# Patient Record
Sex: MALE | Race: WHITE | Employment: UNEMPLOYED | URBAN - METROPOLITAN AREA
[De-identification: names, ages, dates, MRNs, and addresses within clinical notes are randomized per-mention and may not be internally consistent; named-entity substitution may affect disease eponyms.]

---

## 2018-05-07 ENCOUNTER — HOSPITAL ENCOUNTER (INPATIENT)
Age: 43
LOS: 6 days | Discharge: HOME | DRG: 432 | End: 2018-05-13
Attending: INTERNAL MEDICINE | Admitting: INTERNAL MEDICINE

## 2018-05-07 ENCOUNTER — APPOINTMENT (OUTPATIENT)
Dept: CT IMAGING | Age: 43
DRG: 432 | End: 2018-05-07

## 2018-05-07 ENCOUNTER — APPOINTMENT (OUTPATIENT)
Dept: ULTRASOUND IMAGING | Age: 43
DRG: 432 | End: 2018-05-07

## 2018-05-07 ENCOUNTER — APPOINTMENT (OUTPATIENT)
Dept: GENERAL RADIOLOGY | Age: 43
DRG: 432 | End: 2018-05-07

## 2018-05-07 DIAGNOSIS — E87.6 HYPOKALEMIA: ICD-10-CM

## 2018-05-07 DIAGNOSIS — R10.13 ABDOMINAL PAIN, EPIGASTRIC: ICD-10-CM

## 2018-05-07 DIAGNOSIS — K92.0 HEMATEMESIS WITH NAUSEA: ICD-10-CM

## 2018-05-07 DIAGNOSIS — K85.20 ALCOHOL-INDUCED ACUTE PANCREATITIS, UNSPECIFIED COMPLICATION STATUS: Primary | ICD-10-CM

## 2018-05-07 DIAGNOSIS — F10.29 ALCOHOL DEPENDENCE WITH UNSPECIFIED ALCOHOL-INDUCED DISORDER (HCC): ICD-10-CM

## 2018-05-07 LAB
ALBUMIN SERPL-MCNC: 4.1 G/DL (ref 3.5–5.2)
ALP BLD-CCNC: 206 U/L (ref 40–129)
ALT SERPL-CCNC: 116 U/L (ref 0–40)
AMMONIA: 60 UMOL/L (ref 16–60)
ANION GAP SERPL CALCULATED.3IONS-SCNC: 19 MMOL/L (ref 7–16)
AST SERPL-CCNC: 347 U/L (ref 0–39)
BASOPHILS ABSOLUTE: 0.05 E9/L (ref 0–0.2)
BASOPHILS RELATIVE PERCENT: 0.7 % (ref 0–2)
BILIRUB SERPL-MCNC: 9.3 MG/DL (ref 0–1.2)
BUN BLDV-MCNC: 4 MG/DL (ref 6–20)
CALCIUM SERPL-MCNC: 9.1 MG/DL (ref 8.6–10.2)
CHLORIDE BLD-SCNC: 88 MMOL/L (ref 98–107)
CO2: 25 MMOL/L (ref 22–29)
CREAT SERPL-MCNC: 0.6 MG/DL (ref 0.7–1.2)
EOSINOPHILS ABSOLUTE: 0 E9/L (ref 0.05–0.5)
EOSINOPHILS RELATIVE PERCENT: 0 % (ref 0–6)
ETHANOL: 248 MG/DL (ref 0–0.08)
GFR AFRICAN AMERICAN: >60
GFR NON-AFRICAN AMERICAN: >60 ML/MIN/1.73
GLUCOSE BLD-MCNC: 139 MG/DL (ref 74–109)
HCT VFR BLD CALC: 42 % (ref 37–54)
HEMOGLOBIN: 15.1 G/DL (ref 12.5–16.5)
IMMATURE GRANULOCYTES #: 0.04 E9/L
IMMATURE GRANULOCYTES %: 0.5 % (ref 0–5)
LACTIC ACID: 3.7 MMOL/L (ref 0.5–2.2)
LACTIC ACID: 4.9 MMOL/L (ref 0.5–2.2)
LIPASE: 448 U/L (ref 13–60)
LYMPHOCYTES ABSOLUTE: 1.24 E9/L (ref 1.5–4)
LYMPHOCYTES RELATIVE PERCENT: 16.8 % (ref 20–42)
MCH RBC QN AUTO: 34.3 PG (ref 26–35)
MCHC RBC AUTO-ENTMCNC: 36 % (ref 32–34.5)
MCV RBC AUTO: 95.5 FL (ref 80–99.9)
MONOCYTES ABSOLUTE: 0.81 E9/L (ref 0.1–0.95)
MONOCYTES RELATIVE PERCENT: 11 % (ref 2–12)
NEUTROPHILS ABSOLUTE: 5.25 E9/L (ref 1.8–7.3)
NEUTROPHILS RELATIVE PERCENT: 71 % (ref 43–80)
PDW BLD-RTO: 13.7 FL (ref 11.5–15)
PLATELET # BLD: 140 E9/L (ref 130–450)
PMV BLD AUTO: 10.3 FL (ref 7–12)
POTASSIUM SERPL-SCNC: 2.9 MMOL/L (ref 3.5–5)
RBC # BLD: 4.4 E12/L (ref 3.8–5.8)
SODIUM BLD-SCNC: 132 MMOL/L (ref 132–146)
TOTAL PROTEIN: 8.6 G/DL (ref 6.4–8.3)
WBC # BLD: 7.4 E9/L (ref 4.5–11.5)

## 2018-05-07 PROCEDURE — 85025 COMPLETE CBC W/AUTO DIFF WBC: CPT

## 2018-05-07 PROCEDURE — 76705 ECHO EXAM OF ABDOMEN: CPT

## 2018-05-07 PROCEDURE — 83690 ASSAY OF LIPASE: CPT

## 2018-05-07 PROCEDURE — 6360000002 HC RX W HCPCS: Performed by: STUDENT IN AN ORGANIZED HEALTH CARE EDUCATION/TRAINING PROGRAM

## 2018-05-07 PROCEDURE — 2580000003 HC RX 258: Performed by: STUDENT IN AN ORGANIZED HEALTH CARE EDUCATION/TRAINING PROGRAM

## 2018-05-07 PROCEDURE — 71046 X-RAY EXAM CHEST 2 VIEWS: CPT

## 2018-05-07 PROCEDURE — 2000000000 HC ICU R&B

## 2018-05-07 PROCEDURE — 6370000000 HC RX 637 (ALT 250 FOR IP): Performed by: INTERNAL MEDICINE

## 2018-05-07 PROCEDURE — 6360000004 HC RX CONTRAST MEDICATION: Performed by: RADIOLOGY

## 2018-05-07 PROCEDURE — 82140 ASSAY OF AMMONIA: CPT

## 2018-05-07 PROCEDURE — 83605 ASSAY OF LACTIC ACID: CPT

## 2018-05-07 PROCEDURE — 2580000003 HC RX 258: Performed by: INTERNAL MEDICINE

## 2018-05-07 PROCEDURE — 80048 BASIC METABOLIC PNL TOTAL CA: CPT

## 2018-05-07 PROCEDURE — 94760 N-INVAS EAR/PLS OXIMETRY 1: CPT

## 2018-05-07 PROCEDURE — 96365 THER/PROPH/DIAG IV INF INIT: CPT

## 2018-05-07 PROCEDURE — 96375 TX/PRO/DX INJ NEW DRUG ADDON: CPT

## 2018-05-07 PROCEDURE — 36415 COLL VENOUS BLD VENIPUNCTURE: CPT

## 2018-05-07 PROCEDURE — 74177 CT ABD & PELVIS W/CONTRAST: CPT

## 2018-05-07 PROCEDURE — 80307 DRUG TEST PRSMV CHEM ANLYZR: CPT

## 2018-05-07 PROCEDURE — 6360000002 HC RX W HCPCS: Performed by: INTERNAL MEDICINE

## 2018-05-07 PROCEDURE — 80053 COMPREHEN METABOLIC PANEL: CPT

## 2018-05-07 PROCEDURE — 99291 CRITICAL CARE FIRST HOUR: CPT

## 2018-05-07 RX ORDER — ONDANSETRON 2 MG/ML
4 INJECTION INTRAMUSCULAR; INTRAVENOUS EVERY 6 HOURS PRN
Status: DISCONTINUED | OUTPATIENT
Start: 2018-05-07 | End: 2018-05-13 | Stop reason: HOSPADM

## 2018-05-07 RX ORDER — POTASSIUM CHLORIDE 7.45 MG/ML
10 INJECTION INTRAVENOUS ONCE
Status: COMPLETED | OUTPATIENT
Start: 2018-05-07 | End: 2018-05-07

## 2018-05-07 RX ORDER — OMEPRAZOLE 20 MG/1
20 CAPSULE, DELAYED RELEASE ORAL DAILY PRN
Status: ON HOLD | COMMUNITY
End: 2019-01-23 | Stop reason: HOSPADM

## 2018-05-07 RX ORDER — LORAZEPAM 1 MG/1
4 TABLET ORAL
Status: DISCONTINUED | OUTPATIENT
Start: 2018-05-07 | End: 2018-05-13 | Stop reason: HOSPADM

## 2018-05-07 RX ORDER — LORAZEPAM 1 MG/1
3 TABLET ORAL
Status: DISCONTINUED | OUTPATIENT
Start: 2018-05-07 | End: 2018-05-13 | Stop reason: HOSPADM

## 2018-05-07 RX ORDER — LORAZEPAM 1 MG/1
1 TABLET ORAL
Status: DISCONTINUED | OUTPATIENT
Start: 2018-05-07 | End: 2018-05-13 | Stop reason: HOSPADM

## 2018-05-07 RX ORDER — SODIUM CHLORIDE 0.9 % (FLUSH) 0.9 %
10 SYRINGE (ML) INJECTION EVERY 12 HOURS SCHEDULED
Status: DISCONTINUED | OUTPATIENT
Start: 2018-05-07 | End: 2018-05-13 | Stop reason: HOSPADM

## 2018-05-07 RX ORDER — LORAZEPAM 2 MG/ML
3 INJECTION INTRAMUSCULAR
Status: DISCONTINUED | OUTPATIENT
Start: 2018-05-07 | End: 2018-05-13 | Stop reason: HOSPADM

## 2018-05-07 RX ORDER — ONDANSETRON 2 MG/ML
4 INJECTION INTRAMUSCULAR; INTRAVENOUS ONCE
Status: COMPLETED | OUTPATIENT
Start: 2018-05-07 | End: 2018-05-07

## 2018-05-07 RX ORDER — LORAZEPAM 2 MG/ML
1 INJECTION INTRAMUSCULAR
Status: DISCONTINUED | OUTPATIENT
Start: 2018-05-07 | End: 2018-05-13 | Stop reason: HOSPADM

## 2018-05-07 RX ORDER — LORAZEPAM 2 MG/ML
4 INJECTION INTRAMUSCULAR
Status: DISCONTINUED | OUTPATIENT
Start: 2018-05-07 | End: 2018-05-13 | Stop reason: HOSPADM

## 2018-05-07 RX ORDER — POTASSIUM CHLORIDE 20 MEQ/1
20 TABLET, EXTENDED RELEASE ORAL ONCE
Status: COMPLETED | OUTPATIENT
Start: 2018-05-07 | End: 2018-05-07

## 2018-05-07 RX ORDER — SODIUM CHLORIDE 0.9 % (FLUSH) 0.9 %
10 SYRINGE (ML) INJECTION PRN
Status: DISCONTINUED | OUTPATIENT
Start: 2018-05-07 | End: 2018-05-13 | Stop reason: HOSPADM

## 2018-05-07 RX ORDER — POTASSIUM CHLORIDE AND SODIUM CHLORIDE 450; 150 MG/100ML; MG/100ML
INJECTION, SOLUTION INTRAVENOUS CONTINUOUS
Status: DISCONTINUED | OUTPATIENT
Start: 2018-05-07 | End: 2018-05-08

## 2018-05-07 RX ORDER — LORAZEPAM 1 MG/1
2 TABLET ORAL
Status: DISCONTINUED | OUTPATIENT
Start: 2018-05-07 | End: 2018-05-13 | Stop reason: HOSPADM

## 2018-05-07 RX ORDER — 0.9 % SODIUM CHLORIDE 0.9 %
1000 INTRAVENOUS SOLUTION INTRAVENOUS ONCE
Status: COMPLETED | OUTPATIENT
Start: 2018-05-07 | End: 2018-05-07

## 2018-05-07 RX ORDER — LORAZEPAM 2 MG/ML
2 INJECTION INTRAMUSCULAR
Status: DISCONTINUED | OUTPATIENT
Start: 2018-05-07 | End: 2018-05-13 | Stop reason: HOSPADM

## 2018-05-07 RX ADMIN — POTASSIUM CHLORIDE 20 MEQ: 20 TABLET, EXTENDED RELEASE ORAL at 22:27

## 2018-05-07 RX ADMIN — POTASSIUM CHLORIDE AND SODIUM CHLORIDE: 450; 150 INJECTION, SOLUTION INTRAVENOUS at 22:27

## 2018-05-07 RX ADMIN — ONDANSETRON 4 MG: 2 INJECTION INTRAMUSCULAR; INTRAVENOUS at 18:32

## 2018-05-07 RX ADMIN — Medication 10 ML: at 22:33

## 2018-05-07 RX ADMIN — IOPAMIDOL 80 ML: 755 INJECTION, SOLUTION INTRAVENOUS at 19:57

## 2018-05-07 RX ADMIN — SODIUM CHLORIDE 1000 ML: 9 INJECTION, SOLUTION INTRAVENOUS at 20:00

## 2018-05-07 RX ADMIN — POTASSIUM CHLORIDE 10 MEQ: 10 INJECTION, SOLUTION INTRAVENOUS at 20:00

## 2018-05-07 ASSESSMENT — ENCOUNTER SYMPTOMS
CHEST TIGHTNESS: 0
CONSTIPATION: 0
SHORTNESS OF BREATH: 1
SORE THROAT: 0
NAUSEA: 1
ABDOMINAL PAIN: 1
VOMITING: 1
BLOOD IN STOOL: 0
WHEEZING: 0
DIARRHEA: 0
BACK PAIN: 0
COUGH: 1
RHINORRHEA: 0

## 2018-05-07 ASSESSMENT — PAIN SCALES - GENERAL
PAINLEVEL_OUTOF10: 5
PAINLEVEL_OUTOF10: 0

## 2018-05-07 ASSESSMENT — PAIN DESCRIPTION - PAIN TYPE: TYPE: ACUTE PAIN

## 2018-05-07 ASSESSMENT — PAIN DESCRIPTION - LOCATION: LOCATION: ABDOMEN

## 2018-05-07 ASSESSMENT — PAIN DESCRIPTION - DESCRIPTORS: DESCRIPTORS: ACHING

## 2018-05-08 ENCOUNTER — ANESTHESIA EVENT (OUTPATIENT)
Dept: ENDOSCOPY | Age: 43
DRG: 432 | End: 2018-05-08

## 2018-05-08 ENCOUNTER — ANESTHESIA (OUTPATIENT)
Dept: ENDOSCOPY | Age: 43
DRG: 432 | End: 2018-05-08

## 2018-05-08 VITALS
DIASTOLIC BLOOD PRESSURE: 71 MMHG | OXYGEN SATURATION: 96 % | RESPIRATION RATE: 20 BRPM | SYSTOLIC BLOOD PRESSURE: 123 MMHG

## 2018-05-08 LAB
ALBUMIN SERPL-MCNC: 3.3 G/DL (ref 3.5–5.2)
ALP BLD-CCNC: 164 U/L (ref 40–129)
ALT SERPL-CCNC: 97 U/L (ref 0–40)
AMPHETAMINE SCREEN, URINE: NOT DETECTED
ANION GAP SERPL CALCULATED.3IONS-SCNC: 16 MMOL/L (ref 7–16)
ANION GAP SERPL CALCULATED.3IONS-SCNC: 20 MMOL/L (ref 7–16)
AST SERPL-CCNC: 305 U/L (ref 0–39)
BACTERIA: ABNORMAL /HPF
BARBITURATE SCREEN URINE: NOT DETECTED
BASOPHILS ABSOLUTE: 0.06 E9/L (ref 0–0.2)
BASOPHILS RELATIVE PERCENT: 1 % (ref 0–2)
BENZODIAZEPINE SCREEN, URINE: NOT DETECTED
BILIRUB SERPL-MCNC: 8.8 MG/DL (ref 0–1.2)
BILIRUBIN URINE: ABNORMAL
BLOOD, URINE: ABNORMAL
BUN BLDV-MCNC: 3 MG/DL (ref 6–20)
BUN BLDV-MCNC: 3 MG/DL (ref 6–20)
CALCIUM SERPL-MCNC: 7.7 MG/DL (ref 8.6–10.2)
CALCIUM SERPL-MCNC: 8 MG/DL (ref 8.6–10.2)
CANNABINOID SCREEN URINE: NOT DETECTED
CHLORIDE BLD-SCNC: 89 MMOL/L (ref 98–107)
CHLORIDE BLD-SCNC: 93 MMOL/L (ref 98–107)
CHOLESTEROL, TOTAL: 154 MG/DL (ref 0–199)
CLARITY: CLEAR
CO2: 21 MMOL/L (ref 22–29)
CO2: 22 MMOL/L (ref 22–29)
COCAINE METABOLITE SCREEN URINE: NOT DETECTED
COLOR: ABNORMAL
CREAT SERPL-MCNC: 0.6 MG/DL (ref 0.7–1.2)
CREAT SERPL-MCNC: 0.6 MG/DL (ref 0.7–1.2)
EOSINOPHILS ABSOLUTE: 0 E9/L (ref 0.05–0.5)
EOSINOPHILS RELATIVE PERCENT: 0 % (ref 0–6)
EPITHELIAL CELLS, UA: ABNORMAL /HPF
FERRITIN: 1815 NG/ML
GFR AFRICAN AMERICAN: >60
GFR AFRICAN AMERICAN: >60
GFR NON-AFRICAN AMERICAN: >60 ML/MIN/1.73
GFR NON-AFRICAN AMERICAN: >60 ML/MIN/1.73
GLUCOSE BLD-MCNC: 104 MG/DL (ref 74–109)
GLUCOSE BLD-MCNC: 115 MG/DL (ref 74–109)
GLUCOSE URINE: NEGATIVE MG/DL
HBA1C MFR BLD: 5.1 % (ref 4.8–5.9)
HCT VFR BLD CALC: 35.7 % (ref 37–54)
HDLC SERPL-MCNC: 5 MG/DL
HEMOGLOBIN: 12.7 G/DL (ref 12.5–16.5)
IMMATURE GRANULOCYTES #: 0.03 E9/L
IMMATURE GRANULOCYTES %: 0.5 % (ref 0–5)
INR BLD: 1.5
IRON SATURATION: 89 % (ref 20–55)
IRON: 132 MCG/DL (ref 59–158)
KETONES, URINE: 40 MG/DL
LACTIC ACID: 2.7 MMOL/L (ref 0.5–2.2)
LDL CHOLESTEROL CALCULATED: 98 MG/DL (ref 0–99)
LEUKOCYTE ESTERASE, URINE: NEGATIVE
LIPASE: 460 U/L (ref 13–60)
LYMPHOCYTES ABSOLUTE: 0.99 E9/L (ref 1.5–4)
LYMPHOCYTES RELATIVE PERCENT: 16.1 % (ref 20–42)
MAGNESIUM: 1.7 MG/DL (ref 1.6–2.6)
MCH RBC QN AUTO: 34.3 PG (ref 26–35)
MCHC RBC AUTO-ENTMCNC: 35.6 % (ref 32–34.5)
MCV RBC AUTO: 96.5 FL (ref 80–99.9)
METHADONE SCREEN, URINE: NOT DETECTED
MONOCYTES ABSOLUTE: 0.59 E9/L (ref 0.1–0.95)
MONOCYTES RELATIVE PERCENT: 9.6 % (ref 2–12)
NEUTROPHILS ABSOLUTE: 4.48 E9/L (ref 1.8–7.3)
NEUTROPHILS RELATIVE PERCENT: 72.8 % (ref 43–80)
NITRITE, URINE: NEGATIVE
OPIATE SCREEN URINE: NOT DETECTED
PDW BLD-RTO: 14.3 FL (ref 11.5–15)
PH UA: 7 (ref 5–9)
PHENCYCLIDINE SCREEN URINE: NOT DETECTED
PHOSPHORUS: 1.2 MG/DL (ref 2.5–4.5)
PLATELET # BLD: 129 E9/L (ref 130–450)
PMV BLD AUTO: 10.3 FL (ref 7–12)
POTASSIUM SERPL-SCNC: 2.8 MMOL/L (ref 3.5–5)
POTASSIUM SERPL-SCNC: 3.3 MMOL/L (ref 3.5–5)
PROPOXYPHENE SCREEN: NOT DETECTED
PROTEIN UA: 30 MG/DL
PROTHROMBIN TIME: 17.2 SEC (ref 9.3–12.4)
RBC # BLD: 3.7 E12/L (ref 3.8–5.8)
RBC UA: ABNORMAL /HPF (ref 0–2)
SODIUM BLD-SCNC: 130 MMOL/L (ref 132–146)
SODIUM BLD-SCNC: 131 MMOL/L (ref 132–146)
SPECIFIC GRAVITY UA: <=1.005 (ref 1–1.03)
TOTAL IRON BINDING CAPACITY: 149 MCG/DL (ref 250–450)
TOTAL PROTEIN: 6.9 G/DL (ref 6.4–8.3)
TRIGL SERPL-MCNC: 253 MG/DL (ref 0–149)
TSH SERPL DL<=0.05 MIU/L-ACNC: 1.34 UIU/ML (ref 0.27–4.2)
UROBILINOGEN, URINE: >=8 E.U./DL
VLDLC SERPL CALC-MCNC: 51 MG/DL
WBC # BLD: 6.2 E9/L (ref 4.5–11.5)
WBC UA: ABNORMAL /HPF (ref 0–5)

## 2018-05-08 PROCEDURE — 81001 URINALYSIS AUTO W/SCOPE: CPT

## 2018-05-08 PROCEDURE — 6360000002 HC RX W HCPCS: Performed by: INTERNAL MEDICINE

## 2018-05-08 PROCEDURE — 80074 ACUTE HEPATITIS PANEL: CPT

## 2018-05-08 PROCEDURE — 83735 ASSAY OF MAGNESIUM: CPT

## 2018-05-08 PROCEDURE — C9113 INJ PANTOPRAZOLE SODIUM, VIA: HCPCS | Performed by: INTERNAL MEDICINE

## 2018-05-08 PROCEDURE — 88342 IMHCHEM/IMCYTCHM 1ST ANTB: CPT

## 2018-05-08 PROCEDURE — 87081 CULTURE SCREEN ONLY: CPT

## 2018-05-08 PROCEDURE — 2709999900 HC NON-CHARGEABLE SUPPLY: Performed by: INTERNAL MEDICINE

## 2018-05-08 PROCEDURE — 36415 COLL VENOUS BLD VENIPUNCTURE: CPT

## 2018-05-08 PROCEDURE — 88305 TISSUE EXAM BY PATHOLOGIST: CPT

## 2018-05-08 PROCEDURE — 82728 ASSAY OF FERRITIN: CPT

## 2018-05-08 PROCEDURE — 2000000000 HC ICU R&B

## 2018-05-08 PROCEDURE — 0DB48ZX EXCISION OF ESOPHAGOGASTRIC JUNCTION, VIA NATURAL OR ARTIFICIAL OPENING ENDOSCOPIC, DIAGNOSTIC: ICD-10-PCS | Performed by: INTERNAL MEDICINE

## 2018-05-08 PROCEDURE — 80061 LIPID PANEL: CPT

## 2018-05-08 PROCEDURE — 2580000003 HC RX 258: Performed by: NURSE ANESTHETIST, CERTIFIED REGISTERED

## 2018-05-08 PROCEDURE — 3700000000 HC ANESTHESIA ATTENDED CARE: Performed by: INTERNAL MEDICINE

## 2018-05-08 PROCEDURE — 6360000002 HC RX W HCPCS: Performed by: NURSE ANESTHETIST, CERTIFIED REGISTERED

## 2018-05-08 PROCEDURE — 2500000003 HC RX 250 WO HCPCS: Performed by: INTERNAL MEDICINE

## 2018-05-08 PROCEDURE — 3609012400 HC EGD TRANSORAL BIOPSY SINGLE/MULTIPLE: Performed by: INTERNAL MEDICINE

## 2018-05-08 PROCEDURE — 80307 DRUG TEST PRSMV CHEM ANLYZR: CPT

## 2018-05-08 PROCEDURE — 82103 ALPHA-1-ANTITRYPSIN TOTAL: CPT

## 2018-05-08 PROCEDURE — 83605 ASSAY OF LACTIC ACID: CPT

## 2018-05-08 PROCEDURE — 2580000003 HC RX 258: Performed by: INTERNAL MEDICINE

## 2018-05-08 PROCEDURE — 85610 PROTHROMBIN TIME: CPT

## 2018-05-08 PROCEDURE — 84100 ASSAY OF PHOSPHORUS: CPT

## 2018-05-08 PROCEDURE — 85025 COMPLETE CBC W/AUTO DIFF WBC: CPT

## 2018-05-08 PROCEDURE — 83540 ASSAY OF IRON: CPT

## 2018-05-08 PROCEDURE — 6370000000 HC RX 637 (ALT 250 FOR IP): Performed by: INTERNAL MEDICINE

## 2018-05-08 PROCEDURE — C1773 RET DEV, INSERTABLE: HCPCS | Performed by: INTERNAL MEDICINE

## 2018-05-08 PROCEDURE — 84443 ASSAY THYROID STIM HORMONE: CPT

## 2018-05-08 PROCEDURE — 86255 FLUORESCENT ANTIBODY SCREEN: CPT

## 2018-05-08 PROCEDURE — 0DB98ZX EXCISION OF DUODENUM, VIA NATURAL OR ARTIFICIAL OPENING ENDOSCOPIC, DIAGNOSTIC: ICD-10-PCS | Performed by: INTERNAL MEDICINE

## 2018-05-08 PROCEDURE — 83036 HEMOGLOBIN GLYCOSYLATED A1C: CPT

## 2018-05-08 PROCEDURE — 0DB68ZX EXCISION OF STOMACH, VIA NATURAL OR ARTIFICIAL OPENING ENDOSCOPIC, DIAGNOSTIC: ICD-10-PCS | Performed by: INTERNAL MEDICINE

## 2018-05-08 PROCEDURE — 3700000001 HC ADD 15 MINUTES (ANESTHESIA): Performed by: INTERNAL MEDICINE

## 2018-05-08 PROCEDURE — 82390 ASSAY OF CERULOPLASMIN: CPT

## 2018-05-08 PROCEDURE — 83690 ASSAY OF LIPASE: CPT

## 2018-05-08 PROCEDURE — 80053 COMPREHEN METABOLIC PANEL: CPT

## 2018-05-08 PROCEDURE — 83550 IRON BINDING TEST: CPT

## 2018-05-08 PROCEDURE — 86038 ANTINUCLEAR ANTIBODIES: CPT

## 2018-05-08 RX ORDER — PANTOPRAZOLE SODIUM 40 MG/10ML
40 INJECTION, POWDER, LYOPHILIZED, FOR SOLUTION INTRAVENOUS DAILY
Status: DISCONTINUED | OUTPATIENT
Start: 2018-05-08 | End: 2018-05-13 | Stop reason: HOSPADM

## 2018-05-08 RX ORDER — PROPOFOL 10 MG/ML
INJECTION, EMULSION INTRAVENOUS PRN
Status: DISCONTINUED | OUTPATIENT
Start: 2018-05-08 | End: 2018-05-08 | Stop reason: SDUPTHER

## 2018-05-08 RX ORDER — 0.9 % SODIUM CHLORIDE 0.9 %
10 VIAL (ML) INJECTION EVERY 12 HOURS SCHEDULED
Status: DISCONTINUED | OUTPATIENT
Start: 2018-05-08 | End: 2018-05-13 | Stop reason: HOSPADM

## 2018-05-08 RX ORDER — POTASSIUM CHLORIDE 20 MEQ/1
40 TABLET, EXTENDED RELEASE ORAL ONCE
Status: COMPLETED | OUTPATIENT
Start: 2018-05-08 | End: 2018-05-08

## 2018-05-08 RX ORDER — NADOLOL 20 MG/1
20 TABLET ORAL DAILY
Status: DISCONTINUED | OUTPATIENT
Start: 2018-05-08 | End: 2018-05-13 | Stop reason: HOSPADM

## 2018-05-08 RX ORDER — SODIUM CHLORIDE 9 MG/ML
INJECTION, SOLUTION INTRAVENOUS CONTINUOUS PRN
Status: DISCONTINUED | OUTPATIENT
Start: 2018-05-08 | End: 2018-05-08 | Stop reason: SDUPTHER

## 2018-05-08 RX ORDER — SODIUM CHLORIDE 0.9 % (FLUSH) 0.9 %
10 SYRINGE (ML) INJECTION PRN
Status: DISCONTINUED | OUTPATIENT
Start: 2018-05-08 | End: 2018-05-13 | Stop reason: HOSPADM

## 2018-05-08 RX ORDER — SODIUM CHLORIDE AND POTASSIUM CHLORIDE .9; .15 G/100ML; G/100ML
SOLUTION INTRAVENOUS CONTINUOUS
Status: DISCONTINUED | OUTPATIENT
Start: 2018-05-08 | End: 2018-05-13

## 2018-05-08 RX ADMIN — ONDANSETRON 4 MG: 2 INJECTION INTRAMUSCULAR; INTRAVENOUS at 06:01

## 2018-05-08 RX ADMIN — LORAZEPAM 1 MG: 2 INJECTION INTRAMUSCULAR at 21:06

## 2018-05-08 RX ADMIN — PROPOFOL 200 MG: 10 INJECTION, EMULSION INTRAVENOUS at 17:52

## 2018-05-08 RX ADMIN — POTASSIUM CHLORIDE AND SODIUM CHLORIDE: 900; 150 INJECTION, SOLUTION INTRAVENOUS at 22:41

## 2018-05-08 RX ADMIN — Medication 10 ML: at 09:05

## 2018-05-08 RX ADMIN — SODIUM CHLORIDE: 9 INJECTION, SOLUTION INTRAVENOUS at 17:48

## 2018-05-08 RX ADMIN — PANTOPRAZOLE SODIUM 40 MG: 40 INJECTION, POWDER, FOR SOLUTION INTRAVENOUS at 22:41

## 2018-05-08 RX ADMIN — LORAZEPAM 1 MG: 1 TABLET ORAL at 06:01

## 2018-05-08 RX ADMIN — POTASSIUM PHOSPHATE, MONOBASIC AND POTASSIUM PHOSPHATE, DIBASIC 30 MMOL: 224; 236 INJECTION, SOLUTION INTRAVENOUS at 12:13

## 2018-05-08 RX ADMIN — PROPOFOL 50 MG: 10 INJECTION, EMULSION INTRAVENOUS at 17:55

## 2018-05-08 RX ADMIN — Medication 10 ML: at 22:43

## 2018-05-08 RX ADMIN — ENOXAPARIN SODIUM 40 MG: 40 INJECTION SUBCUTANEOUS at 09:05

## 2018-05-08 RX ADMIN — POTASSIUM CHLORIDE 40 MEQ: 20 TABLET, EXTENDED RELEASE ORAL at 00:41

## 2018-05-08 RX ADMIN — NADOLOL 20 MG: 20 TABLET ORAL at 09:10

## 2018-05-08 RX ADMIN — POTASSIUM CHLORIDE AND SODIUM CHLORIDE: 900; 150 INJECTION, SOLUTION INTRAVENOUS at 00:41

## 2018-05-08 RX ADMIN — LORAZEPAM 2 MG: 2 INJECTION INTRAMUSCULAR; INTRAVENOUS at 09:02

## 2018-05-08 RX ADMIN — FOLIC ACID: 5 INJECTION, SOLUTION INTRAMUSCULAR; INTRAVENOUS; SUBCUTANEOUS at 08:59

## 2018-05-08 ASSESSMENT — PAIN SCALES - GENERAL
PAINLEVEL_OUTOF10: 0

## 2018-05-09 LAB
ALBUMIN SERPL-MCNC: 3.1 G/DL (ref 3.5–5.2)
ALP BLD-CCNC: 159 U/L (ref 40–129)
ALPHA-1 ANTITRYPSIN: 171 MG/DL (ref 90–200)
ALT SERPL-CCNC: 103 U/L (ref 0–40)
AMYLASE: 114 U/L (ref 20–100)
ANION GAP SERPL CALCULATED.3IONS-SCNC: 14 MMOL/L (ref 7–16)
ANTI-NUCLEAR ANTIBODY (ANA): NEGATIVE
AST SERPL-CCNC: 298 U/L (ref 0–39)
BASOPHILS ABSOLUTE: 0.05 E9/L (ref 0–0.2)
BASOPHILS RELATIVE PERCENT: 1 % (ref 0–2)
BILIRUB SERPL-MCNC: 10.7 MG/DL (ref 0–1.2)
BUN BLDV-MCNC: 2 MG/DL (ref 6–20)
CALCIUM SERPL-MCNC: 7.6 MG/DL (ref 8.6–10.2)
CHLORIDE BLD-SCNC: 96 MMOL/L (ref 98–107)
CO2: 25 MMOL/L (ref 22–29)
CREAT SERPL-MCNC: 0.6 MG/DL (ref 0.7–1.2)
EOSINOPHILS ABSOLUTE: 0.01 E9/L (ref 0.05–0.5)
EOSINOPHILS RELATIVE PERCENT: 0.2 % (ref 0–6)
GFR AFRICAN AMERICAN: >60
GFR NON-AFRICAN AMERICAN: >60 ML/MIN/1.73
GLUCOSE BLD-MCNC: 98 MG/DL (ref 74–109)
HAV IGM SER IA-ACNC: NORMAL
HCT VFR BLD CALC: 36.2 % (ref 37–54)
HEMOGLOBIN: 12.5 G/DL (ref 12.5–16.5)
HEPATITIS B CORE IGM ANTIBODY: NORMAL
HEPATITIS B SURFACE ANTIGEN INTERPRETATION: NORMAL
HEPATITIS C ANTIBODY INTERPRETATION: NORMAL
IMMATURE GRANULOCYTES #: 0.04 E9/L
IMMATURE GRANULOCYTES %: 0.8 % (ref 0–5)
LIPASE: 695 U/L (ref 13–60)
LYMPHOCYTES ABSOLUTE: 0.93 E9/L (ref 1.5–4)
LYMPHOCYTES RELATIVE PERCENT: 19.2 % (ref 20–42)
MCH RBC QN AUTO: 34.5 PG (ref 26–35)
MCHC RBC AUTO-ENTMCNC: 34.5 % (ref 32–34.5)
MCV RBC AUTO: 100 FL (ref 80–99.9)
MONOCYTES ABSOLUTE: 0.39 E9/L (ref 0.1–0.95)
MONOCYTES RELATIVE PERCENT: 8 % (ref 2–12)
MRSA CULTURE ONLY: NORMAL
NEUTROPHILS ABSOLUTE: 3.43 E9/L (ref 1.8–7.3)
NEUTROPHILS RELATIVE PERCENT: 70.8 % (ref 43–80)
PDW BLD-RTO: 14.6 FL (ref 11.5–15)
PLATELET # BLD: 130 E9/L (ref 130–450)
PMV BLD AUTO: 10.4 FL (ref 7–12)
POTASSIUM SERPL-SCNC: 3.1 MMOL/L (ref 3.5–5)
RBC # BLD: 3.62 E12/L (ref 3.8–5.8)
SODIUM BLD-SCNC: 135 MMOL/L (ref 132–146)
TOTAL PROTEIN: 6.4 G/DL (ref 6.4–8.3)
WBC # BLD: 4.9 E9/L (ref 4.5–11.5)

## 2018-05-09 PROCEDURE — C9113 INJ PANTOPRAZOLE SODIUM, VIA: HCPCS | Performed by: INTERNAL MEDICINE

## 2018-05-09 PROCEDURE — 80053 COMPREHEN METABOLIC PANEL: CPT

## 2018-05-09 PROCEDURE — 83690 ASSAY OF LIPASE: CPT

## 2018-05-09 PROCEDURE — 2580000003 HC RX 258: Performed by: INTERNAL MEDICINE

## 2018-05-09 PROCEDURE — 6360000002 HC RX W HCPCS: Performed by: INTERNAL MEDICINE

## 2018-05-09 PROCEDURE — 81256 HFE GENE: CPT

## 2018-05-09 PROCEDURE — 36415 COLL VENOUS BLD VENIPUNCTURE: CPT

## 2018-05-09 PROCEDURE — 82150 ASSAY OF AMYLASE: CPT

## 2018-05-09 PROCEDURE — 1200000000 HC SEMI PRIVATE

## 2018-05-09 PROCEDURE — 6370000000 HC RX 637 (ALT 250 FOR IP): Performed by: INTERNAL MEDICINE

## 2018-05-09 PROCEDURE — 85025 COMPLETE CBC W/AUTO DIFF WBC: CPT

## 2018-05-09 PROCEDURE — 2500000003 HC RX 250 WO HCPCS: Performed by: INTERNAL MEDICINE

## 2018-05-09 RX ORDER — DIPHENHYDRAMINE HCL 25 MG
25 TABLET ORAL NIGHTLY PRN
Status: DISCONTINUED | OUTPATIENT
Start: 2018-05-09 | End: 2018-05-13 | Stop reason: HOSPADM

## 2018-05-09 RX ORDER — DIPHENHYDRAMINE HCL 25 MG
25 TABLET ORAL NIGHTLY PRN
Status: DISCONTINUED | OUTPATIENT
Start: 2018-05-10 | End: 2018-05-09

## 2018-05-09 RX ADMIN — ENOXAPARIN SODIUM 40 MG: 40 INJECTION SUBCUTANEOUS at 08:08

## 2018-05-09 RX ADMIN — POTASSIUM CHLORIDE AND SODIUM CHLORIDE: 900; 150 INJECTION, SOLUTION INTRAVENOUS at 10:10

## 2018-05-09 RX ADMIN — Medication 10 ML: at 08:08

## 2018-05-09 RX ADMIN — NADOLOL 20 MG: 20 TABLET ORAL at 10:56

## 2018-05-09 RX ADMIN — PANTOPRAZOLE SODIUM 40 MG: 40 INJECTION, POWDER, FOR SOLUTION INTRAVENOUS at 08:08

## 2018-05-09 RX ADMIN — POTASSIUM CHLORIDE AND SODIUM CHLORIDE: 900; 150 INJECTION, SOLUTION INTRAVENOUS at 14:42

## 2018-05-09 RX ADMIN — POTASSIUM CHLORIDE AND SODIUM CHLORIDE: 900; 150 INJECTION, SOLUTION INTRAVENOUS at 22:12

## 2018-05-09 RX ADMIN — DIPHENHYDRAMINE HCL 25 MG: 25 TABLET ORAL at 22:20

## 2018-05-09 RX ADMIN — Medication 10 ML: at 22:13

## 2018-05-09 RX ADMIN — FOLIC ACID: 5 INJECTION, SOLUTION INTRAMUSCULAR; INTRAVENOUS; SUBCUTANEOUS at 10:56

## 2018-05-09 ASSESSMENT — PAIN SCALES - GENERAL
PAINLEVEL_OUTOF10: 0

## 2018-05-10 LAB
ALBUMIN SERPL-MCNC: 2.7 G/DL (ref 3.5–5.2)
ALP BLD-CCNC: 150 U/L (ref 40–129)
ALT SERPL-CCNC: 88 U/L (ref 0–40)
ANION GAP SERPL CALCULATED.3IONS-SCNC: 13 MMOL/L (ref 7–16)
AST SERPL-CCNC: 247 U/L (ref 0–39)
BILIRUB SERPL-MCNC: 10.6 MG/DL (ref 0–1.2)
BUN BLDV-MCNC: 2 MG/DL (ref 6–20)
CALCIUM SERPL-MCNC: 7.6 MG/DL (ref 8.6–10.2)
CERULOPLASMIN: 23 MG/DL (ref 15–30)
CHLORIDE BLD-SCNC: 100 MMOL/L (ref 98–107)
CO2: 22 MMOL/L (ref 22–29)
CREAT SERPL-MCNC: 0.5 MG/DL (ref 0.7–1.2)
GFR AFRICAN AMERICAN: >60
GFR NON-AFRICAN AMERICAN: >60 ML/MIN/1.73
GLUCOSE BLD-MCNC: 87 MG/DL (ref 74–109)
HCT VFR BLD CALC: 34.5 % (ref 37–54)
HEMOGLOBIN: 12 G/DL (ref 12.5–16.5)
LIPASE: 529 U/L (ref 13–60)
POTASSIUM SERPL-SCNC: 3.1 MMOL/L (ref 3.5–5)
SODIUM BLD-SCNC: 135 MMOL/L (ref 132–146)
TOTAL PROTEIN: 6 G/DL (ref 6.4–8.3)

## 2018-05-10 PROCEDURE — 6370000000 HC RX 637 (ALT 250 FOR IP): Performed by: INTERNAL MEDICINE

## 2018-05-10 PROCEDURE — 2500000003 HC RX 250 WO HCPCS: Performed by: INTERNAL MEDICINE

## 2018-05-10 PROCEDURE — 83690 ASSAY OF LIPASE: CPT

## 2018-05-10 PROCEDURE — 85014 HEMATOCRIT: CPT

## 2018-05-10 PROCEDURE — 6360000002 HC RX W HCPCS: Performed by: INTERNAL MEDICINE

## 2018-05-10 PROCEDURE — 2580000003 HC RX 258: Performed by: INTERNAL MEDICINE

## 2018-05-10 PROCEDURE — C9113 INJ PANTOPRAZOLE SODIUM, VIA: HCPCS | Performed by: INTERNAL MEDICINE

## 2018-05-10 PROCEDURE — 1200000000 HC SEMI PRIVATE

## 2018-05-10 PROCEDURE — 85018 HEMOGLOBIN: CPT

## 2018-05-10 PROCEDURE — 80053 COMPREHEN METABOLIC PANEL: CPT

## 2018-05-10 PROCEDURE — 36415 COLL VENOUS BLD VENIPUNCTURE: CPT

## 2018-05-10 RX ORDER — KETOROLAC TROMETHAMINE 15 MG/ML
15 INJECTION, SOLUTION INTRAMUSCULAR; INTRAVENOUS EVERY 6 HOURS PRN
Status: DISCONTINUED | OUTPATIENT
Start: 2018-05-10 | End: 2018-05-13 | Stop reason: HOSPADM

## 2018-05-10 RX ORDER — TRAMADOL HYDROCHLORIDE 50 MG/1
50 TABLET ORAL EVERY 6 HOURS PRN
Status: DISCONTINUED | OUTPATIENT
Start: 2018-05-10 | End: 2018-05-13 | Stop reason: HOSPADM

## 2018-05-10 RX ORDER — POTASSIUM CHLORIDE 20 MEQ/1
20 TABLET, EXTENDED RELEASE ORAL 2 TIMES DAILY WITH MEALS
Status: COMPLETED | OUTPATIENT
Start: 2018-05-10 | End: 2018-05-10

## 2018-05-10 RX ADMIN — ENOXAPARIN SODIUM 40 MG: 40 INJECTION SUBCUTANEOUS at 09:37

## 2018-05-10 RX ADMIN — PANTOPRAZOLE SODIUM 40 MG: 40 INJECTION, POWDER, FOR SOLUTION INTRAVENOUS at 10:03

## 2018-05-10 RX ADMIN — LORAZEPAM 1 MG: 1 TABLET ORAL at 04:32

## 2018-05-10 RX ADMIN — POTASSIUM CHLORIDE 20 MEQ: 20 TABLET, EXTENDED RELEASE ORAL at 12:00

## 2018-05-10 RX ADMIN — Medication 10 ML: at 10:03

## 2018-05-10 RX ADMIN — POTASSIUM CHLORIDE 20 MEQ: 20 TABLET, EXTENDED RELEASE ORAL at 16:33

## 2018-05-10 RX ADMIN — NADOLOL 20 MG: 20 TABLET ORAL at 11:18

## 2018-05-10 RX ADMIN — TRAMADOL HYDROCHLORIDE 50 MG: 50 TABLET, FILM COATED ORAL at 20:25

## 2018-05-10 RX ADMIN — FOLIC ACID: 5 INJECTION, SOLUTION INTRAMUSCULAR; INTRAVENOUS; SUBCUTANEOUS at 10:03

## 2018-05-10 ASSESSMENT — PAIN DESCRIPTION - FREQUENCY
FREQUENCY: INTERMITTENT
FREQUENCY: INTERMITTENT

## 2018-05-10 ASSESSMENT — PAIN DESCRIPTION - PAIN TYPE
TYPE: ACUTE PAIN

## 2018-05-10 ASSESSMENT — PAIN DESCRIPTION - DESCRIPTORS
DESCRIPTORS: ACHING;DISCOMFORT
DESCRIPTORS: ACHING;DISCOMFORT;DULL
DESCRIPTORS: ACHING;DISCOMFORT;DULL

## 2018-05-10 ASSESSMENT — PAIN DESCRIPTION - LOCATION
LOCATION: ABDOMEN
LOCATION: BACK
LOCATION: BACK

## 2018-05-10 ASSESSMENT — PAIN SCALES - GENERAL
PAINLEVEL_OUTOF10: 0
PAINLEVEL_OUTOF10: 2
PAINLEVEL_OUTOF10: 7
PAINLEVEL_OUTOF10: 7
PAINLEVEL_OUTOF10: 2
PAINLEVEL_OUTOF10: 0
PAINLEVEL_OUTOF10: 2

## 2018-05-10 ASSESSMENT — PAIN DESCRIPTION - ONSET
ONSET: ON-GOING
ONSET: ON-GOING

## 2018-05-10 ASSESSMENT — PAIN DESCRIPTION - PROGRESSION
CLINICAL_PROGRESSION: GRADUALLY WORSENING
CLINICAL_PROGRESSION: GRADUALLY IMPROVING

## 2018-05-10 ASSESSMENT — PAIN DESCRIPTION - ORIENTATION
ORIENTATION: LOWER
ORIENTATION: UPPER;MID
ORIENTATION: LOWER

## 2018-05-11 LAB
ALBUMIN SERPL-MCNC: 2.7 G/DL (ref 3.5–5.2)
ALP BLD-CCNC: 144 U/L (ref 40–129)
ALT SERPL-CCNC: 76 U/L (ref 0–40)
ANION GAP SERPL CALCULATED.3IONS-SCNC: 11 MMOL/L (ref 7–16)
ANTI-MITOCHONDRIAL AB, IFA: NEGATIVE
AST SERPL-CCNC: 195 U/L (ref 0–39)
BILIRUB SERPL-MCNC: 11.9 MG/DL (ref 0–1.2)
BUN BLDV-MCNC: 3 MG/DL (ref 6–20)
CALCIUM SERPL-MCNC: 7.6 MG/DL (ref 8.6–10.2)
CHLORIDE BLD-SCNC: 102 MMOL/L (ref 98–107)
CO2: 24 MMOL/L (ref 22–29)
CREAT SERPL-MCNC: 0.5 MG/DL (ref 0.7–1.2)
GFR AFRICAN AMERICAN: >60
GFR NON-AFRICAN AMERICAN: >60 ML/MIN/1.73
GLUCOSE BLD-MCNC: 72 MG/DL (ref 74–109)
LIPASE: 644 U/L (ref 13–60)
POTASSIUM SERPL-SCNC: 3.2 MMOL/L (ref 3.5–5)
SMOOTH MUSCLE ANTIBODY: NEGATIVE
SODIUM BLD-SCNC: 137 MMOL/L (ref 132–146)
TOTAL PROTEIN: 6 G/DL (ref 6.4–8.3)

## 2018-05-11 PROCEDURE — 6370000000 HC RX 637 (ALT 250 FOR IP): Performed by: INTERNAL MEDICINE

## 2018-05-11 PROCEDURE — 6360000002 HC RX W HCPCS: Performed by: INTERNAL MEDICINE

## 2018-05-11 PROCEDURE — 2580000003 HC RX 258: Performed by: INTERNAL MEDICINE

## 2018-05-11 PROCEDURE — C9113 INJ PANTOPRAZOLE SODIUM, VIA: HCPCS | Performed by: INTERNAL MEDICINE

## 2018-05-11 PROCEDURE — 1200000000 HC SEMI PRIVATE

## 2018-05-11 PROCEDURE — 83690 ASSAY OF LIPASE: CPT

## 2018-05-11 PROCEDURE — 36415 COLL VENOUS BLD VENIPUNCTURE: CPT

## 2018-05-11 PROCEDURE — 80053 COMPREHEN METABOLIC PANEL: CPT

## 2018-05-11 RX ADMIN — LORAZEPAM 2 MG: 1 TABLET ORAL at 21:11

## 2018-05-11 RX ADMIN — Medication 10 ML: at 09:09

## 2018-05-11 RX ADMIN — NADOLOL 20 MG: 20 TABLET ORAL at 08:30

## 2018-05-11 RX ADMIN — TRAMADOL HYDROCHLORIDE 50 MG: 50 TABLET, FILM COATED ORAL at 14:47

## 2018-05-11 RX ADMIN — POTASSIUM CHLORIDE AND SODIUM CHLORIDE: 900; 150 INJECTION, SOLUTION INTRAVENOUS at 01:32

## 2018-05-11 RX ADMIN — ENOXAPARIN SODIUM 40 MG: 40 INJECTION SUBCUTANEOUS at 08:30

## 2018-05-11 RX ADMIN — KETOROLAC TROMETHAMINE 15 MG: 15 INJECTION, SOLUTION INTRAMUSCULAR; INTRAVENOUS at 12:18

## 2018-05-11 RX ADMIN — ONDANSETRON 4 MG: 2 INJECTION INTRAMUSCULAR; INTRAVENOUS at 07:47

## 2018-05-11 RX ADMIN — PANTOPRAZOLE SODIUM 40 MG: 40 INJECTION, POWDER, FOR SOLUTION INTRAVENOUS at 09:09

## 2018-05-11 RX ADMIN — POTASSIUM CHLORIDE AND SODIUM CHLORIDE: 900; 150 INJECTION, SOLUTION INTRAVENOUS at 13:06

## 2018-05-11 RX ADMIN — KETOROLAC TROMETHAMINE 15 MG: 15 INJECTION, SOLUTION INTRAMUSCULAR; INTRAVENOUS at 21:11

## 2018-05-11 RX ADMIN — TRAMADOL HYDROCHLORIDE 50 MG: 50 TABLET, FILM COATED ORAL at 09:08

## 2018-05-11 ASSESSMENT — PAIN DESCRIPTION - PROGRESSION
CLINICAL_PROGRESSION: GRADUALLY WORSENING
CLINICAL_PROGRESSION: GRADUALLY WORSENING

## 2018-05-11 ASSESSMENT — PAIN DESCRIPTION - PAIN TYPE
TYPE: ACUTE PAIN
TYPE: ACUTE PAIN

## 2018-05-11 ASSESSMENT — PAIN DESCRIPTION - ORIENTATION
ORIENTATION: LOWER
ORIENTATION: RIGHT;LEFT;LOWER

## 2018-05-11 ASSESSMENT — PAIN SCALES - GENERAL
PAINLEVEL_OUTOF10: 7
PAINLEVEL_OUTOF10: 6
PAINLEVEL_OUTOF10: 7
PAINLEVEL_OUTOF10: 2
PAINLEVEL_OUTOF10: 0
PAINLEVEL_OUTOF10: 3
PAINLEVEL_OUTOF10: 7
PAINLEVEL_OUTOF10: 6
PAINLEVEL_OUTOF10: 6

## 2018-05-11 ASSESSMENT — PAIN DESCRIPTION - FREQUENCY
FREQUENCY: INTERMITTENT
FREQUENCY: INTERMITTENT

## 2018-05-11 ASSESSMENT — PAIN DESCRIPTION - LOCATION
LOCATION: BACK
LOCATION: BACK

## 2018-05-11 ASSESSMENT — PAIN DESCRIPTION - ONSET
ONSET: ON-GOING
ONSET: ON-GOING

## 2018-05-11 ASSESSMENT — PAIN DESCRIPTION - DESCRIPTORS
DESCRIPTORS: ACHING;DISCOMFORT
DESCRIPTORS: ACHING;DULL

## 2018-05-12 LAB
ALBUMIN SERPL-MCNC: 2.7 G/DL (ref 3.5–5.2)
ALP BLD-CCNC: 136 U/L (ref 40–129)
ALT SERPL-CCNC: 68 U/L (ref 0–40)
ANION GAP SERPL CALCULATED.3IONS-SCNC: 14 MMOL/L (ref 7–16)
AST SERPL-CCNC: 174 U/L (ref 0–39)
BASOPHILS ABSOLUTE: 0.07 E9/L (ref 0–0.2)
BASOPHILS RELATIVE PERCENT: 1.1 % (ref 0–2)
BILIRUB SERPL-MCNC: 11.5 MG/DL (ref 0–1.2)
BUN BLDV-MCNC: 6 MG/DL (ref 6–20)
CALCIUM SERPL-MCNC: 7.4 MG/DL (ref 8.6–10.2)
CHLORIDE BLD-SCNC: 102 MMOL/L (ref 98–107)
CO2: 22 MMOL/L (ref 22–29)
CREAT SERPL-MCNC: 0.5 MG/DL (ref 0.7–1.2)
EOSINOPHILS ABSOLUTE: 0 E9/L (ref 0.05–0.5)
EOSINOPHILS RELATIVE PERCENT: 0 % (ref 0–6)
FOLATE: 5.1 NG/ML (ref 4.8–24.2)
GFR AFRICAN AMERICAN: >60
GFR NON-AFRICAN AMERICAN: >60 ML/MIN/1.73
GLUCOSE BLD-MCNC: 70 MG/DL (ref 74–109)
HCT VFR BLD CALC: 36.5 % (ref 37–54)
HEMOGLOBIN: 12.5 G/DL (ref 12.5–16.5)
IMMATURE GRANULOCYTES #: 0.11 E9/L
IMMATURE GRANULOCYTES %: 1.7 % (ref 0–5)
LYMPHOCYTES ABSOLUTE: 1.28 E9/L (ref 1.5–4)
LYMPHOCYTES RELATIVE PERCENT: 19.6 % (ref 20–42)
MCH RBC QN AUTO: 35.1 PG (ref 26–35)
MCHC RBC AUTO-ENTMCNC: 34.2 % (ref 32–34.5)
MCV RBC AUTO: 102.5 FL (ref 80–99.9)
MONOCYTES ABSOLUTE: 0.76 E9/L (ref 0.1–0.95)
MONOCYTES RELATIVE PERCENT: 11.7 % (ref 2–12)
NEUTROPHILS ABSOLUTE: 4.3 E9/L (ref 1.8–7.3)
NEUTROPHILS RELATIVE PERCENT: 65.9 % (ref 43–80)
PDW BLD-RTO: 16.2 FL (ref 11.5–15)
PLATELET # BLD: 129 E9/L (ref 130–450)
PMV BLD AUTO: 10.2 FL (ref 7–12)
POTASSIUM SERPL-SCNC: 3.4 MMOL/L (ref 3.5–5)
RBC # BLD: 3.56 E12/L (ref 3.8–5.8)
SODIUM BLD-SCNC: 138 MMOL/L (ref 132–146)
TOTAL PROTEIN: 5.8 G/DL (ref 6.4–8.3)
VITAMIN B-12: >2000 PG/ML (ref 211–946)
WBC # BLD: 6.5 E9/L (ref 4.5–11.5)

## 2018-05-12 PROCEDURE — 36415 COLL VENOUS BLD VENIPUNCTURE: CPT

## 2018-05-12 PROCEDURE — 6360000002 HC RX W HCPCS: Performed by: INTERNAL MEDICINE

## 2018-05-12 PROCEDURE — 2580000003 HC RX 258: Performed by: INTERNAL MEDICINE

## 2018-05-12 PROCEDURE — 1200000000 HC SEMI PRIVATE

## 2018-05-12 PROCEDURE — 82607 VITAMIN B-12: CPT

## 2018-05-12 PROCEDURE — 82746 ASSAY OF FOLIC ACID SERUM: CPT

## 2018-05-12 PROCEDURE — 85025 COMPLETE CBC W/AUTO DIFF WBC: CPT

## 2018-05-12 PROCEDURE — C9113 INJ PANTOPRAZOLE SODIUM, VIA: HCPCS | Performed by: INTERNAL MEDICINE

## 2018-05-12 PROCEDURE — 6370000000 HC RX 637 (ALT 250 FOR IP): Performed by: INTERNAL MEDICINE

## 2018-05-12 PROCEDURE — 80053 COMPREHEN METABOLIC PANEL: CPT

## 2018-05-12 RX ORDER — PENTOXIFYLLINE 400 MG/1
400 TABLET, EXTENDED RELEASE ORAL
Status: DISCONTINUED | OUTPATIENT
Start: 2018-05-12 | End: 2018-05-13 | Stop reason: HOSPADM

## 2018-05-12 RX ADMIN — PANTOPRAZOLE SODIUM 40 MG: 40 INJECTION, POWDER, FOR SOLUTION INTRAVENOUS at 09:14

## 2018-05-12 RX ADMIN — LORAZEPAM 2 MG: 1 TABLET ORAL at 08:18

## 2018-05-12 RX ADMIN — KETOROLAC TROMETHAMINE 15 MG: 15 INJECTION, SOLUTION INTRAMUSCULAR; INTRAVENOUS at 19:09

## 2018-05-12 RX ADMIN — PENTOXIFYLLINE 400 MG: 400 TABLET, EXTENDED RELEASE ORAL at 19:09

## 2018-05-12 RX ADMIN — ENOXAPARIN SODIUM 40 MG: 40 INJECTION SUBCUTANEOUS at 08:16

## 2018-05-12 RX ADMIN — Medication 10 ML: at 09:14

## 2018-05-12 RX ADMIN — PENTOXIFYLLINE 400 MG: 400 TABLET, EXTENDED RELEASE ORAL at 12:18

## 2018-05-12 RX ADMIN — NADOLOL 20 MG: 20 TABLET ORAL at 08:16

## 2018-05-12 ASSESSMENT — PAIN SCALES - GENERAL
PAINLEVEL_OUTOF10: 0
PAINLEVEL_OUTOF10: 7
PAINLEVEL_OUTOF10: 0

## 2018-05-13 VITALS
RESPIRATION RATE: 14 BRPM | WEIGHT: 225.3 LBS | TEMPERATURE: 98.4 F | SYSTOLIC BLOOD PRESSURE: 116 MMHG | DIASTOLIC BLOOD PRESSURE: 69 MMHG | HEART RATE: 99 BPM | OXYGEN SATURATION: 96 % | BODY MASS INDEX: 28.01 KG/M2 | HEIGHT: 75 IN

## 2018-05-13 LAB
ALBUMIN SERPL-MCNC: 2.6 G/DL (ref 3.5–5.2)
ALP BLD-CCNC: 132 U/L (ref 40–129)
ALT SERPL-CCNC: 61 U/L (ref 0–40)
ANION GAP SERPL CALCULATED.3IONS-SCNC: 14 MMOL/L (ref 7–16)
AST SERPL-CCNC: 150 U/L (ref 0–39)
BILIRUB SERPL-MCNC: 11.9 MG/DL (ref 0–1.2)
BUN BLDV-MCNC: 5 MG/DL (ref 6–20)
CALCIUM SERPL-MCNC: 7.5 MG/DL (ref 8.6–10.2)
CHLORIDE BLD-SCNC: 101 MMOL/L (ref 98–107)
CO2: 22 MMOL/L (ref 22–29)
CREAT SERPL-MCNC: 0.5 MG/DL (ref 0.7–1.2)
GFR AFRICAN AMERICAN: >60
GFR NON-AFRICAN AMERICAN: >60 ML/MIN/1.73
GLUCOSE BLD-MCNC: 88 MG/DL (ref 74–109)
LIPASE: 563 U/L (ref 13–60)
POTASSIUM SERPL-SCNC: 3.2 MMOL/L (ref 3.5–5)
SODIUM BLD-SCNC: 137 MMOL/L (ref 132–146)
TOTAL PROTEIN: 6 G/DL (ref 6.4–8.3)

## 2018-05-13 PROCEDURE — 36415 COLL VENOUS BLD VENIPUNCTURE: CPT

## 2018-05-13 PROCEDURE — 6370000000 HC RX 637 (ALT 250 FOR IP): Performed by: INTERNAL MEDICINE

## 2018-05-13 PROCEDURE — C9113 INJ PANTOPRAZOLE SODIUM, VIA: HCPCS | Performed by: INTERNAL MEDICINE

## 2018-05-13 PROCEDURE — 2580000003 HC RX 258: Performed by: INTERNAL MEDICINE

## 2018-05-13 PROCEDURE — 83690 ASSAY OF LIPASE: CPT

## 2018-05-13 PROCEDURE — 80053 COMPREHEN METABOLIC PANEL: CPT

## 2018-05-13 PROCEDURE — 6360000002 HC RX W HCPCS: Performed by: INTERNAL MEDICINE

## 2018-05-13 RX ORDER — POTASSIUM CHLORIDE 20 MEQ/1
40 TABLET, EXTENDED RELEASE ORAL ONCE
Status: COMPLETED | OUTPATIENT
Start: 2018-05-13 | End: 2018-05-13

## 2018-05-13 RX ORDER — NADOLOL 20 MG/1
20 TABLET ORAL DAILY
Qty: 30 TABLET | Refills: 3 | Status: SHIPPED | OUTPATIENT
Start: 2018-05-14 | End: 2019-01-16

## 2018-05-13 RX ORDER — FOLIC ACID 1 MG/1
1 TABLET ORAL DAILY
Qty: 30 TABLET | Refills: 1 | Status: SHIPPED | OUTPATIENT
Start: 2018-05-13 | End: 2019-01-16

## 2018-05-13 RX ORDER — PENTOXIFYLLINE 400 MG/1
400 TABLET, EXTENDED RELEASE ORAL
Qty: 90 TABLET | Refills: 3 | Status: SHIPPED | OUTPATIENT
Start: 2018-05-13 | End: 2019-01-16

## 2018-05-13 RX ORDER — LORAZEPAM 1 MG/1
1 TABLET ORAL EVERY 8 HOURS PRN
Qty: 30 TABLET | Refills: 0 | Status: SHIPPED | OUTPATIENT
Start: 2018-05-13 | End: 2018-05-27

## 2018-05-13 RX ADMIN — ENOXAPARIN SODIUM 40 MG: 40 INJECTION SUBCUTANEOUS at 08:03

## 2018-05-13 RX ADMIN — PENTOXIFYLLINE 400 MG: 400 TABLET, EXTENDED RELEASE ORAL at 08:03

## 2018-05-13 RX ADMIN — PENTOXIFYLLINE 400 MG: 400 TABLET, EXTENDED RELEASE ORAL at 11:31

## 2018-05-13 RX ADMIN — LORAZEPAM 1 MG: 2 INJECTION INTRAMUSCULAR at 03:37

## 2018-05-13 RX ADMIN — Medication 10 ML: at 09:27

## 2018-05-13 RX ADMIN — POTASSIUM CHLORIDE 40 MEQ: 20 TABLET, EXTENDED RELEASE ORAL at 11:31

## 2018-05-13 RX ADMIN — PANTOPRAZOLE SODIUM 40 MG: 40 INJECTION, POWDER, FOR SOLUTION INTRAVENOUS at 09:27

## 2018-05-13 RX ADMIN — NADOLOL 20 MG: 20 TABLET ORAL at 08:03

## 2018-05-13 ASSESSMENT — PAIN SCALES - GENERAL
PAINLEVEL_OUTOF10: 0
PAINLEVEL_OUTOF10: 0

## 2018-05-15 LAB
Lab: NORMAL
REPORT: NORMAL
THIS TEST SENT TO: NORMAL

## 2018-05-28 ENCOUNTER — HOSPITAL ENCOUNTER (OUTPATIENT)
Age: 43
Discharge: HOME | End: 2018-05-28
Payer: COMMERCIAL

## 2018-05-28 LAB
HCT VFR BLD CALC: 40.8 % (ref 37–54)
HEMOGLOBIN: 14.1 G/DL (ref 12.5–16.5)
INR BLD: 2.2
MCH RBC QN AUTO: 34.4 PG (ref 26–35)
MCHC RBC AUTO-ENTMCNC: 34.6 % (ref 32–34.5)
MCV RBC AUTO: 99.5 FL (ref 80–99.9)
PDW BLD-RTO: 16.1 FL (ref 11.5–15)
PLATELET # BLD: 272 E9/L (ref 130–450)
PMV BLD AUTO: 9.4 FL (ref 7–12)
PROTHROMBIN TIME: 25 SEC (ref 9.3–12.4)
RBC # BLD: 4.1 E12/L (ref 3.8–5.8)
WBC # BLD: 10.8 E9/L (ref 4.5–11.5)

## 2018-05-28 PROCEDURE — 85027 COMPLETE CBC AUTOMATED: CPT

## 2018-05-28 PROCEDURE — 85610 PROTHROMBIN TIME: CPT

## 2018-05-28 PROCEDURE — 36415 COLL VENOUS BLD VENIPUNCTURE: CPT

## 2018-05-28 PROCEDURE — 80053 COMPREHEN METABOLIC PANEL: CPT

## 2018-05-29 LAB
ALBUMIN SERPL-MCNC: 2.6 G/DL (ref 3.5–5.2)
ALP BLD-CCNC: 176 U/L (ref 40–129)
ALT SERPL-CCNC: 83 U/L (ref 0–40)
ANION GAP SERPL CALCULATED.3IONS-SCNC: 14 MMOL/L (ref 7–16)
AST SERPL-CCNC: 237 U/L (ref 0–39)
BILIRUB SERPL-MCNC: 28.9 MG/DL (ref 0–1.2)
BUN BLDV-MCNC: 6 MG/DL (ref 6–20)
CALCIUM SERPL-MCNC: 8 MG/DL (ref 8.6–10.2)
CHLORIDE BLD-SCNC: 100 MMOL/L (ref 98–107)
CO2: 21 MMOL/L (ref 22–29)
CREAT SERPL-MCNC: 0.7 MG/DL (ref 0.7–1.2)
GFR AFRICAN AMERICAN: >60
GFR NON-AFRICAN AMERICAN: >60 ML/MIN/1.73
GLUCOSE BLD-MCNC: 87 MG/DL (ref 74–109)
POTASSIUM SERPL-SCNC: 3.3 MMOL/L (ref 3.5–5)
SODIUM BLD-SCNC: 135 MMOL/L (ref 132–146)
TOTAL PROTEIN: 6.7 G/DL (ref 6.4–8.3)

## 2018-06-05 ENCOUNTER — HOSPITAL ENCOUNTER (OUTPATIENT)
Age: 43
Discharge: HOME | End: 2018-06-05
Payer: COMMERCIAL

## 2018-06-05 LAB
ALBUMIN SERPL-MCNC: 2.6 G/DL (ref 3.5–5.2)
ALP BLD-CCNC: 164 U/L (ref 40–129)
ALT SERPL-CCNC: 159 U/L (ref 0–40)
ANION GAP SERPL CALCULATED.3IONS-SCNC: 13 MMOL/L (ref 7–16)
APTT: 36.8 SEC (ref 24.5–35.1)
AST SERPL-CCNC: 300 U/L (ref 0–39)
BASOPHILS ABSOLUTE: 0.03 E9/L (ref 0–0.2)
BASOPHILS RELATIVE PERCENT: 0.2 % (ref 0–2)
BILIRUB SERPL-MCNC: 24 MG/DL (ref 0–1.2)
BUN BLDV-MCNC: 11 MG/DL (ref 6–20)
CALCIUM SERPL-MCNC: 7.9 MG/DL (ref 8.6–10.2)
CHLORIDE BLD-SCNC: 104 MMOL/L (ref 98–107)
CO2: 22 MMOL/L (ref 22–29)
CREAT SERPL-MCNC: 0.7 MG/DL (ref 0.7–1.2)
EOSINOPHILS ABSOLUTE: 0 E9/L (ref 0.05–0.5)
EOSINOPHILS RELATIVE PERCENT: 0 % (ref 0–6)
GFR AFRICAN AMERICAN: >60
GFR NON-AFRICAN AMERICAN: >60 ML/MIN/1.73
GLUCOSE FASTING: 127 MG/DL (ref 74–109)
HCT VFR BLD CALC: 38.8 % (ref 37–54)
HEMOGLOBIN: 13.7 G/DL (ref 12.5–16.5)
IMMATURE GRANULOCYTES #: 0.13 E9/L
IMMATURE GRANULOCYTES %: 1 % (ref 0–5)
INR BLD: 1.7
LYMPHOCYTES ABSOLUTE: 1.27 E9/L (ref 1.5–4)
LYMPHOCYTES RELATIVE PERCENT: 9.7 % (ref 20–42)
MCH RBC QN AUTO: 34.7 PG (ref 26–35)
MCHC RBC AUTO-ENTMCNC: 35.3 % (ref 32–34.5)
MCV RBC AUTO: 98.2 FL (ref 80–99.9)
MONOCYTES ABSOLUTE: 0.73 E9/L (ref 0.1–0.95)
MONOCYTES RELATIVE PERCENT: 5.6 % (ref 2–12)
NEUTROPHILS ABSOLUTE: 10.96 E9/L (ref 1.8–7.3)
NEUTROPHILS RELATIVE PERCENT: 83.5 % (ref 43–80)
PDW BLD-RTO: 15.9 FL (ref 11.5–15)
PLATELET # BLD: 227 E9/L (ref 130–450)
PMV BLD AUTO: 10 FL (ref 7–12)
POTASSIUM SERPL-SCNC: 3 MMOL/L (ref 3.5–5)
PROTHROMBIN TIME: 19 SEC (ref 9.3–12.4)
RBC # BLD: 3.95 E12/L (ref 3.8–5.8)
SODIUM BLD-SCNC: 139 MMOL/L (ref 132–146)
TOTAL PROTEIN: 6.8 G/DL (ref 6.4–8.3)
WBC # BLD: 13.1 E9/L (ref 4.5–11.5)

## 2018-06-05 PROCEDURE — 85025 COMPLETE CBC W/AUTO DIFF WBC: CPT

## 2018-06-05 PROCEDURE — 36415 COLL VENOUS BLD VENIPUNCTURE: CPT

## 2018-06-05 PROCEDURE — 85730 THROMBOPLASTIN TIME PARTIAL: CPT

## 2018-06-05 PROCEDURE — 80053 COMPREHEN METABOLIC PANEL: CPT

## 2018-06-05 PROCEDURE — 85610 PROTHROMBIN TIME: CPT

## 2018-06-14 ENCOUNTER — HOSPITAL ENCOUNTER (OUTPATIENT)
Age: 43
Discharge: HOME | End: 2018-06-14
Payer: COMMERCIAL

## 2018-06-14 LAB
ALBUMIN SERPL-MCNC: 2.7 G/DL (ref 3.5–5.2)
ALP BLD-CCNC: 130 U/L (ref 40–129)
ALT SERPL-CCNC: 244 U/L (ref 0–40)
ANION GAP SERPL CALCULATED.3IONS-SCNC: 10 MMOL/L (ref 7–16)
APTT: 32.7 SEC (ref 24.5–35.1)
AST SERPL-CCNC: 375 U/L (ref 0–39)
BASOPHILS ABSOLUTE: 0.03 E9/L (ref 0–0.2)
BASOPHILS RELATIVE PERCENT: 0.3 % (ref 0–2)
BILIRUB SERPL-MCNC: 12.5 MG/DL (ref 0–1.2)
BUN BLDV-MCNC: 8 MG/DL (ref 6–20)
CALCIUM SERPL-MCNC: 8.5 MG/DL (ref 8.6–10.2)
CHLORIDE BLD-SCNC: 105 MMOL/L (ref 98–107)
CO2: 24 MMOL/L (ref 22–29)
CREAT SERPL-MCNC: 0.7 MG/DL (ref 0.7–1.2)
EOSINOPHILS ABSOLUTE: 0 E9/L (ref 0.05–0.5)
EOSINOPHILS RELATIVE PERCENT: 0 % (ref 0–6)
GFR AFRICAN AMERICAN: >60
GFR NON-AFRICAN AMERICAN: >60 ML/MIN/1.73
GLUCOSE BLD-MCNC: 120 MG/DL (ref 74–109)
HCT VFR BLD CALC: 37 % (ref 37–54)
HEMOGLOBIN: 12.9 G/DL (ref 12.5–16.5)
IMMATURE GRANULOCYTES #: 0.07 E9/L
IMMATURE GRANULOCYTES %: 0.7 % (ref 0–5)
INR BLD: 1.5
LYMPHOCYTES ABSOLUTE: 1.02 E9/L (ref 1.5–4)
LYMPHOCYTES RELATIVE PERCENT: 10.5 % (ref 20–42)
MCH RBC QN AUTO: 35.2 PG (ref 26–35)
MCHC RBC AUTO-ENTMCNC: 34.9 % (ref 32–34.5)
MCV RBC AUTO: 101.1 FL (ref 80–99.9)
MONOCYTES ABSOLUTE: 0.44 E9/L (ref 0.1–0.95)
MONOCYTES RELATIVE PERCENT: 4.5 % (ref 2–12)
NEUTROPHILS ABSOLUTE: 8.18 E9/L (ref 1.8–7.3)
NEUTROPHILS RELATIVE PERCENT: 84 % (ref 43–80)
PDW BLD-RTO: 16.2 FL (ref 11.5–15)
PLATELET # BLD: 206 E9/L (ref 130–450)
PMV BLD AUTO: 10.1 FL (ref 7–12)
POTASSIUM SERPL-SCNC: 4.1 MMOL/L (ref 3.5–5)
PROTHROMBIN TIME: 16.6 SEC (ref 9.3–12.4)
RBC # BLD: 3.66 E12/L (ref 3.8–5.8)
SODIUM BLD-SCNC: 139 MMOL/L (ref 132–146)
TOTAL PROTEIN: 7.1 G/DL (ref 6.4–8.3)
WBC # BLD: 9.7 E9/L (ref 4.5–11.5)

## 2018-06-14 PROCEDURE — 36415 COLL VENOUS BLD VENIPUNCTURE: CPT

## 2018-06-14 PROCEDURE — 85025 COMPLETE CBC W/AUTO DIFF WBC: CPT

## 2018-06-14 PROCEDURE — 85610 PROTHROMBIN TIME: CPT

## 2018-06-14 PROCEDURE — 80053 COMPREHEN METABOLIC PANEL: CPT

## 2018-06-14 PROCEDURE — 85730 THROMBOPLASTIN TIME PARTIAL: CPT

## 2018-07-17 ENCOUNTER — HOSPITAL ENCOUNTER (OUTPATIENT)
Age: 43
Discharge: HOME | End: 2018-07-17
Payer: COMMERCIAL

## 2018-07-17 LAB
ALBUMIN SERPL-MCNC: 3.4 G/DL (ref 3.5–5.2)
ALP BLD-CCNC: 92 U/L (ref 40–129)
ALT SERPL-CCNC: 30 U/L (ref 0–40)
ANION GAP SERPL CALCULATED.3IONS-SCNC: 11 MMOL/L (ref 7–16)
APTT: 37.3 SEC (ref 24.5–35.1)
AST SERPL-CCNC: 39 U/L (ref 0–39)
BASOPHILS ABSOLUTE: 0.05 E9/L (ref 0–0.2)
BASOPHILS RELATIVE PERCENT: 0.6 % (ref 0–2)
BILIRUB SERPL-MCNC: 1.5 MG/DL (ref 0–1.2)
BUN BLDV-MCNC: 5 MG/DL (ref 6–20)
CALCIUM SERPL-MCNC: 8.8 MG/DL (ref 8.6–10.2)
CHLORIDE BLD-SCNC: 107 MMOL/L (ref 98–107)
CO2: 21 MMOL/L (ref 22–29)
CREAT SERPL-MCNC: 0.7 MG/DL (ref 0.7–1.2)
EOSINOPHILS ABSOLUTE: 0.01 E9/L (ref 0.05–0.5)
EOSINOPHILS RELATIVE PERCENT: 0.1 % (ref 0–6)
GFR AFRICAN AMERICAN: >60
GFR NON-AFRICAN AMERICAN: >60 ML/MIN/1.73
GLUCOSE BLD-MCNC: 166 MG/DL (ref 74–109)
HCT VFR BLD CALC: 36.6 % (ref 37–54)
HEMOGLOBIN: 13 G/DL (ref 12.5–16.5)
IMMATURE GRANULOCYTES #: 0.03 E9/L
IMMATURE GRANULOCYTES %: 0.4 % (ref 0–5)
INR BLD: 1.3
LYMPHOCYTES ABSOLUTE: 1.67 E9/L (ref 1.5–4)
LYMPHOCYTES RELATIVE PERCENT: 21 % (ref 20–42)
MCH RBC QN AUTO: 33.9 PG (ref 26–35)
MCHC RBC AUTO-ENTMCNC: 35.5 % (ref 32–34.5)
MCV RBC AUTO: 95.6 FL (ref 80–99.9)
MONOCYTES ABSOLUTE: 0.47 E9/L (ref 0.1–0.95)
MONOCYTES RELATIVE PERCENT: 5.9 % (ref 2–12)
NEUTROPHILS ABSOLUTE: 5.73 E9/L (ref 1.8–7.3)
NEUTROPHILS RELATIVE PERCENT: 72 % (ref 43–80)
PDW BLD-RTO: 13.6 FL (ref 11.5–15)
PLATELET # BLD: 133 E9/L (ref 130–450)
PMV BLD AUTO: 8.6 FL (ref 7–12)
POTASSIUM SERPL-SCNC: 2.9 MMOL/L (ref 3.5–5)
PROTHROMBIN TIME: 14.5 SEC (ref 9.3–12.4)
RBC # BLD: 3.83 E12/L (ref 3.8–5.8)
SODIUM BLD-SCNC: 139 MMOL/L (ref 132–146)
TOTAL PROTEIN: 6.7 G/DL (ref 6.4–8.3)
WBC # BLD: 8 E9/L (ref 4.5–11.5)

## 2018-07-17 PROCEDURE — 85610 PROTHROMBIN TIME: CPT

## 2018-07-17 PROCEDURE — 80053 COMPREHEN METABOLIC PANEL: CPT

## 2018-07-17 PROCEDURE — 85730 THROMBOPLASTIN TIME PARTIAL: CPT

## 2018-07-17 PROCEDURE — 85025 COMPLETE CBC W/AUTO DIFF WBC: CPT

## 2018-07-17 PROCEDURE — 36415 COLL VENOUS BLD VENIPUNCTURE: CPT

## 2019-01-16 ENCOUNTER — APPOINTMENT (OUTPATIENT)
Dept: GENERAL RADIOLOGY | Age: 44
End: 2019-01-16
Payer: MEDICAID

## 2019-01-16 ENCOUNTER — HOSPITAL ENCOUNTER (INPATIENT)
Age: 44
LOS: 7 days | Discharge: HOME | End: 2019-01-23
Attending: EMERGENCY MEDICINE | Admitting: INTERNAL MEDICINE
Payer: MEDICAID

## 2019-01-16 DIAGNOSIS — E80.6 HYPERBILIRUBINEMIA: ICD-10-CM

## 2019-01-16 DIAGNOSIS — F10.930 ALCOHOL WITHDRAWAL SYNDROME WITHOUT COMPLICATION (HCC): ICD-10-CM

## 2019-01-16 DIAGNOSIS — E87.20 LACTIC ACIDOSIS: ICD-10-CM

## 2019-01-16 DIAGNOSIS — D69.6 THROMBOCYTOPENIA (HCC): ICD-10-CM

## 2019-01-16 DIAGNOSIS — I48.91 ATRIAL FIBRILLATION WITH RAPID VENTRICULAR RESPONSE (HCC): Primary | ICD-10-CM

## 2019-01-16 DIAGNOSIS — I85.11 SECONDARY ESOPHAGEAL VARICES WITH BLEEDING (HCC): ICD-10-CM

## 2019-01-16 DIAGNOSIS — E83.42 HYPOMAGNESEMIA: ICD-10-CM

## 2019-01-16 DIAGNOSIS — R74.01 TRANSAMINITIS: ICD-10-CM

## 2019-01-16 DIAGNOSIS — E87.6 HYPOKALEMIA: ICD-10-CM

## 2019-01-16 PROBLEM — K92.0 HEMATEMESIS: Status: ACTIVE | Noted: 2019-01-16

## 2019-01-16 LAB
ALBUMIN SERPL-MCNC: 4.1 G/DL (ref 3.5–5.2)
ALP BLD-CCNC: 165 U/L (ref 40–129)
ALT SERPL-CCNC: 179 U/L (ref 0–40)
AMPHETAMINE SCREEN, URINE: NOT DETECTED
AMYLASE: 91 U/L (ref 20–100)
ANION GAP SERPL CALCULATED.3IONS-SCNC: 19 MMOL/L (ref 7–16)
AST SERPL-CCNC: 416 U/L (ref 0–39)
BARBITURATE SCREEN URINE: NOT DETECTED
BASOPHILS ABSOLUTE: 0 E9/L (ref 0–0.2)
BASOPHILS RELATIVE PERCENT: 0.3 % (ref 0–2)
BENZODIAZEPINE SCREEN, URINE: NOT DETECTED
BILIRUB SERPL-MCNC: 5.5 MG/DL (ref 0–1.2)
BUN BLDV-MCNC: 6 MG/DL (ref 6–20)
CALCIUM SERPL-MCNC: 8.3 MG/DL (ref 8.6–10.2)
CANNABINOID SCREEN URINE: NOT DETECTED
CHLORIDE BLD-SCNC: 91 MMOL/L (ref 98–107)
CO2: 20 MMOL/L (ref 22–29)
CO2: 22 MMOL/L (ref 22–29)
COCAINE METABOLITE SCREEN URINE: NOT DETECTED
CREAT SERPL-MCNC: 0.6 MG/DL (ref 0.7–1.2)
EOSINOPHILS ABSOLUTE: 0 E9/L (ref 0.05–0.5)
EOSINOPHILS RELATIVE PERCENT: 0.1 % (ref 0–6)
ETHANOL: <10 MG/DL (ref 0–0.08)
GFR AFRICAN AMERICAN: >60
GFR AFRICAN AMERICAN: >60
GFR NON-AFRICAN AMERICAN: >60 ML/MIN/1.73
GFR NON-AFRICAN AMERICAN: >60 ML/MIN/1.73
GLUCOSE BLD-MCNC: 110 MG/DL (ref 74–99)
GLUCOSE BLD-MCNC: 126 MG/DL (ref 74–99)
HCT VFR BLD CALC: 33.9 % (ref 37–54)
HCT VFR BLD CALC: 34.9 % (ref 37–54)
HCT VFR BLD CALC: 40.4 % (ref 37–54)
HEMOGLOBIN: 11.8 G/DL (ref 12.5–16.5)
HEMOGLOBIN: 12.2 G/DL (ref 12.5–16.5)
HEMOGLOBIN: 14.4 G/DL (ref 12.5–16.5)
INR BLD: 1.6
LACTIC ACID: 2.1 MMOL/L (ref 0.5–2.2)
LACTIC ACID: 4.3 MMOL/L (ref 0.5–2.2)
LIPASE: 217 U/L (ref 13–60)
LYMPHOCYTES ABSOLUTE: 0.18 E9/L (ref 1.5–4)
LYMPHOCYTES RELATIVE PERCENT: 1.8 % (ref 20–42)
MAGNESIUM: 1.3 MG/DL (ref 1.6–2.6)
MCH RBC QN AUTO: 32.4 PG (ref 26–35)
MCHC RBC AUTO-ENTMCNC: 35.6 % (ref 32–34.5)
MCV RBC AUTO: 90.8 FL (ref 80–99.9)
METHADONE SCREEN, URINE: NOT DETECTED
MONOCYTES ABSOLUTE: 0.18 E9/L (ref 0.1–0.95)
MONOCYTES RELATIVE PERCENT: 1.8 % (ref 2–12)
NEUTROPHILS ABSOLUTE: 8.63 E9/L (ref 1.8–7.3)
NEUTROPHILS RELATIVE PERCENT: 96.5 % (ref 43–80)
OPIATE SCREEN URINE: NOT DETECTED
PDW BLD-RTO: 14.1 FL (ref 11.5–15)
PHENCYCLIDINE SCREEN URINE: NOT DETECTED
PLATELET # BLD: 65 E9/L (ref 130–450)
PLATELET CONFIRMATION: NORMAL
PMV BLD AUTO: 9.8 FL (ref 7–12)
POC ANION GAP: 13 MMOL/L (ref 7–16)
POC BUN: 4 MG/DL (ref 8–23)
POC CHLORIDE: 100 MMOL/L (ref 100–108)
POC CREATININE: 0.7 MG/DL (ref 0.7–1.2)
POC POTASSIUM: 2.9 MMOL/L (ref 3.5–5)
POC SODIUM: 133 MMOL/L (ref 132–146)
POTASSIUM SERPL-SCNC: 3.6 MMOL/L (ref 3.5–5)
PROPOXYPHENE SCREEN: NOT DETECTED
PROTHROMBIN TIME: 17.4 SEC (ref 9.3–12.4)
RBC # BLD: 4.45 E12/L (ref 3.8–5.8)
RBC # BLD: NORMAL 10*6/UL
SODIUM BLD-SCNC: 132 MMOL/L (ref 132–146)
T4 TOTAL: 10.8 MCG/DL (ref 4.5–11.7)
TOTAL CK: 486 U/L (ref 20–200)
TOTAL PROTEIN: 8.5 G/DL (ref 6.4–8.3)
TROPONIN: <0.01 NG/ML (ref 0–0.03)
TSH SERPL DL<=0.05 MIU/L-ACNC: 1.25 UIU/ML (ref 0.27–4.2)
WBC # BLD: 8.9 E9/L (ref 4.5–11.5)

## 2019-01-16 PROCEDURE — 6370000000 HC RX 637 (ALT 250 FOR IP): Performed by: EMERGENCY MEDICINE

## 2019-01-16 PROCEDURE — 99285 EMERGENCY DEPT VISIT HI MDM: CPT

## 2019-01-16 PROCEDURE — 93005 ELECTROCARDIOGRAM TRACING: CPT | Performed by: INTERNAL MEDICINE

## 2019-01-16 PROCEDURE — 2500000003 HC RX 250 WO HCPCS: Performed by: INTERNAL MEDICINE

## 2019-01-16 PROCEDURE — 83605 ASSAY OF LACTIC ACID: CPT

## 2019-01-16 PROCEDURE — 96375 TX/PRO/DX INJ NEW DRUG ADDON: CPT

## 2019-01-16 PROCEDURE — 80053 COMPREHEN METABOLIC PANEL: CPT

## 2019-01-16 PROCEDURE — 87081 CULTURE SCREEN ONLY: CPT

## 2019-01-16 PROCEDURE — 85610 PROTHROMBIN TIME: CPT

## 2019-01-16 PROCEDURE — 83735 ASSAY OF MAGNESIUM: CPT

## 2019-01-16 PROCEDURE — 99223 1ST HOSP IP/OBS HIGH 75: CPT | Performed by: INTERNAL MEDICINE

## 2019-01-16 PROCEDURE — 6360000002 HC RX W HCPCS: Performed by: EMERGENCY MEDICINE

## 2019-01-16 PROCEDURE — 84436 ASSAY OF TOTAL THYROXINE: CPT

## 2019-01-16 PROCEDURE — 96365 THER/PROPH/DIAG IV INF INIT: CPT

## 2019-01-16 PROCEDURE — C9113 INJ PANTOPRAZOLE SODIUM, VIA: HCPCS | Performed by: EMERGENCY MEDICINE

## 2019-01-16 PROCEDURE — 80307 DRUG TEST PRSMV CHEM ANLYZR: CPT

## 2019-01-16 PROCEDURE — 2000000000 HC ICU R&B

## 2019-01-16 PROCEDURE — 2580000003 HC RX 258: Performed by: INTERNAL MEDICINE

## 2019-01-16 PROCEDURE — 83690 ASSAY OF LIPASE: CPT

## 2019-01-16 PROCEDURE — 36415 COLL VENOUS BLD VENIPUNCTURE: CPT

## 2019-01-16 PROCEDURE — 84443 ASSAY THYROID STIM HORMONE: CPT

## 2019-01-16 PROCEDURE — G0480 DRUG TEST DEF 1-7 CLASSES: HCPCS

## 2019-01-16 PROCEDURE — 6360000002 HC RX W HCPCS: Performed by: INTERNAL MEDICINE

## 2019-01-16 PROCEDURE — 82150 ASSAY OF AMYLASE: CPT

## 2019-01-16 PROCEDURE — 84484 ASSAY OF TROPONIN QUANT: CPT

## 2019-01-16 PROCEDURE — 2580000003 HC RX 258: Performed by: EMERGENCY MEDICINE

## 2019-01-16 PROCEDURE — 85025 COMPLETE CBC W/AUTO DIFF WBC: CPT

## 2019-01-16 PROCEDURE — 82550 ASSAY OF CK (CPK): CPT

## 2019-01-16 PROCEDURE — 82565 ASSAY OF CREATININE: CPT

## 2019-01-16 PROCEDURE — 71045 X-RAY EXAM CHEST 1 VIEW: CPT

## 2019-01-16 PROCEDURE — 85014 HEMATOCRIT: CPT

## 2019-01-16 PROCEDURE — 82947 ASSAY GLUCOSE BLOOD QUANT: CPT

## 2019-01-16 PROCEDURE — 85018 HEMOGLOBIN: CPT

## 2019-01-16 PROCEDURE — C9113 INJ PANTOPRAZOLE SODIUM, VIA: HCPCS | Performed by: INTERNAL MEDICINE

## 2019-01-16 PROCEDURE — 93005 ELECTROCARDIOGRAM TRACING: CPT | Performed by: EMERGENCY MEDICINE

## 2019-01-16 PROCEDURE — 84520 ASSAY OF UREA NITROGEN: CPT

## 2019-01-16 PROCEDURE — 96376 TX/PRO/DX INJ SAME DRUG ADON: CPT

## 2019-01-16 PROCEDURE — 2500000003 HC RX 250 WO HCPCS: Performed by: EMERGENCY MEDICINE

## 2019-01-16 PROCEDURE — 80051 ELECTROLYTE PANEL: CPT

## 2019-01-16 RX ORDER — 0.9 % SODIUM CHLORIDE 0.9 %
1000 INTRAVENOUS SOLUTION INTRAVENOUS ONCE
Status: COMPLETED | OUTPATIENT
Start: 2019-01-16 | End: 2019-01-16

## 2019-01-16 RX ORDER — SODIUM CHLORIDE 0.9 % (FLUSH) 0.9 %
10 SYRINGE (ML) INJECTION 2 TIMES DAILY
Status: DISCONTINUED | OUTPATIENT
Start: 2019-01-16 | End: 2019-01-16 | Stop reason: SDUPTHER

## 2019-01-16 RX ORDER — PANTOPRAZOLE SODIUM 40 MG/10ML
80 INJECTION, POWDER, LYOPHILIZED, FOR SOLUTION INTRAVENOUS ONCE
Status: COMPLETED | OUTPATIENT
Start: 2019-01-16 | End: 2019-01-16

## 2019-01-16 RX ORDER — LORAZEPAM 2 MG/ML
3 INJECTION INTRAMUSCULAR
Status: DISCONTINUED | OUTPATIENT
Start: 2019-01-16 | End: 2019-01-17

## 2019-01-16 RX ORDER — FOLIC ACID 1 MG/1
1 TABLET ORAL ONCE
Status: COMPLETED | OUTPATIENT
Start: 2019-01-16 | End: 2019-01-16

## 2019-01-16 RX ORDER — SODIUM CHLORIDE 0.9 % (FLUSH) 0.9 %
10 SYRINGE (ML) INJECTION PRN
Status: DISCONTINUED | OUTPATIENT
Start: 2019-01-16 | End: 2019-01-18 | Stop reason: SDUPTHER

## 2019-01-16 RX ORDER — SODIUM CHLORIDE 0.9 % (FLUSH) 0.9 %
10 SYRINGE (ML) INJECTION EVERY 12 HOURS SCHEDULED
Status: DISCONTINUED | OUTPATIENT
Start: 2019-01-16 | End: 2019-01-18 | Stop reason: SDUPTHER

## 2019-01-16 RX ORDER — MULTIVITAMIN WITH FOLIC ACID 400 MCG
1 TABLET ORAL DAILY
Status: DISCONTINUED | OUTPATIENT
Start: 2019-01-16 | End: 2019-01-16 | Stop reason: CLARIF

## 2019-01-16 RX ORDER — SODIUM CHLORIDE 9 MG/ML
INJECTION, SOLUTION INTRAVENOUS
Status: DISPENSED
Start: 2019-01-16 | End: 2019-01-17

## 2019-01-16 RX ORDER — CHLORDIAZEPOXIDE HYDROCHLORIDE 25 MG/1
100 CAPSULE, GELATIN COATED ORAL ONCE
Status: COMPLETED | OUTPATIENT
Start: 2019-01-16 | End: 2019-01-16

## 2019-01-16 RX ORDER — LORAZEPAM 1 MG/1
1 TABLET ORAL
Status: DISCONTINUED | OUTPATIENT
Start: 2019-01-16 | End: 2019-01-17

## 2019-01-16 RX ORDER — ONDANSETRON 2 MG/ML
4 INJECTION INTRAMUSCULAR; INTRAVENOUS EVERY 6 HOURS PRN
Status: DISCONTINUED | OUTPATIENT
Start: 2019-01-16 | End: 2019-01-23 | Stop reason: HOSPADM

## 2019-01-16 RX ORDER — LORAZEPAM 1 MG/1
3 TABLET ORAL
Status: DISCONTINUED | OUTPATIENT
Start: 2019-01-16 | End: 2019-01-17

## 2019-01-16 RX ORDER — DIGOXIN 0.25 MG/ML
250 INJECTION INTRAMUSCULAR; INTRAVENOUS ONCE
Status: COMPLETED | OUTPATIENT
Start: 2019-01-16 | End: 2019-01-16

## 2019-01-16 RX ORDER — 0.9 % SODIUM CHLORIDE 0.9 %
10 VIAL (ML) INJECTION DAILY
Status: DISCONTINUED | OUTPATIENT
Start: 2019-01-16 | End: 2019-01-17

## 2019-01-16 RX ORDER — LORAZEPAM 2 MG/ML
4 INJECTION INTRAMUSCULAR
Status: DISCONTINUED | OUTPATIENT
Start: 2019-01-16 | End: 2019-01-17

## 2019-01-16 RX ORDER — MAGNESIUM SULFATE IN WATER 40 MG/ML
2 INJECTION, SOLUTION INTRAVENOUS ONCE
Status: COMPLETED | OUTPATIENT
Start: 2019-01-16 | End: 2019-01-16

## 2019-01-16 RX ORDER — ONDANSETRON 2 MG/ML
4 INJECTION INTRAMUSCULAR; INTRAVENOUS ONCE
Status: COMPLETED | OUTPATIENT
Start: 2019-01-16 | End: 2019-01-16

## 2019-01-16 RX ORDER — MULTIPLE VITAMINS W/ MINERALS TAB 9MG-400MCG
1 TAB ORAL DAILY
Status: DISCONTINUED | OUTPATIENT
Start: 2019-01-16 | End: 2019-01-19 | Stop reason: CLARIF

## 2019-01-16 RX ORDER — DILTIAZEM HYDROCHLORIDE 5 MG/ML
20 INJECTION INTRAVENOUS ONCE
Status: COMPLETED | OUTPATIENT
Start: 2019-01-16 | End: 2019-01-16

## 2019-01-16 RX ORDER — SODIUM CHLORIDE 0.9 % (FLUSH) 0.9 %
10 SYRINGE (ML) INJECTION PRN
Status: DISCONTINUED | OUTPATIENT
Start: 2019-01-16 | End: 2019-01-16 | Stop reason: SDUPTHER

## 2019-01-16 RX ORDER — OCTREOTIDE ACETATE 50 UG/ML
50 INJECTION, SOLUTION INTRAVENOUS; SUBCUTANEOUS ONCE
Status: COMPLETED | OUTPATIENT
Start: 2019-01-16 | End: 2019-01-16

## 2019-01-16 RX ORDER — THIAMINE MONONITRATE (VIT B1) 100 MG
100 TABLET ORAL ONCE
Status: COMPLETED | OUTPATIENT
Start: 2019-01-16 | End: 2019-01-16

## 2019-01-16 RX ORDER — LORAZEPAM 2 MG/ML
2 INJECTION INTRAMUSCULAR
Status: DISCONTINUED | OUTPATIENT
Start: 2019-01-16 | End: 2019-01-17

## 2019-01-16 RX ORDER — LORAZEPAM 1 MG/1
4 TABLET ORAL
Status: DISCONTINUED | OUTPATIENT
Start: 2019-01-16 | End: 2019-01-17

## 2019-01-16 RX ORDER — LORAZEPAM 1 MG/1
2 TABLET ORAL
Status: DISCONTINUED | OUTPATIENT
Start: 2019-01-16 | End: 2019-01-17

## 2019-01-16 RX ORDER — LORAZEPAM 2 MG/ML
1 INJECTION INTRAMUSCULAR
Status: DISCONTINUED | OUTPATIENT
Start: 2019-01-16 | End: 2019-01-17

## 2019-01-16 RX ORDER — POTASSIUM CHLORIDE 7.45 MG/ML
10 INJECTION INTRAVENOUS
Status: COMPLETED | OUTPATIENT
Start: 2019-01-16 | End: 2019-01-16

## 2019-01-16 RX ORDER — PANTOPRAZOLE SODIUM 40 MG/10ML
40 INJECTION, POWDER, LYOPHILIZED, FOR SOLUTION INTRAVENOUS 2 TIMES DAILY
Status: DISCONTINUED | OUTPATIENT
Start: 2019-01-16 | End: 2019-01-17

## 2019-01-16 RX ADMIN — Medication 10 ML: at 20:23

## 2019-01-16 RX ADMIN — PHYTONADIONE 10 MG: 10 INJECTION, EMULSION INTRAMUSCULAR; INTRAVENOUS; SUBCUTANEOUS at 17:58

## 2019-01-16 RX ADMIN — Medication 10 ML: at 17:46

## 2019-01-16 RX ADMIN — DILTIAZEM HYDROCHLORIDE 20 MG: 5 INJECTION INTRAVENOUS at 13:11

## 2019-01-16 RX ADMIN — FOLIC ACID 1 MG: 1 TABLET ORAL at 12:53

## 2019-01-16 RX ADMIN — MAGNESIUM SULFATE HEPTAHYDRATE 2 G: 40 INJECTION, SOLUTION INTRAVENOUS at 14:54

## 2019-01-16 RX ADMIN — Medication 10 ML: at 18:16

## 2019-01-16 RX ADMIN — Medication 100 MG: at 12:53

## 2019-01-16 RX ADMIN — Medication 10 ML: at 22:32

## 2019-01-16 RX ADMIN — POTASSIUM CHLORIDE 10 MEQ: 7.46 INJECTION, SOLUTION INTRAVENOUS at 18:52

## 2019-01-16 RX ADMIN — DILTIAZEM HYDROCHLORIDE 5 MG/HR: 5 INJECTION INTRAVENOUS at 13:17

## 2019-01-16 RX ADMIN — CHLORDIAZEPOXIDE HYDROCHLORIDE 100 MG: 25 CAPSULE ORAL at 12:53

## 2019-01-16 RX ADMIN — Medication 10 ML: at 18:15

## 2019-01-16 RX ADMIN — ONDANSETRON 4 MG: 2 INJECTION INTRAMUSCULAR; INTRAVENOUS at 18:15

## 2019-01-16 RX ADMIN — MULTIPLE VITAMINS W/ MINERALS TAB 1 TABLET: TAB at 13:44

## 2019-01-16 RX ADMIN — POTASSIUM CHLORIDE 10 MEQ: 7.46 INJECTION, SOLUTION INTRAVENOUS at 15:58

## 2019-01-16 RX ADMIN — OCTREOTIDE ACETATE 25 MCG/HR: 500 INJECTION, SOLUTION INTRAVENOUS; SUBCUTANEOUS at 15:48

## 2019-01-16 RX ADMIN — OCTREOTIDE ACETATE 50 MCG: 50 INJECTION, SOLUTION INTRAVENOUS; SUBCUTANEOUS at 15:34

## 2019-01-16 RX ADMIN — ONDANSETRON 4 MG: 2 INJECTION INTRAMUSCULAR; INTRAVENOUS at 12:53

## 2019-01-16 RX ADMIN — PANTOPRAZOLE SODIUM 80 MG: 40 INJECTION, POWDER, FOR SOLUTION INTRAVENOUS at 14:54

## 2019-01-16 RX ADMIN — SODIUM CHLORIDE 1000 ML: 9 INJECTION, SOLUTION INTRAVENOUS at 14:07

## 2019-01-16 RX ADMIN — POTASSIUM CHLORIDE 10 MEQ: 7.46 INJECTION, SOLUTION INTRAVENOUS at 17:45

## 2019-01-16 RX ADMIN — Medication 10 ML: at 22:33

## 2019-01-16 RX ADMIN — POTASSIUM CHLORIDE 10 MEQ: 7.46 INJECTION, SOLUTION INTRAVENOUS at 14:54

## 2019-01-16 RX ADMIN — THIAMINE HYDROCHLORIDE: 100 INJECTION, SOLUTION INTRAMUSCULAR; INTRAVENOUS at 19:38

## 2019-01-16 RX ADMIN — LORAZEPAM 3 MG: 2 INJECTION INTRAMUSCULAR; INTRAVENOUS at 22:32

## 2019-01-16 RX ADMIN — PANTOPRAZOLE SODIUM 40 MG: 40 INJECTION, POWDER, FOR SOLUTION INTRAVENOUS at 20:23

## 2019-01-16 RX ADMIN — SODIUM CHLORIDE 1000 ML: 9 INJECTION, SOLUTION INTRAVENOUS at 13:00

## 2019-01-16 RX ADMIN — DIGOXIN 250 MCG: 250 INJECTION, SOLUTION INTRAMUSCULAR; INTRAVENOUS; PARENTERAL at 14:06

## 2019-01-16 RX ADMIN — Medication 10 ML: at 17:45

## 2019-01-16 ASSESSMENT — ENCOUNTER SYMPTOMS
BLOOD IN STOOL: 0
ABDOMINAL PAIN: 1
RESPIRATORY NEGATIVE: 1
EYES NEGATIVE: 1
ALLERGIC/IMMUNOLOGIC NEGATIVE: 1
SHORTNESS OF BREATH: 1
DIARRHEA: 0
NAUSEA: 1
BACK PAIN: 0
VOMITING: 1
ABDOMINAL DISTENTION: 1

## 2019-01-16 ASSESSMENT — PAIN SCALES - GENERAL: PAINLEVEL_OUTOF10: 0

## 2019-01-17 ENCOUNTER — APPOINTMENT (OUTPATIENT)
Dept: CT IMAGING | Age: 44
End: 2019-01-17
Payer: MEDICAID

## 2019-01-17 LAB
ALBUMIN SERPL-MCNC: 3.2 G/DL (ref 3.5–5.2)
ALP BLD-CCNC: 137 U/L (ref 40–129)
ALT SERPL-CCNC: 120 U/L (ref 0–40)
ANION GAP SERPL CALCULATED.3IONS-SCNC: 9 MMOL/L (ref 7–16)
AST SERPL-CCNC: 185 U/L (ref 0–39)
BASOPHILS ABSOLUTE: 0 E9/L (ref 0–0.2)
BASOPHILS RELATIVE PERCENT: 0.5 % (ref 0–2)
BILIRUB SERPL-MCNC: 5.6 MG/DL (ref 0–1.2)
BILIRUBIN DIRECT: 3.6 MG/DL (ref 0–0.3)
BUN BLDV-MCNC: 4 MG/DL (ref 6–20)
CALCIUM SERPL-MCNC: 7.2 MG/DL (ref 8.6–10.2)
CHLORIDE BLD-SCNC: 101 MMOL/L (ref 98–107)
CO2: 23 MMOL/L (ref 22–29)
CREAT SERPL-MCNC: 0.7 MG/DL (ref 0.7–1.2)
EKG ATRIAL RATE: 91 BPM
EKG Q-T INTERVAL: 378 MS
EKG QRS DURATION: 102 MS
EKG QTC CALCULATION (BAZETT): 482 MS
EKG R AXIS: 3 DEGREES
EKG T AXIS: 12 DEGREES
EKG VENTRICULAR RATE: 98 BPM
EOSINOPHILS ABSOLUTE: 0.07 E9/L (ref 0.05–0.5)
EOSINOPHILS RELATIVE PERCENT: 1.7 % (ref 0–6)
GFR AFRICAN AMERICAN: >60
GFR NON-AFRICAN AMERICAN: >60 ML/MIN/1.73
GLUCOSE BLD-MCNC: 140 MG/DL (ref 74–99)
HCT VFR BLD CALC: 33.5 % (ref 37–54)
HCT VFR BLD CALC: 35.2 % (ref 37–54)
HCT VFR BLD CALC: 36 % (ref 37–54)
HEMOGLOBIN: 11.8 G/DL (ref 12.5–16.5)
HEMOGLOBIN: 12.3 G/DL (ref 12.5–16.5)
HEMOGLOBIN: 12.3 G/DL (ref 12.5–16.5)
INR BLD: 1.5
LV EF: 55 %
LVEF MODALITY: NORMAL
LYMPHOCYTES ABSOLUTE: 0.12 E9/L (ref 1.5–4)
LYMPHOCYTES RELATIVE PERCENT: 2.6 % (ref 20–42)
MAGNESIUM: 2 MG/DL (ref 1.6–2.6)
MCH RBC QN AUTO: 33 PG (ref 26–35)
MCHC RBC AUTO-ENTMCNC: 35.2 % (ref 32–34.5)
MCV RBC AUTO: 93.6 FL (ref 80–99.9)
METAMYELOCYTES RELATIVE PERCENT: 0.9 % (ref 0–1)
MONOCYTES ABSOLUTE: 0.08 E9/L (ref 0.1–0.95)
MONOCYTES RELATIVE PERCENT: 1.7 % (ref 2–12)
NEUTROPHILS ABSOLUTE: 3.85 E9/L (ref 1.8–7.3)
NEUTROPHILS RELATIVE PERCENT: 93 % (ref 43–80)
PDW BLD-RTO: 14.7 FL (ref 11.5–15)
PHOSPHORUS: 1.4 MG/DL (ref 2.5–4.5)
PLATELET # BLD: 37 E9/L (ref 130–450)
PLATELET CONFIRMATION: NORMAL
PMV BLD AUTO: 10.8 FL (ref 7–12)
POTASSIUM SERPL-SCNC: 3.4 MMOL/L (ref 3.5–5)
PROTHROMBIN TIME: 16.2 SEC (ref 9.3–12.4)
RBC # BLD: 3.58 E12/L (ref 3.8–5.8)
RBC # BLD: NORMAL 10*6/UL
SODIUM BLD-SCNC: 133 MMOL/L (ref 132–146)
TOTAL PROTEIN: 6.6 G/DL (ref 6.4–8.3)
WBC # BLD: 4.1 E9/L (ref 4.5–11.5)

## 2019-01-17 PROCEDURE — 6360000002 HC RX W HCPCS: Performed by: INTERNAL MEDICINE

## 2019-01-17 PROCEDURE — 99254 IP/OBS CNSLTJ NEW/EST MOD 60: CPT | Performed by: INTERNAL MEDICINE

## 2019-01-17 PROCEDURE — C9113 INJ PANTOPRAZOLE SODIUM, VIA: HCPCS | Performed by: INTERNAL MEDICINE

## 2019-01-17 PROCEDURE — 84100 ASSAY OF PHOSPHORUS: CPT

## 2019-01-17 PROCEDURE — 2580000003 HC RX 258: Performed by: INTERNAL MEDICINE

## 2019-01-17 PROCEDURE — 2580000003 HC RX 258: Performed by: EMERGENCY MEDICINE

## 2019-01-17 PROCEDURE — 85025 COMPLETE CBC W/AUTO DIFF WBC: CPT

## 2019-01-17 PROCEDURE — 85610 PROTHROMBIN TIME: CPT

## 2019-01-17 PROCEDURE — 74177 CT ABD & PELVIS W/CONTRAST: CPT

## 2019-01-17 PROCEDURE — 93306 TTE W/DOPPLER COMPLETE: CPT

## 2019-01-17 PROCEDURE — 82248 BILIRUBIN DIRECT: CPT

## 2019-01-17 PROCEDURE — 83735 ASSAY OF MAGNESIUM: CPT

## 2019-01-17 PROCEDURE — 2500000003 HC RX 250 WO HCPCS: Performed by: EMERGENCY MEDICINE

## 2019-01-17 PROCEDURE — 99233 SBSQ HOSP IP/OBS HIGH 50: CPT | Performed by: INTERNAL MEDICINE

## 2019-01-17 PROCEDURE — 80053 COMPREHEN METABOLIC PANEL: CPT

## 2019-01-17 PROCEDURE — 6360000002 HC RX W HCPCS

## 2019-01-17 PROCEDURE — 6360000002 HC RX W HCPCS: Performed by: EMERGENCY MEDICINE

## 2019-01-17 PROCEDURE — 2500000003 HC RX 250 WO HCPCS: Performed by: INTERNAL MEDICINE

## 2019-01-17 PROCEDURE — 36415 COLL VENOUS BLD VENIPUNCTURE: CPT | Performed by: NURSE PRACTITIONER

## 2019-01-17 PROCEDURE — 85018 HEMOGLOBIN: CPT

## 2019-01-17 PROCEDURE — 93010 ELECTROCARDIOGRAM REPORT: CPT | Performed by: INTERNAL MEDICINE

## 2019-01-17 PROCEDURE — 6360000004 HC RX CONTRAST MEDICATION: Performed by: RADIOLOGY

## 2019-01-17 PROCEDURE — 36415 COLL VENOUS BLD VENIPUNCTURE: CPT

## 2019-01-17 PROCEDURE — 85014 HEMATOCRIT: CPT

## 2019-01-17 PROCEDURE — 2000000000 HC ICU R&B

## 2019-01-17 RX ORDER — POTASSIUM CHLORIDE 1.5 G/1.77G
20 POWDER, FOR SOLUTION ORAL 2 TIMES DAILY
Status: DISCONTINUED | OUTPATIENT
Start: 2019-01-17 | End: 2019-01-17

## 2019-01-17 RX ORDER — POTASSIUM CHLORIDE 7.45 MG/ML
10 INJECTION INTRAVENOUS
Status: COMPLETED | OUTPATIENT
Start: 2019-01-17 | End: 2019-01-17

## 2019-01-17 RX ORDER — LORAZEPAM 2 MG/ML
2 INJECTION INTRAMUSCULAR ONCE
Status: COMPLETED | OUTPATIENT
Start: 2019-01-17 | End: 2019-01-17

## 2019-01-17 RX ORDER — 0.9 % SODIUM CHLORIDE 0.9 %
10 VIAL (ML) INJECTION 2 TIMES DAILY
Status: DISCONTINUED | OUTPATIENT
Start: 2019-01-18 | End: 2019-01-21

## 2019-01-17 RX ORDER — LORAZEPAM 2 MG/ML
INJECTION INTRAMUSCULAR
Status: COMPLETED
Start: 2019-01-17 | End: 2019-01-17

## 2019-01-17 RX ORDER — PANTOPRAZOLE SODIUM 40 MG/10ML
40 INJECTION, POWDER, LYOPHILIZED, FOR SOLUTION INTRAVENOUS 2 TIMES DAILY
Status: DISCONTINUED | OUTPATIENT
Start: 2019-01-18 | End: 2019-01-21

## 2019-01-17 RX ADMIN — POTASSIUM CHLORIDE 10 MEQ: 7.46 INJECTION, SOLUTION INTRAVENOUS at 18:07

## 2019-01-17 RX ADMIN — Medication 10 ML: at 19:55

## 2019-01-17 RX ADMIN — LORAZEPAM 2 MG: 2 INJECTION INTRAMUSCULAR; INTRAVENOUS at 21:48

## 2019-01-17 RX ADMIN — ONDANSETRON 4 MG: 2 INJECTION INTRAMUSCULAR; INTRAVENOUS at 16:12

## 2019-01-17 RX ADMIN — LORAZEPAM 2 MG: 2 INJECTION INTRAMUSCULAR; INTRAVENOUS at 18:14

## 2019-01-17 RX ADMIN — SODIUM CHLORIDE 8 MG/HR: 9 INJECTION, SOLUTION INTRAVENOUS at 20:58

## 2019-01-17 RX ADMIN — Medication 10 ML: at 16:13

## 2019-01-17 RX ADMIN — LORAZEPAM 4 MG: 2 INJECTION INTRAMUSCULAR; INTRAVENOUS at 20:43

## 2019-01-17 RX ADMIN — Medication 10 ML: at 18:13

## 2019-01-17 RX ADMIN — Medication 10 ML: at 19:35

## 2019-01-17 RX ADMIN — DEXMEDETOMIDINE 0.2 MCG/KG/HR: 100 INJECTION, SOLUTION, CONCENTRATE INTRAVENOUS at 21:51

## 2019-01-17 RX ADMIN — OCTREOTIDE ACETATE 25 MCG/HR: 500 INJECTION, SOLUTION INTRAVENOUS; SUBCUTANEOUS at 09:59

## 2019-01-17 RX ADMIN — LORAZEPAM 3 MG: 2 INJECTION INTRAMUSCULAR; INTRAVENOUS at 11:52

## 2019-01-17 RX ADMIN — LORAZEPAM 3 MG: 2 INJECTION INTRAMUSCULAR; INTRAVENOUS at 16:05

## 2019-01-17 RX ADMIN — DILTIAZEM HYDROCHLORIDE 5 MG/HR: 5 INJECTION INTRAVENOUS at 04:13

## 2019-01-17 RX ADMIN — Medication 10 ML: at 09:52

## 2019-01-17 RX ADMIN — Medication 10 ML: at 20:42

## 2019-01-17 RX ADMIN — PANTOPRAZOLE SODIUM 40 MG: 40 INJECTION, POWDER, FOR SOLUTION INTRAVENOUS at 09:51

## 2019-01-17 RX ADMIN — Medication 10 ML: at 17:02

## 2019-01-17 RX ADMIN — POTASSIUM CHLORIDE 10 MEQ: 7.46 INJECTION, SOLUTION INTRAVENOUS at 17:00

## 2019-01-17 RX ADMIN — Medication 10 ML: at 18:14

## 2019-01-17 RX ADMIN — Medication 10 ML: at 19:42

## 2019-01-17 RX ADMIN — LORAZEPAM 2 MG: 2 INJECTION INTRAMUSCULAR; INTRAVENOUS at 04:10

## 2019-01-17 RX ADMIN — SODIUM CHLORIDE 8 MG/HR: 9 INJECTION, SOLUTION INTRAVENOUS at 12:34

## 2019-01-17 RX ADMIN — LORAZEPAM 2 MG: 2 INJECTION INTRAMUSCULAR at 21:48

## 2019-01-17 RX ADMIN — Medication 10 ML: at 19:15

## 2019-01-17 RX ADMIN — Medication 10 ML: at 20:43

## 2019-01-17 RX ADMIN — IOPAMIDOL 80 ML: 755 INJECTION, SOLUTION INTRAVENOUS at 15:35

## 2019-01-17 RX ADMIN — LORAZEPAM 4 MG: 2 INJECTION INTRAMUSCULAR; INTRAVENOUS at 14:33

## 2019-01-17 RX ADMIN — Medication 10 ML: at 16:12

## 2019-01-17 RX ADMIN — THIAMINE HYDROCHLORIDE: 100 INJECTION, SOLUTION INTRAMUSCULAR; INTRAVENOUS at 09:47

## 2019-01-17 RX ADMIN — Medication 10 ML: at 09:51

## 2019-01-17 RX ADMIN — LORAZEPAM 2 MG: 2 INJECTION INTRAMUSCULAR; INTRAVENOUS at 00:40

## 2019-01-17 RX ADMIN — LORAZEPAM 2 MG: 2 INJECTION INTRAMUSCULAR; INTRAVENOUS at 19:40

## 2019-01-17 RX ADMIN — POTASSIUM CHLORIDE 10 MEQ: 7.46 INJECTION, SOLUTION INTRAVENOUS at 19:02

## 2019-01-17 RX ADMIN — Medication 10 ML: at 16:04

## 2019-01-17 RX ADMIN — Medication 10 ML: at 19:10

## 2019-01-17 RX ADMIN — Medication 10 ML: at 16:05

## 2019-01-17 RX ADMIN — Medication 10 ML: at 19:40

## 2019-01-17 RX ADMIN — Medication 10 ML: at 17:01

## 2019-01-17 ASSESSMENT — PAIN SCALES - GENERAL
PAINLEVEL_OUTOF10: 4
PAINLEVEL_OUTOF10: 0
PAINLEVEL_OUTOF10: 4
PAINLEVEL_OUTOF10: 0
PAINLEVEL_OUTOF10: 0

## 2019-01-17 ASSESSMENT — PAIN DESCRIPTION - LOCATION: LOCATION: ABDOMEN

## 2019-01-17 ASSESSMENT — PAIN DESCRIPTION - FREQUENCY: FREQUENCY: CONTINUOUS

## 2019-01-17 ASSESSMENT — ENCOUNTER SYMPTOMS
ALLERGIC/IMMUNOLOGIC NEGATIVE: 1
RESPIRATORY NEGATIVE: 1
EYES NEGATIVE: 1

## 2019-01-17 ASSESSMENT — PAIN DESCRIPTION - PAIN TYPE: TYPE: ACUTE PAIN

## 2019-01-17 ASSESSMENT — PAIN DESCRIPTION - ONSET: ONSET: ON-GOING

## 2019-01-17 ASSESSMENT — PAIN DESCRIPTION - PROGRESSION: CLINICAL_PROGRESSION: NOT CHANGED

## 2019-01-17 ASSESSMENT — PAIN DESCRIPTION - ORIENTATION: ORIENTATION: UPPER;RIGHT;LEFT

## 2019-01-17 ASSESSMENT — PAIN DESCRIPTION - DESCRIPTORS: DESCRIPTORS: BURNING;SHARP

## 2019-01-18 ENCOUNTER — APPOINTMENT (OUTPATIENT)
Dept: GENERAL RADIOLOGY | Age: 44
End: 2019-01-18
Payer: MEDICAID

## 2019-01-18 ENCOUNTER — APPOINTMENT (OUTPATIENT)
Dept: ULTRASOUND IMAGING | Age: 44
End: 2019-01-18
Payer: MEDICAID

## 2019-01-18 PROBLEM — E43 SEVERE PROTEIN-CALORIE MALNUTRITION (HCC): Status: ACTIVE | Noted: 2019-01-18

## 2019-01-18 LAB
ALBUMIN SERPL-MCNC: 3.4 G/DL (ref 3.5–5.2)
ALP BLD-CCNC: 153 U/L (ref 40–129)
ALT SERPL-CCNC: 114 U/L (ref 0–40)
ANION GAP SERPL CALCULATED.3IONS-SCNC: 12 MMOL/L (ref 7–16)
AST SERPL-CCNC: 174 U/L (ref 0–39)
BASOPHILS ABSOLUTE: 0.04 E9/L (ref 0–0.2)
BASOPHILS RELATIVE PERCENT: 0.9 % (ref 0–2)
BILIRUB SERPL-MCNC: 6.4 MG/DL (ref 0–1.2)
BUN BLDV-MCNC: 4 MG/DL (ref 6–20)
CALCIUM SERPL-MCNC: 7.8 MG/DL (ref 8.6–10.2)
CHLORIDE BLD-SCNC: 103 MMOL/L (ref 98–107)
CO2: 20 MMOL/L (ref 22–29)
CREAT SERPL-MCNC: 0.6 MG/DL (ref 0.7–1.2)
EKG ATRIAL RATE: 197 BPM
EKG Q-T INTERVAL: 226 MS
EKG QRS DURATION: 88 MS
EKG QTC CALCULATION (BAZETT): 386 MS
EKG R AXIS: 13 DEGREES
EKG T AXIS: -34 DEGREES
EKG VENTRICULAR RATE: 176 BPM
EOSINOPHILS ABSOLUTE: 0.08 E9/L (ref 0.05–0.5)
EOSINOPHILS RELATIVE PERCENT: 1.8 % (ref 0–6)
GFR AFRICAN AMERICAN: >60
GFR NON-AFRICAN AMERICAN: >60 ML/MIN/1.73
GLUCOSE BLD-MCNC: 122 MG/DL (ref 74–99)
HCT VFR BLD CALC: 36.3 % (ref 37–54)
HEMOGLOBIN: 12.6 G/DL (ref 12.5–16.5)
LYMPHOCYTES ABSOLUTE: 0.39 E9/L (ref 1.5–4)
LYMPHOCYTES RELATIVE PERCENT: 8.8 % (ref 20–42)
MCH RBC QN AUTO: 32.4 PG (ref 26–35)
MCHC RBC AUTO-ENTMCNC: 34.7 % (ref 32–34.5)
MCV RBC AUTO: 93.3 FL (ref 80–99.9)
MONOCYTES ABSOLUTE: 0.3 E9/L (ref 0.1–0.95)
MONOCYTES RELATIVE PERCENT: 7 % (ref 2–12)
MRSA CULTURE ONLY: NORMAL
NEUTROPHILS ABSOLUTE: 3.53 E9/L (ref 1.8–7.3)
NEUTROPHILS RELATIVE PERCENT: 81.6 % (ref 43–80)
PDW BLD-RTO: 14.4 FL (ref 11.5–15)
PLATELET # BLD: 45 E9/L (ref 130–450)
PLATELET CONFIRMATION: NORMAL
PMV BLD AUTO: 10.6 FL (ref 7–12)
POIKILOCYTES: ABNORMAL
POLYCHROMASIA: ABNORMAL
POTASSIUM SERPL-SCNC: 3.4 MMOL/L (ref 3.5–5)
RBC # BLD: 3.89 E12/L (ref 3.8–5.8)
SODIUM BLD-SCNC: 135 MMOL/L (ref 132–146)
TARGET CELLS: ABNORMAL
TOTAL PROTEIN: 7 G/DL (ref 6.4–8.3)
WBC # BLD: 4.3 E9/L (ref 4.5–11.5)

## 2019-01-18 PROCEDURE — C9113 INJ PANTOPRAZOLE SODIUM, VIA: HCPCS | Performed by: INTERNAL MEDICINE

## 2019-01-18 PROCEDURE — 76705 ECHO EXAM OF ABDOMEN: CPT

## 2019-01-18 PROCEDURE — 2580000003 HC RX 258: Performed by: INTERNAL MEDICINE

## 2019-01-18 PROCEDURE — 71045 X-RAY EXAM CHEST 1 VIEW: CPT

## 2019-01-18 PROCEDURE — 2500000003 HC RX 250 WO HCPCS: Performed by: INTERNAL MEDICINE

## 2019-01-18 PROCEDURE — 36415 COLL VENOUS BLD VENIPUNCTURE: CPT

## 2019-01-18 PROCEDURE — 2000000000 HC ICU R&B

## 2019-01-18 PROCEDURE — 6360000002 HC RX W HCPCS: Performed by: INTERNAL MEDICINE

## 2019-01-18 PROCEDURE — 80053 COMPREHEN METABOLIC PANEL: CPT

## 2019-01-18 PROCEDURE — 99233 SBSQ HOSP IP/OBS HIGH 50: CPT | Performed by: INTERNAL MEDICINE

## 2019-01-18 PROCEDURE — 2500000003 HC RX 250 WO HCPCS: Performed by: EMERGENCY MEDICINE

## 2019-01-18 PROCEDURE — 2580000003 HC RX 258: Performed by: EMERGENCY MEDICINE

## 2019-01-18 PROCEDURE — 85025 COMPLETE CBC W/AUTO DIFF WBC: CPT

## 2019-01-18 RX ORDER — LORAZEPAM 2 MG/ML
4 INJECTION INTRAMUSCULAR
Status: DISCONTINUED | OUTPATIENT
Start: 2019-01-18 | End: 2019-01-23 | Stop reason: HOSPADM

## 2019-01-18 RX ORDER — LORAZEPAM 2 MG/ML
2 INJECTION INTRAMUSCULAR
Status: DISCONTINUED | OUTPATIENT
Start: 2019-01-18 | End: 2019-01-23 | Stop reason: HOSPADM

## 2019-01-18 RX ORDER — LORAZEPAM 2 MG/ML
3 INJECTION INTRAMUSCULAR
Status: DISCONTINUED | OUTPATIENT
Start: 2019-01-18 | End: 2019-01-23 | Stop reason: HOSPADM

## 2019-01-18 RX ORDER — SODIUM CHLORIDE 0.9 % (FLUSH) 0.9 %
10 SYRINGE (ML) INJECTION EVERY 12 HOURS SCHEDULED
Status: DISCONTINUED | OUTPATIENT
Start: 2019-01-18 | End: 2019-01-23 | Stop reason: HOSPADM

## 2019-01-18 RX ORDER — LORAZEPAM 2 MG/ML
1 INJECTION INTRAMUSCULAR
Status: DISCONTINUED | OUTPATIENT
Start: 2019-01-18 | End: 2019-01-23 | Stop reason: HOSPADM

## 2019-01-18 RX ORDER — LORAZEPAM 1 MG/1
3 TABLET ORAL
Status: DISCONTINUED | OUTPATIENT
Start: 2019-01-18 | End: 2019-01-23 | Stop reason: HOSPADM

## 2019-01-18 RX ORDER — LORAZEPAM 1 MG/1
1 TABLET ORAL
Status: DISCONTINUED | OUTPATIENT
Start: 2019-01-18 | End: 2019-01-23 | Stop reason: HOSPADM

## 2019-01-18 RX ORDER — SODIUM CHLORIDE 0.9 % (FLUSH) 0.9 %
10 SYRINGE (ML) INJECTION PRN
Status: DISCONTINUED | OUTPATIENT
Start: 2019-01-18 | End: 2019-01-23 | Stop reason: HOSPADM

## 2019-01-18 RX ORDER — LORAZEPAM 1 MG/1
4 TABLET ORAL
Status: DISCONTINUED | OUTPATIENT
Start: 2019-01-18 | End: 2019-01-23 | Stop reason: HOSPADM

## 2019-01-18 RX ORDER — LORAZEPAM 1 MG/1
2 TABLET ORAL
Status: DISCONTINUED | OUTPATIENT
Start: 2019-01-18 | End: 2019-01-23 | Stop reason: HOSPADM

## 2019-01-18 RX ORDER — DILTIAZEM HYDROCHLORIDE 120 MG/1
120 CAPSULE, COATED, EXTENDED RELEASE ORAL DAILY
Status: DISCONTINUED | OUTPATIENT
Start: 2019-01-19 | End: 2019-01-19

## 2019-01-18 RX ADMIN — LORAZEPAM 3 MG: 2 INJECTION INTRAMUSCULAR; INTRAVENOUS at 16:14

## 2019-01-18 RX ADMIN — DILTIAZEM HYDROCHLORIDE 5 MG/HR: 5 INJECTION INTRAVENOUS at 06:01

## 2019-01-18 RX ADMIN — THIAMINE HYDROCHLORIDE: 100 INJECTION, SOLUTION INTRAMUSCULAR; INTRAVENOUS at 10:09

## 2019-01-18 RX ADMIN — LORAZEPAM 2 MG: 2 INJECTION INTRAMUSCULAR; INTRAVENOUS at 22:22

## 2019-01-18 RX ADMIN — Medication 10 ML: at 20:50

## 2019-01-18 RX ADMIN — LORAZEPAM 4 MG: 2 INJECTION INTRAMUSCULAR; INTRAVENOUS at 18:34

## 2019-01-18 RX ADMIN — Medication 10 ML: at 10:06

## 2019-01-18 RX ADMIN — POTASSIUM PHOSPHATE, MONOBASIC AND POTASSIUM PHOSPHATE, DIBASIC 10 MMOL: 224; 236 INJECTION, SOLUTION INTRAVENOUS at 20:49

## 2019-01-18 RX ADMIN — POTASSIUM PHOSPHATE, MONOBASIC AND POTASSIUM PHOSPHATE, DIBASIC 10 MMOL: 224; 236 INJECTION, SOLUTION INTRAVENOUS at 16:04

## 2019-01-18 RX ADMIN — DEXMEDETOMIDINE 0.4 MCG/KG/HR: 100 INJECTION, SOLUTION, CONCENTRATE INTRAVENOUS at 06:02

## 2019-01-18 RX ADMIN — POTASSIUM PHOSPHATE, MONOBASIC AND POTASSIUM PHOSPHATE, DIBASIC 10 MMOL: 224; 236 INJECTION, SOLUTION INTRAVENOUS at 09:58

## 2019-01-18 RX ADMIN — DEXMEDETOMIDINE 0.2 MCG/KG/HR: 100 INJECTION, SOLUTION, CONCENTRATE INTRAVENOUS at 21:16

## 2019-01-18 RX ADMIN — PANTOPRAZOLE SODIUM 40 MG: 40 INJECTION, POWDER, FOR SOLUTION INTRAVENOUS at 20:51

## 2019-01-18 RX ADMIN — PANTOPRAZOLE SODIUM 40 MG: 40 INJECTION, POWDER, FOR SOLUTION INTRAVENOUS at 10:05

## 2019-01-18 ASSESSMENT — PAIN SCALES - GENERAL
PAINLEVEL_OUTOF10: 0

## 2019-01-19 LAB
ALBUMIN SERPL-MCNC: 3.1 G/DL (ref 3.5–5.2)
ALP BLD-CCNC: 148 U/L (ref 40–129)
ALT SERPL-CCNC: 101 U/L (ref 0–40)
AMMONIA: 95 UMOL/L (ref 16–60)
ANION GAP SERPL CALCULATED.3IONS-SCNC: 11 MMOL/L (ref 7–16)
AST SERPL-CCNC: 135 U/L (ref 0–39)
BASOPHILS ABSOLUTE: 0 E9/L (ref 0–0.2)
BASOPHILS RELATIVE PERCENT: 0.9 % (ref 0–2)
BILIRUB SERPL-MCNC: 4.8 MG/DL (ref 0–1.2)
BUN BLDV-MCNC: 4 MG/DL (ref 6–20)
CALCIUM SERPL-MCNC: 7.7 MG/DL (ref 8.6–10.2)
CHLORIDE BLD-SCNC: 107 MMOL/L (ref 98–107)
CO2: 20 MMOL/L (ref 22–29)
CREAT SERPL-MCNC: 0.6 MG/DL (ref 0.7–1.2)
EOSINOPHILS ABSOLUTE: 0.15 E9/L (ref 0.05–0.5)
EOSINOPHILS RELATIVE PERCENT: 3.5 % (ref 0–6)
GFR AFRICAN AMERICAN: >60
GFR NON-AFRICAN AMERICAN: >60 ML/MIN/1.73
GLUCOSE BLD-MCNC: 97 MG/DL (ref 74–99)
HCT VFR BLD CALC: 35.2 % (ref 37–54)
HEMOGLOBIN: 12.2 G/DL (ref 12.5–16.5)
LYMPHOCYTES ABSOLUTE: 0.65 E9/L (ref 1.5–4)
LYMPHOCYTES RELATIVE PERCENT: 14.9 % (ref 20–42)
MAGNESIUM: 1.7 MG/DL (ref 1.6–2.6)
MCH RBC QN AUTO: 32.6 PG (ref 26–35)
MCHC RBC AUTO-ENTMCNC: 34.7 % (ref 32–34.5)
MCV RBC AUTO: 94.1 FL (ref 80–99.9)
MONOCYTES ABSOLUTE: 0.17 E9/L (ref 0.1–0.95)
MONOCYTES RELATIVE PERCENT: 4.4 % (ref 2–12)
NEUTROPHILS ABSOLUTE: 3.31 E9/L (ref 1.8–7.3)
NEUTROPHILS RELATIVE PERCENT: 77.2 % (ref 43–80)
PDW BLD-RTO: 14.7 FL (ref 11.5–15)
PHOSPHORUS: 2.7 MG/DL (ref 2.5–4.5)
PLATELET # BLD: 57 E9/L (ref 130–450)
PLATELET CONFIRMATION: NORMAL
PMV BLD AUTO: 11.1 FL (ref 7–12)
POTASSIUM SERPL-SCNC: 4.2 MMOL/L (ref 3.5–5)
RBC # BLD: 3.74 E12/L (ref 3.8–5.8)
SODIUM BLD-SCNC: 138 MMOL/L (ref 132–146)
TOTAL PROTEIN: 6.7 G/DL (ref 6.4–8.3)
WBC # BLD: 4.3 E9/L (ref 4.5–11.5)

## 2019-01-19 PROCEDURE — 99233 SBSQ HOSP IP/OBS HIGH 50: CPT | Performed by: INTERNAL MEDICINE

## 2019-01-19 PROCEDURE — 6370000000 HC RX 637 (ALT 250 FOR IP): Performed by: INTERNAL MEDICINE

## 2019-01-19 PROCEDURE — 2500000003 HC RX 250 WO HCPCS: Performed by: INTERNAL MEDICINE

## 2019-01-19 PROCEDURE — 82140 ASSAY OF AMMONIA: CPT

## 2019-01-19 PROCEDURE — C9113 INJ PANTOPRAZOLE SODIUM, VIA: HCPCS | Performed by: INTERNAL MEDICINE

## 2019-01-19 PROCEDURE — 36415 COLL VENOUS BLD VENIPUNCTURE: CPT

## 2019-01-19 PROCEDURE — 83735 ASSAY OF MAGNESIUM: CPT

## 2019-01-19 PROCEDURE — 2000000000 HC ICU R&B

## 2019-01-19 PROCEDURE — 99232 SBSQ HOSP IP/OBS MODERATE 35: CPT | Performed by: INTERNAL MEDICINE

## 2019-01-19 PROCEDURE — 6370000000 HC RX 637 (ALT 250 FOR IP): Performed by: EMERGENCY MEDICINE

## 2019-01-19 PROCEDURE — 6360000002 HC RX W HCPCS: Performed by: INTERNAL MEDICINE

## 2019-01-19 PROCEDURE — 85025 COMPLETE CBC W/AUTO DIFF WBC: CPT

## 2019-01-19 PROCEDURE — 80053 COMPREHEN METABOLIC PANEL: CPT

## 2019-01-19 PROCEDURE — 2580000003 HC RX 258: Performed by: INTERNAL MEDICINE

## 2019-01-19 PROCEDURE — 84100 ASSAY OF PHOSPHORUS: CPT

## 2019-01-19 RX ORDER — M-VIT,TX,IRON,MINS/CALC/FOLIC 27MG-0.4MG
1 TABLET ORAL DAILY
Status: DISCONTINUED | OUTPATIENT
Start: 2019-01-19 | End: 2019-01-23 | Stop reason: HOSPADM

## 2019-01-19 RX ORDER — SODIUM CHLORIDE 9 MG/ML
INJECTION, SOLUTION INTRAVENOUS CONTINUOUS
Status: DISCONTINUED | OUTPATIENT
Start: 2019-01-19 | End: 2019-01-22

## 2019-01-19 RX ORDER — DILTIAZEM HYDROCHLORIDE 120 MG/1
120 CAPSULE, COATED, EXTENDED RELEASE ORAL EVERY 12 HOURS
Status: COMPLETED | OUTPATIENT
Start: 2019-01-19 | End: 2019-01-21

## 2019-01-19 RX ADMIN — Medication 10 ML: at 20:27

## 2019-01-19 RX ADMIN — SODIUM CHLORIDE: 9 INJECTION, SOLUTION INTRAVENOUS at 10:03

## 2019-01-19 RX ADMIN — THIAMINE HYDROCHLORIDE 100 MG: 100 INJECTION, SOLUTION INTRAMUSCULAR; INTRAVENOUS at 09:09

## 2019-01-19 RX ADMIN — PANTOPRAZOLE SODIUM 40 MG: 40 INJECTION, POWDER, FOR SOLUTION INTRAVENOUS at 08:37

## 2019-01-19 RX ADMIN — MULTIPLE VITAMINS W/ MINERALS TAB 1 TABLET: TAB at 09:09

## 2019-01-19 RX ADMIN — Medication 10 ML: at 08:37

## 2019-01-19 RX ADMIN — FOLIC ACID 1 MG: 5 INJECTION, SOLUTION INTRAMUSCULAR; INTRAVENOUS; SUBCUTANEOUS at 09:42

## 2019-01-19 RX ADMIN — Medication 10 ML: at 20:28

## 2019-01-19 RX ADMIN — DILTIAZEM HYDROCHLORIDE 120 MG: 120 CAPSULE, COATED, EXTENDED RELEASE ORAL at 19:07

## 2019-01-19 RX ADMIN — LORAZEPAM 2 MG: 2 INJECTION INTRAMUSCULAR; INTRAVENOUS at 18:57

## 2019-01-19 RX ADMIN — LORAZEPAM 3 MG: 2 INJECTION INTRAMUSCULAR; INTRAVENOUS at 20:42

## 2019-01-19 RX ADMIN — DILTIAZEM HYDROCHLORIDE 120 MG: 120 CAPSULE, COATED, EXTENDED RELEASE ORAL at 08:37

## 2019-01-19 RX ADMIN — PANTOPRAZOLE SODIUM 40 MG: 40 INJECTION, POWDER, FOR SOLUTION INTRAVENOUS at 20:27

## 2019-01-19 ASSESSMENT — PAIN SCALES - GENERAL
PAINLEVEL_OUTOF10: 0

## 2019-01-20 LAB
ALBUMIN SERPL-MCNC: 3.1 G/DL (ref 3.5–5.2)
ALP BLD-CCNC: 145 U/L (ref 40–129)
ALT SERPL-CCNC: 94 U/L (ref 0–40)
ANION GAP SERPL CALCULATED.3IONS-SCNC: 11 MMOL/L (ref 7–16)
AST SERPL-CCNC: 112 U/L (ref 0–39)
BILIRUB SERPL-MCNC: 4 MG/DL (ref 0–1.2)
BUN BLDV-MCNC: 3 MG/DL (ref 6–20)
CALCIUM SERPL-MCNC: 8 MG/DL (ref 8.6–10.2)
CHLORIDE BLD-SCNC: 106 MMOL/L (ref 98–107)
CO2: 18 MMOL/L (ref 22–29)
CREAT SERPL-MCNC: 0.6 MG/DL (ref 0.7–1.2)
FOLATE: 11.1 NG/ML (ref 4.8–24.2)
GFR AFRICAN AMERICAN: >60
GFR NON-AFRICAN AMERICAN: >60 ML/MIN/1.73
GLUCOSE BLD-MCNC: 104 MG/DL (ref 74–99)
HCT VFR BLD CALC: 36.7 % (ref 37–54)
HEMOGLOBIN: 12.6 G/DL (ref 12.5–16.5)
INR BLD: 1.4
MCH RBC QN AUTO: 33 PG (ref 26–35)
MCHC RBC AUTO-ENTMCNC: 34.3 % (ref 32–34.5)
MCV RBC AUTO: 96.1 FL (ref 80–99.9)
PDW BLD-RTO: 15.7 FL (ref 11.5–15)
PLATELET # BLD: 63 E9/L (ref 130–450)
PLATELET CONFIRMATION: NORMAL
PMV BLD AUTO: 10.3 FL (ref 7–12)
POTASSIUM SERPL-SCNC: 3.1 MMOL/L (ref 3.5–5)
PROTHROMBIN TIME: 15.8 SEC (ref 9.3–12.4)
RBC # BLD: 3.82 E12/L (ref 3.8–5.8)
SODIUM BLD-SCNC: 135 MMOL/L (ref 132–146)
TOTAL PROTEIN: 6.9 G/DL (ref 6.4–8.3)
VITAMIN B-12: 1404 PG/ML (ref 211–946)
WBC # BLD: 4.2 E9/L (ref 4.5–11.5)

## 2019-01-20 PROCEDURE — 85027 COMPLETE CBC AUTOMATED: CPT

## 2019-01-20 PROCEDURE — 36415 COLL VENOUS BLD VENIPUNCTURE: CPT

## 2019-01-20 PROCEDURE — C9113 INJ PANTOPRAZOLE SODIUM, VIA: HCPCS | Performed by: INTERNAL MEDICINE

## 2019-01-20 PROCEDURE — 99233 SBSQ HOSP IP/OBS HIGH 50: CPT | Performed by: INTERNAL MEDICINE

## 2019-01-20 PROCEDURE — 6360000002 HC RX W HCPCS: Performed by: INTERNAL MEDICINE

## 2019-01-20 PROCEDURE — 6370000000 HC RX 637 (ALT 250 FOR IP): Performed by: INTERNAL MEDICINE

## 2019-01-20 PROCEDURE — 2580000003 HC RX 258: Performed by: INTERNAL MEDICINE

## 2019-01-20 PROCEDURE — 80053 COMPREHEN METABOLIC PANEL: CPT

## 2019-01-20 PROCEDURE — 85610 PROTHROMBIN TIME: CPT

## 2019-01-20 PROCEDURE — 82607 VITAMIN B-12: CPT

## 2019-01-20 PROCEDURE — 2500000003 HC RX 250 WO HCPCS: Performed by: INTERNAL MEDICINE

## 2019-01-20 PROCEDURE — 2000000000 HC ICU R&B

## 2019-01-20 PROCEDURE — 99232 SBSQ HOSP IP/OBS MODERATE 35: CPT | Performed by: INTERNAL MEDICINE

## 2019-01-20 PROCEDURE — 6370000000 HC RX 637 (ALT 250 FOR IP): Performed by: EMERGENCY MEDICINE

## 2019-01-20 PROCEDURE — 82746 ASSAY OF FOLIC ACID SERUM: CPT

## 2019-01-20 RX ORDER — LACTULOSE 10 G/15ML
20 SOLUTION ORAL 3 TIMES DAILY
Status: DISCONTINUED | OUTPATIENT
Start: 2019-01-20 | End: 2019-01-23 | Stop reason: HOSPADM

## 2019-01-20 RX ORDER — METOPROLOL SUCCINATE 25 MG/1
25 TABLET, EXTENDED RELEASE ORAL 2 TIMES DAILY
Status: COMPLETED | OUTPATIENT
Start: 2019-01-20 | End: 2019-01-21

## 2019-01-20 RX ADMIN — Medication 10 ML: at 08:58

## 2019-01-20 RX ADMIN — Medication 10 ML: at 09:32

## 2019-01-20 RX ADMIN — FOLIC ACID 1 MG: 5 INJECTION, SOLUTION INTRAMUSCULAR; INTRAVENOUS; SUBCUTANEOUS at 09:29

## 2019-01-20 RX ADMIN — METOPROLOL SUCCINATE 25 MG: 25 TABLET, EXTENDED RELEASE ORAL at 09:15

## 2019-01-20 RX ADMIN — LORAZEPAM 2 MG: 2 INJECTION INTRAMUSCULAR; INTRAVENOUS at 20:19

## 2019-01-20 RX ADMIN — SODIUM CHLORIDE: 9 INJECTION, SOLUTION INTRAVENOUS at 13:29

## 2019-01-20 RX ADMIN — DILTIAZEM HYDROCHLORIDE 120 MG: 120 CAPSULE, COATED, EXTENDED RELEASE ORAL at 08:57

## 2019-01-20 RX ADMIN — THIAMINE HYDROCHLORIDE 100 MG: 100 INJECTION, SOLUTION INTRAMUSCULAR; INTRAVENOUS at 09:54

## 2019-01-20 RX ADMIN — PANTOPRAZOLE SODIUM 40 MG: 40 INJECTION, POWDER, FOR SOLUTION INTRAVENOUS at 20:06

## 2019-01-20 RX ADMIN — METOPROLOL SUCCINATE 25 MG: 25 TABLET, EXTENDED RELEASE ORAL at 20:06

## 2019-01-20 RX ADMIN — Medication 10 ML: at 20:07

## 2019-01-20 RX ADMIN — Medication 10 ML: at 20:06

## 2019-01-20 RX ADMIN — LORAZEPAM 2 MG: 2 INJECTION INTRAMUSCULAR; INTRAVENOUS at 08:56

## 2019-01-20 RX ADMIN — DILTIAZEM HYDROCHLORIDE 120 MG: 120 CAPSULE, COATED, EXTENDED RELEASE ORAL at 20:06

## 2019-01-20 RX ADMIN — LORAZEPAM 4 MG: 2 INJECTION INTRAMUSCULAR; INTRAVENOUS at 00:20

## 2019-01-20 RX ADMIN — SODIUM CHLORIDE: 9 INJECTION, SOLUTION INTRAVENOUS at 01:00

## 2019-01-20 RX ADMIN — LACTULOSE 20 G: 20 SOLUTION ORAL at 20:06

## 2019-01-20 RX ADMIN — MULTIPLE VITAMINS W/ MINERALS TAB 1 TABLET: TAB at 08:57

## 2019-01-20 RX ADMIN — PANTOPRAZOLE SODIUM 40 MG: 40 INJECTION, POWDER, FOR SOLUTION INTRAVENOUS at 08:57

## 2019-01-20 RX ADMIN — LACTULOSE 20 G: 20 SOLUTION ORAL at 13:53

## 2019-01-20 RX ADMIN — SODIUM CHLORIDE: 9 INJECTION, SOLUTION INTRAVENOUS at 22:08

## 2019-01-20 RX ADMIN — LACTULOSE 20 G: 20 SOLUTION ORAL at 09:34

## 2019-01-20 ASSESSMENT — PAIN SCALES - GENERAL
PAINLEVEL_OUTOF10: 0

## 2019-01-21 ENCOUNTER — ANESTHESIA (OUTPATIENT)
Dept: ENDOSCOPY | Age: 44
End: 2019-01-21
Payer: MEDICAID

## 2019-01-21 ENCOUNTER — ANESTHESIA EVENT (OUTPATIENT)
Dept: ENDOSCOPY | Age: 44
End: 2019-01-21
Payer: MEDICAID

## 2019-01-21 VITALS
SYSTOLIC BLOOD PRESSURE: 99 MMHG | DIASTOLIC BLOOD PRESSURE: 74 MMHG | OXYGEN SATURATION: 97 % | RESPIRATION RATE: 15 BRPM

## 2019-01-21 LAB
ALBUMIN SERPL-MCNC: 3.4 G/DL (ref 3.5–5.2)
ALP BLD-CCNC: 155 U/L (ref 40–129)
ALT SERPL-CCNC: 98 U/L (ref 0–40)
AMMONIA: 83 UMOL/L (ref 16–60)
ANION GAP SERPL CALCULATED.3IONS-SCNC: 11 MMOL/L (ref 7–16)
ANISOCYTOSIS: ABNORMAL
AST SERPL-CCNC: 127 U/L (ref 0–39)
BASOPHILS ABSOLUTE: 0 E9/L (ref 0–0.2)
BASOPHILS RELATIVE PERCENT: 0.8 % (ref 0–2)
BILIRUB SERPL-MCNC: 3.1 MG/DL (ref 0–1.2)
BUN BLDV-MCNC: <2 MG/DL (ref 6–20)
CALCIUM SERPL-MCNC: 8.2 MG/DL (ref 8.6–10.2)
CHLORIDE BLD-SCNC: 106 MMOL/L (ref 98–107)
CO2: 18 MMOL/L (ref 22–29)
CREAT SERPL-MCNC: 0.6 MG/DL (ref 0.7–1.2)
EKG ATRIAL RATE: 73 BPM
EKG Q-T INTERVAL: 410 MS
EKG QRS DURATION: 108 MS
EKG QTC CALCULATION (BAZETT): 484 MS
EKG R AXIS: -6 DEGREES
EKG T AXIS: 7 DEGREES
EKG VENTRICULAR RATE: 84 BPM
EOSINOPHILS ABSOLUTE: 0 E9/L (ref 0.05–0.5)
EOSINOPHILS RELATIVE PERCENT: 0 % (ref 0–6)
GFR AFRICAN AMERICAN: >60
GFR NON-AFRICAN AMERICAN: >60 ML/MIN/1.73
GLUCOSE BLD-MCNC: 101 MG/DL (ref 74–99)
HCT VFR BLD CALC: 39 % (ref 37–54)
HEMOGLOBIN: 13.3 G/DL (ref 12.5–16.5)
LYMPHOCYTES ABSOLUTE: 1.09 E9/L (ref 1.5–4)
LYMPHOCYTES RELATIVE PERCENT: 20.9 % (ref 20–42)
MCH RBC QN AUTO: 32.4 PG (ref 26–35)
MCHC RBC AUTO-ENTMCNC: 34.1 % (ref 32–34.5)
MCV RBC AUTO: 95.1 FL (ref 80–99.9)
MONOCYTES ABSOLUTE: 0.31 E9/L (ref 0.1–0.95)
MONOCYTES RELATIVE PERCENT: 6.1 % (ref 2–12)
NEUTROPHILS ABSOLUTE: 3.8 E9/L (ref 1.8–7.3)
NEUTROPHILS RELATIVE PERCENT: 73 % (ref 43–80)
OVALOCYTES: ABNORMAL
PDW BLD-RTO: 15.9 FL (ref 11.5–15)
PLATELET # BLD: 86 E9/L (ref 130–450)
PLATELET CONFIRMATION: NORMAL
PMV BLD AUTO: 10.3 FL (ref 7–12)
POIKILOCYTES: ABNORMAL
POLYCHROMASIA: ABNORMAL
POTASSIUM SERPL-SCNC: 4.6 MMOL/L (ref 3.5–5)
RBC # BLD: 4.1 E12/L (ref 3.8–5.8)
SMUDGE CELLS: ABNORMAL
SODIUM BLD-SCNC: 135 MMOL/L (ref 132–146)
TARGET CELLS: ABNORMAL
TOTAL PROTEIN: 7.8 G/DL (ref 6.4–8.3)
VITAMIN D 25-HYDROXY: 18 NG/ML (ref 30–100)
WBC # BLD: 5.2 E9/L (ref 4.5–11.5)

## 2019-01-21 PROCEDURE — 6370000000 HC RX 637 (ALT 250 FOR IP): Performed by: INTERNAL MEDICINE

## 2019-01-21 PROCEDURE — 6360000002 HC RX W HCPCS: Performed by: INTERNAL MEDICINE

## 2019-01-21 PROCEDURE — 2580000003 HC RX 258: Performed by: INTERNAL MEDICINE

## 2019-01-21 PROCEDURE — 82140 ASSAY OF AMMONIA: CPT

## 2019-01-21 PROCEDURE — 99233 SBSQ HOSP IP/OBS HIGH 50: CPT | Performed by: INTERNAL MEDICINE

## 2019-01-21 PROCEDURE — 6360000002 HC RX W HCPCS: Performed by: NURSE ANESTHETIST, CERTIFIED REGISTERED

## 2019-01-21 PROCEDURE — 2700000000 HC OXYGEN THERAPY PER DAY

## 2019-01-21 PROCEDURE — 2720000010 HC SURG SUPPLY STERILE: Performed by: INTERNAL MEDICINE

## 2019-01-21 PROCEDURE — 93005 ELECTROCARDIOGRAM TRACING: CPT | Performed by: INTERNAL MEDICINE

## 2019-01-21 PROCEDURE — 80053 COMPREHEN METABOLIC PANEL: CPT

## 2019-01-21 PROCEDURE — 3609013000 HC EGD TRANSORAL CONTROL BLEEDING ANY METHOD: Performed by: INTERNAL MEDICINE

## 2019-01-21 PROCEDURE — C9113 INJ PANTOPRAZOLE SODIUM, VIA: HCPCS | Performed by: INTERNAL MEDICINE

## 2019-01-21 PROCEDURE — 0DB68ZX EXCISION OF STOMACH, VIA NATURAL OR ARTIFICIAL OPENING ENDOSCOPIC, DIAGNOSTIC: ICD-10-PCS | Performed by: INTERNAL MEDICINE

## 2019-01-21 PROCEDURE — 93010 ELECTROCARDIOGRAM REPORT: CPT | Performed by: INTERNAL MEDICINE

## 2019-01-21 PROCEDURE — 3700000000 HC ANESTHESIA ATTENDED CARE: Performed by: INTERNAL MEDICINE

## 2019-01-21 PROCEDURE — 2500000003 HC RX 250 WO HCPCS: Performed by: INTERNAL MEDICINE

## 2019-01-21 PROCEDURE — 36415 COLL VENOUS BLD VENIPUNCTURE: CPT

## 2019-01-21 PROCEDURE — 6370000000 HC RX 637 (ALT 250 FOR IP): Performed by: EMERGENCY MEDICINE

## 2019-01-21 PROCEDURE — 3609017100 HC EGD: Performed by: INTERNAL MEDICINE

## 2019-01-21 PROCEDURE — 2709999900 HC NON-CHARGEABLE SUPPLY: Performed by: INTERNAL MEDICINE

## 2019-01-21 PROCEDURE — 3700000001 HC ADD 15 MINUTES (ANESTHESIA): Performed by: INTERNAL MEDICINE

## 2019-01-21 PROCEDURE — 85025 COMPLETE CBC W/AUTO DIFF WBC: CPT

## 2019-01-21 PROCEDURE — 88305 TISSUE EXAM BY PATHOLOGIST: CPT

## 2019-01-21 PROCEDURE — 82306 VITAMIN D 25 HYDROXY: CPT

## 2019-01-21 PROCEDURE — 2000000000 HC ICU R&B

## 2019-01-21 RX ORDER — FOLIC ACID 1 MG/1
1 TABLET ORAL DAILY
Status: DISCONTINUED | OUTPATIENT
Start: 2019-01-22 | End: 2019-01-23 | Stop reason: HOSPADM

## 2019-01-21 RX ORDER — TRAMADOL HYDROCHLORIDE 50 MG/1
50 TABLET ORAL EVERY 6 HOURS PRN
Status: DISCONTINUED | OUTPATIENT
Start: 2019-01-21 | End: 2019-01-23 | Stop reason: HOSPADM

## 2019-01-21 RX ORDER — THIAMINE MONONITRATE (VIT B1) 100 MG
100 TABLET ORAL DAILY
Status: DISCONTINUED | OUTPATIENT
Start: 2019-01-22 | End: 2019-01-23 | Stop reason: HOSPADM

## 2019-01-21 RX ORDER — PANTOPRAZOLE SODIUM 40 MG/1
40 TABLET, DELAYED RELEASE ORAL
Status: DISCONTINUED | OUTPATIENT
Start: 2019-01-22 | End: 2019-01-23 | Stop reason: HOSPADM

## 2019-01-21 RX ORDER — PROPOFOL 10 MG/ML
INJECTION, EMULSION INTRAVENOUS PRN
Status: DISCONTINUED | OUTPATIENT
Start: 2019-01-21 | End: 2019-01-21 | Stop reason: SDUPTHER

## 2019-01-21 RX ORDER — ERGOCALCIFEROL 1.25 MG/1
50000 CAPSULE ORAL WEEKLY
Status: DISCONTINUED | OUTPATIENT
Start: 2019-01-22 | End: 2019-01-23 | Stop reason: HOSPADM

## 2019-01-21 RX ORDER — TRAMADOL HYDROCHLORIDE 50 MG/1
50 TABLET ORAL ONCE
Status: COMPLETED | OUTPATIENT
Start: 2019-01-21 | End: 2019-01-21

## 2019-01-21 RX ADMIN — DILTIAZEM HYDROCHLORIDE 120 MG: 120 CAPSULE, COATED, EXTENDED RELEASE ORAL at 08:40

## 2019-01-21 RX ADMIN — Medication 10 ML: at 21:06

## 2019-01-21 RX ADMIN — PROPOFOL 100 MG: 10 INJECTION, EMULSION INTRAVENOUS at 11:31

## 2019-01-21 RX ADMIN — Medication 10 ML: at 08:41

## 2019-01-21 RX ADMIN — ONDANSETRON 4 MG: 2 INJECTION INTRAMUSCULAR; INTRAVENOUS at 15:01

## 2019-01-21 RX ADMIN — MULTIPLE VITAMINS W/ MINERALS TAB 1 TABLET: TAB at 08:41

## 2019-01-21 RX ADMIN — LACTULOSE 20 G: 20 SOLUTION ORAL at 21:01

## 2019-01-21 RX ADMIN — THIAMINE HYDROCHLORIDE 100 MG: 100 INJECTION, SOLUTION INTRAMUSCULAR; INTRAVENOUS at 08:41

## 2019-01-21 RX ADMIN — LACTULOSE 20 G: 20 SOLUTION ORAL at 08:42

## 2019-01-21 RX ADMIN — SODIUM CHLORIDE: 9 INJECTION, SOLUTION INTRAVENOUS at 14:00

## 2019-01-21 RX ADMIN — TRAMADOL HYDROCHLORIDE 50 MG: 50 TABLET, FILM COATED ORAL at 21:10

## 2019-01-21 RX ADMIN — DILTIAZEM HYDROCHLORIDE 120 MG: 120 CAPSULE, COATED, EXTENDED RELEASE ORAL at 21:01

## 2019-01-21 RX ADMIN — LIDOCAINE HYDROCHLORIDE 20 MG: 20 INJECTION, SOLUTION INTRAVENOUS at 11:30

## 2019-01-21 RX ADMIN — PROPOFOL 100 MG: 10 INJECTION, EMULSION INTRAVENOUS at 11:30

## 2019-01-21 RX ADMIN — METOPROLOL SUCCINATE 25 MG: 25 TABLET, EXTENDED RELEASE ORAL at 08:40

## 2019-01-21 RX ADMIN — SODIUM CHLORIDE: 9 INJECTION, SOLUTION INTRAVENOUS at 11:26

## 2019-01-21 RX ADMIN — SODIUM CHLORIDE: 9 INJECTION, SOLUTION INTRAVENOUS at 22:37

## 2019-01-21 RX ADMIN — PROPOFOL 30 MG: 10 INJECTION, EMULSION INTRAVENOUS at 11:35

## 2019-01-21 RX ADMIN — PANTOPRAZOLE SODIUM 40 MG: 40 INJECTION, POWDER, FOR SOLUTION INTRAVENOUS at 08:41

## 2019-01-21 RX ADMIN — PROPOFOL 20 MG: 10 INJECTION, EMULSION INTRAVENOUS at 11:38

## 2019-01-21 RX ADMIN — PHENYLEPHRINE HYDROCHLORIDE 100 MCG: 10 INJECTION INTRAVENOUS at 11:37

## 2019-01-21 RX ADMIN — LACTULOSE 20 G: 20 SOLUTION ORAL at 14:37

## 2019-01-21 RX ADMIN — LORAZEPAM 2 MG: 1 TABLET ORAL at 14:43

## 2019-01-21 RX ADMIN — SODIUM CHLORIDE: 9 INJECTION, SOLUTION INTRAVENOUS at 06:10

## 2019-01-21 RX ADMIN — TRAMADOL HYDROCHLORIDE 50 MG: 50 TABLET, FILM COATED ORAL at 17:12

## 2019-01-21 RX ADMIN — FOLIC ACID 1 MG: 5 INJECTION, SOLUTION INTRAMUSCULAR; INTRAVENOUS; SUBCUTANEOUS at 08:41

## 2019-01-21 RX ADMIN — METOPROLOL SUCCINATE 25 MG: 25 TABLET, EXTENDED RELEASE ORAL at 21:01

## 2019-01-21 ASSESSMENT — PAIN DESCRIPTION - DESCRIPTORS: DESCRIPTORS: ACHING;CONSTANT;DISCOMFORT

## 2019-01-21 ASSESSMENT — PAIN SCALES - GENERAL
PAINLEVEL_OUTOF10: 10
PAINLEVEL_OUTOF10: 0
PAINLEVEL_OUTOF10: 8
PAINLEVEL_OUTOF10: 5

## 2019-01-21 ASSESSMENT — PAIN DESCRIPTION - PAIN TYPE: TYPE: ACUTE PAIN

## 2019-01-21 ASSESSMENT — PAIN DESCRIPTION - LOCATION: LOCATION: HEAD

## 2019-01-22 PROCEDURE — 99233 SBSQ HOSP IP/OBS HIGH 50: CPT | Performed by: INTERNAL MEDICINE

## 2019-01-22 PROCEDURE — 6370000000 HC RX 637 (ALT 250 FOR IP): Performed by: INTERNAL MEDICINE

## 2019-01-22 PROCEDURE — 6370000000 HC RX 637 (ALT 250 FOR IP): Performed by: EMERGENCY MEDICINE

## 2019-01-22 PROCEDURE — 1200000000 HC SEMI PRIVATE

## 2019-01-22 PROCEDURE — 6370000000 HC RX 637 (ALT 250 FOR IP): Performed by: PHYSICIAN ASSISTANT

## 2019-01-22 PROCEDURE — 99232 SBSQ HOSP IP/OBS MODERATE 35: CPT | Performed by: INTERNAL MEDICINE

## 2019-01-22 PROCEDURE — 2580000003 HC RX 258: Performed by: INTERNAL MEDICINE

## 2019-01-22 RX ORDER — LANOLIN ALCOHOL/MO/W.PET/CERES
4.5 CREAM (GRAM) TOPICAL NIGHTLY PRN
Status: DISCONTINUED | OUTPATIENT
Start: 2019-01-22 | End: 2019-01-23 | Stop reason: HOSPADM

## 2019-01-22 RX ADMIN — MULTIPLE VITAMINS W/ MINERALS TAB 1 TABLET: TAB at 08:28

## 2019-01-22 RX ADMIN — FOLIC ACID 1 MG: 1 TABLET ORAL at 08:28

## 2019-01-22 RX ADMIN — LACTULOSE 20 G: 20 SOLUTION ORAL at 21:33

## 2019-01-22 RX ADMIN — MELATONIN TAB 3 MG 4.5 MG: 3 TAB at 22:05

## 2019-01-22 RX ADMIN — LACTULOSE 20 G: 20 SOLUTION ORAL at 13:39

## 2019-01-22 RX ADMIN — LACTULOSE 20 G: 20 SOLUTION ORAL at 08:28

## 2019-01-22 RX ADMIN — PANTOPRAZOLE SODIUM 40 MG: 40 TABLET, DELAYED RELEASE ORAL at 08:28

## 2019-01-22 RX ADMIN — Medication 100 MG: at 08:28

## 2019-01-22 RX ADMIN — DILTIAZEM HYDROCHLORIDE 300 MG: 180 CAPSULE, COATED, EXTENDED RELEASE ORAL at 08:27

## 2019-01-22 RX ADMIN — Medication 10 ML: at 21:33

## 2019-01-22 RX ADMIN — ERGOCALCIFEROL 50000 UNITS: 1.25 CAPSULE ORAL at 08:30

## 2019-01-22 ASSESSMENT — PAIN SCALES - GENERAL
PAINLEVEL_OUTOF10: 0

## 2019-01-23 VITALS
OXYGEN SATURATION: 97 % | DIASTOLIC BLOOD PRESSURE: 71 MMHG | TEMPERATURE: 97.9 F | WEIGHT: 225.7 LBS | BODY MASS INDEX: 28.06 KG/M2 | SYSTOLIC BLOOD PRESSURE: 128 MMHG | HEART RATE: 74 BPM | RESPIRATION RATE: 16 BRPM | HEIGHT: 75 IN

## 2019-01-23 DIAGNOSIS — I48.0 PAROXYSMAL ATRIAL FIBRILLATION (HCC): Primary | ICD-10-CM

## 2019-01-23 PROCEDURE — 2580000003 HC RX 258: Performed by: INTERNAL MEDICINE

## 2019-01-23 PROCEDURE — 6370000000 HC RX 637 (ALT 250 FOR IP): Performed by: INTERNAL MEDICINE

## 2019-01-23 PROCEDURE — 99239 HOSP IP/OBS DSCHRG MGMT >30: CPT | Performed by: INTERNAL MEDICINE

## 2019-01-23 PROCEDURE — 6370000000 HC RX 637 (ALT 250 FOR IP): Performed by: EMERGENCY MEDICINE

## 2019-01-23 PROCEDURE — 99232 SBSQ HOSP IP/OBS MODERATE 35: CPT | Performed by: INTERNAL MEDICINE

## 2019-01-23 RX ORDER — LACTULOSE 10 G/15ML
20 SOLUTION ORAL 2 TIMES DAILY
Qty: 1892 ML | Refills: 0 | Status: ON HOLD | OUTPATIENT
Start: 2019-01-23 | End: 2021-01-01 | Stop reason: SDUPTHER

## 2019-01-23 RX ORDER — LANOLIN ALCOHOL/MO/W.PET/CERES
100 CREAM (GRAM) TOPICAL DAILY
Qty: 30 TABLET | Refills: 0 | Status: SHIPPED | OUTPATIENT
Start: 2019-01-24 | End: 2020-05-11 | Stop reason: SDUPTHER

## 2019-01-23 RX ORDER — ERGOCALCIFEROL (VITAMIN D2) 1250 MCG
50000 CAPSULE ORAL WEEKLY
Qty: 4 CAPSULE | Refills: 0 | Status: SHIPPED | OUTPATIENT
Start: 2019-01-29 | End: 2021-01-01 | Stop reason: ALTCHOICE

## 2019-01-23 RX ORDER — M-VIT,TX,IRON,MINS/CALC/FOLIC 27MG-0.4MG
1 TABLET ORAL DAILY
Qty: 30 TABLET | Refills: 0 | Status: SHIPPED | OUTPATIENT
Start: 2019-01-24 | End: 2021-01-01 | Stop reason: ALTCHOICE

## 2019-01-23 RX ORDER — FOLIC ACID 1 MG/1
1 TABLET ORAL DAILY
Qty: 30 TABLET | Refills: 0 | Status: SHIPPED | OUTPATIENT
Start: 2019-01-24 | End: 2020-05-11 | Stop reason: SDUPTHER

## 2019-01-23 RX ORDER — PANTOPRAZOLE SODIUM 40 MG/1
40 TABLET, DELAYED RELEASE ORAL
Qty: 30 TABLET | Refills: 0 | Status: SHIPPED | OUTPATIENT
Start: 2019-01-24 | End: 2021-01-01 | Stop reason: ALTCHOICE

## 2019-01-23 RX ORDER — DILTIAZEM HYDROCHLORIDE 300 MG/1
300 CAPSULE, COATED, EXTENDED RELEASE ORAL DAILY
Qty: 30 CAPSULE | Refills: 3 | Status: ON HOLD | OUTPATIENT
Start: 2019-01-24 | End: 2020-07-04 | Stop reason: HOSPADM

## 2019-01-23 RX ADMIN — LACTULOSE 20 G: 20 SOLUTION ORAL at 13:34

## 2019-01-23 RX ADMIN — PANTOPRAZOLE SODIUM 40 MG: 40 TABLET, DELAYED RELEASE ORAL at 06:56

## 2019-01-23 RX ADMIN — LACTULOSE 20 G: 20 SOLUTION ORAL at 09:29

## 2019-01-23 RX ADMIN — DILTIAZEM HYDROCHLORIDE 300 MG: 180 CAPSULE, COATED, EXTENDED RELEASE ORAL at 09:28

## 2019-01-23 RX ADMIN — MULTIPLE VITAMINS W/ MINERALS TAB 1 TABLET: TAB at 09:28

## 2019-01-23 RX ADMIN — Medication 10 ML: at 09:00

## 2019-01-23 RX ADMIN — FOLIC ACID 1 MG: 1 TABLET ORAL at 09:28

## 2019-01-23 RX ADMIN — Medication 100 MG: at 09:29

## 2019-01-23 ASSESSMENT — PAIN SCALES - GENERAL
PAINLEVEL_OUTOF10: 0
PAINLEVEL_OUTOF10: 0

## 2019-01-25 ENCOUNTER — NURSE ONLY (OUTPATIENT)
Dept: CARDIOLOGY CLINIC | Age: 44
End: 2019-01-25

## 2019-01-30 ENCOUNTER — TELEPHONE (OUTPATIENT)
Dept: CARDIOLOGY CLINIC | Age: 44
End: 2019-01-30

## 2019-03-04 ENCOUNTER — OFFICE VISIT (OUTPATIENT)
Dept: CARDIOLOGY CLINIC | Age: 44
End: 2019-03-04
Payer: MEDICAID

## 2019-03-04 VITALS
HEART RATE: 72 BPM | BODY MASS INDEX: 26.86 KG/M2 | WEIGHT: 216 LBS | DIASTOLIC BLOOD PRESSURE: 82 MMHG | HEIGHT: 75 IN | SYSTOLIC BLOOD PRESSURE: 120 MMHG

## 2019-03-04 DIAGNOSIS — I48.0 PAROXYSMAL ATRIAL FIBRILLATION (HCC): Primary | ICD-10-CM

## 2019-03-04 DIAGNOSIS — I10 ESSENTIAL HYPERTENSION: ICD-10-CM

## 2019-03-04 DIAGNOSIS — K21.9 GASTROESOPHAGEAL REFLUX DISEASE WITHOUT ESOPHAGITIS: ICD-10-CM

## 2019-03-04 DIAGNOSIS — E66.3 OVER WEIGHT: ICD-10-CM

## 2019-03-04 DIAGNOSIS — Z87.898 HISTORY OF ALCOHOL USE: ICD-10-CM

## 2019-03-04 DIAGNOSIS — I51.7 LVH (LEFT VENTRICULAR HYPERTROPHY): ICD-10-CM

## 2019-03-04 PROCEDURE — G8484 FLU IMMUNIZE NO ADMIN: HCPCS | Performed by: INTERNAL MEDICINE

## 2019-03-04 PROCEDURE — G8419 CALC BMI OUT NRM PARAM NOF/U: HCPCS | Performed by: INTERNAL MEDICINE

## 2019-03-04 PROCEDURE — 99213 OFFICE O/P EST LOW 20 MIN: CPT | Performed by: INTERNAL MEDICINE

## 2019-03-04 PROCEDURE — G8427 DOCREV CUR MEDS BY ELIG CLIN: HCPCS | Performed by: INTERNAL MEDICINE

## 2019-03-04 PROCEDURE — 93000 ELECTROCARDIOGRAM COMPLETE: CPT | Performed by: INTERNAL MEDICINE

## 2019-03-04 PROCEDURE — 1036F TOBACCO NON-USER: CPT | Performed by: INTERNAL MEDICINE

## 2019-03-04 RX ORDER — CLONIDINE HYDROCHLORIDE 0.1 MG/1
0.1 TABLET ORAL 2 TIMES DAILY
COMMUNITY
Start: 2019-02-09 | End: 2020-05-11 | Stop reason: SDUPTHER

## 2019-03-07 DIAGNOSIS — I48.0 PAROXYSMAL ATRIAL FIBRILLATION (HCC): ICD-10-CM

## 2020-05-11 ENCOUNTER — HOSPITAL ENCOUNTER (EMERGENCY)
Age: 45
Discharge: HOME | End: 2020-05-11
Attending: EMERGENCY MEDICINE
Payer: MEDICAID

## 2020-05-11 ENCOUNTER — APPOINTMENT (OUTPATIENT)
Dept: GENERAL RADIOLOGY | Age: 45
End: 2020-05-11
Payer: MEDICAID

## 2020-05-11 VITALS
WEIGHT: 225 LBS | OXYGEN SATURATION: 98 % | TEMPERATURE: 98.5 F | RESPIRATION RATE: 18 BRPM | BODY MASS INDEX: 28.12 KG/M2 | HEART RATE: 99 BPM | SYSTOLIC BLOOD PRESSURE: 149 MMHG | DIASTOLIC BLOOD PRESSURE: 68 MMHG

## 2020-05-11 LAB
ACETAMINOPHEN LEVEL: <5 MCG/ML (ref 10–30)
ALBUMIN SERPL-MCNC: 4.4 G/DL (ref 3.5–5.2)
ALP BLD-CCNC: 110 U/L (ref 40–129)
ALT SERPL-CCNC: 68 U/L (ref 0–40)
AMPHETAMINE SCREEN, URINE: NOT DETECTED
ANION GAP SERPL CALCULATED.3IONS-SCNC: 16 MMOL/L (ref 7–16)
AST SERPL-CCNC: 116 U/L (ref 0–39)
BARBITURATE SCREEN URINE: POSITIVE
BASOPHILS ABSOLUTE: 0.07 E9/L (ref 0–0.2)
BASOPHILS RELATIVE PERCENT: 1 % (ref 0–2)
BENZODIAZEPINE SCREEN, URINE: NOT DETECTED
BETA-HYDROXYBUTYRATE: 0.39 MMOL/L (ref 0.02–0.27)
BILIRUB SERPL-MCNC: 1.8 MG/DL (ref 0–1.2)
BUN BLDV-MCNC: 5 MG/DL (ref 6–20)
CALCIUM SERPL-MCNC: 8.6 MG/DL (ref 8.6–10.2)
CANNABINOID SCREEN URINE: POSITIVE
CHLORIDE BLD-SCNC: 100 MMOL/L (ref 98–107)
CO2: 22 MMOL/L (ref 22–29)
COCAINE METABOLITE SCREEN URINE: NOT DETECTED
CREAT SERPL-MCNC: 0.8 MG/DL (ref 0.7–1.2)
EKG ATRIAL RATE: 109 BPM
EKG P AXIS: 60 DEGREES
EKG P-R INTERVAL: 140 MS
EKG Q-T INTERVAL: 342 MS
EKG QRS DURATION: 96 MS
EKG QTC CALCULATION (BAZETT): 460 MS
EKG R AXIS: 5 DEGREES
EKG T AXIS: 40 DEGREES
EKG VENTRICULAR RATE: 109 BPM
EOSINOPHILS ABSOLUTE: 0 E9/L (ref 0.05–0.5)
EOSINOPHILS RELATIVE PERCENT: 0 % (ref 0–6)
ETHANOL: 55 MG/DL (ref 0–0.08)
FENTANYL SCREEN, URINE: NOT DETECTED
GFR AFRICAN AMERICAN: >60
GFR NON-AFRICAN AMERICAN: >60 ML/MIN/1.73
GLUCOSE BLD-MCNC: 117 MG/DL (ref 74–99)
HCT VFR BLD CALC: 39.5 % (ref 37–54)
HEMOGLOBIN: 14 G/DL (ref 12.5–16.5)
IMMATURE GRANULOCYTES #: 0.02 E9/L
IMMATURE GRANULOCYTES %: 0.3 % (ref 0–5)
INR BLD: 1.4
LACTIC ACID: 2 MMOL/L (ref 0.5–2.2)
LACTIC ACID: 3.3 MMOL/L (ref 0.5–2.2)
LIPASE: 26 U/L (ref 13–60)
LYMPHOCYTES ABSOLUTE: 1.38 E9/L (ref 1.5–4)
LYMPHOCYTES RELATIVE PERCENT: 20.3 % (ref 20–42)
Lab: ABNORMAL
MAGNESIUM: 1.5 MG/DL (ref 1.6–2.6)
MCH RBC QN AUTO: 34.1 PG (ref 26–35)
MCHC RBC AUTO-ENTMCNC: 35.4 % (ref 32–34.5)
MCV RBC AUTO: 96.1 FL (ref 80–99.9)
METHADONE SCREEN, URINE: NOT DETECTED
MONOCYTES ABSOLUTE: 0.5 E9/L (ref 0.1–0.95)
MONOCYTES RELATIVE PERCENT: 7.4 % (ref 2–12)
NEUTROPHILS ABSOLUTE: 4.82 E9/L (ref 1.8–7.3)
NEUTROPHILS RELATIVE PERCENT: 71 % (ref 43–80)
OPIATE SCREEN URINE: NOT DETECTED
OXYCODONE URINE: NOT DETECTED
PDW BLD-RTO: 12.8 FL (ref 11.5–15)
PH VENOUS: 7.45 (ref 7.35–7.45)
PHENCYCLIDINE SCREEN URINE: NOT DETECTED
PLATELET # BLD: 104 E9/L (ref 130–450)
PMV BLD AUTO: 9.7 FL (ref 7–12)
POTASSIUM SERPL-SCNC: 3.5 MMOL/L (ref 3.5–5)
PRO-BNP: 8 PG/ML (ref 0–125)
PROTHROMBIN TIME: 16 SEC (ref 9.3–12.4)
RBC # BLD: 4.11 E12/L (ref 3.8–5.8)
SALICYLATE, SERUM: <0.3 MG/DL (ref 0–30)
SODIUM BLD-SCNC: 138 MMOL/L (ref 132–146)
TOTAL CK: 171 U/L (ref 20–200)
TOTAL PROTEIN: 8.8 G/DL (ref 6.4–8.3)
TRICYCLIC ANTIDEPRESSANTS SCREEN SERUM: NEGATIVE NG/ML
TROPONIN: <0.01 NG/ML (ref 0–0.03)
TSH SERPL DL<=0.05 MIU/L-ACNC: 0.88 UIU/ML (ref 0.27–4.2)
WBC # BLD: 6.8 E9/L (ref 4.5–11.5)

## 2020-05-11 PROCEDURE — 84443 ASSAY THYROID STIM HORMONE: CPT

## 2020-05-11 PROCEDURE — 82010 KETONE BODYS QUAN: CPT

## 2020-05-11 PROCEDURE — 2580000003 HC RX 258: Performed by: EMERGENCY MEDICINE

## 2020-05-11 PROCEDURE — 82550 ASSAY OF CK (CPK): CPT

## 2020-05-11 PROCEDURE — 96365 THER/PROPH/DIAG IV INF INIT: CPT

## 2020-05-11 PROCEDURE — 83880 ASSAY OF NATRIURETIC PEPTIDE: CPT

## 2020-05-11 PROCEDURE — 93005 ELECTROCARDIOGRAM TRACING: CPT | Performed by: EMERGENCY MEDICINE

## 2020-05-11 PROCEDURE — G0480 DRUG TEST DEF 1-7 CLASSES: HCPCS

## 2020-05-11 PROCEDURE — 80307 DRUG TEST PRSMV CHEM ANLYZR: CPT

## 2020-05-11 PROCEDURE — 96375 TX/PRO/DX INJ NEW DRUG ADDON: CPT

## 2020-05-11 PROCEDURE — 36415 COLL VENOUS BLD VENIPUNCTURE: CPT

## 2020-05-11 PROCEDURE — 6360000002 HC RX W HCPCS: Performed by: EMERGENCY MEDICINE

## 2020-05-11 PROCEDURE — 99285 EMERGENCY DEPT VISIT HI MDM: CPT

## 2020-05-11 PROCEDURE — 83690 ASSAY OF LIPASE: CPT

## 2020-05-11 PROCEDURE — 80053 COMPREHEN METABOLIC PANEL: CPT

## 2020-05-11 PROCEDURE — 93010 ELECTROCARDIOGRAM REPORT: CPT | Performed by: INTERNAL MEDICINE

## 2020-05-11 PROCEDURE — 85025 COMPLETE CBC W/AUTO DIFF WBC: CPT

## 2020-05-11 PROCEDURE — 6370000000 HC RX 637 (ALT 250 FOR IP): Performed by: EMERGENCY MEDICINE

## 2020-05-11 PROCEDURE — 83735 ASSAY OF MAGNESIUM: CPT

## 2020-05-11 PROCEDURE — 82800 BLOOD PH: CPT

## 2020-05-11 PROCEDURE — 85610 PROTHROMBIN TIME: CPT

## 2020-05-11 PROCEDURE — 84484 ASSAY OF TROPONIN QUANT: CPT

## 2020-05-11 PROCEDURE — 71045 X-RAY EXAM CHEST 1 VIEW: CPT

## 2020-05-11 PROCEDURE — 2500000003 HC RX 250 WO HCPCS: Performed by: EMERGENCY MEDICINE

## 2020-05-11 PROCEDURE — 96367 TX/PROPH/DG ADDL SEQ IV INF: CPT

## 2020-05-11 PROCEDURE — 83605 ASSAY OF LACTIC ACID: CPT

## 2020-05-11 RX ORDER — 0.9 % SODIUM CHLORIDE 0.9 %
1000 INTRAVENOUS SOLUTION INTRAVENOUS ONCE
Status: COMPLETED | OUTPATIENT
Start: 2020-05-11 | End: 2020-05-11

## 2020-05-11 RX ORDER — THIAMINE HYDROCHLORIDE 100 MG/ML
100 INJECTION, SOLUTION INTRAMUSCULAR; INTRAVENOUS ONCE
Status: COMPLETED | OUTPATIENT
Start: 2020-05-11 | End: 2020-05-11

## 2020-05-11 RX ORDER — CLONIDINE HYDROCHLORIDE 0.1 MG/1
0.1 TABLET ORAL 2 TIMES DAILY
Qty: 14 TABLET | Refills: 0 | Status: ON HOLD | OUTPATIENT
Start: 2020-05-11 | End: 2020-07-04 | Stop reason: HOSPADM

## 2020-05-11 RX ORDER — LANOLIN ALCOHOL/MO/W.PET/CERES
100 CREAM (GRAM) TOPICAL DAILY
Qty: 30 TABLET | Refills: 0 | Status: SHIPPED | OUTPATIENT
Start: 2020-05-11 | End: 2021-01-01 | Stop reason: ALTCHOICE

## 2020-05-11 RX ORDER — MAGNESIUM SULFATE IN WATER 40 MG/ML
2 INJECTION, SOLUTION INTRAVENOUS ONCE
Status: COMPLETED | OUTPATIENT
Start: 2020-05-11 | End: 2020-05-11

## 2020-05-11 RX ORDER — CHLORDIAZEPOXIDE HYDROCHLORIDE 25 MG/1
CAPSULE, GELATIN COATED ORAL
Qty: 20 CAPSULE | Refills: 0 | Status: SHIPPED | OUTPATIENT
Start: 2020-05-11 | End: 2020-05-15

## 2020-05-11 RX ORDER — PHENOBARBITAL SODIUM 65 MG/ML
260 INJECTION INTRAMUSCULAR ONCE
Status: COMPLETED | OUTPATIENT
Start: 2020-05-11 | End: 2020-05-11

## 2020-05-11 RX ORDER — ONDANSETRON 2 MG/ML
4 INJECTION INTRAMUSCULAR; INTRAVENOUS ONCE
Status: COMPLETED | OUTPATIENT
Start: 2020-05-11 | End: 2020-05-11

## 2020-05-11 RX ORDER — FOLIC ACID 5 MG/ML
1 INJECTION, SOLUTION INTRAMUSCULAR; INTRAVENOUS; SUBCUTANEOUS ONCE
Status: DISCONTINUED | OUTPATIENT
Start: 2020-05-11 | End: 2020-05-11 | Stop reason: CLARIF

## 2020-05-11 RX ORDER — CLONIDINE HYDROCHLORIDE 0.1 MG/1
0.1 TABLET ORAL ONCE
Status: COMPLETED | OUTPATIENT
Start: 2020-05-11 | End: 2020-05-11

## 2020-05-11 RX ORDER — FOLIC ACID 1 MG/1
1 TABLET ORAL DAILY
Qty: 30 TABLET | Refills: 0 | Status: SHIPPED | OUTPATIENT
Start: 2020-05-11 | End: 2021-01-01 | Stop reason: ALTCHOICE

## 2020-05-11 RX ADMIN — MAGNESIUM SULFATE 2 G: 2 INJECTION INTRAVENOUS at 17:26

## 2020-05-11 RX ADMIN — CLONIDINE HYDROCHLORIDE 0.1 MG: 0.1 TABLET ORAL at 14:40

## 2020-05-11 RX ADMIN — FOLIC ACID 1 MG: 5 INJECTION, SOLUTION INTRAMUSCULAR; INTRAVENOUS; SUBCUTANEOUS at 14:58

## 2020-05-11 RX ADMIN — SODIUM CHLORIDE 1000 ML: 9 INJECTION, SOLUTION INTRAVENOUS at 14:34

## 2020-05-11 RX ADMIN — THIAMINE HYDROCHLORIDE 100 MG: 100 INJECTION, SOLUTION INTRAMUSCULAR; INTRAVENOUS at 14:41

## 2020-05-11 RX ADMIN — PHENOBARBITAL SODIUM 260 MG: 65 INJECTION INTRAMUSCULAR; INTRAVENOUS at 14:58

## 2020-05-11 RX ADMIN — ONDANSETRON 4 MG: 2 INJECTION INTRAMUSCULAR; INTRAVENOUS at 14:40

## 2020-05-11 RX ADMIN — SODIUM CHLORIDE 1000 ML: 9 INJECTION, SOLUTION INTRAVENOUS at 16:38

## 2020-05-11 ASSESSMENT — ENCOUNTER SYMPTOMS
SHORTNESS OF BREATH: 0
ABDOMINAL PAIN: 0
NAUSEA: 0
VOMITING: 0
COLOR CHANGE: 0
SORE THROAT: 0

## 2020-05-11 NOTE — ED PROVIDER NOTES
homicidal or suicidal plan. Procedures     MDM  Number of Diagnoses or Management Options  Alcohol withdrawal syndrome without complication [facility]):   Elevated blood pressure reading:   Hypomagnesemia:   Diagnosis management comments: Patient presents to the ED for above signs and symptoms. Patient was given IV fluids, thiamine, folate and phenobarbital for their symptoms with moderate improvement. EKG showed sinus tachycardia without ischemic changes. Patient was given 1 dose of clonidine as this is his home medication. Patient did appear dehydrated with mildly elevated lactate that improved with fluids. Results of blood work did not show any evidence of alcohol ketoacidosis. No evidence of thyroid storm. No liver failure. Blood work did show hypomagnesemia as he was given 2 g in the ED. Patient continues to be non-toxic on re-evaluation. Patient is hemodynamically stable. Findings were discussed with the patient and reasons to immediately return to the ED were articulated to them. Patient reports he has good follow-up as his mother is involved with detox and will manage his symptoms at home. Patient denies any suicidal homicidal ideations. They will follow-up with their PMD. Patient agrees with the plan, repeated the plan and all questions were answered. ED Course as of May 11 2202   Mon May 11, 2020   1553 Patient reassessed. Patient reports mild improvement. Patient aware of results thus far. We will give magnesium and if patient continues to improve, he will be discharged home    [MA]      ED Course User Index  [MA] Mona Ortega DO          ----------------------------------------------- PAST HISTORY --------------------------------------------  Past Medical History:  has a past medical history of Anxiety, Depression, Esophagitis, ETOH abuse, Gastritis, GERD (gastroesophageal reflux disease), Hematuria, and Pancreatitis.     Past Surgical History:  has a past surgical history that includes knee surgery; Upper gastrointestinal endoscopy (05/09/2018); Upper gastrointestinal endoscopy (N/A, 5/8/2018); Upper gastrointestinal endoscopy (N/A, 1/21/2019); and Upper gastrointestinal endoscopy (1/21/2019). Social History:  reports that he has never smoked. He has never used smokeless tobacco. He reports current alcohol use. He reports that he does not use drugs. Family History: family history is not on file. The patients home medications have been reviewed. Allergies: Patient has no known allergies.     ------------------------------------------------ RESULTS ---------------------------------------------------    LABS:  Results for orders placed or performed during the hospital encounter of 05/11/20   CBC Auto Differential   Result Value Ref Range    WBC 6.8 4.5 - 11.5 E9/L    RBC 4.11 3.80 - 5.80 E12/L    Hemoglobin 14.0 12.5 - 16.5 g/dL    Hematocrit 39.5 37.0 - 54.0 %    MCV 96.1 80.0 - 99.9 fL    MCH 34.1 26.0 - 35.0 pg    MCHC 35.4 (H) 32.0 - 34.5 %    RDW 12.8 11.5 - 15.0 fL    Platelets 598 (L) 373 - 450 E9/L    MPV 9.7 7.0 - 12.0 fL    Neutrophils % 71.0 43.0 - 80.0 %    Immature Granulocytes % 0.3 0.0 - 5.0 %    Lymphocytes % 20.3 20.0 - 42.0 %    Monocytes % 7.4 2.0 - 12.0 %    Eosinophils % 0.0 0.0 - 6.0 %    Basophils % 1.0 0.0 - 2.0 %    Neutrophils Absolute 4.82 1.80 - 7.30 E9/L    Immature Granulocytes # 0.02 E9/L    Lymphocytes Absolute 1.38 (L) 1.50 - 4.00 E9/L    Monocytes Absolute 0.50 0.10 - 0.95 E9/L    Eosinophils Absolute 0.00 (L) 0.05 - 0.50 E9/L    Basophils Absolute 0.07 0.00 - 0.20 E9/L   Lipase   Result Value Ref Range    Lipase 26 13 - 60 U/L   pH, venous   Result Value Ref Range    pH, Tobias 7.45 7.35 - 7.45   Serum Drug Screen   Result Value Ref Range    Ethanol Lvl 55 mg/dL    Acetaminophen Level <5.0 (L) 10.0 - 70.3 mcg/mL    Salicylate, Serum <8.7 0.0 - 30.0 mg/dL    TCA Scrn NEGATIVE Cutoff:300 ng/mL   Urine Drug Screen   Result Value Ref Range Amphetamine Screen, Urine NOT DETECTED Negative <1000 ng/mL    Barbiturate Screen, Ur POSITIVE (A) Negative < 200 ng/mL    Benzodiazepine Screen, Urine NOT DETECTED Negative < 200 ng/mL    Cannabinoid Scrn, Ur POSITIVE (A) Negative < 50ng/mL    Cocaine Metabolite Screen, Urine NOT DETECTED Negative < 300 ng/mL    Opiate Scrn, Ur NOT DETECTED Negative < 300ng/mL    PCP Screen, Urine NOT DETECTED Negative < 25 ng/mL    Methadone Screen, Urine NOT DETECTED Negative <300 ng/mL    Oxycodone Urine NOT DETECTED Negative <100 ng/mL    FENTANYL SCREEN, URINE NOT DETECTED Negative <1 ng/mL    Drug Screen Comment: see below    Lactic Acid, Plasma   Result Value Ref Range    Lactic Acid 3.3 (H) 0.5 - 2.2 mmol/L   Protime-INR   Result Value Ref Range    Protime 16.0 (H) 9.3 - 12.4 sec    INR 1.4    Troponin   Result Value Ref Range    Troponin <0.01 0.00 - 0.03 ng/mL   Brain Natriuretic Peptide   Result Value Ref Range    Pro-BNP 8 0 - 125 pg/mL   TSH without Reflex   Result Value Ref Range    TSH 0.881 0.270 - 4.200 uIU/mL   Beta-Hydroxybutyrate   Result Value Ref Range    Beta-Hydroxybutyrate 0.39 (H) 0.02 - 0.27 mmol/L   Comprehensive Metabolic Panel   Result Value Ref Range    Sodium 138 132 - 146 mmol/L    Potassium 3.5 3.5 - 5.0 mmol/L    Chloride 100 98 - 107 mmol/L    CO2 22 22 - 29 mmol/L    Anion Gap 16 7 - 16 mmol/L    Glucose 117 (H) 74 - 99 mg/dL    BUN 5 (L) 6 - 20 mg/dL    CREATININE 0.8 0.7 - 1.2 mg/dL    GFR Non-African American >60 >=60 mL/min/1.73    GFR African American >60     Calcium 8.6 8.6 - 10.2 mg/dL    Total Protein 8.8 (H) 6.4 - 8.3 g/dL    Alb 4.4 3.5 - 5.2 g/dL    Total Bilirubin 1.8 (H) 0.0 - 1.2 mg/dL    Alkaline Phosphatase 110 40 - 129 U/L    ALT 68 (H) 0 - 40 U/L     (H) 0 - 39 U/L   Magnesium   Result Value Ref Range    Magnesium 1.5 (L) 1.6 - 2.6 mg/dL   CK   Result Value Ref Range    Total  20 - 200 U/L   Lactic Acid, Plasma   Result Value Ref Range    Lactic Acid 2.0 0.5 - 2.2 mmol/L   EKG 12 Lead   Result Value Ref Range    Ventricular Rate 109 BPM    Atrial Rate 109 BPM    P-R Interval 140 ms    QRS Duration 96 ms    Q-T Interval 342 ms    QTc Calculation (Bazett) 460 ms    P Axis 60 degrees    R Axis 5 degrees    T Axis 40 degrees       RADIOLOGY:    All Radiology results interpreted by Radiologist unless otherwise noted. XR CHEST PORTABLE   Final Result   1. No active pulmonary disease. EKG:  This EKG is signed and interpreted by ED Physician. Time: 1404  Rate: 109  Rhythm: Sinus. Interpretation: Sinus tachycardia, normal axis, no ST elevation, QT appears to be prolonged although qtc 460  Comparison: stable as compared to patient's most recent EKG.    ---------------------------- NURSING NOTES AND VITALS REVIEWED -------------------------   The nursing notes within the ED encounter and vital signs as below have been reviewed. BP (!) 149/68   Pulse 99   Temp 98.5 °F (36.9 °C)   Resp 18   Wt 225 lb (102.1 kg)   SpO2 98%   BMI 28.12 kg/m²   Oxygen Saturation Interpretation: Normal      ------------------------------------------PROGRESS NOTES -------------------------------------------    ED COURSE MEDICATIONS:                Medications   0.9 % sodium chloride bolus (0 mLs Intravenous Stopped 5/11/20 1533)   thiamine (B-1) injection 100 mg (100 mg Intravenous Given 5/11/20 1441)   ondansetron (ZOFRAN) injection 4 mg (4 mg Intravenous Given 5/11/20 1440)   PHENobarbital (LUMINAL) injection 260 mg (260 mg Intravenous Given 7/70/89 1121)   folic acid 1 mg in dextrose 5 % 50 mL IVPB (0 mg Intravenous Stopped 5/11/20 1533)   cloNIDine (CATAPRES) tablet 0.1 mg (0.1 mg Oral Given 5/11/20 1440)   magnesium sulfate 2 g in 50 mL IVPB premix (0 g Intravenous Stopped 5/11/20 1849)   0.9 % sodium chloride bolus (0 mLs Intravenous Stopped 5/11/20 1729)       CONSULTATIONS:            none. PROCEDURES:            none.       COUNSELING:   I have spoken with the patient and

## 2020-05-11 NOTE — ED NOTES
1 2lead ECG completed by SR, pt was placed onto telemetry monitoring with pulse oximetry. A tracing was placed into the pt's lite chart.       Kina Grant RN  05/11/20 5034

## 2020-07-01 ENCOUNTER — APPOINTMENT (OUTPATIENT)
Dept: GENERAL RADIOLOGY | Age: 45
DRG: 201 | End: 2020-07-01
Payer: MEDICAID

## 2020-07-01 ENCOUNTER — HOSPITAL ENCOUNTER (INPATIENT)
Age: 45
LOS: 3 days | Discharge: HOME | DRG: 201 | End: 2020-07-04
Attending: EMERGENCY MEDICINE | Admitting: FAMILY MEDICINE
Payer: MEDICAID

## 2020-07-01 LAB
ALBUMIN SERPL-MCNC: 4.6 G/DL (ref 3.5–5.2)
ALP BLD-CCNC: 97 U/L (ref 40–129)
ALT SERPL-CCNC: 63 U/L (ref 0–40)
AMPHETAMINE SCREEN, URINE: NOT DETECTED
ANION GAP SERPL CALCULATED.3IONS-SCNC: 21 MMOL/L (ref 7–16)
AST SERPL-CCNC: 95 U/L (ref 0–39)
BARBITURATE SCREEN URINE: NOT DETECTED
BASOPHILS ABSOLUTE: 0.13 E9/L (ref 0–0.2)
BASOPHILS RELATIVE PERCENT: 1.1 % (ref 0–2)
BENZODIAZEPINE SCREEN, URINE: NOT DETECTED
BILIRUB SERPL-MCNC: 1.7 MG/DL (ref 0–1.2)
BUN BLDV-MCNC: 5 MG/DL (ref 6–20)
CALCIUM SERPL-MCNC: 10 MG/DL (ref 8.6–10.2)
CANNABINOID SCREEN URINE: NOT DETECTED
CHLORIDE BLD-SCNC: 93 MMOL/L (ref 98–107)
CO2: 19 MMOL/L (ref 22–29)
COCAINE METABOLITE SCREEN URINE: NOT DETECTED
CREAT SERPL-MCNC: 0.8 MG/DL (ref 0.7–1.2)
EOSINOPHILS ABSOLUTE: 0 E9/L (ref 0.05–0.5)
EOSINOPHILS RELATIVE PERCENT: 0 % (ref 0–6)
FENTANYL SCREEN, URINE: NOT DETECTED
GFR AFRICAN AMERICAN: >60
GFR NON-AFRICAN AMERICAN: >60 ML/MIN/1.73
GLUCOSE BLD-MCNC: 127 MG/DL (ref 74–99)
HCT VFR BLD CALC: 47.1 % (ref 37–54)
HEMOGLOBIN: 16.6 G/DL (ref 12.5–16.5)
IMMATURE GRANULOCYTES #: 0.02 E9/L
IMMATURE GRANULOCYTES %: 0.2 % (ref 0–5)
LIPASE: 26 U/L (ref 13–60)
LYMPHOCYTES ABSOLUTE: 1.75 E9/L (ref 1.5–4)
LYMPHOCYTES RELATIVE PERCENT: 15.4 % (ref 20–42)
Lab: NORMAL
MCH RBC QN AUTO: 32.9 PG (ref 26–35)
MCHC RBC AUTO-ENTMCNC: 35.2 % (ref 32–34.5)
MCV RBC AUTO: 93.3 FL (ref 80–99.9)
METHADONE SCREEN, URINE: NOT DETECTED
MONOCYTES ABSOLUTE: 0.62 E9/L (ref 0.1–0.95)
MONOCYTES RELATIVE PERCENT: 5.5 % (ref 2–12)
NEUTROPHILS ABSOLUTE: 8.83 E9/L (ref 1.8–7.3)
NEUTROPHILS RELATIVE PERCENT: 77.8 % (ref 43–80)
OPIATE SCREEN URINE: NOT DETECTED
OXYCODONE URINE: NOT DETECTED
PDW BLD-RTO: 12.7 FL (ref 11.5–15)
PHENCYCLIDINE SCREEN URINE: NOT DETECTED
PLATELET # BLD: 189 E9/L (ref 130–450)
PMV BLD AUTO: 9 FL (ref 7–12)
POTASSIUM SERPL-SCNC: 3.7 MMOL/L (ref 3.5–5)
RBC # BLD: 5.05 E12/L (ref 3.8–5.8)
SODIUM BLD-SCNC: 133 MMOL/L (ref 132–146)
TOTAL PROTEIN: 9.1 G/DL (ref 6.4–8.3)
TROPONIN: <0.01 NG/ML (ref 0–0.03)
WBC # BLD: 11.4 E9/L (ref 4.5–11.5)

## 2020-07-01 PROCEDURE — 96376 TX/PRO/DX INJ SAME DRUG ADON: CPT

## 2020-07-01 PROCEDURE — 99285 EMERGENCY DEPT VISIT HI MDM: CPT

## 2020-07-01 PROCEDURE — 83690 ASSAY OF LIPASE: CPT

## 2020-07-01 PROCEDURE — 2060000000 HC ICU INTERMEDIATE R&B

## 2020-07-01 PROCEDURE — 93005 ELECTROCARDIOGRAM TRACING: CPT | Performed by: EMERGENCY MEDICINE

## 2020-07-01 PROCEDURE — 85025 COMPLETE CBC W/AUTO DIFF WBC: CPT

## 2020-07-01 PROCEDURE — 36415 COLL VENOUS BLD VENIPUNCTURE: CPT

## 2020-07-01 PROCEDURE — 2580000003 HC RX 258: Performed by: STUDENT IN AN ORGANIZED HEALTH CARE EDUCATION/TRAINING PROGRAM

## 2020-07-01 PROCEDURE — 84484 ASSAY OF TROPONIN QUANT: CPT

## 2020-07-01 PROCEDURE — G0480 DRUG TEST DEF 1-7 CLASSES: HCPCS

## 2020-07-01 PROCEDURE — 71045 X-RAY EXAM CHEST 1 VIEW: CPT

## 2020-07-01 PROCEDURE — 6370000000 HC RX 637 (ALT 250 FOR IP): Performed by: EMERGENCY MEDICINE

## 2020-07-01 PROCEDURE — 96375 TX/PRO/DX INJ NEW DRUG ADDON: CPT

## 2020-07-01 PROCEDURE — 6360000002 HC RX W HCPCS: Performed by: STUDENT IN AN ORGANIZED HEALTH CARE EDUCATION/TRAINING PROGRAM

## 2020-07-01 PROCEDURE — 80307 DRUG TEST PRSMV CHEM ANLYZR: CPT

## 2020-07-01 PROCEDURE — 96365 THER/PROPH/DIAG IV INF INIT: CPT

## 2020-07-01 PROCEDURE — 2580000003 HC RX 258: Performed by: EMERGENCY MEDICINE

## 2020-07-01 PROCEDURE — 2500000003 HC RX 250 WO HCPCS: Performed by: EMERGENCY MEDICINE

## 2020-07-01 PROCEDURE — 80053 COMPREHEN METABOLIC PANEL: CPT

## 2020-07-01 RX ORDER — 0.9 % SODIUM CHLORIDE 0.9 %
1000 INTRAVENOUS SOLUTION INTRAVENOUS ONCE
Status: COMPLETED | OUTPATIENT
Start: 2020-07-01 | End: 2020-07-01

## 2020-07-01 RX ORDER — THIAMINE HYDROCHLORIDE 100 MG/ML
100 INJECTION, SOLUTION INTRAMUSCULAR; INTRAVENOUS DAILY
Status: DISCONTINUED | OUTPATIENT
Start: 2020-07-01 | End: 2020-07-01

## 2020-07-01 RX ORDER — LORAZEPAM 1 MG/1
4 TABLET ORAL
Status: DISCONTINUED | OUTPATIENT
Start: 2020-07-01 | End: 2020-07-02

## 2020-07-01 RX ORDER — LORAZEPAM 2 MG/ML
2 INJECTION INTRAMUSCULAR
Status: DISCONTINUED | OUTPATIENT
Start: 2020-07-01 | End: 2020-07-02

## 2020-07-01 RX ORDER — SODIUM CHLORIDE 0.9 % (FLUSH) 0.9 %
10 SYRINGE (ML) INJECTION EVERY 12 HOURS SCHEDULED
Status: DISCONTINUED | OUTPATIENT
Start: 2020-07-01 | End: 2020-07-04 | Stop reason: HOSPADM

## 2020-07-01 RX ORDER — FOLIC ACID 5 MG/ML
1 INJECTION, SOLUTION INTRAMUSCULAR; INTRAVENOUS; SUBCUTANEOUS ONCE
Status: DISCONTINUED | OUTPATIENT
Start: 2020-07-01 | End: 2020-07-01

## 2020-07-01 RX ORDER — FOLIC ACID 1 MG/1
1 TABLET ORAL DAILY
Status: DISCONTINUED | OUTPATIENT
Start: 2020-07-01 | End: 2020-07-01

## 2020-07-01 RX ORDER — LORAZEPAM 1 MG/1
2 TABLET ORAL
Status: DISCONTINUED | OUTPATIENT
Start: 2020-07-01 | End: 2020-07-02

## 2020-07-01 RX ORDER — ONDANSETRON 2 MG/ML
4 INJECTION INTRAMUSCULAR; INTRAVENOUS ONCE
Status: COMPLETED | OUTPATIENT
Start: 2020-07-01 | End: 2020-07-01

## 2020-07-01 RX ORDER — LORAZEPAM 2 MG/ML
1 INJECTION INTRAMUSCULAR
Status: DISCONTINUED | OUTPATIENT
Start: 2020-07-01 | End: 2020-07-02

## 2020-07-01 RX ORDER — LORAZEPAM 2 MG/ML
3 INJECTION INTRAMUSCULAR
Status: DISCONTINUED | OUTPATIENT
Start: 2020-07-01 | End: 2020-07-02

## 2020-07-01 RX ORDER — LORAZEPAM 1 MG/1
1 TABLET ORAL
Status: DISCONTINUED | OUTPATIENT
Start: 2020-07-01 | End: 2020-07-02

## 2020-07-01 RX ORDER — THIAMINE MONONITRATE (VIT B1) 100 MG
100 TABLET ORAL ONCE
Status: COMPLETED | OUTPATIENT
Start: 2020-07-01 | End: 2020-07-01

## 2020-07-01 RX ORDER — LORAZEPAM 1 MG/1
3 TABLET ORAL
Status: DISCONTINUED | OUTPATIENT
Start: 2020-07-01 | End: 2020-07-02

## 2020-07-01 RX ORDER — LORAZEPAM 2 MG/ML
4 INJECTION INTRAMUSCULAR
Status: DISCONTINUED | OUTPATIENT
Start: 2020-07-01 | End: 2020-07-02

## 2020-07-01 RX ORDER — SODIUM CHLORIDE 0.9 % (FLUSH) 0.9 %
10 SYRINGE (ML) INJECTION PRN
Status: DISCONTINUED | OUTPATIENT
Start: 2020-07-01 | End: 2020-07-04 | Stop reason: HOSPADM

## 2020-07-01 RX ADMIN — LORAZEPAM 2 MG: 1 TABLET ORAL at 19:58

## 2020-07-01 RX ADMIN — ONDANSETRON 4 MG: 2 INJECTION INTRAMUSCULAR; INTRAVENOUS at 19:56

## 2020-07-01 RX ADMIN — DILTIAZEM HYDROCHLORIDE 5 MG/HR: 5 INJECTION, SOLUTION INTRAVENOUS at 20:52

## 2020-07-01 RX ADMIN — SODIUM CHLORIDE 1000 ML: 9 INJECTION, SOLUTION INTRAVENOUS at 20:47

## 2020-07-01 RX ADMIN — FOLIC ACID 1 MG: 5 INJECTION, SOLUTION INTRAMUSCULAR; INTRAVENOUS; SUBCUTANEOUS at 22:32

## 2020-07-01 RX ADMIN — SODIUM CHLORIDE 1000 ML: 9 INJECTION, SOLUTION INTRAVENOUS at 19:54

## 2020-07-01 RX ADMIN — Medication 100 MG: at 23:04

## 2020-07-01 RX ADMIN — DILTIAZEM HYDROCHLORIDE 25 MG: 5 INJECTION INTRAVENOUS at 20:57

## 2020-07-01 RX ADMIN — SODIUM CHLORIDE 1000 ML: 9 INJECTION, SOLUTION INTRAVENOUS at 19:53

## 2020-07-01 ASSESSMENT — PAIN DESCRIPTION - LOCATION: LOCATION: ABDOMEN;HEAD

## 2020-07-01 ASSESSMENT — PAIN SCALES - GENERAL: PAINLEVEL_OUTOF10: 5

## 2020-07-01 ASSESSMENT — PAIN DESCRIPTION - PAIN TYPE: TYPE: ACUTE PAIN

## 2020-07-01 ASSESSMENT — PAIN DESCRIPTION - DESCRIPTORS: DESCRIPTORS: HEADACHE

## 2020-07-01 NOTE — ED PROVIDER NOTES
52-year-old male past medical history of alcohol abuse, CAPRI ramsey, presents with delirium tremens. Patient drinks a \"12 pack a night for years. \"  States that he stopped drinking last night at 8 PM patient was to stop drinking. States that he woke up this morning with \"shakes\" and he feels that his heart is racing. No alleviating or exacerbating factors. He does not have any medications to himself off alcohol. His only plan was to stop drinking. Patient states that he previously attempted this before and went into A. fib. Admits to nausea and vomiting 10 times today. Denies headache, blurred vision, chest pain, shortness breath, abdominal pain, point pain, dysuria, hematuria, diarrhea, constipation, new rashes sores, suicidal ideation, and visual or auditory hallucinations. Review of Systems   Constitutional: Negative for activity change, appetite change, chills and fever. HENT: Negative for ear pain, sinus pressure and sore throat. Eyes: Negative for pain, discharge, redness and itching. Respiratory: Negative for cough, shortness of breath and wheezing. Cardiovascular: Negative for chest pain. Gastrointestinal: Negative for abdominal pain, diarrhea, nausea and vomiting. Endocrine: Negative for polyphagia and polyuria. Genitourinary: Negative for dysuria and frequency. Musculoskeletal: Negative for arthralgias and back pain. Skin: Negative for rash and wound. Neurological: Positive for tremors and weakness. Negative for dizziness, seizures, syncope, facial asymmetry, speech difficulty, light-headedness, numbness and headaches. Hematological: Negative for adenopathy. Psychiatric/Behavioral: Negative for agitation. All other systems reviewed and are negative. Physical Exam  Vitals signs and nursing note reviewed. Constitutional:       Appearance: Normal appearance. He is well-developed. Comments: Chronically ill looking, cachectic, with resting tremors.    HENT: MDM  Number of Diagnoses or Management Options  Atrial fibrillation, unspecified type [facility]):   Diagnosis management comments: 61-year-old male with a past medical history of alcohol abuse presenting with delirium tremens and emesis. Patient abruptly stopped drinking alcohol at 8 PM last night. He usually drinks a 12 pack of beer daily. He had no medications to wean himself off alcohol. In the department he is hypertensive, tachycardic in A. fib RVR. CIWA protocol was initiated. 2 L of fluid were given to patient initially. He responded well. Patient still in A. fib RVR. Cardizem bolus plus infusion was ordered. On reevaluation patient states that he feels much better. Patient's blood pressure has decreased slightly. Patient's heart rate is now in the 110's to 120s. He is in no acute distress. Patient was informed of all the results of his evaluation. Dr. Scooter Reagan will admit patient. Patient was given thiamine, folate, fluids, and Ativan. ED Course as of Jul 04 1738 Wed Jul 01, 2020   1093 Spoke with Dr. Scooter Reagan. She will accept the patient for admission. [MT]      ED Course User Index  [MT] Washington Boast, DO       --------------------------------------------- PAST HISTORY ---------------------------------------------  Past Medical History:  has a past medical history of Anxiety, Depression, Esophagitis, ETOH abuse, Gastritis, GERD (gastroesophageal reflux disease), Hematuria, and Pancreatitis. Past Surgical History:  has a past surgical history that includes knee surgery; Upper gastrointestinal endoscopy (05/09/2018); Upper gastrointestinal endoscopy (N/A, 5/8/2018); Upper gastrointestinal endoscopy (N/A, 1/21/2019); and Upper gastrointestinal endoscopy (1/21/2019). Social History:  reports that he has never smoked. He has never used smokeless tobacco. He reports current alcohol use. He reports that he does not use drugs. Family History: family history is not on file.      The patients home medications have been reviewed. Allergies: Patient has no known allergies.     -------------------------------------------------- RESULTS -------------------------------------------------    Lab  Results for orders placed or performed during the hospital encounter of 07/01/20   CBC Auto Differential   Result Value Ref Range    WBC 11.4 4.5 - 11.5 E9/L    RBC 5.05 3.80 - 5.80 E12/L    Hemoglobin 16.6 (H) 12.5 - 16.5 g/dL    Hematocrit 47.1 37.0 - 54.0 %    MCV 93.3 80.0 - 99.9 fL    MCH 32.9 26.0 - 35.0 pg    MCHC 35.2 (H) 32.0 - 34.5 %    RDW 12.7 11.5 - 15.0 fL    Platelets 156 426 - 933 E9/L    MPV 9.0 7.0 - 12.0 fL    Neutrophils % 77.8 43.0 - 80.0 %    Immature Granulocytes % 0.2 0.0 - 5.0 %    Lymphocytes % 15.4 (L) 20.0 - 42.0 %    Monocytes % 5.5 2.0 - 12.0 %    Eosinophils % 0.0 0.0 - 6.0 %    Basophils % 1.1 0.0 - 2.0 %    Neutrophils Absolute 8.83 (H) 1.80 - 7.30 E9/L    Immature Granulocytes # 0.02 E9/L    Lymphocytes Absolute 1.75 1.50 - 4.00 E9/L    Monocytes Absolute 0.62 0.10 - 0.95 E9/L    Eosinophils Absolute 0.00 (L) 0.05 - 0.50 E9/L    Basophils Absolute 0.13 0.00 - 0.20 E9/L   Comprehensive Metabolic Panel   Result Value Ref Range    Sodium 133 132 - 146 mmol/L    Potassium 3.7 3.5 - 5.0 mmol/L    Chloride 93 (L) 98 - 107 mmol/L    CO2 19 (L) 22 - 29 mmol/L    Anion Gap 21 (H) 7 - 16 mmol/L    Glucose 127 (H) 74 - 99 mg/dL    BUN 5 (L) 6 - 20 mg/dL    CREATININE 0.8 0.7 - 1.2 mg/dL    GFR Non-African American >60 >=60 mL/min/1.73    GFR African American >60     Calcium 10.0 8.6 - 10.2 mg/dL    Total Protein 9.1 (H) 6.4 - 8.3 g/dL    Alb 4.6 3.5 - 5.2 g/dL    Total Bilirubin 1.7 (H) 0.0 - 1.2 mg/dL    Alkaline Phosphatase 97 40 - 129 U/L    ALT 63 (H) 0 - 40 U/L    AST 95 (H) 0 - 39 U/L   Serum Drug Screen   Result Value Ref Range    Ethanol Lvl <10 mg/dL    Acetaminophen Level <5.0 (L) 10.0 - 75.9 mcg/mL    Salicylate, Serum <2.3 0.0 - 30.0 mg/dL    TCA Scrn NEGATIVE Cutoff:300 3.5 3.5 - 5.0 mmol/L    Chloride 103 98 - 107 mmol/L    CO2 19 (L) 22 - 29 mmol/L    Anion Gap 15 7 - 16 mmol/L    Glucose 107 (H) 74 - 99 mg/dL    BUN 9 6 - 20 mg/dL    CREATININE 0.8 0.7 - 1.2 mg/dL    GFR Non-African American >60 >=60 mL/min/1.73    GFR African American >60     Calcium 9.0 8.6 - 10.2 mg/dL    Total Protein 7.6 6.4 - 8.3 g/dL    Alb 3.8 3.5 - 5.2 g/dL    Total Bilirubin 1.2 0.0 - 1.2 mg/dL    Alkaline Phosphatase 75 40 - 129 U/L    ALT 57 (H) 0 - 40 U/L    AST 86 (H) 0 - 39 U/L   POCT Glucose   Result Value Ref Range    Meter Glucose 106 (H) 74 - 99 mg/dL   EKG 12 Lead   Result Value Ref Range    Ventricular Rate 181 BPM    Atrial Rate 153 BPM    P-R Interval 128 ms    QRS Duration 88 ms    Q-T Interval 270 ms    QTc Calculation (Bazett) 468 ms    R Axis 9 degrees       Radiology  XR CHEST PORTABLE   Final Result   No acute process. EKG:  This EKG is signed and interpreted by me. Rate: 181  Rhythm: A. fib with RVR  Interpretation: A. fib with RVR. Comparison: changes compared to previous EKG      ------------------------- NURSING NOTES AND VITALS REVIEWED ---------------------------  Date / Time Roomed:  7/1/2020  7:37 PM  ED Bed Assignment:  [id number]    The nursing notes within the ED encounter and vital signs as below have been reviewed. Patient Vitals for the past 24 hrs:   BP Temp Temp src Pulse Resp SpO2   07/04/20 0830 128/80 99.1 °F (37.3 °C) Oral 89 18 --   07/03/20 2000 123/79 99.2 °F (37.3 °C) Oral 71 18 95 %       Oxygen Saturation Interpretation: Normal      ------------------------------------------ PROGRESS NOTES ------------------------------------------  Re-evaluation(s):  Time: 2230. Patients symptoms are improving  Repeat physical examination is improved    Time: 2300.   Patients symptoms are improving  Repeat physical examination is improved    I have spoken with the patient and discussed todays results, in addition to providing specific details for the plan of care and counseling regarding the diagnosis and prognosis. Their questions are answered at this time and they are agreeable with the plan.      --------------------------------- ADDITIONAL PROVIDER NOTES ---------------------------------  Consultations:  Spoke with Dr. Delfina De Leon,  They will admit this patient. This patient's ED course included: a personal history and physicial examination, re-evaluation prior to disposition, multiple bedside re-evaluations, IV medications, cardiac monitoring, continuous pulse oximetry and complex medical decision making and emergency management    This patient has improved during their ED course. Please note that the withdrawal or failure to initiate urgent interventions for this patient would likely result in a life threatening deterioration or permanent disability. Accordingly this patient received 40 minutes of critical care time, excluding separately billable procedures. Clinical Impression  1. Atrial fibrillation, unspecified type ([facility] [address]          Disposition  Patient's disposition: Admit to telemetry  Patient's condition is fair. Derl Duverney, MD  Resident  07/02/20 6284    ATTENDING PROVIDER ATTESTATION:     Irina Herrera presented to the emergency department for evaluation of Delirium Tremens (DTS) (Patient states that he has been going through DT's all day today. ) and Emesis    I have reviewed and discussed the case, including pertinent history (medical, surgical, family and social) and exam findings with the Resident and the Nurse assigned to Irina Herrera. I have personally performed and/or participated in the history, exam, medical decision making, and procedures and agree with all pertinent clinical information. I have reviewed my findings and recommendations with Irina Herrera and members of family present at the time of disposition. I, Dr. Paxton Geiger am the primary physician of record for this patient.     MDM: The patient is

## 2020-07-01 NOTE — ED NOTES
EKG done and given to Dr Rich Gu. Patient placed on telemetry monitor and Dr Rich Gu in seeing the patient at this time.       Giles Santana RN  07/01/20 1945

## 2020-07-02 LAB
ACETAMINOPHEN LEVEL: <5 MCG/ML (ref 10–30)
AMMONIA: 54 UMOL/L (ref 16–60)
EKG ATRIAL RATE: 153 BPM
EKG P-R INTERVAL: 128 MS
EKG Q-T INTERVAL: 270 MS
EKG QRS DURATION: 88 MS
EKG QTC CALCULATION (BAZETT): 468 MS
EKG R AXIS: 9 DEGREES
EKG VENTRICULAR RATE: 181 BPM
ETHANOL: <10 MG/DL (ref 0–0.08)
LV EF: 63 %
LVEF MODALITY: NORMAL
SALICYLATE, SERUM: <0.3 MG/DL (ref 0–30)
TRICYCLIC ANTIDEPRESSANTS SCREEN SERUM: NEGATIVE NG/ML
TSH SERPL DL<=0.05 MIU/L-ACNC: 1.69 UIU/ML (ref 0.27–4.2)

## 2020-07-02 PROCEDURE — 93010 ELECTROCARDIOGRAM REPORT: CPT | Performed by: INTERNAL MEDICINE

## 2020-07-02 PROCEDURE — 82140 ASSAY OF AMMONIA: CPT

## 2020-07-02 PROCEDURE — 93306 TTE W/DOPPLER COMPLETE: CPT

## 2020-07-02 PROCEDURE — 2500000003 HC RX 250 WO HCPCS: Performed by: EMERGENCY MEDICINE

## 2020-07-02 PROCEDURE — 2060000000 HC ICU INTERMEDIATE R&B

## 2020-07-02 PROCEDURE — 6370000000 HC RX 637 (ALT 250 FOR IP): Performed by: INTERNAL MEDICINE

## 2020-07-02 PROCEDURE — 36415 COLL VENOUS BLD VENIPUNCTURE: CPT

## 2020-07-02 PROCEDURE — 84443 ASSAY THYROID STIM HORMONE: CPT

## 2020-07-02 PROCEDURE — 6370000000 HC RX 637 (ALT 250 FOR IP): Performed by: NURSE PRACTITIONER

## 2020-07-02 PROCEDURE — 2580000003 HC RX 258: Performed by: EMERGENCY MEDICINE

## 2020-07-02 RX ORDER — PANTOPRAZOLE SODIUM 40 MG/1
40 TABLET, DELAYED RELEASE ORAL
Status: DISCONTINUED | OUTPATIENT
Start: 2020-07-03 | End: 2020-07-04 | Stop reason: HOSPADM

## 2020-07-02 RX ORDER — CLONIDINE HYDROCHLORIDE 0.1 MG/1
0.1 TABLET ORAL 2 TIMES DAILY
Status: ON HOLD | COMMUNITY
End: 2020-07-04 | Stop reason: HOSPADM

## 2020-07-02 RX ORDER — LORAZEPAM 1 MG/1
1 TABLET ORAL EVERY 4 HOURS PRN
Status: DISCONTINUED | OUTPATIENT
Start: 2020-07-02 | End: 2020-07-04 | Stop reason: HOSPADM

## 2020-07-02 RX ORDER — OMEPRAZOLE 20 MG/1
20 CAPSULE, DELAYED RELEASE ORAL DAILY
Status: ON HOLD | COMMUNITY
End: 2020-07-04 | Stop reason: HOSPADM

## 2020-07-02 RX ORDER — GABAPENTIN 100 MG/1
100 CAPSULE ORAL 3 TIMES DAILY
Status: DISCONTINUED | OUTPATIENT
Start: 2020-07-02 | End: 2020-07-04 | Stop reason: HOSPADM

## 2020-07-02 RX ORDER — METOPROLOL SUCCINATE 50 MG/1
50 TABLET, EXTENDED RELEASE ORAL 2 TIMES DAILY
Status: DISCONTINUED | OUTPATIENT
Start: 2020-07-02 | End: 2020-07-03

## 2020-07-02 RX ORDER — PANTOPRAZOLE SODIUM 40 MG/1
40 TABLET, DELAYED RELEASE ORAL ONCE
Status: COMPLETED | OUTPATIENT
Start: 2020-07-02 | End: 2020-07-02

## 2020-07-02 RX ORDER — TRAZODONE HYDROCHLORIDE 50 MG/1
50 TABLET ORAL NIGHTLY
Status: DISCONTINUED | OUTPATIENT
Start: 2020-07-02 | End: 2020-07-04 | Stop reason: HOSPADM

## 2020-07-02 RX ORDER — ASPIRIN 81 MG/1
81 TABLET, CHEWABLE ORAL DAILY
Status: DISCONTINUED | OUTPATIENT
Start: 2020-07-02 | End: 2020-07-04 | Stop reason: HOSPADM

## 2020-07-02 RX ORDER — CALCIUM CARBONATE 200(500)MG
500 TABLET,CHEWABLE ORAL 3 TIMES DAILY PRN
Status: DISCONTINUED | OUTPATIENT
Start: 2020-07-02 | End: 2020-07-04 | Stop reason: HOSPADM

## 2020-07-02 RX ADMIN — TRAZODONE HYDROCHLORIDE 50 MG: 50 TABLET ORAL at 20:57

## 2020-07-02 RX ADMIN — CALCIUM CARBONATE (ANTACID) CHEW TAB 500 MG 500 MG: 500 CHEW TAB at 16:29

## 2020-07-02 RX ADMIN — DILTIAZEM HYDROCHLORIDE 5 MG/HR: 5 INJECTION, SOLUTION INTRAVENOUS at 16:06

## 2020-07-02 RX ADMIN — GABAPENTIN 100 MG: 100 CAPSULE ORAL at 20:57

## 2020-07-02 RX ADMIN — GABAPENTIN 100 MG: 100 CAPSULE ORAL at 10:27

## 2020-07-02 RX ADMIN — SODIUM CHLORIDE, PRESERVATIVE FREE 10 ML: 5 INJECTION INTRAVENOUS at 20:58

## 2020-07-02 RX ADMIN — GABAPENTIN 100 MG: 100 CAPSULE ORAL at 16:07

## 2020-07-02 RX ADMIN — DILTIAZEM HYDROCHLORIDE 15 MG/HR: 5 INJECTION, SOLUTION INTRAVENOUS at 05:52

## 2020-07-02 RX ADMIN — METOPROLOL SUCCINATE 50 MG: 50 TABLET, EXTENDED RELEASE ORAL at 20:57

## 2020-07-02 RX ADMIN — PANTOPRAZOLE SODIUM 40 MG: 40 TABLET, DELAYED RELEASE ORAL at 13:03

## 2020-07-02 RX ADMIN — ASPIRIN 81 MG 81 MG: 81 TABLET ORAL at 16:29

## 2020-07-02 RX ADMIN — METOPROLOL SUCCINATE 50 MG: 50 TABLET, EXTENDED RELEASE ORAL at 10:27

## 2020-07-02 ASSESSMENT — ENCOUNTER SYMPTOMS
EYE REDNESS: 0
WHEEZING: 0
ABDOMINAL PAIN: 0
COUGH: 0
EYE ITCHING: 0
VOMITING: 0
EYE PAIN: 0
NAUSEA: 0
EYE DISCHARGE: 0
BACK PAIN: 0
DIARRHEA: 0
SORE THROAT: 0
SINUS PRESSURE: 0
SHORTNESS OF BREATH: 0

## 2020-07-02 ASSESSMENT — PAIN SCALES - GENERAL
PAINLEVEL_OUTOF10: 0
PAINLEVEL_OUTOF10: 2

## 2020-07-02 ASSESSMENT — PAIN DESCRIPTION - PAIN TYPE: TYPE: CHRONIC PAIN

## 2020-07-02 NOTE — CONSULTS
Consult    PCP: Dr. Justo Cross    Admitting Physician: Dr. Lauren Adame and Dr. Whitney Mccoy. CHIEF COMPLAINT:  Felt heart racing    HISTORY OF PRESENT ILLNESS:      This is a 38year old  male who presented to the ED with a complaint of feeling his heart racing and th e\"shakes\". States that he was trying to detox himself from alcohol. . Drinks a 12 pack daily for approximately 8-10 days and will then stop for 7 days or so. Has been drinking for a long time. Has been in rehab and was instructed to join AA and to \"get a higher power\". States that his grandmother  3 weeks ago which was a trigger to drink. States life stressors like not seeing his son for a prolonged areas of time is also a trigger to drink. States he drinks due to depresion, boredom and/or anxiety, States he tried to stop drinking at approximately 8PM the day before yesterday, went to bed, and woke up with \"shakes\". States that he felt his heart racing and felt poorly in general. Has a history of atrial fibrillation and in the ED was found to be in atrial fibrillation with RVR. No chest pain. ED Course:  Vital signs:   20 1925 (!) 142/89 98 °F (36.7 °C) Oral 169 22 96 % 6' 3\" (1.905 m) 215 lb (97.5   Labs: Potassium was 3.7, ALT/AST 63/95, BUN/Cr 5/0.8. WBC 11.4, HH 16.6/47. 1. EKG revealed atrial fibrillation with RVR-Rate was 181 bpm. The patient was in need of further evaluation and treatment and admitted under the service of Dr. Chaz Sawant with consultation to Dr. Augustus Nieves and myself for medical management.        PAST MEDICAL Hx:  Past Medical History:   Diagnosis Date    Anxiety     Depression     Esophagitis     ETOH abuse     Gastritis     GERD (gastroesophageal reflux disease)     Hematuria     Pancreatitis        PAST SURGICAL Hx:   Past Surgical History:   Procedure Laterality Date    KNEE SURGERY      UPPER GASTROINTESTINAL ENDOSCOPY  2018    UPPER GASTROINTESTINAL ENDOSCOPY N/A 2018    EGD BIOPSY Financial resource strain: Not on file    Food insecurity     Worry: Not on file     Inability: Not on file   [employer] needs     Medical: Not on file     Non-medical: Not on file   Tobacco Use    Smoking status: Never Smoker    Smokeless tobacco: Never Used   Substance and Sexual Activity    Alcohol use: Yes     Comment: 5th of liquour daily. Previous 1/15/2019    Drug use: No    Sexual activity: Not Currently     Partners: Female   Lifestyle    Physical activity     Days per week: Not on file     Minutes per session: Not on file    Stress: Not on file   Relationships    Social connections     Talks on phone: Not on file     Gets together: Not on file     Attends Religion service: Not on file     Active member of club or organization: Not on file     Attends meetings of clubs or organizations: Not on file     Relationship status: Not on file    Intimate partner violence     Fear of current or ex partner: Not on file     Emotionally abused: Not on file     Physically abused: Not on file     Forced sexual activity: Not on file   Other Topics Concern    Not on file   Social History Narrative    Has a 15 y.o. son who lives in [place]. ROS:  General:   Denies chills, fatigue, fever, malaise, night sweats or weight loss    Psychological:   Denies anxiety, disorientation or hallucinations    HEENT:    Denies epistaxis, headaches, vertigo or visual changes    Cardiovascular:   Denies any chest pain, admits to irregular heartbeats and  palpitations. No paroxysmal nocturnal dyspnea. Respiratory:   Denies shortness of breath, coughing, sputum production, hemoptysis, or wheezing. No orthopnea. Gastrointestinal:   Positive for nausea,Denies vomiting, diarrhea, or constipation. Denies any abdominal pain. Denies change in bowel habits or stools. Genito-Urinary:    Denies any urgency, frequency, hematuria. Voiding without difficulty.     Musculoskeletal:   Denies joint pain, joint stiffness, joint swelling or muscle pain    Neurology:    Denies any headache or focal neurological deficits. No weakness or paresthesia. Derm:    Denies any rashes, ulcers, or excoriations. Denies bruising. Extremities:   Denies any lower extremity swelling or edema. PHYSICAL EXAM:  VITALS:  Blood pressure 135/88, pulse 98, temperature 98.9 °F (37.2 °C), temperature source Oral, resp. rate 16, height 6' 3\" (1.905 m), weight 215 lb (97.5 kg), SpO2 96 %. CONSTITUTIONAL:    Awake, alert, cooperative, no apparent distress, and appears stated age    EYES:    PERRL, EOMI, sclera clear, conjunctiva normal    HENT:    Normocephalic, atraumatic, sinuses nontender on palpation. External ears without lesions. Oral pharynx with moist mucus membranes. NECK:    Supple, symmetrical, trachea midline, no adenopathy, thyroid symmetric, not enlarged and no tenderness, skin normal, no bruits, no JVD    HEMATOLOGIC/LYMPHATICS:    No cervical lymphadenopathy and no supraclavicular lymphadenopathy    LUNGS:    Symmetric. No increased work of breathing, good air exchange, clear to auscultation bilaterally, no wheezes, rhonchi, or rales,     CARDIOVASCULAR:    Normal apical impulse,Irregular rate and rhythm-atrial fibrillation with controlled rate, normal S1 and S2, no S3 or S4, and no murmur noted    ABDOMEN:    No scars, normal bowel sounds, soft, non-distended, non-tender, no masses palpated, no hepatosplenomegaly, no rebound or guarding elicited on palpation     MUSCULOSKELETAL:    There is no redness, warmth, or swelling of the joints. Full range of motion noted. Motor strength is 5 out of 5 all extremities bilaterally. Tone is normal.    NEUROLOGIC:    Awake, alert, oriented to name, place and time. Cranial nerves II-XII are grossly intact. Motor is 5 out of 5 bilaterally. SKIN:    No bruising or bleeding. No redness, warmth, or swelling    EXTREMITIES:    Peripheral pulses present.   No edema, cyanosis, or swelling. OSTEOPATHIC:    Examined in seated and supine positions. Normal thoracic kyphosis and lumbar lordosis. No acute somatic dysfunction. LABORATORY DATA:  CBC with Differential:    Lab Results   Component Value Date    WBC 11.4 07/01/2020    RBC 5.05 07/01/2020    HGB 16.6 07/01/2020    HCT 47.1 07/01/2020     07/01/2020    MCV 93.3 07/01/2020    MCH 32.9 07/01/2020    MCHC 35.2 07/01/2020    RDW 12.7 07/01/2020    METASPCT 0.9 01/17/2019    LYMPHOPCT 15.4 07/01/2020    MONOPCT 5.5 07/01/2020    BASOPCT 1.1 07/01/2020    MONOSABS 0.62 07/01/2020    LYMPHSABS 1.75 07/01/2020    EOSABS 0.00 07/01/2020    BASOSABS 0.13 07/01/2020     CMP:    Lab Results   Component Value Date     07/01/2020    K 3.7 07/01/2020    CL 93 07/01/2020    CO2 19 07/01/2020    BUN 5 07/01/2020    CREATININE 0.8 07/01/2020    GFRAA >60 07/01/2020    LABGLOM >60 07/01/2020    GLUCOSE 127 07/01/2020    PROT 9.1 07/01/2020    LABALBU 4.6 07/01/2020    CALCIUM 10.0 07/01/2020    BILITOT 1.7 07/01/2020    ALKPHOS 97 07/01/2020    AST 95 07/01/2020    ALT 63 07/01/2020       ASSESSMENT:  Patient Active Problem List    Diagnosis Date Noted    Hyperbilirubinemia     Hypomagnesemia     Paroxysmal atrial fibrillation (HCC)     Atrial fibrillation with RVR (HCC)     Severe protein-calorie malnutrition ([address] 01/18/2019    Hypokalemia     Hematemesis 01/16/2019    Atrial fibrillation with rapid ventricular response ([address] 01/16/2019    Alcohol withdrawal syndrome without complication ([facility] 32/82/7831    Lactic acidosis 01/16/2019    Anxiety     Depression     ETOH abuse     Alcohol-induced acute pancreatitis 05/07/2018       Assessment:  · Paroxysmal atrial fibrillation with rapid ventricular response  · Alcohol abuse  · Anxiety/depression  · Gastroesophageal reflux disease  · Vitamin D deficiency    PLAN:  Admitted to the 6th floor/IM.  Will obtain Echocardiogram for evaluation of LV function due to alcoholism he may have underlying ETOH cardiomyopathy. Rate is better controlled but still in atrial fibrillation. Will wean off Cardizem and add beta blocker. Patient states that he does wish to quit drinking. States his mother is a former alcoholic and a good resource/support for him. We discussed inpatient rehab. Patient states he does not feel that he needs this at this time as he has undergone it in the past and failed it. States he believes that his friends will be a good resource to help him stop. Add Neurontin and Desyrel. Will obtain a TSH. Monitor vital signs closely. In regard to the patient's alcoholism he does have mild transaminitis as well as hyperbilirubinemia. Again we did discuss the need for complete cessation from alcohol use. 35 minutes of critical care time was spent with the patient. This includes chart review, , reviewing rhythm strips, and discussion with those consultants involved in the patient's care. More than 50% of my  time was spent at the bedside counseling and/or coordination of care with the patient and/or family with face to face contact. This time was spent reviewing notes and laboratory data, instructing and counseling the patient. Time I spent with the family or surrogate(s) is included only if the patient was incapable of providing the necessary information or participating in medical decisionsI also discussed the differential diagnosis and all of the proposed management plans with the patient and individuals accompanying the patient. I reviewed the patient's past medical, surgical history and medication. Please see orders for further plan of care. Reviewed consultant recommendations/notes and/or discussion. Patient's medications were reviewed/continued/adjusted. Labs as ordered. Please see orders for further plan of care. I reviewed the  course of events since last visit.       Maki Goodrich D.O., F.A.C.O.I.  0:32 AM  7/2/2020

## 2020-07-02 NOTE — PLAN OF CARE
Problem: Nutrition Deficit:  Goal: Ability to achieve adequate nutritional intake will improve  Description: Ability to achieve adequate nutritional intake will improve  Outcome: Met This Shift

## 2020-07-02 NOTE — CARE COORDINATION
SS Note: SW Consult received for \"For consideration of rehab\". SW met with pt, stated he has been to [facility] in past where his stepfather is employed. Pt stated he averages 12 pack beer daily, has been having some periods of sobriety such as 20 days sober then relapses then 20 days sober. Stated he resides alone, has episodes of lonliness, anxiety, and depression adding he does now have a PCP Dr. Coby Smith who has been treating pt for anxiety. Stated his 15year old son and ex-wife reside in [place], has good relationship with ex-wife and frequent contact with his son. Stated alcoholism on both sides of his family, his mother and several aunts now lead sober lives and are supportive. Stated he now has a sponsor and attends [facility] meetings however sponsor was having health issues so was temporarily unable to meet with him to work on 12 step program as they had been previously. Pt requested referral to [facility] for Inpatient Detox Program. Pt stated if accepted his mother could transport him to [facility]. SW called [facility], phone [phone], fax [phone], and faxed referral for her review. Stated at this time no bed available until Sunday but she will review and advise. Electronically signed by Charmayne Bring, LSW on 7/2/2020 at 3:01 PM       Addendum:  BEATRIZ called [facility] Admissions again however still waiting for response.   Electronically signed by Charmayne Bring, LSW on 7/2/2020 at 4:34 PM

## 2020-07-03 LAB
MAGNESIUM: 1.7 MG/DL (ref 1.6–2.6)
METER GLUCOSE: 106 MG/DL (ref 74–99)

## 2020-07-03 PROCEDURE — 82962 GLUCOSE BLOOD TEST: CPT

## 2020-07-03 PROCEDURE — 6370000000 HC RX 637 (ALT 250 FOR IP): Performed by: INTERNAL MEDICINE

## 2020-07-03 PROCEDURE — 6360000002 HC RX W HCPCS: Performed by: PHYSICIAN ASSISTANT

## 2020-07-03 PROCEDURE — 6370000000 HC RX 637 (ALT 250 FOR IP): Performed by: NURSE PRACTITIONER

## 2020-07-03 PROCEDURE — 84165 PROTEIN E-PHORESIS SERUM: CPT

## 2020-07-03 PROCEDURE — 99222 1ST HOSP IP/OBS MODERATE 55: CPT | Performed by: INTERNAL MEDICINE

## 2020-07-03 PROCEDURE — 36415 COLL VENOUS BLD VENIPUNCTURE: CPT

## 2020-07-03 PROCEDURE — 6370000000 HC RX 637 (ALT 250 FOR IP): Performed by: PHYSICIAN ASSISTANT

## 2020-07-03 PROCEDURE — 83735 ASSAY OF MAGNESIUM: CPT

## 2020-07-03 PROCEDURE — APPSS60 APP SPLIT SHARED TIME 46-60 MINUTES: Performed by: PHYSICIAN ASSISTANT

## 2020-07-03 PROCEDURE — 2060000000 HC ICU INTERMEDIATE R&B

## 2020-07-03 PROCEDURE — 2580000003 HC RX 258: Performed by: EMERGENCY MEDICINE

## 2020-07-03 RX ORDER — METOPROLOL SUCCINATE 25 MG/1
25 TABLET, EXTENDED RELEASE ORAL ONCE
Status: DISCONTINUED | OUTPATIENT
Start: 2020-07-03 | End: 2020-07-03

## 2020-07-03 RX ORDER — AMIODARONE HYDROCHLORIDE 200 MG/1
200 TABLET ORAL 2 TIMES DAILY
Status: DISCONTINUED | OUTPATIENT
Start: 2020-07-03 | End: 2020-07-04 | Stop reason: HOSPADM

## 2020-07-03 RX ORDER — ACETAMINOPHEN 325 MG/1
650 TABLET ORAL EVERY 6 HOURS PRN
Status: DISCONTINUED | OUTPATIENT
Start: 2020-07-03 | End: 2020-07-04 | Stop reason: HOSPADM

## 2020-07-03 RX ORDER — METOPROLOL SUCCINATE 25 MG/1
25 TABLET, EXTENDED RELEASE ORAL ONCE
Status: COMPLETED | OUTPATIENT
Start: 2020-07-03 | End: 2020-07-03

## 2020-07-03 RX ORDER — DIGOXIN 0.25 MG/ML
125 INJECTION INTRAMUSCULAR; INTRAVENOUS ONCE
Status: COMPLETED | OUTPATIENT
Start: 2020-07-03 | End: 2020-07-03

## 2020-07-03 RX ADMIN — GABAPENTIN 100 MG: 100 CAPSULE ORAL at 09:33

## 2020-07-03 RX ADMIN — SODIUM CHLORIDE, PRESERVATIVE FREE 10 ML: 5 INJECTION INTRAVENOUS at 20:23

## 2020-07-03 RX ADMIN — RIVAROXABAN 20 MG: 20 TABLET, FILM COATED ORAL at 18:07

## 2020-07-03 RX ADMIN — METOPROLOL SUCCINATE 75 MG: 50 TABLET, EXTENDED RELEASE ORAL at 20:21

## 2020-07-03 RX ADMIN — PANTOPRAZOLE SODIUM 40 MG: 40 TABLET, DELAYED RELEASE ORAL at 05:42

## 2020-07-03 RX ADMIN — TRAZODONE HYDROCHLORIDE 50 MG: 50 TABLET ORAL at 20:21

## 2020-07-03 RX ADMIN — METOPROLOL SUCCINATE 25 MG: 25 TABLET, EXTENDED RELEASE ORAL at 12:02

## 2020-07-03 RX ADMIN — GABAPENTIN 100 MG: 100 CAPSULE ORAL at 15:02

## 2020-07-03 RX ADMIN — AMIODARONE HYDROCHLORIDE 200 MG: 200 TABLET ORAL at 15:03

## 2020-07-03 RX ADMIN — DIGOXIN 125 MCG: 250 INJECTION, SOLUTION INTRAMUSCULAR; INTRAVENOUS; PARENTERAL at 12:05

## 2020-07-03 RX ADMIN — METOPROLOL SUCCINATE 50 MG: 50 TABLET, EXTENDED RELEASE ORAL at 09:33

## 2020-07-03 RX ADMIN — ACETAMINOPHEN 650 MG: 325 TABLET, FILM COATED ORAL at 16:38

## 2020-07-03 RX ADMIN — ENOXAPARIN SODIUM 100 MG: 100 INJECTION SUBCUTANEOUS at 12:05

## 2020-07-03 RX ADMIN — ASPIRIN 81 MG 81 MG: 81 TABLET ORAL at 09:34

## 2020-07-03 RX ADMIN — GABAPENTIN 100 MG: 100 CAPSULE ORAL at 20:21

## 2020-07-03 ASSESSMENT — PAIN SCALES - GENERAL
PAINLEVEL_OUTOF10: 0
PAINLEVEL_OUTOF10: 4

## 2020-07-03 ASSESSMENT — PAIN DESCRIPTION - DESCRIPTORS: DESCRIPTORS: ACHING;DISCOMFORT;HEADACHE

## 2020-07-03 ASSESSMENT — PAIN DESCRIPTION - LOCATION: LOCATION: HEAD

## 2020-07-03 NOTE — PROGRESS NOTES
Dr. Mica Dunn notified that pt's HR is still low 100s. Okay to turn off Cardizem drip. Notify cardiology if HR goes to 130 and sustaining.

## 2020-07-03 NOTE — PROGRESS NOTES
Internal Medicine Progress Note    [facility]    Bob Vieyra. Franny Hernandez., EBONY Chun D.O., DARÍO Jones, MSN, APRN, NP-C  Emeli Swift. Lu Nguyen, MSN, APRN-CNP     Primary Care Physician: Fariba Mahan DO   Admitting Physician:  Theron Foy DO  Admission date and time: 7/1/2020  7:37 PM    Room:  [address] [place] [address]  Admitting diagnosis: Atrial fibrillation with RVR [facility]) [I48.91]    Patient Name: Dee Simeon  MRN: [id number]    Date of Service: 7/3/2020     Subjective: Rica Magallanes is a 38 y. o.  male who was seen and examined today,7/3/2020, at the bedside. The patient has been seen by cardiology at the present time. He still remain in atrial fibrillation with his heart rate under better control but still occasionally tachycardic. Cardizem drip has been weaned off. Beta-blockers are being modified and anticoagulant therapy has been started. The patient will follow-up with cardiology on outpatient basis. He has no other complaints of chest pain or shortness of breath. He shows no acute signs of alcohol withdrawals    No family present during my examination. Review of System:   Constitutional:   Denies fever or chills, weight loss or gain, fatigue or malaise. HEENT:   Denies ear pain, sore throat, sinus or eye problems. Cardiovascular:   Complains of irregular heartbeat  Respiratory:   Denies shortness of breath, coughing, sputum production, hemoptysis, or wheezing. Gastrointestinal:   Denies nausea, vomiting, diarrhea, or constipation. Denies any abdominal pain. Genitourinary:    Denies any urgency, frequency, hematuria. Voiding  without difficulty. Extremities:   Denies lower extremity swelling, edema or cyanosis. Neurology:    Denies any headache or focal neurological deficits, Denies generalized weakness or memory difficulty. Psch:   Denies being anxious or depressed.   Musculoskeletal: texture. Genitalia/Breast:  Deferred      Allergy:  No Known Allergies     Medication:  Scheduled Meds:   metoprolol succinate  75 mg Oral BID    rivaroxaban  20 mg Oral Daily    amiodarone  200 mg Oral BID    traZODone  50 mg Oral Nightly    gabapentin  100 mg Oral TID    pantoprazole  40 mg Oral QAM AC    aspirin  81 mg Oral Daily    sodium chloride flush  10 mL Intravenous 2 times per day     Continuous Infusions:    Objective Data:  CBC:   Recent Labs     07/01/20  0803   WBC 11.4   HGB 16.6*        BMP:    Recent Labs     07/01/20 2003      K 3.7   CL 93*   CO2 19*   BUN 5*   CREATININE 0.8   GLUCOSE 127*     CMP:    Lab Results   Component Value Date     07/01/2020    K 3.7 07/01/2020    CL 93 07/01/2020    CO2 19 07/01/2020    BUN 5 07/01/2020    CREATININE 0.8 07/01/2020    GFRAA >60 07/01/2020    LABGLOM >60 07/01/2020    GLUCOSE 127 07/01/2020    PROT 9.1 07/01/2020    LABALBU 4.6 07/01/2020    CALCIUM 10.0 07/01/2020    BILITOT 1.7 07/01/2020    ALKPHOS 97 07/01/2020    AST 95 07/01/2020    ALT 63 07/01/2020     Hepatic:   Recent Labs     07/01/20 2003   AST 95*   ALT 63*   BILITOT 1.7*   ALKPHOS 97     Troponin:   Recent Labs     07/01/20 2003   Kimberly Rice <0.01     BNP: No results for input(s): BNP in the last 72 hours. Lipids: No results for input(s): CHOL, HDL in the last 72 hours. Invalid input(s): LDLCALCU  ABGs: No results found for: PHART, PO2ART, QBN1DSO  INR: No results for input(s): INR, PROTIME in the last 72 hours. RAD: Xr Chest Portable    Result Date: 7/1/2020  EXAMINATION: ONE XRAY VIEW OF THE CHEST 7/1/2020 7:47 pm COMPARISON: 05/11/2020 HISTORY: ORDERING SYSTEM PROVIDED HISTORY: vomiting TECHNOLOGIST PROVIDED HISTORY: Reason for exam:->vomiting FINDINGS: The lungs are without acute focal process. There is no effusion or pneumothorax. The cardiomediastinal silhouette is stable. The osseous structures are stable. No acute process. Chronic Hospital Medical Problems:  Past Medical History:   Diagnosis Date    Anxiety     Depression     Esophagitis     ETOH abuse     Gastritis     GERD (gastroesophageal reflux disease)     Hematuria     Pancreatitis      Active Problems:    Anxiety    Depression    ETOH abuse    Alcohol withdrawal syndrome without complication (HCC)    Atrial fibrillation with RVR (HCC)  Resolved Problems:    * No resolved hospital problems. *      Assessment:    · Paroxysmal atrial fibrillation with rapid ventricular response  · Alcohol abuse  · Anxiety/depression  · Gastroesophageal reflux disease  · Vitamin D deficiency    Plan:     · The patient has been seen by cardiology and appears to be relatively stable for possible discharge. The patient is currently on a Cardizem drip at a low rate. This will be discontinue. If the patient heart rate can be modify and control on oral beta-blockers possible discharge later today  · Patient has been started on anticoagulant therapy  · We will continue vitamin D supplementation  · Follow with alcohol treatment program on outpatient basis  · Further cardiac work-up as indicated        I reviewed the patient's past medical, surgical history and medication. Patient's medications were reviewed/continued/adjusted. Labs as ordered. Please see orders for further plan of care. Rhythm strips reviewed as well as consultant recommendations/notes and/or discussion. I reviewed the  course of events since last visit. More than 50% of my  time was spent at the bedside counseling and/or coordination of care with the patient and/or family with face to face contact. This time was spent reviewing notes and laboratory data, instructing and counseling the patient.  Time I spent with the family or surrogate(s) is included only if the patient was incapable of providing the necessary information or participating in medical decisionsI also discussed the differential diagnosis and all of the proposed management plans with the patient and individuals accompanying the patient. Jose Cochran requires this high level of physician care and nursing in the [facility] due the complexity of decision management and chance of rapid decline or death. Continued cardiac monitoring and higher level of nursing are required. I am ready available for decision making and intervention. I reviewed the relevant imaging studies and available reports. I also discussed the differential diagnosis and all of the proposed management plans with the patient and individuals accompanying the patient to this visit. I reviewed the relevant imaging studies and available reports. Anthony Mercer DO, JOHN.C.O.I.   On 7/3/2020  3:03 PM

## 2020-07-03 NOTE — H&P
Department of Family Practice  Attending History and Physical        CHIEF COMPLAINT:  RAPID HEART RATE    Reason for Admission:  ATRIAL FIBRILLATION WITH RVR    History Obtained From:  patient, electronic medical record, Quality of history:  good historian    HISTORY OF PRESENT ILLNESS:      The patient is a 38 y.o. male with significant past medical history of ALCOHOL ABUSE who presents with RAPI, [address] [facility] ED. HE REPORTS HAVING HAD AN EPISODE OF A. FIB IN THE PAST WHICH RESOLVED WITH IV CARDIZEM. DR. Marguerita Landau DID A 30 DAY HOLTER AND NO ARRHYTHMIAS WERE RECORDED SO HE WAS NOT CONTINUED ON LEE MEDICATION. Past Medical History:        Diagnosis Date    Anxiety     Depression     Esophagitis     ETOH abuse     Gastritis     GERD (gastroesophageal reflux disease)     Hematuria     Pancreatitis      Past Surgical History:        Procedure Laterality Date    KNEE SURGERY      ACL    UPPER GASTROINTESTINAL ENDOSCOPY  05/09/2018    UPPER GASTROINTESTINAL ENDOSCOPY N/A 5/8/2018    EGD BIOPSY performed by Romayne Downer, MD at [address] 1/21/2019    EGD ESOPHAGOGASTRODUODENOSCOPY performed by Dimas Busby DO at [address]  1/21/2019    EGD CONTROL HEMORRHAGE performed by Dimas Busby DO at [address]:              Influenza:   Indicated for current flu vaccination season Oct. to Feb.            Pneumococcal Polysaccharide:  Indicated for current flu vaccination season Oct. to Feb.    Medications Prior to Admission:   Medications Prior to Admission: cloNIDine (CATAPRES) 0.1 MG tablet, Take 0.1 mg by mouth 2 times daily  omeprazole (PRILOSEC) 20 MG delayed release capsule, Take 20 mg by mouth Daily  cloNIDine (CATAPRES) 0.1 MG tablet, Take 1 tablet by mouth 2 times daily for 7 days  folic acid (FOLVITE) 1 MG tablet, Take 1 tablet by mouth daily  Multiple Vitamins-Minerals (THERAPEUTIC MULTIVITAMIN-MINERALS) tablet, Take 1 tablet by mouth daily  thiamine 100 MG tablet, Take 1 tablet by mouth daily  diltiazem (CARDIZEM CD) 300 MG extended release capsule, Take 1 capsule by mouth daily  lactulose (CHRONULAC) 10 GM/15ML solution, Take 30 mLs by mouth 2 times daily  pantoprazole (PROTONIX) 40 MG tablet, Take 1 tablet by mouth every morning (before breakfast)  ergocalciferol (ERGOCALCIFEROL) 69525 units capsule, Take 1 capsule by mouth once a week    Allergies:  Patient has no known allergies. Social History:   TOBACCO:   reports that he has never smoked. He has never used smokeless tobacco.  ETOH:   reports current alcohol use. DRUGS:   reports no history of drug use. Family History:   History reviewed. No pertinent family history. REVIEW OF SYSTEMS:  CONSTITUTIONAL:  negative  HEENT:  negative  RESPIRATORY:  negative  CARDIOVASCULAR:  positive for  palpitations  GASTROINTESTINAL:  negative  GENITOURINARY:  negative  MUSCULOSKELETAL:  negative  PHYSICAL EXAM:    Vitals:  /76   Pulse 85   Temp 98.5 °F (36.9 °C) (Oral)   Resp 16   Ht 6' 3\" (1.905 m)   Wt 215 lb (97.5 kg)   SpO2 97%   BMI 26.87 kg/m²     CONSTITUTIONAL:  awake, alert, cooperative, no apparent distress, and appears stated age  ENT:  Normocephalic, without obvious abnormality, atraumatic, sinuses nontender on palpation, external ears without lesions, oral pharynx with moist mucus membranes, tonsils without erythema or exudates, gums normal and good dentition.   BACK:  Symmetric, no curvature, spinous processes are non-tender on palpation, paraspinous muscles are non-tender on palpation, no costal vertebral tenderness  LUNGS:  No increased work of breathing, good air exchange, clear to auscultation bilaterally, no crackles or wheezing  CARDIOVASCULAR:  Normal apical impulse, IRregular, RAPID rate and IRREGULAR  rhythm, normal S1 and S2, no S3 or S4, and no murmur noted  ABDOMEN:  No scars,

## 2020-07-03 NOTE — CONSULTS
PJ MCDONOUGH, Chula Gerardo, APRN - CNP, 40 mg at 07/03/20 0542    aspirin chewable tablet 81 mg, 81 mg, Oral, Daily, Donna Decker Bisel, DO, 81 mg at 07/02/20 1629    calcium carbonate (TUMS) chewable tablet 500 mg, 500 mg, Oral, TID PRN, Donna [place] Bisel, DO, 500 mg at 07/02/20 1629    LORazepam (ATIVAN) tablet 1 mg, 1 mg, Oral, Q4H PRN, Donna [place] Bisel, DO    sodium chloride flush 0.9 % injection 10 mL, 10 mL, Intravenous, 2 times per day, Donna Adams, DO, 10 mL at 07/02/20 2058    sodium chloride flush 0.9 % injection 10 mL, 10 mL, Intravenous, PRN, Donna Adams, DO    dilTIAZem 125 mg in dextrose 5 % 125 mL infusion, 5 mg/hr, Intravenous, Continuous, Mariam Rodriguez DO, Last Rate: 5 mL/hr at 07/02/20 2045, 5 mg/hr at 07/02/20 2045      ALLERGIES:  Patient has no known allergies. SOCIAL HISTORY:    Patient states he drinks one 12 pack of beer per day for approximately 2 weeks, and then takes one week without any alcohol use. Denies former current tobacco or illicit drug use. FAMILY HISTORY:   Denies any pertinent cardiac family medical history at this time. REVIEW OF SYSTEMS:     · Constitutional: Denies fevers, chills, night sweats, and generalized fatigue. Denies significant weight loss or weight gain. · HEENT: Denies headaches, nose bleeds, rhinorrhea, sore throat. Denies blurred vision. Denies dysphagia, odynophagia. · Musculoskeletal: Denies falls, pain to BLE with ambulation. Denies muscle weakness. · Neurological: Denies numbness and tingling. Denies focal neurological deficits. · Cardiovascular: +chest tightness, +palpitations, +dizziness, +near syncope. Denies diaphoresis, syncope. Denies PND, orthopnea, peripheral edema. · Respiratory: +shortness of breath. Denies cough, hemoptysis. · Gastrointestinal: Denies abdominal pain, nausea/vomiting, diarrhea and constipation, black/bloody, and tarry stools. · Genitourinary: Denies dysuria and hematuria.   · Hematologic: Denies excessive borderline dilated. Interatrial septum appears intact. No evidence of thrombus within left atrium. Mildly dilated right ventricle. No evidence of a thrombus in the right ventricle. Mildly enlarged right atrium size. Physiologic and/or trace mitral regurgitation is present. No evidence of mitral valve stenosis. Physiologic and/or trace tricuspid regurgitation. RVSP is 24.11 mmHg. Irregular rhythm--atrial fibrillation. Intake/Output Summary (Last 24 hours) at 7/3/2020 0915  Last data filed at 7/2/2020 2045  Gross per 24 hour   Intake 1542.92 ml   Output 250 ml   Net 1292.92 ml       Labs:   CBC:   Recent Labs     07/01/20  0803   WBC 11.4   HGB 16.6*   HCT 47.1        BMP:   Recent Labs     07/01/20 2003      K 3.7   CO2 19*   BUN 5*   CREATININE 0.8   LABGLOM >60   CALCIUM 10.0     TSH:   Recent Labs     07/02/20  1154   TSH 1.690     HgA1c:   Lab Results   Component Value Date    LABA1C 5.1 05/08/2018     CARDIAC ENZYMES:  Recent Labs     07/01/20 2003   TROPONINI <0.01     FASTING LIPID PANEL:  Lab Results   Component Value Date    CHOL 154 05/08/2018    HDL 5 05/08/2018    LDLCALC 98 05/08/2018    TRIG 253 05/08/2018     LIVER PROFILE:  Recent Labs     07/01/20 2003   AST 95*   ALT 63*   LABALBU 4.6         ASSESSMENT:  1. Paroxysmal atrial fibrillation: With RVR on admission. In setting of alcohol abuse. Heart rate better controlled since admission, will adjust medications accordingly. CHADsVASc score of 1 given HTN. TSH within normal limits. 2.  Alcohol abuse. History of esophageal varices with hematemesis per chart review. 3.  Elevated LFTs. 4.  Hypertension: Well-controlled. 5.  Depression. Anxiety. 6.  GERD/gastritis. RECOMMENDATIONS:  1. Increase Toprol-XL to 75 mg twice daily. 2.  Give one-time dose of Toprol-XL 25 mg this AM.  3.  Give one-time dose of IV digoxin 125 mcg this AM.   4.  Wean Cardizem drip to off as tolerated.   5.  Give one-time dose of full dose Lovenox today, with initiating Xarelto therapy 20 mg daily this evening for a minimum of one month with re-evaluation outpatient as per Dr. Romina Multani (given CHADsVASc score of 1). 6.  Echocardiogram from this hospitalization reviewed. 7.  Check magnesium level. 8.  Consider stress testing evaluation as an outpatient if deemed necessary. 9.  Further recommendations as per Dr. Romina Multani. 10. Advised patient of need for alcohol cessation. The above case and recommendations have been discussed with Dr. Romina Multani. OSMAR Herr  [facility]    Electronically signed by Sebastian Serra PA-C on 7/3/2020 at 9:15 AM       Addendum:  Brock Orta MD     Reason for consult:  A fib/RVR     Patient previously known to me.      History of Present illness: 38 yr pt admitted for heart racing -and A fib with RVR. He has history of paroxysmal atrial fibrillation, anxiety, depression, alcohol abuse, gastroesophageal reflux disease/gastritis, hypertension, history of esophageal varices and hematemesis with no prior intervention, He presented to [address] [place] [address] on July 1, 2020 with complaints of palpitations. He states that Wednesday was his first day trying to detox from alcohol use. He typically drinks a 12 pack of beer per day for approximately two weeks, and then goes approximately one week without any alcohol use. Tuesday was his last day drinking (patient did admit to drinking 12 pack of beer that day), with Wednesday being his first day at detox attempt. He states that he was doing well in the AM, however around 2PM he began to notice symptoms of palpitations, shortness of breath and chest tightness. He also admits to dizziness accompanying the palpitations and near syncope, but denies any actual syncopal episodes. The shortness of breath and chest tightness only occurred with the palpitations.  The chest tightness was located across his entire anterior chest with no radiation. The symptoms did not resolve quickly, so he decided come to the ED for further evaluation and management. n the ED, he was found to be in atrial fibrillation with RVR, and we are being consulted as result for valuation and recommendations. He denies paroxysmal nocturnal dyspnea, orthopnea or peripheral edema. He is anxious for discharge. No PC or palpitations; No PND or orthopnea.      Review of systems:  Review of 10 systems negative except as mentioned in the HPI     Medical History: Reviewed     Surgical history: Reviewed     FamilyHistory: Reviewed     Allergies:  Reviewed     Social Hx: Reviewed.     Medications: reviewed.     Labs/imaging studies: Reviewed.     Above ANDREW exam, assessment reviewed and reflect my work. I personally saw, examined, and evaluated the patient today.     I personally reviewed the medications, rhythm strips, pertinent labs and test reports. I directly participated in the medical-decision making, ordering pertinent tests and medication adjustment.     Physical exam:          Vitals:     07/03/20 0930   BP: 114/72   Pulse: 122   Resp: 22   Temp: 98.8 °F (37.1 °C)   SpO2: 97%         In general, this is a well developed, well nourished who appears stated age. awake, alert, cooperative, no apparent distress     HEENT: eyes -conjunctivae pink,   Neck-  no stridor, no carotid bruit. no jugular venous distention   RESPIRATORY: Chest symmetrical and non-tender to palpation. No accessory muscles use. Lung auscultation - clear to auscultation except few rhonchi  CARDIOVASCULAR:     Heart Inspection shows no noted pulsations  Heart Palpation - no palpable thrills  Heart Ausculation - Irregular rate and rhythm, 1/6 systolic murmur, No s3 or rub. No lower extremity edema, no varicosities. Distal pulses palpable, no clubbing or cyanosis   ABDOMEN: Soft, nontender,  Bowel sounds present. MS: n/a.   : Deferred  Rectal Exam: Deferred  SKIN: warm and dry  NEURO / PSYCH: oriented to person, place           Impression/Recommendations:     A Fib withy RVR - Rate control with increase Metoprolol; Wean iv Cardizem; [id number] VASc score 1 - Recommend OAC, at-least for short term; Risk benefits discussed - agreeable to take Xarelto 20mg; Lovenox one dose today morning;  If remain in A fib, DCCV later     Essential HTN -  Monitor BP     Alcohol use - Counseled to quit drinking    Hx of GERd, esophageal varices- Currently stable                 Above recommendations discussed with him and admitting team.        If heart rates controlled, home later today  F/u by [place] cardiology 2-3 weeks    Thank you for the consult.           Jalil Nichols MD  7/3/2020  10:25 AM  [facility]) Cardiology

## 2020-07-03 NOTE — PLAN OF CARE
Problem: Nutrition Deficit:  Goal: Ability to achieve adequate nutritional intake will improve  Description: Ability to achieve adequate nutritional intake will improve  7/3/2020 0134 by Bekah Juarez RN  Outcome: Met This Shift  7/2/2020 1756 by Steph Oscar RN  Outcome: Met This Shift     Problem: Sleep Pattern Disturbance:  Goal: Appears well-rested  Description: Appears well-rested  7/3/2020 0134 by Bekah Juarez RN  Outcome: Met This Shift  7/2/2020 1756 by Steph Oscar RN  Outcome: Met This Shift     Problem: SAFETY  Goal: LTG - Patient will demonstrate safety requirements appropriate to situation/environment  7/3/2020 0134 by Bekah Juarez RN  Outcome: Met This Shift  7/2/2020 1756 by Steph Oscar RN  Outcome: Met This Shift

## 2020-07-04 VITALS
HEART RATE: 89 BPM | RESPIRATION RATE: 18 BRPM | HEIGHT: 75 IN | BODY MASS INDEX: 26.14 KG/M2 | TEMPERATURE: 99.1 F | WEIGHT: 210.2 LBS | DIASTOLIC BLOOD PRESSURE: 80 MMHG | OXYGEN SATURATION: 95 % | SYSTOLIC BLOOD PRESSURE: 128 MMHG

## 2020-07-04 LAB
ALBUMIN SERPL-MCNC: 3.8 G/DL (ref 3.5–5.2)
ALP BLD-CCNC: 75 U/L (ref 40–129)
ALT SERPL-CCNC: 57 U/L (ref 0–40)
ANION GAP SERPL CALCULATED.3IONS-SCNC: 15 MMOL/L (ref 7–16)
AST SERPL-CCNC: 86 U/L (ref 0–39)
BASOPHILS ABSOLUTE: 0.07 E9/L (ref 0–0.2)
BASOPHILS RELATIVE PERCENT: 1.3 % (ref 0–2)
BILIRUB SERPL-MCNC: 1.2 MG/DL (ref 0–1.2)
BUN BLDV-MCNC: 9 MG/DL (ref 6–20)
CALCIUM SERPL-MCNC: 9 MG/DL (ref 8.6–10.2)
CHLORIDE BLD-SCNC: 103 MMOL/L (ref 98–107)
CO2: 19 MMOL/L (ref 22–29)
CREAT SERPL-MCNC: 0.8 MG/DL (ref 0.7–1.2)
EOSINOPHILS ABSOLUTE: 0.12 E9/L (ref 0.05–0.5)
EOSINOPHILS RELATIVE PERCENT: 2.2 % (ref 0–6)
GFR AFRICAN AMERICAN: >60
GFR NON-AFRICAN AMERICAN: >60 ML/MIN/1.73
GLUCOSE BLD-MCNC: 107 MG/DL (ref 74–99)
HCT VFR BLD CALC: 43.6 % (ref 37–54)
HEMOGLOBIN: 15 G/DL (ref 12.5–16.5)
IMMATURE GRANULOCYTES #: 0.02 E9/L
IMMATURE GRANULOCYTES %: 0.4 % (ref 0–5)
LYMPHOCYTES ABSOLUTE: 1.91 E9/L (ref 1.5–4)
LYMPHOCYTES RELATIVE PERCENT: 34.9 % (ref 20–42)
MCH RBC QN AUTO: 32.9 PG (ref 26–35)
MCHC RBC AUTO-ENTMCNC: 34.4 % (ref 32–34.5)
MCV RBC AUTO: 95.6 FL (ref 80–99.9)
MONOCYTES ABSOLUTE: 0.4 E9/L (ref 0.1–0.95)
MONOCYTES RELATIVE PERCENT: 7.3 % (ref 2–12)
NEUTROPHILS ABSOLUTE: 2.95 E9/L (ref 1.8–7.3)
NEUTROPHILS RELATIVE PERCENT: 53.9 % (ref 43–80)
PDW BLD-RTO: 12.7 FL (ref 11.5–15)
PLATELET # BLD: 121 E9/L (ref 130–450)
PMV BLD AUTO: 10.1 FL (ref 7–12)
POTASSIUM SERPL-SCNC: 3.5 MMOL/L (ref 3.5–5)
RBC # BLD: 4.56 E12/L (ref 3.8–5.8)
SODIUM BLD-SCNC: 137 MMOL/L (ref 132–146)
TOTAL PROTEIN: 7.6 G/DL (ref 6.4–8.3)
WBC # BLD: 5.5 E9/L (ref 4.5–11.5)

## 2020-07-04 PROCEDURE — 6370000000 HC RX 637 (ALT 250 FOR IP): Performed by: INTERNAL MEDICINE

## 2020-07-04 PROCEDURE — 36415 COLL VENOUS BLD VENIPUNCTURE: CPT

## 2020-07-04 PROCEDURE — APPSS45 APP SPLIT SHARED TIME 31-45 MINUTES: Performed by: PHYSICIAN ASSISTANT

## 2020-07-04 PROCEDURE — 6370000000 HC RX 637 (ALT 250 FOR IP): Performed by: PHYSICIAN ASSISTANT

## 2020-07-04 PROCEDURE — 6370000000 HC RX 637 (ALT 250 FOR IP): Performed by: NURSE PRACTITIONER

## 2020-07-04 PROCEDURE — 80053 COMPREHEN METABOLIC PANEL: CPT

## 2020-07-04 PROCEDURE — 85025 COMPLETE CBC W/AUTO DIFF WBC: CPT

## 2020-07-04 PROCEDURE — 2580000003 HC RX 258: Performed by: EMERGENCY MEDICINE

## 2020-07-04 PROCEDURE — 99233 SBSQ HOSP IP/OBS HIGH 50: CPT | Performed by: INTERNAL MEDICINE

## 2020-07-04 RX ORDER — GABAPENTIN 100 MG/1
100 CAPSULE ORAL 3 TIMES DAILY
Qty: 90 CAPSULE | Refills: 0 | Status: SHIPPED | OUTPATIENT
Start: 2020-07-04 | End: 2021-01-01 | Stop reason: ALTCHOICE

## 2020-07-04 RX ORDER — METOPROLOL SUCCINATE 25 MG/1
75 TABLET, EXTENDED RELEASE ORAL 2 TIMES DAILY
Qty: 60 TABLET | Refills: 0 | Status: SHIPPED | OUTPATIENT
Start: 2020-07-04 | End: 2020-07-04 | Stop reason: HOSPADM

## 2020-07-04 RX ORDER — POTASSIUM CHLORIDE 20 MEQ/1
20 TABLET, EXTENDED RELEASE ORAL ONCE
Status: COMPLETED | OUTPATIENT
Start: 2020-07-04 | End: 2020-07-04

## 2020-07-04 RX ORDER — METOPROLOL SUCCINATE 100 MG/1
100 TABLET, EXTENDED RELEASE ORAL 2 TIMES DAILY
Qty: 30 TABLET | Refills: 3 | Status: ON HOLD | OUTPATIENT
Start: 2020-07-04 | End: 2021-01-01 | Stop reason: HOSPADM

## 2020-07-04 RX ORDER — ASPIRIN 81 MG/1
81 TABLET, CHEWABLE ORAL DAILY
Qty: 30 TABLET | Refills: 3 | COMMUNITY
Start: 2020-07-05 | End: 2021-01-01 | Stop reason: ALTCHOICE

## 2020-07-04 RX ORDER — METOPROLOL SUCCINATE 25 MG/1
25 TABLET, EXTENDED RELEASE ORAL ONCE
Status: COMPLETED | OUTPATIENT
Start: 2020-07-04 | End: 2020-07-04

## 2020-07-04 RX ORDER — AMIODARONE HYDROCHLORIDE 200 MG/1
200 TABLET ORAL 2 TIMES DAILY
Qty: 60 TABLET | Refills: 0 | Status: SHIPPED | OUTPATIENT
Start: 2020-07-04 | End: 2021-01-01 | Stop reason: ALTCHOICE

## 2020-07-04 RX ORDER — METOPROLOL SUCCINATE 100 MG/1
100 TABLET, EXTENDED RELEASE ORAL 2 TIMES DAILY
Status: DISCONTINUED | OUTPATIENT
Start: 2020-07-04 | End: 2020-07-04 | Stop reason: HOSPADM

## 2020-07-04 RX ADMIN — GABAPENTIN 100 MG: 100 CAPSULE ORAL at 13:51

## 2020-07-04 RX ADMIN — METOPROLOL SUCCINATE 25 MG: 25 TABLET, EXTENDED RELEASE ORAL at 13:51

## 2020-07-04 RX ADMIN — ASPIRIN 81 MG 81 MG: 81 TABLET ORAL at 08:41

## 2020-07-04 RX ADMIN — AMIODARONE HYDROCHLORIDE 200 MG: 200 TABLET ORAL at 08:42

## 2020-07-04 RX ADMIN — SODIUM CHLORIDE, PRESERVATIVE FREE 10 ML: 5 INJECTION INTRAVENOUS at 08:42

## 2020-07-04 RX ADMIN — GABAPENTIN 100 MG: 100 CAPSULE ORAL at 08:41

## 2020-07-04 RX ADMIN — METOPROLOL SUCCINATE 75 MG: 50 TABLET, EXTENDED RELEASE ORAL at 08:41

## 2020-07-04 RX ADMIN — PANTOPRAZOLE SODIUM 40 MG: 40 TABLET, DELAYED RELEASE ORAL at 05:53

## 2020-07-04 RX ADMIN — POTASSIUM CHLORIDE 20 MEQ: 1500 TABLET, EXTENDED RELEASE ORAL at 13:51

## 2020-07-04 ASSESSMENT — PAIN SCALES - GENERAL: PAINLEVEL_OUTOF10: 0

## 2020-07-04 NOTE — PROGRESS NOTES
Department of Family Practice  Adult Daily Progress Note      SUBJECTIVE  MARTINE IS SEEN IN FOLLOW UP TODAY ON [facility]. HE IS OFF THE CARDIZEM DRIP. HE HAS BEEN STARTED ON TOPROL-XL AND WILL BE ON XARELTO FOR AT LEAST A MONTH.     OBJECTIVE    Physical  VITALS:  /79   Pulse 71   Temp 99.2 °F (37.3 °C) (Oral)   Resp 18   Ht 6' 3\" (1.905 m)   Wt 210 lb 3.2 oz (95.3 kg)   SpO2 95%   BMI 26.27 kg/m²   CONSTITUTIONAL:  awake, alert, cooperative, no apparent distress, and appears stated age  LUNGS:  No increased work of breathing, good air exchange, clear to auscultation bilaterally, no crackles or wheezing  CARDIOVASCULAR:  Normal apical impulse, regular rate and rhythm, normal S1 and S2, no S3 or S4, and no murmur noted  ABDOMEN:  No scars, normal bowel sounds, soft, non-distended, non-tender, no masses palpated, no hepatosplenomegally  Data  CBC with Differential:    Lab Results   Component Value Date    WBC 11.4 07/01/2020    RBC 5.05 07/01/2020    HGB 16.6 07/01/2020    HCT 47.1 07/01/2020     07/01/2020    MCV 93.3 07/01/2020    MCH 32.9 07/01/2020    MCHC 35.2 07/01/2020    RDW 12.7 07/01/2020    METASPCT 0.9 01/17/2019    LYMPHOPCT 15.4 07/01/2020    MONOPCT 5.5 07/01/2020    BASOPCT 1.1 07/01/2020    MONOSABS 0.62 07/01/2020    LYMPHSABS 1.75 07/01/2020    EOSABS 0.00 07/01/2020    BASOSABS 0.13 07/01/2020     BMP:    Lab Results   Component Value Date     07/01/2020    K 3.7 07/01/2020    CL 93 07/01/2020    CO2 19 07/01/2020    BUN 5 07/01/2020    LABALBU 4.6 07/01/2020    CREATININE 0.8 07/01/2020    CALCIUM 10.0 07/01/2020    GFRAA >60 07/01/2020    LABGLOM >60 07/01/2020    GLUCOSE 127 07/01/2020     Current Medications  Scheduled Meds:   metoprolol succinate  75 mg Oral BID    rivaroxaban  20 mg Oral Daily    amiodarone  200 mg Oral BID    traZODone  50 mg Oral Nightly    gabapentin  100 mg Oral TID    pantoprazole  40 mg Oral QAM AC    aspirin  81 mg Oral Daily    sodium chloride flush  10 mL Intravenous 2 times per day     Continuous Infusions:  PRN Meds:.acetaminophen, perflutren lipid microspheres, calcium carbonate, LORazepam, sodium chloride flush    ASSESSMENT AND PLAN      Principal Problem:    Atrial fibrillation with RVR (HCC)  Active Problems:    Anxiety    Depression    ETOH abuse    Alcohol withdrawal syndrome without complication (HCC)  Resolved Problems:    * No resolved hospital problems. *      POSSIBLE DISCHARGE BY TOMORROW IF MEDICATIONS EFFECTIVE AND TOLERATED.

## 2020-07-04 NOTE — DISCHARGE SUMMARY
Internal Medicine Progress Note     [place]     Julissa Winchester., F.A.C.OJerardoIJerardo David D.O., JEREMYODARÍO Solomon, MSN, APRN, NP-C  Russell Humphrey, MSN, APRN-CNP       Internal Medicine  Discharge Summary    NAME: Delona Dandy  :  1975  MRN:  [id number]  [facility] [address], DO  ADMITTED: 2020      DISCHARGED: 20    ADMITTING PHYSICIAN: Allie Parry DO    CONSULTANT(S):   IP CONSULT TO SOCIAL WORK  IP CONSULT TO INTERNAL MEDICINE  IP CONSULT TO INTERNAL MEDICINE  IP CONSULT TO CARDIOLOGY     ADMITTING DIAGNOSIS:   Atrial fibrillation with RVR ([facility] [address] [I48.91]     DISCHARGE DIAGNOSES:   · Paroxysmal atrial fibrillation with rapid ventricular response  · Alcohol abuse  · Anxiety/depression  · Gastroesophageal reflux disease  · Vitamin D deficiency    BRIEF HISTORY OF PRESENT ILLNESS:   This is a 38year old  male who presented to the ED with a complaint of feeling his heart racing and the\"shakes\". States that he was trying to detox himself from alcohol. . Drinks a 12 pack daily for approximately 8-10 days and will then stop for 7 days or so. Has been drinking for a long time. Has been in rehab and was instructed to join AA and to \"get a higher power\". States that his grandmother  3 weeks ago which was a trigger to drink. States life stressors like not seeing his son for a prolonged areas of time is also a trigger to drink. States he drinks due to depresion, boredom and/or anxiety, States he tried to stop drinking at approximately 8PM the day before yesterday, went to bed, and woke up with \"shakes\". States that he felt his heart racing and felt poorly in general. Has a history of atrial fibrillation and in the ED was found to be in atrial fibrillation with RVR. No chest pain.      LABS[de-identified]  Lab Results   Component Value Date    WBC 5.5 2020    HGB 15.0 2020    HCT 43.6 07/04/2020     (L) 07/04/2020     07/04/2020    K 3.5 07/04/2020     07/04/2020    CREATININE 0.8 07/04/2020    BUN 9 07/04/2020    CO2 19 (L) 07/04/2020    GLUCOSE 107 (H) 07/04/2020    ALT 57 (H) 07/04/2020    AST 86 (H) 07/04/2020    INR 1.4 05/11/2020     Lab Results   Component Value Date    INR 1.4 05/11/2020    INR 1.4 01/20/2019    INR 1.5 01/17/2019    PROTIME 16.0 (H) 05/11/2020    PROTIME 15.8 (H) 01/20/2019    PROTIME 16.2 (H) 01/17/2019      Lab Results   Component Value Date    TSH 1.690 07/02/2020     Lab Results   Component Value Date    TRIG 253 (H) 05/08/2018     Lab Results   Component Value Date    HDL 5 05/08/2018     Lab Results   Component Value Date    LDLCALC 98 05/08/2018     Lab Results   Component Value Date    LABA1C 5.1 05/08/2018       IMAGING:  Xr Chest Portable    Result Date: 7/1/2020  EXAMINATION: ONE XRAY VIEW OF THE CHEST 7/1/2020 7:47 pm COMPARISON: 05/11/2020 HISTORY: ORDERING SYSTEM PROVIDED HISTORY: vomiting TECHNOLOGIST PROVIDED HISTORY: Reason for exam:->vomiting FINDINGS: The lungs are without acute focal process. There is no effusion or pneumothorax. The cardiomediastinal silhouette is stable. The osseous structures are stable. No acute process. HOSPITAL COURSE:   Abel Fox did well throughout the course of his hospitalization. He was initially admitted with symptoms of heart racing and palpitations related to alcohol detoxification. He was found to have atrial fibrillation with rapid ventricular response related to this. Medication therapy was instituted for treatment of the underlying alcohol withdrawal and is improved. The poorly controlled atrial fibrillation rate continued however. Cardiology was consulted and multiple medication adjustments were required. He was loaded with digoxin IV. Clonidine was discontinued as well as Cardizem in favor of amiodarone and Toprol XL. Rate control significantly improved.   Echocardiogram performed showed preserved systolic functioning with no significant valvular abnormalities, diastolic function could not be assessed in the setting of atrial fibrillation however. Therapeutic anticoagulation and aspirin were instituted as, if the patient remains in atrial fibrillation, he may benefit from cardioversion as an outpatient which will be determined to follow-up with cardiology. Overall, his condition improved to the point where he can be discharged home with close outpatient follow-up. BRIEF PHYSICAL EXAMINATION AND LABORATORIES ON DAY OF DISCHARGE:  VITALS:  /80   Pulse 89   Temp 99.1 °F (37.3 °C) (Oral)   Resp 18   Ht 6' 3\" (1.905 m)   Wt 210 lb 3.2 oz (95.3 kg)   SpO2 95%   BMI 26.27 kg/m²     HEENT:  PERRLA. EOMI. Sclera clear. Buccal mucosa moist.    Neck:  Supple. Trachea midline. No thyromegaly. No JVD. No bruits. Heart: Irregular rhythm with controlled ventricular response,  bpm at rest.  Subtle systolic murmur appreciated. Lungs:  Symmetrical. Clear to auscultation bilaterally. No wheezes. No rhonchi. No rales. Abdomen: Soft. Non-tender. Non-distended. Bowel sounds positive. No organomegaly or masses. No pain on palpation    Extremities:  Peripheral pulses present. No peripheral edema. No ulcers. Neurologic:  Alert x 3. No focal deficit. Cranial nerves grossly intact. Skin:  No petechia. No hemorrhage. No wounds. DISPOSITION:  The patient's condition is good. At this time the patient is without objective evidence of an acute process requiring continuing hospitalization or inpatient management. They are stable for discharge with outpatient follow-up. I have spoken with the patient and discussed the results of the current hospitalization, in addition to providing specific details for the plan of care and counseling regarding the diagnosis and prognosis.   The plan has been discussed in detail and they are aware of the specific conditions for emergent return, as well as the importance of follow-up. Their questions are answered at this time and they are agreeable with the plan for discharge to home    DISCHARGE MEDICATIONS:    Natividad Carpenter   Home Medication Instructions XVD:[id number]    Printed on:07/04/20 7668   Medication Information                      amiodarone (CORDARONE) 200 MG tablet  Take 1 tablet by mouth 2 times daily             aspirin 81 MG chewable tablet  Take 1 tablet by mouth daily             ergocalciferol (ERGOCALCIFEROL) 86436 units capsule  Take 1 capsule by mouth once a week             folic acid (FOLVITE) 1 MG tablet  Take 1 tablet by mouth daily             gabapentin (NEURONTIN) 100 MG capsule  Take 1 capsule by mouth 3 times daily for 30 days. lactulose (CHRONULAC) 10 GM/15ML solution  Take 30 mLs by mouth 2 times daily             metoprolol succinate (TOPROL XL) 100 MG extended release tablet  Take 1 tablet by mouth 2 times daily             Multiple Vitamins-Minerals (THERAPEUTIC MULTIVITAMIN-MINERALS) tablet  Take 1 tablet by mouth daily             pantoprazole (PROTONIX) 40 MG tablet  Take 1 tablet by mouth every morning (before breakfast)             rivaroxaban (XARELTO) 20 MG TABS tablet  Take 1 tablet by mouth daily             thiamine 100 MG tablet  Take 1 tablet by mouth daily                 FOLLOW UP/INSTRUCTIONS:  · This patient is instructed to follow-up with his primary care physician. · Patient is instructed to follow-up with the consults listed above as directed by them. · he is instructed to resume home medications and take new medications as indicated in the list above. · If the patient has a recurrence of symptoms, he is instructed to go to the ED. Preparing for this patient's discharge, including paperwork, orders, instructions, and meeting with patient did require > 40 minutes.     EILEEN Mayen CNP     7/4/2020  10:29 AM     I have discussed the case with the cardiovascular team.  Further adjustments in his cardiac medications have been made. We stressed the absolute need for alcohol cessation as an outpatient and he voiced understanding and agreement. 2D echocardiographic results have been reviewed. Anticoagulation therapy has been employed. He understands the importance of close outpatient follow-up with the cardiovascular team. I saw, examined, and discussed the patient with the resident/nurse practioner and agree with their assessment and plan.     Electronically signed by Rizwana Winchester DO on 7/4/2020 at 10:59 AM

## 2020-07-04 NOTE — PROGRESS NOTES
Inpatient Cardiology Progress Note    Date of Service: 7/4/2020    Reason for Follow-up: Atrial fibrillation with RVR    SUBJECTIVE:     Patient seen and examined at bedside this morning. He is anxious for discharge. He states he has been ambulating without difficulty. He denies any complaints of chest discomfort, palpitations, shortness of breath, dizziness, near-syncope or syncope. Review of telemetry reveals heart rate remaining in the low 100s to 110s in atrial fibrillation. HOME MEDICATIONS:  Prior to Admission medications    Medication Sig Start Date End Date Taking? Authorizing Provider   aspirin 81 MG chewable tablet Take 1 tablet by mouth daily 7/5/20  Yes EILEEN Chandler - CNP   amiodarone (CORDARONE) 200 MG tablet Take 1 tablet by mouth 2 times daily 7/4/20 8/3/20 Yes Evert Oneill APRN - CNP   rivaroxaban (XARELTO) 20 MG TABS tablet Take 1 tablet by mouth daily 7/4/20  Yes Evert Oneill APRN - CNP   gabapentin (NEURONTIN) 100 MG capsule Take 1 capsule by mouth 3 times daily for 30 days.  7/4/20 8/3/20 Yes Evert Oneill APRN - CNP   metoprolol succinate (TOPROL XL) 25 MG extended release tablet Take 3 tablets by mouth 2 times daily 7/4/20  Yes Evert Oneill APRN - CNP   folic acid (FOLVITE) 1 MG tablet Take 1 tablet by mouth daily 5/11/20  Yes Brittni Garcia DO   Multiple Vitamins-Minerals (THERAPEUTIC MULTIVITAMIN-MINERALS) tablet Take 1 tablet by mouth daily 1/24/19  Yes Amy Shore MD   thiamine 100 MG tablet Take 1 tablet by mouth daily 5/11/20   Brittni Garcia DO   lactulose [facility]) 10 GM/15ML solution Take 30 mLs by mouth 2 times daily 1/23/19   Amy Shore MD   pantoprazole (PROTONIX) 40 MG tablet Take 1 tablet by mouth every morning (before breakfast) 1/24/19   Amy Shore MD   ergocalciferol (ERGOCALCIFEROL) 88206 units capsule Take 1 capsule by mouth once a week 1/29/19   Amy Shore MD       CURRENT MEDICATIONS:      Current Facility-Administered Medications:     metoprolol succinate (TOPROL XL) extended release tablet 75 mg, 75 mg, Oral, BID, Abbi Harris PA-C, 75 mg at 07/04/20 0841    rivaroxaban (XARELTO) tablet 20 mg, 20 mg, Oral, Daily, [facility] BRIA Harris, 20 mg at 07/03/20 1807    amiodarone (CORDARONE) tablet 200 mg, 200 mg, Oral, BID, Judy Ruby MD, 200 mg at 07/04/20 0842    acetaminophen (TYLENOL) tablet 650 mg, 650 mg, Oral, Q6H PRN, Tristan Díaz, APRN - CNP, 650 mg at 07/03/20 1638    traZODone (DESYREL) tablet 50 mg, 50 mg, Oral, Nightly, Anthony Mercer DO, 50 mg at 07/03/20 2021    perflutren lipid microspheres (DEFINITY) injection 1.65 mg, 1.5 mL, Intravenous, ONCE PRN, Danay Mercer DO    gabapentin (NEURONTIN) capsule 100 mg, 100 mg, Oral, TID, Anthony Mercer DO, 100 mg at 07/04/20 0841    pantoprazole (PROTONIX) tablet 40 mg, 40 mg, Oral, QAM AC, Tristan Díaz, APRN - CNP, 40 mg at 07/04/20 0553    aspirin chewable tablet 81 mg, 81 mg, Oral, Daily, Danay Mercer, DO, 81 mg at 07/04/20 0841    calcium carbonate (TUMS) chewable tablet 500 mg, 500 mg, Oral, TID PRN, Danay Mercer, DO, 500 mg at 07/02/20 1629    LORazepam (ATIVAN) tablet 1 mg, 1 mg, Oral, Q4H PRN, Danay Mercer, DO    sodium chloride flush 0.9 % injection 10 mL, 10 mL, Intravenous, 2 times per day, Mariam Rodriguez DO, 10 mL at 07/04/20 0110    sodium chloride flush 0.9 % injection 10 mL, 10 mL, Intravenous, PRN, Rose Jeans, DO      ALLERGIES:  Patient has no known allergies. REVIEW OF SYSTEMS:     · Constitutional: Denies fevers, chills, night sweats, and generalized fatigue. · HEENT: Denies headaches, nose bleeds, rhinorrhea, sore throat. Denies blurred vision. Denies dysphagia, odynophagia. · Musculoskeletal: Denies falls, pain to BLE with ambulation. Denies muscle weakness. · Neurological: Denies dizziness and lightheadedness, numbness and tingling. Denies focal neurological deficits.   · Cardiovascular: Denies chest pain, palpitations, diaphoresis. Denies syncope. Denies PND, orthopnea, peripheral edema. · Respiratory: Denies shortness of breath at rest or with exertion. Denies cough, hemoptysis. · Gastrointestinal: Denies abdominal pain, nausea/vomiting, diarrhea and constipation, black/bloody, and tarry stools. PHYSICAL EXAM:   /80   Pulse 89   Temp 99.1 °F (37.3 °C) (Oral)   Resp 18   Ht 6' 3\" (1.905 m)   Wt 210 lb 3.2 oz (95.3 kg)   SpO2 95%   BMI 26.27 kg/m²   CONST:  Well developed, well nourished WM who appears stated age. Awake, alert, cooperative, no apparent distress. HEENT:   Head- Normocephalic, atraumatic. Eyes- Conjunctivae pink, anicteric. Throat- Oral mucosa pink and moist.  Neck-  No stridor, trachea midline, no jugular venous distention. CHEST: Chest symmetrical and non-tender to palpation. No accessory muscle use or intercostal retractions. RESPIRATORY: Lung sounds - clear throughout fields. No wheezing, rales or rhonchi. CARDIOVASCULAR:     No carotid bruit. Heart Inspection- shows no noted pulsations. Heart Palpation- no heaves or thrills. Heart Ausculation- Irregularly irregular rhythm with fast rate at times, no apparent murmur. No s3, s4 or rub. PV: Trace lower extremity edema. No varicosities. Pedal pulses palpable, no clubbing or cyanosis. ABDOMEN: Soft, non-tender to light palpation. Bowel sounds present. MS: Good muscle strength and tone. No atrophy or abnormal movements. SKIN: Warm and dry. NEURO / PSYCH: Oriented to person, place and time. Speech clear and appropriate. Follows all commands. Pleasant affect. DATA:    Telemetry: Atrial fibrillation with HR in the low 100s-110s    Diagnostic:  All diagnostic testing and lab work thus far this admission reviewed in detail.     CXR 07/01/2020:   Impression: No acute process.     2D TTE 07/2020 (Dr. Que Kern):  Summary: Left ventricular size is grossly normal.   Mild left ventricular concentric hypertrophy noted.   Ejection fraction is measured at 63%.   No evidence of left ventricular mass or thrombus noted.   No regional wall motion abnormalities seen.   The left atrium is borderline dilated.   Interatrial septum appears intact.   No evidence of thrombus within left atrium.   Mildly dilated right ventricle.    No evidence of a thrombus in the right ventricle.   Mildly enlarged right atrium size.   Physiologic and/or trace mitral regurgitation is present.   No evidence of mitral valve stenosis.   Physiologic and/or trace tricuspid regurgitation.  RVSP is 24.11 mmHg.   Irregular rhythm--atrial fibrillation. Intake/Output Summary (Last 24 hours) at 7/4/2020 0950  Last data filed at 7/4/2020 0830  Gross per 24 hour   Intake 840 ml   Output 0 ml   Net 840 ml       Labs:   CBC:   Recent Labs     07/04/20  0601   WBC 5.5   HGB 15.0   HCT 43.6   *     BMP:   Recent Labs     07/01/20 2003 07/04/20  0601    137   K 3.7 3.5   CO2 19* 19*   BUN 5* 9   CREATININE 0.8 0.8   LABGLOM >60 >60   CALCIUM 10.0 9.0     Mag:   Recent Labs     07/03/20  1352   MG 1.7     TSH:   Recent Labs     07/02/20  1154   TSH 1.690     HgA1c:   Lab Results   Component Value Date    LABA1C 5.1 05/08/2018     CARDIAC ENZYMES:  Recent Labs     07/01/20 2003   TROPONINI <0.01     FASTING LIPID PANEL:  Lab Results   Component Value Date    CHOL 154 05/08/2018    HDL 5 05/08/2018    LDLCALC 98 05/08/2018    TRIG 253 05/08/2018     LIVER PROFILE:  Recent Labs     07/01/20 2003 07/04/20  0601   AST 95* 86*   ALT 63* 57*   LABALBU 4.6 3.8         ASSESSMENT:  1. Paroxysmal atrial fibrillation: With RVR on admission. In setting of alcohol abuse. Heart rate better controlled since admission but still remains tachycardic at times, will adjust medications accordingly. CHADsVASc score of 1 given HTN, Xarelto initiated with re-evaluation outpatient -- patient was agreeable. TSH within normal limits. 2.  Alcohol abuse.   History of esophageal varices with hematemesis per chart review. 3.  Elevated LFTs. 4.  Hypertension: Well-controlled. 5.  Depression. Anxiety. 6.  GERD/gastritis. RECOMMENDATIONS:  1. Continue Xarelto for stroke prophylaxis. 2. Titrate Toprol-Xl to 100 mg twice daily. Give an extra dose of 25 mg Toprol-XL x 1 dose this AM.  3. Counseled again on importance of alcohol cessation. 4. Continue Amiodarone 200 mg BID for a total of 14 days, then decrease to 200 mg once daily per Dr. Vidal Murphy. He should follow up outpatient in the next 2-3 weeks for re-evaluation of amiodarone therapy and possible need for DCCV.   5. Okay for discharge from a cardiology standpoint if HR remains < 110 per Dr. Vidal Murphy. 6. Supplement potassium. 7. Call with any questions or concerns. The above case and recommendations have been discussed with Dr. Vidal Murphy. OSMAR Gonzalez [facility]    Electronically signed by Elle Zelaya PA-C on 7/4/2020 at 9:50 AM     I independently performed an evaluation on the patient. I have reviewed the above documentation completed by the advance practitioner. Please see my additional contributions to the HPI, physical exam, assessment and medical decision making. Moises Cabrera D.O.   Cardiologist  Cardiology, [facility]

## 2020-07-06 LAB
ALBUMIN SERPL-MCNC: 3.5 G/DL (ref 3.5–4.7)
ALPHA-1-GLOBULIN: 0.2 G/DL (ref 0.2–0.4)
ALPHA-2-GLOBULIN: 0.6 G/FL (ref 0.5–1)
BETA GLOBULIN: 1.3 G/DL (ref 0.8–1.3)
ELECTROPHORESIS: ABNORMAL
GAMMA GLOBULIN: 2.1 G/DL (ref 0.7–1.6)
TOTAL PROTEIN: 7.7 G/DL (ref 6.4–8.3)

## 2020-11-03 PROBLEM — I48.91 ATRIAL FIBRILLATION (HCC): Status: RESOLVED | Noted: 2020-11-03 | Resolved: 2020-11-03

## 2021-01-01 ENCOUNTER — ANESTHESIA (OUTPATIENT)
Dept: ENDOSCOPY | Age: 46
DRG: 280 | End: 2021-01-01
Payer: MEDICAID

## 2021-01-01 ENCOUNTER — APPOINTMENT (OUTPATIENT)
Dept: ULTRASOUND IMAGING | Age: 46
DRG: 279 | End: 2021-01-01
Payer: MEDICAID

## 2021-01-01 ENCOUNTER — APPOINTMENT (OUTPATIENT)
Dept: ULTRASOUND IMAGING | Age: 46
DRG: 280 | End: 2021-01-01
Payer: MEDICAID

## 2021-01-01 ENCOUNTER — ANESTHESIA (OUTPATIENT)
Dept: ENDOSCOPY | Age: 46
End: 2021-01-01

## 2021-01-01 ENCOUNTER — APPOINTMENT (OUTPATIENT)
Dept: GENERAL RADIOLOGY | Age: 46
DRG: 279 | End: 2021-01-01
Payer: MEDICAID

## 2021-01-01 ENCOUNTER — APPOINTMENT (OUTPATIENT)
Dept: GENERAL RADIOLOGY | Age: 46
DRG: 280 | End: 2021-01-01
Payer: MEDICAID

## 2021-01-01 ENCOUNTER — APPOINTMENT (OUTPATIENT)
Dept: INTERVENTIONAL RADIOLOGY/VASCULAR | Age: 46
DRG: 279 | End: 2021-01-01
Payer: MEDICAID

## 2021-01-01 ENCOUNTER — ANESTHESIA EVENT (OUTPATIENT)
Dept: ENDOSCOPY | Age: 46
End: 2021-01-01

## 2021-01-01 ENCOUNTER — HOSPITAL ENCOUNTER (INPATIENT)
Age: 46
LOS: 21 days | DRG: 279 | End: 2021-12-17
Attending: EMERGENCY MEDICINE | Admitting: INTERNAL MEDICINE
Payer: MEDICAID

## 2021-01-01 ENCOUNTER — APPOINTMENT (OUTPATIENT)
Dept: CT IMAGING | Age: 46
DRG: 279 | End: 2021-01-01
Payer: MEDICAID

## 2021-01-01 ENCOUNTER — APPOINTMENT (OUTPATIENT)
Dept: INTERVENTIONAL RADIOLOGY/VASCULAR | Age: 46
DRG: 280 | End: 2021-01-01
Payer: MEDICAID

## 2021-01-01 ENCOUNTER — HOSPITAL ENCOUNTER (INPATIENT)
Age: 46
LOS: 6 days | Discharge: HOME | DRG: 280 | End: 2021-11-22
Attending: EMERGENCY MEDICINE | Admitting: INTERNAL MEDICINE
Payer: MEDICAID

## 2021-01-01 ENCOUNTER — ANESTHESIA EVENT (OUTPATIENT)
Dept: ENDOSCOPY | Age: 46
DRG: 280 | End: 2021-01-01
Payer: MEDICAID

## 2021-01-01 VITALS
BODY MASS INDEX: 24.12 KG/M2 | HEIGHT: 75 IN | OXYGEN SATURATION: 90 % | HEART RATE: 123 BPM | SYSTOLIC BLOOD PRESSURE: 130 MMHG | WEIGHT: 194 LBS | TEMPERATURE: 98.1 F | RESPIRATION RATE: 20 BRPM | DIASTOLIC BLOOD PRESSURE: 55 MMHG

## 2021-01-01 VITALS
OXYGEN SATURATION: 96 % | BODY MASS INDEX: 26.73 KG/M2 | RESPIRATION RATE: 16 BRPM | HEART RATE: 86 BPM | DIASTOLIC BLOOD PRESSURE: 70 MMHG | TEMPERATURE: 97.7 F | WEIGHT: 215 LBS | SYSTOLIC BLOOD PRESSURE: 116 MMHG | HEIGHT: 75 IN

## 2021-01-01 DIAGNOSIS — N17.9 ACUTE KIDNEY INJURY (HCC): ICD-10-CM

## 2021-01-01 DIAGNOSIS — K76.7 HEPATORENAL SYNDROME (HCC): Primary | ICD-10-CM

## 2021-01-01 DIAGNOSIS — K72.10 CHRONIC LIVER FAILURE WITHOUT HEPATIC COMA (HCC): ICD-10-CM

## 2021-01-01 DIAGNOSIS — K70.31 ALCOHOLIC CIRRHOSIS OF LIVER WITH ASCITES (HCC): ICD-10-CM

## 2021-01-01 DIAGNOSIS — E80.6 BILIRUBINEMIA: Primary | ICD-10-CM

## 2021-01-01 LAB
ABO/RH: NORMAL
ACANTHOCYTES: ABNORMAL
ACETAMINOPHEN LEVEL: <5 MCG/ML (ref 10–30)
ALBUMIN FLUID: 1.1 G/DL
ALBUMIN FLUID: <0.2 G/DL
ALBUMIN SERPL-MCNC: 1.7 G/DL (ref 3.5–5.2)
ALBUMIN SERPL-MCNC: 1.8 G/DL (ref 3.5–5.2)
ALBUMIN SERPL-MCNC: 1.9 G/DL (ref 3.5–5.2)
ALBUMIN SERPL-MCNC: 1.9 G/DL (ref 3.5–5.2)
ALBUMIN SERPL-MCNC: 2 G/DL (ref 3.5–5.2)
ALBUMIN SERPL-MCNC: 2.1 G/DL (ref 3.5–5.2)
ALBUMIN SERPL-MCNC: 2.2 G/DL (ref 3.5–5.2)
ALBUMIN SERPL-MCNC: 2.2 G/DL (ref 3.5–5.2)
ALBUMIN SERPL-MCNC: 2.3 G/DL (ref 3.5–5.2)
ALBUMIN SERPL-MCNC: 2.3 G/DL (ref 3.5–5.2)
ALBUMIN SERPL-MCNC: 2.8 G/DL (ref 3.5–5.2)
ALBUMIN SERPL-MCNC: 3.1 G/DL (ref 3.5–5.2)
ALBUMIN SERPL-MCNC: 3.2 G/DL (ref 3.5–5.2)
ALBUMIN SERPL-MCNC: 3.3 G/DL (ref 3.5–5.2)
ALBUMIN SERPL-MCNC: 3.4 G/DL (ref 3.5–5.2)
ALBUMIN SERPL-MCNC: 3.4 G/DL (ref 3.5–5.2)
ALBUMIN SERPL-MCNC: 3.5 G/DL (ref 3.5–5.2)
ALBUMIN SERPL-MCNC: 3.6 G/DL (ref 3.5–5.2)
ALBUMIN SERPL-MCNC: 3.6 G/DL (ref 3.5–5.2)
ALBUMIN SERPL-MCNC: 3.7 G/DL (ref 3.5–5.2)
ALBUMIN SERPL-MCNC: 3.8 G/DL (ref 3.5–5.2)
ALBUMIN SERPL-MCNC: 3.9 G/DL (ref 3.5–5.2)
ALP BLD-CCNC: 127 U/L (ref 40–129)
ALP BLD-CCNC: 133 U/L (ref 40–129)
ALP BLD-CCNC: 138 U/L (ref 40–129)
ALP BLD-CCNC: 143 U/L (ref 40–129)
ALP BLD-CCNC: 152 U/L (ref 40–129)
ALP BLD-CCNC: 154 U/L (ref 40–129)
ALP BLD-CCNC: 172 U/L (ref 40–129)
ALP BLD-CCNC: 181 U/L (ref 40–129)
ALP BLD-CCNC: 208 U/L (ref 40–129)
ALP BLD-CCNC: 37 U/L (ref 40–129)
ALP BLD-CCNC: 45 U/L (ref 40–129)
ALP BLD-CCNC: 46 U/L (ref 40–129)
ALP BLD-CCNC: 46 U/L (ref 40–129)
ALP BLD-CCNC: 47 U/L (ref 40–129)
ALP BLD-CCNC: 49 U/L (ref 40–129)
ALP BLD-CCNC: 50 U/L (ref 40–129)
ALP BLD-CCNC: 50 U/L (ref 40–129)
ALP BLD-CCNC: 51 U/L (ref 40–129)
ALP BLD-CCNC: 52 U/L (ref 40–129)
ALP BLD-CCNC: 52 U/L (ref 40–129)
ALP BLD-CCNC: 54 U/L (ref 40–129)
ALP BLD-CCNC: 54 U/L (ref 40–129)
ALP BLD-CCNC: 57 U/L (ref 40–129)
ALP BLD-CCNC: 57 U/L (ref 40–129)
ALP BLD-CCNC: 62 U/L (ref 40–129)
ALP BLD-CCNC: 66 U/L (ref 40–129)
ALP BLD-CCNC: 66 U/L (ref 40–129)
ALP BLD-CCNC: 68 U/L (ref 40–129)
ALP BLD-CCNC: 88 U/L (ref 40–129)
ALP BLD-CCNC: 89 U/L (ref 40–129)
ALPHA-1 ANTITRYPSIN: 66 MG/DL (ref 90–200)
ALT SERPL-CCNC: 100 U/L (ref 0–40)
ALT SERPL-CCNC: 104 U/L (ref 0–40)
ALT SERPL-CCNC: 118 U/L (ref 0–40)
ALT SERPL-CCNC: 126 U/L (ref 0–40)
ALT SERPL-CCNC: 141 U/L (ref 0–40)
ALT SERPL-CCNC: 142 U/L (ref 0–40)
ALT SERPL-CCNC: 144 U/L (ref 0–40)
ALT SERPL-CCNC: 18 U/L (ref 0–40)
ALT SERPL-CCNC: 18 U/L (ref 0–40)
ALT SERPL-CCNC: 19 U/L (ref 0–40)
ALT SERPL-CCNC: 20 U/L (ref 0–40)
ALT SERPL-CCNC: 21 U/L (ref 0–40)
ALT SERPL-CCNC: 24 U/L (ref 0–40)
ALT SERPL-CCNC: 24 U/L (ref 0–40)
ALT SERPL-CCNC: 25 U/L (ref 0–40)
ALT SERPL-CCNC: 27 U/L (ref 0–40)
ALT SERPL-CCNC: 28 U/L (ref 0–40)
ALT SERPL-CCNC: 34 U/L (ref 0–40)
ALT SERPL-CCNC: 43 U/L (ref 0–40)
ALT SERPL-CCNC: 47 U/L (ref 0–40)
ALT SERPL-CCNC: 77 U/L (ref 0–40)
ALT SERPL-CCNC: 85 U/L (ref 0–40)
ALT SERPL-CCNC: 87 U/L (ref 0–40)
ALT SERPL-CCNC: 91 U/L (ref 0–40)
ALT SERPL-CCNC: 98 U/L (ref 0–40)
AMMONIA: 102 UMOL/L (ref 16–60)
AMMONIA: 103 UMOL/L (ref 16–60)
AMMONIA: 110 UMOL/L (ref 16–60)
AMMONIA: 127 UMOL/L (ref 16–60)
AMMONIA: 43 UMOL/L (ref 16–60)
AMMONIA: 91 UMOL/L (ref 16–60)
AMMONIA: 98 UMOL/L (ref 16–60)
AMPHETAMINE SCREEN, URINE: NOT DETECTED
AMYLASE FLUID: 16 U/L
ANION GAP SERPL CALCULATED.3IONS-SCNC: 10 MMOL/L (ref 7–16)
ANION GAP SERPL CALCULATED.3IONS-SCNC: 11 MMOL/L (ref 7–16)
ANION GAP SERPL CALCULATED.3IONS-SCNC: 12 MMOL/L (ref 7–16)
ANION GAP SERPL CALCULATED.3IONS-SCNC: 13 MMOL/L (ref 7–16)
ANION GAP SERPL CALCULATED.3IONS-SCNC: 14 MMOL/L (ref 7–16)
ANION GAP SERPL CALCULATED.3IONS-SCNC: 15 MMOL/L (ref 7–16)
ANION GAP SERPL CALCULATED.3IONS-SCNC: 16 MMOL/L (ref 7–16)
ANION GAP SERPL CALCULATED.3IONS-SCNC: 18 MMOL/L (ref 7–16)
ANION GAP SERPL CALCULATED.3IONS-SCNC: 23 MMOL/L (ref 7–16)
ANION GAP SERPL CALCULATED.3IONS-SCNC: 9 MMOL/L (ref 7–16)
ANION GAP SERPL CALCULATED.3IONS-SCNC: 9 MMOL/L (ref 7–16)
ANISOCYTOSIS: ABNORMAL
ANTI-NUCLEAR ANTIBODY (ANA): NEGATIVE
ANTIBODY SCREEN: NORMAL
APPEARANCE FLUID: CLEAR
APPEARANCE FLUID: CLEAR
AST SERPL-CCNC: 123 U/L (ref 0–39)
AST SERPL-CCNC: 128 U/L (ref 0–39)
AST SERPL-CCNC: 139 U/L (ref 0–39)
AST SERPL-CCNC: 148 U/L (ref 0–39)
AST SERPL-CCNC: 167 U/L (ref 0–39)
AST SERPL-CCNC: 171 U/L (ref 0–39)
AST SERPL-CCNC: 210 U/L (ref 0–39)
AST SERPL-CCNC: 233 U/L (ref 0–39)
AST SERPL-CCNC: 233 U/L (ref 0–39)
AST SERPL-CCNC: 253 U/L (ref 0–39)
AST SERPL-CCNC: 281 U/L (ref 0–39)
AST SERPL-CCNC: 284 U/L (ref 0–39)
AST SERPL-CCNC: 38 U/L (ref 0–39)
AST SERPL-CCNC: 41 U/L (ref 0–39)
AST SERPL-CCNC: 42 U/L (ref 0–39)
AST SERPL-CCNC: 43 U/L (ref 0–39)
AST SERPL-CCNC: 45 U/L (ref 0–39)
AST SERPL-CCNC: 47 U/L (ref 0–39)
AST SERPL-CCNC: 47 U/L (ref 0–39)
AST SERPL-CCNC: 50 U/L (ref 0–39)
AST SERPL-CCNC: 55 U/L (ref 0–39)
AST SERPL-CCNC: 55 U/L (ref 0–39)
AST SERPL-CCNC: 62 U/L (ref 0–39)
AST SERPL-CCNC: 62 U/L (ref 0–39)
AST SERPL-CCNC: 65 U/L (ref 0–39)
AST SERPL-CCNC: 66 U/L (ref 0–39)
AST SERPL-CCNC: 67 U/L (ref 0–39)
AST SERPL-CCNC: 81 U/L (ref 0–39)
B.E.: -11.3 MMOL/L (ref -3–3)
BACTERIA: ABNORMAL /HPF
BACTERIA: NORMAL /HPF
BARBITURATE SCREEN URINE: NOT DETECTED
BASOPHILS ABSOLUTE: 0 E9/L (ref 0–0.2)
BASOPHILS ABSOLUTE: 0.01 E9/L (ref 0–0.2)
BASOPHILS ABSOLUTE: 0.04 E9/L (ref 0–0.2)
BASOPHILS ABSOLUTE: 0.05 E9/L (ref 0–0.2)
BASOPHILS ABSOLUTE: 0.06 E9/L (ref 0–0.2)
BASOPHILS ABSOLUTE: 0.06 E9/L (ref 0–0.2)
BASOPHILS ABSOLUTE: 0.07 E9/L (ref 0–0.2)
BASOPHILS ABSOLUTE: 0.08 E9/L (ref 0–0.2)
BASOPHILS ABSOLUTE: 0.09 E9/L (ref 0–0.2)
BASOPHILS ABSOLUTE: 0.1 E9/L (ref 0–0.2)
BASOPHILS ABSOLUTE: 0.1 E9/L (ref 0–0.2)
BASOPHILS ABSOLUTE: 0.14 E9/L (ref 0–0.2)
BASOPHILS ABSOLUTE: 0.15 E9/L (ref 0–0.2)
BASOPHILS ABSOLUTE: 0.16 E9/L (ref 0–0.2)
BASOPHILS RELATIVE PERCENT: 0 % (ref 0–2)
BASOPHILS RELATIVE PERCENT: 0 % (ref 0–2)
BASOPHILS RELATIVE PERCENT: 0.1 % (ref 0–2)
BASOPHILS RELATIVE PERCENT: 0.5 % (ref 0–2)
BASOPHILS RELATIVE PERCENT: 0.6 % (ref 0–2)
BASOPHILS RELATIVE PERCENT: 0.7 % (ref 0–2)
BASOPHILS RELATIVE PERCENT: 0.7 % (ref 0–2)
BASOPHILS RELATIVE PERCENT: 0.8 % (ref 0–2)
BASOPHILS RELATIVE PERCENT: 0.9 % (ref 0–2)
BASOPHILS RELATIVE PERCENT: 1 % (ref 0–2)
BASOPHILS RELATIVE PERCENT: 1.1 % (ref 0–2)
BASOPHILS RELATIVE PERCENT: 1.1 % (ref 0–2)
BASOPHILS RELATIVE PERCENT: 1.3 % (ref 0–2)
BASOPHILS RELATIVE PERCENT: 1.7 % (ref 0–2)
BASOPHILS RELATIVE PERCENT: 1.8 % (ref 0–2)
BASOPHILS RELATIVE PERCENT: 1.8 % (ref 0–2)
BASOPHILS RELATIVE PERCENT: 2.6 % (ref 0–2)
BASOPHILS RELATIVE PERCENT: 2.6 % (ref 0–2)
BENZODIAZEPINE SCREEN, URINE: NOT DETECTED
BILIRUB SERPL-MCNC: 19.7 MG/DL (ref 0–1.2)
BILIRUB SERPL-MCNC: 20.1 MG/DL (ref 0–1.2)
BILIRUB SERPL-MCNC: 20.2 MG/DL (ref 0–1.2)
BILIRUB SERPL-MCNC: 20.5 MG/DL (ref 0–1.2)
BILIRUB SERPL-MCNC: 21.3 MG/DL (ref 0–1.2)
BILIRUB SERPL-MCNC: 21.5 MG/DL (ref 0–1.2)
BILIRUB SERPL-MCNC: 22.2 MG/DL (ref 0–1.2)
BILIRUB SERPL-MCNC: 22.5 MG/DL (ref 0–1.2)
BILIRUB SERPL-MCNC: 22.8 MG/DL (ref 0–1.2)
BILIRUB SERPL-MCNC: 22.9 MG/DL (ref 0–1.2)
BILIRUB SERPL-MCNC: 23.4 MG/DL (ref 0–1.2)
BILIRUB SERPL-MCNC: 23.9 MG/DL (ref 0–1.2)
BILIRUB SERPL-MCNC: 24 MG/DL (ref 0–1.2)
BILIRUB SERPL-MCNC: 24.1 MG/DL (ref 0–1.2)
BILIRUB SERPL-MCNC: 24.2 MG/DL (ref 0–1.2)
BILIRUB SERPL-MCNC: 24.2 MG/DL (ref 0–1.2)
BILIRUB SERPL-MCNC: 24.8 MG/DL (ref 0–1.2)
BILIRUB SERPL-MCNC: 26.4 MG/DL (ref 0–1.2)
BILIRUB SERPL-MCNC: 27.5 MG/DL (ref 0–1.2)
BILIRUB SERPL-MCNC: 27.7 MG/DL (ref 0–1.2)
BILIRUB SERPL-MCNC: 29 MG/DL (ref 0–1.2)
BILIRUB SERPL-MCNC: 29.4 MG/DL (ref 0–1.2)
BILIRUB SERPL-MCNC: 30.2 MG/DL (ref 0–1.2)
BILIRUB SERPL-MCNC: 32.9 MG/DL (ref 0–1.2)
BILIRUB SERPL-MCNC: 38.5 MG/DL (ref 0–1.2)
BILIRUB SERPL-MCNC: 41 MG/DL (ref 0–1.2)
BILIRUB SERPL-MCNC: 44.5 MG/DL (ref 0–1.2)
BILIRUB SERPL-MCNC: 47.5 MG/DL (ref 0–1.2)
BILIRUB SERPL-MCNC: 50.1 MG/DL (ref 0–1.2)
BILIRUB SERPL-MCNC: 55.7 MG/DL (ref 0–1.2)
BILIRUBIN DIRECT: 11.3 MG/DL (ref 0–0.3)
BILIRUBIN DIRECT: 15.3 MG/DL (ref 0–0.3)
BILIRUBIN DIRECT: 18.3 MG/DL (ref 0–0.3)
BILIRUBIN URINE: ABNORMAL
BILIRUBIN, INDIRECT: 15.1 MG/DL (ref 0–1)
BILIRUBIN, INDIRECT: 5.2 MG/DL (ref 0–1)
BLOOD BANK DISPENSE STATUS: NORMAL
BLOOD BANK PRODUCT CODE: NORMAL
BLOOD CULTURE, ROUTINE: NORMAL
BLOOD CULTURE, ROUTINE: NORMAL
BLOOD, URINE: ABNORMAL
BODY FLUID CULTURE, STERILE: NORMAL
BODY FLUID CULTURE, STERILE: NORMAL
BPU ID: NORMAL
BUN BLDV-MCNC: 10 MG/DL (ref 6–20)
BUN BLDV-MCNC: 11 MG/DL (ref 6–20)
BUN BLDV-MCNC: 12 MG/DL (ref 6–20)
BUN BLDV-MCNC: 13 MG/DL (ref 6–20)
BUN BLDV-MCNC: 14 MG/DL (ref 6–20)
BUN BLDV-MCNC: 16 MG/DL (ref 6–20)
BUN BLDV-MCNC: 18 MG/DL (ref 6–20)
BUN BLDV-MCNC: 20 MG/DL (ref 6–20)
BUN BLDV-MCNC: 24 MG/DL (ref 6–20)
BUN BLDV-MCNC: 25 MG/DL (ref 6–20)
BUN BLDV-MCNC: 27 MG/DL (ref 6–20)
BUN BLDV-MCNC: 29 MG/DL (ref 6–20)
BUN BLDV-MCNC: 30 MG/DL (ref 6–20)
BUN BLDV-MCNC: 33 MG/DL (ref 6–20)
BUN BLDV-MCNC: 33 MG/DL (ref 6–20)
BUN BLDV-MCNC: 36 MG/DL (ref 6–20)
BUN BLDV-MCNC: 36 MG/DL (ref 6–20)
BUN BLDV-MCNC: 38 MG/DL (ref 6–20)
BUN BLDV-MCNC: 41 MG/DL (ref 6–20)
BUN BLDV-MCNC: 44 MG/DL (ref 6–20)
BUN BLDV-MCNC: 45 MG/DL (ref 6–20)
BURR CELLS: ABNORMAL
CALCIUM IONIZED: 1.12 MMOL/L (ref 1.15–1.33)
CALCIUM SERPL-MCNC: 10.1 MG/DL (ref 8.6–10.2)
CALCIUM SERPL-MCNC: 7.1 MG/DL (ref 8.6–10.2)
CALCIUM SERPL-MCNC: 7.2 MG/DL (ref 8.6–10.2)
CALCIUM SERPL-MCNC: 7.5 MG/DL (ref 8.6–10.2)
CALCIUM SERPL-MCNC: 7.6 MG/DL (ref 8.6–10.2)
CALCIUM SERPL-MCNC: 7.6 MG/DL (ref 8.6–10.2)
CALCIUM SERPL-MCNC: 7.7 MG/DL (ref 8.6–10.2)
CALCIUM SERPL-MCNC: 7.7 MG/DL (ref 8.6–10.2)
CALCIUM SERPL-MCNC: 7.8 MG/DL (ref 8.6–10.2)
CALCIUM SERPL-MCNC: 7.9 MG/DL (ref 8.6–10.2)
CALCIUM SERPL-MCNC: 8 MG/DL (ref 8.6–10.2)
CALCIUM SERPL-MCNC: 8.1 MG/DL (ref 8.6–10.2)
CALCIUM SERPL-MCNC: 8.2 MG/DL (ref 8.6–10.2)
CALCIUM SERPL-MCNC: 8.3 MG/DL (ref 8.6–10.2)
CALCIUM SERPL-MCNC: 8.4 MG/DL (ref 8.6–10.2)
CALCIUM SERPL-MCNC: 8.5 MG/DL (ref 8.6–10.2)
CALCIUM SERPL-MCNC: 8.6 MG/DL (ref 8.6–10.2)
CALCIUM SERPL-MCNC: 8.7 MG/DL (ref 8.6–10.2)
CALCIUM SERPL-MCNC: 8.8 MG/DL (ref 8.6–10.2)
CALCIUM SERPL-MCNC: 9 MG/DL (ref 8.6–10.2)
CALCIUM SERPL-MCNC: 9.3 MG/DL (ref 8.6–10.2)
CALCIUM SERPL-MCNC: 9.4 MG/DL (ref 8.6–10.2)
CALCIUM SERPL-MCNC: 9.5 MG/DL (ref 8.6–10.2)
CALCIUM SERPL-MCNC: 9.6 MG/DL (ref 8.6–10.2)
CALCIUM SERPL-MCNC: 9.7 MG/DL (ref 8.6–10.2)
CANNABINOID SCREEN URINE: NOT DETECTED
CELL COUNT FLUID TYPE: NORMAL
CELL COUNT FLUID TYPE: NORMAL
CHLORIDE BLD-SCNC: 100 MMOL/L (ref 98–107)
CHLORIDE BLD-SCNC: 100 MMOL/L (ref 98–107)
CHLORIDE BLD-SCNC: 101 MMOL/L (ref 98–107)
CHLORIDE BLD-SCNC: 101 MMOL/L (ref 98–107)
CHLORIDE BLD-SCNC: 103 MMOL/L (ref 98–107)
CHLORIDE BLD-SCNC: 104 MMOL/L (ref 98–107)
CHLORIDE BLD-SCNC: 105 MMOL/L (ref 98–107)
CHLORIDE BLD-SCNC: 106 MMOL/L (ref 98–107)
CHLORIDE BLD-SCNC: 107 MMOL/L (ref 98–107)
CHLORIDE BLD-SCNC: 107 MMOL/L (ref 98–107)
CHLORIDE BLD-SCNC: 108 MMOL/L (ref 98–107)
CHLORIDE BLD-SCNC: 108 MMOL/L (ref 98–107)
CHLORIDE BLD-SCNC: 110 MMOL/L (ref 98–107)
CHLORIDE BLD-SCNC: 111 MMOL/L (ref 98–107)
CHLORIDE BLD-SCNC: 90 MMOL/L (ref 98–107)
CHLORIDE BLD-SCNC: 91 MMOL/L (ref 98–107)
CHLORIDE BLD-SCNC: 92 MMOL/L (ref 98–107)
CHLORIDE BLD-SCNC: 93 MMOL/L (ref 98–107)
CHLORIDE BLD-SCNC: 94 MMOL/L (ref 98–107)
CHLORIDE BLD-SCNC: 94 MMOL/L (ref 98–107)
CHLORIDE BLD-SCNC: 95 MMOL/L (ref 98–107)
CHLORIDE BLD-SCNC: 95 MMOL/L (ref 98–107)
CHLORIDE BLD-SCNC: 96 MMOL/L (ref 98–107)
CHLORIDE BLD-SCNC: 97 MMOL/L (ref 98–107)
CHLORIDE BLD-SCNC: 98 MMOL/L (ref 98–107)
CHLORIDE BLD-SCNC: 99 MMOL/L (ref 98–107)
CHLORIDE URINE RANDOM: 56 MMOL/L
CHOLESTEROL, TOTAL: 31 MG/DL (ref 0–199)
CLARITY: ABNORMAL
CLARITY: CLEAR
CO2: 10 MMOL/L (ref 22–29)
CO2: 13 MMOL/L (ref 22–29)
CO2: 14 MMOL/L (ref 22–29)
CO2: 16 MMOL/L (ref 22–29)
CO2: 17 MMOL/L (ref 22–29)
CO2: 18 MMOL/L (ref 22–29)
CO2: 19 MMOL/L (ref 22–29)
CO2: 20 MMOL/L (ref 22–29)
CO2: 21 MMOL/L (ref 22–29)
CO2: 23 MMOL/L (ref 22–29)
CO2: 23 MMOL/L (ref 22–29)
CO2: 26 MMOL/L (ref 22–29)
CO2: 26 MMOL/L (ref 22–29)
CO2: 27 MMOL/L (ref 22–29)
CO2: 27 MMOL/L (ref 22–29)
CO2: 28 MMOL/L (ref 22–29)
CO2: 30 MMOL/L (ref 22–29)
CO2: 9 MMOL/L (ref 22–29)
COARSE CASTS, UA: ABNORMAL /LPF (ref 0–2)
COCAINE METABOLITE SCREEN URINE: NOT DETECTED
COHB: 0.4 % (ref 0–1.5)
COLOR FLUID: YELLOW
COLOR FLUID: YELLOW
COLOR: ABNORMAL
COMMENT: ABNORMAL
CREAT SERPL-MCNC: 0.7 MG/DL (ref 0.7–1.2)
CREAT SERPL-MCNC: 0.8 MG/DL (ref 0.7–1.2)
CREAT SERPL-MCNC: 0.9 MG/DL (ref 0.7–1.2)
CREAT SERPL-MCNC: 1 MG/DL (ref 0.7–1.2)
CREAT SERPL-MCNC: 1.1 MG/DL (ref 0.7–1.2)
CREAT SERPL-MCNC: 1.2 MG/DL (ref 0.7–1.2)
CREAT SERPL-MCNC: 1.4 MG/DL (ref 0.7–1.2)
CREAT SERPL-MCNC: 1.4 MG/DL (ref 0.7–1.2)
CREAT SERPL-MCNC: 1.6 MG/DL (ref 0.7–1.2)
CREAT SERPL-MCNC: 1.7 MG/DL (ref 0.7–1.2)
CREAT SERPL-MCNC: 1.9 MG/DL (ref 0.7–1.2)
CREAT SERPL-MCNC: 2.2 MG/DL (ref 0.7–1.2)
CREAT SERPL-MCNC: 2.3 MG/DL (ref 0.7–1.2)
CREAT SERPL-MCNC: 2.4 MG/DL (ref 0.7–1.2)
CREAT SERPL-MCNC: 2.5 MG/DL (ref 0.7–1.2)
CREAT SERPL-MCNC: 2.9 MG/DL (ref 0.7–1.2)
CREAT SERPL-MCNC: 3.2 MG/DL (ref 0.7–1.2)
CREAT SERPL-MCNC: 3.2 MG/DL (ref 0.7–1.2)
CREAT SERPL-MCNC: 3.4 MG/DL (ref 0.7–1.2)
CREAT SERPL-MCNC: 3.8 MG/DL (ref 0.7–1.2)
CREAT SERPL-MCNC: 3.9 MG/DL (ref 0.7–1.2)
CREATININE URINE: 85 MG/DL (ref 40–278)
CRITICAL: ABNORMAL
CULTURE, BLOOD 2: NORMAL
DATE ANALYZED: ABNORMAL
DATE OF COLLECTION: ABNORMAL
DESCRIPTION BLOOD BANK: NORMAL
EKG ATRIAL RATE: 100 BPM
EKG P AXIS: 45 DEGREES
EKG P-R INTERVAL: 148 MS
EKG Q-T INTERVAL: 388 MS
EKG QRS DURATION: 116 MS
EKG QTC CALCULATION (BAZETT): 500 MS
EKG R AXIS: 30 DEGREES
EKG T AXIS: 44 DEGREES
EKG VENTRICULAR RATE: 100 BPM
EOSINOPHIL, URINE: 0 % (ref 0–1)
EOSINOPHILS ABSOLUTE: 0 E9/L (ref 0.05–0.5)
EOSINOPHILS ABSOLUTE: 0 E9/L (ref 0.05–0.5)
EOSINOPHILS ABSOLUTE: 0.01 E9/L (ref 0.05–0.5)
EOSINOPHILS ABSOLUTE: 0.01 E9/L (ref 0.05–0.5)
EOSINOPHILS ABSOLUTE: 0.09 E9/L (ref 0.05–0.5)
EOSINOPHILS ABSOLUTE: 0.09 E9/L (ref 0.05–0.5)
EOSINOPHILS ABSOLUTE: 0.1 E9/L (ref 0.05–0.5)
EOSINOPHILS ABSOLUTE: 0.11 E9/L (ref 0.05–0.5)
EOSINOPHILS ABSOLUTE: 0.15 E9/L (ref 0.05–0.5)
EOSINOPHILS ABSOLUTE: 0.16 E9/L (ref 0.05–0.5)
EOSINOPHILS ABSOLUTE: 0.18 E9/L (ref 0.05–0.5)
EOSINOPHILS ABSOLUTE: 0.19 E9/L (ref 0.05–0.5)
EOSINOPHILS ABSOLUTE: 0.2 E9/L (ref 0.05–0.5)
EOSINOPHILS ABSOLUTE: 0.23 E9/L (ref 0.05–0.5)
EOSINOPHILS ABSOLUTE: 0.24 E9/L (ref 0.05–0.5)
EOSINOPHILS ABSOLUTE: 0.24 E9/L (ref 0.05–0.5)
EOSINOPHILS ABSOLUTE: 0.26 E9/L (ref 0.05–0.5)
EOSINOPHILS ABSOLUTE: 0.27 E9/L (ref 0.05–0.5)
EOSINOPHILS ABSOLUTE: 0.32 E9/L (ref 0.05–0.5)
EOSINOPHILS ABSOLUTE: 0.33 E9/L (ref 0.05–0.5)
EOSINOPHILS ABSOLUTE: 0.42 E9/L (ref 0.05–0.5)
EOSINOPHILS ABSOLUTE: 0.42 E9/L (ref 0.05–0.5)
EOSINOPHILS ABSOLUTE: 0.44 E9/L (ref 0.05–0.5)
EOSINOPHILS ABSOLUTE: 0.47 E9/L (ref 0.05–0.5)
EOSINOPHILS ABSOLUTE: 0.62 E9/L (ref 0.05–0.5)
EOSINOPHILS ABSOLUTE: 0.67 E9/L (ref 0.05–0.5)
EOSINOPHILS RELATIVE PERCENT: 0 % (ref 0–6)
EOSINOPHILS RELATIVE PERCENT: 0 % (ref 0–6)
EOSINOPHILS RELATIVE PERCENT: 0.1 % (ref 0–6)
EOSINOPHILS RELATIVE PERCENT: 0.1 % (ref 0–6)
EOSINOPHILS RELATIVE PERCENT: 1.7 % (ref 0–6)
EOSINOPHILS RELATIVE PERCENT: 2 % (ref 0–6)
EOSINOPHILS RELATIVE PERCENT: 2.1 % (ref 0–6)
EOSINOPHILS RELATIVE PERCENT: 2.2 % (ref 0–6)
EOSINOPHILS RELATIVE PERCENT: 2.3 % (ref 0–6)
EOSINOPHILS RELATIVE PERCENT: 2.6 % (ref 0–6)
EOSINOPHILS RELATIVE PERCENT: 2.7 % (ref 0–6)
EOSINOPHILS RELATIVE PERCENT: 2.7 % (ref 0–6)
EOSINOPHILS RELATIVE PERCENT: 3 % (ref 0–6)
EOSINOPHILS RELATIVE PERCENT: 3.3 % (ref 0–6)
EOSINOPHILS RELATIVE PERCENT: 3.5 % (ref 0–6)
EOSINOPHILS RELATIVE PERCENT: 5.2 % (ref 0–6)
EOSINOPHILS RELATIVE PERCENT: 6.1 % (ref 0–6)
EOSINOPHILS RELATIVE PERCENT: 6.3 % (ref 0–6)
EOSINOPHILS RELATIVE PERCENT: 7 % (ref 0–6)
EOSINOPHILS RELATIVE PERCENT: 7 % (ref 0–6)
EOSINOPHILS RELATIVE PERCENT: 8 % (ref 0–6)
EOSINOPHILS RELATIVE PERCENT: 8.7 % (ref 0–6)
EPITHELIAL CELLS, UA: ABNORMAL /HPF
ETHANOL: <10 MG/DL (ref 0–0.08)
FENTANYL SCREEN, URINE: NOT DETECTED
FIBRINOGEN: 197 MG/DL (ref 225–540)
FIBRINOGEN: <80 MG/DL (ref 225–540)
FLUID TYPE: NORMAL
FLUID TYPE: NORMAL
FOLATE: 10.1 NG/ML (ref 4.8–24.2)
FOLATE: 14.8 NG/ML (ref 4.8–24.2)
GFR AFRICAN AMERICAN: 20
GFR AFRICAN AMERICAN: 21
GFR AFRICAN AMERICAN: 24
GFR AFRICAN AMERICAN: 26
GFR AFRICAN AMERICAN: 26
GFR AFRICAN AMERICAN: 29
GFR AFRICAN AMERICAN: 34
GFR AFRICAN AMERICAN: 36
GFR AFRICAN AMERICAN: 37
GFR AFRICAN AMERICAN: 39
GFR AFRICAN AMERICAN: 47
GFR AFRICAN AMERICAN: 53
GFR AFRICAN AMERICAN: 57
GFR AFRICAN AMERICAN: >60
GFR NON-AFRICAN AMERICAN: 17 ML/MIN/1.73
GFR NON-AFRICAN AMERICAN: 17 ML/MIN/1.73
GFR NON-AFRICAN AMERICAN: 20 ML/MIN/1.73
GFR NON-AFRICAN AMERICAN: 21 ML/MIN/1.73
GFR NON-AFRICAN AMERICAN: 21 ML/MIN/1.73
GFR NON-AFRICAN AMERICAN: 24 ML/MIN/1.73
GFR NON-AFRICAN AMERICAN: 28 ML/MIN/1.73
GFR NON-AFRICAN AMERICAN: 29 ML/MIN/1.73
GFR NON-AFRICAN AMERICAN: 31 ML/MIN/1.73
GFR NON-AFRICAN AMERICAN: 33 ML/MIN/1.73
GFR NON-AFRICAN AMERICAN: 39 ML/MIN/1.73
GFR NON-AFRICAN AMERICAN: 44 ML/MIN/1.73
GFR NON-AFRICAN AMERICAN: 47 ML/MIN/1.73
GFR NON-AFRICAN AMERICAN: 55 ML/MIN/1.73
GFR NON-AFRICAN AMERICAN: 55 ML/MIN/1.73
GFR NON-AFRICAN AMERICAN: >60 ML/MIN/1.73
GLUCOSE BLD-MCNC: 100 MG/DL (ref 74–99)
GLUCOSE BLD-MCNC: 104 MG/DL (ref 74–99)
GLUCOSE BLD-MCNC: 104 MG/DL (ref 74–99)
GLUCOSE BLD-MCNC: 111 MG/DL (ref 74–99)
GLUCOSE BLD-MCNC: 114 MG/DL (ref 74–99)
GLUCOSE BLD-MCNC: 114 MG/DL (ref 74–99)
GLUCOSE BLD-MCNC: 115 MG/DL (ref 74–99)
GLUCOSE BLD-MCNC: 117 MG/DL (ref 74–99)
GLUCOSE BLD-MCNC: 117 MG/DL (ref 74–99)
GLUCOSE BLD-MCNC: 118 MG/DL (ref 74–99)
GLUCOSE BLD-MCNC: 120 MG/DL (ref 74–99)
GLUCOSE BLD-MCNC: 122 MG/DL (ref 74–99)
GLUCOSE BLD-MCNC: 122 MG/DL (ref 74–99)
GLUCOSE BLD-MCNC: 127 MG/DL (ref 74–99)
GLUCOSE BLD-MCNC: 129 MG/DL (ref 74–99)
GLUCOSE BLD-MCNC: 131 MG/DL (ref 74–99)
GLUCOSE BLD-MCNC: 142 MG/DL (ref 74–99)
GLUCOSE BLD-MCNC: 147 MG/DL (ref 74–99)
GLUCOSE BLD-MCNC: 163 MG/DL (ref 74–99)
GLUCOSE BLD-MCNC: 198 MG/DL (ref 74–99)
GLUCOSE BLD-MCNC: 285 MG/DL (ref 74–99)
GLUCOSE BLD-MCNC: 64 MG/DL (ref 74–99)
GLUCOSE BLD-MCNC: 70 MG/DL (ref 74–99)
GLUCOSE BLD-MCNC: 73 MG/DL (ref 74–99)
GLUCOSE BLD-MCNC: 76 MG/DL (ref 74–99)
GLUCOSE BLD-MCNC: 80 MG/DL (ref 74–99)
GLUCOSE BLD-MCNC: 83 MG/DL (ref 74–99)
GLUCOSE BLD-MCNC: 88 MG/DL (ref 74–99)
GLUCOSE BLD-MCNC: 89 MG/DL (ref 74–99)
GLUCOSE BLD-MCNC: 89 MG/DL (ref 74–99)
GLUCOSE BLD-MCNC: 91 MG/DL (ref 74–99)
GLUCOSE BLD-MCNC: 93 MG/DL (ref 74–99)
GLUCOSE BLD-MCNC: 97 MG/DL (ref 74–99)
GLUCOSE BLD-MCNC: 98 MG/DL (ref 74–99)
GLUCOSE URINE: 100 MG/DL
GLUCOSE URINE: NEGATIVE MG/DL
GLUCOSE, FLUID: 113 MG/DL
GRAM STAIN RESULT: NORMAL
GRAM STAIN RESULT: NORMAL
HAPTOGLOBIN: <10 MG/DL (ref 30–200)
HAV IGM SER IA-ACNC: NORMAL
HAV IGM SER IA-ACNC: NORMAL
HCO3: 10.3 MMOL/L (ref 22–26)
HCT VFR BLD CALC: 17.5 % (ref 37–54)
HCT VFR BLD CALC: 18 % (ref 37–54)
HCT VFR BLD CALC: 18 % (ref 37–54)
HCT VFR BLD CALC: 18.9 % (ref 37–54)
HCT VFR BLD CALC: 18.9 % (ref 37–54)
HCT VFR BLD CALC: 19.1 % (ref 37–54)
HCT VFR BLD CALC: 19.3 % (ref 37–54)
HCT VFR BLD CALC: 19.5 % (ref 37–54)
HCT VFR BLD CALC: 19.5 % (ref 37–54)
HCT VFR BLD CALC: 20.2 % (ref 37–54)
HCT VFR BLD CALC: 20.3 % (ref 37–54)
HCT VFR BLD CALC: 21.1 % (ref 37–54)
HCT VFR BLD CALC: 21.2 % (ref 37–54)
HCT VFR BLD CALC: 21.3 % (ref 37–54)
HCT VFR BLD CALC: 22 % (ref 37–54)
HCT VFR BLD CALC: 22.3 % (ref 37–54)
HCT VFR BLD CALC: 22.4 % (ref 37–54)
HCT VFR BLD CALC: 22.6 % (ref 37–54)
HCT VFR BLD CALC: 23.2 % (ref 37–54)
HCT VFR BLD CALC: 23.3 % (ref 37–54)
HCT VFR BLD CALC: 23.5 % (ref 37–54)
HCT VFR BLD CALC: 25.4 % (ref 37–54)
HCT VFR BLD CALC: 27.5 % (ref 37–54)
HCT VFR BLD CALC: 29.7 % (ref 37–54)
HCT VFR BLD CALC: 30.7 % (ref 37–54)
HCT VFR BLD CALC: 31.5 % (ref 37–54)
HCT VFR BLD CALC: 32.1 % (ref 37–54)
HCT VFR BLD CALC: 32.3 % (ref 37–54)
HCT VFR BLD CALC: 32.7 % (ref 37–54)
HCT VFR BLD CALC: 32.7 % (ref 37–54)
HCT VFR BLD CALC: 33.9 % (ref 37–54)
HCT VFR BLD CALC: 33.9 % (ref 37–54)
HCT VFR BLD CALC: 34.3 % (ref 37–54)
HCT VFR BLD CALC: 34.6 % (ref 37–54)
HDLC SERPL-MCNC: 11 MG/DL
HEMOGLOBIN: 10.1 G/DL (ref 12.5–16.5)
HEMOGLOBIN: 10.8 G/DL (ref 12.5–16.5)
HEMOGLOBIN: 11.1 G/DL (ref 12.5–16.5)
HEMOGLOBIN: 11.1 G/DL (ref 12.5–16.5)
HEMOGLOBIN: 11.6 G/DL (ref 12.5–16.5)
HEMOGLOBIN: 11.6 G/DL (ref 12.5–16.5)
HEMOGLOBIN: 11.7 G/DL (ref 12.5–16.5)
HEMOGLOBIN: 11.9 G/DL (ref 12.5–16.5)
HEMOGLOBIN: 12.1 G/DL (ref 12.5–16.5)
HEMOGLOBIN: 12.2 G/DL (ref 12.5–16.5)
HEMOGLOBIN: 12.4 G/DL (ref 12.5–16.5)
HEMOGLOBIN: 12.4 G/DL (ref 12.5–16.5)
HEMOGLOBIN: 6 G/DL (ref 12.5–16.5)
HEMOGLOBIN: 6.2 G/DL (ref 12.5–16.5)
HEMOGLOBIN: 6.4 G/DL (ref 12.5–16.5)
HEMOGLOBIN: 6.6 G/DL (ref 12.5–16.5)
HEMOGLOBIN: 6.6 G/DL (ref 12.5–16.5)
HEMOGLOBIN: 6.7 G/DL (ref 12.5–16.5)
HEMOGLOBIN: 6.8 G/DL (ref 12.5–16.5)
HEMOGLOBIN: 6.9 G/DL (ref 12.5–16.5)
HEMOGLOBIN: 7.1 G/DL (ref 12.5–16.5)
HEMOGLOBIN: 7.1 G/DL (ref 12.5–16.5)
HEMOGLOBIN: 7.2 G/DL (ref 12.5–16.5)
HEMOGLOBIN: 7.5 G/DL (ref 12.5–16.5)
HEMOGLOBIN: 7.5 G/DL (ref 12.5–16.5)
HEMOGLOBIN: 7.8 G/DL (ref 12.5–16.5)
HEMOGLOBIN: 7.9 G/DL (ref 12.5–16.5)
HEMOGLOBIN: 8 G/DL (ref 12.5–16.5)
HEMOGLOBIN: 8.1 G/DL (ref 12.5–16.5)
HEMOGLOBIN: 8.2 G/DL (ref 12.5–16.5)
HEMOGLOBIN: 9.2 G/DL (ref 12.5–16.5)
HEPATITIS B CORE IGM ANTIBODY: NORMAL
HEPATITIS B CORE IGM ANTIBODY: NORMAL
HEPATITIS B CORE TOTAL ANTIBODY: NONREACTIVE
HEPATITIS B SURFACE ANTIGEN INTERPRETATION: NORMAL
HEPATITIS B SURFACE ANTIGEN INTERPRETATION: NORMAL
HEPATITIS C ANTIBODY INTERPRETATION: NORMAL
HEPATITIS C ANTIBODY INTERPRETATION: NORMAL
HHB: 2.2 % (ref 0–5)
HYPOCHROMIA: ABNORMAL
IGG: 2217 MG/DL (ref 700–1600)
IMMATURE GRANULOCYTES #: 0.02 E9/L
IMMATURE GRANULOCYTES #: 0.03 E9/L
IMMATURE GRANULOCYTES #: 0.1 E9/L
IMMATURE GRANULOCYTES #: 0.13 E9/L
IMMATURE GRANULOCYTES #: 0.31 E9/L
IMMATURE GRANULOCYTES %: 0.3 % (ref 0–5)
IMMATURE GRANULOCYTES %: 0.7 % (ref 0–5)
IMMATURE GRANULOCYTES %: 0.9 % (ref 0–5)
IMMATURE GRANULOCYTES %: 1.2 % (ref 0–5)
IMMATURE GRANULOCYTES %: 2.8 % (ref 0–5)
IMMATURE RETIC FRACT: 21.4 % (ref 2.3–13.4)
IMMATURE RETIC FRACT: 26.1 % (ref 2.3–13.4)
INR BLD: 2.9
INR BLD: 3.4
INR BLD: 3.5
INR BLD: 3.9
INR BLD: 4.1
INR BLD: 4.2
INR BLD: 4.2
INR BLD: 4.3
INR BLD: 4.7
INR BLD: 4.8
INR BLD: 4.9
INR BLD: 5
INR BLD: 5
INR BLD: 5.2
INR BLD: 5.3
INR BLD: 5.7
INR BLD: 5.9
INR BLD: 6.2
INR BLD: 6.3
INR BLD: 6.6
INR BLD: 6.8
INR BLD: 7.1
INR BLD: 7.3
INR BLD: 7.8
INR BLD: 8.3
INR BLD: 8.7
INR BLD: 9.2
KETONES, URINE: ABNORMAL MG/DL
KETONES, URINE: ABNORMAL MG/DL
KETONES, URINE: NEGATIVE MG/DL
KETONES, URINE: NEGATIVE MG/DL
L. PNEUMOPHILA SEROGP 1 UR AG: NORMAL
LAB: ABNORMAL
LACTATE DEHYDROGENASE: 228 U/L (ref 135–225)
LACTIC ACID, SEPSIS: 3.1 MMOL/L (ref 0.5–1.9)
LACTIC ACID, SEPSIS: 3.4 MMOL/L (ref 0.5–1.9)
LACTIC ACID: 2 MMOL/L (ref 0.5–2.2)
LACTIC ACID: 2 MMOL/L (ref 0.5–2.2)
LACTIC ACID: 2.2 MMOL/L (ref 0.5–2.2)
LD, FLUID: 28 U/L
LDL CHOLESTEROL CALCULATED: 8 MG/DL (ref 0–99)
LEUKOCYTE ESTERASE, URINE: NEGATIVE
LIPASE: 68 U/L (ref 13–60)
LYMPHOCYTES ABSOLUTE: 0.27 E9/L (ref 1.5–4)
LYMPHOCYTES ABSOLUTE: 0.42 E9/L (ref 1.5–4)
LYMPHOCYTES ABSOLUTE: 0.42 E9/L (ref 1.5–4)
LYMPHOCYTES ABSOLUTE: 0.43 E9/L (ref 1.5–4)
LYMPHOCYTES ABSOLUTE: 0.44 E9/L (ref 1.5–4)
LYMPHOCYTES ABSOLUTE: 0.47 E9/L (ref 1.5–4)
LYMPHOCYTES ABSOLUTE: 0.62 E9/L (ref 1.5–4)
LYMPHOCYTES ABSOLUTE: 0.63 E9/L (ref 1.5–4)
LYMPHOCYTES ABSOLUTE: 0.7 E9/L (ref 1.5–4)
LYMPHOCYTES ABSOLUTE: 0.73 E9/L (ref 1.5–4)
LYMPHOCYTES ABSOLUTE: 0.82 E9/L (ref 1.5–4)
LYMPHOCYTES ABSOLUTE: 0.83 E9/L (ref 1.5–4)
LYMPHOCYTES ABSOLUTE: 0.85 E9/L (ref 1.5–4)
LYMPHOCYTES ABSOLUTE: 0.85 E9/L (ref 1.5–4)
LYMPHOCYTES ABSOLUTE: 0.88 E9/L (ref 1.5–4)
LYMPHOCYTES ABSOLUTE: 0.9 E9/L (ref 1.5–4)
LYMPHOCYTES ABSOLUTE: 0.92 E9/L (ref 1.5–4)
LYMPHOCYTES ABSOLUTE: 0.96 E9/L (ref 1.5–4)
LYMPHOCYTES ABSOLUTE: 0.99 E9/L (ref 1.5–4)
LYMPHOCYTES ABSOLUTE: 1 E9/L (ref 1.5–4)
LYMPHOCYTES ABSOLUTE: 1.1 E9/L (ref 1.5–4)
LYMPHOCYTES ABSOLUTE: 1.2 E9/L (ref 1.5–4)
LYMPHOCYTES ABSOLUTE: 1.28 E9/L (ref 1.5–4)
LYMPHOCYTES ABSOLUTE: 1.81 E9/L (ref 1.5–4)
LYMPHOCYTES ABSOLUTE: 2.06 E9/L (ref 1.5–4)
LYMPHOCYTES ABSOLUTE: 2.1 E9/L (ref 1.5–4)
LYMPHOCYTES RELATIVE PERCENT: 10.4 % (ref 20–42)
LYMPHOCYTES RELATIVE PERCENT: 11 % (ref 20–42)
LYMPHOCYTES RELATIVE PERCENT: 11.3 % (ref 20–42)
LYMPHOCYTES RELATIVE PERCENT: 11.4 % (ref 20–42)
LYMPHOCYTES RELATIVE PERCENT: 13.9 % (ref 20–42)
LYMPHOCYTES RELATIVE PERCENT: 14.8 % (ref 20–42)
LYMPHOCYTES RELATIVE PERCENT: 15.7 % (ref 20–42)
LYMPHOCYTES RELATIVE PERCENT: 16 % (ref 20–42)
LYMPHOCYTES RELATIVE PERCENT: 16.2 % (ref 20–42)
LYMPHOCYTES RELATIVE PERCENT: 17.4 % (ref 20–42)
LYMPHOCYTES RELATIVE PERCENT: 17.7 % (ref 20–42)
LYMPHOCYTES RELATIVE PERCENT: 18.9 % (ref 20–42)
LYMPHOCYTES RELATIVE PERCENT: 19.5 % (ref 20–42)
LYMPHOCYTES RELATIVE PERCENT: 21.2 % (ref 20–42)
LYMPHOCYTES RELATIVE PERCENT: 21.5 % (ref 20–42)
LYMPHOCYTES RELATIVE PERCENT: 21.7 % (ref 20–42)
LYMPHOCYTES RELATIVE PERCENT: 22.3 % (ref 20–42)
LYMPHOCYTES RELATIVE PERCENT: 28.1 % (ref 20–42)
LYMPHOCYTES RELATIVE PERCENT: 28.7 % (ref 20–42)
LYMPHOCYTES RELATIVE PERCENT: 3.4 % (ref 20–42)
LYMPHOCYTES RELATIVE PERCENT: 3.6 % (ref 20–42)
LYMPHOCYTES RELATIVE PERCENT: 4 % (ref 20–42)
LYMPHOCYTES RELATIVE PERCENT: 4.3 % (ref 20–42)
LYMPHOCYTES RELATIVE PERCENT: 5.2 % (ref 20–42)
LYMPHOCYTES RELATIVE PERCENT: 5.2 % (ref 20–42)
LYMPHOCYTES RELATIVE PERCENT: 6 % (ref 20–42)
LYMPHOCYTES RELATIVE PERCENT: 7.1 % (ref 20–42)
LYMPHOCYTES RELATIVE PERCENT: 7.8 % (ref 20–42)
Lab: ABNORMAL
Lab: NORMAL
MAGNESIUM: 1.3 MG/DL (ref 1.6–2.6)
MAGNESIUM: 1.4 MG/DL (ref 1.6–2.6)
MAGNESIUM: 1.5 MG/DL (ref 1.6–2.6)
MAGNESIUM: 1.5 MG/DL (ref 1.6–2.6)
MAGNESIUM: 1.6 MG/DL (ref 1.6–2.6)
MAGNESIUM: 1.7 MG/DL (ref 1.6–2.6)
MAGNESIUM: 1.8 MG/DL (ref 1.6–2.6)
MAGNESIUM: 2 MG/DL (ref 1.6–2.6)
MCH RBC QN AUTO: 32.5 PG (ref 26–35)
MCH RBC QN AUTO: 32.8 PG (ref 26–35)
MCH RBC QN AUTO: 32.8 PG (ref 26–35)
MCH RBC QN AUTO: 32.9 PG (ref 26–35)
MCH RBC QN AUTO: 32.9 PG (ref 26–35)
MCH RBC QN AUTO: 33.1 PG (ref 26–35)
MCH RBC QN AUTO: 33.1 PG (ref 26–35)
MCH RBC QN AUTO: 33.3 PG (ref 26–35)
MCH RBC QN AUTO: 33.3 PG (ref 26–35)
MCH RBC QN AUTO: 33.4 PG (ref 26–35)
MCH RBC QN AUTO: 33.4 PG (ref 26–35)
MCH RBC QN AUTO: 33.5 PG (ref 26–35)
MCH RBC QN AUTO: 33.7 PG (ref 26–35)
MCH RBC QN AUTO: 33.8 PG (ref 26–35)
MCH RBC QN AUTO: 33.8 PG (ref 26–35)
MCH RBC QN AUTO: 34 PG (ref 26–35)
MCH RBC QN AUTO: 34.2 PG (ref 26–35)
MCH RBC QN AUTO: 34.2 PG (ref 26–35)
MCH RBC QN AUTO: 34.3 PG (ref 26–35)
MCH RBC QN AUTO: 34.5 PG (ref 26–35)
MCH RBC QN AUTO: 34.7 PG (ref 26–35)
MCH RBC QN AUTO: 34.7 PG (ref 26–35)
MCH RBC QN AUTO: 34.8 PG (ref 26–35)
MCH RBC QN AUTO: 35 PG (ref 26–35)
MCH RBC QN AUTO: 35 PG (ref 26–35)
MCH RBC QN AUTO: 35.6 PG (ref 26–35)
MCHC RBC AUTO-ENTMCNC: 33.8 % (ref 32–34.5)
MCHC RBC AUTO-ENTMCNC: 34 % (ref 32–34.5)
MCHC RBC AUTO-ENTMCNC: 34.1 % (ref 32–34.5)
MCHC RBC AUTO-ENTMCNC: 34.1 % (ref 32–34.5)
MCHC RBC AUTO-ENTMCNC: 34.2 % (ref 32–34.5)
MCHC RBC AUTO-ENTMCNC: 34.3 % (ref 32–34.5)
MCHC RBC AUTO-ENTMCNC: 34.4 % (ref 32–34.5)
MCHC RBC AUTO-ENTMCNC: 34.4 % (ref 32–34.5)
MCHC RBC AUTO-ENTMCNC: 34.6 % (ref 32–34.5)
MCHC RBC AUTO-ENTMCNC: 34.8 % (ref 32–34.5)
MCHC RBC AUTO-ENTMCNC: 35 % (ref 32–34.5)
MCHC RBC AUTO-ENTMCNC: 35 % (ref 32–34.5)
MCHC RBC AUTO-ENTMCNC: 35.1 % (ref 32–34.5)
MCHC RBC AUTO-ENTMCNC: 35.3 % (ref 32–34.5)
MCHC RBC AUTO-ENTMCNC: 35.3 % (ref 32–34.5)
MCHC RBC AUTO-ENTMCNC: 35.4 % (ref 32–34.5)
MCHC RBC AUTO-ENTMCNC: 35.4 % (ref 32–34.5)
MCHC RBC AUTO-ENTMCNC: 35.8 % (ref 32–34.5)
MCHC RBC AUTO-ENTMCNC: 35.8 % (ref 32–34.5)
MCHC RBC AUTO-ENTMCNC: 35.9 % (ref 32–34.5)
MCHC RBC AUTO-ENTMCNC: 36 % (ref 32–34.5)
MCHC RBC AUTO-ENTMCNC: 36 % (ref 32–34.5)
MCHC RBC AUTO-ENTMCNC: 36.2 % (ref 32–34.5)
MCHC RBC AUTO-ENTMCNC: 36.2 % (ref 32–34.5)
MCHC RBC AUTO-ENTMCNC: 36.4 % (ref 32–34.5)
MCHC RBC AUTO-ENTMCNC: 36.4 % (ref 32–34.5)
MCHC RBC AUTO-ENTMCNC: 36.6 % (ref 32–34.5)
MCHC RBC AUTO-ENTMCNC: 36.7 % (ref 32–34.5)
MCHC RBC AUTO-ENTMCNC: 36.8 % (ref 32–34.5)
MCV RBC AUTO: 100.5 FL (ref 80–99.9)
MCV RBC AUTO: 101.6 FL (ref 80–99.9)
MCV RBC AUTO: 103.7 FL (ref 80–99.9)
MCV RBC AUTO: 90 FL (ref 80–99.9)
MCV RBC AUTO: 91.1 FL (ref 80–99.9)
MCV RBC AUTO: 91.6 FL (ref 80–99.9)
MCV RBC AUTO: 91.7 FL (ref 80–99.9)
MCV RBC AUTO: 91.8 FL (ref 80–99.9)
MCV RBC AUTO: 92.9 FL (ref 80–99.9)
MCV RBC AUTO: 93.9 FL (ref 80–99.9)
MCV RBC AUTO: 94.2 FL (ref 80–99.9)
MCV RBC AUTO: 94.3 FL (ref 80–99.9)
MCV RBC AUTO: 94.8 FL (ref 80–99.9)
MCV RBC AUTO: 95 FL (ref 80–99.9)
MCV RBC AUTO: 95.2 FL (ref 80–99.9)
MCV RBC AUTO: 95.3 FL (ref 80–99.9)
MCV RBC AUTO: 95.5 FL (ref 80–99.9)
MCV RBC AUTO: 96.2 FL (ref 80–99.9)
MCV RBC AUTO: 96.5 FL (ref 80–99.9)
MCV RBC AUTO: 96.7 FL (ref 80–99.9)
MCV RBC AUTO: 97 FL (ref 80–99.9)
MCV RBC AUTO: 97.4 FL (ref 80–99.9)
MCV RBC AUTO: 97.8 FL (ref 80–99.9)
MCV RBC AUTO: 97.9 FL (ref 80–99.9)
MCV RBC AUTO: 97.9 FL (ref 80–99.9)
MCV RBC AUTO: 98.3 FL (ref 80–99.9)
MCV RBC AUTO: 98.3 FL (ref 80–99.9)
MCV RBC AUTO: 99.1 FL (ref 80–99.9)
MCV RBC AUTO: 99.1 FL (ref 80–99.9)
METAMYELOCYTES RELATIVE PERCENT: 0.9 % (ref 0–1)
METAMYELOCYTES RELATIVE PERCENT: 0.9 % (ref 0–1)
METER GLUCOSE: 100 MG/DL (ref 74–99)
METER GLUCOSE: 102 MG/DL (ref 74–99)
METER GLUCOSE: 102 MG/DL (ref 74–99)
METER GLUCOSE: 103 MG/DL (ref 74–99)
METER GLUCOSE: 104 MG/DL (ref 74–99)
METER GLUCOSE: 104 MG/DL (ref 74–99)
METER GLUCOSE: 105 MG/DL (ref 74–99)
METER GLUCOSE: 106 MG/DL (ref 74–99)
METER GLUCOSE: 106 MG/DL (ref 74–99)
METER GLUCOSE: 107 MG/DL (ref 74–99)
METER GLUCOSE: 109 MG/DL (ref 74–99)
METER GLUCOSE: 109 MG/DL (ref 74–99)
METER GLUCOSE: 110 MG/DL (ref 74–99)
METER GLUCOSE: 110 MG/DL (ref 74–99)
METER GLUCOSE: 112 MG/DL (ref 74–99)
METER GLUCOSE: 116 MG/DL (ref 74–99)
METER GLUCOSE: 117 MG/DL (ref 74–99)
METER GLUCOSE: 117 MG/DL (ref 74–99)
METER GLUCOSE: 118 MG/DL (ref 74–99)
METER GLUCOSE: 119 MG/DL (ref 74–99)
METER GLUCOSE: 121 MG/DL (ref 74–99)
METER GLUCOSE: 122 MG/DL (ref 74–99)
METER GLUCOSE: 125 MG/DL (ref 74–99)
METER GLUCOSE: 126 MG/DL (ref 74–99)
METER GLUCOSE: 131 MG/DL (ref 74–99)
METER GLUCOSE: 132 MG/DL (ref 74–99)
METER GLUCOSE: 134 MG/DL (ref 74–99)
METER GLUCOSE: 150 MG/DL (ref 74–99)
METER GLUCOSE: 152 MG/DL (ref 74–99)
METER GLUCOSE: 332 MG/DL (ref 74–99)
METER GLUCOSE: 79 MG/DL (ref 74–99)
METER GLUCOSE: 80 MG/DL (ref 74–99)
METER GLUCOSE: 80 MG/DL (ref 74–99)
METER GLUCOSE: 85 MG/DL (ref 74–99)
METER GLUCOSE: 90 MG/DL (ref 74–99)
METER GLUCOSE: 90 MG/DL (ref 74–99)
METER GLUCOSE: 95 MG/DL (ref 74–99)
METER GLUCOSE: 97 MG/DL (ref 74–99)
METER GLUCOSE: 98 MG/DL (ref 74–99)
METER GLUCOSE: 99 MG/DL (ref 74–99)
METHADONE SCREEN, URINE: NOT DETECTED
METHB: 0.4 % (ref 0–1.5)
MODE: ABNORMAL
MONOCYTE, FLUID: 93 %
MONOCYTE, FLUID: 95 %
MONOCYTES ABSOLUTE: 0.18 E9/L (ref 0.1–0.95)
MONOCYTES ABSOLUTE: 0.2 E9/L (ref 0.1–0.95)
MONOCYTES ABSOLUTE: 0.21 E9/L (ref 0.1–0.95)
MONOCYTES ABSOLUTE: 0.23 E9/L (ref 0.1–0.95)
MONOCYTES ABSOLUTE: 0.24 E9/L (ref 0.1–0.95)
MONOCYTES ABSOLUTE: 0.26 E9/L (ref 0.1–0.95)
MONOCYTES ABSOLUTE: 0.26 E9/L (ref 0.1–0.95)
MONOCYTES ABSOLUTE: 0.27 E9/L (ref 0.1–0.95)
MONOCYTES ABSOLUTE: 0.29 E9/L (ref 0.1–0.95)
MONOCYTES ABSOLUTE: 0.33 E9/L (ref 0.1–0.95)
MONOCYTES ABSOLUTE: 0.38 E9/L (ref 0.1–0.95)
MONOCYTES ABSOLUTE: 0.43 E9/L (ref 0.1–0.95)
MONOCYTES ABSOLUTE: 0.44 E9/L (ref 0.1–0.95)
MONOCYTES ABSOLUTE: 0.48 E9/L (ref 0.1–0.95)
MONOCYTES ABSOLUTE: 0.52 E9/L (ref 0.1–0.95)
MONOCYTES ABSOLUTE: 0.57 E9/L (ref 0.1–0.95)
MONOCYTES ABSOLUTE: 0.58 E9/L (ref 0.1–0.95)
MONOCYTES ABSOLUTE: 0.59 E9/L (ref 0.1–0.95)
MONOCYTES ABSOLUTE: 0.59 E9/L (ref 0.1–0.95)
MONOCYTES ABSOLUTE: 0.64 E9/L (ref 0.1–0.95)
MONOCYTES ABSOLUTE: 0.7 E9/L (ref 0.1–0.95)
MONOCYTES ABSOLUTE: 0.95 E9/L (ref 0.1–0.95)
MONOCYTES ABSOLUTE: 0.97 E9/L (ref 0.1–0.95)
MONOCYTES ABSOLUTE: 1.04 E9/L (ref 0.1–0.95)
MONOCYTES ABSOLUTE: 1.13 E9/L (ref 0.1–0.95)
MONOCYTES ABSOLUTE: 2.09 E9/L (ref 0.1–0.95)
MONOCYTES RELATIVE PERCENT: 1.7 % (ref 2–12)
MONOCYTES RELATIVE PERCENT: 11 % (ref 2–12)
MONOCYTES RELATIVE PERCENT: 12.8 % (ref 2–12)
MONOCYTES RELATIVE PERCENT: 12.8 % (ref 2–12)
MONOCYTES RELATIVE PERCENT: 13.5 % (ref 2–12)
MONOCYTES RELATIVE PERCENT: 15.7 % (ref 2–12)
MONOCYTES RELATIVE PERCENT: 18.7 % (ref 2–12)
MONOCYTES RELATIVE PERCENT: 2.7 % (ref 2–12)
MONOCYTES RELATIVE PERCENT: 3.4 % (ref 2–12)
MONOCYTES RELATIVE PERCENT: 3.4 % (ref 2–12)
MONOCYTES RELATIVE PERCENT: 3.5 % (ref 2–12)
MONOCYTES RELATIVE PERCENT: 4.3 % (ref 2–12)
MONOCYTES RELATIVE PERCENT: 4.5 % (ref 2–12)
MONOCYTES RELATIVE PERCENT: 5.1 % (ref 2–12)
MONOCYTES RELATIVE PERCENT: 5.3 % (ref 2–12)
MONOCYTES RELATIVE PERCENT: 6.2 % (ref 2–12)
MONOCYTES RELATIVE PERCENT: 6.3 % (ref 2–12)
MONOCYTES RELATIVE PERCENT: 7.1 % (ref 2–12)
MONOCYTES RELATIVE PERCENT: 7.8 % (ref 2–12)
MONOCYTES RELATIVE PERCENT: 8.4 % (ref 2–12)
MONOCYTES RELATIVE PERCENT: 8.7 % (ref 2–12)
MONOCYTES RELATIVE PERCENT: 8.7 % (ref 2–12)
MONOCYTES RELATIVE PERCENT: 9 % (ref 2–12)
MONOCYTES RELATIVE PERCENT: 9.5 % (ref 2–12)
MONOCYTES RELATIVE PERCENT: 9.6 % (ref 2–12)
MYELOCYTE PERCENT: 2.7 % (ref 0–0)
NEUTROPHIL, FLUID: 5 %
NEUTROPHIL, FLUID: 7 %
NEUTROPHILS ABSOLUTE: 2.76 E9/L (ref 1.8–7.3)
NEUTROPHILS ABSOLUTE: 2.77 E9/L (ref 1.8–7.3)
NEUTROPHILS ABSOLUTE: 2.79 E9/L (ref 1.8–7.3)
NEUTROPHILS ABSOLUTE: 2.82 E9/L (ref 1.8–7.3)
NEUTROPHILS ABSOLUTE: 3.07 E9/L (ref 1.8–7.3)
NEUTROPHILS ABSOLUTE: 3.12 E9/L (ref 1.8–7.3)
NEUTROPHILS ABSOLUTE: 3.17 E9/L (ref 1.8–7.3)
NEUTROPHILS ABSOLUTE: 3.66 E9/L (ref 1.8–7.3)
NEUTROPHILS ABSOLUTE: 3.68 E9/L (ref 1.8–7.3)
NEUTROPHILS ABSOLUTE: 3.71 E9/L (ref 1.8–7.3)
NEUTROPHILS ABSOLUTE: 3.74 E9/L (ref 1.8–7.3)
NEUTROPHILS ABSOLUTE: 3.8 E9/L (ref 1.8–7.3)
NEUTROPHILS ABSOLUTE: 4.32 E9/L (ref 1.8–7.3)
NEUTROPHILS ABSOLUTE: 4.56 E9/L (ref 1.8–7.3)
NEUTROPHILS ABSOLUTE: 4.65 E9/L (ref 1.8–7.3)
NEUTROPHILS ABSOLUTE: 6.13 E9/L (ref 1.8–7.3)
NEUTROPHILS ABSOLUTE: 6.95 E9/L (ref 1.8–7.3)
NEUTROPHILS ABSOLUTE: 6.95 E9/L (ref 1.8–7.3)
NEUTROPHILS ABSOLUTE: 7.48 E9/L (ref 1.8–7.3)
NEUTROPHILS ABSOLUTE: 7.66 E9/L (ref 1.8–7.3)
NEUTROPHILS ABSOLUTE: 8.01 E9/L (ref 1.8–7.3)
NEUTROPHILS ABSOLUTE: 8.27 E9/L (ref 1.8–7.3)
NEUTROPHILS ABSOLUTE: 8.47 E9/L (ref 1.8–7.3)
NEUTROPHILS ABSOLUTE: 8.64 E9/L (ref 1.8–7.3)
NEUTROPHILS ABSOLUTE: 8.8 E9/L (ref 1.8–7.3)
NEUTROPHILS ABSOLUTE: 8.86 E9/L (ref 1.8–7.3)
NEUTROPHILS ABSOLUTE: 9.4 E9/L (ref 1.8–7.3)
NEUTROPHILS ABSOLUTE: 9.65 E9/L (ref 1.8–7.3)
NEUTROPHILS RELATIVE PERCENT: 51.3 % (ref 43–80)
NEUTROPHILS RELATIVE PERCENT: 61.3 % (ref 43–80)
NEUTROPHILS RELATIVE PERCENT: 62.1 % (ref 43–80)
NEUTROPHILS RELATIVE PERCENT: 62.1 % (ref 43–80)
NEUTROPHILS RELATIVE PERCENT: 62.3 % (ref 43–80)
NEUTROPHILS RELATIVE PERCENT: 63.5 % (ref 43–80)
NEUTROPHILS RELATIVE PERCENT: 63.7 % (ref 43–80)
NEUTROPHILS RELATIVE PERCENT: 66.4 % (ref 43–80)
NEUTROPHILS RELATIVE PERCENT: 68.1 % (ref 43–80)
NEUTROPHILS RELATIVE PERCENT: 68.7 % (ref 43–80)
NEUTROPHILS RELATIVE PERCENT: 68.8 % (ref 43–80)
NEUTROPHILS RELATIVE PERCENT: 70 % (ref 43–80)
NEUTROPHILS RELATIVE PERCENT: 70.8 % (ref 43–80)
NEUTROPHILS RELATIVE PERCENT: 71.3 % (ref 43–80)
NEUTROPHILS RELATIVE PERCENT: 72.2 % (ref 43–80)
NEUTROPHILS RELATIVE PERCENT: 76.5 % (ref 43–80)
NEUTROPHILS RELATIVE PERCENT: 77.7 % (ref 43–80)
NEUTROPHILS RELATIVE PERCENT: 79.1 % (ref 43–80)
NEUTROPHILS RELATIVE PERCENT: 79.8 % (ref 43–80)
NEUTROPHILS RELATIVE PERCENT: 83 % (ref 43–80)
NEUTROPHILS RELATIVE PERCENT: 84.3 % (ref 43–80)
NEUTROPHILS RELATIVE PERCENT: 85.5 % (ref 43–80)
NEUTROPHILS RELATIVE PERCENT: 86.2 % (ref 43–80)
NEUTROPHILS RELATIVE PERCENT: 86.7 % (ref 43–80)
NEUTROPHILS RELATIVE PERCENT: 87 % (ref 43–80)
NEUTROPHILS RELATIVE PERCENT: 87.1 % (ref 43–80)
NEUTROPHILS RELATIVE PERCENT: 87.8 % (ref 43–80)
NEUTROPHILS RELATIVE PERCENT: 91.3 % (ref 43–80)
NITRITE, URINE: NEGATIVE
NUCLEATED CELLS FLUID: 103 /UL
NUCLEATED CELLS FLUID: 31 /UL
O2 CONTENT: 17.1 ML/DL
O2 SATURATION: 97.8 % (ref 92–98.5)
O2HB: 97 % (ref 94–97)
OPERATOR ID: 514
OPIATE SCREEN URINE: NOT DETECTED
ORGANISM: ABNORMAL
ORGANISM: ABNORMAL
OSMOLALITY URINE: 289 MOSM/KG (ref 300–900)
OSMOLALITY: 268 MOSM/KG (ref 285–310)
OVALOCYTES: ABNORMAL
OXYCODONE URINE: NOT DETECTED
PARATHYROID HORMONE INTACT: 60 PG/ML (ref 15–65)
PATIENT TEMP: 37 C
PCO2: 15.8 MMHG (ref 35–45)
PDW BLD-RTO: 16.5 FL (ref 11.5–15)
PDW BLD-RTO: 16.6 FL (ref 11.5–15)
PDW BLD-RTO: 16.8 FL (ref 11.5–15)
PDW BLD-RTO: 17 FL (ref 11.5–15)
PDW BLD-RTO: 17 FL (ref 11.5–15)
PDW BLD-RTO: 17.1 FL (ref 11.5–15)
PDW BLD-RTO: 17.2 FL (ref 11.5–15)
PDW BLD-RTO: 17.5 FL (ref 11.5–15)
PDW BLD-RTO: 17.7 FL (ref 11.5–15)
PDW BLD-RTO: 17.8 FL (ref 11.5–15)
PDW BLD-RTO: 17.9 FL (ref 11.5–15)
PDW BLD-RTO: 18.1 FL (ref 11.5–15)
PDW BLD-RTO: 18.2 FL (ref 11.5–15)
PDW BLD-RTO: 18.6 FL (ref 11.5–15)
PDW BLD-RTO: 19.2 FL (ref 11.5–15)
PDW BLD-RTO: 19.4 FL (ref 11.5–15)
PDW BLD-RTO: 21 FL (ref 11.5–15)
PDW BLD-RTO: 21.7 FL (ref 11.5–15)
PDW BLD-RTO: 21.9 FL (ref 11.5–15)
PDW BLD-RTO: 22.2 FL (ref 11.5–15)
PDW BLD-RTO: 22.2 FL (ref 11.5–15)
PDW BLD-RTO: 22.5 FL (ref 11.5–15)
PDW BLD-RTO: 22.6 FL (ref 11.5–15)
PDW BLD-RTO: 23.7 FL (ref 11.5–15)
PDW BLD-RTO: 23.9 FL (ref 11.5–15)
PDW BLD-RTO: 25.5 FL (ref 11.5–15)
PH BLOOD GAS: 7.43 (ref 7.35–7.45)
PH UA: 6 (ref 5–9)
PH UA: 6.5 (ref 5–9)
PH UA: 7 (ref 5–9)
PH UA: 7 (ref 5–9)
PHENCYCLIDINE SCREEN URINE: NOT DETECTED
PHOSPHORUS: 1.4 MG/DL (ref 2.5–4.5)
PHOSPHORUS: 1.7 MG/DL (ref 2.5–4.5)
PHOSPHORUS: 1.7 MG/DL (ref 2.5–4.5)
PHOSPHORUS: 1.8 MG/DL (ref 2.5–4.5)
PHOSPHORUS: 1.8 MG/DL (ref 2.5–4.5)
PHOSPHORUS: 2 MG/DL (ref 2.5–4.5)
PHOSPHORUS: 2.1 MG/DL (ref 2.5–4.5)
PHOSPHORUS: 2.1 MG/DL (ref 2.5–4.5)
PHOSPHORUS: 2.2 MG/DL (ref 2.5–4.5)
PHOSPHORUS: 2.4 MG/DL (ref 2.5–4.5)
PHOSPHORUS: 2.6 MG/DL (ref 2.5–4.5)
PHOSPHORUS: 2.7 MG/DL (ref 2.5–4.5)
PHOSPHORUS: 3.1 MG/DL (ref 2.5–4.5)
PHOSPHORUS: 3.5 MG/DL (ref 2.5–4.5)
PLATELET # BLD: 100 E9/L (ref 130–450)
PLATELET # BLD: 103 E9/L (ref 130–450)
PLATELET # BLD: 29 E9/L (ref 130–450)
PLATELET # BLD: 32 E9/L (ref 130–450)
PLATELET # BLD: 34 E9/L (ref 130–450)
PLATELET # BLD: 38 E9/L (ref 130–450)
PLATELET # BLD: 39 E9/L (ref 130–450)
PLATELET # BLD: 39 E9/L (ref 130–450)
PLATELET # BLD: 46 E9/L (ref 130–450)
PLATELET # BLD: 47 E9/L (ref 130–450)
PLATELET # BLD: 50 E9/L (ref 130–450)
PLATELET # BLD: 52 E9/L (ref 130–450)
PLATELET # BLD: 52 E9/L (ref 130–450)
PLATELET # BLD: 58 E9/L (ref 130–450)
PLATELET # BLD: 59 E9/L (ref 130–450)
PLATELET # BLD: 62 E9/L (ref 130–450)
PLATELET # BLD: 63 E9/L (ref 130–450)
PLATELET # BLD: 68 E9/L (ref 130–450)
PLATELET # BLD: 69 E9/L (ref 130–450)
PLATELET # BLD: 71 E9/L (ref 130–450)
PLATELET # BLD: 72 E9/L (ref 130–450)
PLATELET # BLD: 73 E9/L (ref 130–450)
PLATELET # BLD: 78 E9/L (ref 130–450)
PLATELET # BLD: 81 E9/L (ref 130–450)
PLATELET # BLD: 81 E9/L (ref 130–450)
PLATELET # BLD: 86 E9/L (ref 130–450)
PLATELET # BLD: 88 E9/L (ref 130–450)
PLATELET CONFIRMATION: NORMAL
PMV BLD AUTO: 10 FL (ref 7–12)
PMV BLD AUTO: 10.1 FL (ref 7–12)
PMV BLD AUTO: 10.1 FL (ref 7–12)
PMV BLD AUTO: 10.2 FL (ref 7–12)
PMV BLD AUTO: 10.2 FL (ref 7–12)
PMV BLD AUTO: 10.4 FL (ref 7–12)
PMV BLD AUTO: 10.4 FL (ref 7–12)
PMV BLD AUTO: 10.5 FL (ref 7–12)
PMV BLD AUTO: 10.5 FL (ref 7–12)
PMV BLD AUTO: 10.6 FL (ref 7–12)
PMV BLD AUTO: 10.7 FL (ref 7–12)
PMV BLD AUTO: 10.7 FL (ref 7–12)
PMV BLD AUTO: 10.8 FL (ref 7–12)
PMV BLD AUTO: 10.9 FL (ref 7–12)
PMV BLD AUTO: 10.9 FL (ref 7–12)
PMV BLD AUTO: 11 FL (ref 7–12)
PMV BLD AUTO: 11.4 FL (ref 7–12)
PMV BLD AUTO: 9.5 FL (ref 7–12)
PMV BLD AUTO: 9.6 FL (ref 7–12)
PMV BLD AUTO: 9.7 FL (ref 7–12)
PMV BLD AUTO: 9.8 FL (ref 7–12)
PMV BLD AUTO: 9.9 FL (ref 7–12)
PMV BLD AUTO: 9.9 FL (ref 7–12)
PO2: 105.7 MMHG (ref 75–100)
POIKILOCYTES: ABNORMAL
POLYCHROMASIA: ABNORMAL
POTASSIUM REFLEX MAGNESIUM: 3.1 MMOL/L (ref 3.5–5)
POTASSIUM REFLEX MAGNESIUM: 5.8 MMOL/L (ref 3.5–5)
POTASSIUM SERPL-SCNC: 2.6 MMOL/L (ref 3.5–5)
POTASSIUM SERPL-SCNC: 2.7 MMOL/L (ref 3.5–5)
POTASSIUM SERPL-SCNC: 3 MMOL/L (ref 3.5–5)
POTASSIUM SERPL-SCNC: 3.2 MMOL/L (ref 3.5–5)
POTASSIUM SERPL-SCNC: 3.3 MMOL/L (ref 3.5–5)
POTASSIUM SERPL-SCNC: 3.4 MMOL/L (ref 3.5–5)
POTASSIUM SERPL-SCNC: 3.5 MMOL/L (ref 3.5–5)
POTASSIUM SERPL-SCNC: 3.6 MMOL/L (ref 3.5–5)
POTASSIUM SERPL-SCNC: 3.7 MMOL/L (ref 3.5–5)
POTASSIUM SERPL-SCNC: 3.8 MMOL/L (ref 3.5–5)
POTASSIUM SERPL-SCNC: 3.9 MMOL/L (ref 3.5–5)
POTASSIUM SERPL-SCNC: 4 MMOL/L (ref 3.5–5)
POTASSIUM SERPL-SCNC: 4.1 MMOL/L (ref 3.5–5)
POTASSIUM SERPL-SCNC: 4.1 MMOL/L (ref 3.5–5)
POTASSIUM SERPL-SCNC: 4.2 MMOL/L (ref 3.5–5)
POTASSIUM SERPL-SCNC: 4.8 MMOL/L (ref 3.5–5)
POTASSIUM SERPL-SCNC: 5 MMOL/L (ref 3.5–5)
PRO-BNP: 166 PG/ML (ref 0–125)
PROCALCITONIN: 0.46 NG/ML (ref 0–0.08)
PROTEIN FLUID: 0.2 G/DL
PROTEIN FLUID: 1.7 G/DL
PROTEIN UA: ABNORMAL MG/DL
PROTEIN UA: NEGATIVE MG/DL
PROTHROMBIN TIME: 103.8 SEC (ref 9.3–12.4)
PROTHROMBIN TIME: 110.6 SEC (ref 9.3–12.4)
PROTHROMBIN TIME: 34.5 SEC (ref 9.3–12.4)
PROTHROMBIN TIME: 39.9 SEC (ref 9.3–12.4)
PROTHROMBIN TIME: 41.9 SEC (ref 9.3–12.4)
PROTHROMBIN TIME: 45.4 SEC (ref 9.3–12.4)
PROTHROMBIN TIME: 46.1 SEC (ref 9.3–12.4)
PROTHROMBIN TIME: 46.7 SEC (ref 9.3–12.4)
PROTHROMBIN TIME: 49.1 SEC (ref 9.3–12.4)
PROTHROMBIN TIME: 49.4 SEC (ref 9.3–12.4)
PROTHROMBIN TIME: 49.7 SEC (ref 9.3–12.4)
PROTHROMBIN TIME: 50.6 SEC (ref 9.3–12.4)
PROTHROMBIN TIME: 50.9 SEC (ref 9.3–12.4)
PROTHROMBIN TIME: 51.5 SEC (ref 9.3–12.4)
PROTHROMBIN TIME: 54.9 SEC (ref 9.3–12.4)
PROTHROMBIN TIME: 56.3 SEC (ref 9.3–12.4)
PROTHROMBIN TIME: 57.8 SEC (ref 9.3–12.4)
PROTHROMBIN TIME: 58.3 SEC (ref 9.3–12.4)
PROTHROMBIN TIME: 58.9 SEC (ref 9.3–12.4)
PROTHROMBIN TIME: 60.5 SEC (ref 9.3–12.4)
PROTHROMBIN TIME: 61.5 SEC (ref 9.3–12.4)
PROTHROMBIN TIME: 67.3 SEC (ref 9.3–12.4)
PROTHROMBIN TIME: 69.7 SEC (ref 9.3–12.4)
PROTHROMBIN TIME: 73.5 SEC (ref 9.3–12.4)
PROTHROMBIN TIME: 74.5 SEC (ref 9.3–12.4)
PROTHROMBIN TIME: 76.8 SEC (ref 9.3–12.4)
PROTHROMBIN TIME: 78.6 SEC (ref 9.3–12.4)
PROTHROMBIN TIME: 81.3 SEC (ref 9.3–12.4)
PROTHROMBIN TIME: 84 SEC (ref 9.3–12.4)
PROTHROMBIN TIME: 87.2 SEC (ref 9.3–12.4)
PROTHROMBIN TIME: 92.9 SEC (ref 9.3–12.4)
RBC # BLD: 1.81 E12/L (ref 3.8–5.8)
RBC # BLD: 1.88 E12/L (ref 3.8–5.8)
RBC # BLD: 1.89 E12/L (ref 3.8–5.8)
RBC # BLD: 1.9 E12/L (ref 3.8–5.8)
RBC # BLD: 1.96 E12/L (ref 3.8–5.8)
RBC # BLD: 1.98 E12/L (ref 3.8–5.8)
RBC # BLD: 2.01 E12/L (ref 3.8–5.8)
RBC # BLD: 2.06 E12/L (ref 3.8–5.8)
RBC # BLD: 2.1 E12/L (ref 3.8–5.8)
RBC # BLD: 2.14 E12/L (ref 3.8–5.8)
RBC # BLD: 2.25 E12/L (ref 3.8–5.8)
RBC # BLD: 2.3 E12/L (ref 3.8–5.8)
RBC # BLD: 2.31 E12/L (ref 3.8–5.8)
RBC # BLD: 2.34 E12/L (ref 3.8–5.8)
RBC # BLD: 2.38 E12/L (ref 3.8–5.8)
RBC # BLD: 2.39 E12/L (ref 3.8–5.8)
RBC # BLD: 2.64 E12/L (ref 3.8–5.8)
RBC # BLD: 2.93 E12/L (ref 3.8–5.8)
RBC # BLD: 3.15 E12/L (ref 3.8–5.8)
RBC # BLD: 3.28 E12/L (ref 3.8–5.8)
RBC # BLD: 3.37 E12/L (ref 3.8–5.8)
RBC # BLD: 3.39 E12/L (ref 3.8–5.8)
RBC # BLD: 3.47 E12/L (ref 3.8–5.8)
RBC # BLD: 3.5 E12/L (ref 3.8–5.8)
RBC # BLD: 3.57 E12/L (ref 3.8–5.8)
RBC # BLD: 3.57 E12/L (ref 3.8–5.8)
RBC # BLD: 3.62 E12/L (ref 3.8–5.8)
RBC # BLD: 3.65 E12/L (ref 3.8–5.8)
RBC # BLD: 3.77 E12/L (ref 3.8–5.8)
RBC FLUID: <2000 /UL
RBC FLUID: <2000 /UL
RBC UA: >20 /HPF (ref 0–2)
RBC UA: ABNORMAL /HPF (ref 0–2)
RBC UA: ABNORMAL /HPF (ref 0–2)
RBC UA: NORMAL /HPF (ref 0–2)
REASON FOR REJECTION: NORMAL
REJECTED TEST: NORMAL
RETIC HGB EQUIVALENT: 40.9 PG (ref 28.2–36.6)
RETIC HGB EQUIVALENT: 41.3 PG (ref 28.2–36.6)
RETICULOCYTE ABSOLUTE COUNT: 0.06 E12/L
RETICULOCYTE ABSOLUTE COUNT: 0.13 E12/L
RETICULOCYTE COUNT PCT: 3 % (ref 0.4–1.9)
RETICULOCYTE COUNT PCT: 5.7 % (ref 0.4–1.9)
SARS-COV-2, NAAT: NOT DETECTED
SARS-COV-2, NAAT: NOT DETECTED
SCHISTOCYTES: ABNORMAL
SCHISTOCYTES: ABNORMAL
SMOOTH MUSCLE ANTIBODY: NEGATIVE
SODIUM BLD-SCNC: 119 MMOL/L (ref 132–146)
SODIUM BLD-SCNC: 121 MMOL/L (ref 132–146)
SODIUM BLD-SCNC: 122 MMOL/L (ref 132–146)
SODIUM BLD-SCNC: 124 MMOL/L (ref 132–146)
SODIUM BLD-SCNC: 124 MMOL/L (ref 132–146)
SODIUM BLD-SCNC: 125 MMOL/L (ref 132–146)
SODIUM BLD-SCNC: 127 MMOL/L (ref 132–146)
SODIUM BLD-SCNC: 128 MMOL/L (ref 132–146)
SODIUM BLD-SCNC: 129 MMOL/L (ref 132–146)
SODIUM BLD-SCNC: 130 MMOL/L (ref 132–146)
SODIUM BLD-SCNC: 131 MMOL/L (ref 132–146)
SODIUM BLD-SCNC: 132 MMOL/L (ref 132–146)
SODIUM BLD-SCNC: 133 MMOL/L (ref 132–146)
SODIUM BLD-SCNC: 136 MMOL/L (ref 132–146)
SODIUM BLD-SCNC: 137 MMOL/L (ref 132–146)
SODIUM BLD-SCNC: 137 MMOL/L (ref 132–146)
SODIUM BLD-SCNC: 138 MMOL/L (ref 132–146)
SODIUM BLD-SCNC: 139 MMOL/L (ref 132–146)
SODIUM BLD-SCNC: 139 MMOL/L (ref 132–146)
SODIUM BLD-SCNC: 140 MMOL/L (ref 132–146)
SODIUM BLD-SCNC: 141 MMOL/L (ref 132–146)
SODIUM BLD-SCNC: 142 MMOL/L (ref 132–146)
SODIUM URINE: 35 MMOL/L
SOURCE, BLOOD GAS: ABNORMAL
SPECIFIC GRAVITY UA: 1.01 (ref 1–1.03)
SPHEROCYTES: ABNORMAL
SPHEROCYTES: ABNORMAL
STREP PNEUMONIAE ANTIGEN, URINE: NORMAL
T4 FREE: 1.04 NG/DL (ref 0.93–1.7)
TARGET CELLS: ABNORMAL
TEAR DROP CELLS: ABNORMAL
THB: 12.4 G/DL (ref 11.5–16.5)
TIME ANALYZED: 1035
TOTAL PROTEIN: 4.5 G/DL (ref 6.4–8.3)
TOTAL PROTEIN: 5 G/DL (ref 6.4–8.3)
TOTAL PROTEIN: 5.2 G/DL (ref 6.4–8.3)
TOTAL PROTEIN: 5.3 G/DL (ref 6.4–8.3)
TOTAL PROTEIN: 5.3 G/DL (ref 6.4–8.3)
TOTAL PROTEIN: 5.6 G/DL (ref 6.4–8.3)
TOTAL PROTEIN: 5.6 G/DL (ref 6.4–8.3)
TOTAL PROTEIN: 5.8 G/DL (ref 6.4–8.3)
TOTAL PROTEIN: 5.8 G/DL (ref 6.4–8.3)
TOTAL PROTEIN: 5.9 G/DL (ref 6.4–8.3)
TOTAL PROTEIN: 5.9 G/DL (ref 6.4–8.3)
TOTAL PROTEIN: 6 G/DL (ref 6.4–8.3)
TOTAL PROTEIN: 6.3 G/DL (ref 6.4–8.3)
TOTAL PROTEIN: 6.4 G/DL (ref 6.4–8.3)
TOTAL PROTEIN: 6.5 G/DL (ref 6.4–8.3)
TOTAL PROTEIN: 6.5 G/DL (ref 6.4–8.3)
TOTAL PROTEIN: 6.6 G/DL (ref 6.4–8.3)
TOTAL PROTEIN: 6.7 G/DL (ref 6.4–8.3)
TRIGL SERPL-MCNC: 59 MG/DL (ref 0–149)
TROPONIN, HIGH SENSITIVITY: 20 NG/L (ref 0–11)
TSH SERPL DL<=0.05 MIU/L-ACNC: 0.28 UIU/ML (ref 0.27–4.2)
UREA NITROGEN, UR: 315 MG/DL (ref 800–1666)
URIC ACID, SERUM: 5.7 MG/DL (ref 3.4–7)
URINE CULTURE, ROUTINE: ABNORMAL
URINE CULTURE, ROUTINE: ABNORMAL
UROBILINOGEN, URINE: 0.2 E.U./DL
UROBILINOGEN, URINE: 1 E.U./DL
VACUOLATED NEUTROPHILS: ABNORMAL
VITAMIN B-12: >2000 PG/ML (ref 211–946)
VITAMIN B-12: >2000 PG/ML (ref 211–946)
VITAMIN D 25-HYDROXY: 14 NG/ML (ref 30–100)
VLDLC SERPL CALC-MCNC: 12 MG/DL
WBC # BLD: 10.3 E9/L (ref 4.5–11.5)
WBC # BLD: 10.6 E9/L (ref 4.5–11.5)
WBC # BLD: 10.6 E9/L (ref 4.5–11.5)
WBC # BLD: 10.8 E9/L (ref 4.5–11.5)
WBC # BLD: 10.9 E9/L (ref 4.5–11.5)
WBC # BLD: 11.2 E9/L (ref 4.5–11.5)
WBC # BLD: 11.3 E9/L (ref 4.5–11.5)
WBC # BLD: 12.7 E9/L (ref 4.5–11.5)
WBC # BLD: 4 E9/L (ref 4.5–11.5)
WBC # BLD: 4.4 E9/L (ref 4.5–11.5)
WBC # BLD: 4.5 E9/L (ref 4.5–11.5)
WBC # BLD: 4.6 E9/L (ref 4.5–11.5)
WBC # BLD: 4.8 E9/L (ref 4.5–11.5)
WBC # BLD: 5.3 E9/L (ref 4.5–11.5)
WBC # BLD: 5.3 E9/L (ref 4.5–11.5)
WBC # BLD: 5.5 E9/L (ref 4.5–11.5)
WBC # BLD: 5.5 E9/L (ref 4.5–11.5)
WBC # BLD: 5.7 E9/L (ref 4.5–11.5)
WBC # BLD: 6 E9/L (ref 4.5–11.5)
WBC # BLD: 7.2 E9/L (ref 4.5–11.5)
WBC # BLD: 7.3 E9/L (ref 4.5–11.5)
WBC # BLD: 7.5 E9/L (ref 4.5–11.5)
WBC # BLD: 8.7 E9/L (ref 4.5–11.5)
WBC # BLD: 8.8 E9/L (ref 4.5–11.5)
WBC # BLD: 8.8 E9/L (ref 4.5–11.5)
WBC # BLD: 9.1 E9/L (ref 4.5–11.5)
WBC # BLD: 9.4 E9/L (ref 4.5–11.5)
WBC UA: ABNORMAL /HPF (ref 0–5)
WBC UA: NORMAL /HPF (ref 0–5)

## 2021-01-01 PROCEDURE — 6360000002 HC RX W HCPCS: Performed by: INTERNAL MEDICINE

## 2021-01-01 PROCEDURE — 2580000003 HC RX 258: Performed by: INTERNAL MEDICINE

## 2021-01-01 PROCEDURE — 6370000000 HC RX 637 (ALT 250 FOR IP): Performed by: HOSPITALIST

## 2021-01-01 PROCEDURE — 84100 ASSAY OF PHOSPHORUS: CPT

## 2021-01-01 PROCEDURE — 82962 GLUCOSE BLOOD TEST: CPT

## 2021-01-01 PROCEDURE — 84540 ASSAY OF URINE/UREA-N: CPT

## 2021-01-01 PROCEDURE — 86900 BLOOD TYPING SEROLOGIC ABO: CPT

## 2021-01-01 PROCEDURE — 6360000002 HC RX W HCPCS: Performed by: NURSE PRACTITIONER

## 2021-01-01 PROCEDURE — 6370000000 HC RX 637 (ALT 250 FOR IP): Performed by: INTERNAL MEDICINE

## 2021-01-01 PROCEDURE — C1729 CATH, DRAINAGE: HCPCS

## 2021-01-01 PROCEDURE — 6370000000 HC RX 637 (ALT 250 FOR IP)

## 2021-01-01 PROCEDURE — 6370000000 HC RX 637 (ALT 250 FOR IP): Performed by: NURSE PRACTITIONER

## 2021-01-01 PROCEDURE — 2580000003 HC RX 258: Performed by: HOSPITALIST

## 2021-01-01 PROCEDURE — 80143 DRUG ASSAY ACETAMINOPHEN: CPT

## 2021-01-01 PROCEDURE — C9113 INJ PANTOPRAZOLE SODIUM, VIA: HCPCS | Performed by: INTERNAL MEDICINE

## 2021-01-01 PROCEDURE — 85025 COMPLETE CBC W/AUTO DIFF WBC: CPT

## 2021-01-01 PROCEDURE — 93975 VASCULAR STUDY: CPT

## 2021-01-01 PROCEDURE — 82140 ASSAY OF AMMONIA: CPT

## 2021-01-01 PROCEDURE — P9047 ALBUMIN (HUMAN), 25%, 50ML: HCPCS | Performed by: INTERNAL MEDICINE

## 2021-01-01 PROCEDURE — 89051 BODY FLUID CELL COUNT: CPT

## 2021-01-01 PROCEDURE — 85610 PROTHROMBIN TIME: CPT

## 2021-01-01 PROCEDURE — 80074 ACUTE HEPATITIS PANEL: CPT

## 2021-01-01 PROCEDURE — 85014 HEMATOCRIT: CPT

## 2021-01-01 PROCEDURE — 97110 THERAPEUTIC EXERCISES: CPT

## 2021-01-01 PROCEDURE — 36430 TRANSFUSION BLD/BLD COMPNT: CPT

## 2021-01-01 PROCEDURE — 86901 BLOOD TYPING SEROLOGIC RH(D): CPT

## 2021-01-01 PROCEDURE — 6360000002 HC RX W HCPCS: Performed by: HOSPITALIST

## 2021-01-01 PROCEDURE — 86850 RBC ANTIBODY SCREEN: CPT

## 2021-01-01 PROCEDURE — 2060000000 HC ICU INTERMEDIATE R&B

## 2021-01-01 PROCEDURE — 82525 ASSAY OF COPPER: CPT

## 2021-01-01 PROCEDURE — 85027 COMPLETE CBC AUTOMATED: CPT

## 2021-01-01 PROCEDURE — P9059 PLASMA, FRZ BETWEEN 8-24HOUR: HCPCS

## 2021-01-01 PROCEDURE — 2580000003 HC RX 258: Performed by: NURSE PRACTITIONER

## 2021-01-01 PROCEDURE — 80053 COMPREHEN METABOLIC PANEL: CPT

## 2021-01-01 PROCEDURE — 87040 BLOOD CULTURE FOR BACTERIA: CPT

## 2021-01-01 PROCEDURE — 36415 COLL VENOUS BLD VENIPUNCTURE: CPT

## 2021-01-01 PROCEDURE — 81001 URINALYSIS AUTO W/SCOPE: CPT

## 2021-01-01 PROCEDURE — 88112 CYTOPATH CELL ENHANCE TECH: CPT

## 2021-01-01 PROCEDURE — 2580000003 HC RX 258

## 2021-01-01 PROCEDURE — 86923 COMPATIBILITY TEST ELECTRIC: CPT

## 2021-01-01 PROCEDURE — 83735 ASSAY OF MAGNESIUM: CPT

## 2021-01-01 PROCEDURE — G0378 HOSPITAL OBSERVATION PER HR: HCPCS

## 2021-01-01 PROCEDURE — 2000000000 HC ICU R&B

## 2021-01-01 PROCEDURE — 83690 ASSAY OF LIPASE: CPT

## 2021-01-01 PROCEDURE — 97530 THERAPEUTIC ACTIVITIES: CPT

## 2021-01-01 PROCEDURE — C9113 INJ PANTOPRAZOLE SODIUM, VIA: HCPCS | Performed by: HOSPITALIST

## 2021-01-01 PROCEDURE — 82306 VITAMIN D 25 HYDROXY: CPT

## 2021-01-01 PROCEDURE — 2500000003 HC RX 250 WO HCPCS

## 2021-01-01 PROCEDURE — 97165 OT EVAL LOW COMPLEX 30 MIN: CPT | Performed by: OCCUPATIONAL THERAPIST

## 2021-01-01 PROCEDURE — 96361 HYDRATE IV INFUSION ADD-ON: CPT

## 2021-01-01 PROCEDURE — 6370000000 HC RX 637 (ALT 250 FOR IP): Performed by: CLINICAL NURSE SPECIALIST

## 2021-01-01 PROCEDURE — 87635 SARS-COV-2 COVID-19 AMP PRB: CPT

## 2021-01-01 PROCEDURE — 96368 THER/DIAG CONCURRENT INF: CPT

## 2021-01-01 PROCEDURE — 80048 BASIC METABOLIC PNL TOTAL CA: CPT

## 2021-01-01 PROCEDURE — P9012 CRYOPRECIPITATE EACH UNIT: HCPCS

## 2021-01-01 PROCEDURE — 49083 ABD PARACENTESIS W/IMAGING: CPT | Performed by: RADIOLOGY

## 2021-01-01 PROCEDURE — 2580000003 HC RX 258: Performed by: GENERAL PRACTICE

## 2021-01-01 PROCEDURE — 88305 TISSUE EXAM BY PATHOLOGIST: CPT

## 2021-01-01 PROCEDURE — 82746 ASSAY OF FOLIC ACID SERUM: CPT

## 2021-01-01 PROCEDURE — 85384 FIBRINOGEN ACTIVITY: CPT

## 2021-01-01 PROCEDURE — 97530 THERAPEUTIC ACTIVITIES: CPT | Performed by: OCCUPATIONAL THERAPIST

## 2021-01-01 PROCEDURE — 84484 ASSAY OF TROPONIN QUANT: CPT

## 2021-01-01 PROCEDURE — 84550 ASSAY OF BLOOD/URIC ACID: CPT

## 2021-01-01 PROCEDURE — 86695 HERPES SIMPLEX TYPE 1 TEST: CPT

## 2021-01-01 PROCEDURE — 87205 SMEAR GRAM STAIN: CPT

## 2021-01-01 PROCEDURE — 2500000003 HC RX 250 WO HCPCS: Performed by: INTERNAL MEDICINE

## 2021-01-01 PROCEDURE — 49083 ABD PARACENTESIS W/IMAGING: CPT

## 2021-01-01 PROCEDURE — 2500000003 HC RX 250 WO HCPCS: Performed by: EMERGENCY MEDICINE

## 2021-01-01 PROCEDURE — 83970 ASSAY OF PARATHORMONE: CPT

## 2021-01-01 PROCEDURE — 97161 PT EVAL LOW COMPLEX 20 MIN: CPT | Performed by: PHYSICAL THERAPIST

## 2021-01-01 PROCEDURE — 71046 X-RAY EXAM CHEST 2 VIEWS: CPT

## 2021-01-01 PROCEDURE — 82077 ASSAY SPEC XCP UR&BREATH IA: CPT

## 2021-01-01 PROCEDURE — 85018 HEMOGLOBIN: CPT

## 2021-01-01 PROCEDURE — P9016 RBC LEUKOCYTES REDUCED: HCPCS

## 2021-01-01 PROCEDURE — 82947 ASSAY GLUCOSE BLOOD QUANT: CPT

## 2021-01-01 PROCEDURE — 82248 BILIRUBIN DIRECT: CPT

## 2021-01-01 PROCEDURE — 87070 CULTURE OTHR SPECIMN AEROBIC: CPT

## 2021-01-01 PROCEDURE — 82330 ASSAY OF CALCIUM: CPT

## 2021-01-01 PROCEDURE — 86694 HERPES SIMPLEX NES ANTBDY: CPT

## 2021-01-01 PROCEDURE — 87077 CULTURE AEROBIC IDENTIFY: CPT

## 2021-01-01 PROCEDURE — 82042 OTHER SOURCE ALBUMIN QUAN EA: CPT

## 2021-01-01 PROCEDURE — 86696 HERPES SIMPLEX TYPE 2 TEST: CPT

## 2021-01-01 PROCEDURE — 6360000002 HC RX W HCPCS: Performed by: EMERGENCY MEDICINE

## 2021-01-01 PROCEDURE — 71045 X-RAY EXAM CHEST 1 VIEW: CPT

## 2021-01-01 PROCEDURE — 84443 ASSAY THYROID STIM HORMONE: CPT

## 2021-01-01 PROCEDURE — 83615 LACTATE (LD) (LDH) ENZYME: CPT

## 2021-01-01 PROCEDURE — 82805 BLOOD GASES W/O2 SATURATION: CPT

## 2021-01-01 PROCEDURE — 2500000003 HC RX 250 WO HCPCS: Performed by: NURSE PRACTITIONER

## 2021-01-01 PROCEDURE — 76705 ECHO EXAM OF ABDOMEN: CPT

## 2021-01-01 PROCEDURE — 82436 ASSAY OF URINE CHLORIDE: CPT

## 2021-01-01 PROCEDURE — 85045 AUTOMATED RETICULOCYTE COUNT: CPT

## 2021-01-01 PROCEDURE — 84157 ASSAY OF PROTEIN OTHER: CPT

## 2021-01-01 PROCEDURE — 82607 VITAMIN B-12: CPT

## 2021-01-01 PROCEDURE — P9040 RBC LEUKOREDUCED IRRADIATED: HCPCS

## 2021-01-01 PROCEDURE — 80061 LIPID PANEL: CPT

## 2021-01-01 PROCEDURE — 87186 SC STD MICRODIL/AGAR DIL: CPT

## 2021-01-01 PROCEDURE — 2580000003 HC RX 258: Performed by: EMERGENCY MEDICINE

## 2021-01-01 PROCEDURE — 87449 NOS EACH ORGANISM AG IA: CPT

## 2021-01-01 PROCEDURE — 83605 ASSAY OF LACTIC ACID: CPT

## 2021-01-01 PROCEDURE — 83935 ASSAY OF URINE OSMOLALITY: CPT

## 2021-01-01 PROCEDURE — 96375 TX/PRO/DX INJ NEW DRUG ADDON: CPT

## 2021-01-01 PROCEDURE — 84439 ASSAY OF FREE THYROXINE: CPT

## 2021-01-01 PROCEDURE — 0W9G3ZX DRAINAGE OF PERITONEAL CAVITY, PERCUTANEOUS APPROACH, DIAGNOSTIC: ICD-10-PCS | Performed by: INTERNAL MEDICINE

## 2021-01-01 PROCEDURE — 97535 SELF CARE MNGMENT TRAINING: CPT

## 2021-01-01 PROCEDURE — 82784 ASSAY IGA/IGD/IGG/IGM EACH: CPT

## 2021-01-01 PROCEDURE — 80076 HEPATIC FUNCTION PANEL: CPT

## 2021-01-01 PROCEDURE — 86665 EPSTEIN-BARR CAPSID VCA: CPT

## 2021-01-01 PROCEDURE — P9035 PLATELET PHERES LEUKOREDUCED: HCPCS

## 2021-01-01 PROCEDURE — 96365 THER/PROPH/DIAG IV INF INIT: CPT

## 2021-01-01 PROCEDURE — 83880 ASSAY OF NATRIURETIC PEPTIDE: CPT

## 2021-01-01 PROCEDURE — 6360000002 HC RX W HCPCS: Performed by: GENERAL PRACTICE

## 2021-01-01 PROCEDURE — 82570 ASSAY OF URINE CREATININE: CPT

## 2021-01-01 PROCEDURE — 97116 GAIT TRAINING THERAPY: CPT | Performed by: PHYSICAL THERAPIST

## 2021-01-01 PROCEDURE — 83010 ASSAY OF HAPTOGLOBIN QUANT: CPT

## 2021-01-01 PROCEDURE — 97112 NEUROMUSCULAR REEDUCATION: CPT | Performed by: PHYSICAL THERAPIST

## 2021-01-01 PROCEDURE — 83930 ASSAY OF BLOOD OSMOLALITY: CPT

## 2021-01-01 PROCEDURE — 84145 PROCALCITONIN (PCT): CPT

## 2021-01-01 PROCEDURE — 82103 ALPHA-1-ANTITRYPSIN TOTAL: CPT

## 2021-01-01 PROCEDURE — 86255 FLUORESCENT ANTIBODY SCREEN: CPT

## 2021-01-01 PROCEDURE — 82150 ASSAY OF AMYLASE: CPT

## 2021-01-01 PROCEDURE — 86038 ANTINUCLEAR ANTIBODIES: CPT

## 2021-01-01 PROCEDURE — 99285 EMERGENCY DEPT VISIT HI MDM: CPT

## 2021-01-01 PROCEDURE — 96366 THER/PROPH/DIAG IV INF ADDON: CPT

## 2021-01-01 PROCEDURE — 99284 EMERGENCY DEPT VISIT MOD MDM: CPT

## 2021-01-01 PROCEDURE — 84300 ASSAY OF URINE SODIUM: CPT

## 2021-01-01 PROCEDURE — 51701 INSERT BLADDER CATHETER: CPT

## 2021-01-01 PROCEDURE — 97116 GAIT TRAINING THERAPY: CPT

## 2021-01-01 PROCEDURE — 74176 CT ABD & PELVIS W/O CONTRAST: CPT

## 2021-01-01 PROCEDURE — 80307 DRUG TEST PRSMV CHEM ANLYZR: CPT

## 2021-01-01 PROCEDURE — 6370000000 HC RX 637 (ALT 250 FOR IP): Performed by: GENERAL PRACTICE

## 2021-01-01 PROCEDURE — 96376 TX/PRO/DX INJ SAME DRUG ADON: CPT

## 2021-01-01 PROCEDURE — 86704 HEP B CORE ANTIBODY TOTAL: CPT

## 2021-01-01 PROCEDURE — 93005 ELECTROCARDIOGRAM TRACING: CPT | Performed by: GENERAL PRACTICE

## 2021-01-01 PROCEDURE — 87088 URINE BACTERIA CULTURE: CPT

## 2021-01-01 PROCEDURE — 2500000003 HC RX 250 WO HCPCS: Performed by: GENERAL PRACTICE

## 2021-01-01 RX ORDER — MAGNESIUM SULFATE IN WATER 40 MG/ML
2000 INJECTION, SOLUTION INTRAVENOUS ONCE
Status: COMPLETED | OUTPATIENT
Start: 2021-01-01 | End: 2021-01-01

## 2021-01-01 RX ORDER — SODIUM CHLORIDE 9 MG/ML
INJECTION, SOLUTION INTRAVENOUS EVERY 8 HOURS
Status: DISCONTINUED | OUTPATIENT
Start: 2021-01-01 | End: 2021-01-01

## 2021-01-01 RX ORDER — SODIUM BICARBONATE 650 MG/1
1300 TABLET ORAL 3 TIMES DAILY
Status: DISCONTINUED | OUTPATIENT
Start: 2021-01-01 | End: 2021-01-01

## 2021-01-01 RX ORDER — FUROSEMIDE 10 MG/ML
40 INJECTION INTRAMUSCULAR; INTRAVENOUS ONCE
Status: COMPLETED | OUTPATIENT
Start: 2021-01-01 | End: 2021-01-01

## 2021-01-01 RX ORDER — POTASSIUM CHLORIDE 20 MEQ/1
20 TABLET, EXTENDED RELEASE ORAL ONCE
Status: COMPLETED | OUTPATIENT
Start: 2021-01-01 | End: 2021-01-01

## 2021-01-01 RX ORDER — LORAZEPAM 2 MG/ML
3 INJECTION INTRAMUSCULAR
Status: DISCONTINUED | OUTPATIENT
Start: 2021-01-01 | End: 2021-01-01

## 2021-01-01 RX ORDER — MIDODRINE HYDROCHLORIDE 5 MG/1
10 TABLET ORAL
Status: DISCONTINUED | OUTPATIENT
Start: 2021-01-01 | End: 2021-01-01

## 2021-01-01 RX ORDER — POTASSIUM CHLORIDE 7.45 MG/ML
10 INJECTION INTRAVENOUS
Status: COMPLETED | OUTPATIENT
Start: 2021-01-01 | End: 2021-01-01

## 2021-01-01 RX ORDER — PANTOPRAZOLE SODIUM 40 MG/10ML
40 INJECTION, POWDER, LYOPHILIZED, FOR SOLUTION INTRAVENOUS 2 TIMES DAILY
Status: DISCONTINUED | OUTPATIENT
Start: 2021-01-01 | End: 2021-01-01 | Stop reason: HOSPADM

## 2021-01-01 RX ORDER — ACETAMINOPHEN 325 MG/1
650 TABLET ORAL EVERY 6 HOURS PRN
Status: DISCONTINUED | OUTPATIENT
Start: 2021-01-01 | End: 2021-01-01

## 2021-01-01 RX ORDER — ALBUMIN (HUMAN) 12.5 G/50ML
50 SOLUTION INTRAVENOUS ONCE
Status: DISCONTINUED | OUTPATIENT
Start: 2021-01-01 | End: 2021-01-01

## 2021-01-01 RX ORDER — SODIUM CHLORIDE 9 MG/ML
250 INJECTION, SOLUTION INTRAVENOUS PRN
Status: COMPLETED | OUTPATIENT
Start: 2021-01-01 | End: 2021-01-01

## 2021-01-01 RX ORDER — LORAZEPAM 2 MG/ML
4 INJECTION INTRAMUSCULAR
Status: DISCONTINUED | OUTPATIENT
Start: 2021-01-01 | End: 2021-01-01

## 2021-01-01 RX ORDER — SODIUM CHLORIDE 9 MG/ML
INJECTION, SOLUTION INTRAVENOUS PRN
Status: DISCONTINUED | OUTPATIENT
Start: 2021-01-01 | End: 2021-01-01 | Stop reason: ALTCHOICE

## 2021-01-01 RX ORDER — NICOTINE POLACRILEX 4 MG
15 LOZENGE BUCCAL PRN
Status: DISCONTINUED | OUTPATIENT
Start: 2021-01-01 | End: 2021-01-01

## 2021-01-01 RX ORDER — PREDNISOLONE 15 MG/5 ML
40 SOLUTION, ORAL ORAL DAILY
Status: DISCONTINUED | OUTPATIENT
Start: 2021-01-01 | End: 2021-01-01

## 2021-01-01 RX ORDER — AMIODARONE HYDROCHLORIDE 200 MG/1
200 TABLET ORAL 2 TIMES DAILY
Status: ON HOLD | COMMUNITY
End: 2021-01-01 | Stop reason: HOSPADM

## 2021-01-01 RX ORDER — PHYTONADIONE 10 MG/ML
10 INJECTION, EMULSION INTRAMUSCULAR; INTRAVENOUS; SUBCUTANEOUS ONCE
Status: DISCONTINUED | OUTPATIENT
Start: 2021-01-01 | End: 2021-01-01 | Stop reason: CLARIF

## 2021-01-01 RX ORDER — POTASSIUM CHLORIDE 20 MEQ/1
40 TABLET, EXTENDED RELEASE ORAL ONCE
Status: COMPLETED | OUTPATIENT
Start: 2021-01-01 | End: 2021-01-01

## 2021-01-01 RX ORDER — 0.9 % SODIUM CHLORIDE 0.9 %
500 INTRAVENOUS SOLUTION INTRAVENOUS ONCE
Status: COMPLETED | OUTPATIENT
Start: 2021-01-01 | End: 2021-01-01

## 2021-01-01 RX ORDER — LANOLIN ALCOHOL/MO/W.PET/CERES
6 CREAM (GRAM) TOPICAL NIGHTLY PRN
Status: DISCONTINUED | OUTPATIENT
Start: 2021-01-01 | End: 2021-01-01

## 2021-01-01 RX ORDER — SODIUM CHLORIDE 9 MG/ML
25 INJECTION, SOLUTION INTRAVENOUS PRN
Status: DISCONTINUED | OUTPATIENT
Start: 2021-01-01 | End: 2021-01-01 | Stop reason: HOSPADM

## 2021-01-01 RX ORDER — DEXTROSE MONOHYDRATE 50 MG/ML
100 INJECTION, SOLUTION INTRAVENOUS PRN
Status: DISCONTINUED | OUTPATIENT
Start: 2021-01-01 | End: 2021-01-01 | Stop reason: HOSPADM

## 2021-01-01 RX ORDER — LORAZEPAM 2 MG/ML
2 INJECTION INTRAMUSCULAR
Status: DISCONTINUED | OUTPATIENT
Start: 2021-01-01 | End: 2021-01-01

## 2021-01-01 RX ORDER — SODIUM CHLORIDE 0.9 % (FLUSH) 0.9 %
5-40 SYRINGE (ML) INJECTION EVERY 12 HOURS SCHEDULED
Status: DISCONTINUED | OUTPATIENT
Start: 2021-01-01 | End: 2021-01-01 | Stop reason: SDUPTHER

## 2021-01-01 RX ORDER — PHYTONADIONE 5 MG/1
10 TABLET ORAL DAILY
Status: DISCONTINUED | OUTPATIENT
Start: 2021-01-01 | End: 2021-01-01

## 2021-01-01 RX ORDER — SODIUM CHLORIDE 9 MG/ML
INJECTION, SOLUTION INTRAVENOUS PRN
Status: COMPLETED | OUTPATIENT
Start: 2021-01-01 | End: 2021-01-01

## 2021-01-01 RX ORDER — SODIUM CHLORIDE 9 MG/ML
25 INJECTION, SOLUTION INTRAVENOUS PRN
Status: DISCONTINUED | OUTPATIENT
Start: 2021-01-01 | End: 2021-01-01 | Stop reason: SDUPTHER

## 2021-01-01 RX ORDER — PHYTONADIONE 5 MG/1
10 TABLET ORAL ONCE
Status: COMPLETED | OUTPATIENT
Start: 2021-01-01 | End: 2021-01-01

## 2021-01-01 RX ORDER — PHYTONADIONE 5 MG/1
5 TABLET ORAL ONCE
Status: COMPLETED | OUTPATIENT
Start: 2021-01-01 | End: 2021-01-01

## 2021-01-01 RX ORDER — FENTANYL CITRATE 50 UG/ML
25 INJECTION, SOLUTION INTRAMUSCULAR; INTRAVENOUS EVERY 6 HOURS PRN
Status: DISCONTINUED | OUTPATIENT
Start: 2021-01-01 | End: 2021-01-01 | Stop reason: HOSPADM

## 2021-01-01 RX ORDER — POLYETHYLENE GLYCOL 3350 17 G/17G
17 POWDER, FOR SOLUTION ORAL DAILY PRN
Status: DISCONTINUED | OUTPATIENT
Start: 2021-01-01 | End: 2021-01-01

## 2021-01-01 RX ORDER — SODIUM CHLORIDE 9 MG/ML
INJECTION, SOLUTION INTRAVENOUS PRN
Status: DISCONTINUED | OUTPATIENT
Start: 2021-01-01 | End: 2021-01-01 | Stop reason: HOSPADM

## 2021-01-01 RX ORDER — 0.9 % SODIUM CHLORIDE 0.9 %
1000 INTRAVENOUS SOLUTION INTRAVENOUS ONCE
Status: COMPLETED | OUTPATIENT
Start: 2021-01-01 | End: 2021-01-01

## 2021-01-01 RX ORDER — DEXTROSE MONOHYDRATE 25 G/50ML
25 INJECTION, SOLUTION INTRAVENOUS ONCE
Status: COMPLETED | OUTPATIENT
Start: 2021-01-01 | End: 2021-01-01

## 2021-01-01 RX ORDER — MAGNESIUM SULFATE IN WATER 40 MG/ML
4000 INJECTION, SOLUTION INTRAVENOUS ONCE
Status: COMPLETED | OUTPATIENT
Start: 2021-01-01 | End: 2021-01-01

## 2021-01-01 RX ORDER — SODIUM CHLORIDE 0.9 % (FLUSH) 0.9 %
5-40 SYRINGE (ML) INJECTION PRN
Status: DISCONTINUED | OUTPATIENT
Start: 2021-01-01 | End: 2021-01-01 | Stop reason: SDUPTHER

## 2021-01-01 RX ORDER — LACTULOSE 10 G/15ML
20 SOLUTION ORAL
Status: DISCONTINUED | OUTPATIENT
Start: 2021-01-01 | End: 2021-01-01

## 2021-01-01 RX ORDER — HEPARIN SODIUM 5000 [USP'U]/ML
5000 INJECTION, SOLUTION INTRAVENOUS; SUBCUTANEOUS EVERY 8 HOURS SCHEDULED
Status: DISCONTINUED | OUTPATIENT
Start: 2021-01-01 | End: 2021-01-01

## 2021-01-01 RX ORDER — SODIUM CHLORIDE 0.9 % (FLUSH) 0.9 %
5-40 SYRINGE (ML) INJECTION EVERY 12 HOURS SCHEDULED
Status: DISCONTINUED | OUTPATIENT
Start: 2021-01-01 | End: 2021-01-01 | Stop reason: HOSPADM

## 2021-01-01 RX ORDER — MIDODRINE HYDROCHLORIDE 5 MG/1
5 TABLET ORAL
Status: DISCONTINUED | OUTPATIENT
Start: 2021-01-01 | End: 2021-01-01

## 2021-01-01 RX ORDER — POLYETHYLENE GLYCOL 3350 17 G/17G
17 POWDER, FOR SOLUTION ORAL DAILY PRN
Status: DISCONTINUED | OUTPATIENT
Start: 2021-01-01 | End: 2021-01-01 | Stop reason: HOSPADM

## 2021-01-01 RX ORDER — PHYTONADIONE 10 MG/ML
10 INJECTION, EMULSION INTRAMUSCULAR; INTRAVENOUS; SUBCUTANEOUS ONCE
Status: COMPLETED | OUTPATIENT
Start: 2021-01-01 | End: 2021-01-01

## 2021-01-01 RX ORDER — METOPROLOL TARTRATE 5 MG/5ML
2.5 INJECTION INTRAVENOUS EVERY 6 HOURS
Status: DISCONTINUED | OUTPATIENT
Start: 2021-01-01 | End: 2021-01-01

## 2021-01-01 RX ORDER — LORAZEPAM 1 MG/1
1 TABLET ORAL
Status: DISCONTINUED | OUTPATIENT
Start: 2021-01-01 | End: 2021-01-01 | Stop reason: HOSPADM

## 2021-01-01 RX ORDER — ACETAMINOPHEN 325 MG/1
325 TABLET ORAL EVERY 6 HOURS PRN
Status: DISCONTINUED | OUTPATIENT
Start: 2021-01-01 | End: 2021-01-01 | Stop reason: HOSPADM

## 2021-01-01 RX ORDER — FUROSEMIDE 40 MG/1
40 TABLET ORAL DAILY
Qty: 60 TABLET | Refills: 3 | Status: SHIPPED | OUTPATIENT
Start: 2021-01-01

## 2021-01-01 RX ORDER — DEXTROSE MONOHYDRATE 25 G/50ML
12.5 INJECTION, SOLUTION INTRAVENOUS PRN
Status: DISCONTINUED | OUTPATIENT
Start: 2021-01-01 | End: 2021-01-01 | Stop reason: HOSPADM

## 2021-01-01 RX ORDER — LACTULOSE 10 G/15ML
20 SOLUTION ORAL 3 TIMES DAILY
Status: DISCONTINUED | OUTPATIENT
Start: 2021-01-01 | End: 2021-01-01 | Stop reason: HOSPADM

## 2021-01-01 RX ORDER — SODIUM CHLORIDE 0.9 % (FLUSH) 0.9 %
5-40 SYRINGE (ML) INJECTION PRN
Status: DISCONTINUED | OUTPATIENT
Start: 2021-01-01 | End: 2021-01-01 | Stop reason: HOSPADM

## 2021-01-01 RX ORDER — ACETAMINOPHEN 500 MG
500 TABLET ORAL EVERY 6 HOURS PRN
Status: DISCONTINUED | OUTPATIENT
Start: 2021-01-01 | End: 2021-01-01

## 2021-01-01 RX ORDER — POTASSIUM CHLORIDE 20 MEQ/1
20 TABLET, EXTENDED RELEASE ORAL
Status: DISCONTINUED | OUTPATIENT
Start: 2021-01-01 | End: 2021-01-01

## 2021-01-01 RX ORDER — SODIUM CHLORIDE 9 MG/ML
10 INJECTION INTRAVENOUS 2 TIMES DAILY
Status: DISCONTINUED | OUTPATIENT
Start: 2021-01-01 | End: 2021-01-01 | Stop reason: HOSPADM

## 2021-01-01 RX ORDER — SODIUM CHLORIDE 9 MG/ML
INJECTION, SOLUTION INTRAVENOUS PRN
Status: DISCONTINUED | OUTPATIENT
Start: 2021-01-01 | End: 2021-01-01 | Stop reason: SDUPTHER

## 2021-01-01 RX ORDER — MORPHINE SULFATE 4 MG/ML
3 INJECTION, SOLUTION INTRAMUSCULAR; INTRAVENOUS
Status: DISCONTINUED | OUTPATIENT
Start: 2021-01-01 | End: 2021-01-01 | Stop reason: HOSPADM

## 2021-01-01 RX ORDER — FUROSEMIDE 10 MG/ML
20 INJECTION INTRAMUSCULAR; INTRAVENOUS 3 TIMES DAILY
Status: DISCONTINUED | OUTPATIENT
Start: 2021-01-01 | End: 2021-01-01

## 2021-01-01 RX ORDER — LORAZEPAM 2 MG/ML
1 INJECTION INTRAMUSCULAR
Status: DISCONTINUED | OUTPATIENT
Start: 2021-01-01 | End: 2021-01-01 | Stop reason: HOSPADM

## 2021-01-01 RX ORDER — LACTULOSE 10 G/15ML
20 SOLUTION ORAL 3 TIMES DAILY
Qty: 1892 ML | Refills: 0 | Status: SHIPPED | OUTPATIENT
Start: 2021-01-01

## 2021-01-01 RX ORDER — TRAMADOL HYDROCHLORIDE 50 MG/1
25 TABLET ORAL EVERY 6 HOURS PRN
Status: DISCONTINUED | OUTPATIENT
Start: 2021-01-01 | End: 2021-01-01

## 2021-01-01 RX ORDER — SODIUM CHLORIDE 0.9 % (FLUSH) 0.9 %
5-40 SYRINGE (ML) INJECTION PRN
Status: DISCONTINUED | OUTPATIENT
Start: 2021-01-01 | End: 2021-01-01

## 2021-01-01 RX ORDER — POTASSIUM CHLORIDE 20 MEQ/1
20 TABLET, EXTENDED RELEASE ORAL
Status: COMPLETED | OUTPATIENT
Start: 2021-01-01 | End: 2021-01-01

## 2021-01-01 RX ORDER — SPIRONOLACTONE 25 MG/1
100 TABLET ORAL DAILY
Status: DISCONTINUED | OUTPATIENT
Start: 2021-01-01 | End: 2021-01-01 | Stop reason: HOSPADM

## 2021-01-01 RX ORDER — SODIUM BICARBONATE 650 MG/1
1300 TABLET ORAL 2 TIMES DAILY
Status: DISCONTINUED | OUTPATIENT
Start: 2021-01-01 | End: 2021-01-01

## 2021-01-01 RX ORDER — MORPHINE SULFATE 2 MG/ML
2 INJECTION, SOLUTION INTRAMUSCULAR; INTRAVENOUS
Status: DISCONTINUED | OUTPATIENT
Start: 2021-01-01 | End: 2021-01-01

## 2021-01-01 RX ORDER — ACETAMINOPHEN 650 MG/1
650 SUPPOSITORY RECTAL EVERY 6 HOURS PRN
Status: DISCONTINUED | OUTPATIENT
Start: 2021-01-01 | End: 2021-01-01

## 2021-01-01 RX ORDER — POTASSIUM BICARBONATE 25 MEQ/1
50 TABLET, EFFERVESCENT ORAL ONCE
Status: DISCONTINUED | OUTPATIENT
Start: 2021-01-01 | End: 2021-01-01 | Stop reason: CLARIF

## 2021-01-01 RX ORDER — MEPERIDINE HYDROCHLORIDE 25 MG/ML
25 INJECTION INTRAMUSCULAR; INTRAVENOUS; SUBCUTANEOUS EVERY 5 MIN PRN
Status: DISCONTINUED | OUTPATIENT
Start: 2021-01-01 | End: 2021-01-01 | Stop reason: HOSPADM

## 2021-01-01 RX ORDER — FUROSEMIDE 10 MG/ML
40 INJECTION INTRAMUSCULAR; INTRAVENOUS ONCE
Status: DISCONTINUED | OUTPATIENT
Start: 2021-01-01 | End: 2021-01-01

## 2021-01-01 RX ORDER — MIDODRINE HYDROCHLORIDE 5 MG/1
15 TABLET ORAL
Status: DISCONTINUED | OUTPATIENT
Start: 2021-01-01 | End: 2021-01-01

## 2021-01-01 RX ORDER — LORAZEPAM 1 MG/1
3 TABLET ORAL
Status: DISCONTINUED | OUTPATIENT
Start: 2021-01-01 | End: 2021-01-01

## 2021-01-01 RX ORDER — FENTANYL CITRATE 50 UG/ML
25 INJECTION, SOLUTION INTRAMUSCULAR; INTRAVENOUS EVERY 5 MIN PRN
Status: DISCONTINUED | OUTPATIENT
Start: 2021-01-01 | End: 2021-01-01

## 2021-01-01 RX ORDER — LORAZEPAM 1 MG/1
4 TABLET ORAL
Status: DISCONTINUED | OUTPATIENT
Start: 2021-01-01 | End: 2021-01-01

## 2021-01-01 RX ORDER — PANTOPRAZOLE SODIUM 40 MG/10ML
40 INJECTION, POWDER, LYOPHILIZED, FOR SOLUTION INTRAVENOUS DAILY
Status: DISCONTINUED | OUTPATIENT
Start: 2021-01-01 | End: 2021-01-01

## 2021-01-01 RX ORDER — FUROSEMIDE 40 MG/1
40 TABLET ORAL DAILY
Status: DISCONTINUED | OUTPATIENT
Start: 2021-01-01 | End: 2021-01-01 | Stop reason: HOSPADM

## 2021-01-01 RX ORDER — LACTULOSE 10 G/15ML
20 SOLUTION ORAL 3 TIMES DAILY
Status: DISCONTINUED | OUTPATIENT
Start: 2021-01-01 | End: 2021-01-01

## 2021-01-01 RX ORDER — PHYTONADIONE 5 MG/1
5 TABLET ORAL DAILY
Status: DISCONTINUED | OUTPATIENT
Start: 2021-01-01 | End: 2021-01-01

## 2021-01-01 RX ORDER — THIAMINE HYDROCHLORIDE 100 MG/ML
100 INJECTION, SOLUTION INTRAMUSCULAR; INTRAVENOUS DAILY
Status: DISCONTINUED | OUTPATIENT
Start: 2021-01-01 | End: 2021-01-01 | Stop reason: CLARIF

## 2021-01-01 RX ORDER — METOPROLOL TARTRATE 5 MG/5ML
INJECTION INTRAVENOUS
Status: COMPLETED
Start: 2021-01-01 | End: 2021-01-01

## 2021-01-01 RX ORDER — ONDANSETRON 2 MG/ML
4 INJECTION INTRAMUSCULAR; INTRAVENOUS
Status: ACTIVE | OUTPATIENT
Start: 2021-01-01 | End: 2021-01-01

## 2021-01-01 RX ORDER — FUROSEMIDE 10 MG/ML
40 INJECTION INTRAMUSCULAR; INTRAVENOUS 2 TIMES DAILY
Status: COMPLETED | OUTPATIENT
Start: 2021-01-01 | End: 2021-01-01

## 2021-01-01 RX ORDER — PROCHLORPERAZINE EDISYLATE 5 MG/ML
10 INJECTION INTRAMUSCULAR; INTRAVENOUS EVERY 6 HOURS PRN
Status: DISCONTINUED | OUTPATIENT
Start: 2021-01-01 | End: 2021-01-01

## 2021-01-01 RX ORDER — ALBUMIN (HUMAN) 12.5 G/50ML
25 SOLUTION INTRAVENOUS EVERY 8 HOURS
Status: DISCONTINUED | OUTPATIENT
Start: 2021-01-01 | End: 2021-01-01

## 2021-01-01 RX ORDER — NICOTINE POLACRILEX 4 MG
15 LOZENGE BUCCAL PRN
Status: DISCONTINUED | OUTPATIENT
Start: 2021-01-01 | End: 2021-01-01 | Stop reason: HOSPADM

## 2021-01-01 RX ORDER — MIDODRINE HYDROCHLORIDE 5 MG/1
TABLET ORAL
Status: DISPENSED
Start: 2021-01-01 | End: 2021-01-01

## 2021-01-01 RX ORDER — SODIUM CHLORIDE 9 MG/ML
250 INJECTION, SOLUTION INTRAVENOUS PRN
Status: DISCONTINUED | OUTPATIENT
Start: 2021-01-01 | End: 2021-01-01 | Stop reason: HOSPADM

## 2021-01-01 RX ORDER — FENTANYL CITRATE 50 UG/ML
12.5 INJECTION, SOLUTION INTRAMUSCULAR; INTRAVENOUS ONCE
Status: COMPLETED | OUTPATIENT
Start: 2021-01-01 | End: 2021-01-01

## 2021-01-01 RX ORDER — SODIUM CHLORIDE, SODIUM LACTATE, POTASSIUM CHLORIDE, AND CALCIUM CHLORIDE .6; .31; .03; .02 G/100ML; G/100ML; G/100ML; G/100ML
250 INJECTION, SOLUTION INTRAVENOUS ONCE
Status: COMPLETED | OUTPATIENT
Start: 2021-01-01 | End: 2021-01-01

## 2021-01-01 RX ORDER — FENTANYL CITRATE 50 UG/ML
50 INJECTION, SOLUTION INTRAMUSCULAR; INTRAVENOUS EVERY 5 MIN PRN
Status: DISCONTINUED | OUTPATIENT
Start: 2021-01-01 | End: 2021-01-01 | Stop reason: HOSPADM

## 2021-01-01 RX ORDER — ALBUMIN (HUMAN) 12.5 G/50ML
25 SOLUTION INTRAVENOUS EVERY 12 HOURS
Status: DISCONTINUED | OUTPATIENT
Start: 2021-01-01 | End: 2021-01-01

## 2021-01-01 RX ORDER — POTASSIUM CHLORIDE 7.45 MG/ML
10 INJECTION INTRAVENOUS PRN
Status: DISCONTINUED | OUTPATIENT
Start: 2021-01-01 | End: 2021-01-01

## 2021-01-01 RX ORDER — POTASSIUM CHLORIDE 20 MEQ/1
20 TABLET, EXTENDED RELEASE ORAL 2 TIMES DAILY WITH MEALS
Status: DISCONTINUED | OUTPATIENT
Start: 2021-01-01 | End: 2021-01-01

## 2021-01-01 RX ORDER — SODIUM CHLORIDE 9 MG/ML
1000 INJECTION, SOLUTION INTRAVENOUS CONTINUOUS
Status: DISCONTINUED | OUTPATIENT
Start: 2021-01-01 | End: 2021-01-01

## 2021-01-01 RX ORDER — SODIUM CHLORIDE 9 MG/ML
10 INJECTION INTRAVENOUS DAILY
Status: DISCONTINUED | OUTPATIENT
Start: 2021-01-01 | End: 2021-01-01

## 2021-01-01 RX ORDER — LORAZEPAM 1 MG/1
2 TABLET ORAL
Status: DISCONTINUED | OUTPATIENT
Start: 2021-01-01 | End: 2021-01-01

## 2021-01-01 RX ORDER — PENTOXIFYLLINE 400 MG/1
400 TABLET, EXTENDED RELEASE ORAL
Status: DISCONTINUED | OUTPATIENT
Start: 2021-01-01 | End: 2021-01-01

## 2021-01-01 RX ORDER — POTASSIUM CHLORIDE 20 MEQ/1
40 TABLET, EXTENDED RELEASE ORAL
Status: DISCONTINUED | OUTPATIENT
Start: 2021-01-01 | End: 2021-01-01 | Stop reason: HOSPADM

## 2021-01-01 RX ORDER — GAUZE BANDAGE 2" X 2"
100 BANDAGE TOPICAL DAILY
Status: DISCONTINUED | OUTPATIENT
Start: 2021-01-01 | End: 2021-01-01

## 2021-01-01 RX ORDER — LIDOCAINE 4 G/G
1 PATCH TOPICAL DAILY PRN
Status: DISCONTINUED | OUTPATIENT
Start: 2021-01-01 | End: 2021-01-01 | Stop reason: HOSPADM

## 2021-01-01 RX ORDER — POTASSIUM CHLORIDE 20 MEQ/1
20 TABLET, EXTENDED RELEASE ORAL
Qty: 60 TABLET | Refills: 3 | Status: SHIPPED | OUTPATIENT
Start: 2021-01-01

## 2021-01-01 RX ORDER — PANTOPRAZOLE SODIUM 40 MG/1
40 TABLET, DELAYED RELEASE ORAL
Status: DISCONTINUED | OUTPATIENT
Start: 2021-01-01 | End: 2021-01-01

## 2021-01-01 RX ORDER — LACTULOSE 10 G/15ML
30 SOLUTION ORAL 3 TIMES DAILY
Status: DISCONTINUED | OUTPATIENT
Start: 2021-01-01 | End: 2021-01-01

## 2021-01-01 RX ORDER — SODIUM BICARBONATE 650 MG/1
650 TABLET ORAL 2 TIMES DAILY
Status: DISCONTINUED | OUTPATIENT
Start: 2021-01-01 | End: 2021-01-01

## 2021-01-01 RX ORDER — SPIRONOLACTONE 100 MG/1
100 TABLET, FILM COATED ORAL DAILY
Qty: 30 TABLET | Refills: 3 | Status: SHIPPED | OUTPATIENT
Start: 2021-01-01

## 2021-01-01 RX ORDER — POTASSIUM CHLORIDE 20 MEQ/1
20 TABLET, EXTENDED RELEASE ORAL 2 TIMES DAILY WITH MEALS
Status: COMPLETED | OUTPATIENT
Start: 2021-01-01 | End: 2021-01-01

## 2021-01-01 RX ORDER — KETOROLAC TROMETHAMINE 15 MG/ML
15 INJECTION, SOLUTION INTRAMUSCULAR; INTRAVENOUS ONCE
Status: COMPLETED | OUTPATIENT
Start: 2021-01-01 | End: 2021-01-01

## 2021-01-01 RX ORDER — OMEPRAZOLE 20 MG/1
20-40 CAPSULE, DELAYED RELEASE ORAL DAILY
COMMUNITY

## 2021-01-01 RX ORDER — ACETAMINOPHEN 650 MG/1
325 SUPPOSITORY RECTAL EVERY 6 HOURS PRN
Status: DISCONTINUED | OUTPATIENT
Start: 2021-01-01 | End: 2021-01-01 | Stop reason: HOSPADM

## 2021-01-01 RX ORDER — ALBUMIN (HUMAN) 12.5 G/50ML
50 SOLUTION INTRAVENOUS ONCE
Status: COMPLETED | OUTPATIENT
Start: 2021-01-01 | End: 2021-01-01

## 2021-01-01 RX ADMIN — POTASSIUM CHLORIDE 10 MEQ: 10 INJECTION, SOLUTION INTRAVENOUS at 12:52

## 2021-01-01 RX ADMIN — ALBUMIN (HUMAN) 25 G: 0.25 INJECTION, SOLUTION INTRAVENOUS at 02:00

## 2021-01-01 RX ADMIN — THIAMINE HYDROCHLORIDE 100 MG: 100 INJECTION, SOLUTION INTRAMUSCULAR; INTRAVENOUS at 13:07

## 2021-01-01 RX ADMIN — RIFAXIMIN 550 MG: 550 TABLET ORAL at 13:12

## 2021-01-01 RX ADMIN — OCTREOTIDE ACETATE 50 MCG/HR: 500 INJECTION, SOLUTION INTRAVENOUS; SUBCUTANEOUS at 15:26

## 2021-01-01 RX ADMIN — OCTREOTIDE ACETATE 50 MCG/HR: 500 INJECTION, SOLUTION INTRAVENOUS; SUBCUTANEOUS at 18:07

## 2021-01-01 RX ADMIN — PIPERACILLIN SODIUM AND TAZOBACTAM SODIUM 3375 MG: 3; .375 INJECTION, POWDER, LYOPHILIZED, FOR SOLUTION INTRAVENOUS at 11:19

## 2021-01-01 RX ADMIN — Medication 10 ML: at 08:57

## 2021-01-01 RX ADMIN — POTASSIUM CHLORIDE 20 MEQ: 1500 TABLET, EXTENDED RELEASE ORAL at 09:28

## 2021-01-01 RX ADMIN — PIPERACILLIN SODIUM AND TAZOBACTAM SODIUM 3375 MG: 3; .375 INJECTION, POWDER, LYOPHILIZED, FOR SOLUTION INTRAVENOUS at 17:59

## 2021-01-01 RX ADMIN — SODIUM CHLORIDE, PRESERVATIVE FREE 10 ML: 5 INJECTION INTRAVENOUS at 21:18

## 2021-01-01 RX ADMIN — OCTREOTIDE ACETATE 50 MCG/HR: 500 INJECTION, SOLUTION INTRAVENOUS; SUBCUTANEOUS at 02:31

## 2021-01-01 RX ADMIN — POTASSIUM BICARBONATE 20 MEQ: 782 TABLET, EFFERVESCENT ORAL at 21:33

## 2021-01-01 RX ADMIN — RIFAXIMIN 550 MG: 550 TABLET ORAL at 13:51

## 2021-01-01 RX ADMIN — OCTREOTIDE ACETATE 50 MCG/HR: 500 INJECTION, SOLUTION INTRAVENOUS; SUBCUTANEOUS at 02:33

## 2021-01-01 RX ADMIN — CHOLECALCIFEROL TAB 125 MCG (5000 UNIT) 5000 UNITS: 125 TAB at 07:53

## 2021-01-01 RX ADMIN — PHYTONADIONE 10 MG: 10 INJECTION, EMULSION INTRAMUSCULAR; INTRAVENOUS; SUBCUTANEOUS at 11:48

## 2021-01-01 RX ADMIN — FUROSEMIDE 20 MG: 10 INJECTION, SOLUTION INTRAMUSCULAR; INTRAVENOUS at 14:00

## 2021-01-01 RX ADMIN — SODIUM CHLORIDE: 9 INJECTION, SOLUTION INTRAVENOUS at 22:25

## 2021-01-01 RX ADMIN — Medication 10 ML: at 10:45

## 2021-01-01 RX ADMIN — FUROSEMIDE 20 MG: 10 INJECTION, SOLUTION INTRAMUSCULAR; INTRAVENOUS at 09:01

## 2021-01-01 RX ADMIN — FUROSEMIDE 20 MG: 10 INJECTION, SOLUTION INTRAMUSCULAR; INTRAVENOUS at 22:01

## 2021-01-01 RX ADMIN — SODIUM BICARBONATE 1300 MG: 650 TABLET ORAL at 16:54

## 2021-01-01 RX ADMIN — MIDODRINE HYDROCHLORIDE 15 MG: 5 TABLET ORAL at 21:46

## 2021-01-01 RX ADMIN — DEXTROSE MONOHYDRATE 25 G: 500 INJECTION PARENTERAL at 12:12

## 2021-01-01 RX ADMIN — CHOLECALCIFEROL TAB 125 MCG (5000 UNIT) 5000 UNITS: 125 TAB at 17:41

## 2021-01-01 RX ADMIN — SODIUM CHLORIDE: 9 INJECTION, SOLUTION INTRAVENOUS at 06:20

## 2021-01-01 RX ADMIN — CHOLECALCIFEROL TAB 125 MCG (5000 UNIT) 5000 UNITS: 125 TAB at 09:06

## 2021-01-01 RX ADMIN — MIDODRINE HYDROCHLORIDE 15 MG: 5 TABLET ORAL at 09:36

## 2021-01-01 RX ADMIN — RIFAXIMIN 550 MG: 550 TABLET ORAL at 21:53

## 2021-01-01 RX ADMIN — PANTOPRAZOLE SODIUM 40 MG: 40 INJECTION, POWDER, FOR SOLUTION INTRAVENOUS at 08:34

## 2021-01-01 RX ADMIN — POTASSIUM CHLORIDE 20 MEQ: 1500 TABLET, EXTENDED RELEASE ORAL at 13:01

## 2021-01-01 RX ADMIN — RIFAXIMIN 550 MG: 550 TABLET ORAL at 07:59

## 2021-01-01 RX ADMIN — MIDODRINE HYDROCHLORIDE 15 MG: 5 TABLET ORAL at 18:30

## 2021-01-01 RX ADMIN — CHOLECALCIFEROL TAB 125 MCG (5000 UNIT) 5000 UNITS: 125 TAB at 08:42

## 2021-01-01 RX ADMIN — SODIUM CHLORIDE: 9 INJECTION, SOLUTION INTRAVENOUS at 06:39

## 2021-01-01 RX ADMIN — PANTOPRAZOLE SODIUM 40 MG: 40 TABLET, DELAYED RELEASE ORAL at 06:27

## 2021-01-01 RX ADMIN — SODIUM CHLORIDE, PRESERVATIVE FREE 10 ML: 5 INJECTION INTRAVENOUS at 08:44

## 2021-01-01 RX ADMIN — PANTOPRAZOLE SODIUM 40 MG: 40 INJECTION, POWDER, FOR SOLUTION INTRAVENOUS at 10:12

## 2021-01-01 RX ADMIN — Medication 10 ML: at 08:07

## 2021-01-01 RX ADMIN — Medication 10 ML: at 22:04

## 2021-01-01 RX ADMIN — THIAMINE HYDROCHLORIDE 100 MG: 100 INJECTION, SOLUTION INTRAMUSCULAR; INTRAVENOUS at 08:31

## 2021-01-01 RX ADMIN — PANTOPRAZOLE SODIUM 40 MG: 40 INJECTION, POWDER, FOR SOLUTION INTRAVENOUS at 09:14

## 2021-01-01 RX ADMIN — PIPERACILLIN SODIUM AND TAZOBACTAM SODIUM 3375 MG: 3; .375 INJECTION, POWDER, LYOPHILIZED, FOR SOLUTION INTRAVENOUS at 11:46

## 2021-01-01 RX ADMIN — PANTOPRAZOLE SODIUM 40 MG: 40 INJECTION, POWDER, FOR SOLUTION INTRAVENOUS at 19:56

## 2021-01-01 RX ADMIN — INSULIN LISPRO 1 UNITS: 100 INJECTION, SOLUTION INTRAVENOUS; SUBCUTANEOUS at 16:34

## 2021-01-01 RX ADMIN — SODIUM BICARBONATE 1300 MG: 650 TABLET ORAL at 08:26

## 2021-01-01 RX ADMIN — Medication 10 ML: at 09:48

## 2021-01-01 RX ADMIN — FUROSEMIDE 20 MG: 10 INJECTION, SOLUTION INTRAMUSCULAR; INTRAVENOUS at 16:24

## 2021-01-01 RX ADMIN — CEFTRIAXONE SODIUM 1000 MG: 1 INJECTION, POWDER, FOR SOLUTION INTRAMUSCULAR; INTRAVENOUS at 21:17

## 2021-01-01 RX ADMIN — LACTULOSE 30 G: 20 SOLUTION ORAL at 09:45

## 2021-01-01 RX ADMIN — MIDODRINE HYDROCHLORIDE 15 MG: 5 TABLET ORAL at 13:32

## 2021-01-01 RX ADMIN — PHENYLEPHRINE HYDROCHLORIDE 130 MCG/MIN: 10 INJECTION INTRAVENOUS at 11:09

## 2021-01-01 RX ADMIN — OCTREOTIDE ACETATE 50 MCG/HR: 500 INJECTION, SOLUTION INTRAVENOUS; SUBCUTANEOUS at 16:35

## 2021-01-01 RX ADMIN — MIDODRINE HYDROCHLORIDE 10 MG: 5 TABLET ORAL at 08:00

## 2021-01-01 RX ADMIN — MIDODRINE HYDROCHLORIDE 15 MG: 5 TABLET ORAL at 10:43

## 2021-01-01 RX ADMIN — LACTULOSE 20 G: 20 SOLUTION ORAL at 13:32

## 2021-01-01 RX ADMIN — Medication 250 MG: at 21:13

## 2021-01-01 RX ADMIN — RIFAXIMIN 550 MG: 550 TABLET ORAL at 09:45

## 2021-01-01 RX ADMIN — PHENYLEPHRINE HYDROCHLORIDE 115 MCG/MIN: 10 INJECTION INTRAVENOUS at 12:52

## 2021-01-01 RX ADMIN — PENTOXIFYLLINE 400 MG: 400 TABLET, EXTENDED RELEASE ORAL at 09:01

## 2021-01-01 RX ADMIN — CHOLECALCIFEROL TAB 125 MCG (5000 UNIT) 5000 UNITS: 125 TAB at 08:00

## 2021-01-01 RX ADMIN — POTASSIUM CHLORIDE 40 MEQ: 20 TABLET, EXTENDED RELEASE ORAL at 09:17

## 2021-01-01 RX ADMIN — CHOLECALCIFEROL TAB 125 MCG (5000 UNIT) 5000 UNITS: 125 TAB at 09:02

## 2021-01-01 RX ADMIN — POTASSIUM CHLORIDE 40 MEQ: 20 TABLET, EXTENDED RELEASE ORAL at 17:08

## 2021-01-01 RX ADMIN — PREDNISOLONE 40 MG: 15 SOLUTION ORAL at 08:04

## 2021-01-01 RX ADMIN — RIFAXIMIN 550 MG: 550 TABLET ORAL at 08:51

## 2021-01-01 RX ADMIN — SODIUM CHLORIDE: 9 INJECTION, SOLUTION INTRAVENOUS at 21:55

## 2021-01-01 RX ADMIN — RIFAXIMIN 550 MG: 550 TABLET ORAL at 21:01

## 2021-01-01 RX ADMIN — PIPERACILLIN SODIUM AND TAZOBACTAM SODIUM 3375 MG: 3; .375 INJECTION, POWDER, LYOPHILIZED, FOR SOLUTION INTRAVENOUS at 02:41

## 2021-01-01 RX ADMIN — PHYTONADIONE 10 MG: 10 INJECTION, EMULSION INTRAMUSCULAR; INTRAVENOUS; SUBCUTANEOUS at 07:40

## 2021-01-01 RX ADMIN — Medication 5 ML: at 22:21

## 2021-01-01 RX ADMIN — PANTOPRAZOLE SODIUM 40 MG: 40 INJECTION, POWDER, FOR SOLUTION INTRAVENOUS at 09:03

## 2021-01-01 RX ADMIN — TRAMADOL HYDROCHLORIDE 25 MG: 50 TABLET, FILM COATED ORAL at 13:49

## 2021-01-01 RX ADMIN — OCTREOTIDE ACETATE 50 MCG/HR: 500 INJECTION, SOLUTION INTRAVENOUS; SUBCUTANEOUS at 00:07

## 2021-01-01 RX ADMIN — OCTREOTIDE ACETATE 50 MCG/HR: 500 INJECTION, SOLUTION INTRAVENOUS; SUBCUTANEOUS at 18:31

## 2021-01-01 RX ADMIN — PIPERACILLIN SODIUM AND TAZOBACTAM SODIUM 3375 MG: 3; .375 INJECTION, POWDER, LYOPHILIZED, FOR SOLUTION INTRAVENOUS at 01:26

## 2021-01-01 RX ADMIN — FUROSEMIDE 40 MG: 10 INJECTION, SOLUTION INTRAMUSCULAR; INTRAVENOUS at 13:01

## 2021-01-01 RX ADMIN — PHYTONADIONE 10 MG: 10 INJECTION, EMULSION INTRAMUSCULAR; INTRAVENOUS; SUBCUTANEOUS at 01:01

## 2021-01-01 RX ADMIN — SODIUM CHLORIDE: 9 INJECTION, SOLUTION INTRAVENOUS at 14:47

## 2021-01-01 RX ADMIN — LACTULOSE 20 G: 20 SOLUTION ORAL at 22:04

## 2021-01-01 RX ADMIN — Medication 20 ML: at 10:10

## 2021-01-01 RX ADMIN — THIAMINE HYDROCHLORIDE 100 MG: 100 INJECTION, SOLUTION INTRAMUSCULAR; INTRAVENOUS at 09:07

## 2021-01-01 RX ADMIN — LACTULOSE 20 G: 20 SOLUTION ORAL at 14:00

## 2021-01-01 RX ADMIN — PIPERACILLIN SODIUM AND TAZOBACTAM SODIUM 3375 MG: 3; .375 INJECTION, POWDER, LYOPHILIZED, FOR SOLUTION INTRAVENOUS at 11:37

## 2021-01-01 RX ADMIN — SODIUM BICARBONATE 1300 MG: 650 TABLET ORAL at 09:14

## 2021-01-01 RX ADMIN — SODIUM CHLORIDE 1000 ML: 9 INJECTION, SOLUTION INTRAVENOUS at 19:48

## 2021-01-01 RX ADMIN — FUROSEMIDE 20 MG: 10 INJECTION, SOLUTION INTRAMUSCULAR; INTRAVENOUS at 15:39

## 2021-01-01 RX ADMIN — PREDNISOLONE 40 MG: 15 SOLUTION ORAL at 16:40

## 2021-01-01 RX ADMIN — PHYTONADIONE 5 MG: 5 TABLET ORAL at 07:32

## 2021-01-01 RX ADMIN — MORPHINE SULFATE 2 MG: 2 INJECTION, SOLUTION INTRAMUSCULAR; INTRAVENOUS at 12:31

## 2021-01-01 RX ADMIN — PHYTONADIONE 10 MG: 5 TABLET ORAL at 07:51

## 2021-01-01 RX ADMIN — SODIUM CHLORIDE: 9 INJECTION, SOLUTION INTRAVENOUS at 22:08

## 2021-01-01 RX ADMIN — MIDODRINE HYDROCHLORIDE 15 MG: 5 TABLET ORAL at 17:12

## 2021-01-01 RX ADMIN — SODIUM CHLORIDE, PRESERVATIVE FREE 10 ML: 5 INJECTION INTRAVENOUS at 10:45

## 2021-01-01 RX ADMIN — PANTOPRAZOLE SODIUM 40 MG: 40 INJECTION, POWDER, FOR SOLUTION INTRAVENOUS at 08:05

## 2021-01-01 RX ADMIN — LACTULOSE 30 G: 20 SOLUTION ORAL at 21:17

## 2021-01-01 RX ADMIN — MORPHINE SULFATE 2 MG: 2 INJECTION, SOLUTION INTRAMUSCULAR; INTRAVENOUS at 10:55

## 2021-01-01 RX ADMIN — FUROSEMIDE 20 MG: 10 INJECTION, SOLUTION INTRAMUSCULAR; INTRAVENOUS at 20:38

## 2021-01-01 RX ADMIN — MIDODRINE HYDROCHLORIDE 15 MG: 5 TABLET ORAL at 08:24

## 2021-01-01 RX ADMIN — LACTULOSE 20 G: 20 SOLUTION ORAL at 22:03

## 2021-01-01 RX ADMIN — POTASSIUM CHLORIDE 20 MEQ: 20 TABLET, EXTENDED RELEASE ORAL at 17:13

## 2021-01-01 RX ADMIN — PIPERACILLIN SODIUM AND TAZOBACTAM SODIUM 3375 MG: 3; .375 INJECTION, POWDER, LYOPHILIZED, FOR SOLUTION INTRAVENOUS at 03:10

## 2021-01-01 RX ADMIN — RIFAXIMIN 550 MG: 550 TABLET ORAL at 13:46

## 2021-01-01 RX ADMIN — POTASSIUM CHLORIDE 40 MEQ: 20 TABLET, EXTENDED RELEASE ORAL at 20:23

## 2021-01-01 RX ADMIN — FUROSEMIDE 20 MG: 10 INJECTION, SOLUTION INTRAMUSCULAR; INTRAVENOUS at 22:52

## 2021-01-01 RX ADMIN — RIFAXIMIN 550 MG: 550 TABLET ORAL at 20:16

## 2021-01-01 RX ADMIN — LACTULOSE 20 G: 20 SOLUTION ORAL at 20:17

## 2021-01-01 RX ADMIN — ALBUMIN (HUMAN) 25 G: 0.25 INJECTION, SOLUTION INTRAVENOUS at 02:42

## 2021-01-01 RX ADMIN — RIFAXIMIN 550 MG: 550 TABLET ORAL at 08:27

## 2021-01-01 RX ADMIN — PIPERACILLIN SODIUM AND TAZOBACTAM SODIUM 3375 MG: 3; .375 INJECTION, POWDER, LYOPHILIZED, FOR SOLUTION INTRAVENOUS at 12:07

## 2021-01-01 RX ADMIN — LACTULOSE 20 G: 20 SOLUTION ORAL at 13:51

## 2021-01-01 RX ADMIN — SODIUM BICARBONATE 650 MG: 650 TABLET ORAL at 10:43

## 2021-01-01 RX ADMIN — LACTULOSE 30 G: 20 SOLUTION ORAL at 20:23

## 2021-01-01 RX ADMIN — SODIUM CHLORIDE: 9 INJECTION, SOLUTION INTRAVENOUS at 09:40

## 2021-01-01 RX ADMIN — PANTOPRAZOLE SODIUM 40 MG: 40 INJECTION, POWDER, FOR SOLUTION INTRAVENOUS at 21:17

## 2021-01-01 RX ADMIN — ALBUMIN (HUMAN) 25 G: 0.25 INJECTION, SOLUTION INTRAVENOUS at 18:46

## 2021-01-01 RX ADMIN — LACTULOSE 20 G: 20 SOLUTION ORAL at 09:16

## 2021-01-01 RX ADMIN — POTASSIUM CHLORIDE 40 MEQ: 20 TABLET, EXTENDED RELEASE ORAL at 07:04

## 2021-01-01 RX ADMIN — LACTULOSE 20 G: 20 SOLUTION ORAL at 08:51

## 2021-01-01 RX ADMIN — SODIUM BICARBONATE 1300 MG: 650 TABLET ORAL at 09:39

## 2021-01-01 RX ADMIN — OCTREOTIDE ACETATE 50 MCG/HR: 500 INJECTION, SOLUTION INTRAVENOUS; SUBCUTANEOUS at 01:28

## 2021-01-01 RX ADMIN — PIPERACILLIN SODIUM AND TAZOBACTAM SODIUM 3375 MG: 3; .375 INJECTION, POWDER, LYOPHILIZED, FOR SOLUTION INTRAVENOUS at 12:53

## 2021-01-01 RX ADMIN — POTASSIUM CHLORIDE 40 MEQ: 20 TABLET, EXTENDED RELEASE ORAL at 09:42

## 2021-01-01 RX ADMIN — MIDODRINE HYDROCHLORIDE 15 MG: 5 TABLET ORAL at 08:07

## 2021-01-01 RX ADMIN — SODIUM CHLORIDE, PRESERVATIVE FREE 10 ML: 5 INJECTION INTRAVENOUS at 09:17

## 2021-01-01 RX ADMIN — CHOLECALCIFEROL TAB 125 MCG (5000 UNIT) 5000 UNITS: 125 TAB at 08:31

## 2021-01-01 RX ADMIN — SODIUM BICARBONATE 650 MG: 650 TABLET ORAL at 07:52

## 2021-01-01 RX ADMIN — SODIUM CHLORIDE: 9 INJECTION, SOLUTION INTRAVENOUS at 06:06

## 2021-01-01 RX ADMIN — MORPHINE SULFATE 2 MG: 2 INJECTION, SOLUTION INTRAMUSCULAR; INTRAVENOUS at 08:26

## 2021-01-01 RX ADMIN — SODIUM BICARBONATE 650 MG: 650 TABLET ORAL at 21:13

## 2021-01-01 RX ADMIN — METOPROLOL TARTRATE 2.5 MG: 1 INJECTION, SOLUTION INTRAVENOUS at 13:33

## 2021-01-01 RX ADMIN — POTASSIUM CHLORIDE 20 MEQ: 20 TABLET, EXTENDED RELEASE ORAL at 10:01

## 2021-01-01 RX ADMIN — PENTOXIFYLLINE 400 MG: 400 TABLET, EXTENDED RELEASE ORAL at 17:12

## 2021-01-01 RX ADMIN — TRAMADOL HYDROCHLORIDE 25 MG: 50 TABLET, FILM COATED ORAL at 11:47

## 2021-01-01 RX ADMIN — RIFAXIMIN 550 MG: 550 TABLET ORAL at 21:35

## 2021-01-01 RX ADMIN — FUROSEMIDE 40 MG: 40 TABLET ORAL at 08:33

## 2021-01-01 RX ADMIN — RIFAXIMIN 550 MG: 550 TABLET ORAL at 20:40

## 2021-01-01 RX ADMIN — Medication 6 MG: at 21:13

## 2021-01-01 RX ADMIN — POTASSIUM CHLORIDE 10 MEQ: 10 INJECTION, SOLUTION INTRAVENOUS at 14:50

## 2021-01-01 RX ADMIN — RIFAXIMIN 550 MG: 550 TABLET ORAL at 22:04

## 2021-01-01 RX ADMIN — SODIUM BICARBONATE 1300 MG: 650 TABLET ORAL at 21:53

## 2021-01-01 RX ADMIN — PANTOPRAZOLE SODIUM 40 MG: 40 INJECTION, POWDER, FOR SOLUTION INTRAVENOUS at 08:41

## 2021-01-01 RX ADMIN — SODIUM CHLORIDE 1000 ML: 9 INJECTION, SOLUTION INTRAVENOUS at 11:00

## 2021-01-01 RX ADMIN — SODIUM CHLORIDE, PRESERVATIVE FREE 10 ML: 5 INJECTION INTRAVENOUS at 14:29

## 2021-01-01 RX ADMIN — PHENYLEPHRINE HYDROCHLORIDE 125 MCG/MIN: 10 INJECTION INTRAVENOUS at 21:30

## 2021-01-01 RX ADMIN — LACTULOSE 20 G: 20 SOLUTION ORAL at 08:00

## 2021-01-01 RX ADMIN — ALBUMIN (HUMAN) 25 G: 0.25 INJECTION, SOLUTION INTRAVENOUS at 02:41

## 2021-01-01 RX ADMIN — RIFAXIMIN 550 MG: 550 TABLET ORAL at 07:47

## 2021-01-01 RX ADMIN — MAGNESIUM SULFATE 2000 MG: 2 INJECTION INTRAVENOUS at 13:02

## 2021-01-01 RX ADMIN — POTASSIUM CHLORIDE 40 MEQ: 20 TABLET, EXTENDED RELEASE ORAL at 13:04

## 2021-01-01 RX ADMIN — SODIUM CHLORIDE: 9 INJECTION, SOLUTION INTRAVENOUS at 17:17

## 2021-01-01 RX ADMIN — ALBUMIN (HUMAN) 25 G: 0.25 INJECTION, SOLUTION INTRAVENOUS at 02:32

## 2021-01-01 RX ADMIN — SPIRONOLACTONE 100 MG: 25 TABLET ORAL at 08:33

## 2021-01-01 RX ADMIN — THIAMINE HYDROCHLORIDE 100 MG: 100 INJECTION, SOLUTION INTRAMUSCULAR; INTRAVENOUS at 08:42

## 2021-01-01 RX ADMIN — ALBUMIN (HUMAN) 25 G: 0.25 INJECTION, SOLUTION INTRAVENOUS at 10:07

## 2021-01-01 RX ADMIN — RIFAXIMIN 550 MG: 550 TABLET ORAL at 20:38

## 2021-01-01 RX ADMIN — PIPERACILLIN SODIUM AND TAZOBACTAM SODIUM 3375 MG: 3; .375 INJECTION, POWDER, LYOPHILIZED, FOR SOLUTION INTRAVENOUS at 19:39

## 2021-01-01 RX ADMIN — Medication 10 ML: at 08:32

## 2021-01-01 RX ADMIN — SODIUM CHLORIDE, PRESERVATIVE FREE 10 ML: 5 INJECTION INTRAVENOUS at 08:41

## 2021-01-01 RX ADMIN — LORAZEPAM 1 MG: 2 INJECTION INTRAMUSCULAR; INTRAVENOUS at 16:23

## 2021-01-01 RX ADMIN — SODIUM CHLORIDE: 9 INJECTION, SOLUTION INTRAVENOUS at 15:00

## 2021-01-01 RX ADMIN — Medication 10 ML: at 08:42

## 2021-01-01 RX ADMIN — ALBUMIN (HUMAN) 25 G: 0.25 INJECTION, SOLUTION INTRAVENOUS at 17:36

## 2021-01-01 RX ADMIN — CHOLECALCIFEROL TAB 125 MCG (5000 UNIT) 5000 UNITS: 125 TAB at 08:07

## 2021-01-01 RX ADMIN — SODIUM CHLORIDE, PRESERVATIVE FREE 10 ML: 5 INJECTION INTRAVENOUS at 09:28

## 2021-01-01 RX ADMIN — FUROSEMIDE 40 MG: 40 TABLET ORAL at 12:51

## 2021-01-01 RX ADMIN — RIFAXIMIN 550 MG: 550 TABLET ORAL at 20:17

## 2021-01-01 RX ADMIN — POTASSIUM CHLORIDE 20 MEQ: 1500 TABLET, EXTENDED RELEASE ORAL at 17:44

## 2021-01-01 RX ADMIN — Medication 10 ML: at 20:44

## 2021-01-01 RX ADMIN — PHENYLEPHRINE HYDROCHLORIDE 100 MCG/KG/MIN: 10 INJECTION INTRAVENOUS at 19:58

## 2021-01-01 RX ADMIN — PHENYLEPHRINE HYDROCHLORIDE 80 MCG/MIN: 10 INJECTION INTRAVENOUS at 18:31

## 2021-01-01 RX ADMIN — Medication 10 ML: at 08:34

## 2021-01-01 RX ADMIN — THIAMINE HYDROCHLORIDE 100 MG: 100 INJECTION, SOLUTION INTRAMUSCULAR; INTRAVENOUS at 08:52

## 2021-01-01 RX ADMIN — LORAZEPAM 1 MG: 2 INJECTION INTRAMUSCULAR; INTRAVENOUS at 10:07

## 2021-01-01 RX ADMIN — PIPERACILLIN SODIUM AND TAZOBACTAM SODIUM 3375 MG: 3; .375 INJECTION, POWDER, LYOPHILIZED, FOR SOLUTION INTRAVENOUS at 02:42

## 2021-01-01 RX ADMIN — PHYTONADIONE 10 MG: 10 INJECTION, EMULSION INTRAMUSCULAR; INTRAVENOUS; SUBCUTANEOUS at 13:05

## 2021-01-01 RX ADMIN — POTASSIUM CHLORIDE 20 MEQ: 20 TABLET, EXTENDED RELEASE ORAL at 17:56

## 2021-01-01 RX ADMIN — MIDODRINE HYDROCHLORIDE 15 MG: 5 TABLET ORAL at 11:35

## 2021-01-01 RX ADMIN — MAGNESIUM SULFATE HEPTAHYDRATE 2000 MG: 40 INJECTION, SOLUTION INTRAVENOUS at 12:55

## 2021-01-01 RX ADMIN — PANTOPRAZOLE SODIUM 40 MG: 40 INJECTION, POWDER, FOR SOLUTION INTRAVENOUS at 09:18

## 2021-01-01 RX ADMIN — Medication 250 MG: at 16:00

## 2021-01-01 RX ADMIN — SPIRONOLACTONE 100 MG: 25 TABLET ORAL at 10:28

## 2021-01-01 RX ADMIN — PANTOPRAZOLE SODIUM 40 MG: 40 INJECTION, POWDER, FOR SOLUTION INTRAVENOUS at 09:28

## 2021-01-01 RX ADMIN — LACTULOSE 20 G: 20 SOLUTION ORAL at 07:32

## 2021-01-01 RX ADMIN — SODIUM CHLORIDE 12.5 ML/HR: 9 INJECTION, SOLUTION INTRAVENOUS at 13:12

## 2021-01-01 RX ADMIN — SODIUM BICARBONATE 1300 MG: 650 TABLET ORAL at 08:00

## 2021-01-01 RX ADMIN — SODIUM BICARBONATE 1300 MG: 650 TABLET ORAL at 13:32

## 2021-01-01 RX ADMIN — PIPERACILLIN SODIUM AND TAZOBACTAM SODIUM 3375 MG: 3; .375 INJECTION, POWDER, LYOPHILIZED, FOR SOLUTION INTRAVENOUS at 05:32

## 2021-01-01 RX ADMIN — Medication 20 ML: at 09:00

## 2021-01-01 RX ADMIN — PIPERACILLIN SODIUM AND TAZOBACTAM SODIUM 3375 MG: 3; .375 INJECTION, POWDER, LYOPHILIZED, FOR SOLUTION INTRAVENOUS at 01:59

## 2021-01-01 RX ADMIN — LACTULOSE 30 G: 20 SOLUTION ORAL at 20:43

## 2021-01-01 RX ADMIN — MIDODRINE HYDROCHLORIDE 15 MG: 5 TABLET ORAL at 17:43

## 2021-01-01 RX ADMIN — CEFTRIAXONE SODIUM 1000 MG: 1 INJECTION, POWDER, FOR SOLUTION INTRAMUSCULAR; INTRAVENOUS at 21:12

## 2021-01-01 RX ADMIN — PHENYLEPHRINE HYDROCHLORIDE 80 MCG/MIN: 10 INJECTION INTRAVENOUS at 20:59

## 2021-01-01 RX ADMIN — PIPERACILLIN SODIUM AND TAZOBACTAM SODIUM 3375 MG: 3; .375 INJECTION, POWDER, LYOPHILIZED, FOR SOLUTION INTRAVENOUS at 02:31

## 2021-01-01 RX ADMIN — OCTREOTIDE ACETATE 25 MCG/HR: 500 INJECTION, SOLUTION INTRAVENOUS; SUBCUTANEOUS at 02:55

## 2021-01-01 RX ADMIN — LACTULOSE 20 G: 20 SOLUTION ORAL at 10:52

## 2021-01-01 RX ADMIN — MORPHINE SULFATE 2 MG: 2 INJECTION, SOLUTION INTRAMUSCULAR; INTRAVENOUS at 09:42

## 2021-01-01 RX ADMIN — Medication 10 ML: at 20:27

## 2021-01-01 RX ADMIN — PIPERACILLIN SODIUM AND TAZOBACTAM SODIUM 3375 MG: 3; .375 INJECTION, POWDER, LYOPHILIZED, FOR SOLUTION INTRAVENOUS at 01:57

## 2021-01-01 RX ADMIN — MIDODRINE HYDROCHLORIDE 15 MG: 5 TABLET ORAL at 18:25

## 2021-01-01 RX ADMIN — SODIUM CHLORIDE, PRESERVATIVE FREE 10 ML: 5 INJECTION INTRAVENOUS at 10:49

## 2021-01-01 RX ADMIN — OCTREOTIDE ACETATE 50 MCG/HR: 500 INJECTION, SOLUTION INTRAVENOUS; SUBCUTANEOUS at 14:17

## 2021-01-01 RX ADMIN — RIFAXIMIN 550 MG: 550 TABLET ORAL at 07:51

## 2021-01-01 RX ADMIN — PIPERACILLIN SODIUM AND TAZOBACTAM SODIUM 3375 MG: 3; .375 INJECTION, POWDER, LYOPHILIZED, FOR SOLUTION INTRAVENOUS at 10:48

## 2021-01-01 RX ADMIN — MIDODRINE HYDROCHLORIDE 15 MG: 5 TABLET ORAL at 18:46

## 2021-01-01 RX ADMIN — CHOLECALCIFEROL TAB 125 MCG (5000 UNIT) 5000 UNITS: 125 TAB at 10:45

## 2021-01-01 RX ADMIN — PIPERACILLIN SODIUM AND TAZOBACTAM SODIUM 3375 MG: 3; .375 INJECTION, POWDER, LYOPHILIZED, FOR SOLUTION INTRAVENOUS at 19:59

## 2021-01-01 RX ADMIN — CEFTRIAXONE SODIUM 1000 MG: 1 INJECTION, POWDER, FOR SOLUTION INTRAMUSCULAR; INTRAVENOUS at 21:53

## 2021-01-01 RX ADMIN — MIDODRINE HYDROCHLORIDE 15 MG: 5 TABLET ORAL at 12:41

## 2021-01-01 RX ADMIN — SODIUM CHLORIDE: 9 INJECTION, SOLUTION INTRAVENOUS at 06:11

## 2021-01-01 RX ADMIN — OCTREOTIDE ACETATE 50 MCG/HR: 500 INJECTION, SOLUTION INTRAVENOUS; SUBCUTANEOUS at 20:59

## 2021-01-01 RX ADMIN — POTASSIUM CHLORIDE 20 MEQ: 20 TABLET, EXTENDED RELEASE ORAL at 12:37

## 2021-01-01 RX ADMIN — MIDODRINE HYDROCHLORIDE 15 MG: 5 TABLET ORAL at 08:26

## 2021-01-01 RX ADMIN — OCTREOTIDE ACETATE 25 MCG/HR: 500 INJECTION, SOLUTION INTRAVENOUS; SUBCUTANEOUS at 23:51

## 2021-01-01 RX ADMIN — SODIUM BICARBONATE 1300 MG: 650 TABLET ORAL at 22:01

## 2021-01-01 RX ADMIN — THIAMINE HYDROCHLORIDE 100 MG: 100 INJECTION, SOLUTION INTRAMUSCULAR; INTRAVENOUS at 10:10

## 2021-01-01 RX ADMIN — PHYTONADIONE 10 MG: 10 INJECTION, EMULSION INTRAMUSCULAR; INTRAVENOUS; SUBCUTANEOUS at 05:04

## 2021-01-01 RX ADMIN — RIFAXIMIN 550 MG: 550 TABLET ORAL at 21:27

## 2021-01-01 RX ADMIN — ALBUMIN (HUMAN) 25 G: 0.25 INJECTION, SOLUTION INTRAVENOUS at 10:59

## 2021-01-01 RX ADMIN — DIBASIC SODIUM PHOSPHATE, MONOBASIC POTASSIUM PHOSPHATE AND MONOBASIC SODIUM PHOSPHATE 2 TABLET: 852; 155; 130 TABLET ORAL at 13:01

## 2021-01-01 RX ADMIN — RIFAXIMIN 550 MG: 550 TABLET ORAL at 13:31

## 2021-01-01 RX ADMIN — SODIUM CHLORIDE: 9 INJECTION, SOLUTION INTRAVENOUS at 23:07

## 2021-01-01 RX ADMIN — FUROSEMIDE 20 MG: 10 INJECTION, SOLUTION INTRAMUSCULAR; INTRAVENOUS at 22:02

## 2021-01-01 RX ADMIN — POTASSIUM BICARBONATE 20 MEQ: 782 TABLET, EFFERVESCENT ORAL at 08:52

## 2021-01-01 RX ADMIN — Medication 10 ML: at 08:00

## 2021-01-01 RX ADMIN — FUROSEMIDE 40 MG: 10 INJECTION, SOLUTION INTRAMUSCULAR; INTRAVENOUS at 16:55

## 2021-01-01 RX ADMIN — LORAZEPAM 1 MG: 2 INJECTION INTRAMUSCULAR; INTRAVENOUS at 08:55

## 2021-01-01 RX ADMIN — LACTULOSE 30 G: 20 SOLUTION ORAL at 21:18

## 2021-01-01 RX ADMIN — PIPERACILLIN SODIUM AND TAZOBACTAM SODIUM 3375 MG: 3; .375 INJECTION, POWDER, LYOPHILIZED, FOR SOLUTION INTRAVENOUS at 03:04

## 2021-01-01 RX ADMIN — PIPERACILLIN SODIUM AND TAZOBACTAM SODIUM 3375 MG: 3; .375 INJECTION, POWDER, LYOPHILIZED, FOR SOLUTION INTRAVENOUS at 01:38

## 2021-01-01 RX ADMIN — SODIUM CHLORIDE: 9 INJECTION, SOLUTION INTRAVENOUS at 16:21

## 2021-01-01 RX ADMIN — LACTULOSE 20 G: 20 SOLUTION ORAL at 13:09

## 2021-01-01 RX ADMIN — SODIUM CHLORIDE: 9 INJECTION, SOLUTION INTRAVENOUS at 23:03

## 2021-01-01 RX ADMIN — RIFAXIMIN 550 MG: 550 TABLET ORAL at 07:32

## 2021-01-01 RX ADMIN — RIFAXIMIN 550 MG: 550 TABLET ORAL at 09:03

## 2021-01-01 RX ADMIN — PIPERACILLIN SODIUM AND TAZOBACTAM SODIUM 3375 MG: 3; .375 INJECTION, POWDER, LYOPHILIZED, FOR SOLUTION INTRAVENOUS at 18:27

## 2021-01-01 RX ADMIN — PHENYLEPHRINE HYDROCHLORIDE 50 MCG/MIN: 10 INJECTION INTRAVENOUS at 13:53

## 2021-01-01 RX ADMIN — RIFAXIMIN 550 MG: 550 TABLET ORAL at 09:00

## 2021-01-01 RX ADMIN — PIPERACILLIN SODIUM AND TAZOBACTAM SODIUM 3375 MG: 3; .375 INJECTION, POWDER, LYOPHILIZED, FOR SOLUTION INTRAVENOUS at 02:49

## 2021-01-01 RX ADMIN — POTASSIUM CHLORIDE 40 MEQ: 20 TABLET, EXTENDED RELEASE ORAL at 17:38

## 2021-01-01 RX ADMIN — SODIUM BICARBONATE 1300 MG: 650 TABLET ORAL at 15:23

## 2021-01-01 RX ADMIN — LACTULOSE 20 G: 20 SOLUTION ORAL at 08:24

## 2021-01-01 RX ADMIN — PANTOPRAZOLE SODIUM 40 MG: 40 INJECTION, POWDER, FOR SOLUTION INTRAVENOUS at 22:04

## 2021-01-01 RX ADMIN — Medication 10 ML: at 21:14

## 2021-01-01 RX ADMIN — MIDODRINE HYDROCHLORIDE 10 MG: 5 TABLET ORAL at 17:13

## 2021-01-01 RX ADMIN — MIDODRINE HYDROCHLORIDE 15 MG: 5 TABLET ORAL at 08:42

## 2021-01-01 RX ADMIN — MAGNESIUM SULFATE 2000 MG: 2 INJECTION INTRAVENOUS at 10:48

## 2021-01-01 RX ADMIN — Medication 10 ML: at 08:54

## 2021-01-01 RX ADMIN — FOLIC ACID: 5 INJECTION, SOLUTION INTRAMUSCULAR; INTRAVENOUS; SUBCUTANEOUS at 11:37

## 2021-01-01 RX ADMIN — MIDODRINE HYDROCHLORIDE 15 MG: 5 TABLET ORAL at 12:01

## 2021-01-01 RX ADMIN — SODIUM CHLORIDE 80 MG: 9 INJECTION, SOLUTION INTRAVENOUS at 22:03

## 2021-01-01 RX ADMIN — POTASSIUM BICARBONATE 40 MEQ: 782 TABLET, EFFERVESCENT ORAL at 20:33

## 2021-01-01 RX ADMIN — Medication: at 07:36

## 2021-01-01 RX ADMIN — SODIUM CHLORIDE 1000 ML: 9 INJECTION, SOLUTION INTRAVENOUS at 08:34

## 2021-01-01 RX ADMIN — Medication 10 ML: at 20:41

## 2021-01-01 RX ADMIN — SODIUM CHLORIDE: 9 INJECTION, SOLUTION INTRAVENOUS at 14:57

## 2021-01-01 RX ADMIN — MIDODRINE HYDROCHLORIDE 15 MG: 5 TABLET ORAL at 08:05

## 2021-01-01 RX ADMIN — SODIUM CHLORIDE: 9 INJECTION, SOLUTION INTRAVENOUS at 14:39

## 2021-01-01 RX ADMIN — SODIUM BICARBONATE 1300 MG: 650 TABLET ORAL at 08:24

## 2021-01-01 RX ADMIN — PANTOPRAZOLE SODIUM 40 MG: 40 INJECTION, POWDER, FOR SOLUTION INTRAVENOUS at 08:53

## 2021-01-01 RX ADMIN — SODIUM CHLORIDE: 9 INJECTION, SOLUTION INTRAVENOUS at 06:18

## 2021-01-01 RX ADMIN — MIDODRINE HYDROCHLORIDE 15 MG: 5 TABLET ORAL at 13:03

## 2021-01-01 RX ADMIN — MIDODRINE HYDROCHLORIDE 15 MG: 5 TABLET ORAL at 12:53

## 2021-01-01 RX ADMIN — SODIUM CHLORIDE: 9 INJECTION, SOLUTION INTRAVENOUS at 13:40

## 2021-01-01 RX ADMIN — SODIUM CHLORIDE 8 MG/HR: 9 INJECTION, SOLUTION INTRAVENOUS at 07:32

## 2021-01-01 RX ADMIN — SODIUM CHLORIDE: 9 INJECTION, SOLUTION INTRAVENOUS at 22:57

## 2021-01-01 RX ADMIN — SODIUM BICARBONATE 1300 MG: 650 TABLET ORAL at 13:47

## 2021-01-01 RX ADMIN — MIDODRINE HYDROCHLORIDE 15 MG: 5 TABLET ORAL at 08:55

## 2021-01-01 RX ADMIN — SODIUM BICARBONATE 650 MG: 650 TABLET ORAL at 20:22

## 2021-01-01 RX ADMIN — RIFAXIMIN 550 MG: 550 TABLET ORAL at 08:30

## 2021-01-01 RX ADMIN — RIFAXIMIN 550 MG: 550 TABLET ORAL at 13:32

## 2021-01-01 RX ADMIN — SODIUM CHLORIDE: 9 INJECTION, SOLUTION INTRAVENOUS at 22:18

## 2021-01-01 RX ADMIN — SODIUM CHLORIDE, PRESERVATIVE FREE 10 ML: 5 INJECTION INTRAVENOUS at 08:43

## 2021-01-01 RX ADMIN — SODIUM BICARBONATE 1300 MG: 650 TABLET ORAL at 08:40

## 2021-01-01 RX ADMIN — SODIUM CHLORIDE, PRESERVATIVE FREE 10 ML: 5 INJECTION INTRAVENOUS at 10:13

## 2021-01-01 RX ADMIN — ALBUMIN (HUMAN) 25 G: 0.25 INJECTION, SOLUTION INTRAVENOUS at 17:59

## 2021-01-01 RX ADMIN — Medication 10 ML: at 09:41

## 2021-01-01 RX ADMIN — SODIUM BICARBONATE 1300 MG: 650 TABLET ORAL at 13:03

## 2021-01-01 RX ADMIN — PIPERACILLIN SODIUM AND TAZOBACTAM SODIUM 3375 MG: 3; .375 INJECTION, POWDER, LYOPHILIZED, FOR SOLUTION INTRAVENOUS at 10:54

## 2021-01-01 RX ADMIN — SODIUM CHLORIDE: 9 INJECTION, SOLUTION INTRAVENOUS at 23:00

## 2021-01-01 RX ADMIN — TRAMADOL HYDROCHLORIDE 25 MG: 50 TABLET, FILM COATED ORAL at 20:21

## 2021-01-01 RX ADMIN — SODIUM CHLORIDE 8 MG/HR: 9 INJECTION, SOLUTION INTRAVENOUS at 19:47

## 2021-01-01 RX ADMIN — Medication 10 ML: at 09:33

## 2021-01-01 RX ADMIN — POTASSIUM CHLORIDE 40 MEQ: 20 TABLET, EXTENDED RELEASE ORAL at 13:12

## 2021-01-01 RX ADMIN — SODIUM CHLORIDE, PRESERVATIVE FREE 10 ML: 5 INJECTION INTRAVENOUS at 10:02

## 2021-01-01 RX ADMIN — SODIUM BICARBONATE 1300 MG: 650 TABLET ORAL at 09:27

## 2021-01-01 RX ADMIN — ALBUMIN (HUMAN) 25 G: 0.25 INJECTION, SOLUTION INTRAVENOUS at 22:58

## 2021-01-01 RX ADMIN — ALBUMIN (HUMAN) 25 G: 0.25 INJECTION, SOLUTION INTRAVENOUS at 12:15

## 2021-01-01 RX ADMIN — POTASSIUM CHLORIDE 20 MEQ: 20 TABLET, EXTENDED RELEASE ORAL at 09:00

## 2021-01-01 RX ADMIN — ALBUMIN (HUMAN) 25 G: 0.25 INJECTION, SOLUTION INTRAVENOUS at 17:44

## 2021-01-01 RX ADMIN — Medication 10 ML: at 10:13

## 2021-01-01 RX ADMIN — POTASSIUM CHLORIDE 40 MEQ: 20 TABLET, EXTENDED RELEASE ORAL at 14:28

## 2021-01-01 RX ADMIN — PENTOXIFYLLINE 400 MG: 400 TABLET, EXTENDED RELEASE ORAL at 07:59

## 2021-01-01 RX ADMIN — MIDODRINE HYDROCHLORIDE 15 MG: 5 TABLET ORAL at 17:19

## 2021-01-01 RX ADMIN — LACTULOSE 20 G: 20 SOLUTION ORAL at 13:31

## 2021-01-01 RX ADMIN — LACTULOSE 20 G: 20 SOLUTION ORAL at 08:46

## 2021-01-01 RX ADMIN — SODIUM CHLORIDE: 9 INJECTION, SOLUTION INTRAVENOUS at 21:05

## 2021-01-01 RX ADMIN — PIPERACILLIN SODIUM AND TAZOBACTAM SODIUM 3375 MG: 3; .375 INJECTION, POWDER, LYOPHILIZED, FOR SOLUTION INTRAVENOUS at 20:33

## 2021-01-01 RX ADMIN — RIFAXIMIN 550 MG: 550 TABLET ORAL at 08:06

## 2021-01-01 RX ADMIN — TRAMADOL HYDROCHLORIDE 25 MG: 50 TABLET, FILM COATED ORAL at 21:53

## 2021-01-01 RX ADMIN — PIPERACILLIN SODIUM AND TAZOBACTAM SODIUM 3375 MG: 3; .375 INJECTION, POWDER, LYOPHILIZED, FOR SOLUTION INTRAVENOUS at 10:53

## 2021-01-01 RX ADMIN — MAGNESIUM SULFATE HEPTAHYDRATE 2000 MG: 40 INJECTION, SOLUTION INTRAVENOUS at 14:39

## 2021-01-01 RX ADMIN — Medication 10 ML: at 20:37

## 2021-01-01 RX ADMIN — SODIUM CHLORIDE, PRESERVATIVE FREE 10 ML: 5 INJECTION INTRAVENOUS at 09:45

## 2021-01-01 RX ADMIN — POTASSIUM PHOSPHATE, MONOBASIC AND POTASSIUM PHOSPHATE, DIBASIC 15 MMOL: 224; 236 INJECTION, SOLUTION, CONCENTRATE INTRAVENOUS at 12:45

## 2021-01-01 RX ADMIN — SODIUM CHLORIDE: 9 INJECTION, SOLUTION INTRAVENOUS at 22:45

## 2021-01-01 RX ADMIN — CHOLECALCIFEROL TAB 125 MCG (5000 UNIT) 5000 UNITS: 125 TAB at 09:41

## 2021-01-01 RX ADMIN — POTASSIUM CHLORIDE 10 MEQ: 7.46 INJECTION, SOLUTION INTRAVENOUS at 08:25

## 2021-01-01 RX ADMIN — METOPROLOL TARTRATE 2.5 MG: 1 INJECTION, SOLUTION INTRAVENOUS at 19:15

## 2021-01-01 RX ADMIN — LORAZEPAM 1 MG: 2 INJECTION INTRAMUSCULAR; INTRAVENOUS at 22:56

## 2021-01-01 RX ADMIN — Medication 10 ML: at 21:31

## 2021-01-01 RX ADMIN — Medication 250 MG: at 10:41

## 2021-01-01 RX ADMIN — POTASSIUM CHLORIDE 40 MEQ: 20 TABLET, EXTENDED RELEASE ORAL at 17:16

## 2021-01-01 RX ADMIN — SODIUM CHLORIDE: 9 INJECTION, SOLUTION INTRAVENOUS at 14:54

## 2021-01-01 RX ADMIN — PHENYLEPHRINE HYDROCHLORIDE 125 MCG/MIN: 10 INJECTION INTRAVENOUS at 07:08

## 2021-01-01 RX ADMIN — ALBUMIN (HUMAN) 25 G: 0.25 INJECTION, SOLUTION INTRAVENOUS at 01:41

## 2021-01-01 RX ADMIN — RIFAXIMIN 550 MG: 550 TABLET ORAL at 08:44

## 2021-01-01 RX ADMIN — MIDODRINE HYDROCHLORIDE 15 MG: 5 TABLET ORAL at 17:59

## 2021-01-01 RX ADMIN — RIFAXIMIN 550 MG: 550 TABLET ORAL at 22:17

## 2021-01-01 RX ADMIN — SODIUM CHLORIDE 8 MG/HR: 9 INJECTION, SOLUTION INTRAVENOUS at 10:52

## 2021-01-01 RX ADMIN — SODIUM CHLORIDE: 9 INJECTION, SOLUTION INTRAVENOUS at 05:12

## 2021-01-01 RX ADMIN — SODIUM BICARBONATE 1300 MG: 650 TABLET ORAL at 22:02

## 2021-01-01 RX ADMIN — PHENYLEPHRINE HYDROCHLORIDE 70 MCG/MIN: 10 INJECTION INTRAVENOUS at 02:31

## 2021-01-01 RX ADMIN — MIDODRINE HYDROCHLORIDE 15 MG: 5 TABLET ORAL at 08:31

## 2021-01-01 RX ADMIN — RIFAXIMIN 550 MG: 550 TABLET ORAL at 10:52

## 2021-01-01 RX ADMIN — THIAMINE HCL TAB 100 MG 100 MG: 100 TAB at 09:00

## 2021-01-01 RX ADMIN — RIFAXIMIN 550 MG: 550 TABLET ORAL at 19:56

## 2021-01-01 RX ADMIN — SODIUM CHLORIDE: 9 INJECTION, SOLUTION INTRAVENOUS at 14:53

## 2021-01-01 RX ADMIN — RIFAXIMIN 550 MG: 550 TABLET ORAL at 08:42

## 2021-01-01 RX ADMIN — SODIUM CHLORIDE 1000 ML: 9 INJECTION, SOLUTION INTRAVENOUS at 02:54

## 2021-01-01 RX ADMIN — PENTOXIFYLLINE 400 MG: 400 TABLET, EXTENDED RELEASE ORAL at 16:41

## 2021-01-01 RX ADMIN — FUROSEMIDE 40 MG: 10 INJECTION, SOLUTION INTRAMUSCULAR; INTRAVENOUS at 17:44

## 2021-01-01 RX ADMIN — RIFAXIMIN 550 MG: 550 TABLET ORAL at 09:17

## 2021-01-01 RX ADMIN — SPIRONOLACTONE 100 MG: 25 TABLET ORAL at 09:17

## 2021-01-01 RX ADMIN — SODIUM BICARBONATE 650 MG: 650 TABLET ORAL at 09:03

## 2021-01-01 RX ADMIN — Medication 10 ML: at 13:17

## 2021-01-01 RX ADMIN — Medication: at 19:57

## 2021-01-01 RX ADMIN — CEFTRIAXONE SODIUM 1000 MG: 1 INJECTION, POWDER, FOR SOLUTION INTRAMUSCULAR; INTRAVENOUS at 22:02

## 2021-01-01 RX ADMIN — Medication: at 23:04

## 2021-01-01 RX ADMIN — POTASSIUM CHLORIDE 20 MEQ: 1500 TABLET, EXTENDED RELEASE ORAL at 17:45

## 2021-01-01 RX ADMIN — PANTOPRAZOLE SODIUM 40 MG: 40 INJECTION, POWDER, FOR SOLUTION INTRAVENOUS at 08:42

## 2021-01-01 RX ADMIN — THIAMINE HYDROCHLORIDE 100 MG: 100 INJECTION, SOLUTION INTRAMUSCULAR; INTRAVENOUS at 13:47

## 2021-01-01 RX ADMIN — MIDODRINE HYDROCHLORIDE 15 MG: 5 TABLET ORAL at 17:15

## 2021-01-01 RX ADMIN — RIFAXIMIN 550 MG: 550 TABLET ORAL at 09:14

## 2021-01-01 RX ADMIN — RIFAXIMIN 550 MG: 550 TABLET ORAL at 21:18

## 2021-01-01 RX ADMIN — FUROSEMIDE 40 MG: 10 INJECTION, SOLUTION INTRAMUSCULAR; INTRAVENOUS at 13:03

## 2021-01-01 RX ADMIN — Medication 10 ML: at 08:41

## 2021-01-01 RX ADMIN — SODIUM BICARBONATE 650 MG: 650 TABLET ORAL at 20:40

## 2021-01-01 RX ADMIN — MAGNESIUM SULFATE HEPTAHYDRATE 4000 MG: 40 INJECTION, SOLUTION INTRAVENOUS at 11:38

## 2021-01-01 RX ADMIN — LACTULOSE 20 G: 20 SOLUTION ORAL at 13:47

## 2021-01-01 RX ADMIN — POTASSIUM CHLORIDE 20 MEQ: 1500 TABLET, EXTENDED RELEASE ORAL at 14:42

## 2021-01-01 RX ADMIN — PENTOXIFYLLINE 400 MG: 400 TABLET, EXTENDED RELEASE ORAL at 11:35

## 2021-01-01 RX ADMIN — SODIUM CHLORIDE: 9 INJECTION, SOLUTION INTRAVENOUS at 06:05

## 2021-01-01 RX ADMIN — RIFAXIMIN 550 MG: 550 TABLET ORAL at 21:31

## 2021-01-01 RX ADMIN — ALBUMIN (HUMAN) 25 G: 0.25 INJECTION, SOLUTION INTRAVENOUS at 17:53

## 2021-01-01 RX ADMIN — KETOROLAC TROMETHAMINE 15 MG: 15 INJECTION, SOLUTION INTRAMUSCULAR; INTRAVENOUS at 20:21

## 2021-01-01 RX ADMIN — Medication 250 MG: at 15:00

## 2021-01-01 RX ADMIN — RIFAXIMIN 550 MG: 550 TABLET ORAL at 21:02

## 2021-01-01 RX ADMIN — RIFAXIMIN 550 MG: 550 TABLET ORAL at 08:33

## 2021-01-01 RX ADMIN — POTASSIUM CHLORIDE 10 MEQ: 10 INJECTION, SOLUTION INTRAVENOUS at 13:31

## 2021-01-01 RX ADMIN — RIFAXIMIN 550 MG: 550 TABLET ORAL at 20:07

## 2021-01-01 RX ADMIN — SODIUM CHLORIDE 1000 ML: 9 INJECTION, SOLUTION INTRAVENOUS at 12:55

## 2021-01-01 RX ADMIN — Medication 6 MG: at 20:09

## 2021-01-01 RX ADMIN — PIPERACILLIN SODIUM AND TAZOBACTAM SODIUM 3375 MG: 3; .375 INJECTION, POWDER, LYOPHILIZED, FOR SOLUTION INTRAVENOUS at 03:08

## 2021-01-01 RX ADMIN — SODIUM CHLORIDE: 9 INJECTION, SOLUTION INTRAVENOUS at 21:50

## 2021-01-01 RX ADMIN — SODIUM CHLORIDE, PRESERVATIVE FREE 10 ML: 5 INJECTION INTRAVENOUS at 09:03

## 2021-01-01 RX ADMIN — FENTANYL CITRATE 25 MCG: 0.05 INJECTION, SOLUTION INTRAMUSCULAR; INTRAVENOUS at 08:53

## 2021-01-01 RX ADMIN — ALBUMIN (HUMAN) 25 G: 0.25 INJECTION, SOLUTION INTRAVENOUS at 13:59

## 2021-01-01 RX ADMIN — MIDODRINE HYDROCHLORIDE 15 MG: 5 TABLET ORAL at 07:43

## 2021-01-01 RX ADMIN — WATER: 100 IRRIGANT IRRIGATION at 00:41

## 2021-01-01 RX ADMIN — THIAMINE HYDROCHLORIDE 100 MG: 100 INJECTION, SOLUTION INTRAMUSCULAR; INTRAVENOUS at 15:15

## 2021-01-01 RX ADMIN — Medication: at 16:21

## 2021-01-01 RX ADMIN — Medication 20 ML: at 09:07

## 2021-01-01 RX ADMIN — SODIUM CHLORIDE, PRESERVATIVE FREE 10 ML: 5 INJECTION INTRAVENOUS at 09:32

## 2021-01-01 RX ADMIN — PIPERACILLIN SODIUM AND TAZOBACTAM SODIUM 3375 MG: 3; .375 INJECTION, POWDER, LYOPHILIZED, FOR SOLUTION INTRAVENOUS at 18:03

## 2021-01-01 RX ADMIN — SODIUM CHLORIDE 250 ML: 9 INJECTION, SOLUTION INTRAVENOUS at 13:11

## 2021-01-01 RX ADMIN — MIDODRINE HYDROCHLORIDE 15 MG: 5 TABLET ORAL at 13:06

## 2021-01-01 RX ADMIN — PANTOPRAZOLE SODIUM 40 MG: 40 INJECTION, POWDER, FOR SOLUTION INTRAVENOUS at 10:43

## 2021-01-01 RX ADMIN — CEFTRIAXONE SODIUM 1000 MG: 1 INJECTION, POWDER, FOR SOLUTION INTRAMUSCULAR; INTRAVENOUS at 20:43

## 2021-01-01 RX ADMIN — POTASSIUM & SODIUM PHOSPHATES POWDER PACK 280-160-250 MG 250 MG: 280-160-250 PACK at 13:48

## 2021-01-01 RX ADMIN — SODIUM BICARBONATE 1300 MG: 650 TABLET ORAL at 08:30

## 2021-01-01 RX ADMIN — SODIUM ZIRCONIUM CYCLOSILICATE 10 G: 10 POWDER, FOR SUSPENSION ORAL at 12:11

## 2021-01-01 RX ADMIN — DIBASIC SODIUM PHOSPHATE, MONOBASIC POTASSIUM PHOSPHATE AND MONOBASIC SODIUM PHOSPHATE 4 TABLET: 852; 155; 130 TABLET ORAL at 12:40

## 2021-01-01 RX ADMIN — PANTOPRAZOLE SODIUM 40 MG: 40 INJECTION, POWDER, FOR SOLUTION INTRAVENOUS at 08:43

## 2021-01-01 RX ADMIN — RIFAXIMIN 550 MG: 550 TABLET ORAL at 11:35

## 2021-01-01 RX ADMIN — FUROSEMIDE 40 MG: 40 TABLET ORAL at 10:02

## 2021-01-01 RX ADMIN — Medication 6 MG: at 20:22

## 2021-01-01 RX ADMIN — SODIUM BICARBONATE 1300 MG: 650 TABLET ORAL at 07:47

## 2021-01-01 RX ADMIN — MIDODRINE HYDROCHLORIDE 10 MG: 5 TABLET ORAL at 09:00

## 2021-01-01 RX ADMIN — POTASSIUM CHLORIDE 10 MEQ: 7.46 INJECTION, SOLUTION INTRAVENOUS at 07:04

## 2021-01-01 RX ADMIN — SODIUM CHLORIDE: 9 INJECTION, SOLUTION INTRAVENOUS at 06:44

## 2021-01-01 RX ADMIN — PIPERACILLIN SODIUM AND TAZOBACTAM SODIUM 3375 MG: 3; .375 INJECTION, POWDER, LYOPHILIZED, FOR SOLUTION INTRAVENOUS at 11:00

## 2021-01-01 RX ADMIN — OCTREOTIDE ACETATE 25 MCG/HR: 500 INJECTION, SOLUTION INTRAVENOUS; SUBCUTANEOUS at 12:23

## 2021-01-01 RX ADMIN — PIPERACILLIN SODIUM AND TAZOBACTAM SODIUM 3375 MG: 3; .375 INJECTION, POWDER, LYOPHILIZED, FOR SOLUTION INTRAVENOUS at 21:24

## 2021-01-01 RX ADMIN — WATER: 100 IRRIGANT IRRIGATION at 02:05

## 2021-01-01 RX ADMIN — PHENYLEPHRINE HYDROCHLORIDE 155 MCG/MIN: 10 INJECTION INTRAVENOUS at 00:20

## 2021-01-01 RX ADMIN — ALBUMIN (HUMAN) 25 G: 0.25 INJECTION, SOLUTION INTRAVENOUS at 17:15

## 2021-01-01 RX ADMIN — ALBUMIN (HUMAN) 25 G: 0.25 INJECTION, SOLUTION INTRAVENOUS at 01:26

## 2021-01-01 RX ADMIN — SODIUM CHLORIDE, PRESERVATIVE FREE 10 ML: 5 INJECTION INTRAVENOUS at 19:57

## 2021-01-01 RX ADMIN — SODIUM BICARBONATE 650 MG: 650 TABLET ORAL at 12:54

## 2021-01-01 RX ADMIN — POTASSIUM CHLORIDE 10 MEQ: 10 INJECTION, SOLUTION INTRAVENOUS at 02:45

## 2021-01-01 RX ADMIN — MIDODRINE HYDROCHLORIDE 15 MG: 5 TABLET ORAL at 11:38

## 2021-01-01 RX ADMIN — OCTREOTIDE ACETATE 50 MCG/HR: 500 INJECTION, SOLUTION INTRAVENOUS; SUBCUTANEOUS at 00:10

## 2021-01-01 RX ADMIN — PHENYLEPHRINE HYDROCHLORIDE 90 MCG/MIN: 10 INJECTION INTRAVENOUS at 06:35

## 2021-01-01 RX ADMIN — Medication 10 ML: at 10:02

## 2021-01-01 RX ADMIN — PANTOPRAZOLE SODIUM 40 MG: 40 INJECTION, POWDER, FOR SOLUTION INTRAVENOUS at 08:31

## 2021-01-01 RX ADMIN — PIPERACILLIN SODIUM AND TAZOBACTAM SODIUM 3375 MG: 3; .375 INJECTION, POWDER, LYOPHILIZED, FOR SOLUTION INTRAVENOUS at 18:32

## 2021-01-01 RX ADMIN — LACTULOSE 20 G: 20 SOLUTION ORAL at 09:01

## 2021-01-01 RX ADMIN — SODIUM CHLORIDE, PRESERVATIVE FREE 10 ML: 5 INJECTION INTRAVENOUS at 08:34

## 2021-01-01 RX ADMIN — LACTULOSE 20 G: 20 SOLUTION ORAL at 19:56

## 2021-01-01 RX ADMIN — PANTOPRAZOLE SODIUM 40 MG: 40 TABLET, DELAYED RELEASE ORAL at 06:36

## 2021-01-01 RX ADMIN — SODIUM CHLORIDE, PRESERVATIVE FREE 10 ML: 5 INJECTION INTRAVENOUS at 08:53

## 2021-01-01 RX ADMIN — FUROSEMIDE 20 MG: 10 INJECTION, SOLUTION INTRAMUSCULAR; INTRAVENOUS at 08:00

## 2021-01-01 RX ADMIN — PIPERACILLIN SODIUM AND TAZOBACTAM SODIUM 3375 MG: 3; .375 INJECTION, POWDER, LYOPHILIZED, FOR SOLUTION INTRAVENOUS at 10:29

## 2021-01-01 RX ADMIN — ALBUMIN (HUMAN) 25 G: 0.25 INJECTION, SOLUTION INTRAVENOUS at 13:46

## 2021-01-01 RX ADMIN — PIPERACILLIN SODIUM AND TAZOBACTAM SODIUM 3375 MG: 3; .375 INJECTION, POWDER, LYOPHILIZED, FOR SOLUTION INTRAVENOUS at 18:46

## 2021-01-01 RX ADMIN — CALCIUM GLUCONATE 1000 MG: 98 INJECTION, SOLUTION INTRAVENOUS at 12:12

## 2021-01-01 RX ADMIN — SODIUM CHLORIDE: 9 INJECTION, SOLUTION INTRAVENOUS at 00:49

## 2021-01-01 RX ADMIN — RIFAXIMIN 550 MG: 550 TABLET ORAL at 20:22

## 2021-01-01 RX ADMIN — LACTULOSE 30 G: 20 SOLUTION ORAL at 08:33

## 2021-01-01 RX ADMIN — PIPERACILLIN SODIUM AND TAZOBACTAM SODIUM 3375 MG: 3; .375 INJECTION, POWDER, LYOPHILIZED, FOR SOLUTION INTRAVENOUS at 17:46

## 2021-01-01 RX ADMIN — PIPERACILLIN SODIUM AND TAZOBACTAM SODIUM 3375 MG: 3; .375 INJECTION, POWDER, LYOPHILIZED, FOR SOLUTION INTRAVENOUS at 02:33

## 2021-01-01 RX ADMIN — PIPERACILLIN SODIUM AND TAZOBACTAM SODIUM 3375 MG: 3; .375 INJECTION, POWDER, LYOPHILIZED, FOR SOLUTION INTRAVENOUS at 13:04

## 2021-01-01 RX ADMIN — SODIUM CHLORIDE: 9 INJECTION, SOLUTION INTRAVENOUS at 02:00

## 2021-01-01 RX ADMIN — SODIUM CHLORIDE: 9 INJECTION, SOLUTION INTRAVENOUS at 08:35

## 2021-01-01 RX ADMIN — Medication 10 ML: at 21:17

## 2021-01-01 RX ADMIN — SODIUM CHLORIDE 8 MG/HR: 9 INJECTION, SOLUTION INTRAVENOUS at 22:44

## 2021-01-01 RX ADMIN — FUROSEMIDE 40 MG: 10 INJECTION, SOLUTION INTRAMUSCULAR; INTRAVENOUS at 12:11

## 2021-01-01 RX ADMIN — MIDODRINE HYDROCHLORIDE 15 MG: 5 TABLET ORAL at 11:48

## 2021-01-01 RX ADMIN — CHOLECALCIFEROL TAB 125 MCG (5000 UNIT) 5000 UNITS: 125 TAB at 10:14

## 2021-01-01 RX ADMIN — PANTOPRAZOLE SODIUM 40 MG: 40 INJECTION, POWDER, FOR SOLUTION INTRAVENOUS at 10:49

## 2021-01-01 RX ADMIN — SODIUM BICARBONATE 1300 MG: 650 TABLET ORAL at 21:01

## 2021-01-01 RX ADMIN — TRAMADOL HYDROCHLORIDE 25 MG: 50 TABLET, FILM COATED ORAL at 11:43

## 2021-01-01 RX ADMIN — SODIUM CHLORIDE, POTASSIUM CHLORIDE, SODIUM LACTATE AND CALCIUM CHLORIDE 250 ML: 600; 310; 30; 20 INJECTION, SOLUTION INTRAVENOUS at 00:33

## 2021-01-01 RX ADMIN — PHENYLEPHRINE HYDROCHLORIDE 85 MCG/MIN: 10 INJECTION INTRAVENOUS at 10:51

## 2021-01-01 RX ADMIN — OCTREOTIDE ACETATE 50 MCG/HR: 500 INJECTION, SOLUTION INTRAVENOUS; SUBCUTANEOUS at 10:59

## 2021-01-01 RX ADMIN — TRAMADOL HYDROCHLORIDE 25 MG: 50 TABLET, FILM COATED ORAL at 13:14

## 2021-01-01 RX ADMIN — LACTULOSE 30 G: 20 SOLUTION ORAL at 10:01

## 2021-01-01 RX ADMIN — POTASSIUM & SODIUM PHOSPHATES POWDER PACK 280-160-250 MG 250 MG: 280-160-250 PACK at 14:55

## 2021-01-01 RX ADMIN — POTASSIUM CHLORIDE 10 MEQ: 10 INJECTION, SOLUTION INTRAVENOUS at 10:14

## 2021-01-01 RX ADMIN — OCTREOTIDE ACETATE 50 MCG/HR: 500 INJECTION, SOLUTION INTRAVENOUS; SUBCUTANEOUS at 05:29

## 2021-01-01 RX ADMIN — PANTOPRAZOLE SODIUM 40 MG: 40 INJECTION, POWDER, FOR SOLUTION INTRAVENOUS at 13:14

## 2021-01-01 RX ADMIN — Medication 10 ML: at 21:36

## 2021-01-01 RX ADMIN — INSULIN LISPRO 4 UNITS: 100 INJECTION, SOLUTION INTRAVENOUS; SUBCUTANEOUS at 22:22

## 2021-01-01 RX ADMIN — SODIUM BICARBONATE 1300 MG: 650 TABLET ORAL at 21:02

## 2021-01-01 RX ADMIN — MIDODRINE HYDROCHLORIDE 15 MG: 5 TABLET ORAL at 09:13

## 2021-01-01 RX ADMIN — Medication 10 ML: at 22:52

## 2021-01-01 RX ADMIN — PHYTONADIONE 10 MG: 5 TABLET ORAL at 11:34

## 2021-01-01 RX ADMIN — MAGNESIUM SULFATE HEPTAHYDRATE 4000 MG: 40 INJECTION, SOLUTION INTRAVENOUS at 13:33

## 2021-01-01 RX ADMIN — ALBUMIN (HUMAN) 25 G: 0.25 INJECTION, SOLUTION INTRAVENOUS at 10:23

## 2021-01-01 RX ADMIN — MIDODRINE HYDROCHLORIDE 15 MG: 5 TABLET ORAL at 11:12

## 2021-01-01 RX ADMIN — POTASSIUM CHLORIDE 40 MEQ: 20 TABLET, EXTENDED RELEASE ORAL at 08:46

## 2021-01-01 RX ADMIN — PIPERACILLIN SODIUM AND TAZOBACTAM SODIUM 3375 MG: 3; .375 INJECTION, POWDER, LYOPHILIZED, FOR SOLUTION INTRAVENOUS at 02:44

## 2021-01-01 RX ADMIN — SODIUM CHLORIDE: 9 INJECTION, SOLUTION INTRAVENOUS at 05:19

## 2021-01-01 RX ADMIN — SPIRONOLACTONE 100 MG: 25 TABLET ORAL at 10:02

## 2021-01-01 RX ADMIN — Medication 10 ML: at 08:48

## 2021-01-01 RX ADMIN — RIFAXIMIN 550 MG: 550 TABLET ORAL at 09:36

## 2021-01-01 RX ADMIN — SODIUM BICARBONATE 1300 MG: 650 TABLET ORAL at 21:31

## 2021-01-01 RX ADMIN — SODIUM CHLORIDE: 9 INJECTION, SOLUTION INTRAVENOUS at 00:08

## 2021-01-01 RX ADMIN — PANTOPRAZOLE SODIUM 40 MG: 40 INJECTION, POWDER, FOR SOLUTION INTRAVENOUS at 20:23

## 2021-01-01 RX ADMIN — PENTOXIFYLLINE 400 MG: 400 TABLET, EXTENDED RELEASE ORAL at 12:37

## 2021-01-01 RX ADMIN — FUROSEMIDE 20 MG: 10 INJECTION, SOLUTION INTRAMUSCULAR; INTRAVENOUS at 11:37

## 2021-01-01 RX ADMIN — PANTOPRAZOLE SODIUM 40 MG: 40 INJECTION, POWDER, FOR SOLUTION INTRAVENOUS at 20:43

## 2021-01-01 RX ADMIN — PANTOPRAZOLE SODIUM 40 MG: 40 INJECTION, POWDER, FOR SOLUTION INTRAVENOUS at 09:45

## 2021-01-01 RX ADMIN — FUROSEMIDE 20 MG: 10 INJECTION, SOLUTION INTRAMUSCULAR; INTRAVENOUS at 09:32

## 2021-01-01 RX ADMIN — PIPERACILLIN SODIUM AND TAZOBACTAM SODIUM 3375 MG: 3; .375 INJECTION, POWDER, LYOPHILIZED, FOR SOLUTION INTRAVENOUS at 18:19

## 2021-01-01 RX ADMIN — SODIUM BICARBONATE 1300 MG: 650 TABLET ORAL at 20:16

## 2021-01-01 RX ADMIN — LACTULOSE 20 G: 20 SOLUTION ORAL at 21:35

## 2021-01-01 RX ADMIN — Medication 10 ML: at 19:58

## 2021-01-01 RX ADMIN — POTASSIUM CHLORIDE 40 MEQ: 1500 TABLET, EXTENDED RELEASE ORAL at 11:47

## 2021-01-01 RX ADMIN — RIFAXIMIN 550 MG: 550 TABLET ORAL at 14:29

## 2021-01-01 RX ADMIN — RIFAXIMIN 550 MG: 550 TABLET ORAL at 10:12

## 2021-01-01 RX ADMIN — THIAMINE HYDROCHLORIDE 100 MG: 100 INJECTION, SOLUTION INTRAMUSCULAR; INTRAVENOUS at 08:54

## 2021-01-01 RX ADMIN — SODIUM CHLORIDE: 9 INJECTION, SOLUTION INTRAVENOUS at 21:28

## 2021-01-01 RX ADMIN — FENTANYL CITRATE 12.5 MCG: 50 INJECTION, SOLUTION INTRAMUSCULAR; INTRAVENOUS at 22:14

## 2021-01-01 RX ADMIN — ALBUMIN (HUMAN) 25 G: 0.25 INJECTION, SOLUTION INTRAVENOUS at 09:49

## 2021-01-01 RX ADMIN — POTASSIUM CHLORIDE 10 MEQ: 10 INJECTION, SOLUTION INTRAVENOUS at 08:42

## 2021-01-01 RX ADMIN — POTASSIUM CHLORIDE 10 MEQ: 10 INJECTION, SOLUTION INTRAVENOUS at 13:50

## 2021-01-01 RX ADMIN — OCTREOTIDE ACETATE 25 MCG/HR: 500 INJECTION, SOLUTION INTRAVENOUS; SUBCUTANEOUS at 10:00

## 2021-01-01 RX ADMIN — Medication 10 ML: at 08:43

## 2021-01-01 RX ADMIN — FUROSEMIDE 40 MG: 40 TABLET ORAL at 10:28

## 2021-01-01 RX ADMIN — SODIUM CHLORIDE, PRESERVATIVE FREE 10 ML: 5 INJECTION INTRAVENOUS at 07:52

## 2021-01-01 RX ADMIN — PIPERACILLIN SODIUM AND TAZOBACTAM SODIUM 3375 MG: 3; .375 INJECTION, POWDER, LYOPHILIZED, FOR SOLUTION INTRAVENOUS at 03:17

## 2021-01-01 RX ADMIN — POTASSIUM BICARBONATE 20 MEQ: 782 TABLET, EFFERVESCENT ORAL at 17:36

## 2021-01-01 RX ADMIN — SODIUM BICARBONATE 1300 MG: 650 TABLET ORAL at 20:38

## 2021-01-01 RX ADMIN — RIFAXIMIN 550 MG: 550 TABLET ORAL at 22:01

## 2021-01-01 RX ADMIN — SPIRONOLACTONE 100 MG: 25 TABLET ORAL at 08:44

## 2021-01-01 RX ADMIN — PHYTONADIONE 10 MG: 5 TABLET ORAL at 08:05

## 2021-01-01 RX ADMIN — SODIUM BICARBONATE 1300 MG: 650 TABLET ORAL at 14:00

## 2021-01-01 RX ADMIN — MORPHINE SULFATE 3 MG: 4 INJECTION INTRAVENOUS at 13:58

## 2021-01-01 RX ADMIN — Medication 6 MG: at 21:52

## 2021-01-01 RX ADMIN — FUROSEMIDE 40 MG: 40 TABLET ORAL at 09:44

## 2021-01-01 RX ADMIN — Medication 10 ML: at 20:17

## 2021-01-01 RX ADMIN — MIDODRINE HYDROCHLORIDE 15 MG: 5 TABLET ORAL at 17:01

## 2021-01-01 RX ADMIN — Medication 6 MG: at 22:55

## 2021-01-01 RX ADMIN — ALBUMIN (HUMAN) 25 G: 0.25 INJECTION, SOLUTION INTRAVENOUS at 10:29

## 2021-01-01 RX ADMIN — POTASSIUM CHLORIDE 20 MEQ: 1500 TABLET, EXTENDED RELEASE ORAL at 12:53

## 2021-01-01 RX ADMIN — Medication 10 ML: at 20:22

## 2021-01-01 RX ADMIN — FUROSEMIDE 20 MG: 10 INJECTION, SOLUTION INTRAMUSCULAR; INTRAVENOUS at 13:09

## 2021-01-01 RX ADMIN — TRAMADOL HYDROCHLORIDE 25 MG: 50 TABLET, FILM COATED ORAL at 21:02

## 2021-01-01 RX ADMIN — OCTREOTIDE ACETATE 50 MCG/HR: 500 INJECTION, SOLUTION INTRAVENOUS; SUBCUTANEOUS at 14:34

## 2021-01-01 RX ADMIN — FUROSEMIDE 20 MG: 10 INJECTION, SOLUTION INTRAMUSCULAR; INTRAVENOUS at 13:32

## 2021-01-01 RX ADMIN — SODIUM CHLORIDE: 9 INJECTION, SOLUTION INTRAVENOUS at 15:11

## 2021-01-01 RX ADMIN — SODIUM CHLORIDE: 9 INJECTION, SOLUTION INTRAVENOUS at 06:42

## 2021-01-01 RX ADMIN — SODIUM BICARBONATE: 84 INJECTION, SOLUTION INTRAVENOUS at 14:08

## 2021-01-01 RX ADMIN — PIPERACILLIN SODIUM AND TAZOBACTAM SODIUM 3375 MG: 3; .375 INJECTION, POWDER, LYOPHILIZED, FOR SOLUTION INTRAVENOUS at 02:18

## 2021-01-01 RX ADMIN — THIAMINE HYDROCHLORIDE 100 MG: 100 INJECTION, SOLUTION INTRAMUSCULAR; INTRAVENOUS at 11:47

## 2021-01-01 RX ADMIN — PIPERACILLIN SODIUM AND TAZOBACTAM SODIUM 3375 MG: 3; .375 INJECTION, POWDER, LYOPHILIZED, FOR SOLUTION INTRAVENOUS at 05:26

## 2021-01-01 RX ADMIN — FUROSEMIDE 20 MG: 10 INJECTION, SOLUTION INTRAMUSCULAR; INTRAVENOUS at 13:47

## 2021-01-01 RX ADMIN — LACTULOSE 20 G: 20 SOLUTION ORAL at 13:13

## 2021-01-01 RX ADMIN — SODIUM CHLORIDE: 9 INJECTION, SOLUTION INTRAVENOUS at 15:42

## 2021-01-01 RX ADMIN — ALBUMIN (HUMAN) 25 G: 0.25 INJECTION, SOLUTION INTRAVENOUS at 16:28

## 2021-01-01 RX ADMIN — SPIRONOLACTONE 100 MG: 25 TABLET ORAL at 08:51

## 2021-01-01 RX ADMIN — SODIUM CHLORIDE 1000 ML: 9 INJECTION, SOLUTION INTRAVENOUS at 22:03

## 2021-01-01 RX ADMIN — FUROSEMIDE 20 MG: 10 INJECTION, SOLUTION INTRAMUSCULAR; INTRAVENOUS at 08:31

## 2021-01-01 RX ADMIN — POTASSIUM CHLORIDE 40 MEQ: 20 TABLET, EXTENDED RELEASE ORAL at 12:19

## 2021-01-01 RX ADMIN — PIPERACILLIN SODIUM AND TAZOBACTAM SODIUM 3375 MG: 3; .375 INJECTION, POWDER, LYOPHILIZED, FOR SOLUTION INTRAVENOUS at 18:22

## 2021-01-01 RX ADMIN — Medication 10 ML: at 22:01

## 2021-01-01 RX ADMIN — CHOLECALCIFEROL TAB 125 MCG (5000 UNIT) 5000 UNITS: 125 TAB at 09:21

## 2021-01-01 RX ADMIN — PHENYLEPHRINE HYDROCHLORIDE 130 MCG/MIN: 10 INJECTION INTRAVENOUS at 17:15

## 2021-01-01 RX ADMIN — POTASSIUM BICARBONATE 40 MEQ: 782 TABLET, EFFERVESCENT ORAL at 07:43

## 2021-01-01 RX ADMIN — PIPERACILLIN SODIUM AND TAZOBACTAM SODIUM 3375 MG: 3; .375 INJECTION, POWDER, LYOPHILIZED, FOR SOLUTION INTRAVENOUS at 22:03

## 2021-01-01 RX ADMIN — Medication 10 ML: at 08:31

## 2021-01-01 RX ADMIN — THIAMINE HYDROCHLORIDE 100 MG: 100 INJECTION, SOLUTION INTRAMUSCULAR; INTRAVENOUS at 10:36

## 2021-01-01 RX ADMIN — THIAMINE HCL TAB 100 MG 100 MG: 100 TAB at 07:59

## 2021-01-01 RX ADMIN — PANTOPRAZOLE SODIUM 40 MG: 40 INJECTION, POWDER, FOR SOLUTION INTRAVENOUS at 07:52

## 2021-01-01 RX ADMIN — PIPERACILLIN SODIUM AND TAZOBACTAM SODIUM 3375 MG: 3; .375 INJECTION, POWDER, LYOPHILIZED, FOR SOLUTION INTRAVENOUS at 03:56

## 2021-01-01 RX ADMIN — LACTULOSE 20 G: 20 SOLUTION ORAL at 12:37

## 2021-01-01 RX ADMIN — MIDODRINE HYDROCHLORIDE 15 MG: 5 TABLET ORAL at 16:55

## 2021-01-01 RX ADMIN — SODIUM CHLORIDE, PRESERVATIVE FREE 10 ML: 5 INJECTION INTRAVENOUS at 08:57

## 2021-01-01 RX ADMIN — POTASSIUM CHLORIDE 10 MEQ: 10 INJECTION, SOLUTION INTRAVENOUS at 10:56

## 2021-01-01 RX ADMIN — MIDODRINE HYDROCHLORIDE 15 MG: 5 TABLET ORAL at 08:30

## 2021-01-01 RX ADMIN — MIDODRINE HYDROCHLORIDE 15 MG: 5 TABLET ORAL at 07:51

## 2021-01-01 RX ADMIN — POTASSIUM CHLORIDE 10 MEQ: 10 INJECTION, SOLUTION INTRAVENOUS at 11:21

## 2021-01-01 RX ADMIN — POTASSIUM CHLORIDE 20 MEQ: 20 TABLET, EXTENDED RELEASE ORAL at 08:33

## 2021-01-01 RX ADMIN — RIFAXIMIN 550 MG: 550 TABLET ORAL at 10:43

## 2021-01-01 RX ADMIN — MAGNESIUM SULFATE HEPTAHYDRATE 2000 MG: 40 INJECTION, SOLUTION INTRAVENOUS at 11:42

## 2021-01-01 RX ADMIN — SODIUM BICARBONATE 1300 MG: 650 TABLET ORAL at 13:09

## 2021-01-01 RX ADMIN — RIFAXIMIN 550 MG: 550 TABLET ORAL at 22:48

## 2021-01-01 RX ADMIN — PHENYLEPHRINE HYDROCHLORIDE 180 MCG/MIN: 10 INJECTION INTRAVENOUS at 05:25

## 2021-01-01 RX ADMIN — SODIUM CHLORIDE: 9 INJECTION, SOLUTION INTRAVENOUS at 15:08

## 2021-01-01 RX ADMIN — OCTREOTIDE ACETATE 50 MCG/HR: 500 INJECTION, SOLUTION INTRAVENOUS; SUBCUTANEOUS at 09:32

## 2021-01-01 RX ADMIN — Medication 10 ML: at 21:02

## 2021-01-01 RX ADMIN — RIFAXIMIN 550 MG: 550 TABLET ORAL at 21:17

## 2021-01-01 RX ADMIN — Medication 10 ML: at 12:45

## 2021-01-01 RX ADMIN — ALBUMIN (HUMAN) 25 G: 0.25 INJECTION, SOLUTION INTRAVENOUS at 11:42

## 2021-01-01 RX ADMIN — Medication 10 ML: at 21:25

## 2021-01-01 RX ADMIN — THIAMINE HYDROCHLORIDE 100 MG: 100 INJECTION, SOLUTION INTRAMUSCULAR; INTRAVENOUS at 09:33

## 2021-01-01 RX ADMIN — MIDODRINE HYDROCHLORIDE 10 MG: 5 TABLET ORAL at 16:42

## 2021-01-01 RX ADMIN — Medication 10 ML: at 21:18

## 2021-01-01 RX ADMIN — THIAMINE HYDROCHLORIDE 100 MG: 100 INJECTION, SOLUTION INTRAMUSCULAR; INTRAVENOUS at 09:28

## 2021-01-01 RX ADMIN — MIDODRINE HYDROCHLORIDE 15 MG: 5 TABLET ORAL at 10:12

## 2021-01-01 RX ADMIN — PROCHLORPERAZINE EDISYLATE 10 MG: 5 INJECTION INTRAMUSCULAR; INTRAVENOUS at 17:13

## 2021-01-01 RX ADMIN — SODIUM CHLORIDE: 9 INJECTION, SOLUTION INTRAVENOUS at 15:43

## 2021-01-01 RX ADMIN — PHYTONADIONE 10 MG: 5 TABLET ORAL at 08:31

## 2021-01-01 RX ADMIN — OCTREOTIDE ACETATE 50 MCG/HR: 500 INJECTION, SOLUTION INTRAVENOUS; SUBCUTANEOUS at 21:29

## 2021-01-01 RX ADMIN — CHOLECALCIFEROL TAB 125 MCG (5000 UNIT) 5000 UNITS: 125 TAB at 08:25

## 2021-01-01 RX ADMIN — PANTOPRAZOLE SODIUM 40 MG: 40 INJECTION, POWDER, FOR SOLUTION INTRAVENOUS at 10:01

## 2021-01-01 RX ADMIN — PANTOPRAZOLE SODIUM 40 MG: 40 INJECTION, POWDER, FOR SOLUTION INTRAVENOUS at 07:48

## 2021-01-01 RX ADMIN — FUROSEMIDE 40 MG: 40 TABLET ORAL at 09:17

## 2021-01-01 RX ADMIN — PANTOPRAZOLE SODIUM 40 MG: 40 INJECTION, POWDER, FOR SOLUTION INTRAVENOUS at 08:39

## 2021-01-01 RX ADMIN — FENTANYL CITRATE 25 MCG: 0.05 INJECTION, SOLUTION INTRAMUSCULAR; INTRAVENOUS at 10:52

## 2021-01-01 RX ADMIN — RIFAXIMIN 550 MG: 550 TABLET ORAL at 10:01

## 2021-01-01 RX ADMIN — PIPERACILLIN SODIUM AND TAZOBACTAM SODIUM 3375 MG: 3; .375 INJECTION, POWDER, LYOPHILIZED, FOR SOLUTION INTRAVENOUS at 13:31

## 2021-01-01 RX ADMIN — POTASSIUM & SODIUM PHOSPHATES POWDER PACK 280-160-250 MG 250 MG: 280-160-250 PACK at 20:22

## 2021-01-01 RX ADMIN — MAGNESIUM SULFATE HEPTAHYDRATE 2000 MG: 40 INJECTION, SOLUTION INTRAVENOUS at 19:12

## 2021-01-01 RX ADMIN — PANTOPRAZOLE SODIUM 40 MG: 40 TABLET, DELAYED RELEASE ORAL at 05:32

## 2021-01-01 RX ADMIN — PIPERACILLIN SODIUM AND TAZOBACTAM SODIUM 3375 MG: 3; .375 INJECTION, POWDER, LYOPHILIZED, FOR SOLUTION INTRAVENOUS at 19:00

## 2021-01-01 RX ADMIN — SODIUM CHLORIDE: 9 INJECTION, SOLUTION INTRAVENOUS at 07:42

## 2021-01-01 RX ADMIN — PIPERACILLIN SODIUM AND TAZOBACTAM SODIUM 3375 MG: 3; .375 INJECTION, POWDER, LYOPHILIZED, FOR SOLUTION INTRAVENOUS at 18:44

## 2021-01-01 RX ADMIN — PIPERACILLIN SODIUM AND TAZOBACTAM SODIUM 3375 MG: 3; .375 INJECTION, POWDER, LYOPHILIZED, FOR SOLUTION INTRAVENOUS at 09:21

## 2021-01-01 RX ADMIN — LACTULOSE 30 G: 20 SOLUTION ORAL at 13:46

## 2021-01-01 RX ADMIN — PHENYLEPHRINE HYDROCHLORIDE 100 MCG/MIN: 10 INJECTION INTRAVENOUS at 00:05

## 2021-01-01 RX ADMIN — METOPROLOL TARTRATE 2.5 MG: 1 INJECTION, SOLUTION INTRAVENOUS at 19:25

## 2021-01-01 RX ADMIN — RIFAXIMIN 550 MG: 550 TABLET ORAL at 20:09

## 2021-01-01 RX ADMIN — PHYTONADIONE 10 MG: 5 TABLET ORAL at 10:42

## 2021-01-01 RX ADMIN — POTASSIUM CHLORIDE 20 MEQ: 20 TABLET, EXTENDED RELEASE ORAL at 10:14

## 2021-01-01 RX ADMIN — ALBUMIN (HUMAN) 25 G: 0.25 INJECTION, SOLUTION INTRAVENOUS at 02:19

## 2021-01-01 RX ADMIN — FUROSEMIDE 20 MG: 10 INJECTION, SOLUTION INTRAMUSCULAR; INTRAVENOUS at 21:25

## 2021-01-01 RX ADMIN — SODIUM BICARBONATE 1300 MG: 650 TABLET ORAL at 22:49

## 2021-01-01 RX ADMIN — PIPERACILLIN SODIUM AND TAZOBACTAM SODIUM 3375 MG: 3; .375 INJECTION, POWDER, LYOPHILIZED, FOR SOLUTION INTRAVENOUS at 10:51

## 2021-01-01 RX ADMIN — RIFAXIMIN 550 MG: 550 TABLET ORAL at 08:55

## 2021-01-01 RX ADMIN — Medication 10 ML: at 09:29

## 2021-01-01 RX ADMIN — SODIUM CHLORIDE: 9 INJECTION, SOLUTION INTRAVENOUS at 06:51

## 2021-01-01 RX ADMIN — PHENYLEPHRINE HYDROCHLORIDE 130 MCG/MIN: 10 INJECTION INTRAVENOUS at 03:29

## 2021-01-01 RX ADMIN — FUROSEMIDE 20 MG: 10 INJECTION, SOLUTION INTRAMUSCULAR; INTRAVENOUS at 00:33

## 2021-01-01 RX ADMIN — POTASSIUM CHLORIDE 10 MEQ: 10 INJECTION, SOLUTION INTRAVENOUS at 09:54

## 2021-01-01 RX ADMIN — RIFAXIMIN 550 MG: 550 TABLET ORAL at 22:02

## 2021-01-01 RX ADMIN — ALBUMIN (HUMAN) 25 G: 0.25 INJECTION, SOLUTION INTRAVENOUS at 03:08

## 2021-01-01 RX ADMIN — OCTREOTIDE ACETATE 50 MCG/HR: 500 INJECTION, SOLUTION INTRAVENOUS; SUBCUTANEOUS at 07:40

## 2021-01-01 RX ADMIN — LACTULOSE 30 G: 20 SOLUTION ORAL at 13:31

## 2021-01-01 RX ADMIN — PHENYLEPHRINE HYDROCHLORIDE 125 MCG/MIN: 10 INJECTION INTRAVENOUS at 14:09

## 2021-01-01 RX ADMIN — Medication: at 08:18

## 2021-01-01 RX ADMIN — MIDODRINE HYDROCHLORIDE 15 MG: 5 TABLET ORAL at 08:41

## 2021-01-01 RX ADMIN — Medication 10 ML: at 08:23

## 2021-01-01 RX ADMIN — RIFAXIMIN 550 MG: 550 TABLET ORAL at 08:40

## 2021-01-01 RX ADMIN — CHOLECALCIFEROL TAB 125 MCG (5000 UNIT) 5000 UNITS: 125 TAB at 11:35

## 2021-01-01 RX ADMIN — POTASSIUM CHLORIDE 10 MEQ: 7.46 INJECTION, SOLUTION INTRAVENOUS at 10:40

## 2021-01-01 RX ADMIN — LACTULOSE 20 G: 20 SOLUTION ORAL at 22:02

## 2021-01-01 RX ADMIN — MIDODRINE HYDROCHLORIDE 15 MG: 5 TABLET ORAL at 15:15

## 2021-01-01 RX ADMIN — POTASSIUM CHLORIDE 10 MEQ: 7.46 INJECTION, SOLUTION INTRAVENOUS at 09:36

## 2021-01-01 RX ADMIN — SODIUM CHLORIDE, PRESERVATIVE FREE 10 ML: 5 INJECTION INTRAVENOUS at 08:32

## 2021-01-01 RX ADMIN — SODIUM CHLORIDE: 9 INJECTION, SOLUTION INTRAVENOUS at 22:55

## 2021-01-01 RX ADMIN — SODIUM BICARBONATE 1300 MG: 650 TABLET ORAL at 08:55

## 2021-01-01 RX ADMIN — SODIUM CHLORIDE: 9 INJECTION, SOLUTION INTRAVENOUS at 06:00

## 2021-01-01 RX ADMIN — POTASSIUM CHLORIDE 10 MEQ: 10 INJECTION, SOLUTION INTRAVENOUS at 07:08

## 2021-01-01 RX ADMIN — WATER 2000 MG: 1 INJECTION INTRAMUSCULAR; INTRAVENOUS; SUBCUTANEOUS at 14:08

## 2021-01-01 RX ADMIN — SODIUM BICARBONATE 1300 MG: 650 TABLET ORAL at 15:38

## 2021-01-01 RX ADMIN — POTASSIUM CHLORIDE 10 MEQ: 10 INJECTION, SOLUTION INTRAVENOUS at 00:59

## 2021-01-01 RX ADMIN — INSULIN LISPRO 3 UNITS: 100 INJECTION, SOLUTION INTRAVENOUS; SUBCUTANEOUS at 09:24

## 2021-01-01 RX ADMIN — LACTULOSE 20 G: 20 SOLUTION ORAL at 22:01

## 2021-01-01 RX ADMIN — LACTULOSE 20 G: 20 SOLUTION ORAL at 14:46

## 2021-01-01 RX ADMIN — OCTREOTIDE ACETATE 25 MCG/HR: 500 INJECTION, SOLUTION INTRAVENOUS; SUBCUTANEOUS at 08:36

## 2021-01-01 RX ADMIN — RIFAXIMIN 550 MG: 550 TABLET ORAL at 09:28

## 2021-01-01 RX ADMIN — PIPERACILLIN SODIUM AND TAZOBACTAM SODIUM 3375 MG: 3; .375 INJECTION, POWDER, LYOPHILIZED, FOR SOLUTION INTRAVENOUS at 17:26

## 2021-01-01 RX ADMIN — LORAZEPAM 1 MG: 2 INJECTION INTRAMUSCULAR; INTRAVENOUS at 13:24

## 2021-01-01 RX ADMIN — TRAMADOL HYDROCHLORIDE 25 MG: 50 TABLET, FILM COATED ORAL at 22:54

## 2021-01-01 RX ADMIN — THIAMINE HYDROCHLORIDE 100 MG: 100 INJECTION, SOLUTION INTRAMUSCULAR; INTRAVENOUS at 10:13

## 2021-01-01 RX ADMIN — FENTANYL CITRATE 25 MCG: 0.05 INJECTION, SOLUTION INTRAMUSCULAR; INTRAVENOUS at 16:57

## 2021-01-01 RX ADMIN — MIDODRINE HYDROCHLORIDE 15 MG: 5 TABLET ORAL at 16:24

## 2021-01-01 RX ADMIN — SODIUM CHLORIDE: 9 INJECTION, SOLUTION INTRAVENOUS at 05:54

## 2021-01-01 RX ADMIN — CHOLECALCIFEROL TAB 125 MCG (5000 UNIT) 5000 UNITS: 125 TAB at 08:55

## 2021-01-01 RX ADMIN — POTASSIUM CHLORIDE 20 MEQ: 20 TABLET, EXTENDED RELEASE ORAL at 13:46

## 2021-01-01 RX ADMIN — SODIUM BICARBONATE 1300 MG: 650 TABLET ORAL at 10:12

## 2021-01-01 RX ADMIN — METOPROLOL TARTRATE 2.5 MG: 1 INJECTION, SOLUTION INTRAVENOUS at 08:41

## 2021-01-01 RX ADMIN — ALBUMIN (HUMAN) 25 G: 0.25 INJECTION, SOLUTION INTRAVENOUS at 09:51

## 2021-01-01 RX ADMIN — RIFAXIMIN 550 MG: 550 TABLET ORAL at 21:16

## 2021-01-01 RX ADMIN — SODIUM BICARBONATE 1300 MG: 650 TABLET ORAL at 09:00

## 2021-01-01 RX ADMIN — SODIUM CHLORIDE: 9 INJECTION, SOLUTION INTRAVENOUS at 14:45

## 2021-01-01 RX ADMIN — PIPERACILLIN SODIUM AND TAZOBACTAM SODIUM 3375 MG: 3; .375 INJECTION, POWDER, LYOPHILIZED, FOR SOLUTION INTRAVENOUS at 11:42

## 2021-01-01 RX ADMIN — THIAMINE HYDROCHLORIDE 100 MG: 100 INJECTION, SOLUTION INTRAMUSCULAR; INTRAVENOUS at 09:19

## 2021-01-01 RX ADMIN — POTASSIUM BICARBONATE 40 MEQ: 782 TABLET, EFFERVESCENT ORAL at 21:30

## 2021-01-01 RX ADMIN — FUROSEMIDE 40 MG: 40 TABLET ORAL at 08:45

## 2021-01-01 RX ADMIN — MIDODRINE HYDROCHLORIDE 10 MG: 5 TABLET ORAL at 11:35

## 2021-01-01 RX ADMIN — MIDODRINE HYDROCHLORIDE 15 MG: 5 TABLET ORAL at 17:37

## 2021-01-01 RX ADMIN — LACTULOSE 20 G: 20 SOLUTION ORAL at 21:53

## 2021-01-01 RX ADMIN — OCTREOTIDE ACETATE 50 MCG/HR: 500 INJECTION, SOLUTION INTRAVENOUS; SUBCUTANEOUS at 14:46

## 2021-01-01 RX ADMIN — SODIUM CHLORIDE, PRESERVATIVE FREE 10 ML: 5 INJECTION INTRAVENOUS at 20:43

## 2021-01-01 RX ADMIN — LACTULOSE 30 G: 20 SOLUTION ORAL at 14:28

## 2021-01-01 RX ADMIN — RIFAXIMIN 550 MG: 550 TABLET ORAL at 08:24

## 2021-01-01 RX ADMIN — WATER: 100 IRRIGANT IRRIGATION at 13:23

## 2021-01-01 RX ADMIN — HEPARIN SODIUM 5000 UNITS: 5000 INJECTION INTRAVENOUS; SUBCUTANEOUS at 06:00

## 2021-01-01 RX ADMIN — SODIUM CHLORIDE 1000 ML: 9 INJECTION, SOLUTION INTRAVENOUS at 10:30

## 2021-01-01 RX ADMIN — PIPERACILLIN SODIUM AND TAZOBACTAM SODIUM 3375 MG: 3; .375 INJECTION, POWDER, LYOPHILIZED, FOR SOLUTION INTRAVENOUS at 18:49

## 2021-01-01 RX ADMIN — PHENYLEPHRINE HYDROCHLORIDE 140 MCG/MIN: 10 INJECTION INTRAVENOUS at 04:44

## 2021-01-01 RX ADMIN — CEFTRIAXONE SODIUM 1000 MG: 1 INJECTION, POWDER, FOR SOLUTION INTRAMUSCULAR; INTRAVENOUS at 20:17

## 2021-01-01 RX ADMIN — Medication 6 MG: at 20:54

## 2021-01-01 RX ADMIN — Medication 6 MG: at 20:21

## 2021-01-01 RX ADMIN — Medication 10 ML: at 22:02

## 2021-01-01 RX ADMIN — SODIUM CHLORIDE, PRESERVATIVE FREE 10 ML: 5 INJECTION INTRAVENOUS at 08:05

## 2021-01-01 RX ADMIN — PANTOPRAZOLE SODIUM 40 MG: 40 INJECTION, POWDER, FOR SOLUTION INTRAVENOUS at 08:55

## 2021-01-01 RX ADMIN — SODIUM CHLORIDE 1000 ML: 9 INJECTION, SOLUTION INTRAVENOUS at 13:18

## 2021-01-01 RX ADMIN — THIAMINE HYDROCHLORIDE 100 MG: 100 INJECTION, SOLUTION INTRAMUSCULAR; INTRAVENOUS at 08:53

## 2021-01-01 RX ADMIN — SPIRONOLACTONE 100 MG: 25 TABLET ORAL at 09:44

## 2021-01-01 RX ADMIN — SODIUM CHLORIDE 8 MG/HR: 9 INJECTION, SOLUTION INTRAVENOUS at 01:43

## 2021-01-01 RX ADMIN — RIFAXIMIN 550 MG: 550 TABLET ORAL at 20:43

## 2021-01-01 RX ADMIN — THIAMINE HYDROCHLORIDE 100 MG: 100 INJECTION, SOLUTION INTRAMUSCULAR; INTRAVENOUS at 09:00

## 2021-01-01 RX ADMIN — FUROSEMIDE 20 MG: 10 INJECTION, SOLUTION INTRAMUSCULAR; INTRAVENOUS at 08:25

## 2021-01-01 RX ADMIN — RIFAXIMIN 550 MG: 550 TABLET ORAL at 20:23

## 2021-01-01 RX ADMIN — Medication 10 ML: at 09:02

## 2021-01-01 RX ADMIN — MIDODRINE HYDROCHLORIDE 15 MG: 5 TABLET ORAL at 09:27

## 2021-01-01 RX ADMIN — PHENYLEPHRINE HYDROCHLORIDE 40 MCG/MIN: 10 INJECTION INTRAVENOUS at 00:02

## 2021-01-01 RX ADMIN — SODIUM CHLORIDE 500 ML: 9 INJECTION, SOLUTION INTRAVENOUS at 06:31

## 2021-01-01 RX ADMIN — PHYTONADIONE 10 MG: 5 TABLET ORAL at 09:03

## 2021-01-01 RX ADMIN — SODIUM CHLORIDE: 9 INJECTION, SOLUTION INTRAVENOUS at 00:42

## 2021-01-01 RX ADMIN — CHOLECALCIFEROL TAB 125 MCG (5000 UNIT) 5000 UNITS: 125 TAB at 09:29

## 2021-01-01 ASSESSMENT — PAIN SCALES - GENERAL
PAINLEVEL_OUTOF10: 0
PAINLEVEL_OUTOF10: 0
PAINLEVEL_OUTOF10: 2
PAINLEVEL_OUTOF10: 0
PAINLEVEL_OUTOF10: 2
PAINLEVEL_OUTOF10: 0
PAINLEVEL_OUTOF10: 4
PAINLEVEL_OUTOF10: 0
PAINLEVEL_OUTOF10: 3
PAINLEVEL_OUTOF10: 0
PAINLEVEL_OUTOF10: 0
PAINLEVEL_OUTOF10: 10
PAINLEVEL_OUTOF10: 0
PAINLEVEL_OUTOF10: 8
PAINLEVEL_OUTOF10: 0
PAINLEVEL_OUTOF10: 7
PAINLEVEL_OUTOF10: 0
PAINLEVEL_OUTOF10: 8
PAINLEVEL_OUTOF10: 0
PAINLEVEL_OUTOF10: 5
PAINLEVEL_OUTOF10: 0
PAINLEVEL_OUTOF10: 8
PAINLEVEL_OUTOF10: 0
PAINLEVEL_OUTOF10: 8
PAINLEVEL_OUTOF10: 0
PAINLEVEL_OUTOF10: 9
PAINLEVEL_OUTOF10: 0
PAINLEVEL_OUTOF10: 10
PAINLEVEL_OUTOF10: 0
PAINLEVEL_OUTOF10: 0
PAINLEVEL_OUTOF10: 6
PAINLEVEL_OUTOF10: 0
PAINLEVEL_OUTOF10: 6
PAINLEVEL_OUTOF10: 0
PAINLEVEL_OUTOF10: 8
PAINLEVEL_OUTOF10: 0
PAINLEVEL_OUTOF10: 6
PAINLEVEL_OUTOF10: 0
PAINLEVEL_OUTOF10: 10
PAINLEVEL_OUTOF10: 10
PAINLEVEL_OUTOF10: 0
PAINLEVEL_OUTOF10: 10
PAINLEVEL_OUTOF10: 0
PAINLEVEL_OUTOF10: 8
PAINLEVEL_OUTOF10: 0
PAINLEVEL_OUTOF10: 5
PAINLEVEL_OUTOF10: 0
PAINLEVEL_OUTOF10: 10
PAINLEVEL_OUTOF10: 10
PAINLEVEL_OUTOF10: 0
PAINLEVEL_OUTOF10: 3
PAINLEVEL_OUTOF10: 0

## 2021-01-01 ASSESSMENT — PAIN SCALES - WONG BAKER

## 2021-01-01 ASSESSMENT — PAIN DESCRIPTION - PAIN TYPE
TYPE: ACUTE PAIN
TYPE: ACUTE PAIN

## 2021-01-01 ASSESSMENT — PAIN DESCRIPTION - LOCATION
LOCATION: ABDOMEN
LOCATION: ABDOMEN
LOCATION: GENERALIZED
LOCATION: GENERALIZED
LOCATION: ABDOMEN
LOCATION: ABDOMEN

## 2021-01-01 ASSESSMENT — PAIN DESCRIPTION - DESCRIPTORS
DESCRIPTORS: ACHING
DESCRIPTORS: ACHING
DESCRIPTORS: ACHING;DISCOMFORT;DULL
DESCRIPTORS: DISCOMFORT
DESCRIPTORS: DISCOMFORT;ACHING
DESCRIPTORS: ACHING
DESCRIPTORS: DISCOMFORT

## 2021-01-01 ASSESSMENT — PAIN - FUNCTIONAL ASSESSMENT
PAIN_FUNCTIONAL_ASSESSMENT: ACTIVITIES ARE NOT PREVENTED
PAIN_FUNCTIONAL_ASSESSMENT: ACTIVITIES ARE NOT PREVENTED
PAIN_FUNCTIONAL_ASSESSMENT: PREVENTS OR INTERFERES SOME ACTIVE ACTIVITIES AND ADLS

## 2021-01-01 ASSESSMENT — ENCOUNTER SYMPTOMS
SORE THROAT: 0
BLOOD IN STOOL: 0
WHEEZING: 0
ABDOMINAL PAIN: 1
CHOKING: 0
VOICE CHANGE: 0
EYE REDNESS: 0
SHORTNESS OF BREATH: 0
NAUSEA: 1
BACK PAIN: 0
VOMITING: 1
COLOR CHANGE: 1
COUGH: 1
TROUBLE SWALLOWING: 0
RHINORRHEA: 0
ABDOMINAL DISTENTION: 1
EYE DISCHARGE: 0

## 2021-01-01 ASSESSMENT — PAIN DESCRIPTION - ONSET
ONSET: ON-GOING
ONSET: ON-GOING

## 2021-01-01 ASSESSMENT — PAIN DESCRIPTION - PROGRESSION
CLINICAL_PROGRESSION: GRADUALLY WORSENING
CLINICAL_PROGRESSION: GRADUALLY WORSENING

## 2021-01-01 ASSESSMENT — PAIN DESCRIPTION - FREQUENCY
FREQUENCY: INTERMITTENT
FREQUENCY: INTERMITTENT

## 2021-01-01 ASSESSMENT — LIFESTYLE VARIABLES: SMOKING_STATUS: 0

## 2021-01-01 ASSESSMENT — PAIN DESCRIPTION - ORIENTATION
ORIENTATION: MID;LOWER
ORIENTATION: MID

## 2021-11-14 PROBLEM — E80.6 BILIRUBINEMIA: Status: ACTIVE | Noted: 2021-01-01

## 2021-11-14 NOTE — ED NOTES
Brennan Torrez ( girlfriend waiting in car [phone]     [facility]  11/14/21 5043

## 2021-11-15 NOTE — PROGRESS NOTES
Patient here for ultrasound guided paracentesis. Instructions given and questions answered. Consent signed. Abdomen scanned and marked under the guidance of procedural Radiologist.     1332 start procedure /83 99 18 97% on room air    1401end procedure /76 97 17 96% on room air     6400  cc drained of straw colored ascites fluid. Dry sterile dressing of 2x2 tegaderm to RLQ     Specimen taken to lab with specimen labels that ED printed. Nurse to nurse given to [facility]    Surgery called and asked that patient be transported to OR for patients next procedure.     Patient transported to the OR

## 2021-11-15 NOTE — CONSULTS
[facility]  Dr. Ila Young M.D., Dr. Ellen Roche M.D., Dr. Hector Egan D.O., Dr. Mariana Harris M.D., Dr. Jake Hussein D.O. Patient Name: Emilee Marsh  MRN: [id number]  : 1975 (40 y.o. male)  Allergies: has No Known Allergies. Date of Service: 2021       Reason for Consultation: Jaundice with encephalopathy    HISTORY OF PRESENT ILLNESS:      The patient is a 40 y.o. male who presents with jaundice and encephalopathy. Patient has a past medical history significant for alcohol abuse and intoxication. Patient is presented to the emergency room with his girlfriend with concern of fatigue, jaundice, confusion for the past 2 weeks. Patient's girlfriend at the bedside said that he became so confused that she was worried and decided to brought him in the emergency room. In the emergency room patient was evaluated and found to be elevated Lipase, elevated liver chemistry as well as an ammonia of 91.1 patient was found to have abdominal distention mostly due to ascites. Gastroenterology was consulted for further management of his presumed alcohol induced cirrhosis. I personally saw the patient in the emergency room under stable condition. Patient was accompanied by his girlfriend who participate in their history and physical presented below. The patient's girlfriend stated for the past 2 to 3 weeks patient has become mostly confused, jaundice, and also decided that he will stop drinking. Patient's girlfriend said that the confusion got worse leading her to bring the patient to the emergency room for evaluation. On further questioning patient says that he has been having increased coughing as well as vomiting. Patient endorses hematemesis daily but denies any change in his bowel habits. Patient denies melena, and or hematochezia. Patient says that he has been heavily drinking for the most of his life since he was 26 and denies any history of IV drug use.   Review of medical records showed that patient was evaluated by a 2019 for similar circumstances. Where a liver serology work-up was negative. EGD from 1/21/2019 showed Portal hypertensive gastropathy with gastritis. Patient was appreciated to have significant abdominal distention, fluid wave positive. Worsening abdominal pain. He described the abdominal pain mostly as diffuse throughout his abdomen. Review of recent labs showed that patient is hypokalemic potassium 3.1 hyponatremic sodium 131 alk phos 208  AST 2 8. Ammonia 91 total bilirubin 20.5 albumin 2.3 given the patient is found to have hepatic encephalopathy as well as jaundice with ascites patient will be admitted for further management of his decompensated alcohol induced cirrhosis. Patient did not have any additional complaint. Patient hemodynamically stable. Patient is afebrile otherwise      REVIEW OF SYSTEMS:   Constitutional: Denies fever, chills, or unintentional weight loss. HEENT: Denies double or blurry vision, headaches, ear pain or ringing in the ears. No drainage from the ears, nose or throat. Cardiovascular: Denies any chest pain, irregular heartbeats, or palpitations. Respiratory: Denies shortness of breath, coughing, sputum production, hemoptysis, or wheezing. Gastrointestinal: Admits nausea, vomiting and hematemesis, Denies diarrhea, or constipation. Denies any , black or bloody stools. Admits to abdominal pain  Genitourinary: Denies any urinary urgency, frequency, hematuria. Voiding without difficulty. Endocrine: Denies sensitivity to heat or cold. Denies changes in hair, skin or nails. Denies excessive thirst, hunger or going to the bathroom more frequently than usual.  Extremities: Denies swelling or calf pain. Musculoskeletal: Denies muscle or joint pain, stiffness, arthritis, gout, or instability. Dermatology / Skin: Denies any rashes, ulcers, or excoriations. Denies bruising.    Neurology: Denies any bowel or bladder incontinence, headache or focal neurological deficits. No weakness or paresthesia. Denies numbness or tingling in the hands or feet. Psychiatric: Denies nervousness/anxiety, depression or memory loss. Past Medical History:  Past Medical History:   Diagnosis Date    Anxiety     Depression     Esophagitis     ETOH abuse     Gastritis     GERD (gastroesophageal reflux disease)     Hematuria     Pancreatitis        Past Surgical History:  Past Surgical History:   Procedure Laterality Date    KNEE SURGERY      ACL    UPPER GASTROINTESTINAL ENDOSCOPY  05/09/2018    UPPER GASTROINTESTINAL ENDOSCOPY N/A 5/8/2018    EGD BIOPSY performed by Sindi Camacho MD at [address] [place] [address] Avenue 1/21/2019    EGD ESOPHAGOGASTRODUODENOSCOPY performed by Demetrius Floyd DO at [address] [place] [address] Avenue  1/21/2019    EGD CONTROL HEMORRHAGE performed by Demetrius Floyd DO at [address] [place] [address] Medications:  Prior to Admission medications    Medication Sig Start Date End Date Taking? Authorizing Provider   aspirin 81 MG chewable tablet Take 1 tablet by mouth daily 7/5/20   EILEEN Samuels CNP   amiodarone (CORDARONE) 200 MG tablet Take 1 tablet by mouth 2 times daily 7/4/20 8/3/20  EILEEN Samuels CNP   rivaroxaban (XARELTO) 20 MG TABS tablet Take 1 tablet by mouth daily 7/4/20   EILEEN Samuels CNP   gabapentin (NEURONTIN) 100 MG capsule Take 1 capsule by mouth 3 times daily for 30 days.  7/4/20 8/3/20  EILEEN Samuels CNP   metoprolol succinate (TOPROL XL) 100 MG extended release tablet Take 1 tablet by mouth 2 times daily 7/4/20   Brielle Sousa DO   folic acid (FOLVITE) 1 MG tablet Take 1 tablet by mouth daily 5/11/20   Cayetano Saha DO   thiamine 100 MG tablet Take 1 tablet by mouth daily 5/11/20   Cayetano Saha DO   lactulose [facility]) 10 GM/15ML solution Take 30 mLs by mouth 2 times daily 1/23/19   Maria Eugenia Child MD pantoprazole (PROTONIX) 40 MG tablet Take 1 tablet by mouth every morning (before breakfast) 1/24/19   Debby Shah MD   ergocalciferol (ERGOCALCIFEROL) 67879 units capsule Take 1 capsule by mouth once a week 1/29/19   Debby Shah MD   Multiple Vitamins-Minerals (THERAPEUTIC MULTIVITAMIN-MINERALS) tablet Take 1 tablet by mouth daily 1/24/19   Debby Shah MD       Allergies: Patient has no known allergies. Social History:  Social History     Socioeconomic History    Marital status:      Spouse name: Not on file    Number of children: 1    Years of education: Not on file    Highest education level: Not on file   Occupational History    Not on file   Tobacco Use    Smoking status: Never Smoker    Smokeless tobacco: Never Used   Vaping Use    Vaping Use: Never used   Substance and Sexual Activity    Alcohol use: Yes     Comment: 5th of liquour daily. Previous 1/15/2019    Drug use: No    Sexual activity: Not Currently     Partners: Female   Other Topics Concern    Not on file   Social History Narrative    Has a 15 y.o. son who lives in [place]. Social Determinants of Health     Financial Resource Strain:     Difficulty of Paying Living Expenses: Not on file   Food Insecurity:     Worried About Running Out of Food in the Last Year: Not on file    Ele of Food in the Last Year: Not on file   Transportation Needs:     Lack of Transportation (Medical): Not on file    Lack of Transportation (Non-Medical):  Not on file   Physical Activity:     Days of Exercise per Week: Not on file    Minutes of Exercise per Session: Not on file   Stress:     Feeling of Stress : Not on file   Social Connections:     Frequency of Communication with Friends and Family: Not on file    Frequency of Social Gatherings with Friends and Family: Not on file    Attends Mosque Services: Not on file    Active Member of Clubs or Organizations: Not on file    Attends Club or Organization Meetings: Not on file    Marital Status: Not on file   Intimate Partner Violence:     Fear of Current or Ex-Partner: Not on file    Emotionally Abused: Not on file    Physically Abused: Not on file    Sexually Abused: Not on file   Housing Stability:     Unable to Pay for Housing in the Last Year: Not on file    Number of [place] in the Last Year: Not on file    Unstable Housing in the Last Year: Not on file       Family History:  No family history on file. PHYSICAL EXAM:  Vital Signs: /72   Pulse 97   Temp 98.8 °F (37.1 °C) (Oral)   Resp 16   Ht 6' 3\" (1.905 m)   Wt 215 lb (97.5 kg)   SpO2 97%   BMI 26.87 kg/m²   GENERAL APPEARANCE: Jaundice awake, alert, oriented, cooperative, and in no acute distress  EYES:  Lids and lashes normal, PERRLA, EOMI, sclera clear, conjunctiva normal  HENT:  Normocephalic, without obvious abnormality, atramatic, oral pharynx with moist mucus membranes, tonsils without erythema or exudates  NECK:  Supple with no carotid bruits, JVD or thyromegaly. No cervical adenopathy  LUNGS:  Clear to auscultation bilaterally with no wheezes, rales or rhonchi. No increased work of breathing, good air exchange. CARDIOVASCULAR: Regular rate and rhythm, no murmur  ABDOMEN:  normal bowel sounds in all 4 quadrants, soft, distended with positive fluid wave, non-tender, no masses palpated, no hepatosplenomegally  MUSCULOSKELETAL:  There is no redness, warmth, or swelling of the joints. Full range of motion noted. Motor strength is 5 out of 5 all extremities bilaterally. Tone is normal.  EXTREMITIES: No edema, 2+ pulses bilaterally (radial and dorsalis pedis)  NEUROLOGIC:  Awake, alert, oriented to name, place and time with slight confusion. Cranial nerves II-XII are grossly intact. Motor is 5 out of 5 bilaterally. SKIN: Jaundice, texture, and turgor. There is no redness, warmth, or swelling. No bruising or bleeding, no mottling.    PSYCH: Affect, behavior and insight are all within normal limits. DATA:  Results for orders placed or performed during the hospital encounter of 11/14/21   Ammonia   Result Value Ref Range    Ammonia 91.0 (H) 16.0 - 60.0 umol/L   Basic Metabolic Panel w/ Reflex to MG   Result Value Ref Range    Sodium 131 (L) 132 - 146 mmol/L    Potassium reflex Magnesium 3.1 (L) 3.5 - 5.0 mmol/L    Chloride 104 98 - 107 mmol/L    CO2 16 (L) 22 - 29 mmol/L    Anion Gap 11 7 - 16 mmol/L    Glucose 97 74 - 99 mg/dL    BUN 11 6 - 20 mg/dL    CREATININE 0.8 0.7 - 1.2 mg/dL    GFR Non-African American >60 >=60 mL/min/1.73    GFR African American >60     Calcium 7.8 (L) 8.6 - 10.2 mg/dL   Hepatic function panel   Result Value Ref Range    Total Protein 6.6 6.4 - 8.3 g/dL    Albumin 2.3 (L) 3.5 - 5.2 g/dL    Alkaline Phosphatase 208 (H) 40 - 129 U/L     (H) 0 - 40 U/L     (H) 0 - 39 U/L    Total Bilirubin 20.5 (H) 0.0 - 1.2 mg/dL    Bilirubin, Direct 15.3 (H) 0.0 - 0.3 mg/dL    Bilirubin, Indirect 5.2 (H) 0.0 - 1.0 mg/dL   Lipase   Result Value Ref Range    Lipase 68 (H) 13 - 60 U/L   Magnesium   Result Value Ref Range    Magnesium 1.8 1.6 - 2.6 mg/dL         IMAGING:  No results found. ASSESSMENT / PLAN:      1. Abdominal pain with ascites  Ascites most likely to decompensated cirrhosis secondary to alcohol abuse  Patient admits to worsening abdominal pain and distention for the past 2 weeks  Patient says that he has been abstaining from alcohol for the past 2 week since he became symptomatic. I will order a paracentesis with ascitic fluid analysis to rule out SBP  I will also order an abdominal ultrasound to rule out portal vein thrombosis  Continue with Rocephin 1 g every 24 hours up until other sources of infections is ruled out. Patient appeared non toxic and afebrile. I will order a chest x-ray, urine analysis, and blood culture to rule out any source of additional infection.     2. Hematemesis, rule out gastric/esophageal varices  Patient says that he has been having episode of vomiting daily for the past months  Or so; hematemesis could be secondary to Lindsay-Mata tear but I cannot rule out gastric/esophageal varices. Given patient symptoms of daily hematemesis I will initiate patient on octreotide and Protonix infusion. Check CBC for Anemia  Last EGD from 1/21/2019 showed Portal hypertensive gastropathy with gastritis. Patient has not been compliant with home medication  We will keep the patient on clear liquid diet tonight. N.p.o. after midnight. And schedule tentative varices screening for tomorrow 11/15/2021    3. Hepatic encephalopathy type C grade I   Patient ammonia was 91 on admission patient does not have any signs of asterixis but appeared to be slightly confused  I will initiate lactulose 20 g 3 times daily with rifaximin 550 mg p.o. 3 times a day for encephalopathy  This will be titrated to up to 2-3 soft bowel movement per day. 4. Alcohol induced cirrhosis, decompensated  Please obtain PT/INR for meld calculation  Patient appeared jaundiced I will order an abdominal ultrasound to rule out any additional biliary or liver pathology. Patient was advised on abstaining completely from alcohol given his decompensated state which he agreed to do. Medical  records showed that patient has been admitted multiple times for similar complaint; last admission was in 2019. Liver serology work-up was negative in 2018. I will screening for viral hepatitis again. I will continue with octreotide and PPI infusion As well as lactulose and rifaximin. as mentioned above. I will trend liver chemistry daily And monitor for worsening liver function    Other comorbidities managed per primary team  Alcohol withdrawal and intoxication  History of atrial fibrillation      Please see orders for further plan of care.     Reviewed above with attending     Sanjuana Dimas DO, D.O.   GI fellow  11/14/2021 at 9:34 PM

## 2021-11-15 NOTE — ED PROVIDER NOTES
81-year-old male presenting to the emergency department with fatigue, jaundice, confusion beginning about 2 week ago. Patient has stopped drinking for the past 2 weeks, and has been having jaundice for about 3 weeks with confusion and fatigue and abdominal pain and distention beginning this week. Patient has previously had acute liver cirrhosis, but has never been paracentesis. Patient also reports associated hematemesis and blood in cough. Patient has a history of alcoholism, but denies any alcohol for the last 2 weeks. Patient is alert and oriented x3, but wife states that he has had previous history of hepatic encephalopathy. No gastroenterologist for regular follow-up. Review of Systems   Constitutional: Positive for appetite change and fatigue. Negative for chills and fever. HENT: Negative for congestion, rhinorrhea, sore throat, trouble swallowing and voice change. Eyes: Negative for discharge, redness and visual disturbance. Respiratory: Positive for cough. Negative for choking, shortness of breath and wheezing. Cardiovascular: Negative for chest pain. Gastrointestinal: Positive for abdominal distention, abdominal pain, nausea and vomiting. Negative for blood in stool. Genitourinary: Negative for dysuria, frequency, hematuria and urgency. Musculoskeletal: Negative for back pain and neck pain. Skin: Positive for color change (Jaundice). Negative for rash and wound. Neurological: Negative for dizziness, syncope, weakness and numbness. Psychiatric/Behavioral: Positive for confusion. Negative for behavioral problems. Physical Exam  Vitals reviewed. Constitutional:       General: He is not in acute distress. Appearance: Normal appearance. He is normal weight. He is not ill-appearing or toxic-appearing. HENT:      Head: Normocephalic.       Right Ear: External ear normal.      Left Ear: External ear normal.      Nose: Nose normal.      Mouth/Throat: Mouth: Mucous membranes are moist.   Eyes:      General: Scleral icterus present. Right eye: No discharge. Left eye: No discharge. Cardiovascular:      Rate and Rhythm: Normal rate and regular rhythm. Pulses: Normal pulses. Pulmonary:      Effort: Pulmonary effort is normal. No respiratory distress. Breath sounds: No wheezing, rhonchi or rales. Abdominal:      General: There is distension. Tenderness: There is abdominal tenderness. There is no right CVA tenderness, left CVA tenderness, guarding or rebound. Musculoskeletal:         General: No tenderness. Normal range of motion. Cervical back: Normal range of motion and neck supple. No rigidity or tenderness. Right lower leg: No edema. Left lower leg: No edema. Skin:     General: Skin is warm. Coloration: Skin is jaundiced. Neurological:      Mental Status: He is alert and oriented to person, place, and time. Motor: No weakness. Comments: Asterixis    Psychiatric:         Mood and Affect: Mood normal.         Behavior: Behavior normal.          Procedures     MDM  Number of Diagnoses or Management Options  Diagnosis management comments: 52-year-old male presenting to emergency department with jaundice, confusion, hematemesis, cough, fatigue,asterixis x2 weeks. Albumin 2.3, alk phos 208, , , total bili 20.5, increased from previous 1.7, direct bilirubin 15.3, indirect 5.2. Lipase 68, sodium 131, potassium 3.1. Effervescent potassium and fluids given. Ammonia 91.0. Spoke to Dr. Cynthia Gonzalez, who was agreed to consult on patient and recommended that patient be admitted and will contact a GI scope tomorrow. Spoke to Dr. Ann Prieto who is agreed to admit patient for further management by GI. Patient is amenable to this plan at this time.   Patient is stable at this time       Amount and/or Complexity of Data Reviewed  Decide to obtain previous medical records or to obtain history from someone other than the patient: yes         ED Course as of 11/14/21 2133   Karyna Dsouza Nov 14, 2021   1829   ATTENDING PROVIDER ATTESTATION:     I have personally performed and/or participated in the history, exam, medical decision making, and procedures and agree with all pertinent clinical information unless otherwise noted. I have also reviewed and agree with the past medical, family and social history unless otherwise noted. I have discussed this patient in detail with the resident and provided the instruction and education regarding the evidence-based evaluation and treatment of abdominal bloating. History: Patient presents to the ED with a complaint of abdominal distention and bloating. He has known history of cirrhosis. Patient is an alcoholic. Last drink was approximately 2 weeks ago. States he occasionally has nausea vomiting but is not currently experiencing any. Denies any abdominal pain. Denies fever or chills. His wife states that he occasionally is confused. Prior history of hepatic encephalopathy. Not currently on lactulose. No prior history of paracentesis. Symptoms mild to moderate in severity and have been  intermittent. Does not follow with a gastroenterologist.    My findings: Ruddy Mayen is a 40 y.o. male whom is in no distress. Physical exam reveals patient lying in bed comfortably. Mild abdominal distention with positive fluid wave. He does have jaundice with scleral icterus. Heart regular rate and rhythm. Lungs clear to auscultation. Patient is ANO x3. No evidence of confusion during the HPI. No asterixis. There is no pretibial edema nor calf tenderness bilaterally. My plan: Symptomatic and supportive care. [facility]. Electronically signed by Corie Damico DO on 11/14/21 at 6:30 PM EST       [MS]   1946 Patient resting in bed in no distress. Discussed results of labs with him. Have placed a consult to gastroenterology.  [MS]   1953 Spoke to Dr. Juju Curran and informed him of patient's lab work and current orders. Dr. Nahomy Schuster said that he will examine patient's lab work and determine whether he needs to come in or will call back. [SACHA]   2057 Spoke to Dr. Kevin Rogers who recommended patient be admitted and will be scheduled for scope tomorrow. Spoke to Dr. Blanca Lagos who has agreed to admit patient at this time. [SACHA]      ED Course User Index  [SACHA] Di Pathak DO  [MS] Arvind Torre DO        ED Course as of 11/14/21 2133   Sun Nov 14, 2021   1829   ATTENDING PROVIDER ATTESTATION:     I have personally performed and/or participated in the history, exam, medical decision making, and procedures and agree with all pertinent clinical information unless otherwise noted. I have also reviewed and agree with the past medical, family and social history unless otherwise noted. I have discussed this patient in detail with the resident and provided the instruction and education regarding the evidence-based evaluation and treatment of abdominal bloating. History: Patient presents to the ED with a complaint of abdominal distention and bloating. He has known history of cirrhosis. Patient is an alcoholic. Last drink was approximately 2 weeks ago. States he occasionally has nausea vomiting but is not currently experiencing any. Denies any abdominal pain. Denies fever or chills. His wife states that he occasionally is confused. Prior history of hepatic encephalopathy. Not currently on lactulose. No prior history of paracentesis. Symptoms mild to moderate in severity and have been  intermittent. Does not follow with a gastroenterologist.    My findings: Marie Peralta is a 40 y.o. male whom is in no distress. Physical exam reveals patient lying in bed comfortably. Mild abdominal distention with positive fluid wave. He does have jaundice with scleral icterus. Heart regular rate and rhythm. Lungs clear to auscultation. Patient is ANO x3.   No evidence of confusion during the HPI. No asterixis. There is no pretibial edema nor calf tenderness bilaterally. My plan: Symptomatic and supportive care. [facility]. Electronically signed by Adela Chen DO on 11/14/21 at 6:30 PM EST       [MS]   1946 Patient resting in bed in no distress. Discussed results of labs with him. Have placed a consult to gastroenterology. [MS]   1953 Spoke to Dr. Hailey Kapoor and informed him of patient's lab work and current orders. Dr. Christian aVrgas said that he will examine patient's lab work and determine whether he needs to come in or will call back. [SACHA]   2057 Spoke to Dr. Sita Deshpande who recommended patient be admitted and will be scheduled for scope tomorrow. Spoke to Dr. Sebas Manuel who has agreed to admit patient at this time. [SACHA]      ED Course User Index  [SACHA] Terri Cleveland DO  [MS] Adela Chen DO       --------------------------------------------- PAST HISTORY ---------------------------------------------  Past Medical History:  has a past medical history of Anxiety, Depression, Esophagitis, ETOH abuse, Gastritis, GERD (gastroesophageal reflux disease), Hematuria, and Pancreatitis. Past Surgical History:  has a past surgical history that includes knee surgery; Upper gastrointestinal endoscopy (05/09/2018); Upper gastrointestinal endoscopy (N/A, 5/8/2018); Upper gastrointestinal endoscopy (N/A, 1/21/2019); and Upper gastrointestinal endoscopy (1/21/2019). Social History:  reports that he has never smoked. He has never used smokeless tobacco. He reports current alcohol use. He reports that he does not use drugs. Family History: family history is not on file. The patients home medications have been reviewed. Allergies: Patient has no known allergies.     -------------------------------------------------- RESULTS -------------------------------------------------    LABS:  Results for orders placed or performed during the hospital encounter of 11/14/21   Ammonia   Result Value Ref Range    Ammonia 91.0 (H) 16.0 - 60.0 umol/L   Basic Metabolic Panel w/ Reflex to MG   Result Value Ref Range    Sodium 131 (L) 132 - 146 mmol/L    Potassium reflex Magnesium 3.1 (L) 3.5 - 5.0 mmol/L    Chloride 104 98 - 107 mmol/L    CO2 16 (L) 22 - 29 mmol/L    Anion Gap 11 7 - 16 mmol/L    Glucose 97 74 - 99 mg/dL    BUN 11 6 - 20 mg/dL    CREATININE 0.8 0.7 - 1.2 mg/dL    GFR Non-African American >60 >=60 mL/min/1.73    GFR African American >60     Calcium 7.8 (L) 8.6 - 10.2 mg/dL   Hepatic function panel   Result Value Ref Range    Total Protein 6.6 6.4 - 8.3 g/dL    Albumin 2.3 (L) 3.5 - 5.2 g/dL    Alkaline Phosphatase 208 (H) 40 - 129 U/L     (H) 0 - 40 U/L     (H) 0 - 39 U/L    Total Bilirubin 20.5 (H) 0.0 - 1.2 mg/dL    Bilirubin, Direct 15.3 (H) 0.0 - 0.3 mg/dL    Bilirubin, Indirect 5.2 (H) 0.0 - 1.0 mg/dL   Lipase   Result Value Ref Range    Lipase 68 (H) 13 - 60 U/L   Magnesium   Result Value Ref Range    Magnesium 1.8 1.6 - 2.6 mg/dL       RADIOLOGY:  IR US GUIDED PARACENTESIS    (Results Pending)   US DUP ABD PEL RETRO SCROT COMPLETE    (Results Pending)   US ABDOMEN LIMITED Specify organ? LIVER, GALLBLADDER, PANCREAS    (Results Pending)         ------------------------- NURSING NOTES AND VITALS REVIEWED ---------------------------  Date / Time Roomed:  11/14/2021  5:12 PM  ED Bed Assignment:  [id number]    The nursing notes within the ED encounter and vital signs as below have been reviewed.      Patient Vitals for the past 24 hrs:   BP Temp Temp src Pulse Resp SpO2 Height Weight   11/14/21 1825 127/72 98.8 °F (37.1 °C) Oral 97 16 97 % 6' 3\" (1.905 m) 215 lb (97.5 kg)       Oxygen Saturation Interpretation: Normal    ------------------------------------------ PROGRESS NOTES ------------------------------------------  Re-evaluation(s):   Patients symptoms show no change  Repeat physical examination is not changed    Counseling:  I have spoken with the patient and discussed todays results, in addition to providing specific details for the plan of care and counseling regarding the diagnosis and prognosis. Their questions are answered at this time and they are agreeable with the plan of admission.    --------------------------------- ADDITIONAL PROVIDER NOTES ---------------------------------  Consultations:   Spoke with Dr. Rayo Marks. Discussed case. They will admit the patient. This patient's ED course included: a personal history and physicial examination, re-evaluation prior to disposition, multiple bedside re-evaluations and IV medications    This patient has remained hemodynamically stable during their ED course. Diagnosis:  1. Bilirubinemia    2. Alcoholic cirrhosis of liver with ascites (HCC)        Disposition:  Patient's disposition: Admit   Patient's condition is stable.          Di Pathak DO  Resident  11/14/21 2132       Di Pathak DO  Resident  11/14/21 2133

## 2021-11-15 NOTE — H&P
Interval H&P    H&P was reviewed. No changes. Vitals:    11/15/21 0854   BP: 108/61   Pulse: 99   Resp: (!) 32   Temp: 98.1 °F (36.7 °C)   SpO2: 96%       Hematemesis in the setting of decompensated cirrhosis  Schedule tentative EGD for today to rule out gastric esophageal varices    Pt seen and independently examined. Pertinent notes and lab work reviewed. D/w Dr. Mallory Talley with physical exam and A&P.   Discussed with patient - all questions answered - agreeable with the plan as delineated, including plan for endoscopy as described      Freedom Cortes MD  11/15/2021  10:40 AM

## 2021-11-15 NOTE — ED NOTES
Pt. Awake. Blood drawn as ordered. Pt. Alert/verbal.  Aware he is at [facility] , aware he is to have a scope. But not sure when. Consent signed by pt. And this RN.      Becky Briscoe RN  11/15/21 7881

## 2021-11-15 NOTE — PROGRESS NOTES
Department of Internal Medicine  PN    PCP: Daly Sosa DO  Admitting Physician: Dr. Fei Gomes  Consultants: Gastroenterology  Date of Service: 11/14/2021    CHIEF COMPLAINT: Abdominal distention and discomfort and hematemesis/coffee-ground emesis    HISTORY OF PRESENT ILLNESS:    Patient is 78-year-old male who presented to the ED due to abdominal distention with associated hematemesis and coffee-ground emesis. Patient states that she has a history of alcoholic hepatitis. That over the last month he has noticed increased abdominal distention. Initially he started out having abdominal pain. Pain became bloating. Now he is feeling more distended and when his stay during the ED he did develop some generalized abdominal discomfort. He does admit to associated hematemesis and coffee-ground emesis. He denies any melena or bleeding per rectum. Patient states that his symptoms began after he resumed alcohol use. His jaundice returned and worsened. As such he quit drinking again about 2 weeks ago. He denies any fever or chills. Denies any chest pain. Denies any shortness of breath. 11/15/221  Patient seen and examined in the ED hold area. Patient feels better today than yesterday. Patient has little discomfort around paracentesis site. Patient is hungry. He denies any nausea vomiting or shortness of breath. Patient has been n.p.o. for EGD today but was canceled recently. Potassium 3.3 today with BUN/creatinine 11/0.8. Patient's total bilirubin is still elevated at 19.7 with moderate elevation of transaminase. Hemoglobin 1.6 with platelet count 88. INR is 4.3 today.   Temperature 98.1 with heart rate 99 and O2 sat 96% on room air at rest.      PAST MEDICAL Hx:  Past Medical History:   Diagnosis Date    Anxiety     Depression     Esophagitis     ETOH abuse     Gastritis     GERD (gastroesophageal reflux disease)     Hematuria     Pancreatitis        PAST SURGICAL Hx:   Past Surgical History:   Procedure Laterality Date    KNEE SURGERY      ACL    UPPER GASTROINTESTINAL ENDOSCOPY  05/09/2018    UPPER GASTROINTESTINAL ENDOSCOPY N/A 5/8/2018    EGD BIOPSY performed by Sindi Camacho MD at [facility] 1/21/2019    EGD ESOPHAGOGASTRODUODENOSCOPY performed by Demetrius Floyd DO at [facility]  1/21/2019    EGD CONTROL HEMORRHAGE performed by Demetrius Floyd DO at [address] [facility] Hx:  No family history on file. HOME MEDICATIONS:  Prior to Admission medications    Medication Sig Start Date End Date Taking? Authorizing Provider   aspirin 81 MG chewable tablet Take 1 tablet by mouth daily 7/5/20   EILEEN Samuels CNP   amiodarone (CORDARONE) 200 MG tablet Take 1 tablet by mouth 2 times daily 7/4/20 8/3/20  EILEEN Samuels CNP   rivaroxaban (XARELTO) 20 MG TABS tablet Take 1 tablet by mouth daily 7/4/20   EILEEN Samuels CNP   gabapentin (NEURONTIN) 100 MG capsule Take 1 capsule by mouth 3 times daily for 30 days.  7/4/20 8/3/20  EILEEN Samuels CNP   metoprolol succinate (TOPROL XL) 100 MG extended release tablet Take 1 tablet by mouth 2 times daily 7/4/20   Brielle Sousa DO   folic acid (FOLVITE) 1 MG tablet Take 1 tablet by mouth daily 5/11/20   Cayetano Saha DO   thiamine 100 MG tablet Take 1 tablet by mouth daily 5/11/20   Cayetano Saha DO   lactulose [facility]) 10 GM/15ML solution Take 30 mLs by mouth 2 times daily 1/23/19   Maria Eugenia Child MD   pantoprazole (PROTONIX) 40 MG tablet Take 1 tablet by mouth every morning (before breakfast) 1/24/19   Maria Eugenia Child MD   ergocalciferol (ERGOCALCIFEROL) 53645 units capsule Take 1 capsule by mouth once a week 1/29/19   Maria Eugenia Child MD   Multiple Vitamins-Minerals (THERAPEUTIC MULTIVITAMIN-MINERALS) tablet Take 1 tablet by mouth daily 1/24/19   Maria Eugenia Child MD       ALLERGIES:  Patient has no known allergies. SOCIAL Hx:  Social History     Socioeconomic History    Marital status:      Spouse name: Not on file    Number of children: 1    Years of education: Not on file    Highest education level: Not on file   Occupational History    Not on file   Tobacco Use    Smoking status: Never Smoker    Smokeless tobacco: Never Used   Vaping Use    Vaping Use: Never used   Substance and Sexual Activity    Alcohol use: Yes     Comment: 5th of liquour daily. Previous 1/15/2019    Drug use: No    Sexual activity: Not Currently     Partners: Female   Other Topics Concern    Not on file   Social History Narrative    Has a 15 y.o. son who lives in [place]. Social Determinants of Health     Financial Resource Strain:     Difficulty of Paying Living Expenses: Not on file   Food Insecurity:     Worried About Running Out of Food in the Last Year: Not on file    Ele of Food in the Last Year: Not on file   Transportation Needs:     Lack of Transportation (Medical): Not on file    Lack of Transportation (Non-Medical):  Not on file   Physical Activity:     Days of Exercise per Week: Not on file    Minutes of Exercise per Session: Not on file   Stress:     Feeling of Stress : Not on file   Social Connections:     Frequency of Communication with Friends and Family: Not on file    Frequency of Social Gatherings with Friends and Family: Not on file    Attends Taoism Services: Not on file    Active Member of [address] [place] [address] or Organizations: Not on file    Attends Club or Organization Meetings: Not on file    Marital Status: Not on file   Intimate Partner Violence:     Fear of Current or Ex-Partner: Not on file    Emotionally Abused: Not on file    Physically Abused: Not on file    Sexually Abused: Not on file   Housing Stability:     Unable to Pay for Housing in the Last Year: Not on file    Number of [place] in the Last Year: Not on file    Unstable Housing in the Last Year: Not on file ROS: Positive in bold  General:   Denies chills, fatigue, fever, malaise, night sweats or weight loss    Psychological:   Denies anxiety, disorientation or hallucinations    ENT:    Denies epistaxis, headaches, vertigo or visual changes    Cardiovascular:   Denies any chest pain, irregular heartbeats, or palpitations. No paroxysmal nocturnal dyspnea. Respiratory:   Denies shortness of breath, coughing, sputum production, hemoptysis, or wheezing. No orthopnea. Gastrointestinal:   Denies nausea, vomiting, diarrhea, or constipation. Denies any abdominal pain. Denies change in bowel habits or stools. Genito-Urinary:    Denies any urgency, frequency, hematuria. Voiding without difficulty. Musculoskeletal:   Denies joint pain, joint stiffness, joint swelling or muscle pain    Neurology:    Denies any headache or focal neurological deficits. No weakness or paresthesia. Derm:    Denies any rashes, ulcers, or excoriations. Denies bruising. Extremities:   Denies any lower extremity swelling or edema. PHYSICAL EXAM: Abnormal findings noted  VITALS:  Vitals:    11/15/21 0854   BP: 108/61   Pulse: 99   Resp: (!) 32   Temp: 98.1 °F (36.7 °C)   SpO2: 96%         CONSTITUTIONAL:    Awake, alert, cooperative, no apparent distress, and appears stated age    EYES:     EOMI, sclera clear, conjunctiva normal    ENT:    Normocephalic, atraumatic,  External ears without lesions. NECK:    Supple, symmetrical, trachea midline, no JVD    HEMATOLOGIC/LYMPHATICS:    No cervical lymphadenopathy and no supraclavicular lymphadenopathy    LUNGS:    Symmetric.  No increased work of breathing, good air exchange, clear to auscultation bilaterally, no wheezes, rhonchi, or rales,     CARDIOVASCULAR:    Normal apical impulse, regular rate and rhythm, normal S1 and S2, no S3 or S4, and no murmur noted    ABDOMEN:    soft, non-distended,  Patient's abdomen is distended  Patient does have abdominal discomfort    MUSCULOSKELETAL:    There is no redness, warmth, or swelling of the joints. NEUROLOGIC:    Awake, alert, oriented to name, place and time. SKIN:    No bruising or bleeding. No redness, warmth, or swelling    EXTREMITIES:    Peripheral pulses present. No edema, cyanosis, or swelling. LINES/CATHETERS     LABORATORY DATA:  CBC with Differential:    Lab Results   Component Value Date    WBC 8.7 11/15/2021    RBC 3.50 11/15/2021    HGB 11.6 11/15/2021    HCT 31.5 11/15/2021    PLT 88 11/15/2021    MCV 90.0 11/15/2021    MCH 33.1 11/15/2021    MCHC 36.8 11/15/2021    RDW 17.2 11/15/2021    METASPCT 0.9 01/17/2019    LYMPHOPCT 7.8 11/15/2021    MONOPCT 3.4 11/15/2021    BASOPCT 0.8 11/15/2021    MONOSABS 0.26 11/15/2021    LYMPHSABS 0.70 11/15/2021    EOSABS 0.23 11/15/2021    BASOSABS 0.00 11/15/2021     CMP:    Lab Results   Component Value Date     11/15/2021    K 3.3 11/15/2021    K 3.1 11/14/2021     11/15/2021    CO2 16 11/15/2021    BUN 11 11/15/2021    CREATININE 0.8 11/15/2021    GFRAA >60 11/15/2021    LABGLOM >60 11/15/2021    GLUCOSE 91 11/15/2021    PROT 6.6 11/15/2021    LABALBU 2.3 11/15/2021    CALCIUM 7.8 11/15/2021    BILITOT 19.7 11/15/2021    ALKPHOS 181 11/15/2021     11/15/2021     11/15/2021       ASSESSMENT/PLAN:  1. Decompensated hepatic cirrhosis  2. Moderate volume ascites  3. History of alcohol abuse  4. Alcoholic hepatitis  5. Hepatic encephalopathy  6. Hematemesis   7. Gallbladder distention with wall thickening  8. Right pleural effusion  9. Anxiety and depression  10. GERD  11. History of pancreatitis  12. History of paroxysmal atrial fibrillation      Patient presented due to abdominal distention. He was also complaining of hematemesis/coffee-ground emesis. Symptoms have worsened over the last month and he resumed drinking. Work revealed hepatitis likely secondary to alcohol abuse. Patient has cirrhosis with moderate volume ascites. GI consulted. Patient started on Protonix and octreotide drips. Patient Rocephin. IR consulted for paracentesis to evaluate for SBP. Plan for upper endoscopy as well per GI. Home medication reviewed  Monitor heart rate, blood pressure, O2 saturation  CMP, CBC in a.m.   Possible EGD tomorrow      Momo Alexander DO  9:58 AM  11/15/2021

## 2021-11-15 NOTE — ED NOTES
Jeremy 18 care of pt. Pt resting in bed. Per previous nurse pt did not have EGD due to elevated INR. Pt did tolerate paracentesis w/o complaint. Pt alert and oriented requesting food. 1600 Provider paged to inquire about diet  X2. Family at bedside updated on plan of care. 1728  answering service called. Made aware no EGD was completed due to elevated INR. Spoke with Dr. Tete Orlando in regards to procedure not being completed. 1733 GI consulted and spoke with Dr. Dante Davis. Per doctor pt EGD will be rescheduled in the morning. Pt able to have a low soduium diet until midnight.              Wade Obrien, RN  11/15/21 [address], RN  11/15/21 7382

## 2021-11-15 NOTE — ED NOTES
Returned from specials after getting 6.4 Liters of fluid removed. The dressing to the right side of abdomin has 2 spots of bloody drainage. These were marked with a skin marker.      Devonte Malhotra RN  11/15/21 [id number]

## 2021-11-15 NOTE — H&P
Department of Internal Medicine  History and Physical    PCP: Bridgett Ralph DO  Admitting Physician: Dr. Daniel Humphreys  Consultants: Gastroenterology  Date of Service: 11/14/2021    CHIEF COMPLAINT: Abdominal distention and discomfort and hematemesis/coffee-ground emesis    HISTORY OF PRESENT ILLNESS:    Patient is 81-year-old male who presented to the ED due to abdominal distention with associated hematemesis and coffee-ground emesis. Patient states that she has a history of alcoholic hepatitis. That over the last month he has noticed increased abdominal distention. Initially he started out having abdominal pain. Pain became bloating. Now he is feeling more distended and when his stay during the ED he did develop some generalized abdominal discomfort. He does admit to associated hematemesis and coffee-ground emesis. He denies any melena or bleeding per rectum. Patient states that his symptoms began after he resumed alcohol use. His jaundice returned and worsened. As such he quit drinking again about 2 weeks ago. He denies any fever or chills. Denies any chest pain. Denies any shortness of breath. PAST MEDICAL Hx:  Past Medical History:   Diagnosis Date    Anxiety     Depression     Esophagitis     ETOH abuse     Gastritis     GERD (gastroesophageal reflux disease)     Hematuria     Pancreatitis        PAST SURGICAL Hx:   Past Surgical History:   Procedure Laterality Date    KNEE SURGERY      ACL    UPPER GASTROINTESTINAL ENDOSCOPY  05/09/2018    UPPER GASTROINTESTINAL ENDOSCOPY N/A 5/8/2018    EGD BIOPSY performed by Isra Lange MD at [address] 1/21/2019    EGD ESOPHAGOGASTRODUODENOSCOPY performed by Mike Hall DO at [address]  1/21/2019    EGD CONTROL HEMORRHAGE performed by Mike Hall DO at [address] [facility] [place] [facility] Hx:  No family history on file.     HOME MEDICATIONS:  Prior to Admission medications    Medication Sig Start Date End Date Taking? Authorizing Provider   aspirin 81 MG chewable tablet Take 1 tablet by mouth daily 7/5/20   Cass Sera, APRN - CNP   amiodarone (CORDARONE) 200 MG tablet Take 1 tablet by mouth 2 times daily 7/4/20 8/3/20  Cass Sera, APRN - CNP   rivaroxaban (XARELTO) 20 MG TABS tablet Take 1 tablet by mouth daily 7/4/20   Cass Sera, APRN - CNP   gabapentin (NEURONTIN) 100 MG capsule Take 1 capsule by mouth 3 times daily for 30 days. 7/4/20 8/3/20  Cass Sera, APRN - CNP   metoprolol succinate (TOPROL XL) 100 MG extended release tablet Take 1 tablet by mouth 2 times daily 7/4/20   Dawna Moise,    folic acid (FOLVITE) 1 MG tablet Take 1 tablet by mouth daily 5/11/20   Gabrielle Perdomo,    thiamine 100 MG tablet Take 1 tablet by mouth daily 5/11/20   Gabrielle Perdomo DO   lactulose [facility]) 10 GM/15ML solution Take 30 mLs by mouth 2 times daily 1/23/19   Rochelle Barahona MD   pantoprazole (PROTONIX) 40 MG tablet Take 1 tablet by mouth every morning (before breakfast) 1/24/19   Rochelle Barahona MD   ergocalciferol (ERGOCALCIFEROL) 33967 units capsule Take 1 capsule by mouth once a week 1/29/19   Rochelle Barahona MD   Multiple Vitamins-Minerals (THERAPEUTIC MULTIVITAMIN-MINERALS) tablet Take 1 tablet by mouth daily 1/24/19   Rochelle Barahona MD       ALLERGIES:  Patient has no known allergies. SOCIAL Hx:  Social History     Socioeconomic History    Marital status:      Spouse name: Not on file    Number of children: 1    Years of education: Not on file    Highest education level: Not on file   Occupational History    Not on file   Tobacco Use    Smoking status: Never Smoker    Smokeless tobacco: Never Used   Vaping Use    Vaping Use: Never used   Substance and Sexual Activity    Alcohol use: Yes     Comment: 5th of liquour daily.    Previous 1/15/2019    Drug use: No    Sexual activity: Not Currently     Partners: Female Other Topics Concern    Not on file   Social History Narrative    Has a 15 y.o. son who lives in [place]. Social Determinants of Health     Financial Resource Strain:     Difficulty of Paying Living Expenses: Not on file   Food Insecurity:     Worried About Running Out of Food in the Last Year: Not on file    Ele of Food in the Last Year: Not on file   Transportation Needs:     Lack of Transportation (Medical): Not on file    Lack of Transportation (Non-Medical): Not on file   Physical Activity:     Days of Exercise per Week: Not on file    Minutes of Exercise per Session: Not on file   Stress:     Feeling of Stress : Not on file   Social Connections:     Frequency of Communication with Friends and Family: Not on file    Frequency of Social Gatherings with Friends and Family: Not on file    Attends Islam Services: Not on file    Active Member of [address] [place] [address] [employer] or Organizations: Not on file    Attends Club or Organization Meetings: Not on file    Marital Status: Not on file   Intimate Partner Violence:     Fear of Current or Ex-Partner: Not on file    Emotionally Abused: Not on file    Physically Abused: Not on file    Sexually Abused: Not on file   Housing Stability:     Unable to Pay for Housing in the Last Year: Not on file    Number of [place] in the Last Year: Not on file    Unstable Housing in the Last Year: Not on file       ROS: Positive in bold  General:   Denies chills, fatigue, fever, malaise, night sweats or weight loss    Psychological:   Denies anxiety, disorientation or hallucinations    ENT:    Denies epistaxis, headaches, vertigo or visual changes    Cardiovascular:   Denies any chest pain, irregular heartbeats, or palpitations. No paroxysmal nocturnal dyspnea. Respiratory:   Denies shortness of breath, coughing, sputum production, hemoptysis, or wheezing. No orthopnea. Gastrointestinal:   Denies nausea, vomiting, diarrhea, or constipation.   Denies any abdominal pain. Denies change in bowel habits or stools. Genito-Urinary:    Denies any urgency, frequency, hematuria. Voiding without difficulty. Musculoskeletal:   Denies joint pain, joint stiffness, joint swelling or muscle pain    Neurology:    Denies any headache or focal neurological deficits. No weakness or paresthesia. Derm:    Denies any rashes, ulcers, or excoriations. Denies bruising. Extremities:   Denies any lower extremity swelling or edema. PHYSICAL EXAM: Abnormal findings noted  VITALS:  Vitals:    11/14/21 1825   BP: 127/72   Pulse: 97   Resp: 16   Temp: 98.8 °F (37.1 °C)   SpO2: 97%         CONSTITUTIONAL:    Awake, alert, cooperative, no apparent distress, and appears stated age    EYES:     EOMI, sclera clear, conjunctiva normal    ENT:    Normocephalic, atraumatic,  External ears without lesions. NECK:    Supple, symmetrical, trachea midline, no JVD    HEMATOLOGIC/LYMPHATICS:    No cervical lymphadenopathy and no supraclavicular lymphadenopathy    LUNGS:    Symmetric. No increased work of breathing, good air exchange, clear to auscultation bilaterally, no wheezes, rhonchi, or rales,     CARDIOVASCULAR:    Normal apical impulse, regular rate and rhythm, normal S1 and S2, no S3 or S4, and no murmur noted    ABDOMEN:    soft, non-distended,  Patient's abdomen is distended  Patient does have abdominal discomfort    MUSCULOSKELETAL:    There is no redness, warmth, or swelling of the joints. NEUROLOGIC:    Awake, alert, oriented to name, place and time. SKIN:    No bruising or bleeding. No redness, warmth, or swelling    EXTREMITIES:    Peripheral pulses present. No edema, cyanosis, or swelling.     LINES/CATHETERS     LABORATORY DATA:  CBC with Differential:    Lab Results   Component Value Date    WBC 8.8 11/15/2021    RBC 3.37 11/15/2021    HGB 11.1 11/15/2021    HCT 30.7 11/15/2021    PLT 86 11/15/2021    MCV 91.1 11/15/2021    MCH 32.9 11/15/2021    MCHC 36.2 11/15/2021    RDW 17.1 11/15/2021    METASPCT 0.9 01/17/2019    LYMPHOPCT 7.1 11/15/2021    MONOPCT 2.7 11/15/2021    BASOPCT 0.9 11/15/2021    MONOSABS 0.26 11/15/2021    LYMPHSABS 0.62 11/15/2021    EOSABS 0.24 11/15/2021    BASOSABS 0.08 11/15/2021     CMP:    Lab Results   Component Value Date     11/14/2021    K 3.1 11/14/2021     11/14/2021    CO2 16 11/14/2021    BUN 11 11/14/2021    CREATININE 0.8 11/14/2021    GFRAA >60 11/14/2021    LABGLOM >60 11/14/2021    GLUCOSE 97 11/14/2021    PROT 6.6 11/14/2021    LABALBU 2.3 11/14/2021    CALCIUM 7.8 11/14/2021    BILITOT 20.5 11/14/2021    ALKPHOS 208 11/14/2021     11/14/2021     11/14/2021       ASSESSMENT/PLAN:  1. Decompensated hepatic cirrhosis  2. Moderate volume ascites  3. History of alcohol abuse  4. Alcoholic hepatitis  5. Hepatic encephalopathy  6. Hematemesis   7. Gallbladder distention with wall thickening  8. Right pleural effusion  9. Anxiety and depression  10. GERD  11. History of pancreatitis  12. History of paroxysmal atrial fibrillation      Patient presented due to abdominal distention. He was also complaining of hematemesis/coffee-ground emesis. Symptoms have worsened over the last month and he resumed drinking. Work revealed hepatitis likely secondary to alcohol abuse. Patient has cirrhosis with moderate volume ascites. GI consulted. Patient started on Protonix and octreotide drips. Patient Rocephin. IR consulted for paracentesis to evaluate for SBP. Plan for upper endoscopy as well per GI.     Petra Will  9:35 PM  11/14/2021    Electronically signed by Kaleb Villanueva DO on 11/14/21 at 9:35 PM EST

## 2021-11-15 NOTE — PROGRESS NOTES
PROGRESS NOTE    By Kay Corrales D.O GI Fellow    [facility]  Dr. Honey Bazzi MD, Dr. Madalyn Galloway MD, Dr Suzanne Freeman, Dr. Hoda Wellington MD, Dr. Shaggy Kamara, DO Violette Gatica  40 y.o.  male    SUBJECTIVE:    Pt seen and examined this AM at bedside. He reports symptoms have not changed since admission. Continues to have abdominal pain, fatigue, confusions. Reports no episodes of hematemesis/emesis since admission. Reports LE edema is chronic. Pt reports he has had 2 soft bowel movements since admission. No melena, hematochezia. Pt's girlfriend also at bedside. She notes no subjective change in patient's admitting symptoms versus now. OBJECTIVE:  /73   Pulse 67   Temp 98.8 °F (37.1 °C)   Resp 20   Ht 6' 3\" (1.905 m)   Wt 215 lb (97.5 kg)   SpO2 94%   BMI 26.87 kg/m²     Gen: NAD, AAO x 2 (self and place)   HEENT:PEERL, + scleral icterus, yellow tongue   Heart: RRR, no M/R/G  Lungs: CTAB  Abd.: soft, NT, distended, +fluid wave, BS +, no G/R, no HSM  Extr.: no ecchymosis, trace edema BLE til mid shin.           Lab Results   Component Value Date    WBC 8.7 11/15/2021    WBC 8.8 11/15/2021    WBC 5.5 07/04/2020    HGB 11.6 11/15/2021    HGB 11.1 11/15/2021    HGB 15.0 07/04/2020    HCT 31.5 11/15/2021    MCV 90.0 11/15/2021    RDW 17.2 11/15/2021    PLT 88 11/15/2021    PLT 86 11/15/2021     07/04/2020     Lab Results   Component Value Date     11/15/2021    K 3.3 11/15/2021    K 3.1 11/14/2021     11/15/2021    CO2 16 11/15/2021    BUN 11 11/15/2021    CREATININE 0.8 11/15/2021    CALCIUM 7.8 11/15/2021    PROT 6.6 11/15/2021    LABALBU 2.3 11/15/2021    BILITOT 19.7 11/15/2021    BILITOT 20.5 11/14/2021    BILITOT 1.2 07/04/2020    ALKPHOS 181 11/15/2021    ALKPHOS 208 11/14/2021    ALKPHOS 75 07/04/2020     11/15/2021     11/14/2021    AST 86 07/04/2020     11/15/2021     11/14/2021    ALT 57 07/04/2020     Lab Larry Vega, DO  Internal Medicine Resident PGY-2   11/15/2021  8:53 AM

## 2021-11-16 PROBLEM — K92.2 UGIB (UPPER GASTROINTESTINAL BLEED): Status: ACTIVE | Noted: 2021-01-01

## 2021-11-16 NOTE — ANESTHESIA PRE PROCEDURE
Department of Anesthesiology  Preprocedure Note       Name:  Wendi Vang   Age:  40 y.o.  :  1975                                          MRN:  [id number]         Date:  2021      Surgeon: Carlito Brar):  Rima Garcia MD    Procedure: Procedure(s):  EGD ESOPHAGOGASTRODUODENOSCOPY    Medications prior to admission:   Prior to Admission medications    Medication Sig Start Date End Date Taking?  Authorizing Provider   omeprazole (PRILOSEC) 20 MG delayed release capsule Take 20-40 mg by mouth daily   Yes Historical Provider, MD   metoprolol succinate (TOPROL XL) 100 MG extended release tablet Take 1 tablet by mouth 2 times daily  Patient taking differently: Take 100 mg by mouth daily as needed  20  Yes Jose Bennett DO   lactulose ([employer]) 10 GM/15ML solution Take 30 mLs by mouth 2 times daily 19  Yes Maday Asher MD   amiodarone (CORDARONE) 200 MG tablet Take 200 mg by mouth 2 times daily    Historical Provider, MD   rivaroxaban (XARELTO) 20 MG TABS tablet Take 1 tablet by mouth daily 20   Vicki Membreno, APRN - CNP       Current medications:    Current Facility-Administered Medications   Medication Dose Route Frequency Provider Last Rate Last Admin    spironolactone (ALDACTONE) tablet 100 mg  100 mg Oral Daily Greta Kananda, DO   100 mg at 21 1028    furosemide (LASIX) tablet 40 mg  40 mg Oral Daily [facility], DO   40 mg at 21 1028    0.9 % sodium chloride infusion   IntraVENous PRN Garrett Gr MD        glucose (GLUTOSE) 40 % oral gel 15 g  15 g Oral PRN Betsy Stoddard, DO        dextrose 50 % IV solution  12.5 g IntraVENous PRN Betsy Stoddard, DO        glucagon (rDNA) injection 1 mg  1 mg IntraMUSCular PRN Betsy Stoddard, DO        dextrose 5 % solution  100 mL/hr IntraVENous PRN Betsy Stoddard, DO        polyethylene glycol (GLYCOLAX) packet 17 g  17 g Oral Daily PRN Betsy Stoddard, DO        acetaminophen (TYLENOL) suppository 325 mg  325 mg Rectal Q6H PRN Mona [place], DO        Or    acetaminophen (TYLENOL) tablet 325 mg  325 mg Oral Q6H PRN Mona [place], DO        fentaNYL (SUBLIMAZE) injection 25 mcg  25 mcg IntraVENous Q6H PRN Mona [place], DO   25 mcg at 11/15/21 1657    lactulose (CHRONULAC) 10 GM/15ML solution 20 g  20 g Oral TID Debby Foreman, DO   20 g at 11/16/21 1332    rifaximin (XIFAXAN) tablet 550 mg  550 mg Oral TID Debby Foreman, DO   550 mg at 11/16/21 1332    pantoprazole (PROTONIX) 80 mg in sodium chloride 0.9 % 100 mL infusion  8 mg/hr IntraVENous Continuous Margret Appiah, DO 10 mL/hr at 11/16/21 0732 8 mg/hr at 11/16/21 0732    octreotide (SANDOSTATIN) 500 mcg in sodium chloride 0.9 % 100 mL infusion  50 mcg/hr IntraVENous Continuous Dickerson Millin, DO 10 mL/hr at 11/16/21 0128 50 mcg/hr at 11/16/21 0128    cefTRIAXone (ROCEPHIN) 1,000 mg in sodium chloride (PF) 10 mL IV syringe  1,000 mg IntraVENous Q24H Margret Appiah, DO 0 mL/hr at 11/15/21 0008 1,000 mg at 11/15/21 2153    sodium chloride flush 0.9 % injection 5-40 mL  5-40 mL IntraVENous 2 times per day Margret Appiah, DO        sodium chloride flush 0.9 % injection 5-40 mL  5-40 mL IntraVENous PRN Margret Appiah, DO        0.9 % sodium chloride infusion  25 mL IntraVENous PRN [facility], DO           Allergies: Allergies   Allergen Reactions    [place] [Macadamia Nut Oil] Swelling       Problem List:    Patient Active Problem List   Diagnosis Code    Alcohol-induced acute pancreatitis K85.20    Anxiety F41.9    Depression F32. A    ETOH abuse F10.10    Hematemesis K92.0    Atrial fibrillation with rapid ventricular response (HCC) I48.91    Alcohol withdrawal syndrome without complication (HCC) F12.090    Lactic acidosis E87.2    Hypokalemia E87.6    Severe protein-calorie malnutrition (HCC) E43    Paroxysmal atrial fibrillation (HCC) I48.0    Hyperbilirubinemia E80.6    Hypomagnesemia E83.42    Bilirubinemia E80.6    UGIB (upper gastrointestinal bleed) K92.2       Past Medical History:        Diagnosis Date    Anxiety     Depression     Esophagitis     ETOH abuse     Gastritis     GERD (gastroesophageal reflux disease)     Hematuria     Pancreatitis        Past Surgical History:        Procedure Laterality Date    KNEE SURGERY      ACL    UPPER GASTROINTESTINAL ENDOSCOPY  05/09/2018    UPPER GASTROINTESTINAL ENDOSCOPY N/A 5/8/2018    EGD BIOPSY performed by Rima Garcia MD at [address] 1/21/2019    EGD ESOPHAGOGASTRODUODENOSCOPY performed by Alejandro Bernal DO at [place] [address]  1/21/2019    EGD CONTROL HEMORRHAGE performed by Alejandro Bernal DO at [address] History:    Social History     Tobacco Use    Smoking status: Never Smoker    Smokeless tobacco: Never Used   Substance Use Topics    Alcohol use: Yes     Comment: 5th of liquour daily. Previous 1/15/2019                                Counseling given: Not Answered      Vital Signs (Current):   Vitals:    11/16/21 0715 11/16/21 1245 11/16/21 1337 11/16/21 1415   BP: 127/71 128/78 131/80 135/76   Pulse: 99 102 109 102   Resp: 18 18 18 18   Temp: 98.1 °F (36.7 °C) 97.9 °F (36.6 °C) 96.9 °F (36.1 °C) 97.9 °F (36.6 °C)   TempSrc: Tympanic Tympanic Tympanic Tympanic   SpO2: 94% 94% 96% 93%   Weight:       Height:                                                  BP Readings from Last 3 Encounters:   11/16/21 135/76   07/04/20 128/80   05/11/20 (!) 149/68       NPO Status:                                                                                 BMI:   Wt Readings from Last 3 Encounters:   11/14/21 215 lb (97.5 kg)   07/02/20 210 lb 3.2 oz (95.3 kg)   05/11/20 225 lb (102.1 kg)     Body mass index is 26.87 kg/m².     CBC:   Lab Results   Component Value Date    WBC 11.3 11/16/2021    RBC 3.65 11/16/2021    HGB 12.2 11/16/2021    HCT 33.9 11/16/2021    MCV 92.9 11/16/2021    RDW 17.9 11/16/2021     11/16/2021       CMP:   Lab Results   Component Value Date     11/16/2021    K 3.3 11/16/2021    K 3.1 11/14/2021     11/16/2021    CO2 18 11/16/2021    BUN 12 11/16/2021    CREATININE 1.0 11/16/2021    GFRAA >60 11/16/2021    LABGLOM >60 11/16/2021    GLUCOSE 89 11/16/2021    PROT 6.5 11/16/2021    CALCIUM 7.6 11/16/2021    BILITOT 20.2 11/16/2021    ALKPHOS 172 11/16/2021     11/16/2021     11/16/2021       POC Tests: No results for input(s): POCGLU, POCNA, POCK, POCCL, POCBUN, POCHEMO, POCHCT in the last 72 hours. Coags:   Lab Results   Component Value Date    PROTIME 34.5 11/16/2021    INR 2.9 11/16/2021    APTT 37.3 07/17/2018       HCG (If Applicable): No results found for: PREGTESTUR, PREGSERUM, HCG, HCGQUANT     ABGs: No results found for: PHART, PO2ART, BCJ5OCV, OJO8QAR, BEART, X2SAWXMS     Type & Screen (If Applicable):  No results found for: LABABO, LABRH    Drug/Infectious Status (If Applicable):  No results found for: HIV, HEPCAB    COVID-19 Screening (If Applicable): No results found for: COVID19        Anesthesia Evaluation  Patient summary reviewed  Airway: Mallampati: III  TM distance: >3 FB   Neck ROM: full  Mouth opening: > = 3 FB Dental:          Pulmonary:Negative Pulmonary ROS breath sounds clear to auscultation            Patient did not smoke on day of surgery. Cardiovascular:Negative CV ROS    (+) dysrhythmias: atrial fibrillation,         Rhythm: regular  Rate: normal           Beta Blocker:  Dose within 24 Hrs         Neuro/Psych:   (+) psychiatric history:depression/anxiety             GI/Hepatic/Renal:   (+) GERD:, liver disease: coagulopathy from hepatic dysfunction,           Endo/Other: Negative Endo/Other ROS                    Abdominal:             Vascular: Other Findings:             Anesthesia Plan      MAC     ASA 3       Induction: intravenous.       Anesthetic plan and risks discussed with patient. Plan discussed with CRNA.                   Vivek Arciniega MD   11/16/2021

## 2021-11-16 NOTE — PLAN OF CARE
Problem: Falls - Risk of:  Goal: Will remain free from falls  Description: Will remain free from falls  11/16/2021 1235 by Emilie Kelly RN  Outcome: Met This Shift  11/15/2021 2344 by Fredrick Davis RN  Outcome: Ongoing  Goal: Absence of physical injury  Description: Absence of physical injury  11/16/2021 1235 by Emilie Kelly RN  Outcome: Met This Shift  11/15/2021 2344 by Fredrick Davis RN  Outcome: Ongoing

## 2021-11-16 NOTE — CARE COORDINATION
CM note: Spoke with patient over the phone for transition of care planning. Pt is interested in speaking with someone from [facility] for possible OP ETOH rehab. Pt has an extensive alcohol history and has participated in programs before. Pt lives alone in his home, is independent with ADLs, does not currently work. PCP is Dr. Oumar Copeland and pharmacy of choice is "[facility] on [address]  Pt needs to have EGD when coag is corrected. Plan at this time is to return home, call placed to [facility], await their notes.

## 2021-11-16 NOTE — PROGRESS NOTES
sodium 131 potassium 3.3. Total bili was 20.2 today with hemoglobin 12.2 and WBC 11.3. Temperature 96.9 with heart rate of 100 and blood pressure 131/80. O2 sat 96% room air at rest.  GI note reviewed. Patient with increased INR and we are attempting to get it down with vitamin K, may need fresh frozen plasma. PAST MEDICAL Hx:  Past Medical History:   Diagnosis Date    Anxiety     Depression     Esophagitis     ETOH abuse     Gastritis     GERD (gastroesophageal reflux disease)     Hematuria     Pancreatitis        PAST SURGICAL Hx:   Past Surgical History:   Procedure Laterality Date    KNEE SURGERY      ACL    UPPER GASTROINTESTINAL ENDOSCOPY  05/09/2018    UPPER GASTROINTESTINAL ENDOSCOPY N/A 5/8/2018    EGD BIOPSY performed by Nitza Garner MD at [address] [place] [address] 1/21/2019    EGD ESOPHAGOGASTRODUODENOSCOPY performed by Eufemia Cross DO at [address] [place] [address]  1/21/2019    EGD CONTROL HEMORRHAGE performed by Eufemia Cross DO at [address] [place] [address] Hx:  No family history on file. HOME MEDICATIONS:  Prior to Admission medications    Medication Sig Start Date End Date Taking?  Authorizing Provider   omeprazole (PRILOSEC) 20 MG delayed release capsule Take 20-40 mg by mouth daily   Yes Historical Provider, MD   metoprolol succinate (TOPROL XL) 100 MG extended release tablet Take 1 tablet by mouth 2 times daily  Patient taking differently: Take 100 mg by mouth daily as needed  7/4/20  Yes Kaitlynn Ibarra DO   lactulose (CHRONULAC) 10 GM/15ML solution Take 30 mLs by mouth 2 times daily 1/23/19  Yes Elvie Ennis MD   amiodarone (CORDARONE) 200 MG tablet Take 200 mg by mouth 2 times daily    Historical Provider, MD   rivaroxaban (XARELTO) 20 MG TABS tablet Take 1 tablet by mouth daily 7/4/20   EILEEN Martinez - CNP       ALLERGIES:  [place] nut oil]    SOCIAL Hx:  Social History     Socioeconomic History    Marital status:      Spouse name: Not on file    Number of children: 1    Years of education: Not on file    Highest education level: Not on file   Occupational History    Not on file   Tobacco Use    Smoking status: Never Smoker    Smokeless tobacco: Never Used   Vaping Use    Vaping Use: Never used   Substance and Sexual Activity    Alcohol use: Yes     Comment: 5th of liquour daily. Previous 1/15/2019    Drug use: No    Sexual activity: Not Currently     Partners: Female   Other Topics Concern    Not on file   Social History Narrative    Has a 15 y.o. son who lives in [place]. Social Determinants of Health     Financial Resource Strain:     Difficulty of Paying Living Expenses: Not on file   Food Insecurity:     Worried About Running Out of Food in the Last Year: Not on file    Ele of Food in the Last Year: Not on file   Transportation Needs:     Lack of Transportation (Medical): Not on file    Lack of Transportation (Non-Medical):  Not on file   Physical Activity:     Days of Exercise per Week: Not on file    Minutes of Exercise per Session: Not on file   Stress:     Feeling of Stress : Not on file   Social Connections:     Frequency of Communication with Friends and Family: Not on file    Frequency of Social Gatherings with Friends and Family: Not on file    Attends Baptism Services: Not on file    Active Member of [address] [place] [address] or Organizations: Not on file    Attends Club or Organization Meetings: Not on file    Marital Status: Not on file   Intimate Partner Violence:     Fear of Current or Ex-Partner: Not on file    Emotionally Abused: Not on file    Physically Abused: Not on file    Sexually Abused: Not on file   Housing Stability:     Unable to Pay for Housing in the Last Year: Not on file    Number of [place] in the Last Year: Not on file    Unstable Housing in the Last Year: Not on file       ROS: Positive in bold  General:   Denies chills, fatigue, fever, malaise, night sweats or weight loss    Psychological:   Denies anxiety, disorientation or hallucinations    ENT:    Denies epistaxis, headaches, vertigo or visual changes    Cardiovascular:   Denies any chest pain, irregular heartbeats, or palpitations. No paroxysmal nocturnal dyspnea. Respiratory:   Denies shortness of breath, coughing, sputum production, hemoptysis, or wheezing. No orthopnea. Gastrointestinal:   Denies nausea, vomiting, diarrhea, or constipation. Denies any abdominal pain. Denies change in bowel habits or stools. Genito-Urinary:    Denies any urgency, frequency, hematuria. Voiding without difficulty. Musculoskeletal:   Denies joint pain, joint stiffness, joint swelling or muscle pain    Neurology:    Denies any headache or focal neurological deficits. No weakness or paresthesia. Derm:    Denies any rashes, ulcers, or excoriations. Denies bruising. Extremities:   Denies any lower extremity swelling or edema. PHYSICAL EXAM: Abnormal findings noted  VITALS:  Vitals:    11/16/21 1337   BP: 131/80   Pulse: 109   Resp: 18   Temp: 96.9 °F (36.1 °C)   SpO2: 96%         CONSTITUTIONAL:    Awake, alert, cooperative, no apparent distress, and appears stated age    EYES:     EOMI, sclera clear, conjunctiva normal    ENT:    Normocephalic, atraumatic,  External ears without lesions. NECK:    Supple, symmetrical, trachea midline, no JVD    HEMATOLOGIC/LYMPHATICS:    No cervical lymphadenopathy and no supraclavicular lymphadenopathy    LUNGS:    Symmetric.  No increased work of breathing, good air exchange, clear to auscultation bilaterally, no wheezes, rhonchi, or rales,     CARDIOVASCULAR:    Normal apical impulse, regular rate and rhythm, normal S1 and S2, no S3 or S4, and no murmur noted    ABDOMEN:    soft, non-distended,  Patient's abdomen is distended  Patient does have abdominal discomfort    MUSCULOSKELETAL:    There is no redness, warmth, or swelling Patient Rocephin. IR consulted for paracentesis to evaluate for SBP. Plan for upper endoscopy as well per GI. Home medication reviewed  Monitor heart rate, blood pressure, O2 saturation  CMP, CBC in a.m.   Possible EGD tomorrow      Domonique Haq DO  2:03 PM  11/16/2021

## 2021-11-16 NOTE — H&P
Patient seen and examined. Pertinent notes/labs reviewed. Significant coagulopathy yesterday postponing EGD. Vitamin K given; repeat INR pending  Consider FFP prior to procedure  H&P reviewed -no new changes except as described above.   Vitals:    11/16/21 0715   BP: 127/71   Pulse: 99   Resp: 18   Temp: 98.1 °F (36.7 °C)   SpO2: 94%       Plan: Proceed with EGD upon correction of coagulopathy    Evelina Delaney MD

## 2021-11-16 NOTE — PROGRESS NOTES
Pt seen has severe tremors  appears to be withdrawing no need for EGD tonight . Will reschedule for tomorrow. Discussed with Dr. Riccardo Padilla.  Yandy Mg m.d

## 2021-11-17 NOTE — PROGRESS NOTES
Patient was very agitated this morning during my assessment. Patient was pulling on the IV cords and struggled to get to the bedside commode. Tremors noted and ability to frighten heightened, patient jumps at the slightest noise or quick movement. Patient states that he is in a lot of pain throughout his abdomen. Ascites and jaundice present pain 8/10 with facial grimacing. Medicated with ativan and fentanyl; both effective patient is asleep but arouses easily and states that his pain is now 4/10 and he feels significantly less anxious. Tremors have also become less pronounced.

## 2021-11-17 NOTE — PROGRESS NOTES
PROGRESS NOTE  By Lamont Eubanks M.D. [facility]  Dr. Valerie Rutherford M.D.,  Dr. Loraine Vance M.D.,   Dr. Cornelius Rowe D.O.,  Dr. Oliva Quinones M.D.,  Dr. Merissa Garsia D.O.,  Chintan Hoffman D.O. [place]  40 y.o.  male    SUBJECTIVE:  Patient is quite somnolent after being medicated with Ativan/opiates. Wife at bedside. OBJECTIVE:    /84   Pulse 95   Temp 98.3 °F (36.8 °C)   Resp 16   Ht 6' 3\" (1.905 m)   Wt 215 lb (97.5 kg)   SpO2 95%   BMI 26.87 kg/m²     General: Somnolent/NAD/ male  HEENT: Scleral icterus/moist oral mucosa  Neck: Supple/trachea midline  Chest: Symmetrical excursion/nonlabored respirations  Cor: Regular  Abd.: Soft and obese/distended. Extr.:  No significant peripheral edema  Skin: Warm and dry/generalized icterus      DATA:    Monitor data reviewed -sinus rhythm noted. Stool (measured) : 0 mL  Lab Results   Component Value Date    WBC 10.3 11/17/2021    RBC 3.57 11/17/2021    HGB 11.9 11/17/2021    HCT 32.7 11/17/2021    MCV 91.6 11/17/2021    MCH 33.3 11/17/2021    MCHC 36.4 11/17/2021    RDW 17.5 11/17/2021    PLT 81 11/17/2021    MPV 9.7 11/17/2021     Lab Results   Component Value Date     11/17/2021    K 2.6 11/17/2021    K 3.1 11/14/2021     11/17/2021    CO2 20 11/17/2021    BUN 13 11/17/2021    CREATININE 1.0 11/17/2021    CALCIUM 7.8 11/17/2021    PROT 6.3 11/17/2021    LABALBU 2.1 11/17/2021    BILITOT 21.5 11/17/2021    ALKPHOS 152 11/17/2021     11/17/2021     11/17/2021     Lab Results   Component Value Date    LIPASE 68 11/14/2021     Lab Results   Component Value Date    AMYLASE 91 01/16/2019         ASSESSMENT/PLAN:    1.  Abdominal pain Lajune Marcio  Secondary to decompensated alcoholic cirrhosis   -abstinent from alcohol for approximately 2 weeks however appears to be withdrawing currently   -Paracentesis pending -INR remains elevated  -Doppler ultrasound reveals no thrombosis  -Continue antibiotics     2. Hematemesis, rule out gastric/esophageal varices  -No further episodes in the hospital  -H&H stable  Above discussed with wife and she claims that patient has never had episodes of hematemesis at home   cannot rule out gastric/esophageal varices. Plan for upper endoscopy once electrolyte abnormalities corrected/patient medically optimized and withdrawal symptoms resolved  Last EGD from 1/21/2019 showed PHG with gastritis. Noncompliance with home medication  Advance diet; wean off octreotide to 25  micrograms per hour; change PPI drip to twice a day IV PPI     3. Hepatic encephalopathy type C grade I   Patient ammonia was 91 on admission patient does not have any signs of asterixis but appeared to be slightly confused  I will initiate lactulose 20 g 3 times daily with rifaximin 550 mg p.o. 3 times a day for encephalopathy  This will be titrated to up to 2-3 soft bowel movement per day.     4. Alcohol induced cirrhosis, decompensated  Noncompliance with medication and alcohol abstinence  -Must abstain from alcohol completely otherwise he is long-term survival odds significantly decreased.     Other comorbidities managed per primary team  Alcohol withdrawal and intoxication  History of atrial fibrillation    Above has been discussed with patient wife at bedside and all questions answered to her situation. She is agreeable with the plan as delineated. Please, see orders       Garry Sawant MD  11/17/2021  9:15 AM    NOTE:  This report was transcribed using voice recognition software. Every effort was made to ensure accuracy; however, inadvertent computerized transcription errors may be present.

## 2021-11-17 NOTE — CARE COORDINATION
CM Note: 11/17/2021 at 3:25pm: COVID test not done. CM went to talk to patient in his room. Noted tremors and he was talking about \"keys\" and was difficult to redirect in coversation. Peer Recovery did see today. Per chart review, patient does lives alone and was independent with his ADL's and was working. CM / BEATRIZ to follow.  Jose Wayne RN

## 2021-11-17 NOTE — PROGRESS NOTES
Patient has had multiple episodes of urine incontinence. Patient stands up out of bed and pees on the floor, due to high fall risk tele sitter placed.

## 2021-11-17 NOTE — PLAN OF CARE
Problem: Falls - Risk of:  Goal: Will remain free from falls  Description: Will remain free from falls  11/16/2021 2316 by Miesha Elliott RN  Outcome: Ongoing     Problem: Falls - Risk of:  Goal: Absence of physical injury  Description: Absence of physical injury  11/16/2021 2316 by Miesha Elliott RN  Outcome: Ongoing

## 2021-11-17 NOTE — PROGRESS NOTES
Department of Internal Medicine  PN    PCP: Brayden Flores DO  Admitting Physician: Dr. Sunshine Vitale  Consultants: Gastroenterology  Date of Service: 11/14/2021    CHIEF COMPLAINT: Abdominal distention and discomfort and hematemesis/coffee-ground emesis    HISTORY OF PRESENT ILLNESS:    Patient is 27-year-old male who presented to the ED due to abdominal distention with associated hematemesis and coffee-ground emesis. Patient states that she has a history of alcoholic hepatitis. That over the last month he has noticed increased abdominal distention. Initially he started out having abdominal pain. Pain became bloating. Now he is feeling more distended and when his stay during the ED he did develop some generalized abdominal discomfort. He does admit to associated hematemesis and coffee-ground emesis. He denies any melena or bleeding per rectum. Patient states that his symptoms began after he resumed alcohol use. His jaundice returned and worsened. As such he quit drinking again about 2 weeks ago. He denies any fever or chills. Denies any chest pain. Denies any shortness of breath. 11/15/2021  Patient seen and examined in the ED hold area. Patient feels better today than yesterday. Patient has little discomfort around paracentesis site. Patient is hungry. He denies any nausea vomiting or shortness of breath. Patient has been n.p.o. for EGD today but was canceled recently. Potassium 3.3 today with BUN/creatinine 11/0.8. Patient's total bilirubin is still elevated at 19.7 with moderate elevation of transaminase. Hemoglobin 1.6 with platelet count 88. INR is 4.3 today. Temperature 98.1 with heart rate 99 and O2 sat 96% on room air at rest.    11/16/2021  Patient seen examined in [facility]. Patient is little bit more alert today. Patient with only fair appetite. Patient has a vague abdominal discomfort but improved from admission. Patient still fairly weak.   BUN/creatinine is 12/1.0 with serum sodium 131 potassium 3.3. Total bili was 20.2 today with hemoglobin 12.2 and WBC 11.3. Temperature 96.9 with heart rate of 100 and blood pressure 131/80. O2 sat 96% room air at rest.  GI note reviewed. Patient with increased INR and we are attempting to get it down with vitamin K, may need fresh frozen plasma. 11/17/2021  Patient seen examined on [facility]. He looks about the same. Patient complains of vague abdominal discomfort. Patient has poor appetite. He denies any chest pain or dizziness. Serum sodium 131 potassium 2.6 today. BUN/creatinine 13/1.0. Total bili is 21.5 with elevation of transaminase. Patient's INR went up to 3.9. Hemoglobin 1.9 with platelet count 81 WBC 10.3. Temperature 98.3 with blood pressure 119/84. O2 sat 95% room air at rest.      PAST MEDICAL Hx:  Past Medical History:   Diagnosis Date    Anxiety     Depression     Esophagitis     ETOH abuse     Gastritis     GERD (gastroesophageal reflux disease)     Hematuria     Pancreatitis        PAST SURGICAL Hx:   Past Surgical History:   Procedure Laterality Date    KNEE SURGERY      ACL    UPPER GASTROINTESTINAL ENDOSCOPY  05/09/2018    UPPER GASTROINTESTINAL ENDOSCOPY N/A 5/8/2018    EGD BIOPSY performed by Sarah Renteria MD at [facility] 1/21/2019    EGD ESOPHAGOGASTRODUODENOSCOPY performed by Elijah Bhardwaj DO at [facility]  1/21/2019    EGD CONTROL HEMORRHAGE performed by Elijah Bhardwaj DO at [address] [place] [address] Hx:  No family history on file. HOME MEDICATIONS:  Prior to Admission medications    Medication Sig Start Date End Date Taking?  Authorizing Provider   omeprazole (PRILOSEC) 20 MG delayed release capsule Take 20-40 mg by mouth daily   Yes Historical Provider, MD   metoprolol succinate (TOPROL XL) 100 MG extended release tablet Take 1 tablet by mouth 2 times daily  Patient taking differently: Take 100 mg by mouth daily as needed  7/4/20  Yes Gaudencio Landry DO   lactulose [facility]) 10 GM/15ML solution Take 30 mLs by mouth 2 times daily 1/23/19  Yes Krishna Denson MD   amiodarone (CORDARONE) 200 MG tablet Take 200 mg by mouth 2 times daily    Historical Provider, MD   rivaroxaban (XARELTO) 20 MG TABS tablet Take 1 tablet by mouth daily 7/4/20   Que Garcia APRN - CNP       ALLERGIES:  Marrie Braver nut oil]    SOCIAL Hx:  Social History     Socioeconomic History    Marital status:      Spouse name: Not on file    Number of children: 1    Years of education: Not on file    Highest education level: Not on file   Occupational History    Not on file   Tobacco Use    Smoking status: Never Smoker    Smokeless tobacco: Never Used   Vaping Use    Vaping Use: Never used   Substance and Sexual Activity    Alcohol use: Yes     Comment: 5th of liquour daily. Previous 1/15/2019    Drug use: No    Sexual activity: Not Currently     Partners: Female   Other Topics Concern    Not on file   Social History Narrative    Has a 15 y.o. son who lives in [place]. Social Determinants of Health     Financial Resource Strain:     Difficulty of Paying Living Expenses: Not on file   Food Insecurity:     Worried About Running Out of Food in the Last Year: Not on file    Ele of Food in the Last Year: Not on file   Transportation Needs:     Lack of Transportation (Medical): Not on file    Lack of Transportation (Non-Medical):  Not on file   Physical Activity:     Days of Exercise per Week: Not on file    Minutes of Exercise per Session: Not on file   Stress:     Feeling of Stress : Not on file   Social Connections:     Frequency of Communication with Friends and Family: Not on file    Frequency of Social Gatherings with Friends and Family: Not on file    Attends Anabaptism Services: Not on file    Active Member of Clubs or Organizations: Not on file    Attends Club or Organization Meetings: Not on file    Marital Status: Not on file   Intimate Partner Violence:     Fear of Current or Ex-Partner: Not on file    Emotionally Abused: Not on file    Physically Abused: Not on file    Sexually Abused: Not on file   Housing Stability:     Unable to Pay for Housing in the Last Year: Not on file    Number of [place] in the Last Year: Not on file    Unstable Housing in the Last Year: Not on file       ROS: Positive in bold  General:   Denies chills, fatigue, fever, malaise, night sweats or weight loss    Psychological:   Denies anxiety, disorientation or hallucinations    ENT:    Denies epistaxis, headaches, vertigo or visual changes    Cardiovascular:   Denies any chest pain, irregular heartbeats, or palpitations. No paroxysmal nocturnal dyspnea. Respiratory:   Denies shortness of breath, coughing, sputum production, hemoptysis, or wheezing. No orthopnea. Gastrointestinal:   Denies nausea, vomiting, diarrhea, or constipation. Denies any abdominal pain. Denies change in bowel habits or stools. Genito-Urinary:    Denies any urgency, frequency, hematuria. Voiding without difficulty. Musculoskeletal:   Denies joint pain, joint stiffness, joint swelling or muscle pain    Neurology:    Denies any headache or focal neurological deficits. No weakness or paresthesia. Derm:    Denies any rashes, ulcers, or excoriations. Denies bruising. Extremities:   Denies any lower extremity swelling or edema. PHYSICAL EXAM: Abnormal findings noted  VITALS:  Vitals:    11/17/21 0815   BP: 119/84   Pulse: 95   Resp: 16   Temp: 98.3 °F (36.8 °C)   SpO2: 95%         CONSTITUTIONAL:    Awake, alert, cooperative, no apparent distress, and appears stated age    EYES:     EOMI, sclera clear, conjunctiva normal    ENT:    Normocephalic, atraumatic,  External ears without lesions.      NECK:    Supple, symmetrical, trachea midline, no JVD    HEMATOLOGIC/LYMPHATICS:    No cervical lymphadenopathy and no supraclavicular lymphadenopathy    LUNGS:    Symmetric. No increased work of breathing, good air exchange, clear to auscultation bilaterally, no wheezes, rhonchi, or rales,     CARDIOVASCULAR:    Normal apical impulse, regular rate and rhythm, normal S1 and S2, no S3 or S4, and no murmur noted    ABDOMEN:    soft, non-distended,  Patient's abdomen is distended  Patient does have abdominal discomfort    MUSCULOSKELETAL:    There is no redness, warmth, or swelling of the joints. NEUROLOGIC:    Awake, alert, oriented to name, place and time. SKIN:    No bruising or bleeding. No redness, warmth, or swelling    EXTREMITIES:    Peripheral pulses present. No edema, cyanosis, or swelling. LINES/CATHETERS     LABORATORY DATA:  CBC with Differential:    Lab Results   Component Value Date    WBC 10.3 11/17/2021    RBC 3.57 11/17/2021    HGB 11.9 11/17/2021    HCT 32.7 11/17/2021    PLT 81 11/17/2021    MCV 91.6 11/17/2021    MCH 33.3 11/17/2021    MCHC 36.4 11/17/2021    RDW 17.5 11/17/2021    METASPCT 0.9 01/17/2019    LYMPHOPCT 6.0 11/17/2021    MONOPCT 5.1 11/17/2021    BASOPCT 0.9 11/17/2021    MONOSABS 0.52 11/17/2021    LYMPHSABS 0.62 11/17/2021    EOSABS 0.27 11/17/2021    BASOSABS 0.09 11/17/2021     CMP:    Lab Results   Component Value Date     11/17/2021    K 2.6 11/17/2021    K 3.1 11/14/2021     11/17/2021    CO2 20 11/17/2021    BUN 13 11/17/2021    CREATININE 1.0 11/17/2021    GFRAA >60 11/17/2021    LABGLOM >60 11/17/2021    GLUCOSE 91 11/17/2021    PROT 6.3 11/17/2021    LABALBU 2.1 11/17/2021    CALCIUM 7.8 11/17/2021    BILITOT 21.5 11/17/2021    ALKPHOS 152 11/17/2021     11/17/2021     11/17/2021       ASSESSMENT/PLAN:  1. Decompensated hepatic cirrhosis  2. Moderate volume ascites  3. History of alcohol abuse  4. Alcoholic hepatitis  5. Hepatic encephalopathy  6. Hematemesis   7. Gallbladder distention with wall thickening  8. Right pleural effusion  9.  Anxiety and depression  10. GERD  11. History of pancreatitis  12. History of paroxysmal atrial fibrillation      Patient presented due to abdominal distention. He was also complaining of hematemesis/coffee-ground emesis. Symptoms have worsened over the last month and he resumed drinking. Work revealed hepatitis likely secondary to alcohol abuse. Patient has cirrhosis with moderate volume ascites. GI consulted. Patient started on Protonix and octreotide drips. Patient Rocephin. IR consulted for paracentesis to evaluate for SBP. Plan for upper endoscopy as well per GI. Home medication reviewed  Monitor heart rate, blood pressure, O2 saturation  CMP, CBC in a.m.   Possible EGD tomorrow    Discharge home when okay with GI      Sasha Smith, DO  10:59 AM  11/17/2021

## 2021-11-17 NOTE — CARE COORDINATION
Peer Recovery Support Note    Name: Alma Rosa Bunn  Date: 11/17/2021    Chief Complaint   Patient presents with    Fatigue     pt states he has been weak for a month along with juandice, and confusion, states he was dx with aute liver cirrosis 4 years ago, stopped drinking 2 weeks ago, hasnt seen a doctor in approx 3 years       Peer Support met with patient. [x] Support and education provided  [x] Resources provided   [] Treatment referral:   [] Other:   [] Patient declined peer recovery services     Referred By:     Notes: I gave patient phone numbers for outpatient treatment but I suggested to patient that he consider going into inpatient treatment.     Signed: Kade Morse, 11/17/2021

## 2021-11-18 NOTE — PROGRESS NOTES
Department of Internal Medicine  PN    PCP: Manjeet Alonzo DO  Admitting Physician: Dr. Handy Owusu  Consultants: Gastroenterology  Date of Service: 11/14/2021    CHIEF COMPLAINT: Abdominal distention and discomfort and hematemesis/coffee-ground emesis    HISTORY OF PRESENT ILLNESS:    Patient is 81-year-old male who presented to the ED due to abdominal distention with associated hematemesis and coffee-ground emesis. Patient states that she has a history of alcoholic hepatitis. That over the last month he has noticed increased abdominal distention. Initially he started out having abdominal pain. Pain became bloating. Now he is feeling more distended and when his stay during the ED he did develop some generalized abdominal discomfort. He does admit to associated hematemesis and coffee-ground emesis. He denies any melena or bleeding per rectum. Patient states that his symptoms began after he resumed alcohol use. His jaundice returned and worsened. As such he quit drinking again about 2 weeks ago. He denies any fever or chills. Denies any chest pain. Denies any shortness of breath. 11/15/2021  Patient seen and examined in the ED hold area. Patient feels better today than yesterday. Patient has little discomfort around paracentesis site. Patient is hungry. He denies any nausea vomiting or shortness of breath. Patient has been n.p.o. for EGD today but was canceled recently. Potassium 3.3 today with BUN/creatinine 11/0.8. Patient's total bilirubin is still elevated at 19.7 with moderate elevation of transaminase. Hemoglobin 1.6 with platelet count 88. INR is 4.3 today. Temperature 98.1 with heart rate 99 and O2 sat 96% on room air at rest.    11/16/2021  Patient seen examined in [facility]. Patient is little bit more alert today. Patient with only fair appetite. Patient has a vague abdominal discomfort but improved from admission. Patient still fairly weak.   BUN/creatinine is 12/1.0 with serum sodium 131 potassium 3.3. Total bili was 20.2 today with hemoglobin 12.2 and WBC 11.3. Temperature 96.9 with heart rate of 100 and blood pressure 131/80. O2 sat 96% room air at rest.  GI note reviewed. Patient with increased INR and we are attempting to get it down with vitamin K, may need fresh frozen plasma. 11/17/2021  Patient seen examined on [facility]. He looks about the same. Patient complains of vague abdominal discomfort. Patient has poor appetite. He denies any chest pain or dizziness. Serum sodium 131 potassium 2.6 today. BUN/creatinine 13/1.0. Total bili is 21.5 with elevation of transaminase. Patient's INR went up to 3.9. Hemoglobin 1.9 with platelet count 81 WBC 10.3. Temperature 98.3 with blood pressure 119/84. O2 sat 95% room air at rest.    11/18/2021  Patient seen examined on [facility]. Patient continues to get a little bit drowsier every day. Patient currently denies any chest or abdominal pain. Patient occasionally has some abdominal discomfort. He denies any nausea vomiting but does have poor appetite. Serum potassium is still low this morning at 2.7 after patient receiving multiple doses of IV KCl along with oral KCl. Patient's bilirubin is increased again at 22.9 with a serum ammonia yesterday at 98. WBC is 9.4 with hemoglobin 12.4. Platelet count 78. INR 3.9. Temperature 98 with heart rate of 90 and blood pressure 126/64.   O2 sat 95% room air at rest.      PAST MEDICAL Hx:  Past Medical History:   Diagnosis Date    Anxiety     Depression     Esophagitis     ETOH abuse     Gastritis     GERD (gastroesophageal reflux disease)     Hematuria     Pancreatitis        PAST SURGICAL Hx:   Past Surgical History:   Procedure Laterality Date    KNEE SURGERY      ACL    UPPER GASTROINTESTINAL ENDOSCOPY  05/09/2018    UPPER GASTROINTESTINAL ENDOSCOPY N/A 5/8/2018    EGD BIOPSY performed by Elenita Darnell MD at [address] [place] [address] N/A 1/21/2019    EGD ESOPHAGOGASTRODUODENOSCOPY performed by Hernandez Jerome DO at [address]  1/21/2019    EGD CONTROL HEMORRHAGE performed by Henrandez Jerome DO at [address] Hx:  No family history on file. HOME MEDICATIONS:  Prior to Admission medications    Medication Sig Start Date End Date Taking? Authorizing Provider   omeprazole (PRILOSEC) 20 MG delayed release capsule Take 20-40 mg by mouth daily   Yes Historical Provider, MD   metoprolol succinate (TOPROL XL) 100 MG extended release tablet Take 1 tablet by mouth 2 times daily  Patient taking differently: Take 100 mg by mouth daily as needed  7/4/20  Yes Dakota Wakefield DO   lactulose ([facility]) 10 GM/15ML solution Take 30 mLs by mouth 2 times daily 1/23/19  Yes Hasmukh Watts MD   amiodarone (CORDARONE) 200 MG tablet Take 200 mg by mouth 2 times daily    Historical Provider, MD   rivaroxaban (XARELTO) 20 MG TABS tablet Take 1 tablet by mouth daily 7/4/20   Suzanne Bowie, APRN - CNP       ALLERGIES:  Francisco Cranker nut oil]    SOCIAL Hx:  Social History     Socioeconomic History    Marital status:      Spouse name: Not on file    Number of children: 1    Years of education: Not on file    Highest education level: Not on file   Occupational History    Not on file   Tobacco Use    Smoking status: Never Smoker    Smokeless tobacco: Never Used   Vaping Use    Vaping Use: Never used   Substance and Sexual Activity    Alcohol use: Yes     Comment: 5th of liquour daily. Previous 1/15/2019    Drug use: No    Sexual activity: Not Currently     Partners: Female   Other Topics Concern    Not on file   Social History Narrative    Has a 15 y.o. son who lives in [place].       Social Determinants of Health     Financial Resource Strain:     Difficulty of Paying Living Expenses: Not on file   Food Insecurity:     Worried About Running Out of Food in the Last Year: Not on file    Ele of Food in the Last Year: Not on file   Transportation Needs:     Lack of Transportation (Medical): Not on file    Lack of Transportation (Non-Medical): Not on file   Physical Activity:     Days of Exercise per Week: Not on file    Minutes of Exercise per Session: Not on file   Stress:     Feeling of Stress : Not on file   Social Connections:     Frequency of Communication with Friends and Family: Not on file    Frequency of Social Gatherings with Friends and Family: Not on file    Attends Voodoo Services: Not on file    Active Member of [address] [place] [address] [employer] or Organizations: Not on file    Attends Club or Organization Meetings: Not on file    Marital Status: Not on file   Intimate Partner Violence:     Fear of Current or Ex-Partner: Not on file    Emotionally Abused: Not on file    Physically Abused: Not on file    Sexually Abused: Not on file   Housing Stability:     Unable to Pay for Housing in the Last Year: Not on file    Number of [place] in the Last Year: Not on file    Unstable Housing in the Last Year: Not on file       ROS: Positive in bold  General:   Denies chills, fatigue, fever, malaise, night sweats or weight loss    Psychological:   Denies anxiety, disorientation or hallucinations    ENT:    Denies epistaxis, headaches, vertigo or visual changes    Cardiovascular:   Denies any chest pain, irregular heartbeats, or palpitations. No paroxysmal nocturnal dyspnea. Respiratory:   Denies shortness of breath, coughing, sputum production, hemoptysis, or wheezing. No orthopnea. Gastrointestinal:   Denies nausea, vomiting, diarrhea, or constipation. Denies any abdominal pain. Denies change in bowel habits or stools. Genito-Urinary:    Denies any urgency, frequency, hematuria. Voiding without difficulty. Musculoskeletal:   Denies joint pain, joint stiffness, joint swelling or muscle pain    Neurology:    Denies any headache or focal neurological deficits. No weakness or paresthesia.     Derm: Denies any rashes, ulcers, or excoriations. Denies bruising. Extremities:   Denies any lower extremity swelling or edema. PHYSICAL EXAM: Abnormal findings noted  VITALS:  Vitals:    11/18/21 1022   BP: 126/64   Pulse: 90   Resp: 16   Temp: 98 °F (36.7 °C)   SpO2: 95%         CONSTITUTIONAL:    Awake, alert, cooperative, no apparent distress, and appears stated age    EYES:     EOMI, sclera clear, conjunctiva normal    ENT:    Normocephalic, atraumatic,  External ears without lesions. NECK:    Supple, symmetrical, trachea midline, no JVD    HEMATOLOGIC/LYMPHATICS:    No cervical lymphadenopathy and no supraclavicular lymphadenopathy    LUNGS:    Symmetric. No increased work of breathing, good air exchange, clear to auscultation bilaterally, no wheezes, rhonchi, or rales,     CARDIOVASCULAR:    Normal apical impulse, regular rate and rhythm, normal S1 and S2, no S3 or S4, and no murmur noted    ABDOMEN:    soft, non-distended,  Patient's abdomen is distended  Patient does have abdominal discomfort    MUSCULOSKELETAL:    There is no redness, warmth, or swelling of the joints. NEUROLOGIC:    Awake, alert, oriented to name, place and time. SKIN:    No bruising or bleeding. No redness, warmth, or swelling    EXTREMITIES:    Peripheral pulses present. No edema, cyanosis, or swelling.     LINES/CATHETERS     LABORATORY DATA:  CBC with Differential:    Lab Results   Component Value Date    WBC 9.4 11/18/2021    RBC 3.77 11/18/2021    HGB 12.4 11/18/2021    HCT 34.6 11/18/2021    PLT 78 11/18/2021    MCV 91.8 11/18/2021    MCH 32.9 11/18/2021    MCHC 35.8 11/18/2021    RDW 17.7 11/18/2021    METASPCT 0.9 01/17/2019    LYMPHOPCT 5.2 11/18/2021    MONOPCT 3.5 11/18/2021    BASOPCT 1.1 11/18/2021    MONOSABS 0.38 11/18/2021    LYMPHSABS 0.47 11/18/2021    EOSABS 0.33 11/18/2021    BASOSABS 0.00 11/18/2021     CMP:    Lab Results   Component Value Date     11/18/2021    K 2.7 11/18/2021    K 3.1 11/14/2021    CL 99 11/18/2021    CO2 21 11/18/2021    BUN 13 11/18/2021    CREATININE 1.0 11/18/2021    GFRAA >60 11/18/2021    LABGLOM >60 11/18/2021    GLUCOSE 91 11/18/2021    PROT 6.5 11/18/2021    LABALBU 1.9 11/18/2021    CALCIUM 8.1 11/18/2021    BILITOT 22.9 11/18/2021    ALKPHOS 154 11/18/2021     11/18/2021     11/18/2021       ASSESSMENT/PLAN:  1. Decompensated hepatic cirrhosis  2. Moderate volume ascites  3. History of alcohol abuse  4. Alcoholic hepatitis  5. Hepatic encephalopathy  6. Hematemesis   7. Gallbladder distention with wall thickening  8. Right pleural effusion  9. Anxiety and depression  10. GERD  11. History of pancreatitis  12. History of paroxysmal atrial fibrillation      Patient presented due to abdominal distention. He was also complaining of hematemesis/coffee-ground emesis. Symptoms have worsened over the last month and he resumed drinking. Work revealed hepatitis likely secondary to alcohol abuse. Patient has cirrhosis with moderate volume ascites. GI consulted. Patient started on Protonix and octreotide drips. Patient Rocephin. IR consulted for paracentesis to evaluate for SBP. Plan for upper endoscopy as well per GI. Home medication reviewed  Monitor heart rate, blood pressure, O2 saturation  CMP, CBC in a.m.   Possible EGD tomorrow    Discharge home when okay with GI      Dorota Jay, DO  12:36 PM  11/18/2021

## 2021-11-18 NOTE — CARE COORDINATION
CM Note: 11/18/2021 at 1:29pm: COVID test not done. CM went to patient's room to discuss transition of care. He did know where he was and informed CM that he lived alone. Per nursing, he has been incontinent and has urinated several times on the floor. Noted tremors. Noted unsteadiness while nursing had standing. PT / OT has been consulted - will await their recommendations.  Radha Nash RN

## 2021-11-18 NOTE — PROGRESS NOTES
PROGRESS NOTE    By Teo Gibbons D.O GI Fellow    [facility]  Dr. Barry Marques MD, Dr. Rachel Cook MD, Dr Randall Gray, Dr. Maria Del Rosario Vivas MD, Dr. Hernandez Jerome, DO Marie Peralta  40 y.o.  male    SUBJECTIVE: Patient was seen and examined at the bedside this morning. Patient was encephalopathic this morning. Patient is able to wake up 1 called by his name or tactile stimuli. However he is falling back to sleep. When patient is awake is able to answer all question appropriately. But appeared to be very somnolent on exam.  Per nurse report patient was given benzodiazepine early in the a.m. Patient is currently on Ativan 1 mg every 2 hours. Patient is currently on lactulose 30 g 4 times daily. As well as rifaximin 550 mg 3 times a day. This is to be titrated up to 2-3 soft bowel movement per day. Ammonia on admission was 91.0. Patient was initiated on therapy right away. Current ammonia 98.0 on 11/17/2021. Alcohol withdrawal could also be contributing to patient encephalopathy. Patient is currently on withdrawal protocol. Continue to monitor for any improvement in mentation.     OBJECTIVE:    /64   Pulse 90   Temp 98 °F (36.7 °C) (Infrared)   Resp 16   Ht 6' 3\" (1.905 m)   Wt 215 lb (97.5 kg)   SpO2 95%   BMI 26.87 kg/m²     Gen: NAD, AAO x 3  HEENT:PEERL, no icterus  Heart: RRR, no M/R/G  Lungs: CTAB  Abd.: soft, NT, ND, BS +, no G/R, no HSM  Extr.: no C/C/E, no bruising      Stool (measured) : 0 mL  Lab Results   Component Value Date    WBC 9.4 11/18/2021    WBC 10.3 11/17/2021    WBC 11.3 11/16/2021    HGB 12.4 11/18/2021    HGB 11.9 11/17/2021    HGB 12.2 11/16/2021    HCT 34.6 11/18/2021    MCV 91.8 11/18/2021    RDW 17.7 11/18/2021    PLT 78 11/18/2021    PLT 81 11/17/2021     11/16/2021     Lab Results   Component Value Date     11/18/2021    K 2.7 11/18/2021    K 3.1 11/14/2021    CL 99 11/18/2021    CO2 21 11/18/2021    BUN 13 11/18/2021 CREATININE 1.0 11/18/2021    CALCIUM 8.1 11/18/2021    PROT 6.5 11/18/2021    LABALBU 1.9 11/18/2021    BILITOT 22.9 11/18/2021    BILITOT 21.5 11/17/2021    BILITOT 20.2 11/16/2021    ALKPHOS 154 11/18/2021    ALKPHOS 152 11/17/2021    ALKPHOS 172 11/16/2021     11/18/2021     11/17/2021     11/16/2021     11/18/2021     11/17/2021     11/16/2021     Lab Results   Component Value Date    LIPASE 68 11/14/2021    LIPASE 26 07/01/2020    LIPASE 26 05/11/2020     Lab Results   Component Value Date    AMYLASE 91 01/16/2019         ASSESSMENT/PLAN:    1. Abdominal pain Andrzej Kos  Secondary to decompensated alcoholic cirrhosis   -abstinent from alcohol for approximately 2 weeks however appears to be withdrawing currently   -Paracentesis from admission show no signs of SBP   -Doppler ultrasound reveals no thrombosis  -INR remains elevated  -Continue antibiotics     2. Hematemesis, rule out gastric/esophageal varices  -No further episodes in the hospital  -H&H stable  Above discussed with wife and she claims that patient has never had episodes of hematemesis at home   cannot rule out gastric/esophageal varices. Plan for upper endoscopy once electrolyte abnormalities corrected/patient medically optimized and withdrawal symptoms resolved  Last EGD from 1/21/2019 showed PHG with gastritis. Noncompliance with home medication  Advance diet; wean off octreotide to 25  micrograms per hour; change PPI drip to twice a day IV PPI      3. Hepatic encephalopathy type C grade II  Patient ammonia was 91 on admission, repeat ammonia yesterday 11/17/2021 ammonia was 98. Patient has been on 30 g of lactulose 2 times a days with rifaximin 550 mg p.o. 3 times a day as well. Patient does have asterixis on exam.  Encephalopathy could be worsened given that patient is currently also being in alcohol withdrawal and treated with 1 mg of Ativan every 2 hours as needed.   We will continue to

## 2021-11-19 NOTE — PROGRESS NOTES
Department of Internal Medicine  PN    PCP: Alda Adam DO  Admitting Physician: Dr. Luciano Caro  Consultants: Gastroenterology  Date of Service: 11/14/2021    CHIEF COMPLAINT: Abdominal distention and discomfort and hematemesis/coffee-ground emesis    HISTORY OF PRESENT ILLNESS:    Patient is 50-year-old male who presented to the ED due to abdominal distention with associated hematemesis and coffee-ground emesis. Patient states that she has a history of alcoholic hepatitis. That over the last month he has noticed increased abdominal distention. Initially he started out having abdominal pain. Pain became bloating. Now he is feeling more distended and when his stay during the ED he did develop some generalized abdominal discomfort. He does admit to associated hematemesis and coffee-ground emesis. He denies any melena or bleeding per rectum. Patient states that his symptoms began after he resumed alcohol use. His jaundice returned and worsened. As such he quit drinking again about 2 weeks ago. He denies any fever or chills. Denies any chest pain. Denies any shortness of breath. 11/15/2021  Patient seen and examined in the ED hold area. Patient feels better today than yesterday. Patient has little discomfort around paracentesis site. Patient is hungry. He denies any nausea vomiting or shortness of breath. Patient has been n.p.o. for EGD today but was canceled recently. Potassium 3.3 today with BUN/creatinine 11/0.8. Patient's total bilirubin is still elevated at 19.7 with moderate elevation of transaminase. Hemoglobin 1.6 with platelet count 88. INR is 4.3 today. Temperature 98.1 with heart rate 99 and O2 sat 96% on room air at rest.    11/16/2021  Patient seen examined in [facility]. Patient is little bit more alert today. Patient with only fair appetite. Patient has a vague abdominal discomfort but improved from admission. Patient still fairly weak.   BUN/creatinine is 12/1.0 with serum sodium 131 potassium 3.3. Total bili was 20.2 today with hemoglobin 12.2 and WBC 11.3. Temperature 96.9 with heart rate of 100 and blood pressure 131/80. O2 sat 96% room air at rest.  GI note reviewed. Patient with increased INR and we are attempting to get it down with vitamin K, may need fresh frozen plasma. 11/17/2021  Patient seen examined on [facility]. He looks about the same. Patient complains of vague abdominal discomfort. Patient has poor appetite. He denies any chest pain or dizziness. Serum sodium 131 potassium 2.6 today. BUN/creatinine 13/1.0. Total bili is 21.5 with elevation of transaminase. Patient's INR went up to 3.9. Hemoglobin 1.9 with platelet count 81 WBC 10.3. Temperature 98.3 with blood pressure 119/84. O2 sat 95% room air at rest.    11/18/2021  Patient seen examined on [facility]. Patient continues to get a little bit drowsier every day. Patient currently denies any chest or abdominal pain. Patient occasionally has some abdominal discomfort. He denies any nausea vomiting but does have poor appetite. Serum potassium is still low this morning at 2.7 after patient receiving multiple doses of IV KCl along with oral KCl. Patient's bilirubin is increased again at 22.9 with a serum ammonia yesterday at 98. WBC is 9.4 with hemoglobin 12.4. Platelet count 78. INR 3.9. Temperature 98 with heart rate of 90 and blood pressure 126/64. O2 sat 95% room air at rest.    11/19/2021  Patient seen examined on [facility]. Patient sitting up in chair currently still has some tremors in his arms with intention. He denies any chest pain. Patient has very mild abdominal discomfort with some mildmoderate abdominal distention. He is alert and oriented x3 at this time. BUN/creatinine 14/1.07 potassium 3.0. Serum ammonia 110 with a total bili 22.2. Gl[place]in 12.1 with platelet count of 73. Temperature 98.1 with heart rate in 90 and blood pressure 106/68. O2 sat 97% room air at rest.  Urine output is very good. / note reviewed and apparently the patient is refusing transfer to extended care facility for rehab but [facility] care agency cannot see him until Tuesday. PAST MEDICAL Hx:  Past Medical History:   Diagnosis Date    Anxiety     Depression     Esophagitis     ETOH abuse     Gastritis     GERD (gastroesophageal reflux disease)     Hematuria     Pancreatitis        PAST SURGICAL Hx:   Past Surgical History:   Procedure Laterality Date    KNEE SURGERY      ACL    UPPER GASTROINTESTINAL ENDOSCOPY  05/09/2018    UPPER GASTROINTESTINAL ENDOSCOPY N/A 5/8/2018    EGD BIOPSY performed by Parth Wu MD at [address] [place] [address] 1/21/2019    EGD ESOPHAGOGASTRODUODENOSCOPY performed by Devang Alcala DO at [facility]  1/21/2019    EGD CONTROL HEMORRHAGE performed by Devang Alcala DO at [address] [facility] Hx:  No family history on file. HOME MEDICATIONS:  Prior to Admission medications    Medication Sig Start Date End Date Taking?  Authorizing Provider   omeprazole (PRILOSEC) 20 MG delayed release capsule Take 20-40 mg by mouth daily   Yes Historical Provider, MD   metoprolol succinate (TOPROL XL) 100 MG extended release tablet Take 1 tablet by mouth 2 times daily  Patient taking differently: Take 100 mg by mouth daily as needed  7/4/20  Yes Luis Barakat DO   lactulose (CHRONULAC) 10 GM/15ML solution Take 30 mLs by mouth 2 times daily 1/23/19  Yes Le Hammond MD   amiodarone (CORDARONE) 200 MG tablet Take 200 mg by mouth 2 times daily    Historical Provider, MD   rivaroxaban (XARELTO) 20 MG TABS tablet Take 1 tablet by mouth daily 7/4/20   Luda Garry, APRN - CNP       ALLERGIES:  Joss Deems nut oil]    SOCIAL Hx:  Social History     Socioeconomic History    Marital status:      Spouse name: Not on file    Number of children: 1    Years of education: Not on file  Highest education level: Not on file   Occupational History    Not on file   Tobacco Use    Smoking status: Never Smoker    Smokeless tobacco: Never Used   Vaping Use    Vaping Use: Never used   Substance and Sexual Activity    Alcohol use: Yes     Comment: 5th of liquour daily. Previous 1/15/2019    Drug use: No    Sexual activity: Not Currently     Partners: Female   Other Topics Concern    Not on file   Social History Narrative    Has a 15 y.o. son who lives in [place]. Social Determinants of Health     Financial Resource Strain:     Difficulty of Paying Living Expenses: Not on file   Food Insecurity:     Worried About Running Out of Food in the Last Year: Not on file    Ele of Food in the Last Year: Not on file   Transportation Needs:     Lack of Transportation (Medical): Not on file    Lack of Transportation (Non-Medical):  Not on file   Physical Activity:     Days of Exercise per Week: Not on file    Minutes of Exercise per Session: Not on file   Stress:     Feeling of Stress : Not on file   Social Connections:     Frequency of Communication with Friends and Family: Not on file    Frequency of Social Gatherings with Friends and Family: Not on file    Attends Restorationist Services: Not on file    Active Member of [address] [place] [address] [employer] or Organizations: Not on file    Attends Club or Organization Meetings: Not on file    Marital Status: Not on file   Intimate Partner Violence:     Fear of Current or Ex-Partner: Not on file    Emotionally Abused: Not on file    Physically Abused: Not on file    Sexually Abused: Not on file   Housing Stability:     Unable to Pay for Housing in the Last Year: Not on file    Number of [place] in the Last Year: Not on file    Unstable Housing in the Last Year: Not on file       ROS: Positive in bold  General:   Denies chills, fatigue, fever, malaise, night sweats or weight loss    Psychological:   Denies anxiety, disorientation or hallucinations    ENT: Denies epistaxis, headaches, vertigo or visual changes    Cardiovascular:   Denies any chest pain, irregular heartbeats, or palpitations. No paroxysmal nocturnal dyspnea. Respiratory:   Denies shortness of breath, coughing, sputum production, hemoptysis, or wheezing. No orthopnea. Gastrointestinal:   Denies nausea, vomiting, diarrhea, or constipation. Denies any abdominal pain. Denies change in bowel habits or stools. Genito-Urinary:    Denies any urgency, frequency, hematuria. Voiding without difficulty. Musculoskeletal:   Denies joint pain, joint stiffness, joint swelling or muscle pain    Neurology:    Denies any headache or focal neurological deficits. No weakness or paresthesia. Derm:    Denies any rashes, ulcers, or excoriations. Denies bruising. Extremities:   Denies any lower extremity swelling or edema. PHYSICAL EXAM: Abnormal findings noted  VITALS:  Vitals:    11/19/21 1000   BP: 106/68   Pulse: 90   Resp: 16   Temp: 98.1 °F (36.7 °C)   SpO2: 97%         CONSTITUTIONAL:    Awake, alert, cooperative, no apparent distress, and appears stated age    EYES:     EOMI, sclera clear, conjunctiva normal    ENT:    Normocephalic, atraumatic,  External ears without lesions. NECK:    Supple, symmetrical, trachea midline, no JVD    HEMATOLOGIC/LYMPHATICS:    No cervical lymphadenopathy and no supraclavicular lymphadenopathy    LUNGS:    Symmetric. No increased work of breathing, good air exchange, clear to auscultation bilaterally, no wheezes, rhonchi, or rales,     CARDIOVASCULAR:    Normal apical impulse, regular rate and rhythm, normal S1 and S2, no S3 or S4, and no murmur noted    ABDOMEN:    soft, non-distended,  Patient's abdomen is distended  Patient does have abdominal discomfort    MUSCULOSKELETAL:    There is no redness, warmth, or swelling of the joints. NEUROLOGIC:    Awake, alert, oriented to name, place and time. SKIN:    No bruising or bleeding.   No redness, warmth, or swelling    EXTREMITIES:    Peripheral pulses present. No edema, cyanosis, or swelling. LINES/CATHETERS     LABORATORY DATA:  CBC with Differential:    Lab Results   Component Value Date    WBC 8.8 11/19/2021    RBC 3.62 11/19/2021    HGB 12.1 11/19/2021    HCT 34.3 11/19/2021    PLT 73 11/19/2021    MCV 94.8 11/19/2021    MCH 33.4 11/19/2021    MCHC 35.3 11/19/2021    RDW 17.9 11/19/2021    METASPCT 0.9 01/17/2019    LYMPHOPCT 5.2 11/19/2021    MONOPCT 7.8 11/19/2021    BASOPCT 0.9 11/19/2021    MONOSABS 0.70 11/19/2021    LYMPHSABS 0.44 11/19/2021    EOSABS 0.62 11/19/2021    BASOSABS 0.08 11/19/2021     CMP:    Lab Results   Component Value Date     11/19/2021    K 3.0 11/19/2021    K 3.1 11/14/2021     11/19/2021    CO2 20 11/19/2021    BUN 14 11/19/2021    CREATININE 1.1 11/19/2021    GFRAA >60 11/19/2021    LABGLOM >60 11/19/2021    GLUCOSE 142 11/19/2021    PROT 6.3 11/19/2021    LABALBU 1.8 11/19/2021    CALCIUM 8.1 11/19/2021    BILITOT 22.2 11/19/2021    ALKPHOS 143 11/19/2021     11/19/2021    ALT 98 11/19/2021       ASSESSMENT/PLAN:  1. Decompensated hepatic cirrhosis  2. Moderate volume ascites  3. History of alcohol abuse  4. Alcoholic hepatitis  5. Hepatic encephalopathy  6. Hematemesis   7. Gallbladder distention with wall thickening  8. Right pleural effusion  9. Anxiety and depression  10. GERD  11. History of pancreatitis  12. History of paroxysmal atrial fibrillation      Patient presented due to abdominal distention. He was also complaining of hematemesis/coffee-ground emesis. Symptoms have worsened over the last month and he resumed drinking. Work revealed hepatitis likely secondary to alcohol abuse. Patient has cirrhosis with moderate volume ascites. GI consulted. Patient started on Protonix and octreotide drips. Patient Rocephin. IR consulted for paracentesis to evaluate for SBP. Plan for upper endoscopy as well per GI.     Home medication reviewed  Monitor heart rate, blood pressure, O2 saturation  CMP, CBC in a.m.     Discharge home when okay with ARA Kellogg DO  1:06 PM  11/19/2021

## 2021-11-19 NOTE — PROGRESS NOTES
PROGRESS NOTE    By Segun Flores D.O GI Fellow    [facility]  Dr. Gissel Abel MD, Dr. July Robertson MD, Dr Hannah Marr, Dr. Renetta Barajas MD, Dr. Tahir Mayo, [facility]  40 y.o.  male    SUBJECTIVE: Patient was seen and examined at bedside this morning. Patient was acute event reported overnight. Patient continues to be jaundiced on exam.  However patient is fully awake today. Patient is responding to all question appropriately. Mentation significantly improved from yesterday. Patient denies any acute complaint. Patient has remained hemodynamically stable. Patient is afebrile.   OBJECTIVE:    /69   Pulse 90   Temp 98 °F (36.7 °C) (Infrared)   Resp 18   Ht 6' 3\" (1.905 m)   Wt 215 lb (97.5 kg)   SpO2 96%   BMI 26.87 kg/m²     Gen: NAD, AAO x 3   HEENT:PEERL, icterus and jaundice  Heart: RRR, no M/R/G  Lungs: CTAB  Abd.: soft, distended, NT, BS +, no G/R, no HSM  Extr.:  Upper extremity tremors no C/C/E, no bruising      Stool (measured) : 0 mL  Lab Results   Component Value Date    WBC 9.4 11/18/2021    WBC 10.3 11/17/2021    WBC 11.3 11/16/2021    HGB 12.4 11/18/2021    HGB 11.9 11/17/2021    HGB 12.2 11/16/2021    HCT 34.6 11/18/2021    MCV 91.8 11/18/2021    RDW 17.7 11/18/2021    PLT 78 11/18/2021    PLT 81 11/17/2021     11/16/2021     Lab Results   Component Value Date     11/18/2021    K 3.0 11/18/2021    K 3.1 11/14/2021     11/18/2021    CO2 21 11/18/2021    BUN 13 11/18/2021    CREATININE 1.0 11/18/2021    CALCIUM 7.8 11/18/2021    PROT 6.5 11/18/2021    LABALBU 1.9 11/18/2021    BILITOT 22.9 11/18/2021    BILITOT 21.5 11/17/2021    BILITOT 20.2 11/16/2021    ALKPHOS 154 11/18/2021    ALKPHOS 152 11/17/2021    ALKPHOS 172 11/16/2021     11/18/2021     11/17/2021     11/16/2021     11/18/2021     11/17/2021     11/16/2021     Lab Results   Component Value Date    LIPASE 68 11/14/2021 LIPASE 26 07/01/2020    LIPASE 26 05/11/2020     Lab Results   Component Value Date    AMYLASE 91 01/16/2019         ASSESSMENT/PLAN:    1. Abdominal pain /ascites  Secondary to decompensated alcoholic cirrhosis   -abstinent from alcohol for approximately 2 weeks however appears to be withdrawing currently   -Paracentesis from admission show no signs of SBP & Doppler ultrasound reveals no thrombosis. INR remains elevated  Patient does gave some abdominal pain on exam.  However patient continued to have ascites on exam.  Currently on Aldactone 100 mg p.o. daily as well as furosemide 40 mg daily.  -I will continue with diuretic and antibiotics prophylactic and will consider repeat paracentesis.      2. Hematemesis, rule out gastric/esophageal varices  -No further episodes in the hospital, H&H has remained stable.  cannot rule out gastric/esophageal varices. Plan for upper endoscopy once electrolyte abnormalities corrected/patient medically optimized and withdrawal symptoms resolved  Last EGD from 1/21/2019 showed PHG with gastritis. Continue diet as tolerated; wean off octreotide to 25  micrograms per hour; change PPI drip to twice a day IV PPI      3. Hepatic encephalopathy type C grade II  Patient ammonia was 91 on admission, repeat ammonia yesterday 11/17/2021 ammonia was 98. Patient has been on 30 g of lactulose 2 times a days with rifaximin 550 mg p.o. 3 times a day as well. Patient mentation has significantly improved compared to yesterday. Patient is A&O x3 and is able to answer all question appropriately. Patient does not have asterixis on exam however he is currently also in alcohol withdrawal and treated with 1 mg of Ativan every 2 hours as needed. We will continue to monitor mental status continue with lactulose and rifaximin. This will be titrated to up to 2-3 soft bowel movement per day.     4. Alcohol induced cirrhosis, decompensated  Meld sodium score 35.   Chemistry continued to trend down. However INR remained elevated at 3.9. Bilirubin today is 22.9. Admission bilirubin was 20.5 a slight increase. Patient continues to be jaundiced. Noncompliance with medication and alcohol abstinence  Patient was educated and advised on the importance of abstain from alcohol completely otherwise he is long-term survival odds significantly decreased.   Patient seem to understand and is agreeable with this plan.     Other comorbidities managed per primary team  Alcohol withdrawal and intoxication  History of atrial fibrillation         Pt was discussed with Dr. Bonnie Darby and Dr. Jesika Friedman DO  GI Fellow   11/19/2021  9:06 AM

## 2021-11-19 NOTE — CARE COORDINATION
SARAI Note: 11/19/2021 at 10:24am: COVID testing not done. CM talked with the patient and his mother and girlfriend at the bedside. Patient seems more alert this am and his mother and girlfriend state they notice a big change in his mentation since yesterday. PT / OT to see the patient this am. States he is feeling weak. Discussed the possibility of going to a SNF for rehab to gain some strength , but he absolutely refuses. His mom and girlfriend states that they know he would not go to a SNF and both realize he would have to be able ot  fully participate and be more independent with his ADL's in order to go to a inpatient alcohol treatment center. Both the patient and family are receptive to [facility] when he is discharged. His mom states the physician told her that the patient would be here until at least the beginning of the week. Reviewed [facility] choices with them, and the girlfriend is requesting [facility]. Referral called to Felicia at [facility]. She states they can accept, but the next start of care is on Tuesday. She states they will need [facility] orders and if by chance, he would go home before Monday - they would have to have an order from the Physician stating that it is ok to start care on Tuesday. They are also requesting someone from [facility] come and talk to him again, as he was not fully alert to comprehend what they were saying when they came on 11/17. SARAI called Wilma from [facility] and she states she will be in to see patient this afternoon.  Javad Coles RN

## 2021-11-19 NOTE — CARE COORDINATION
[facility] Support Note    Name: Bre Keene  Date: 11/19/2021    Chief Complaint   Patient presents with    Fatigue     pt states he has been weak for a month along with juandice, and confusion, states he was dx with aute liver cirrosis 4 years ago, stopped drinking 2 weeks ago, hasnt seen a doctor in approx 3 years       [facility] met with patient.   [] Support and education provided  [] Resources provided   [] Treatment referral:   [] Other:   [x] Patient declined [facility] services     Referred By: Venecia Headley (BEATRIZ)  Notes:   Signed: Manoj Carter, 11/19/2021

## 2021-11-19 NOTE — HOME CARE
[address] [facility] referral received, awaiting final orders. Spoke with patient and verified demographics. [facility] follow. [facility], [place] [address] [facility].

## 2021-11-19 NOTE — PROGRESS NOTES
PLAN OF CARE       Physical therapy plan of care is established based on physician order,  patient diagnosis and clinical assessment    Current Treatment Recommendations:    -Bed Mobility: Lower extremity exercises , Upper extremity exercises  and Trunk control activities   -Standing Balance: Perform strengthening exercises in standing to promote motor control with or without upper extremity support , Instruct patient on adequate base of support to maintain balance and Challenge balance utilizing reaching  activities beyond center of gravity    -Transfers: Provide instruction on proper hand and foot position for adequate transfer of weight onto lower extremities and use of gait device if needed and Cues for hand placement, technique and safety. Provide stabilization to prevent fall   -Gait: Gait training, Standing activities to improve: base of support, weight shift, weight bearing , Exercises to improve trunk control and Exercises to improve hip and knee control   -Endurance: Utilize Supervised activities to increase level of endurance to allow for safe functional mobility including transfers and gait   -Stairs: Stair training with instruction on proper technique and hand placement on rail    PT long term treatment goals are located in below grid    Patient and or family understand(s) diagnosis, prognosis, and plan of care. Frequency of treatments: Patient will be seen  daily.          Prior Level of Function: Patient ambulated independently   Rehab Potential: good    for baseline    Past medical history:   Past Medical History:   Diagnosis Date    Anxiety     Depression     Esophagitis     ETOH abuse     Gastritis     GERD (gastroesophageal reflux disease)     Hematuria     Pancreatitis      Past Surgical History:   Procedure Laterality Date    KNEE SURGERY      ACL    UPPER GASTROINTESTINAL ENDOSCOPY  05/09/2018    UPPER GASTROINTESTINAL ENDOSCOPY N/A 5/8/2018    EGD BIOPSY performed by Parth Wu MD at [address] [facility] N/A 1/21/2019    EGD ESOPHAGOGASTRODUODENOSCOPY performed by Sarabjit Irving DO at [address] [facility]  1/21/2019    EGD CONTROL HEMORRHAGE performed by Sarabjit Irving DO at [facility]:    Precautions: Up with assistance and Up as tolerated, falls, alarm and confusion ,    Social history: Patient lives alone in a ranch home  with 3 steps  to enter without Rail  Tub shower grab bars    Equipment owned: Crutches,       [facility]   How much difficulty turning over in bed?: A Little  How much difficulty sitting down on / standing up from a chair with arms?: A Lot  How much difficulty moving from lying on back to sitting on side of bed?: A Little  How much help from another person moving to and from a bed to a chair?: A Lot  How much help from another person needed to walk in hospital room?: A Lot  How much help from another person for climbing 3-5 steps with a railing?: A Lot  AM-PAC Inpatient Mobility Raw Score : 14  AM-PAC Inpatient T-Scale Score : 38.1  Mobility Inpatient CMS 0-100% Score: 61.29  Mobility Inpatient CMS G-Code Modifier : CL    Nursing cleared patient for PT evaluation. The admitting diagnosis and active problem list as listed above have been reviewed prior to the initiation of this evaluation. OBJECTIVE;   Initial Evaluation  Date: 11/19/2021 Treatment Date:     Short Term/ Long Term   Goals   Was pt agreeable to Eval/treatment? Yes  To be met in 5 days   Pain level   0/10        Bed Mobility    Rolling: Supervision     Supine to sit: Moderate assist of 1    Sit to supine: Minimal assist of 1    Scooting: Supervision     Rolling: Independent    Supine to sit: Independent    Sit to supine: Independent    Scooting: Independent     Transfers Sit to stand:  Moderate assist of 1 from bed and chair   Sit to stand: Independent     Ambulation    10 and 2 x 20  feet using  hand held assist with Moderate assist of 1   with iv pole; poor upright motor control with multiplane instability   100 feet using  least restrictive device with Independent    Stair negotiation: ascended and descended   Not assessed     10 steps with rail independent    ROM Within functional limits    Increase range of motion 10% of affected joints    Strength BUE:  refer to OT eval  RLE:  3+/5  LLE:  3+/5  Increase strength in affected mm groups by 1/3 grade   Balance Sitting EOB:  fair    Dynamic Standing:  poor    Sitting EOB:  good    Dynamic Standing: fair       Patient is Alert & Oriented x person, place, time and situation and follows one step directions  Distractible and needs direction to task  and repetition to follow thru with directions  Sensation:  Patient  denies numbness/tingling   Edema:  none noted   Endurance: poor         Patient education  Patient educated on role of Physical Therapy, risks of immobility, safety and plan of care and  importance of mobility while in hospital      Patient response to education:   Pt verbalized understanding Pt demonstrated skill Pt requires further education in this area   Yes Partial Yes      Treatment:  Patient practiced and was instructed/facilitated in the following treatment: Patient   Sat edge of bed 10 minutes with Supervision  to increase dynamic sitting balance and activity tolerance. seated and standing challenges reaching beyond base of support      Therapeutic Exercises:  not performed        At end of session, patient in chair with girl friend present call light and phone within reach,  all lines and tubes intact, nursing notified. Patient would benefit from continued skilled Physical Therapy to improve functional independence and quality of life.          Patient's/ family goals   home    Time in  1014  Time out  1034    Total Treatment Time  10 minutes    Evaluation time includes thorough review of current medical information, gathering information on past medical history/social history and prior level of function, completion of standardized testing/informal observation of tasks, assessment of data, and development of Plan of care and goals.      CPT codes:  Low Complexity PT evaluation (49723)  Gait Training (86110) 10 minutes 1 unit(s)    Abena Cifuentes, PT

## 2021-11-19 NOTE — PROGRESS NOTES
PROGRESS NOTE  By Isabelle Seo M.D. [facility]  Dr. Barry Marques M.D.,  Dr. Rachel Cook M.D.,   Dr. Elena Oquendo D.O.,  Dr. Sobeida Garcia M.D.,  Dr. Hernandez Jerome D.O.,  Kellie Melo D.O. Marie Peralta  40 y.o.  male    SUBJECTIVE:  Denies abdominal pain. Denies nausea vomiting. Family at bedside    OBJECTIVE:    /68   Pulse 90   Temp 98.1 °F (36.7 °C) (Infrared)   Resp 16   Ht 6' 3\" (1.905 m)   Wt 215 lb (97.5 kg)   SpO2 97%   BMI 26.87 kg/m²     General: NAD/ male. AAO x3  HEENT: Persistent scleral icterus/moist oral mucosa  Neck: Supple with trachea midline  Chest: CTA B/symmetrical excursions  Cor: Regular/S1-S2  Abd.: Soft and distended. Mildly diffusely tender upon palpation without guarding  Extr.:  No significant peripheral edema  Skin: Warm and dry. Persistent icterus        DATA:    Monitor data reviewed -sinus rhythm noted.     Stool (measured) : 0 mL  Lab Results   Component Value Date    WBC 9.4 11/18/2021    RBC 3.77 11/18/2021    HGB 12.4 11/18/2021    HCT 34.6 11/18/2021    MCV 91.8 11/18/2021    MCH 32.9 11/18/2021    MCHC 35.8 11/18/2021    RDW 17.7 11/18/2021    PLT 78 11/18/2021    MPV 10.0 11/18/2021     Lab Results   Component Value Date     11/18/2021    K 3.0 11/18/2021    K 3.1 11/14/2021     11/18/2021    CO2 21 11/18/2021    BUN 13 11/18/2021    CREATININE 1.0 11/18/2021    CALCIUM 7.8 11/18/2021    PROT 6.5 11/18/2021    LABALBU 1.9 11/18/2021    BILITOT 22.9 11/18/2021    ALKPHOS 154 11/18/2021     11/18/2021     11/18/2021     Lab Results   Component Value Date    LIPASE 68 11/14/2021     Lab Results   Component Value Date    AMYLASE 91 01/16/2019         ASSESSMENT/PLAN:    1. Abdominal pain /ascites  Secondary to decompensated alcoholic cirrhosis   -abstinent from alcohol for approximately 2 weeks per patient  -Paracentesis from admission show no signs of SBP   -Doppler ultrasound reveals no thrombosis  -INR remains elevated  -Continue antibiotics     2. Hematemesis, rule out gastric/esophageal varices  -No further episodes in the hospital  -H&H stable  Above discussed with wife and she claims that patient has never had episodes of hematemesis at home   cannot rule out gastric/esophageal varices. Plan for upper endoscopy once electrolyte abnormalities corrected/patient medically optimized and withdrawal symptoms resolved  Last EGD from 1/21/2019 showed PHG with gastritis. Noncompliance with home medication  Continue current diet  - continue to wean off octreotide -  discontinue tomorrow  -. Continue PPI twice daily     3. Hepatic encephalopathy type C grade II  Patient ammonia was 91 on admission, repeat ammonia yesterday 11/17/2021 ammonia was 98. Patient has been on 30 g of lactulose 2 times a days with rifaximin 550 mg p.o. 3 times a day as well. Patient does have asterixis on exam.  Encephalopathy could be worsened given that patient is currently also being in alcohol withdrawal and treated with 1 mg of Ativan every 2 hours as needed. continue to monitor mental status; continue with lactulose and rifaximin.  titrated to up to 2-3 soft bowel movement per day.     4. Alcohol induced cirrhosis, decompensated  Meld sodium score 35. INR remained elevated at 3.9.-A.m. labs pending today   Noncompliance with medication and alcohol abstinence  Must abstain from alcohol completely otherwise he is long-term survival odds significantly decreased.     Other comorbidities managed per primary team  Alcohol withdrawal and intoxication  History of atrial fibrillation       Rosie Betts MD  11/19/2021  10:05 AM    NOTE:  This report was transcribed using voice recognition software. Every effort was made to ensure accuracy; however, inadvertent computerized transcription errors may be present.

## 2021-11-19 NOTE — PROGRESS NOTES
[facility]  [facility]         Date:2021                                                   Patient Name: Brayden iL     MRN: [id number]     : 1975     Room: [address] [place] [address]-       Evaluating OT: Kris Childress, OTR/L; [id number]       Referring Provider and Orders/Date:   OT eval and treat Start: 21, End: 21, ONE TIME, Standing Count: 1 Occurrences, SABA Thompson DO     Diagnosis:   1. Bilirubinemia    2.  Alcoholic cirrhosis of liver with ascites [facility])         Pertinent Medical History:       Past Medical History:   Diagnosis Date    Anxiety     Depression     Esophagitis     ETOH abuse     Gastritis     GERD (gastroesophageal reflux disease)     Hematuria     Pancreatitis           Past Surgical History:   Procedure Laterality Date    KNEE SURGERY      ACL    UPPER GASTROINTESTINAL ENDOSCOPY  2018    UPPER GASTROINTESTINAL ENDOSCOPY N/A 2018    EGD BIOPSY performed by Tamica Rashid MD at [facility] 2019    EGD ESOPHAGOGASTRODUODENOSCOPY performed by Harish Carbajal DO at [facility]  2019    EGD CONTROL HEMORRHAGE performed by Harish Carbajal DO at [facility] ENDOSCOPY       Precautions:  Fall Risk, tremors    Recommended placement: subacute     Assessment of current deficits     [x] Functional mobility  [x]ADLs  [x] Strength               [x]Cognition     [x] Functional transfers   [x] IADLs         [x] Safety Awareness   [x]Endurance     [x] Fine Coordination              [x] Balance      [] Vision/perception   []Sensation      [x]Gross Motor Coordination  [] ROM  [] Delirium                   [x] Motor Control     OT PLAN OF CARE   OT POC based on physician orders, patient diagnosis and results of clinical assessment    Frequency/Duration 1-3 days/wk for 2 weeks PRN   Specific OT Treatment Interventions to include:   * Instruction/training on adapted ADL techniques and AE recommendations to increase functional independence within precautions       * Training on energy conservation strategies, correct breathing pattern and techniques to improve independence/tolerance for self-care routine  * Functional transfer/mobility training/DME recommendations for increased independence, safety, and fall prevention  * Patient/Family education to increase follow through with safety techniques and functional independence  * Recommendation of environmental modifications for increased safety with functional transfers/mobility and ADLs  * Cognitive retraining/development of therapeutic activities to improve problem solving, judgement, memory, and attention for increased safety/participation in ADL/IADL tasks  * Therapeutic exercise to improve motor endurance, ROM, and functional strength for ADLs/functional transfers  * Therapeutic activities to facilitate/challenge dynamic balance, stand tolerance for increased safety and independence with ADLs  * Therapeutic activities to facilitate gross/fine motor skills for increased independence with ADLs  * Neuro-muscular re-education: facilitation of righting/equilibrium reactions, midline orientation, scapular stability/mobility, normalization of muscle tone, and facilitation of volitional active controled movement  * Positioning to improve skin integrity, interaction with environment and functional independence     Recommended Adaptive Equipment/DME:  TBD      Home Living: Patient lives alone in a ranch home  with 3 steps  to enter without Rail. He reports he can have assist from his girl friend or mother if required. Laundry in the basement with a standard flight of steps with rails. Bathroom setup: tub shower with grab bars.  Standard bars    DME owned: none     Prior Level of Function: indep with ADLs , indep with IADLs; ambulated indep   Driving: yes   Occupation: none   Enjoys: fishing,hunting, odd jobs around the house, dog    Pain Level: none  Cognition: A&O: 4/4; Follows 3 step directions   Memory:  fair   Sequencing:  Fair-   Problem solving:  Fair-   Judgement/safety:  Poor+ with impulsivity and decreased awareness of deficits    AM-PAC Daily Activity Inpatient   How much help for putting on and taking off regular lower body clothing?: A Lot  How much help for Bathing?: A Lot  How much help for Toileting?: A Lot  How much help for putting on and taking off regular upper body clothing?: A Lot  How much help for taking care of personal grooming?: A Lot  How much help for eating meals?: A Lot  AM-PAC Inpatient Daily Activity Raw Score: 12  AM-PAC Inpatient ADL T-Scale Score : 30.6  ADL Inpatient CMS 0-100% Score: 66.57  ADL Inpatient CMS G-Code Modifier : CL     Functional Assessment:     Initial Eval Status  Date: 11/19/2021   Treatment Status  Date: STGs = LTGs  Time frame: 10-14 days   Feeding Moderate Assist with extended time and 25% spillage due to tremors  indep   Grooming Moderate Assist with hair wash, comb due to tremors and droppage reaching over head. Face wash and hand wash with extended time. Unable to test oral care with pt transferring to a different floor. Stand by Assist    UB Dressing Maximal Assist with gown management from sitting up in arm chair. Minimal Assist    LB Dressing Maximal Assist for pants to thread over feet and pull up over hips in standing with balance assist. Ability to don and doff socks with extended time from sitting  Minimal Assist    Bathing Moderate Assist for LB and buttocks in standing. Stand by Assist    Toileting Maximal Assist with assist for clothing management from 3:1  Stand by Assist    Bed Mobility  NT with pt up in chair upon arrival  Supine to sit: Supervision   Sit to supine: Supervision    Functional Transfers Moderate Assist with use of IV for support.  Pt impulsive and demonstrating loss of balance initially with low surface of arm chair. Min A from 3:1 with higher surface  Stand by Assist    Functional Mobility Moderate Assist + min A for management of IV. Use of walker for UB support and second person to manage IV for safety. Impulsivity and balance loss noted for fall risk. Recommending 3:1 rather than use of bathroom  Minimal Assist    Balance Sitting:     Static:  Fair+    Dynamic:fair  Standing: fair-  Sitting:     Static:  good    Dynamic:fair+  Standing: fair   Activity Tolerance Pt on room air with with stable vitals. Standing tolerance 1min average  Increase standing tolerance for >4min with stable vital signs for carry over into toileting, functional tranfers and indep in ADLs   Visual/  Perceptual Glasses: none; WFL    Reports change in vision since admission: \"fuzzy\"     NA   Ken UE Strengthening  3+/5 generally  4+/5MMT generally for carry over into self care, functional transfers and functional mobility with AD. Hand Dominance  [x] Right  [] Left    AROM (PROM) Strength Additional Info:    RUE  WFL 3+/5 Fair+ and fair- FMC/dexterity noted during ADL tasks  Opposition [x] Intact [] Impaired  Finger to nose [] Intact [x] Impaired     LUE WFL 3+/5 Fair  and poor+ FMC/dexterity noted during ADL tasks  Opposition [] Intact [x] Impaired  Finger to nose [] Intact [x] Impaired     Hearing: WFL   Sensation:  No c/o numbness or tingling   Tone: WFL   Edema: none    Comments: Upon arrival patient sitting up in arm chair with nursing present. Pt required max A for most UB ADLs and max A LB ADLs tasks. Limited with mod A for standing during LB ADLs and functional transfers. The biggest barriers reflect that of functional transfers, functional mobility, UB/LB ADLs, cognition, activity tolerance, balance, safety and strengthening. At end of session, patient sitting in arm chair with call light and phone within reach, all lines and tubes intact.   Overall patient demonstrated decreased independence and safety during completion of ADL/functional transfer/mobility tasks. Nursing updated on pt position and status following OT eval. Pt would benefit from continued skilled OT to increase safety and independence with completion of ADL/IADL tasks for functional independence and quality of life. Treatment: OT treatment provided this date includes:   Instruction, education and training on safe facilitation and adapted techniques for completion of ADLs. These include neuromuscular reeducation to facilitate balance/righting reactions, safe functional transfer techniques and on energy conservation/work simplification for completion of ADLs. Education provided on hand/feet placement with bed rail, arm chair, w/c and body mechanics for fall prevention. Cues for energy conservation and safety for in the home at DC, including modifications and DME. Extended time to complete all tasks, including skilled monitoring of patient's response during treatment session and vital signs. Prior to and at the end of session, environmental modifications / line management completed for patients safety and efficiency of treatment session. See above for further details. Rehab Potential: Good  for established goals     Patient / Family Goal: Increase strength, safety and going to the bathroom    Patient and/or family were instructed on functional diagnosis, prognosis/goals and OT plan of care. Demonstrated good understanding. Eval Complexity: Low  · History: Brief review of medical records and additional review of physical, cognitive, or psychosocial history related to current functional performance  · Exam: 3+ performance deficits  · Assistance/Modification: Mod assistance or modifications required to perform tasks. May have comorbidities that affect occupational performance.     Time In: 1340  Time Out: 1410  Total Treatment Time: 10    Min Units   OT Eval Low 97165  x  1   OT Eval Medium 85882 OT Eval High [id number]      OT Re-Eval [id number]       Therapeutic Ex [id number]       Therapeutic Activities 52573  10 1    ADL/Self Care 07449       Orthotic Management [id number]       Manual [id number]     Neuro Re-Ed [id number]       Non-Billable Time          Evaluation Time additionally includes thorough review of current medical information, gathering information on past medical history/social history and prior level of function, interpretation of standardized testing/informal observation of tasks, assessment of data and development of plan of care and goals.             Nicole Weaver OTR/L; [id number]

## 2021-11-20 NOTE — PROGRESS NOTES
Department of Internal Medicine  PN    PCP: Manjeet Alonzo DO  Admitting Physician: Dr. Handy Owusu  Consultants: Gastroenterology  Date of Service: 11/14/2021    CHIEF COMPLAINT: Abdominal distention and discomfort and hematemesis/coffee-ground emesis    HISTORY OF PRESENT ILLNESS:    Patient is 79-year-old male who presented to the ED due to abdominal distention with associated hematemesis and coffee-ground emesis. Patient states that she has a history of alcoholic hepatitis. That over the last month he has noticed increased abdominal distention. Initially he started out having abdominal pain. Pain became bloating. Now he is feeling more distended and when his stay during the ED he did develop some generalized abdominal discomfort. He does admit to associated hematemesis and coffee-ground emesis. He denies any melena or bleeding per rectum. Patient states that his symptoms began after he resumed alcohol use. His jaundice returned and worsened. As such he quit drinking again about 2 weeks ago. He denies any fever or chills. Denies any chest pain. Denies any shortness of breath. 11/15/2021  Patient seen and examined in the ED hold area. Patient feels better today than yesterday. Patient has little discomfort around paracentesis site. Patient is hungry. He denies any nausea vomiting or shortness of breath. Patient has been n.p.o. for EGD today but was canceled recently. Potassium 3.3 today with BUN/creatinine 11/0.8. Patient's total bilirubin is still elevated at 19.7 with moderate elevation of transaminase. Hemoglobin 1.6 with platelet count 88. INR is 4.3 today. Temperature 98.1 with heart rate 99 and O2 sat 96% on room air at rest.    11/16/2021  Patient seen examined in [facility]. Patient is little bit more alert today. Patient with only fair appetite. Patient has a vague abdominal discomfort but improved from admission. Patient still fairly weak.   BUN/creatinine is 12/1.0 with serum sodium 131 potassium 3.3. Total bili was 20.2 today with hemoglobin 12.2 and WBC 11.3. Temperature 96.9 with heart rate of 100 and blood pressure 131/80. O2 sat 96% room air at rest.  GI note reviewed. Patient with increased INR and we are attempting to get it down with vitamin K, may need fresh frozen plasma. 11/17/2021  Patient seen examined on [facility]. He looks about the same. Patient complains of vague abdominal discomfort. Patient has poor appetite. He denies any chest pain or dizziness. Serum sodium 131 potassium 2.6 today. BUN/creatinine 13/1.0. Total bili is 21.5 with elevation of transaminase. Patient's INR went up to 3.9. Hemoglobin 1.9 with platelet count 81 WBC 10.3. Temperature 98.3 with blood pressure 119/84. O2 sat 95% room air at rest.    11/18/2021  Patient seen examined on [facility]. Patient continues to get a little bit drowsier every day. Patient currently denies any chest or abdominal pain. Patient occasionally has some abdominal discomfort. He denies any nausea vomiting but does have poor appetite. Serum potassium is still low this morning at 2.7 after patient receiving multiple doses of IV KCl along with oral KCl. Patient's bilirubin is increased again at 22.9 with a serum ammonia yesterday at 98. WBC is 9.4 with hemoglobin 12.4. Platelet count 78. INR 3.9. Temperature 98 with heart rate of 90 and blood pressure 126/64. O2 sat 95% room air at rest.    11/19/2021  Patient seen examined on [facility]. Patient sitting up in chair currently still has some tremors in his arms with intention. He denies any chest pain. Patient has very mild abdominal discomfort with some mildmoderate abdominal distention. He is alert and oriented x3 at this time. BUN/creatinine 14/1.07 potassium 3.0. Serum ammonia 110 with a total bili 22.2. Globin 12.1 with platelet count of 73. Temperature 98.1 with heart rate in 90 and blood pressure 106/68. O2 sat 97% room air at rest.  Urine output is very good. / note reviewed and apparently the patient is refusing transfer to extended care facility for rehab but [facility] until Tuesday. 11/20/2021  Patient seen and examined on PCU. Patient seems to be little bit more alert again today. Patient's and tremors are improved. Patient denies any chest pain. There is no unusual shortness of breath. Patient has some very mild abdominal discomfort. Temperatures ranges 97. 6nine 9.2. Heart rate 88 with blood pressure 107/56. O2 sat 95% on room air at rest.      PAST MEDICAL Hx:  Past Medical History:   Diagnosis Date    Anxiety     Depression     Esophagitis     ETOH abuse     Gastritis     GERD (gastroesophageal reflux disease)     Hematuria     Pancreatitis        PAST SURGICAL Hx:   Past Surgical History:   Procedure Laterality Date    KNEE SURGERY      ACL    UPPER GASTROINTESTINAL ENDOSCOPY  05/09/2018    UPPER GASTROINTESTINAL ENDOSCOPY N/A 5/8/2018    EGD BIOPSY performed by Saundra Centeno MD at [address] 1/21/2019    EGD ESOPHAGOGASTRODUODENOSCOPY performed by Rita Baird DO at [address]  1/21/2019    EGD CONTROL HEMORRHAGE performed by Rita Baird DO at [address] [facility] Hx:  No family history on file. HOME MEDICATIONS:  Prior to Admission medications    Medication Sig Start Date End Date Taking?  Authorizing Provider   omeprazole (PRILOSEC) 20 MG delayed release capsule Take 20-40 mg by mouth daily   Yes Historical Provider, MD   metoprolol succinate (TOPROL XL) 100 MG extended release tablet Take 1 tablet by mouth 2 times daily  Patient taking differently: Take 100 mg by mouth daily as needed  7/4/20  Yes Shawn Jay DO   lactulose (CHRONULAC) 10 GM/15ML solution Take 30 mLs by mouth 2 times daily 1/23/19  Yes Debby Shah MD   amiodarone (CORDARONE) 200 MG tablet Take 200 mg by mouth 2 times daily    Historical Provider, MD   rivaroxaban (XARELTO) 20 MG TABS tablet Take 1 tablet by mouth daily 7/4/20   EILEEN Escobar - CNP       ALLERGIES:  Giacomo Due nut oil]    SOCIAL Hx:  Social History     Socioeconomic History    Marital status:      Spouse name: Not on file    Number of children: 1    Years of education: Not on file    Highest education level: Not on file   Occupational History    Not on file   Tobacco Use    Smoking status: Never Smoker    Smokeless tobacco: Never Used   Vaping Use    Vaping Use: Never used   Substance and Sexual Activity    Alcohol use: Yes     Comment: 5th of liquour daily. Previous 1/15/2019    Drug use: No    Sexual activity: Not Currently     Partners: Female   Other Topics Concern    Not on file   Social History Narrative    Has a 15 y.o. son who lives in [place]. Social Determinants of Health     Financial Resource Strain:     Difficulty of Paying Living Expenses: Not on file   Food Insecurity:     Worried About Running Out of Food in the Last Year: Not on file    Ele of Food in the Last Year: Not on file   Transportation Needs:     Lack of Transportation (Medical): Not on file    Lack of Transportation (Non-Medical):  Not on file   Physical Activity:     Days of Exercise per Week: Not on file    Minutes of Exercise per Session: Not on file   Stress:     Feeling of Stress : Not on file   Social Connections:     Frequency of Communication with Friends and Family: Not on file    Frequency of Social Gatherings with Friends and Family: Not on file    Attends Christian Services: Not on file    Active Member of Clubs or Organizations: Not on file    Attends Club or Organization Meetings: Not on file    Marital Status: Not on file   Intimate Partner Violence:     Fear of Current or Ex-Partner: Not on file    Emotionally Abused: Not on file    Physically Abused: Not on file    Sexually Abused: Not on file Housing Stability:     Unable to Pay for Housing in the Last Year: Not on file    Number of Places Lived in the Last Year: Not on file    Unstable Housing in the Last Year: Not on file       ROS: Positive in bold  General:   Denies chills, fatigue, fever, malaise, night sweats or weight loss    Psychological:   Denies anxiety, disorientation or hallucinations    ENT:    Denies epistaxis, headaches, vertigo or visual changes    Cardiovascular:   Denies any chest pain, irregular heartbeats, or palpitations. No paroxysmal nocturnal dyspnea. Respiratory:   Denies shortness of breath, coughing, sputum production, hemoptysis, or wheezing. No orthopnea. Gastrointestinal:   Denies nausea, vomiting, diarrhea, or constipation. Denies any abdominal pain. Denies change in bowel habits or stools. Genito-Urinary:    Denies any urgency, frequency, hematuria. Voiding without difficulty. Musculoskeletal:   Denies joint pain, joint stiffness, joint swelling or muscle pain    Neurology:    Denies any headache or focal neurological deficits. No weakness or paresthesia. Derm:    Denies any rashes, ulcers, or excoriations. Denies bruising. Extremities:   Denies any lower extremity swelling or edema. PHYSICAL EXAM: Abnormal findings noted  VITALS:  Vitals:    11/20/21 0745   BP: (!) 107/56   Pulse: 88   Resp: 13   Temp: 99.2 °F (37.3 °C)   SpO2: 95%         CONSTITUTIONAL:    Awake, alert, cooperative, no apparent distress, and appears stated age    EYES:     EOMI, sclera clear, conjunctiva normal    ENT:    Normocephalic, atraumatic,  External ears without lesions. NECK:    Supple, symmetrical, trachea midline, no JVD    HEMATOLOGIC/LYMPHATICS:    No cervical lymphadenopathy and no supraclavicular lymphadenopathy    LUNGS:    Symmetric.  No increased work of breathing, good air exchange, clear to auscultation bilaterally, no wheezes, rhonchi, or rales,     CARDIOVASCULAR:    Normal apical impulse, regular rate and rhythm, normal S1 and S2, no S3 or S4, and no murmur noted    ABDOMEN:    soft, non-distended,  Patient's abdomen is distended  Patient does have abdominal discomfort    MUSCULOSKELETAL:    There is no redness, warmth, or swelling of the joints. NEUROLOGIC:    Awake, alert, oriented to name, place and time. SKIN:    No bruising or bleeding. No redness, warmth, or swelling    EXTREMITIES:    Peripheral pulses present. No edema, cyanosis, or swelling. LINES/CATHETERS     LABORATORY DATA:  CBC with Differential:    Lab Results   Component Value Date    WBC 8.8 11/19/2021    RBC 3.62 11/19/2021    HGB 12.1 11/19/2021    HCT 34.3 11/19/2021    PLT 73 11/19/2021    MCV 94.8 11/19/2021    MCH 33.4 11/19/2021    MCHC 35.3 11/19/2021    RDW 17.9 11/19/2021    METASPCT 0.9 01/17/2019    LYMPHOPCT 5.2 11/19/2021    MONOPCT 7.8 11/19/2021    BASOPCT 0.9 11/19/2021    MONOSABS 0.70 11/19/2021    LYMPHSABS 0.44 11/19/2021    EOSABS 0.62 11/19/2021    BASOSABS 0.08 11/19/2021     CMP:    Lab Results   Component Value Date     11/19/2021    K 3.0 11/19/2021    K 3.1 11/14/2021     11/19/2021    CO2 20 11/19/2021    BUN 14 11/19/2021    CREATININE 1.1 11/19/2021    GFRAA >60 11/19/2021    LABGLOM >60 11/19/2021    GLUCOSE 142 11/19/2021    PROT 6.3 11/19/2021    LABALBU 1.8 11/19/2021    CALCIUM 8.1 11/19/2021    BILITOT 22.2 11/19/2021    ALKPHOS 143 11/19/2021     11/19/2021    ALT 98 11/19/2021       ASSESSMENT/PLAN:  1. Decompensated hepatic cirrhosis  2. Moderate volume ascites  3. History of alcohol abuse  4. Alcoholic hepatitis  5. Hepatic encephalopathy  6. Hematemesis   7. Gallbladder distention with wall thickening  8. Right pleural effusion  9. Anxiety and depression  10. GERD  11. History of pancreatitis  12. History of paroxysmal atrial fibrillation      Patient presented due to abdominal distention. He was also complaining of hematemesis/coffee-ground emesis.   Symptoms have worsened over the last month and he resumed drinking. Work revealed hepatitis likely secondary to alcohol abuse. Patient has cirrhosis with moderate volume ascites. GI consulted. Patient started on Protonix and octreotide drips. Patient Rocephin. IR consulted for paracentesis to evaluate for SBP. Plan for upper endoscopy as well per GI. Home medication reviewed  Monitor heart rate, blood pressure, O2 saturation  CMP, CBC in a.m.     Discharge home when okay with GI      Fuentes Prieto, DO  10:20 AM  11/20/2021

## 2021-11-20 NOTE — PROGRESS NOTES
PROGRESS NOTE    By Magaly Campbell D.O GI Fellow    [facility]  Dr. Isaiah Durham MD, Dr. Tiana Martínez MD, Dr Chris Jain, Dr. Jacob Barrett MD, Dr. Teodoro López, DO Delmar Llanes  40 y.o.  male    SUBJECTIVE:    Patient resting comfortably this AM.  Patient with no complaints.       OBJECTIVE:    BP (!) 107/56   Pulse 88   Temp 99.2 °F (37.3 °C) (Oral)   Resp 13   Ht 6' 3\" (1.905 m)   Wt 215 lb (97.5 kg)   SpO2 95%   BMI 26.87 kg/m²     Gen: NAD, AAO x 2  HEENT:PEERL, no icterus  Heart: RRR, no M/R/G  Lungs: CTAB  Abd.: soft, NT, ND, BS +, no G/R, no HSM  Extr.: no C/C/E, no bruising      Stool (measured) : 0 mL  Lab Results   Component Value Date    WBC 8.8 11/19/2021    WBC 9.4 11/18/2021    WBC 10.3 11/17/2021    HGB 12.1 11/19/2021    HGB 12.4 11/18/2021    HGB 11.9 11/17/2021    HCT 34.3 11/19/2021    MCV 94.8 11/19/2021    RDW 17.9 11/19/2021    PLT 73 11/19/2021    PLT 78 11/18/2021    PLT 81 11/17/2021     Lab Results   Component Value Date     11/19/2021    K 3.0 11/19/2021    K 3.1 11/14/2021     11/19/2021    CO2 20 11/19/2021    BUN 14 11/19/2021    CREATININE 1.1 11/19/2021    CALCIUM 8.1 11/19/2021    PROT 6.3 11/19/2021    LABALBU 1.8 11/19/2021    BILITOT 22.2 11/19/2021    BILITOT 22.9 11/18/2021    BILITOT 21.5 11/17/2021    ALKPHOS 143 11/19/2021    ALKPHOS 154 11/18/2021    ALKPHOS 152 11/17/2021     11/19/2021     11/18/2021     11/17/2021    ALT 98 11/19/2021     11/18/2021     11/17/2021     Lab Results   Component Value Date    LIPASE 68 11/14/2021    LIPASE 26 07/01/2020    LIPASE 26 05/11/2020     Lab Results   Component Value Date    AMYLASE 91 01/16/2019         ASSESSMENT/PLAN    1. Abdominal pain /ascites  Secondary to decompensated alcoholic cirrhosis   -abstinent from alcohol for approximately 2 weeks however appears to be withdrawing currently   -Paracentesis from admission show no signs of SBP & Doppler ultrasound reveals no thrombosis. INR remains elevated   Conitiue with  Aldactone 100 mg p.o. daily as well as furosemide 40 mg daily. - okay for 2gm NA diet      2. Reported Hematemesis  , -No further episodes in the hospital, H&H has remained stable. Last EGD from 1/21/2019 showed PHG with gastritis. Completed 72 hours of octreotide  - Okay for BID IV PPI   - Consider EGD for evaluation once patient stabilized from withdrawal     3. Hepatic encephalopathy type C grade II  Patient ammonia was 91 on admission, repeat ammonia yesterday 11/17/2021 ammonia was 98.  Patient has been on 30 g of lactulose 2 times a days with rifaximin 550 mg p.o. 3 times a day as well.    Patient mentation has significantly improved compared to yesterday. Patient is A&O x3 and is able to answer all question appropriately. Patient does not have asterixis on exam however he is currently also in alcohol withdrawal and treated with 1 mg of Ativan every 2 hours as needed. We will continue to monitor mental status continue with lactulose and rifaximin.   This will be titrated to up to 2-3 soft bowel movement per day.     4. Alcohol induced cirrhosis, decompensated  Meld sodium score 35. Chemistry continued to trend down. However INR remained elevated at 3.9. Bilirubin today is 22.9. Admission bilirubin was 20.5 a slight increase. Noncompliance with medication and alcohol abstinence  Patient was educated and advised on the importance of abstain from alcohol completely otherwise he is long-term survival odds significantly decreased. Patient seem to understand and is agreeable with this plan. Pt was discussed with  Dr. Beryl Moya DO  GI Fellow   11/20/2021  7:59 AM    Pt seen and independently examined. Pertinent notes and lab work reviewed. Monitor reviewed showing sinus rhythm. D/w Dr. Coughlin Pitch with physical exam and A&P.   Discussed with patient/family at bedside - all questions answered - agreeable with the plan as delineated.     Mani Hernandez MD  11/20/2021  12:03 PM

## 2021-11-21 NOTE — PROGRESS NOTES
Department of Internal Medicine  PN    PCP: Nirmala Trevizo DO  Admitting Physician: Dr. Yadira Gutierrez  Consultants: Gastroenterology  Date of Service: 11/14/2021    CHIEF COMPLAINT: Abdominal distention and discomfort and hematemesis/coffee-ground emesis    HISTORY OF PRESENT ILLNESS:    Patient is 69-year-old male who presented to the ED due to abdominal distention with associated hematemesis and coffee-ground emesis. Patient states that she has a history of alcoholic hepatitis. That over the last month he has noticed increased abdominal distention. Initially he started out having abdominal pain. Pain became bloating. Now he is feeling more distended and when his stay during the ED he did develop some generalized abdominal discomfort. He does admit to associated hematemesis and coffee-ground emesis. He denies any melena or bleeding per rectum. Patient states that his symptoms began after he resumed alcohol use. His jaundice returned and worsened. As such he quit drinking again about 2 weeks ago. He denies any fever or chills. Denies any chest pain. Denies any shortness of breath. 11/15/2021  Patient seen and examined in the ED hold area. Patient feels better today than yesterday. Patient has little discomfort around paracentesis site. Patient is hungry. He denies any nausea vomiting or shortness of breath. Patient has been n.p.o. for EGD today but was canceled recently. Potassium 3.3 today with BUN/creatinine 11/0.8. Patient's total bilirubin is still elevated at 19.7 with moderate elevation of transaminase. Hemoglobin 1.6 with platelet count 88. INR is 4.3 today. Temperature 98.1 with heart rate 99 and O2 sat 96% on room air at rest.    11/16/2021  Patient seen examined in [facility]. Patient is little bit more alert today. Patient with only fair appetite. Patient has a vague abdominal discomfort but improved from admission. Patient still fairly weak.   BUN/creatinine is 12/1.0 with serum sodium 131 potassium 3.3. Total bili was 20.2 today with hemoglobin 12.2 and WBC 11.3. Temperature 96.9 with heart rate of 100 and blood pressure 131/80. O2 sat 96% room air at rest.  GI note reviewed. Patient with increased INR and we are attempting to get it down with vitamin K, may need fresh frozen plasma. 11/17/2021  Patient seen examined on [facility]. He looks about the same. Patient complains of vague abdominal discomfort. Patient has poor appetite. He denies any chest pain or dizziness. Serum sodium 131 potassium 2.6 today. BUN/creatinine 13/1.0. Total bili is 21.5 with elevation of transaminase. Patient's INR went up to 3.9. Hemoglobin 1.9 with platelet count 81 WBC 10.3. Temperature 98.3 with blood pressure 119/84. O2 sat 95% room air at rest.    11/18/2021  Patient seen examined on [facility]. Patient continues to get a little bit drowsier every day. Patient currently denies any chest or abdominal pain. Patient occasionally has some abdominal discomfort. He denies any nausea vomiting but does have poor appetite. Serum potassium is still low this morning at 2.7 after patient receiving multiple doses of IV KCl along with oral KCl. Patient's bilirubin is increased again at 22.9 with a serum ammonia yesterday at 98. WBC is 9.4 with hemoglobin 12.4. Platelet count 78. INR 3.9. Temperature 98 with heart rate of 90 and blood pressure 126/64. O2 sat 95% room air at rest.    11/19/2021  Patient seen examined on [facility]. Patient sitting up in chair currently still has some tremors in his arms with intention. He denies any chest pain. Patient has very mild abdominal discomfort with some mildmoderate abdominal distention. He is alert and oriented x3 at this time. BUN/creatinine 14/1.07 potassium 3.0. Serum ammonia 110 with a total bili 22.2. [place] 12.1 with platelet count of 73. Temperature 98.1 with heart rate in 90 and blood pressure 106/68. O2 sat 97% room air at rest.  Urine output is very good. / note reviewed and apparently the patient is refusing transfer to extended care facility for rehab but [facility] health care agency cannot see him until Tuesday. 11/20/2021  Patient seen and examined on PCU. Patient seems to be little bit more alert again today. Patient's and tremors are improved. Patient denies any chest pain. There is no unusual shortness of breath. Patient has some very mild abdominal discomfort. Temperatures ranges 97. 6nine 9.2. Heart rate 88 with blood pressure 107/56. O2 sat 95% on room air at rest.    11/21/2021  Patient seen examined on PCU. Patient seems about the same. The patient is alert and oriented x3. Patient has less hand arm tremor. Sodium is 125 BUN/creatinine 16/1.2. Total bilirubin 22.8. Platelet count 68 with hemoglobin 0.6. INR is 4.2. Temperature 98.4 with heart rate 94 blood pressure 113/67. O2 sat 96% on room air at rest.  Discharge home when okay with GI. PAST MEDICAL Hx:  Past Medical History:   Diagnosis Date    Anxiety     Depression     Esophagitis     ETOH abuse     Gastritis     GERD (gastroesophageal reflux disease)     Hematuria     Pancreatitis        PAST SURGICAL Hx:   Past Surgical History:   Procedure Laterality Date    KNEE SURGERY      ACL    UPPER GASTROINTESTINAL ENDOSCOPY  05/09/2018    UPPER GASTROINTESTINAL ENDOSCOPY N/A 5/8/2018    EGD BIOPSY performed by Emanuel Beltre MD at [address] 1/21/2019    EGD ESOPHAGOGASTRODUODENOSCOPY performed by Rom Gordon DO at [address] [facility]  1/21/2019    EGD CONTROL HEMORRHAGE performed by Rom Gordon DO at [address] [facility] Hx:  No family history on file. HOME MEDICATIONS:  Prior to Admission medications    Medication Sig Start Date End Date Taking?  Authorizing Provider   omeprazole (PRILOSEC) 20 MG delayed release capsule Take 20-40 mg by mouth daily Yes Historical Provider, MD   metoprolol succinate (TOPROL XL) 100 MG extended release tablet Take 1 tablet by mouth 2 times daily  Patient taking differently: Take 100 mg by mouth daily as needed  7/4/20  Yes Gaudencio Landry DO   lactulose ([address]) 10 GM/15ML solution Take 30 mLs by mouth 2 times daily 1/23/19  Yes Krishna Denson MD   amiodarone (CORDARONE) 200 MG tablet Take 200 mg by mouth 2 times daily    Historical Provider, MD   rivaroxaban (XARELTO) 20 MG TABS tablet Take 1 tablet by mouth daily 7/4/20   Que Garcia APRN - CNP       ALLERGIES:  Marrie Braver nut oil]    SOCIAL Hx:  Social History     Socioeconomic History    Marital status:      Spouse name: Not on file    Number of children: 1    Years of education: Not on file    Highest education level: Not on file   Occupational History    Not on file   Tobacco Use    Smoking status: Never Smoker    Smokeless tobacco: Never Used   Vaping Use    Vaping Use: Never used   Substance and Sexual Activity    Alcohol use: Yes     Comment: 5th of liquour daily. Previous 1/15/2019    Drug use: No    Sexual activity: Not Currently     Partners: Female   Other Topics Concern    Not on file   Social History Narrative    Has a 15 y.o. son who lives in [place]. Social Determinants of Health     Financial Resource Strain:     Difficulty of Paying Living Expenses: Not on file   Food Insecurity:     Worried About Running Out of Food in the Last Year: Not on file    Ele of Food in the Last Year: Not on file   Transportation Needs:     Lack of Transportation (Medical): Not on file    Lack of Transportation (Non-Medical):  Not on file   Physical Activity:     Days of Exercise per Week: Not on file    Minutes of Exercise per Session: Not on file   Stress:     Feeling of Stress : Not on file   Social Connections:     Frequency of Communication with Friends and Family: Not on file    Frequency of Social Gatherings with Friends and Family: Not on file    Attends Scientology Services: Not on file    Active Member of Clubs or Organizations: Not on file    Attends Club or Organization Meetings: Not on file    Marital Status: Not on file   Intimate Partner Violence:     Fear of Current or Ex-Partner: Not on file    Emotionally Abused: Not on file    Physically Abused: Not on file    Sexually Abused: Not on file   Housing Stability:     Unable to Pay for Housing in the Last Year: Not on file    Number of [place] in the Last Year: Not on file    Unstable Housing in the Last Year: Not on file       ROS: Positive in bold  General:   Denies chills, fatigue, fever, malaise, night sweats or weight loss    Psychological:   Denies anxiety, disorientation or hallucinations    ENT:    Denies epistaxis, headaches, vertigo or visual changes    Cardiovascular:   Denies any chest pain, irregular heartbeats, or palpitations. No paroxysmal nocturnal dyspnea. Respiratory:   Denies shortness of breath, coughing, sputum production, hemoptysis, or wheezing. No orthopnea. Gastrointestinal:   Denies nausea, vomiting, diarrhea, or constipation. Denies any abdominal pain. Denies change in bowel habits or stools. Genito-Urinary:    Denies any urgency, frequency, hematuria. Voiding without difficulty. Musculoskeletal:   Denies joint pain, joint stiffness, joint swelling or muscle pain    Neurology:    Denies any headache or focal neurological deficits. No weakness or paresthesia. Derm:    Denies any rashes, ulcers, or excoriations. Denies bruising. Extremities:   Denies any lower extremity swelling or edema.       PHYSICAL EXAM: Abnormal findings noted  VITALS:  Vitals:    11/21/21 0715   BP: 113/67   Pulse: 94   Resp: 18   Temp: 98.4 °F (36.9 °C)   SpO2: 96%         CONSTITUTIONAL:    Awake, alert, cooperative, no apparent distress, and appears stated age    EYES:     EOMI, sclera clear, conjunctiva normal    ENT: Normocephalic, atraumatic,  External ears without lesions. NECK:    Supple, symmetrical, trachea midline, no JVD    HEMATOLOGIC/LYMPHATICS:    No cervical lymphadenopathy and no supraclavicular lymphadenopathy    LUNGS:    Symmetric. No increased work of breathing, good air exchange, clear to auscultation bilaterally, no wheezes, rhonchi, or rales,     CARDIOVASCULAR:    Normal apical impulse, regular rate and rhythm, normal S1 and S2, no S3 or S4, and no murmur noted    ABDOMEN:    soft, non-distended,  Patient's abdomen is distended  Patient does have abdominal discomfort    MUSCULOSKELETAL:    There is no redness, warmth, or swelling of the joints. NEUROLOGIC:    Awake, alert, oriented to name, place and time. SKIN:    No bruising or bleeding. No redness, warmth, or swelling    EXTREMITIES:    Peripheral pulses present. No edema, cyanosis, or swelling. LINES/CATHETERS     LABORATORY DATA:  CBC with Differential:    Lab Results   Component Value Date    WBC 10.6 11/21/2021    RBC 3.39 11/21/2021    HGB 11.6 11/21/2021    HCT 32.3 11/21/2021    PLT 68 11/21/2021    MCV 95.3 11/21/2021    MCH 34.2 11/21/2021    MCHC 35.9 11/21/2021    RDW 17.9 11/21/2021    METASPCT 0.9 11/20/2021    LYMPHOPCT 3.6 11/21/2021    MONOPCT 6.3 11/21/2021    BASOPCT 0.9 11/21/2021    MONOSABS 0.64 11/21/2021    LYMPHSABS 0.42 11/21/2021    EOSABS 0.67 11/21/2021    BASOSABS 0.10 11/21/2021     CMP:    Lab Results   Component Value Date     11/21/2021    K 3.9 11/21/2021    K 3.1 11/14/2021    CL 98 11/21/2021    CO2 16 11/21/2021    BUN 16 11/21/2021    CREATININE 1.2 11/21/2021    GFRAA >60 11/21/2021    LABGLOM >60 11/21/2021    GLUCOSE 98 11/21/2021    PROT 6.6 11/21/2021    LABALBU 1.9 11/21/2021    CALCIUM 8.1 11/21/2021    BILITOT 22.8 11/21/2021    ALKPHOS 133 11/21/2021     11/21/2021    ALT 87 11/21/2021       ASSESSMENT/PLAN:  1. Decompensated hepatic cirrhosis  2.  Moderate volume ascites  3. History of alcohol abuse  4. Alcoholic hepatitis  5. Hepatic encephalopathy  6. Hematemesis   7. Gallbladder distention with wall thickening  8. Right pleural effusion  9. Anxiety and depression  10. GERD  11. History of pancreatitis  12. History of paroxysmal atrial fibrillation      Patient presented due to abdominal distention. He was also complaining of hematemesis/coffee-ground emesis. Symptoms have worsened over the last month and he resumed drinking. Work revealed hepatitis likely secondary to alcohol abuse. Patient has cirrhosis with moderate volume ascites. GI consulted. Patient started on Protonix and octreotide drips. Patient Rocephin. IR consulted for paracentesis to evaluate for SBP. Plan for upper endoscopy as well per GI. Home medication reviewed  Monitor heart rate, blood pressure, O2 saturation    CMP, CBC,INR in a.m.   Consult  for discharge planning  Discharge home when okay with GI      Hannah Lopez DO  12:28 PM  11/21/2021

## 2021-11-21 NOTE — PROGRESS NOTES
PROGRESS NOTE    By Kay Corrales D.O GI Fellow    [facility]  Dr. Honey Bazzi MD, Dr. Madalyn Galloway MD, Dr uSzanne Freeman, Dr. Hoda Wellington MD, Dr. Shaggy Kamara, DO Violette Gatica  40 y.o.  male    SUBJECTIVE:    Patient resting comfortably this AM.  Patient with no complaints. Patient recognizes provider from day prior.       OBJECTIVE:    /67   Pulse 94   Temp 98.4 °F (36.9 °C) (Oral)   Resp 18   Ht 6' 3\" (1.905 m)   Wt 215 lb (97.5 kg)   SpO2 96%   BMI 26.87 kg/m²     Gen: NAD, AAO x 3  HEENT:PEERL, no icterus  Heart: RRR, no M/R/G  Lungs: CTAB  Abd.: soft, NT, ND, BS +, no G/R, no HSM  Extr.: no C/C/E, no bruising      Stool (measured) : 0 mL  Lab Results   Component Value Date    WBC 9.1 11/20/2021    WBC 8.8 11/19/2021    WBC 9.4 11/18/2021    HGB 11.1 11/20/2021    HGB 12.1 11/19/2021    HGB 12.4 11/18/2021    HCT 32.1 11/20/2021    MCV 97.9 11/20/2021    RDW 17.9 11/20/2021    PLT 69 11/20/2021    PLT 73 11/19/2021    PLT 78 11/18/2021     Lab Results   Component Value Date     11/20/2021    K 3.4 11/20/2021    K 3.1 11/14/2021    CL 98 11/20/2021    CO2 16 11/20/2021    BUN 16 11/20/2021    CREATININE 1.2 11/20/2021    CALCIUM 7.9 11/20/2021    PROT 6.4 11/20/2021    LABALBU 1.7 11/20/2021    BILITOT 22.5 11/20/2021    BILITOT 22.2 11/19/2021    BILITOT 22.9 11/18/2021    ALKPHOS 138 11/20/2021    ALKPHOS 143 11/19/2021    ALKPHOS 154 11/18/2021     11/20/2021     11/19/2021     11/18/2021    ALT 85 11/20/2021    ALT 98 11/19/2021     11/18/2021     Lab Results   Component Value Date    LIPASE 68 11/14/2021    LIPASE 26 07/01/2020    LIPASE 26 05/11/2020     Lab Results   Component Value Date    AMYLASE 91 01/16/2019         ASSESSMENT/PLAN    1. Abdominal pain /ascites  Secondary to decompensated alcoholic cirrhosis   -abstinent from alcohol for approximately 2 weeks however appears to be withdrawing currently   -Paracentesis from admission show no signs of SBP & Doppler ultrasound reveals no thrombosis. INR remains elevated   Conitiue with  Aldactone 100 mg p.o. daily as well as furosemide 40 mg daily. - okay for 2gm NA diet      2. Reported Hematemesis  , -No further episodes in the hospital, H&H has remained stable. Last EGD from 1/21/2019 showed PHG with gastritis. Completed 72 hours of octreotide  -Patient with no overt signs of GI bleed on exam remains stable. No plans as evaluation this time  \     3. Hepatic encephalopathy type C grade II  Patient ammonia was 91 on admission, repeat ammonia yesterday 11/17/2021 ammonia was 98.  Patient has been on 30 g of lactulose 2 times a days with rifaximin 550 mg p.o. 3 times a day as well.    Patient mentation has significantly improved compared to yesterday. Patient is A&O x3 and is able to answer all question appropriately. Patient does not have asterixis on exam however he is currently also in alcohol withdrawal and treated with 1 mg of Ativan every 2 hours as needed. We will continue to monitor mental status continue with lactulose and rifaximin.   This will be titrated to up to 2-3 soft bowel movement per day.     4. Alcohol induced cirrhosis, decompensated  Meld sodium score 35. Chemistry continued to trend down. However INR remained elevated at 3.9. Bilirubin today is 22.9. Admission bilirubin was 20.5 a slight increase. Noncompliance with medication and alcohol abstinence  Patient was educated and advised on the importance of abstain from alcohol completely otherwise he is long-term survival odds significantly decreased. Patient seem to understand and is agreeable with this plan. Patient's hemoglobin is remained stable since admission with with no further hematemesis. No plans endoscopic evaluation at this time. Patient's mental status is appears returned to baseline.   And are still elevated patient was once discussed the need for absolute alcohol abstinence and the risk of liver failure and death from continued alcohol abuse. Patient was offered inpatient rehab and this would be greatly encouraged however patient is refusing at this time. Patient's lab work from this a.m. pending. Patient's AST ALT continue to be downtrending and INR is stable okay to DC from GI point of view          Pt was discussed with  Dr. Verona Borrero DO  GI Fellow   11/21/2021  8:34 AM    Pt seen and independently examined. Pertinent notes and lab work reviewed. Monitor reviewed showing sinus rhythm. D/w Dr. Concepcion Parikh with physical exam and A&P. Discussed with patient/family at bedside - all questions answered - agreeable with the plan as delineated.     Jared Edward MD  11/21/2021  10:32 AM

## 2021-11-22 NOTE — PROGRESS NOTES
PROGRESS NOTE  By Lamont Eubanks M.D. [facility]  Dr. Valerie Rutherford M.D.,  Dr. Loraine Vance M.D.,   Dr. Cornelius Rowe D.O.,  Dr. Oliva Quinones M.D.,  Dr. Merissa Garsia D.O.,  Chintan Hoffman D.O. [place]  40 y.o.  male    SUBJECTIVE:  Denies abdominal pain. Denies nausea vomiting. Family at bedside    OBJECTIVE:    BP (!) 102/59   Pulse 87   Temp 99 °F (37.2 °C) (Oral)   Resp 16   Ht 6' 3\" (1.905 m)   Wt 215 lb (97.5 kg)   SpO2 93%   BMI 26.87 kg/m²     General: NAD/ male. HEENT: Persistent scleral icterus/moist oral mucosa  Neck: Supple with trachea midline  Chest: Symmetrical excursion/nonlabored respirations  Cor: Regular  Abd.: Soft and distended  Extr.:  No peripheral edema  Skin: Warm and dry/persistent icterus      DATA:    Monitor data reviewed -sinus rhythm noted.     Stool (measured) : 0 mL  Lab Results   Component Value Date    WBC 10.6 11/22/2021    RBC 3.47 11/22/2021    HGB 11.7 11/22/2021    HCT 32.7 11/22/2021    MCV 94.2 11/22/2021    MCH 33.7 11/22/2021    MCHC 35.8 11/22/2021    RDW 17.8 11/22/2021    PLT 72 11/22/2021    MPV 10.4 11/22/2021     Lab Results   Component Value Date     11/21/2021    K 3.9 11/21/2021    K 3.1 11/14/2021    CL 98 11/21/2021    CO2 16 11/21/2021    BUN 16 11/21/2021    CREATININE 1.2 11/21/2021    CALCIUM 8.1 11/21/2021    PROT 6.6 11/21/2021    LABALBU 1.9 11/21/2021    BILITOT 22.8 11/21/2021    ALKPHOS 133 11/21/2021     11/21/2021    ALT 87 11/21/2021     Lab Results   Component Value Date    LIPASE 68 11/14/2021     Lab Results   Component Value Date    AMYLASE 91 01/16/2019         ASSESSMENT/PLAN:    1. Abdominal pain /ascites  -Resolved abdominal pain/tolerating diet  Secondary to decompensated alcoholic cirrhosis   -abstinent from alcohol for approximately 2 weeks however appears to had withdrawals this admission  -Paracentesis from admission show no signs of SBP & Doppler ultrasound reveals no thrombosis.  INR remains elevated   Conitiue with  Aldactone 100 mg p.o. daily as well as furosemide 40 mg daily.  -Continue 2 g sodium diet     2. Reported Hematemesis   -No further episodes in the hospital, H&H has remained stable. Last EGD from 1/21/2019 showed PHG with gastritis. Completed 72 hours of octreotide  -Patient with no overt signs of GI bleed on exam remains stable -no immediate plans for inpatient endoscopy       3. Hepatic encephalopathy type C grade II  -NH3  91 on admission  -Mental status appears improved and close to baseline  -Continue current  - continue with lactulose and rifaximin.        4. Alcohol induced cirrhosis, decompensated  Meld-Na score 35.  Chemistry continued to trend down.    - INR remains elevated  -Persistent significant increase in bilirubin at 22.8 yesterday   Noncompliance with medication and alcohol abstinence  Patient was educated and advised on the importance of abstain from alcohol completely otherwise he is long-term survival odds significantly decreased.  Patient seem to understand and is agreeable with this plan.        Stable H&H without evidence of overt bleed. No immediate plans for inpatient endoscopy. Patient appears to be more acceptable to the idea of rehabilitation and currently social work is working on placement. Okay to be discharged from GI POV with close follow-up as outpatient. Patient/family to call for appointment and with questions/concerns at [phone]. Thank you for the opportunity to participate in the care of Mr. Carlton Vitale. Gregoria Mcpherson MD  11/22/2021  10:38 AM    NOTE:  This report was transcribed using voice recognition software. Every effort was made to ensure accuracy; however, inadvertent computerized transcription errors may be present.

## 2021-11-22 NOTE — PROGRESS NOTES
Physical Therapy    Physical Therapy Treatment Note/Plan of Care    Room #:  0001/9531-35  Patient Name: Violette Gatica  YOB: 1975  MRN: [id number]    Date of Service: 11/22/2021     Tentative placement recommendation: Subacute vs Home Health Physical Therapy if patient meets goals  Equipment recommendation: To be determined and Wheeled Walker      Evaluating Physical Therapist: Avis Kulkarni  #[id number]      Specific Provider Orders/Date/Referring Provider :  11/18/21 0945   PT eval and treat Start: 11/1confusion and fatigue and abdominal pain and distention  8/21 0945, End: 11/18/21 0945, ONE TIME, Standing Count: 1 Occurrences, R    Kyler Younger DO      Admitting Diagnosis:   Bilirubinemia [K24.8]  Alcoholic cirrhosis of liver with ascites ([address] [K70.31]  UGIB (upper gastrointestinal bleed) [K92.2]    Admitted with    confusion and fatigue and abdominal pain and distention hematemesis      Surgery: none  Visit Diagnoses       Codes    Alcoholic cirrhosis of liver with ascites ([address]     K70.31          Patient Active Problem List   Diagnosis    Alcohol-induced acute pancreatitis    Anxiety    Depression    ETOH abuse    Hematemesis    Atrial fibrillation with rapid ventricular response (HCC)    Alcohol withdrawal syndrome without complication (HCC)    Lactic acidosis    Hypokalemia    Severe protein-calorie malnutrition (HCC)    Paroxysmal atrial fibrillation ([address]    Hyperbilirubinemia    Hypomagnesemia    Bilirubinemia    UGIB (upper gastrointestinal bleed)        ASSESSMENT of Current Deficits Patient exhibits decreased strength, balance, endurance and coordination impairing functional mobility, transfers, gait , gait distance, tolerance to activity and participation are barriers to d/c and require skilled intervention during hospital stay to attain pre hospital level of function. Decreased strength, balance and endurance increases patient's risk for fall.  Patient Prior Level of Function: Patient ambulated independently   Rehab Potential: good    for baseline    Past medical history:   Past Medical History:   Diagnosis Date    Anxiety     Depression     Esophagitis     ETOH abuse     Gastritis     GERD (gastroesophageal reflux disease)     Hematuria     Pancreatitis      Past Surgical History:   Procedure Laterality Date    KNEE SURGERY      ACL    UPPER GASTROINTESTINAL ENDOSCOPY  05/09/2018    UPPER GASTROINTESTINAL ENDOSCOPY N/A 5/8/2018    EGD BIOPSY performed by Carmelo Hernandez MD at [address] 1/21/2019    EGD ESOPHAGOGASTRODUODENOSCOPY performed by Daniel Carbajal DO at [address] [facility]  1/21/2019    EGD CONTROL HEMORRHAGE performed by Daniel Carbajal DO at [address]:    Precautions: Up with assistance and Up as tolerated, falls, alarm and confusion ,    Social history: Patient lives alone in a ranch home  with 3 steps  to enter without Rail  Tub shower grab bars    Equipment owned: [employer],       [address] cleared patient for PT treatment. OBJECTIVE;   Initial Evaluation  Date: 11/19/2021 Treatment Date:  11/22/2021     Short Term/ Long Term   Goals   Was pt agreeable to Eval/treatment? Yes yes To be met in 5 days   Pain level   0/10    0/10    Bed Mobility    Rolling: Supervision     Supine to sit: Moderate assist of 1    Sit to supine: Minimal assist of 1    Scooting: Supervision    Rolling: Not assessed patient seated edge of bed   Supine to sit: Not assessed patient seated edge of bed   Sit to supine: Supervision    Scooting: Supervision     Rolling: Independent    Supine to sit: Independent    Sit to supine: Independent    Scooting: Independent     Transfers Sit to stand:  Moderate assist of 1 from bed and chair  Sit to stand: Supervision  with cues for safety and hand placement     Sit to stand: Independent Ambulation    10 and 2 x 20  feet using  hand held assist with Moderate assist of 1   with iv pole; poor upright motor control with multiplane instability 2x40 feet using  wheeled walker with Minimal assist of 1   cues for sequencing, decreased speed, safety and pacing    100 feet using  least restrictive device with Independent    Stair negotiation: ascended and descended   Not assessed     10 steps with rail independent    ROM Within functional limits    Increase range of motion 10% of affected joints    Strength BUE:  refer to OT eval  RLE:  3+/5  LLE:  3+/5  Increase strength in affected mm groups by 1/3 grade   Balance Sitting EOB:  fair    Dynamic Standing:  poor   Sitting EOB: good   Dynamic Standing: fair - with wheeled walker   Sitting EOB:  good    Dynamic Standing: fair       Patient is Alert & Oriented x person, place, time and situation and follows one step directions  Distractible and needs direction to task  and repetition to follow thru with directions  Sensation:  Patient  denies numbness/tingling   Edema:  none noted   Endurance: fair          Patient education  Patient educated on role of Physical Therapy, risks of immobility, safety and plan of care,  importance of mobility while in hospital , importance and purpose of adaptive device and adjusted to proper height for the patient. , safety  and education on safety at home with mobility vs going to rehab. Patient response to education:   Pt verbalized understanding Pt demonstrated skill Pt requires further education in this area   Yes Partial Yes      Treatment:  Patient practiced and was instructed/facilitated in the following treatment: Patient stood to wheeled walker to use urinal. Patient amb into hallway with wheeled walker and requiring cues for stability and decreased speed as patient is slightly impulsive. Patient took standing rest d/t dizziness and returned to room to sit edge of bed.  Patient transferred back to supine position and provided with education for safety at home and benefits on going to rehab vs home. Therapeutic Exercises:  not performed        At end of session, patient in bed with family and social work present and alarm on call light and phone within reach,  all lines and tubes intact, nursing notified. Patient would benefit from continued skilled Physical Therapy to improve functional independence and quality of life.          Patient's/ family goals   home    Time in  12  Time out  304    Total Treatment Time 23  minutes    CPT codes:  Therapeutic activities (38355)   13 minutes  1 unit(s)  Gait Training (48442) 10 minutes 1 unit(s)    Ova ALANA Siddiqi  #[id number]

## 2021-11-22 NOTE — PROGRESS NOTES
Telesitter removed on a trial basis at this time, much 1:1 with myself and therapy, bed alarm still on, pt instructed and agrees to call for assistance

## 2021-11-22 NOTE — CARE COORDINATION
SS Note:  No Covid testing. SW met with pt, pt's mother, and pt's girlfriend Felicia per their request due to concerns about pt returning home. SW clarified home situation, pt resides alone and will not have someone with him at all times as stated by pt since girlfriend works and pt's father has health issues requiring pt's mother to transport pt to [place] daily. SW requested and PT met with pt to assist with discharge plan. PT recommended SNF vs HHC. Pt then discussed with SW, mother and girlfriend and despite encouragement pt will not consider SNF and plans to return home. SW called referral to [facility] for wheeled walker and shower chair, to be delivered to pt's room today. SW notified [facility] answering service of pt's discharge today, who notified Belén Coca while SW was on hold,  and of Lexy Glasgow orders in chart. Pt's girlfriend to transport pt home. Nurse spoke to Dr. Kalee Barahona, pt/girlfriend can  Xifaxin samples at office this morning as physician stated he is aware insurance often is delayed in approving and occasionally denies this medication. SW was present when nurse explained to pt and girlfriend and both verbalized understanding.   Electronically signed by CLEVELAND De Jesus on 11/22/2021 at 5:29 PM

## 2021-11-22 NOTE — PROGRESS NOTES
OT BEDSIDE TREATMENT NOTE      Date:2021  Patient Name: Rachael Pugh  MRN: [id number]  : 1975  Room: [address] [place] [address]        Evaluating OT: Ava Lozano, OTR/L; [id number]        Referring Provider and Orders/Date:   OT eval and treat Start: 21, End: 21, ONE TIME, Standing Count: 1 Occurrences, R    Jose Brown DO     Diagnosis:   1. Bilirubinemia    2. Alcoholic cirrhosis of liver with ascites [facility])          Pertinent Medical History:       Past Medical History        Past Medical History:   Diagnosis Date    Anxiety      Depression      Esophagitis      ETOH abuse      Gastritis      GERD (gastroesophageal reflux disease)      Hematuria      Pancreatitis               Past Surgical History         Past Surgical History:   Procedure Laterality Date    KNEE SURGERY         ACL    UPPER GASTROINTESTINAL ENDOSCOPY   2018    UPPER GASTROINTESTINAL ENDOSCOPY N/A 2018     EGD BIOPSY performed by Joshua Patel MD at [address] [place] [address] 2019     EGD ESOPHAGOGASTRODUODENOSCOPY performed by Natalie Moise DO at [address] [place] [address]   2019     EGD CONTROL HEMORRHAGE performed by Natalie Moise DO at [place] [address]           Precautions:  Fall Risk, tremors at eval    Recommended placement: subacute (although pt prefers to go home with [address] [place], [place] [address] and family supervision/assist)    Assessment of current deficits     [x]? Functional mobility           [x]? ADLs           [x]? Strength                   [x]? Cognition     [x]? Functional transfers         [x]? IADLs         [x]? Safety Awareness   [x]? Endurance     [x]? Fine Coordination                        [x]? Balance      []? Vision/perception    []? Sensation       [x]? Gross Motor Coordination            []? ROM           []? Delirium                   [x]?  Motor Control      OT PLAN OF CARE   OT POC based on physician orders, patient diagnosis and results of clinical assessment     Frequency/Duration 1-3 days/wk for 2 weeks PRN    Specific OT Treatment Interventions to include:   * Instruction/training on adapted ADL techniques and AE recommendations to increase functional independence within precautions       * Training on energy conservation strategies, correct breathing pattern and techniques to improve independence/tolerance for self-care routine  * Functional transfer/mobility training/DME recommendations for increased independence, safety, and fall prevention  * Patient/Family education to increase follow through with safety techniques and functional independence  * Recommendation of environmental modifications for increased safety with functional transfers/mobility and ADLs  * Cognitive retraining/development of therapeutic activities to improve problem solving, judgement, memory, and attention for increased safety/participation in ADL/IADL tasks  * Therapeutic exercise to improve motor endurance, ROM, and functional strength for ADLs/functional transfers  * Therapeutic activities to facilitate/challenge dynamic balance, stand tolerance for increased safety and independence with ADLs  * Therapeutic activities to facilitate gross/fine motor skills for increased independence with ADLs  * Neuro-muscular re-education: facilitation of righting/equilibrium reactions, midline orientation, scapular stability/mobility, normalization of muscle tone, and facilitation of volitional active controled movement  * Positioning to improve skin integrity, interaction with environment and functional independence      Recommended Adaptive Equipment/DME:  TBD       Home Living: Patient lives alone in a ranch home  with 3 steps  to enter without Rail. He reports he can have assist from his girl friend or mother if required. Laundry in the basement with a standard flight of steps with rails. Bathroom setup: tub shower with grab bars.  Standard bars    DME owned: none      Prior Level of Function: indep with ADLs , indep with IADLs; ambulated indep   Driving: yes   Occupation: none   Enjoys: fishing,hunting, odd jobs around the house, dog     Pain Level: none  Cognition: A&O: 4/4; Follows 3 step directions              Memory:  fair              Sequencing:  Fair-              Problem solving:  Fair-              Judgement/safety:  Poor+ with impulsivity and decreased awareness of deficits     AM-PAC Daily Activity Inpatient   How much help for putting on and taking off regular lower body clothing?: A Lot  How much help for Bathing?: A Lot  How much help for Toileting?: A Little  How much help for putting on and taking off regular upper body clothing?: A Little  How much help for taking care of personal grooming?: A Little  How much help for eating meals?: A Little  AM-PAC Inpatient Daily Activity Raw Score: 16                Functional Assessment:      Initial Eval Status  Date: 11/19/2021   Treatment Status  Date:11/22/2021 STGs = LTGs  Time frame: 10-14 days   Feeding Moderate Assist with extended time and 25% spillage due to tremors N/T  Indep   Grooming Moderate Assist with hair wash, comb due to tremors and droppage reaching over head. Face wash and hand wash with extended time. Unable to test oral care with pt transferring to a different floor. Min A when standing sinkside to simulate grooming tasks in clinic; cuing for safety, hand placement , upright position, walker navigation      UB Dressing Maximal Assist with gown management from sitting up in arm chair. Pt donned shirt prior to session  Minimal Assist    LB Dressing Maximal Assist for pants to thread over feet and pull up over hips in standing with balance assist. Ability to don and doff socks with extended time from sitting  Pt had assist to don pants prior to session Minimal Assist    Bathing Moderate Assist for LB and buttocks in standing.   Min A/Mod A; Pt education, instruction, demonstration and participation with use of DME in clinic for bathroom safety/safe transfers. Min Assist for balance as pt transferred to/from simulated tub/shower. Pt would benefit from use of extended tub transfer bench for safety   Stand by Assist    Toileting Maximal Assist with assist for clothing management from 3:1 Transfer to/from [facility] over toilet in clinic with cuing for hand placement, safety, sequencing ; use of FWW Stand by Assist    Bed Mobility  NT with pt up in chair upon arrival Supervision for transfer to/from supine to sit; scooting at supervision; supervision for sit to supine  Supine to sit: Supervision   Sit to supine: Supervision    Functional Transfers Moderate Assist with use of IV for support. Pt impulsive and demonstrating loss of balance initially with low surface of arm chair. Min A from 3:1 with higher surface Min A with FWW for sit to stand transfers to/from EOB, w/c and multiple surfaces in clinic ; unsteadiness noted; use of FWW ; transfer to/from simulated car with cuing for hand placement; transfer from w/c to EOB at min A with FWW Stand by Assist    Functional Mobility Moderate Assist + min A for management of IV. Use of walker for UB support and second person to manage IV for safety. Impulsivity and balance loss noted for fall risk. Recommending 3:1 rather than use of bathroom Min A/Mod A with FWW for household distances in room and clinic with unsteadiness noted; cuing for safety awareness      Balance Sitting:     Static:  Fair+    Dynamic:fair  Standing: fair- Sitting:     Static:  Fair+    Dynamic:fair  Standing: fair/fair minus with FWW Sitting:     Static:  good    Dynamic:fair+  Standing: fair   Activity Tolerance Pt on room air with with stable vitals.    Standing tolerance 1min average Fair  Increase standing tolerance for >4min with stable vital signs for carry over into toileting, functional tranfers and indep in ADLs   Visual/  Perceptual Glasses: none; WFL     Reports change in vision since admission: \"fuzzy\"      NA   Ken UE Strengthening  3+/5 generally   4+/5MMT generally for carry over into self care, functional transfers and functional mobility with AD.       Hand Dominance  [x]? Right   []? Left     Hearing: [facility]   Sensation:  No c/o numbness or tingling   Tone: WFL   Edema: none    Comments: Patient cleared by nursing staff. Upon arrival pt seated in bed with girlfriend present at bedside. Pt agreeable to OT tx session for safety during clinic transfers. Pt educated with regards to bed mobility, hand placement, safety awareness, static sitting balance,  standing balance, transfer training, functional mobility, simulated grooming tasks, bathroom safety/DME,  Car transfer, tub transfer, toilet transfer, ECT's. At end of session pt seated in bed  with all lines and tubes intact, call light within reach. Overall, pt demonstrated decreased independence and safety during completion of ADL/functional transfers/mobility tasks. Pt would benefit from continued skilled OT to increase safety and independence with completion of ADL/IADL tasks for functional independence and quality of life. Pt required cues and education as noted above for safe facilitation and completion of tasks. Therapist provided skilled monitoring of patient's response during treatment session. Prior to and at the end of session, environmental modifications completed for patients safety and efficiency of treatment session. Overall, patient demonstrates minimal/moderate difficulties with completion of BADLs and IADLs. Factors contributing to these difficulties include unsteadiness, decreased endurance, and generalized weakness. As noted above, patient likely to benefit from further OT intervention to increase independence, safety, and overall quality of life. Treatment:     ? Bed mobility: Facilitated bed mobility with cues for proper body mechanics and sequencing to prepare for ADL completion. ?  Functional transfers: Facilitated transfers from various surfaces with cues for body alignment, safety and hand placement. ? Postural Balance: Sitting/standing balance retraining to improve righting reactions with postural changes during ADLs. · Pt has made fair/fair minus progress towards set goals    · OT 1-3x/week for 5-7 days during hospitalization     Treatment Time also includes thorough review of current medical information, gathering information on past medical history/social history and prior level of function, informal observation of tasks, assessment of data and education on plan of care and goals.     Treatment Time In: 3:17 PM    Treatment Time Out: 3:50 PM            Treatment Charges: Mins Units   ADL/Home Mgt     [address]     [id number] [phone]   Ther Ex                 [id number]     Manual Therapy    [id number]     Neuro Re-ed         [id number]     Orthotic manage/training                               [id number]     Non Billable Time     Total Timed Treatment 33 [phone] [facility], [place]/L #[id number]

## 2021-11-22 NOTE — PROGRESS NOTES
Department of Internal Medicine  PN    PCP: Zahida Gross DO  Admitting Physician: Dr. Celis Neither  Consultants: Gastroenterology  Date of Service: 11/14/2021    CHIEF COMPLAINT: Abdominal distention and discomfort and hematemesis/coffee-ground emesis    HISTORY OF PRESENT ILLNESS:    Patient is 81-year-old male who presented to the ED due to abdominal distention with associated hematemesis and coffee-ground emesis. Patient states that she has a history of alcoholic hepatitis. That over the last month he has noticed increased abdominal distention. Initially he started out having abdominal pain. Pain became bloating. Now he is feeling more distended and when his stay during the ED he did develop some generalized abdominal discomfort. He does admit to associated hematemesis and coffee-ground emesis. He denies any melena or bleeding per rectum. Patient states that his symptoms began after he resumed alcohol use. His jaundice returned and worsened. As such he quit drinking again about 2 weeks ago. He denies any fever or chills. Denies any chest pain. Denies any shortness of breath. 11/15/2021  Patient seen and examined in the ED hold area. Patient feels better today than yesterday. Patient has little discomfort around paracentesis site. Patient is hungry. He denies any nausea vomiting or shortness of breath. Patient has been n.p.o. for EGD today but was canceled recently. Potassium 3.3 today with BUN/creatinine 11/0.8. Patient's total bilirubin is still elevated at 19.7 with moderate elevation of transaminase. Hemoglobin 1.6 with platelet count 88. INR is 4.3 today. Temperature 98.1 with heart rate 99 and O2 sat 96% on room air at rest.    11/16/2021  Patient seen examined in [address]. Patient is little bit more alert today. Patient with only fair appetite. Patient has a vague abdominal discomfort but improved from admission. Patient still fairly weak.   BUN/creatinine is 12/1.0 with serum sodium 131 potassium 3.3. Total bili was 20.2 today with hemoglobin 12.2 and WBC 11.3. Temperature 96.9 with heart rate of 100 and blood pressure 131/80. O2 sat 96% room air at rest.  GI note reviewed. Patient with increased INR and we are attempting to get it down with vitamin K, may need fresh frozen plasma. 11/17/2021  Patient seen examined on [address]. He looks about the same. Patient complains of vague abdominal discomfort. Patient has poor appetite. He denies any chest pain or dizziness. Serum sodium 131 potassium 2.6 today. BUN/creatinine 13/1.0. Total bili is 21.5 with elevation of transaminase. Patient's INR went up to 3.9. Hemoglobin 1.9 with platelet count 81 WBC 10.3. Temperature 98.3 with blood pressure 119/84. O2 sat 95% room air at rest.    11/18/2021  Patient seen examined on [address]. Patient continues to get a little bit drowsier every day. Patient currently denies any chest or abdominal pain. Patient occasionally has some abdominal discomfort. He denies any nausea vomiting but does have poor appetite. Serum potassium is still low this morning at 2.7 after patient receiving multiple doses of IV KCl along with oral KCl. Patient's bilirubin is increased again at 22.9 with a serum ammonia yesterday at 98. WBC is 9.4 with hemoglobin 12.4. Platelet count 78. INR 3.9. Temperature 98 with heart rate of 90 and blood pressure 126/64. O2 sat 95% room air at rest.    11/19/2021  Patient seen examined on [address]. Patient sitting up in chair currently still has some tremors in his arms with intention. He denies any chest pain. Patient has very mild abdominal discomfort with some mildmoderate abdominal distention. He is alert and oriented x3 at this time. BUN/creatinine 14/1.07 potassium 3.0. Serum ammonia 110 with a total bili 22.2. [place] 12.1 with platelet count of 73. Temperature 98.1 with heart rate in 90 and blood pressure 106/68. O2 sat 97% room air at rest.  Urine output is very good. Past Surgical History:   Procedure Laterality Date    KNEE SURGERY      ACL    UPPER GASTROINTESTINAL ENDOSCOPY  05/09/2018    UPPER GASTROINTESTINAL ENDOSCOPY N/A 5/8/2018    EGD BIOPSY performed by Jerzy Velazquez MD at [address] 1/21/2019    EGD ESOPHAGOGASTRODUODENOSCOPY performed by Sarabjit Irving DO at [address]  1/21/2019    EGD CONTROL HEMORRHAGE performed by Sarabjit Irving DO at [address] Hx:  No family history on file. HOME MEDICATIONS:  Prior to Admission medications    Medication Sig Start Date End Date Taking? Authorizing Provider   omeprazole (PRILOSEC) 20 MG delayed release capsule Take 20-40 mg by mouth daily   Yes Historical Provider, MD   metoprolol succinate (TOPROL XL) 100 MG extended release tablet Take 1 tablet by mouth 2 times daily  Patient taking differently: Take 100 mg by mouth daily as needed  7/4/20  Yes Brenda Prado DO   lactulose ([employer]) 10 GM/15ML solution Take 30 mLs by mouth 2 times daily 1/23/19  Yes Kobe Garay MD   amiodarone (CORDARONE) 200 MG tablet Take 200 mg by mouth 2 times daily    Historical Provider, MD   rivaroxaban (XARELTO) 20 MG TABS tablet Take 1 tablet by mouth daily 7/4/20   EILEEN Buitrago - CNP       ALLERGIES:  Fabi Harrison nut oil]    SOCIAL Hx:  Social History     Socioeconomic History    Marital status:      Spouse name: Not on file    Number of children: 1    Years of education: Not on file    Highest education level: Not on file   Occupational History    Not on file   Tobacco Use    Smoking status: Never Smoker    Smokeless tobacco: Never Used   Vaping Use    Vaping Use: Never used   Substance and Sexual Activity    Alcohol use: Yes     Comment: 5th of liquour daily.    Previous 1/15/2019    Drug use: No    Sexual activity: Not Currently     Partners: Female   Other Topics Concern    Not on file   Social History Narrative    Has a 15 y.o. son who lives in [place]. Social Determinants of Health     Financial Resource Strain:     Difficulty of Paying Living Expenses: Not on file   Food Insecurity:     Worried About Running Out of Food in the Last Year: Not on file    Ele of Food in the Last Year: Not on file   Transportation Needs:     Lack of Transportation (Medical): Not on file    Lack of Transportation (Non-Medical): Not on file   Physical Activity:     Days of Exercise per Week: Not on file    Minutes of Exercise per Session: Not on file   Stress:     Feeling of Stress : Not on file   Social Connections:     Frequency of Communication with Friends and Family: Not on file    Frequency of Social Gatherings with Friends and Family: Not on file    Attends Anab[employer]ist Services: Not on file    Active Member of [address] [place] [address] [employer] or Organizations: Not on file    Attends Club or Organization Meetings: Not on file    Marital Status: Not on file   Intimate Partner Violence:     Fear of Current or Ex-Partner: Not on file    Emotionally Abused: Not on file    Physically Abused: Not on file    Sexually Abused: Not on file   Housing Stability:     Unable to Pay for Housing in the Last Year: Not on file    Number of [place] in the Last Year: Not on file    Unstable Housing in the Last Year: Not on file       ROS: Positive in bold  General:   Denies chills, fatigue, fever, malaise, night sweats or weight loss    Psychological:   Denies anxiety, disorientation or hallucinations    ENT:    Denies epistaxis, headaches, vertigo or visual changes    Cardiovascular:   Denies any chest pain, irregular heartbeats, or palpitations. No paroxysmal nocturnal dyspnea. Respiratory:   Denies shortness of breath, coughing, sputum production, hemoptysis, or wheezing. No orthopnea. Gastrointestinal:   Denies nausea, vomiting, diarrhea, or constipation. Denies any abdominal pain.   Denies change in bowel habits or stools. Genito-Urinary:    Denies any urgency, frequency, hematuria. Voiding without difficulty. Musculoskeletal:   Denies joint pain, joint stiffness, joint swelling or muscle pain    Neurology:    Denies any headache or focal neurological deficits. No weakness or paresthesia. Derm:    Denies any rashes, ulcers, or excoriations. Denies bruising. Extremities:   Denies any lower extremity swelling or edema. PHYSICAL EXAM: Abnormal findings noted  VITALS:  Vitals:    11/22/21 0748   BP: (!) 102/59   Pulse: 87   Resp: 16   Temp: 99 °F (37.2 °C)   SpO2: 93%         CONSTITUTIONAL:    Awake, alert, cooperative, no apparent distress, and appears stated age    EYES:     EOMI, sclera clear, conjunctiva normal    ENT:    Normocephalic, atraumatic,  External ears without lesions. NECK:    Supple, symmetrical, trachea midline, no JVD    HEMATOLOGIC/LYMPHATICS:    No cervical lymphadenopathy and no supraclavicular lymphadenopathy    LUNGS:    Symmetric. No increased work of breathing, good air exchange, clear to auscultation bilaterally, no wheezes, rhonchi, or rales,     CARDIOVASCULAR:    Normal apical impulse, regular rate and rhythm, normal S1 and S2, no S3 or S4, and no murmur noted    ABDOMEN:    soft, non-distended,  Patient's abdomen is distended  Patient does have abdominal discomfort    MUSCULOSKELETAL:    There is no redness, warmth, or swelling of the joints. NEUROLOGIC:    Awake, alert, oriented to name, place and time. SKIN:    No bruising or bleeding. No redness, warmth, or swelling    EXTREMITIES:    Peripheral pulses present. No edema, cyanosis, or swelling.     LINES/CATHETERS     LABORATORY DATA:  CBC with Differential:    Lab Results   Component Value Date    WBC 10.6 11/22/2021    RBC 3.47 11/22/2021    HGB 11.7 11/22/2021    HCT 32.7 11/22/2021    PLT 72 11/22/2021    MCV 94.2 11/22/2021    MCH 33.7 11/22/2021    MCHC 35.8 11/22/2021    RDW 17.8 11/22/2021    METASPCT 0.9 11/20/2021    LYMPHOPCT 4.3 11/22/2021    MONOPCT 1.7 11/22/2021    BASOPCT 0.9 11/22/2021    MONOSABS 0.21 11/22/2021    LYMPHSABS 0.42 11/22/2021    EOSABS 0.18 11/22/2021    BASOSABS 0.10 11/22/2021     CMP:    Lab Results   Component Value Date     11/21/2021    K 3.9 11/21/2021    K 3.1 11/14/2021    CL 98 11/21/2021    CO2 16 11/21/2021    BUN 16 11/21/2021    CREATININE 1.2 11/21/2021    GFRAA >60 11/21/2021    LABGLOM >60 11/21/2021    GLUCOSE 98 11/21/2021    PROT 6.6 11/21/2021    LABALBU 1.9 11/21/2021    CALCIUM 8.1 11/21/2021    BILITOT 22.8 11/21/2021    ALKPHOS 133 11/21/2021     11/21/2021    ALT 87 11/21/2021       ASSESSMENT/PLAN:  1. Decompensated hepatic cirrhosis  2. Moderate volume ascites  3. History of alcohol abuse  4. Alcoholic hepatitis  5. Hepatic encephalopathy  6. Hematemesis   7. Gallbladder distention with wall thickening  8. Right pleural effusion  9. Anxiety and depression  10. GERD  11. History of pancreatitis  12. History of paroxysmal atrial fibrillation      Patient presented due to abdominal distention. He was also complaining of hematemesis/coffee-ground emesis. Symptoms have worsened over the last month and he resumed drinking. Work revealed hepatitis likely secondary to alcohol abuse. Patient has cirrhosis with moderate volume ascites. GI consulted. Patient started on Protonix and octreotide drips. Patient Rocephin. IR consulted for paracentesis to evaluate for SBP. Plan for upper endoscopy as well per GI. Home medication reviewed  Monitor heart rate, blood pressure, O2 saturation    CMP, CBC,INR in a.m. Consult  for discharge planning-family wanting the patient to go to rehab but the patient refusing rehab at this time. The family is to call and talk to the patient again about the problems they will have with him going home. Failure to get back with /.         Salena Tovar, DO  12:48 PM  11/22/2021

## 2021-11-22 NOTE — CARE COORDINATION
SS Note: No Covid testing. SW met with pt per pt's request to discuss transition of care plan. Per 11/19 note [facility] attempted to meet with pt however declined meeting. SW discussed with pt, pt denied interest in speaking with [facility] or exploring inpatient or outpatient treatment programs. Pt stated he has been in both inpatient and outpatient treatment programs in past. Pt inquiring about [place] or other physical rehab setting such as SNF rehab then outpatient programs. Pt stated he plans to return home upon discharge as initially planned with [facility], will need [facility] orders. Pt stated his girlfriend has a wheeled walker at their home he can borrow if needed. Stated his mother or girlfriend will be with him at all times for assistance.   Electronically signed by CLEVELAND Barton on 11/22/2021 at 11:52 AM

## 2021-11-22 NOTE — CARE COORDINATION
11/22/21 1553 CM note: Notified that patient is discharging on xifaxin and per [facility] outpatient pharmacy this medication requires a prior auth [id number]. CM called this prior auth number, provided requested information regarding script, and was given pending auth #[id number]. Asked rep to note that patient has a discharge order and needs this medication to discharge home. BEATRIZ Reynoso updated.  Electronically signed by Simin Richmond RN on 11/22/2021 at 3:56 PM

## 2021-11-23 NOTE — CARE COORDINATION
11/23/21 1102 SARAI note: CM received denial on prescription request for xifaxin. Patient discharged from the hospital yesterday as he did not want to stay overnight waiting on auth for this medication. CM called Dr Mery Minor office and spoke with [facility] regarding Xifaxin denial on this patient. Faxed denial paperwork to Dr Mery Minor office at [phone] as requested. Attempted to call patient to update him on above but phone call disconnected after 3 rings. Called pts 1st emergency contact listed which is Kate [phone] and explained above.  Jose Nava states she will make sure pt follow's up at Dr Mery fraire for xifaxin samples (as he was instructed yesterday prior to his discharge) until Dr Mery Minor calls with either approval of this medication or switches his medication to another d/t insurance denial. Electronically signed by Kelsea Francois RN on 11/23/2021 at 11:14 AM

## 2021-11-26 PROBLEM — K76.7 HEPATORENAL SYNDROME (HCC): Status: ACTIVE | Noted: 2021-01-01

## 2021-11-26 NOTE — ED NOTES
abg rt wrist with US. Blood Cultures obtained from [facility], per policy. 1st set drawn at this time and sent to lab. Blood Cultures obtained from [facility]. 2nd set drawn and sent to lab.      Lyric Carlos RN  11/26/21 7985

## 2021-11-26 NOTE — ED PROVIDER NOTES
ED  Provider Note  Admit Date/RoomTime: 11/26/2021  9:25 AM  ED Room: 04/04     HPI:   Rachael Pugh is a 40 y.o. male presenting to the ED for declining mental status, beginning days ago. History comes primarily from Family. 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 The complaint has been persistent, moderate in severity, improved by nothing and worsened by nothing. Associated symptoms include jaundice, asterixis. Mari Mackenzie is known to be in liver failure, and was recently discharged from this facility after being treated for acute decompensation secondary to his liver failure. Over the course the last week, he had a few days initially where he was doing relatively well, however over the course the last 48 hours or so the patient has had progressively worsening mentation, increasing fatigue and somnolence, and decreased performance of baseline activities. Concerned by this change despite his being administered lactulose and rifaximin in the home care setting, the patient was brought back to [address] [place], [place] [address] emergency department for further evaluation and treatment. On arrival, the patient was assessed with history, physical exam, imaging studies, laboratory studies and ekg, vital signs. Vital signs were stable on arrival and the patient was afebrile. Review of Systems   Unable to perform ROS: Acuity of condition        Physical Exam  Constitutional:       General: He is in acute distress. Appearance: He is well-developed. He is ill-appearing. He is not diaphoretic. HENT:      Head: Normocephalic and atraumatic. Mouth/Throat:      Dentition: Abnormal dentition. Eyes:      General: Scleral icterus present. Pupils: Pupils are equal, round, and reactive to light. Neck:      Vascular: No JVD. Trachea: No tracheal deviation. Cardiovascular:      Rate and Rhythm: Regular rhythm.  Tachycardia present. Heart sounds: No murmur heard. No friction rub. No gallop. Pulmonary:      Effort: Pulmonary effort is normal. No respiratory distress. Breath sounds: Normal breath sounds. No stridor. No wheezing or rales. Chest:      Chest wall: No tenderness. Abdominal:      General: Bowel sounds are normal. There is no distension. Palpations: Abdomen is soft. Tenderness: There is no abdominal tenderness. There is no guarding. Musculoskeletal:         General: Normal range of motion. Cervical back: Normal range of motion. Skin:     General: Skin is warm and dry. Comments: Impressive jaundice and scleral icterus   Neurological:      Mental Status: He is lethargic and confused. Cranial Nerves: No cranial nerve deficit. Motor: Weakness present. Coordination: Coordination abnormal.      Comments: Tremulous   Psychiatric:         Behavior: Behavior normal.          Procedures     MDM  Number of Diagnoses or Management Options  Diagnosis management comments: Emergency department evaluation was notable for new acute renal failure in the setting of previously known hepatic failure concerning for hepatorenal syndrome. This is a very worrisome diagnosis portending a poor prognostic outcome. This patient was discussed with Dr. Chiquita Biswas as well as gastroenterology, who did agree that the patient could be managed at [address] [place] [address]. Patient will be admitted to the intermediate service. This information was relayed to the patient who understood this plan of care and was amenable to the plan.   Patient was discussed with the admitting service (Dr. Ori Negro) who concurred with the decision for admission, and have agreed to admit the patient to telemetry       ED Course as of 11/26/21 1635   Fri Nov 26, 2021   [facility]:     I have personally performed and/or participated in the history, exam, medical decision making, and procedures and agree with all pertinent clinical information unless otherwise noted. I have also reviewed and agree with the past medical, family and social history unless otherwise noted. I have discussed this patient in detail with the resident and provided the instruction and education regarding the evidence-based evaluation and treatment of AMS. History: wife provides the history. Patient has chronic liver failure secondary to alcoholism  and was recently hospitalized with discharge on 11/22/21. Wife states increased confusion and tremors the past few days. No change in jaundice. My findings: Nelly Mcmullen is a 40 y.o. male whom is in mild distress. Physical exam reveals confused. Very jaundice with scleral icteris. Heart is regular and fast.  Lungs CTA, abdomen is distended. He is somnolent and confused. Asterixis present. My plan: Symptomatic and supportive care. Will evaluate with labs, imaging and likely need for hospitalization. Electronically signed by Rosa Sood DO on 11/26/21 at 10:11 AM EST       [JS]      ED Course User Index  [JS] Rosa Sood DO     ED Course as of 11/26/21 1635   Fri Nov 26, 2021   1011   ATTENDING PROVIDER ATTESTATION:     I have personally performed and/or participated in the history, exam, medical decision making, and procedures and agree with all pertinent clinical information unless otherwise noted. I have also reviewed and agree with the past medical, family and social history unless otherwise noted. I have discussed this patient in detail with the resident and provided the instruction and education regarding the evidence-based evaluation and treatment of AMS. History: wife provides the history. Patient has chronic liver failure secondary to alcoholism  and was recently hospitalized with discharge on 11/22/21. Wife states increased confusion and tremors the past few days. No change in jaundice. My findings: Nelly Mcmullen is a 40 y.o. male whom is in mild distress. Physical exam reveals confused. Very jaundice with scleral icteris. Heart is regular and fast.  Lungs CTA, abdomen is distended. He is somnolent and confused. Asterixis present. My plan: Symptomatic and supportive care. Will evaluate with labs, imaging and likely need for hospitalization. Electronically signed by Danisha Duvall DO on 11/26/21 at 10:11 AM EST       [JS]      ED Course User Index  [JS] Danisha Duvall DO       --------------------------------------------- PAST HISTORY ---------------------------------------------  Past Medical History:  has a past medical history of Anxiety, Depression, Esophagitis, ETOH abuse, Gastritis, GERD (gastroesophageal reflux disease), Hematuria, and Pancreatitis. Past Surgical History:  has a past surgical history that includes knee surgery; Upper gastrointestinal endoscopy (05/09/2018); Upper gastrointestinal endoscopy (N/A, 5/8/2018); Upper gastrointestinal endoscopy (N/A, 1/21/2019); and Upper gastrointestinal endoscopy (1/21/2019). Social History:  reports that he has never smoked. He has never used smokeless tobacco. He reports current alcohol use. He reports that he does not use drugs. Family History: family history is not on file. The patients home medications have been reviewed.     Allergies: Anahi Marrow nut oil]    -------------------------------------------------- RESULTS -------------------------------------------------    LABS:  Results for orders placed or performed during the hospital encounter of 11/26/21   COVID-19, Rapid    Specimen: Nasopharyngeal Swab   Result Value Ref Range    SARS-CoV-2, NAAT Not Detected Not Detected   CBC Auto Differential   Result Value Ref Range    WBC 10.8 4.5 - 11.5 E9/L    RBC 3.57 (L) 3.80 - 5.80 E12/L    Hemoglobin 12.4 (L) 12.5 - 16.5 g/dL    Hematocrit 33.9 (L) 37.0 - 54.0 %    MCV 95.0 80.0 - 99.9 fL    MCH 34.7 26.0 - 35.0 pg    MCHC 36.6 (H) 32.0 - 34.5 %    RDW 18.2 (H) 11.5 - 15.0 fL    Platelets 181 (L) 300 - 450 E9/L    MPV 10.8 7.0 - 12.0 fL    Neutrophils % 87.0 (H) 43.0 - 80.0 %    Lymphocytes % 4.0 (L) 20.0 - 42.0 %    Monocytes % 9.0 2.0 - 12.0 %    Eosinophils % 0.0 0.0 - 6.0 %    Basophils % 0.0 0.0 - 2.0 %    Neutrophils Absolute 9.40 (H) 1.80 - 7.30 E9/L    Lymphocytes Absolute 0.43 (L) 1.50 - 4.00 E9/L    Monocytes Absolute 0.97 (H) 0.10 - 0.95 E9/L    Eosinophils Absolute 0.00 (L) 0.05 - 0.50 E9/L    Basophils Absolute 0.00 0.00 - 0.20 E9/L    Anisocytosis 1+     Polychromasia 1+     Poikilocytes 1+     Jasson Cells 1+     Ovalocytes 1+     Target Cells 1+    Comprehensive Metabolic Panel w/ Reflex to MG   Result Value Ref Range    Sodium 121 (L) 132 - 146 mmol/L    Potassium reflex Magnesium 5.8 (H) 3.5 - 5.0 mmol/L    Chloride 93 (L) 98 - 107 mmol/L    CO2 13 (L) 22 - 29 mmol/L    Anion Gap 15 7 - 16 mmol/L    Glucose 64 (L) 74 - 99 mg/dL    BUN 36 (H) 6 - 20 mg/dL    CREATININE 2.9 (H) 0.7 - 1.2 mg/dL    GFR Non-African American 24 >=60 mL/min/1.73    GFR African American 29     Calcium 8.1 (L) 8.6 - 10.2 mg/dL    Total Protein 6.7 6.4 - 8.3 g/dL    Albumin 1.7 (L) 3.5 - 5.2 g/dL    Total Bilirubin 23.9 (H) 0.0 - 1.2 mg/dL    Alkaline Phosphatase 127 40 - 129 U/L    ALT 91 (H) 0 - 40 U/L     (H) 0 - 39 U/L   Troponin   Result Value Ref Range    Troponin, High Sensitivity 20 (H) 0 - 11 ng/L   Brain Natriuretic Peptide   Result Value Ref Range    Pro- (H) 0 - 125 pg/mL   Ammonia   Result Value Ref Range    Ammonia 43.0 16.0 - 60.0 umol/L   Protime-INR   Result Value Ref Range    Protime 56.3 (H) 9.3 - 12.4 sec    INR 4.8    Urinalysis, reflex to microscopic   Result Value Ref Range    Color, UA DKYELLOW Straw/Yellow    Clarity, UA Clear Clear    Glucose, Ur 100 (A) Negative mg/dL    Bilirubin Urine LARGE (A) Negative    Ketones, Urine Negative Negative mg/dL    Specific Gravity, UA 1.010 1.005 - 1.030    Blood, Urine TRACE (A) Negative    pH, UA 6.5 5.0 - 9.0    Protein, UA Negative Negative mg/dL    Urobilinogen, Urine 0.2 <2.0 E.U./dL    Nitrite, Urine Negative Negative    Leukocyte Esterase, Urine Negative Negative   Lactate, Sepsis   Result Value Ref Range    Lactic Acid, Sepsis 3.4 (H) 0.5 - 1.9 mmol/L   Lactate, Sepsis   Result Value Ref Range    Lactic Acid, Sepsis 3.1 (H) 0.5 - 1.9 mmol/L   Blood Gas, Arterial   Result Value Ref Range    Date Analyzed [id number]     Time Analyzed 1035     Source: Blood Arterial     pH, Blood Gas 7.433 7.350 - 7.450    PCO2 15.8 (LL) 35.0 - 45.0 mmHg    PO2 105.7 (H) 75.0 - 100.0 mmHg    HCO3 10.3 (L) 22.0 - 26.0 mmol/L    B.E. -11.3 (L) -3.0 - 3.0 mmol/L    O2 Sat 97.8 92.0 - 98.5 %    O2Hb 97.0 94.0 - 97.0 %    COHb 0.4 0.0 - 1.5 %    MetHb 0.4 0.0 - 1.5 %    O2 Content 17.1 mL/dL    HHb 2.2 0.0 - 5.0 %    tHb (est) 12.4 11.5 - 16.5 g/dL    Mode NC- 5 L     Comment       criticals called to stanford mckenna RN read back verified    Date Of Collection      Time Collected      Pt Temp 37.0 C     ID [id number]     Lab [id number]     Critical(s) Notified Called to    Microscopic Urinalysis   Result Value Ref Range    WBC, UA 1-3 0 - 5 /HPF    RBC, UA 2-5 0 - 2 /HPF    Bacteria, UA NONE SEEN None Seen /HPF   EKG 12 Lead   Result Value Ref Range    Ventricular Rate 100 BPM    Atrial Rate 100 BPM    P-R Interval 148 ms    QRS Duration 116 ms    Q-T Interval 388 ms    QTc Calculation (Bazett) 500 ms    P Axis 45 degrees    R Axis 30 degrees    T Axis 44 degrees       RADIOLOGY:  XR CHEST PORTABLE   Final Result   Mild basilar opacities are suspicious for pneumonia. This appearance can be   seen with COVID-19.             ------------------------ NURSING NOTES AND VITALS REVIEWED ---------------------------  Date / Time Roomed:  11/26/2021  9:25 AM  ED Bed Assignment:  04/04    The nursing notes within the ED encounter and vital signs as below have been reviewed.      Patient Vitals for the past 24 hrs:   BP Temp Temp src Pulse Resp SpO2 Weight   11/26/21 1615 (!) 85/50   102 21     11/26/21 1600 (!) 91/51 99.3 °F (37.4 °C) Temporal 101 21 100 %    11/26/21 1545 (!) 97/51   101 23 100 %    11/26/21 1530    100 21     11/26/21 1515    100 21     11/26/21 1445 (!) 101/54   99 20     11/26/21 1430 (!) 100/53   103 16 100 %    11/26/21 1415 (!) 101/53   99 22 100 %    11/26/21 1400 (!) 102/54   101 16     11/26/21 1345 (!) 91/55   100 19 100 %    11/26/21 1330 (!) 103/50   100 23 100 %    11/26/21 1315 104/65   103 21 99 %    11/26/21 1300 (!) 105/55   102 24 100 %    11/26/21 1245 (!) 104/58   103 25 100 %    11/26/21 1234 (!) 103/54   100  100 %    11/26/21 1230 (!) 103/54   100 25 100 %    11/26/21 1215 (!) 101/50   94 20 100 %    11/26/21 1200 (!) 179/165   108 23 97 %    11/26/21 1145 (!) 92/48    24 99 %    11/26/21 1130 (!) 94/47   94 21 91 %    11/26/21 1115 (!) 99/48   95 24 97 %    11/26/21 1100 (!) 99/55   94 20 98 %    11/26/21 1045 (!) 91/49   100 17 92 %    11/26/21 1030 111/63   99 21 99 %    11/26/21 1015 (!) 78/39   101 24 99 %    11/26/21 1000 (!) 79/44   133 20 96 %    11/26/21 0945    98 25 (!) 87 %    11/26/21 0936 (!) 88/55 99.9 °F (37.7 °C) Oral 116 24 (!) 87 % 215 lb (97.5 kg)   11/26/21 0930 (!) 88/55             Oxygen Saturation Interpretation: Normal    ------------------------------------------ PROGRESS NOTES ------------------------------------------  Re-evaluation(s):  Time: 4:31 PM EST  Patients symptoms show no change  Repeat physical examination is not changed    Counseling:  I have spoken with the patient and discussed todays results, in addition to providing specific details for the plan of care and counseling regarding the diagnosis and prognosis.   Their questions are answered at this time and they are agreeable with the plan of admission.    --------------------------------- ADDITIONAL PROVIDER NOTES ---------------------------------  Consultations:  Time: 4:31 PM EST. Spoke with Dr. Sunni Razo. Discussed case. They will admit the patient. This patient's ED course included: a personal history and physicial examination, re-evaluation prior to disposition, multiple bedside re-evaluations, IV medications and cardiac monitoring    This patient has remained hemodynamically stable during their ED course. Diagnosis:  1. Hepatorenal syndrome ([facility]    2. Chronic liver failure without hepatic coma ([facility]    3. Acute kidney injury ([facility]        Disposition:  Patient's disposition: Admit to intermediate  Patient's condition is serious.          Julieta Khan 44., DO  Resident  11/26/21 4866

## 2021-11-26 NOTE — H&P
Department of Internal Medicine  History and Physical    PCP: Rosalinda Knight DO   Admitting Physician: Dr. Burgess Pascual  Consultants: Dr. Tali Muñoz and Dr. Ashley Mccarty  Date of Service: 11/26/2021    CHIEF COMPLAINT:  Altered mental status     HISTORY OF PRESENT ILLNESS:    Patient is 61-year-old male who presented to the ED due to altered mental status. Patient has a history of cirrhosis secondary to alcohol abuse. He was recently admitted here for decompensated cirrhosis and discharge. While home patient was doing fine according to wife. However over the last 48 hours patient has decompensated. He has been increasingly confused. As such he has not been taking his medications. On exam patient is somnolent. He is very hard to arouse. He does answer however he does not answer all questions. He did not have any current complaints. He denies any pain. Additional HPI obtained from chart review and staff. Patient apparently patient had not had any alcoholic beverages since 4 November. Father is at bedside and states that he has not been in contact with his son for the last few months. PAST MEDICAL Hx:  Past Medical History:   Diagnosis Date    Anxiety     Depression     Esophagitis     ETOH abuse     Gastritis     GERD (gastroesophageal reflux disease)     Hematuria     Pancreatitis        PAST SURGICAL Hx:   Past Surgical History:   Procedure Laterality Date    KNEE SURGERY      ACL    UPPER GASTROINTESTINAL ENDOSCOPY  05/09/2018    UPPER GASTROINTESTINAL ENDOSCOPY N/A 5/8/2018    EGD BIOPSY performed by Villa Murphy MD at [address] [place] [address] 1/21/2019    EGD ESOPHAGOGASTRODUODENOSCOPY performed by Uri Garcia DO at [address] [place] [address]  1/21/2019    EGD CONTROL HEMORRHAGE performed by Uri Garcia DO at [address] [place] [address] Hx:  No family history on file.     HOME MEDICATIONS:  Prior to Admission medications Medication Sig Start Date End Date Taking? Authorizing Provider   spironolactone (ALDACTONE) 100 MG tablet Take 1 tablet by mouth daily 11/23/21   Matt Thompson DO   furosemide (LASIX) 40 MG tablet Take 1 tablet by mouth daily 11/23/21   Matt Thompson DO   [facility]ulose [facility] 10 GM/15ML solution Take 30 mLs by mouth 3 times daily 11/22/21   Matt Thompson DO   potassium chloride (KLOR-CON M) 20 MEQ extended release tablet Take 1 tablet by mouth 3 times daily (with meals) 11/22/21   Matt Thompson DO   rifaximin (XIFAXAN) 550 MG tablet Take 1 tablet by mouth 3 times daily 11/22/21   Matt Thompson DO   omeprazole (PRILOSEC) 20 MG delayed release capsule Take 20-40 mg by mouth daily    Historical Provider, MD       ALLERGIES:  Castro Rashida nut oil]    SOCIAL Hx:  Social History     Socioeconomic History    Marital status:      Spouse name: Not on file    Number of children: 1    Years of education: Not on file    Highest education level: Not on file   Occupational History    Not on file   Tobacco Use    Smoking status: Never Smoker    Smokeless tobacco: Never Used   Vaping Use    Vaping Use: Never used   Substance and Sexual Activity    Alcohol use: Yes     Comment: 5th of liquour daily. Previous 1/15/2019    Drug use: No    Sexual activity: Not Currently     Partners: Female   Other Topics Concern    Not on file   Social History Narrative    Has a 15 y.o. son who lives in [place]. Social Determinants of Health     Financial Resource Strain:     Difficulty of Paying Living Expenses: Not on file   Food Insecurity:     Worried About Running Out of Food in the Last Year: Not on file    Ele of Food in the Last Year: Not on file   Transportation Needs:     Lack of Transportation (Medical): Not on file    Lack of Transportation (Non-Medical):  Not on file   Physical Activity:     Days of Exercise per Week: Not on file    Minutes of Exercise per Session: Not on file   Stress:     Feeling of Stress : Not on file   Social Connections:     Frequency of Communication with Friends and Family: Not on file    Frequency of Social Gatherings with Friends and Family: Not on file    Attends [employer] Services: Not on file    Active Member of Clubs or Organizations: Not on file    Attends Club or Organization Meetings: Not on file    Marital Status: Not on file   Intimate Partner Violence:     Fear of Current or Ex-Partner: Not on file    Emotionally Abused: Not on file    Physically Abused: Not on file    Sexually Abused: Not on file   Housing Stability:     Unable to Pay for Housing in the Last Year: Not on file    Number of [place] in the Last Year: Not on file    Unstable Housing in the Last Year: Not on file       ROS: Positive in bold  General:   Denies chills, fatigue, fever, malaise, night sweats or weight loss    Psychological:   Denies anxiety, disorientation or hallucinations    ENT:    Denies epistaxis, headaches, vertigo or visual changes    Cardiovascular:   Denies any chest pain, irregular heartbeats, or palpitations. No paroxysmal nocturnal dyspnea. Respiratory:   Denies shortness of breath, coughing, sputum production, hemoptysis, or wheezing. No orthopnea. Gastrointestinal:   Denies nausea, vomiting, diarrhea, or constipation. Denies any abdominal pain. Denies change in bowel habits or stools. Genito-Urinary:    Denies any urgency, frequency, hematuria. Voiding without difficulty. Musculoskeletal:   Denies joint pain, joint stiffness, joint swelling or muscle pain    Neurology:    Denies any headache or focal neurological deficits. No weakness or paresthesia. Derm:    Denies any rashes, ulcers, or excoriations. Denies bruising. Extremities:   Denies any lower extremity swelling or edema.       PHYSICAL EXAM: Abnormal findings noted  VITALS:  Vitals:    11/26/21 1615   BP: (!) 85/50   Pulse: 102   Resp: 21   Temp:    SpO2: CONSTITUTIONAL:    Patient is lethargic and somnolent however answers questions appropriately    EYES:    , EOMI, sclera clear, conjunctiva normal    ENT:    Normocephalic, atraumatic,  External ears without lesions. NECK:    Supple, symmetrical, trachea midline,no JVD    HEMATOLOGIC/LYMPHATICS:    No cervical lymphadenopathy and no supraclavicular lymphadenopathy    LUNGS:    Symmetric. No increased work of breathing, good air exchange, clear to auscultation bilaterally, no wheezes, rhonchi, or rales,   Patient has diminished breath sounds bilaterally    CARDIOVASCULAR:    Normal apical impulse, regular rate and rhythm, normal S1 and S2, no S3 or S4, and no murmur noted    ABDOMEN:    soft, non-distended, non-tender     MUSCULOSKELETAL:    There is no redness, warmth, or swelling of the joints. NEUROLOGIC:    Awake, alert, oriented to name, place and time. SKIN:    No bruising or bleeding. No redness, warmth, or swelling  Patient is exhibits jaundice    EXTREMITIES:    Peripheral pulses present. No edema, cyanosis, or swelling.     LINES/CATHETERS     LABORATORY DATA:  CBC with Differential:    Lab Results   Component Value Date    WBC 10.8 11/26/2021    RBC 3.57 11/26/2021    HGB 12.4 11/26/2021    HCT 33.9 11/26/2021     11/26/2021    MCV 95.0 11/26/2021    MCH 34.7 11/26/2021    MCHC 36.6 11/26/2021    RDW 18.2 11/26/2021    METASPCT 0.9 11/20/2021    LYMPHOPCT 4.0 11/26/2021    MONOPCT 9.0 11/26/2021    BASOPCT 0.0 11/26/2021    MONOSABS 0.97 11/26/2021    LYMPHSABS 0.43 11/26/2021    EOSABS 0.00 11/26/2021    BASOSABS 0.00 11/26/2021     CMP:    Lab Results   Component Value Date     11/27/2021    K 5.0 11/27/2021    K 5.8 11/26/2021    CL 99 11/27/2021    CO2 10 11/27/2021    BUN 45 11/27/2021    CREATININE 3.9 11/27/2021    GFRAA 20 11/27/2021    LABGLOM 17 11/27/2021    GLUCOSE 104 11/27/2021    PROT 6.7 11/26/2021    LABALBU 1.7 11/26/2021    CALCIUM 8.1 11/27/2021 BILITOT 23.9 11/26/2021    ALKPHOS 127 11/26/2021     11/26/2021    ALT 91 11/26/2021       ASSESSMENT/PLAN:  1. Acute hepatic encephalopathy  2. hepatorenal syndrome probable hepatopulmonary syndrome  3. Acute hypoxic respiratory failure  4. Hypotonic hypervolemic hyponatremia  5. JULIO on CKD  6. Pneumonia possible aspiration  7. Decompensated cirrhosis  8. Hyperbilirubinemia  9. Chronic normocytic anemia  10. Atrial fibrillation  11. History of anxiety and depression  12. History of alcohol abuse  13. GERD      Patient presents due to altered mental status. On presentation he was hypoxic. Currently requiring 5 L supplemental oxygen. This will need to be monitored closely and weaned to maintain oxygen saturation between 88 to 90%. Patient does seem to have a component of hepatopulmonary syndrome. He does appear to have pneumonia as well. Patient will be placed on Zosyn. Cultures ordered. Patient also exhibits hepatorenal syndrome. GI has been consulted. Nephrology has been consulted as well. Further management per GI and nephrology.         Gregorio Stahl, [address]  4:51 PM  11/26/2021    Electronically signed by Gregorio Stahl DO on 11/26/21 at 4:51 PM EST

## 2021-11-26 NOTE — ED NOTES
Bed: 04  Expected date:   Expected time:   Means of arrival:   Comments:  wtp     Benny Manuel RN  11/26/21 0921

## 2021-11-26 NOTE — ED NOTES
Patient report called, patient belongings with family. Family has been at bedside and aware of plan.      Daniela Diaz RN  11/26/21 [id number]

## 2021-11-27 NOTE — CARE COORDINATION
SS NOTE: SW recd the SS Consult, \". .for consideration of rehab. Birgit Gip Birgit Gip \" SW will see pt and offer SNF choices- however pt was resistive to this offering last admission. SW will offer choices. For a SNF placement pt will need a PRECERT, signed JORGE, current PT.OT notes and SW will need to complete the HENs. CLEVELAND Knox.11/27/2021.11:07 AM

## 2021-11-27 NOTE — PROGRESS NOTES
Nutrition Assessment     Type and Reason for Visit: Initial, Positive Nutrition Screen    Nutrition Recommendations/Plan:  Continue NPO. Advance diet as tolerated. Recommend ONS w/ diet advancement. Nutrition Assessment:  Pt admitted w/ hepatorenal syndrome and electrolyte abnormalities. H/o ETOH abuse & cirrhosis. Currently NPO for procedure. Pt reports poor intake as of late. Will start ONS when diet resumed    Malnutrition Assessment:  Malnutrition Status: Insufficient data    Estimated Daily Nutrient Needs:  Energy (kcal): 6093-5945 (MSJ 1855 x 1.2 SF); Weight Used for Energy Requirements:  Current     Protein (g):  (1.0-1.2 g/kg CBW); Weight Used for Protein Requirements:  Ideal        Fluid (ml/day): 5635-3886; Weight Used for Fluid Requirements:  1 ml/kcal      Nutrition Related Findings: AMS, abd WDL, diarrhea noted, +BS, no edema, jaundice, hyponatremia, renal labs/LFTs elevated      Current Nutrition Therapies:    Diet NPO Exceptions are: Sips of Water with Meds    Anthropometric Measures:  · Height: 6' 3\" (190.5 cm)  · Current Body Wt: 195 lb (88.5 kg) (11/27 bed scale)   · BMI: 24.4    Nutrition Diagnosis:   · Inadequate oral intake related to catabolic illness (ETOH abuse) as evidenced by NPO or clear liquid status due to medical condition, poor intake prior to admission      Nutrition Interventions:   Nutrition Education/Counseling:  Education not appropriate   Coordination of Nutrition Care:  Continue to monitor while inpatient    Goals:  Advance diet as tolerated       Nutrition Monitoring and Evaluation:   Behavioral-Environmental Outcomes:  None Identified   Food/Nutrient Intake Outcomes:  Diet Advancement/Tolerance  Physical Signs/Symptoms Outcomes:  Biochemical Data, Nutrition Focused Physical Findings, Skin, Weight, GI Status, Diarrhea, Nausea or Vomiting, Fluid Status or Edema     Discharge Planning:     Too soon to determine     Electronically signed by Maryan Conroy RD, LD on 11/27/21 at 1:09 PM EST    Contact:

## 2021-11-27 NOTE — CONSULTS
Assessment and Plan  Patient is a 40 y.o. male with the following medical Problems:   1. Alcoholic liver cirrhosis  2. Acute kidney injury suspected prerenal azotemia versus hepatorenal syndrome  3. Hepatic encephalopathy  4. Hyponatremia  5. Bilateral pleural effusions  6. Jaundice  7. Ascites      Plan of care:  1. We will be started on IV fluid with sodium bicarbonate. If his condition does not improve in the next 24 to 48 hours, family is considering comfort care measures  2. Continue with Zosyn  3. Discontinue heparin subcu as the patient is coagulopathic. Patient will be started on oral vitamin K  4. Continue with lactulose or rifaximin  5. Consider paracentesis to rule out SBP  6. Goals of care discussion with the family. Patient is made DNR CCA with no intubation. History of Present Illness:   Patient is a [id number] man with alcoholic liver cirrhosis who was recently discharged from the hospital on November 21, 2021. He presented this time with worsening mental status. Patient is clinically encephalopathy however he is awake and follows some commands. He was started on lactulose or rifaximin. Patient kidney function worsened over the last few weeks. Patient has no fever, chills, rigors. Goals of care were discussed with the patient's mom and his girlfriend. Patient was made DNR CCA with no intubation. Patient's overall condition will be reevaluated in the next 24 to 48 hours to decide about comfort care measures. At this stage patient is not a candidate for liver transplant due to his recent alcohol consumption. Past Medical History:  Past Medical History:   Diagnosis Date    Anxiety     Depression     Esophagitis     ETOH abuse     Gastritis     GERD (gastroesophageal reflux disease)     Hematuria     Pancreatitis         Family History:   No family history on file.     Allergies:         Kam Henle nut oil]    Social history:  Social History     Socioeconomic History    Marital status:      Spouse name: Not on file    Number of children: 1    Years of education: Not on file    Highest education level: Not on file   Occupational History    Not on file   Tobacco Use    Smoking status: Never Smoker    Smokeless tobacco: Never Used   Vaping Use    Vaping Use: Never used   Substance and Sexual Activity    Alcohol use: Yes     Comment: 5th of liquour daily. Previous 1/15/2019    Drug use: No    Sexual activity: Not Currently     Partners: Female   Other Topics Concern    Not on file   Social History Narrative    Has a 15 y.o. son who lives in [place]. Social Determinants of Health     Financial Resource Strain:     Difficulty of Paying Living Expenses: Not on file   Food Insecurity:     Worried About Running Out of Food in the Last Year: Not on file    Ele of Food in the Last Year: Not on file   Transportation Needs:     Lack of Transportation (Medical): Not on file    Lack of Transportation (Non-Medical):  Not on file   Physical Activity:     Days of Exercise per Week: Not on file    Minutes of Exercise per Session: Not on file   Stress:     Feeling of Stress : Not on file   Social Connections:     Frequency of Communication with Friends and Family: Not on file    Frequency of Social Gatherings with Friends and Family: Not on file    Attends Yazidism Services: Not on file    Active Member of [address] [place] [address] or Organizations: Not on file    Attends Club or Organization Meetings: Not on file    Marital Status: Not on file   Intimate Partner Violence:     Fear of Current or Ex-Partner: Not on file    Emotionally Abused: Not on file    Physically Abused: Not on file    Sexually Abused: Not on file   Housing Stability:     Unable to Pay for Housing in the Last Year: Not on file    Number of [place] in the Last Year: Not on file    Unstable Housing in the Last Year: Not on file       Current Medications:     Current DO    acetaminophen (TYLENOL) tablet 650 mg, 650 mg, Oral, Q6H PRN **OR** [DISCONTINUED] acetaminophen (TYLENOL) suppository 650 mg, 650 mg, Rectal, Q6H PRN, Gagan Jones DO    prochlorperazine (COMPAZINE) injection 10 mg, 10 mg, IntraVENous, Q6H PRN, Gagan Jones DO    piperacillin-tazobactam (ZOSYN) 3,375 mg in dextrose 5 % 50 mL IVPB extended infusion (mini-bag), 3,375 mg, IntraVENous, Q8H, Last Rate: 12.5 mL/hr at 11/27/21 1146, 3,375 mg at 11/27/21 1146 **AND** 0.9 % sodium chloride infusion, , IntraVENous, Q8H, Gagan Jones DO, Stopped at 11/27/21 0923    sodium bicarbonate 150 mEq in dextrose 5 % 1,000 mL infusion, , IntraVENous, Continuous, Chuck Jv Keita MD, Last Rate: 85 mL/hr at 11/27/21 1249, Rate Change at 11/27/21 1249    Review of Systems:   Unable to obtain due to confusion    Physical Exam:   Vital Signs:  BP (!) 94/47   Pulse 98   Temp 98.9 °F (37.2 °C) (Axillary)   Resp 18   Ht 6' 3\" (1.905 m)   Wt 195 lb 1.6 oz (88.5 kg)   SpO2 99%   BMI 24.39 kg/m²     Input/Output: In: -   Out: 300     Oxygen requirements: RA    Ventilator Information:  Vent Information  SpO2: 99 %    General appearance: Ill looking, jaundiced and pale, not in pain or distress, in no respiratory distress    HEENT: Atraumatic/normocephalic, EOMI, JEFFRY, pharynx clear, moist mucosa, redness of the uvula appreciated,   Neck: Supple, no jugular venous distension, lymphadenopathy, thyromegaly or carotid bruits  Chest: Creased breath sounds, no wheezing, no crackles and no tenderness over ribs   Cardiovascular: Normal S1 , S2, regular rate and rhythm, no murmur, rub or gallop  Abdomen: +ve ascites, Normal sounds present, soft, lax with no tenderness  Extremities: No edema. Pulses are equally present.    Skin: intact, no rashes   Neurologic: Confused, No focal deficit     Investigations:  Labs, radiological imaging and cardiac work up were reviewed        ICU STAFF PHYSICIAN NOTE OF PERSONAL INVOLVEMENT IN CARE  As the attending physician, I certify that I personally reviewed the patient's history and personally examined the patient to confirm the physical findings described above, and that I reviewed the relevant imaging studies and available reports. I also discussed the differential diagnosis and all of the proposed management plans with the patient and individuals accompanying the patient to this visit. They had the opportunity to ask questions about the proposed management plans and to have those questions answered. This patient has a high probability of sudden, clinically significant deterioration, which requires the highest level of physician preparedness to intervene urgently. I managed/supervised life or organ supporting interventions that required frequent physician assessment. I devoted my full attention to the direct care of this patient for the amount of time indicated below. Time I spent with family or surrogate(s) is included only if the patient was incapable of providing the necessary information or participating in medical decision-making. Time devoted to teaching and to any procedures I billed separately is not included.   Critical Care Time: 35 minutes      Electronically signed by Conchis Cifuentes MD on 11/27/2021 at 1:03 PM

## 2021-11-27 NOTE — CONSULTS
Patient seen and examined. Consult dictated. Patient is a 78-year-old gentleman with alcoholic cirrhosis of liver and now with acute kidney injury and electrolyte balance with hyponatremia, hyperkalemia and metabolic acidosis. Acute kidney injury possibly secondary cardiorenal syndrome versus profound volume depletion. Will supplement volume cautiously. Discontinue potassium sparing diuretics as that could be playing a role in the patient's hyperkalemia as well as hyponatremia. Try to raise serum sodium level slowly. Supplement bicarbonate. Patient does have some degree of ketosis with elevated beta hydroxybutyrate which may be reflection of poor oral intake.  , Thank You for allowing me to participate in the care of this patient. Will follow the patient with you.     Adalberto Montes MD  Nephrology    Electronically signed by Lisbeth Gomez MD on 11/27/2021 at 11:32 AM

## 2021-11-27 NOTE — CONSULTS
distention. No carotid bruits. CHEST:  Symmetrical.  LUNGS:  Vesicular breath sounds. Breath sounds decreased at the bases. No rales or rhonchi. CARDIOVASCULAR:  Regular rate and rhythm without any pericardial rub or  gallop. ABDOMEN:  Soft and mildly distended. No tenderness or rebound  tenderness. Normal bowel sounds. EXTREMITIES:  No pedal edema. LAB DATA:  Lab data from earlier today; serum sodium level 124 mmol/L,  bicarbonate level 11 mmol/L, beta-hydroxybutyrate 0.39 mmol/L, potassium  4.8 mmol/L, creatinine 3.2 mg/dL. IMPRESSION:  1. Acute kidney injury. Acute kidney injury in this patient in all  probability is secondary to hepatorenal syndrome with advanced cirrhosis  of liver. The patient also has had poor oral intake and was on an  aggressive diuretic regimen. We will gently hydrate the patient. Follow urinary output and serum creatinine. If the renal function does  not improve, we will need to intervene with renal replacement therapy  and this was discussed with the patient and his girlfriend and mother. The patient would be a poor dialysis candidate. 2.  Metabolic acidosis. The patient has non-anion gap metabolic  acidosis which may be a reflection of diarrhea as well as acute kidney  injury. We will supplement bicarbonate. We will increase the rate of  bicarbonate supplement. Follow serial bicarbonate levels. 3.  Hyponatremia. The patient with hyponatremia associated with hepatic  failure. We will cautiously try to raise the serum sodium levels. Discontinue spironolactone as that may be playing a role in the  patient's hyponatremia. 4.  Hyperkalemia. Hyperkalemia is secondary to acute kidney injury,  potassium-sparing diuretics, and potassium supplements. Hyperkalemia  has improved. 5.  Borderline hypotension. If needed, we will use midodrine. 7.  Hypocalcemia. We will follow. PLAN:  Plan is to increase the rate of IV fluids and follow serial  electrolytes. Rest of plans per orders. Thank you for allowing me to participate in the care of your patient. We will follow the patient with you.         Joana Mahan MD    D: 11/27/2021 11:41:41       T: 11/27/2021 15:53:52     [id number]  Job#: [id number]     Doc#: [id number]    CC:

## 2021-11-27 NOTE — PLAN OF CARE
Problem: Falls - Risk of:  Goal: Will remain free from falls  Description: Will remain free from falls  11/27/2021 0008 by Irvin Manuel RN  Outcome: Met This Shift  11/27/2021 0007 by Irvin Manuel RN  Outcome: Met This Shift  Goal: Absence of physical injury  Description: Absence of physical injury  11/27/2021 0008 by Irvin Manuel RN  Outcome: Met This Shift  11/27/2021 0007 by Irvin Manuel RN  Outcome: Met This Shift     Problem: Skin Integrity:  Goal: Will show no infection signs and symptoms  Description: Will show no infection signs and symptoms  11/27/2021 0008 by Irvin Manuel RN  Outcome: Met This Shift  11/27/2021 0007 by Irvin Manuel RN  Outcome: Met This Shift  Goal: Absence of new skin breakdown  Description: Absence of new skin breakdown  11/27/2021 0008 by Irvin Manuel RN  Outcome: Met This Shift  11/27/2021 0007 by Irvin Manuel RN  Outcome: Met This Shift

## 2021-11-27 NOTE — HOME CARE
Current with [facility] for SN, PT, OT. Will need NORMAN orders prior to discharge if appropriate.   Roberta Calles LPN, [facility]

## 2021-11-27 NOTE — PROGRESS NOTES
Internal Medicine Progress Note    [facility]    Vianca Hudson., F.A.C.OJerardoI. [place] D.O., JEREMYOJerardoIJerardo Pacheco D.O. Jenny Blue, MSN, APRN, NP-C  Jelena Rodriguez. Thalia Lozano, MSN, APRN-CNP     Primary Care Physician: Alda Adam DO   Admitting Physician:  Loni Pinzon DO  Admission date and time: 11/26/2021  9:25 AM    Room:  [address] [place] [address]  Admitting diagnosis: Hepatorenal syndrome ([facility] [address] [K76.7]  Acute kidney injury ([facility] [address] [N17.9]  Chronic liver failure without hepatic coma [facility]) [K72.10]    Patient Name: Billie Fleischer  MRN: [id number]    Date of Service: 11/27/2021       Covering for Dr. Luciano Caro    Subjective: Tevin Steve is a 40 y.o. male who was seen and examined today,11/27/2021, at the bedside. Patient doing very poorly multiple problems at hand including hepatorenal syndrome and dropping blood pressure. Patient has very low urine output with evidence of metabolic acidosis. Currently being followed by multiple subspecialists. Mother and fiancé at the bedside present during my examination. Review of System: Limited at the present time  Constitutional:   Denies fever or chills, weight loss or gain, fatigue or malaise. HEENT:   Denies ear pain, sore throat, sinus or eye problems. Cardiovascular:   Denies any chest pain, irregular heartbeats, or palpitations. Respiratory:   Denies shortness of breath, coughing, sputum production, hemoptysis, or wheezing. Gastrointestinal:   Denies nausea, vomiting, diarrhea, or constipation. Denies any abdominal pain. Genitourinary:    Denies any urgency, frequency, hematuria. Voiding  without difficulty. Extremities:   Denies lower extremity swelling, edema or cyanosis. Neurology:    Denies any headache or focal neurological deficits, Denies generalized weakness or memory difficulty. Psch:   Denies being anxious or depressed.   Musculoskeletal:    Denies  myalgias, joint complaints or back pain.   Integumentary:   Denies any rashes, ulcers, or excoriations. Denies bruising. Hematologic/Lymphatic:  Denies bruising or bleeding. Physical Exam:  No intake/output data recorded. Intake/Output Summary (Last 24 hours) at 11/27/2021 1503  Last data filed at 11/27/2021 1422  Gross per 24 hour   Intake 1120 ml   Output 300 ml   Net 820 ml   I/O last 3 completed shifts: In: 1120 [I.V.:1020; IV Piggyback:100]  Out: 300 [Urine:300]  Patient Vitals for the past 96 hrs (Last 3 readings):   Weight   11/27/21 0600 195 lb 1.6 oz (88.5 kg)   11/26/21 1645 195 lb (88.5 kg)   11/26/21 0936 215 lb (97.5 kg)     Vital Signs:   Blood pressure 108/61, pulse 85, temperature 98.9 °F (37.2 °C), temperature source Axillary, resp. rate 14, height 6' 3\" (1.905 m), weight 195 lb 1.6 oz (88.5 kg), SpO2 96 %. General appearance:  Patient alert but lethargic. Appears chronically ill  Head:  Normocephalic. No masses, lesions or tenderness. Eyes:  PERRLA. EOMI. sclera icteric. Buccal mucosa moist.  ENT:  Ears normal. Mucosa normal.  Neck:    Supple. Trachea midline. No thyromegaly. No JVD. No bruits. Heart:    Rhythm regular. Tachycardic. No murmurs. Lungs:    Diminished  Abdomen:   Soft. Non-tender. Non-distended. Bowel sounds positive. No organomegaly or masses. No pain on palpation. Newly distended  Extremities:    Peripheral pulses present. No peripheral edema. No ulcers. No cyanosis. No clubbing.   Neurologic:    Responsive but lethargic  Integumentary:  No rashes skin jaundice  Genitalia/Breast:  Hickman catheter    Medication:  Scheduled Meds:   sodium bicarbonate  1,300 mg Oral TID    pantoprazole  40 mg IntraVENous Daily    And    sodium chloride (PF)  10 mL IntraVENous Daily    phytonadione  5 mg Oral Daily    midodrine  15 mg Oral TID WC    thiamine (VITAMIN B1) IVPB  100 mg IntraVENous Daily    albumin human  25 g IntraVENous Q12H    rifaximin  550 mg Oral BID    sodium chloride flush  5-40 mL IntraVENous 2 times per day    piperacillin-tazobactam  3,375 mg IntraVENous Q8H     Continuous Infusions:   phenylephrine (DESMOND-SYNEPHRINE) 50mg/250mL infusion 75 mcg/min (11/27/21 1407)    octreotide (SANDOSTATIN) infusion      dextrose      sodium chloride      sodium chloride Stopped (11/27/21 0920)    sodium bicarbonate infusion 85 mL/hr at 11/27/21 1249       Objective Data:  CBC with Differential:    Lab Results   Component Value Date    WBC 10.9 11/27/2021    RBC 3.15 11/27/2021    HGB 10.8 11/27/2021    HCT 29.7 11/27/2021    PLT 81 11/27/2021    MCV 94.3 11/27/2021    MCH 34.3 11/27/2021    MCHC 36.4 11/27/2021    RDW 18.6 11/27/2021    METASPCT 0.9 11/20/2021    LYMPHOPCT 11.0 11/27/2021    MONOPCT 9.5 11/27/2021    BASOPCT 0.5 11/27/2021    MONOSABS 1.04 11/27/2021    LYMPHSABS 1.20 11/27/2021    EOSABS 0.01 11/27/2021    BASOSABS 0.06 11/27/2021     CMP:    Lab Results   Component Value Date     11/27/2021    K 4.2 11/27/2021    K 5.8 11/26/2021    CL 95 11/27/2021    CO2 14 11/27/2021    BUN 45 11/27/2021    CREATININE 3.8 11/27/2021    GFRAA 21 11/27/2021    LABGLOM 17 11/27/2021    GLUCOSE 89 11/27/2021    PROT 5.9 11/27/2021    LABALBU 1.7 11/27/2021    CALCIUM 8.0 11/27/2021    BILITOT 21.3 11/27/2021    ALKPHOS 89 11/27/2021     11/27/2021     11/27/2021              Assessment:    · Acute hepatic encephalopathy  · Hepatorenal syndrome probable hepatopulmonary syndrome  · Acute hypoxic respiratory failure  · Hypotonic hypervolemic hyponatremia  · JULIO on CKD  · Pneumonia possible aspiration  · Decompensated cirrhosis  · Hyperbilirubinemia  · Chronic normocytic anemia  · Atrial fibrillation  · History of anxiety and depression  · History of alcohol abuse  · GERD        Plan:         · Patient is doing very poorly at the present time. The patient does have advanced liver disease with decompensated cirrhosis and not currently a candidate for liver transplant.   Symptoms are suggestive of severe electrolyte imbalance with probable hepatorenal syndrome. He also is currently hypotensive. I have discussed the condition with the mother and fiancé along with Dr. Leon Laughlin have discussed his CODE STATUS and agree on no intubation or chest compressions. We have also discussed possibility of transition into hospice pending his status. Continue aggressive care for the next 24 to 48 hours and observe response  · Electrolyte imbalance of hyponatremia being followed by nephrology  · Metabolic acidosis being corrected by bicarb infusion  · GI is following regarding his hepatic cirrhosis  · Vasopressor has been instituted  · Salt poor albumin to expand volume  · Blood culture has been obtained. Zosyn to cover for spontaneous bacterial peritonitis  · Patient is auto anticoagulated vitamin K supplementation  · Treat elevated ammonia level  · Might require paracentesis  · Prognosis poor    Greater than 40 minutes of critical care time was spent with the patient. This includes chart review, , and discussion with those consultants involved in the patient's care. More than 50% of my  time was spent at the bedside counseling/coordinating care with the patient and/or family with face to face contact. This time was spent reviewing notes and laboratory data as well as instructing and counseling the patient. Time I spent with the family or surrogate(s) is included only if the patient was incapable of providing the necessary information or participating in medical decisions. I also discussed the differential diagnosis and all of the proposed management plans with the patient and individuals accompanying the patient. Soledad Lovell requires this high level of physician care and nursing on the ICU unit due the complexity of decision management and chance of rapid decline or death. Continued cardiac monitoring and higher level of nursing are required.  I am readily available for any further decision-making and intervention.      Winnie Mercer DO, JOHN.C.O.I.  11/27/2021  3:03 PM

## 2021-11-28 NOTE — PROGRESS NOTES
Pulmonary/Critical Care Progress Note    We are following patient for alcoholic cirrhosis, hyperbilirubinemia of greater than 20, acute kidney injury, possibly hepatorenal syndrome, improved hepatic encephalopathy, hyponatremia, hypokalemia, ascites, bilateral pleural effusions    SUBJECTIVE:  The patient is apparently much more awake and alert than he was today as compared to 2 to 3 days ago when he was admitted. He has received a great deal (60 mEq) of potassium today and continues to receive a bicarbonate drip per the nephrology service. He is not currently on lactulose because he was having diarrhea and volume loss was a concern. He is scheduled tomorrow for a paracentesis. I met his mother at the bedside who has very realistic expectations. Patient is still pressor dependent on phenylephrine; therefore, we will increase his albumin doses to 25 mg every 8 hours. He is also able to take p.o. fluids which we will initiate shortly. BUN and creatinine appear to be slowly improving with fluids containing bicarbonate.     MEDICATIONS:   sodium bicarbonate  1,300 mg Oral TID    pantoprazole  40 mg IntraVENous Daily    And    sodium chloride (PF)  10 mL IntraVENous Daily    midodrine  15 mg Oral TID WC    thiamine (VITAMIN B1) IVPB  100 mg IntraVENous Daily    albumin human  25 g IntraVENous Q12H    metoprolol  2.5 mg IntraVENous Q6H    rifaximin  550 mg Oral BID    sodium chloride flush  5-40 mL IntraVENous 2 times per day    piperacillin-tazobactam  3,375 mg IntraVENous Q8H      phenylephrine (DESMOND-SYNEPHRINE) 50mg/250mL infusion 125 mcg/min (11/28/21 1409)    octreotide (SANDOSTATIN) infusion 50 mcg/hr (11/28/21 0932)    dextrose      sodium chloride      sodium chloride 12.5 mL/hr at 11/28/21 1457    sodium bicarbonate infusion 85 mL/hr at 11/28/21 0736     LORazepam **OR** LORazepam **OR** [DISCONTINUED] LORazepam **OR** [DISCONTINUED] LORazepam **OR** [DISCONTINUED] LORazepam **OR** [DISCONTINUED] LORazepam **OR** [DISCONTINUED] LORazepam **OR** [DISCONTINUED] LORazepam, glucose, dextrose, glucagon (rDNA), dextrose, sodium chloride flush, sodium chloride, polyethylene glycol, acetaminophen **OR** [DISCONTINUED] acetaminophen, prochlorperazine      REVIEW OF SYSTEMS:  Constitutional: Denies fever, weight loss, night sweats, and fatigue  Skin: Denies pigmentation, dark lesions, and rashes   HEENT: Denies hearing loss, tinnitus, ear drainage, epistaxis, sore throat, and hoarseness. Cardiovascular: Denies palpitations, chest pain, and chest pressure. Respiratory: Denies cough, dyspnea at rest, hemoptysis, apnea, and choking. Gastrointestinal: Denies nausea, vomiting, poor appetite, diarrhea, heartburn or reflux  Genitourinary: Denies dysuria, frequency, urgency or hematuria  Musculoskeletal: Denies myalgias, muscle weakness, and bone pain  Neurological: Denies dizziness, vertigo, headache, and focal weakness  Psychological: Denies anxiety and depression  Endocrine: Denies heat intolerance and cold intolerance  Hematopoietic/Lymphatic: Denies bleeding problems and blood transfusions    OBJECTIVE:  Vitals:    11/28/21 1200   BP:    Pulse:    Resp:    Temp: 98.4 °F (36.9 °C)   SpO2:         O2 Flow Rate (L/min): 5 L/min  O2 Device: None (Room air)    PHYSICAL EXAM:  Constitutional: No fever, chills, diaphoresis  Skin: Deeply jaundiced  HEENT: Prominent scleral icterus icterus  Neck: No JVD, lymphadenopathy, thyromegaly  Cardiovascular: S1, S2 regular. No S3 murmurs rubs present  Respiratory: Few crackles at lung bases, otherwise clear  Gastrointestinal: Some dullness to percussion consistent with ascites. I do not feel splenic enlargement  Genitourinary: No grossly bloody urine. Urine is dark in color  Extremities: No clubbing, cyanosis, or edema.  Significant muscle wasting is seen  Neurological: Confused but easily arousable and able to take sips of liquids  Psychological: Flat affect    LABS:  WBC   Date Value Ref Range Status   11/28/2021 12.7 (H) 4.5 - 11.5 E9/L Final   11/27/2021 10.9 4.5 - 11.5 E9/L Final   11/26/2021 10.8 4.5 - 11.5 E9/L Final     Hemoglobin   Date Value Ref Range Status   11/28/2021 10.1 (L) 12.5 - 16.5 g/dL Final   11/27/2021 10.8 (L) 12.5 - 16.5 g/dL Final   11/26/2021 12.4 (L) 12.5 - 16.5 g/dL Final     Hematocrit   Date Value Ref Range Status   11/28/2021 27.5 (L) 37.0 - 54.0 % Final   11/27/2021 29.7 (L) 37.0 - 54.0 % Final   11/26/2021 33.9 (L) 37.0 - 54.0 % Final     MCV   Date Value Ref Range Status   11/28/2021 93.9 80.0 - 99.9 fL Final   11/27/2021 94.3 80.0 - 99.9 fL Final   11/26/2021 95.0 80.0 - 99.9 fL Final     Platelets   Date Value Ref Range Status   11/28/2021 63 (L) 130 - 450 E9/L Final   11/27/2021 81 (L) 130 - 450 E9/L Final   11/26/2021 100 (L) 130 - 450 E9/L Final     Sodium   Date Value Ref Range Status   11/28/2021 127 (L) 132 - 146 mmol/L Final   11/28/2021 129 (L) 132 - 146 mmol/L Final   11/27/2021 124 (L) 132 - 146 mmol/L Final     Potassium   Date Value Ref Range Status   11/28/2021 3.4 (L) 3.5 - 5.0 mmol/L Final   11/28/2021 3.2 (L) 3.5 - 5.0 mmol/L Final   11/27/2021 3.2 (L) 3.5 - 5.0 mmol/L Final     Potassium reflex Magnesium   Date Value Ref Range Status   11/26/2021 5.8 (H) 3.5 - 5.0 mmol/L Final   11/14/2021 3.1 (L) 3.5 - 5.0 mmol/L Final     Chloride   Date Value Ref Range Status   11/28/2021 94 (L) 98 - 107 mmol/L Final   11/28/2021 97 (L) 98 - 107 mmol/L Final   11/27/2021 92 (L) 98 - 107 mmol/L Final     CO2   Date Value Ref Range Status   11/28/2021 21 (L) 22 - 29 mmol/L Final   11/28/2021 18 (L) 22 - 29 mmol/L Final   11/27/2021 17 (L) 22 - 29 mmol/L Final     BUN   Date Value Ref Range Status   11/28/2021 38 (H) 6 - 20 mg/dL Final   11/28/2021 44 (H) 6 - 20 mg/dL Final   11/27/2021 45 (H) 6 - 20 mg/dL Final     CREATININE   Date Value Ref Range Status   11/28/2021 2.5 (H) 0.7 - 1.2 mg/dL Final   11/28/2021 3.2 (H) 0.7 - 1.2 mg/dL Final   11/27/2021 3.4 (H) 0.7 - 1.2 mg/dL Final     Glucose   Date Value Ref Range Status   11/28/2021 163 (H) 74 - 99 mg/dL Final   11/28/2021 122 (H) 74 - 99 mg/dL Final   11/27/2021 114 (H) 74 - 99 mg/dL Final     Calcium   Date Value Ref Range Status   11/28/2021 7.1 (L) 8.6 - 10.2 mg/dL Final   11/28/2021 7.5 (L) 8.6 - 10.2 mg/dL Final   11/27/2021 7.6 (L) 8.6 - 10.2 mg/dL Final     Total Protein   Date Value Ref Range Status   11/28/2021 5.2 (L) 6.4 - 8.3 g/dL Final   11/27/2021 5.9 (L) 6.4 - 8.3 g/dL Final   11/27/2021 5.8 (L) 6.4 - 8.3 g/dL Final     Albumin   Date Value Ref Range Status   11/28/2021 2.2 (L) 3.5 - 5.2 g/dL Final   11/27/2021 1.7 (L) 3.5 - 5.2 g/dL Final   11/27/2021 1.7 (L) 3.5 - 5.2 g/dL Final     Total Bilirubin   Date Value Ref Range Status   11/28/2021 24.2 (H) 0.0 - 1.2 mg/dL Final   11/27/2021 21.3 (H) 0.0 - 1.2 mg/dL Final   11/27/2021 20.1 (H) 0.0 - 1.2 mg/dL Final     Alkaline Phosphatase   Date Value Ref Range Status   11/28/2021 66 40 - 129 U/L Final   11/27/2021 89 40 - 129 U/L Final   11/27/2021 88 40 - 129 U/L Final     AST   Date Value Ref Range Status   11/28/2021 148 (H) 0 - 39 U/L Final   11/27/2021 233 (H) 0 - 39 U/L Final     Comment:     Specimen is slightly Hemolyzed. Result may be artificially increased. 11/27/2021 233 (H) 0 - 39 U/L Final     Comment:     Specimen is slightly Hemolyzed. Result may be artificially increased. ALT   Date Value Ref Range Status   11/28/2021 77 (H) 0 - 40 U/L Final   11/27/2021 104 (H) 0 - 40 U/L Final   11/27/2021 100 (H) 0 - 40 U/L Final     GFR Non-   Date Value Ref Range Status   11/28/2021 28 >=60 mL/min/1.73 Final     Comment:     Chronic Kidney Disease: less than 60 ml/min/1.73 sq.m. Kidney Failure: less than 15 ml/min/1.73 sq.m. Results valid for patients 18 years and older. 11/28/2021 21 >=60 mL/min/1.73 Final     Comment:     Chronic Kidney Disease: less than 60 ml/min/1.73 sq.m. Kidney Failure: less than 15 ml/min/1.73 sq.m. Results valid for patients 18 years and older. 11/27/2021 20 >=60 mL/min/1.73 Final     Comment:     Chronic Kidney Disease: less than 60 ml/min/1.73 sq.m. Kidney Failure: less than 15 ml/min/1.73 sq.m. Results valid for patients 18 years and older. GFR    Date Value Ref Range Status   11/28/2021 34  Final   11/28/2021 26  Final   11/27/2021 24  Final     Magnesium   Date Value Ref Range Status   11/28/2021 1.8 1.6 - 2.6 mg/dL Final   11/28/2021 1.8 1.6 - 2.6 mg/dL Final   11/27/2021 1.8 1.6 - 2.6 mg/dL Final     Phosphorus   Date Value Ref Range Status   11/28/2021 3.1 2.5 - 4.5 mg/dL Final   11/15/2021 2.0 (L) 2.5 - 4.5 mg/dL Final   01/19/2019 2.7 2.5 - 4.5 mg/dL Final     Recent Labs     11/26/21  1035   PH 7.433   PO2 105.7*   PCO2 15.8*   HCO3 10.3*   BE -11.3*   O2SAT 97.8       RADIOLOGY:  US GALLBLADDER RUQ   Final Result   Findings again consistent with cirrhotic liver. Ascites. Hepatofugal flow redemonstrated in the portal vein. Thickened gallbladder wall which may be reactive to the ascites and hepatic   disease. US ABDOMEN LIMITED   Final Result   Moderate volume intra-abdominal ascites. XR CHEST PORTABLE   Final Result   Mild basilar opacities are suspicious for pneumonia. This appearance can be   seen with COVID-19. IR US GUIDED PARACENTESIS    (Results Pending)           PROBLEM LIST:  Active Problems:    Hepatorenal syndrome ([address]  Resolved Problems:    * No resolved hospital problems. *      IMPRESSION:  1. Advanced hepatic cirrhosis  2. Acute kidney injury  3. Intravascular depletion versus hepatorenal syndrome  4. Consider spontaneous bacterial peritonitis  5. Ascites  6. Bilateral pleural effusions related to his ascites  7. Severe jaundice    PLAN:  1.  Continue IV fluids until the patient is taking a decent volume of food; sodium bicarbonate tablets may also be

## 2021-11-28 NOTE — PROGRESS NOTES
PROGRESS NOTE    By Iván Sanders D.O GI Fellow    [facility]  Dr. Yuliana Valdez MD, Dr. Juan Wilson MD, Dr Kalia Montenegro, Dr. Donice Bence, MD, Dr. Cuba Pollack, DO Yulia Kendrick  40 y.o.  male    SUBJECTIVE: Patient was seen and examined at the bedside this morning along with his mother. Patient was found sleeping comfortably. Per his mother patient was awake prior to my arrival.  And his mentation has significantly improved. Patient was able to be on video chat with his family. And has been joking around. Review of labs showed that patient had a sliding improvement in creatinine. 3.2 this morning.     OBJECTIVE: Repeat ultrasound showed thickened gallbladder which could be reactive to the ascites or hepatic disease    BP (!) 96/56   Pulse 73   Temp 98.8 °F (37.1 °C) (Axillary)   Resp 18   Ht 6' 3\" (1.905 m)   Wt 195 lb 1.6 oz (88.5 kg)   SpO2 96%   BMI 24.39 kg/m²     Gen: NAD  HEENT:PEERL, icterus/jaundice  Heart: RRR, no M/R/G  Lungs: CTAB  Abd.: soft, NT, distended, BS +, no G/R, no HSM  Extr.: no C/C/E, no bruising         Lab Results   Component Value Date    WBC 12.7 11/28/2021    WBC 10.9 11/27/2021    WBC 10.8 11/26/2021    HGB 10.1 11/28/2021    HGB 10.8 11/27/2021    HGB 12.4 11/26/2021    HCT 27.5 11/28/2021    MCV 93.9 11/28/2021    RDW 18.1 11/28/2021    PLT 63 11/28/2021    PLT 81 11/27/2021     11/26/2021     Lab Results   Component Value Date     11/28/2021    K 3.2 11/28/2021    K 5.8 11/26/2021    CL 97 11/28/2021    CO2 18 11/28/2021    BUN 44 11/28/2021    CREATININE 3.2 11/28/2021    CALCIUM 7.5 11/28/2021    PROT 5.2 11/28/2021    LABALBU 2.2 11/28/2021    BILITOT 24.2 11/28/2021    BILITOT 21.3 11/27/2021    BILITOT 20.1 11/27/2021    ALKPHOS 66 11/28/2021    ALKPHOS 89 11/27/2021    ALKPHOS 88 11/27/2021     11/28/2021     11/27/2021     11/27/2021    ALT 77 11/28/2021     11/27/2021     11/27/2021 treatment of renal failure/hepatorenal syndrome per nephrology     Other comorbidities  Hypotension  Hyponatremia  Hyperkalemia  History of atrial fibrillation  GERD  Hx Alcohol withdrawal and intoxication      Pt was discussed with attending    Pradeep Delgado DO  GI Fellow   11/28/2021  12:20 PM    I was present at bedside and performed my own exam.  I agree with the above findings and plan. Pt improved mentation this am.  Had d/w with mother and she feels much improved mentation. I did tell her kidney function improved somewhat however blood is thin with INR elevating. Pt on Vit K.  US reviewed with normal CBD and no suggestion of obstruction. Prognosis poor overall and very short term survival if pt continues to drink as outpt. Recommend AA and counseling vs inpt rehab on D/C. Will have him follow with transplant center on outpt basis. Nephrology notes reviewed. No outward bleeding. Our service will follow.       DO Feroz  11/28/2021  9:56 PM

## 2021-11-28 NOTE — PROGRESS NOTES
Internal Medicine Progress Note         Primary Care Physician: Jarrett Ramos DO   Admitting Physician:  Jose Brown DO  Admission date and time: 11/26/2021  9:25 AM    Room:  [address] [place] [address]  Admitting diagnosis: Hepatorenal syndrome ([facility] [K76.7]  Acute kidney injury ([facility] [N17.9]  Chronic liver failure without hepatic coma [facility]) [K72.10]    Patient Name: Rachael Pugh  MRN: [id number]    Date of Service: 11/28/2021       Subjective: Mari Mackenzie is a 40 y.o. male who was seen and examined today,11/28/2021, at the bedside. Patient doing very poorly multiple problems at hand including hepatorenal syndrome and dropping blood pressure. Patient has very low urine output with evidence of metabolic acidosis. Currently being followed by multiple subspecialists. 11/2611/27/2021-medical coverage by Dr. Allan Brumfield    11/28/2021  Patient seen examined on PCU. Patient's mother is at the bedside and case discussed in detail. Patient is much more alert this morning. Patient oriented to person place. He denies any significant pain at this time. There is no lower leg edema and abdomen is distended but very soft. Serum sodium is up to 129 with a potassium 3.2. CO2 electrolytes is 18 and improved but BUN/creatinine is 44/3.2 which is improved compared to 11/27. Total bili is 24.2 with WBC 12.7 hemoglobin 10.1. Platelet count is down to 63. INR was 7.8. Temperature 98.8 with heart rate of 70 and blood pressure is 115/67. Patient has been put on phenylephrine drip with improvement in blood pressure. Urine output about 400 cc a shift. Review of System: Limited at the present time  Constitutional:   Denies fever or chills, weight loss or gain, fatigue or malaise. HEENT:   Denies ear pain, sore throat, sinus or eye problems. Cardiovascular:   Denies any chest pain, irregular heartbeats, or palpitations.    Respiratory:   Denies shortness of breath, coughing, sputum production, hemoptysis, or wheezing. Gastrointestinal:   Denies nausea, vomiting, diarrhea, or constipation. Denies any abdominal pain. Genitourinary:    Denies any urgency, frequency, hematuria. Voiding  without difficulty. Extremities:   Denies lower extremity swelling, edema or cyanosis. Neurology:    Denies any headache or focal neurological deficits, Denies generalized weakness or memory difficulty. Psch:   Denies being anxious or depressed. Musculoskeletal:    Denies  myalgias, joint complaints or back pain. Integumentary:   Denies any rashes, ulcers, or excoriations. Denies bruising. Hematologic/Lymphatic:  Denies bruising or bleeding. Physical Exam:  I/O this shift:  In: 120 [P.O.:120]  Out: 400 [Urine:400]    Intake/Output Summary (Last 24 hours) at 11/28/2021 1121  Last data filed at 11/28/2021 0800  Gross per 24 hour   Intake 2651 ml   Output 400 ml   Net 2251 ml   I/O last 3 completed shifts: In: 8444 [I.V.:2184; IV Piggyback:347]  Out: -   Patient Vitals for the past 96 hrs (Last 3 readings):   Weight   11/27/21 0600 195 lb 1.6 oz (88.5 kg)   11/26/21 1645 195 lb (88.5 kg)   11/26/21 0936 215 lb (97.5 kg)     Vital Signs:   Blood pressure (!) 96/56, pulse 73, temperature 98.8 °F (37.1 °C), temperature source Axillary, resp. rate 18, height 6' 3\" (1.905 m), weight 195 lb 1.6 oz (88.5 kg), SpO2 96 %. General appearance:  Patient alert but lethargic. Appears chronically ill  Head:  Normocephalic. No masses, lesions or tenderness. Eyes:  PERRLA. EOMI. sclera icteric. Buccal mucosa moist.  ENT:  Ears normal. Mucosa normal.  Neck:    Supple. Trachea midline. No thyromegaly. No JVD. No bruits. Heart:    Rhythm regular. Tachycardic. No murmurs. Lungs:    Diminished  Abdomen:   Soft. Non-tender. Non-distended. Bowel sounds positive. No organomegaly or masses. No pain on palpation. Newly distended  Extremities:    Peripheral pulses present. No peripheral edema. No ulcers. No cyanosis.  No clubbing.   Neurologic:    Responsive but lethargic  Integumentary:  No rashes skin jaundice  Genitalia/Breast:  Hickman catheter    Medication:  Scheduled Meds:   potassium chloride  10 mEq IntraVENous Q1H    sodium bicarbonate  1,300 mg Oral TID    pantoprazole  40 mg IntraVENous Daily    And    sodium chloride (PF)  10 mL IntraVENous Daily    midodrine  15 mg Oral TID WC    thiamine (VITAMIN B1) IVPB  100 mg IntraVENous Daily    albumin human  25 g IntraVENous Q12H    metoprolol  2.5 mg IntraVENous Q6H    rifaximin  550 mg Oral BID    sodium chloride flush  5-40 mL IntraVENous 2 times per day    piperacillin-tazobactam  3,375 mg IntraVENous Q8H     Continuous Infusions:   phenylephrine (DESMOND-SYNEPHRINE) 50mg/250mL infusion 125 mcg/min (11/28/21 0708)    octreotide (SANDOSTATIN) infusion 50 mcg/hr (11/28/21 0932)    dextrose      sodium chloride      sodium chloride Stopped (11/28/21 0841)    sodium bicarbonate infusion 85 mL/hr at 11/28/21 0736       Objective Data:  CBC with Differential:    Lab Results   Component Value Date    WBC 12.7 11/28/2021    RBC 2.93 11/28/2021    HGB 10.1 11/28/2021    HCT 27.5 11/28/2021    PLT 63 11/28/2021    MCV 93.9 11/28/2021    MCH 34.5 11/28/2021    MCHC 36.7 11/28/2021    RDW 18.1 11/28/2021    METASPCT 0.9 11/20/2021    LYMPHOPCT 11.0 11/27/2021    MONOPCT 9.5 11/27/2021    BASOPCT 0.5 11/27/2021    MONOSABS 1.04 11/27/2021    LYMPHSABS 1.20 11/27/2021    EOSABS 0.01 11/27/2021    BASOSABS 0.06 11/27/2021     CMP:    Lab Results   Component Value Date     11/28/2021    K 3.2 11/28/2021    K 5.8 11/26/2021    CL 97 11/28/2021    CO2 18 11/28/2021    BUN 44 11/28/2021    CREATININE 3.2 11/28/2021    GFRAA 26 11/28/2021    LABGLOM 21 11/28/2021    GLUCOSE 122 11/28/2021    PROT 5.2 11/28/2021    LABALBU 2.2 11/28/2021    CALCIUM 7.5 11/28/2021    BILITOT 24.2 11/28/2021    ALKPHOS 66 11/28/2021     11/28/2021    ALT 77 11/28/2021 Assessment:    · Acute hepatic encephalopathy  · Hepatorenal syndrome probable hepatopulmonary syndrome  · Acute hypoxic respiratory failure  · Hypotonic hypervolemic hyponatremia  · JULIO on CKD  · Pneumonia possible aspiration  · Decompensated cirrhosis  · Hyperbilirubinemia  · Chronic normocytic anemia  · Atrial fibrillation  · History of anxiety and depression  · History of alcohol abuse  · GERD        Plan:     · Patient is doing very poorly at the present time. The patient does have advanced liver disease with decompensated cirrhosis and not currently a candidate for liver transplant. Symptoms are suggestive of severe electrolyte imbalance with probable hepatorenal syndrome. He also is currently hypotensive. I have discussed the condition with the mother and fiancé along with Dr. Del Cid Pi have discussed his CODE STATUS and agree on no intubation or chest compressions. We have also discussed possibility of transition into hospice pending his status. Continue aggressive care for the next 24 to 48 hours and observe response  · Electrolyte imbalance of hyponatremia being followed by nephrology  · Metabolic acidosis being corrected by bicarb infusion  · GI is following regarding his hepatic cirrhosis  · Vasopressor has been instituted  · Salt poor albumin to expand volume  · Blood culture has been obtained.   Zosyn to cover for spontaneous bacterial peritonitis  · Patient is auto anticoagulated vitamin K supplementation  · Treat elevated ammonia level  · Might require paracentesis  · Prognosis poor but he is improving          Hannah Lopez DO, F.A.C.O.I.  11/28/2021  11:21 AM

## 2021-11-29 NOTE — CARE COORDINATION
SS NOTE: COVID NEGATIVE 11/26. This is an ICU pt. Pt is a DNR CCA, no intubation. He is on IV Bicarb, Protonix, Zocyn and Vasopressors. Pt had a paracentesis today. BEATRIZ met with pt and his sister, Scar Chapa in pt's room today. Pt was recently dched to his home with a ww and bsc from [facility] and [facility] AT [facility] from [facility]. SW discussed rehab placement with pt and sister today and shared the SNF provider list.  Pt's sister also requested information on applying for SSA. SW will meet with pt and his mother tomorrow and request SNF choices for pt. For a SNF placement pt will need a PRECERT, signed JORGE, current PT.OT notes and SW will need to complete the HENs. CLEVELAND Fischer.11/29/2021.5:06PM.

## 2021-11-29 NOTE — PROGRESS NOTES
Internal Medicine Progress Note         Primary Care Physician: Alda Adam DO   Admitting Physician:  Adelaida Mcmillan DO  Admission date and time: 11/26/2021  9:25 AM    Room:  [address] [place] [address]  Admitting diagnosis: Hepatorenal syndrome ([address] [K76.7]  Acute kidney injury ([address] [N17.9]  Chronic liver failure without hepatic coma [facility]) [K72.10]    Patient Name: Billie Fleischer  MRN: [id number]    Date of Service: 11/29/2021       Subjective: Tevin Steve is a 40 y.o. male ,11/29/2021, at the bedside. Patient doing very poorly multiple problems at hand including hepatorenal syndrome and dropping blood pressure. Patient has very low urine output with evidence of metabolic acidosis. Currently being followed by multiple subspecialists. 11/2611/27/2021-medical coverage by Dr. Roe Milligan    11/28/2021  Patient seen examined on PCU. Patient's mother is at the bedside and case discussed in detail. Patient is much more alert this morning. Patient oriented to person place. He denies any significant pain at this time. There is no lower leg edema and abdomen is distended but very soft. Serum sodium is up to 129 with a potassium 3.2. CO2 electrolytes is 18 and improved but BUN/creatinine is 44/3.2 which is improved compared to 11/27. Total bili is 24.2 with WBC 12.7 hemoglobin 10.1. Platelet count is down to 63. INR was 7.8. Temperature 98.8 with heart rate of 70 and blood pressure is 115/67. Patient has been put on phenylephrine drip with improvement in blood pressure. Urine output about 400 cc a shift. 11/29/2021  Patient seen and examined in PCU. Patient's mother is at the bedside and case discussed. Patient is still alert and oriented x23 with episodes of confusion. Patient denies any problem with chest or abdominal pain. Patient strength is still very poor with the patient still very weak on his feet. Patient appetite has improved with less nausea.   Patient still has persistent hand tremor which rate 17, height 6' 3\" (1.905 m), weight 195 lb 1.6 oz (88.5 kg), SpO2 97 %. General appearance:  Patient alert but lethargic. Appears chronically ill  Head:  Normocephalic. No masses, lesions or tenderness. Eyes:  PERRLA. EOMI. sclera icteric. Buccal mucosa moist.  ENT:  Ears normal. Mucosa normal.  Neck:    Supple. Trachea midline. No thyromegaly. No JVD. No bruits. Heart:    Rhythm regular. Tachycardic. No murmurs. Lungs:    Diminished  Abdomen:   Soft. Non-tender. Non-distended. Bowel sounds positive. No organomegaly or masses. No pain on palpation. Newly distended  Extremities:    Peripheral pulses present. No peripheral edema. No ulcers. No cyanosis. No clubbing.   Neurologic:    Responsive but lethargic  Integumentary:  No rashes skin jaundice  Genitalia/Breast:  Hickman catheter    Medication:  Scheduled Meds:   insulin lispro  0-6 Units SubCUTAneous TID WC    albumin human  25 g IntraVENous Q8H    pantoprazole  40 mg IntraVENous Daily    And    sodium chloride (PF)  10 mL IntraVENous Daily    midodrine  15 mg Oral TID WC    thiamine (VITAMIN B1) IVPB  100 mg IntraVENous Daily    metoprolol  2.5 mg IntraVENous Q6H    rifaximin  550 mg Oral BID    sodium chloride flush  5-40 mL IntraVENous 2 times per day    piperacillin-tazobactam  3,375 mg IntraVENous Q8H     Continuous Infusions:   sodium chloride      sodium chloride      sodium chloride 1,000 mL (11/29/21 1318)    phenylephrine (DESMOND-SYNEPHRINE) 50mg/250mL infusion 130 mcg/min (11/29/21 1109)    octreotide (SANDOSTATIN) infusion 50 mcg/hr (11/29/21 0740)    dextrose      sodium chloride      sodium chloride Stopped (11/29/21 0914)       Objective Data:  CBC with Differential:    Lab Results   Component Value Date    WBC 12.7 11/28/2021    RBC 2.93 11/28/2021    HGB 10.1 11/28/2021    HCT 27.5 11/28/2021    PLT 63 11/28/2021    MCV 93.9 11/28/2021    MCH 34.5 11/28/2021    MCHC 36.7 11/28/2021    RDW 18.1 11/28/2021    METASPCT 0.9 11/20/2021    LYMPHOPCT 11.0 11/27/2021    MONOPCT 9.5 11/27/2021    BASOPCT 0.5 11/27/2021    MONOSABS 1.04 11/27/2021    LYMPHSABS 1.20 11/27/2021    EOSABS 0.01 11/27/2021    BASOSABS 0.06 11/27/2021     CMP:    Lab Results   Component Value Date     11/29/2021    K 3.6 11/29/2021    K 5.8 11/26/2021    CL 90 11/29/2021    CO2 28 11/29/2021    BUN 33 11/29/2021    CREATININE 2.2 11/29/2021    GFRAA 39 11/29/2021    LABGLOM 33 11/29/2021    GLUCOSE 111 11/29/2021    PROT 4.5 11/29/2021    LABALBU 2.2 11/29/2021    CALCIUM 8.0 11/29/2021    BILITOT 24.0 11/29/2021    ALKPHOS 50 11/29/2021    AST 81 11/29/2021    ALT 47 11/29/2021              Assessment:    · Acute hepatic encephalopathy  · Hepatorenal syndrome probable hepatopulmonary syndrome  · Acute hypoxic respiratory failure  · Hypotonic hypervolemic hyponatremia  · JULIO on CKD  · Pneumonia possible aspiration  · Decompensated cirrhosis  · Hyperbilirubinemia  · Chronic normocytic anemia  · Atrial fibrillation  · History of anxiety and depression  · History of alcohol abuse  · GERD        Plan:     · Patient is doing very poorly at the present time. The patient does have advanced liver disease with decompensated cirrhosis and not currently a candidate for liver transplant. Symptoms are suggestive of severe electrolyte imbalance with probable hepatorenal syndrome. He also is currently hypotensive. I have discussed the condition with the mother and fiancé along with Dr. Antoine Toledo have discussed his CODE STATUS and agree on no intubation or chest compressions. We have also discussed possibility of transition into hospice pending his status.   Continue aggressive care for the next 24 to 48 hours and observe response  · Electrolyte imbalance of hyponatremia being followed by nephrology  · Metabolic acidosis being corrected by bicarb infusion  · GI is following regarding his hepatic cirrhosis  · Vasopressor has been instituted  · Salt poor albumin to expand volume  · Blood culture has been obtained.   Zosyn to cover for spontaneous bacterial peritonitis  · Patient is auto anticoagulated vitamin K supplementation  · Treat elevated ammonia level  · Might require paracentesis  · Prognosis is very poor but he is improving short-term          Ramos Medina DO, F.A.C.O.I.  11/29/2021  2:24 PM

## 2021-11-29 NOTE — PROGRESS NOTES
Pulmonary/Critical Care Progress Note    We are following patient for alcoholic cirrhosis, hyperbilirubinemia, coagulopathy, hyponatremia, ascites, small bilateral pleural effusions, JULIO    SUBJECTIVE:  Patient's mental status continues to improve. He is brighter today than yesterday. However, he was unable to have his paracentesis because of a prolonged PT/INR. He received vitamin K last night but this was not helpful because he cannot make any vitamin K dependent clotting factors. Therefore, we will give him fresh frozen plasma this evening and an additional unit tonight in preparation for the test which will be reordered for tomorrow. The patient is being tapered off Desmond-Synephrine and is doing reasonably well with this. He will received an an additional dose of albumin today.     MEDICATIONS:   insulin lispro  0-6 Units SubCUTAneous TID WC    albumin human  25 g IntraVENous Q8H    pantoprazole  40 mg IntraVENous Daily    And    sodium chloride (PF)  10 mL IntraVENous Daily    midodrine  15 mg Oral TID WC    thiamine (VITAMIN B1) IVPB  100 mg IntraVENous Daily    metoprolol  2.5 mg IntraVENous Q6H    rifaximin  550 mg Oral BID    sodium chloride flush  5-40 mL IntraVENous 2 times per day    piperacillin-tazobactam  3,375 mg IntraVENous Q8H      sodium chloride      sodium chloride      sodium chloride 1,000 mL (11/29/21 1318)    phenylephrine (DESMOND-SYNEPHRINE) 50mg/250mL infusion 130 mcg/min (11/29/21 1109)    octreotide (SANDOSTATIN) infusion 50 mcg/hr (11/29/21 0740)    dextrose      sodium chloride      sodium chloride Stopped (11/29/21 0914)     sodium chloride, sodium chloride, LORazepam **OR** LORazepam **OR** [DISCONTINUED] LORazepam **OR** [DISCONTINUED] LORazepam **OR** [DISCONTINUED] LORazepam **OR** [DISCONTINUED] LORazepam **OR** [DISCONTINUED] LORazepam **OR** [DISCONTINUED] LORazepam, glucose, dextrose, glucagon (rDNA), dextrose, sodium chloride flush, sodium chloride, Value Ref Range Status   11/28/2021 10.1 (L) 12.5 - 16.5 g/dL Final   11/27/2021 10.8 (L) 12.5 - 16.5 g/dL Final   11/26/2021 12.4 (L) 12.5 - 16.5 g/dL Final     Hematocrit   Date Value Ref Range Status   11/28/2021 27.5 (L) 37.0 - 54.0 % Final   11/27/2021 29.7 (L) 37.0 - 54.0 % Final   11/26/2021 33.9 (L) 37.0 - 54.0 % Final     MCV   Date Value Ref Range Status   11/28/2021 93.9 80.0 - 99.9 fL Final   11/27/2021 94.3 80.0 - 99.9 fL Final   11/26/2021 95.0 80.0 - 99.9 fL Final     Platelets   Date Value Ref Range Status   11/28/2021 63 (L) 130 - 450 E9/L Final   11/27/2021 81 (L) 130 - 450 E9/L Final   11/26/2021 100 (L) 130 - 450 E9/L Final     Sodium   Date Value Ref Range Status   11/29/2021 133 132 - 146 mmol/L Final   11/29/2021 130 (L) 132 - 146 mmol/L Final   11/29/2021 131 (L) 132 - 146 mmol/L Final     Potassium   Date Value Ref Range Status   11/29/2021 3.7 3.5 - 5.0 mmol/L Final   11/29/2021 3.6 3.5 - 5.0 mmol/L Final   11/29/2021 3.3 (L) 3.5 - 5.0 mmol/L Final     Potassium reflex Magnesium   Date Value Ref Range Status   11/26/2021 5.8 (H) 3.5 - 5.0 mmol/L Final   11/14/2021 3.1 (L) 3.5 - 5.0 mmol/L Final     Chloride   Date Value Ref Range Status   11/29/2021 93 (L) 98 - 107 mmol/L Final   11/29/2021 90 (L) 98 - 107 mmol/L Final   11/29/2021 91 (L) 98 - 107 mmol/L Final     CO2   Date Value Ref Range Status   11/29/2021 27 22 - 29 mmol/L Final   11/29/2021 28 22 - 29 mmol/L Final   11/29/2021 30 (H) 22 - 29 mmol/L Final     BUN   Date Value Ref Range Status   11/29/2021 30 (H) 6 - 20 mg/dL Final   11/29/2021 33 (H) 6 - 20 mg/dL Final   11/29/2021 33 (H) 6 - 20 mg/dL Final     CREATININE   Date Value Ref Range Status   11/29/2021 1.9 (H) 0.7 - 1.2 mg/dL Final   11/29/2021 2.2 (H) 0.7 - 1.2 mg/dL Final   11/29/2021 2.3 (H) 0.7 - 1.2 mg/dL Final     Glucose   Date Value Ref Range Status   11/29/2021 131 (H) 74 - 99 mg/dL Final   11/29/2021 111 (H) 74 - 99 mg/dL Final   11/29/2021 285 (H) 74 - 99 mg/dL Final     Calcium   Date Value Ref Range Status   11/29/2021 8.0 (L) 8.6 - 10.2 mg/dL Final   11/29/2021 8.0 (L) 8.6 - 10.2 mg/dL Final   11/29/2021 7.2 (L) 8.6 - 10.2 mg/dL Final     Total Protein   Date Value Ref Range Status   11/29/2021 4.5 (L) 6.4 - 8.3 g/dL Final   11/28/2021 5.2 (L) 6.4 - 8.3 g/dL Final   11/27/2021 5.9 (L) 6.4 - 8.3 g/dL Final     Albumin   Date Value Ref Range Status   11/29/2021 2.2 (L) 3.5 - 5.2 g/dL Final   11/28/2021 2.2 (L) 3.5 - 5.2 g/dL Final   11/27/2021 1.7 (L) 3.5 - 5.2 g/dL Final     Total Bilirubin   Date Value Ref Range Status   11/29/2021 24.0 (H) 0.0 - 1.2 mg/dL Final   11/28/2021 24.2 (H) 0.0 - 1.2 mg/dL Final   11/27/2021 21.3 (H) 0.0 - 1.2 mg/dL Final     Alkaline Phosphatase   Date Value Ref Range Status   11/29/2021 50 40 - 129 U/L Final   11/28/2021 66 40 - 129 U/L Final   11/27/2021 89 40 - 129 U/L Final     AST   Date Value Ref Range Status   11/29/2021 81 (H) 0 - 39 U/L Final   11/28/2021 148 (H) 0 - 39 U/L Final   11/27/2021 233 (H) 0 - 39 U/L Final     Comment:     Specimen is slightly Hemolyzed. Result may be artificially increased. ALT   Date Value Ref Range Status   11/29/2021 47 (H) 0 - 40 U/L Final   11/28/2021 77 (H) 0 - 40 U/L Final   11/27/2021 104 (H) 0 - 40 U/L Final     GFR Non-   Date Value Ref Range Status   11/29/2021 39 >=60 mL/min/1.73 Final     Comment:     Chronic Kidney Disease: less than 60 ml/min/1.73 sq.m. Kidney Failure: less than 15 ml/min/1.73 sq.m. Results valid for patients 18 years and older. 11/29/2021 33 >=60 mL/min/1.73 Final     Comment:     Chronic Kidney Disease: less than 60 ml/min/1.73 sq.m. Kidney Failure: less than 15 ml/min/1.73 sq.m. Results valid for patients 18 years and older. 11/29/2021 31 >=60 mL/min/1.73 Final     Comment:     Chronic Kidney Disease: less than 60 ml/min/1.73 sq.m. Kidney Failure: less than 15 ml/min/1.73 sq.m.   Results valid for patients 18 years and older. GFR    Date Value Ref Range Status   11/29/2021 47  Final   11/29/2021 39  Final   11/29/2021 37  Final     Magnesium   Date Value Ref Range Status   11/28/2021 1.8 1.6 - 2.6 mg/dL Final   11/28/2021 1.8 1.6 - 2.6 mg/dL Final   11/27/2021 1.8 1.6 - 2.6 mg/dL Final     Phosphorus   Date Value Ref Range Status   11/28/2021 3.1 2.5 - 4.5 mg/dL Final   11/15/2021 2.0 (L) 2.5 - 4.5 mg/dL Final   01/19/2019 2.7 2.5 - 4.5 mg/dL Final     No results for input(s): PH, PO2, PCO2, HCO3, BE, O2SAT in the last 72 hours. RADIOLOGY:  US GALLBLADDER RUQ   Final Result   Findings again consistent with cirrhotic liver. Ascites. Hepatofugal flow redemonstrated in the portal vein. Thickened gallbladder wall which may be reactive to the ascites and hepatic   disease. US ABDOMEN LIMITED   Final Result   Moderate volume intra-abdominal ascites. XR CHEST PORTABLE   Final Result   Mild basilar opacities are suspicious for pneumonia. This appearance can be   seen with COVID-19.         [address]    (Results Pending)           PROBLEM LIST:  Active Problems:    Hepatorenal syndrome ([address]  Resolved Problems:    * No resolved hospital problems. *      IMPRESSION:  1. Hepatic cirrhosis secondary to alcohol abuse  2. Coagulopathy secondary to cirrhosis  3. Must rule out spontaneous bacterial peritonitis  4. Hypersplenism with low platelets  5. Hepatic encephalopathy, improving  6. JULIO, improving  7. Ascites, pleural effusions      PLAN:  1. 2 units FFP 1 now and 1 L  2. Recheck pro time PTT in a.m. 3. If coagulopathy improves, he will have ultrasound-guided paracentesis  4. Octreotide per GI service  5. Rifaximin  6. May need to start lactulose soon  7. Continue to monitor renal function  8.  Continue Zosyn for now    ATTESTATION:  ICU Staff Physician note of personal involvement in Care  As the attending physician, I certify that I personally reviewed the patients

## 2021-11-29 NOTE — PROGRESS NOTES
PROGRESS NOTE    By Tobias Estrada D.O GI Fellow    [facility]  Dr. Prakash Mcgovern MD, Dr. Ev Saravia MD, Dr Eriberto Carpio, Dr. Leida Peralta MD, Dr. Nneka Ellis, DO    SUBJECTIVE: Laurie Antonio is 40 y.o. male admitted on 11/26/2021 with AMS in the setting of EtOH cirrhosis, JULIO  In the setting of prerenal azotemia vs hepatorenal syndrome and electrolyte abnormalities including hyponatremia, hypokalemia and metabolic acidosis. Patient's significant other at bedside. Per girlfriend, patient's mentation has been improving daily. Still requiring phenylephrine for BP support. Pt is AAOx2 (self and location). Pt reports he \"does not feel well. \" Denies N/V. Diarrhea improved- 2 bowel movements day prior. Denies fevers/ chills.      OBJECTIVE:  BP (!) 98/54   Pulse 62   Temp 100.4 °F (38 °C) (Oral)   Resp 13   Ht 6' 3\" (1.905 m)   Wt 195 lb 1.6 oz (88.5 kg)   SpO2 98%   BMI 24.39 kg/m²     Gen: NAD, AAO x 2, asterixis   HEENT:PEERL, icterus  Heart: RRR, no M/R/G  Lungs: CTAB  Abd.: soft, mildly distended, NT, BS +, no G/R, no HSM  Extr.: no C/C/E, no bruising         Lab Results   Component Value Date    WBC 12.7 11/28/2021    WBC 10.9 11/27/2021    WBC 10.8 11/26/2021    HGB 10.1 11/28/2021    HGB 10.8 11/27/2021    HGB 12.4 11/26/2021    HCT 27.5 11/28/2021    MCV 93.9 11/28/2021    RDW 18.1 11/28/2021    PLT 63 11/28/2021    PLT 81 11/27/2021     11/26/2021     Lab Results   Component Value Date     11/29/2021    K 3.3 11/29/2021    K 5.8 11/26/2021    CL 91 11/29/2021    CO2 30 11/29/2021    BUN 33 11/29/2021    CREATININE 2.3 11/29/2021    CALCIUM 7.2 11/29/2021    PROT 4.5 11/29/2021    LABALBU 2.2 11/29/2021    BILITOT 24.0 11/29/2021    BILITOT 24.2 11/28/2021    BILITOT 21.3 11/27/2021    ALKPHOS 50 11/29/2021    ALKPHOS 66 11/28/2021    ALKPHOS 89 11/27/2021    AST 81 11/29/2021     11/28/2021     11/27/2021    ALT 47 11/29/2021    ALT 77 11/28/2021     11/27/2021     Lab Results   Component Value Date    LIPASE 68 11/14/2021    LIPASE 26 07/01/2020    LIPASE 26 05/11/2020     Lab Results   Component Value Date    AMYLASE 91 01/16/2019         ASSESSMENT/PLAN:  1. Hepatic encephalopathy type C grade II, improved  · Suspect in the setting of acute renal failure/dehdration   · Stool studies never collected? · Diagnostic paracentesis planned for today to r/o SBP, pending INR. 2. Decompensated Alcohol induced cirrhosis with ascites   · MELD-Na 45 with INR 5.9 today (65-66% estimated 90 day mortality) vs (last admission 36 with INR 4.8 11/20/2021) Patient has abstained from alcohol since 11/4 and was recently discharged on 11/20 with treatment of lactulose and rifaximin as well as spironolactone and furosemide. · Overall prognosis remains poor. · Bilirubin continues to be elevated at 24. (24.2 yesterday). Continue with daily Vitamin care for elevated INR. · Liver chemistries trending down since last admission. · Continue with rifaximin and awaiting ascitic fluid cytology- plan for bedside paracentesis today. Pt received FFP for elevated INR at 5.7 this AM.     3. Acute kidney injury 2/2 renal hypoperfusion, unlikely hepatorenal syndrome, improved   · UO improved at 1.4 cc/kg/hr in the last 24 hours. BUN and Cr improved. Nephrology following. · Continue IVF. 4. Hypotension  · Continue Octreotide and phenylephrine for BP support. · Hold diuretic. Other comorbidities  Hyponatremia, improved Na 131  Hyperkalemia on admission, resolved. 2/2 Spirolactone use. Hypokalemia, K 3.3 this AM  Hypocalcemia  History of atrial fibrillation, NSR on telemetry   GERD, stable  Hx Alcohol withdrawal and intoxication    Leonardo Lizarraga DO PGY-2   11/29/2021  8:32 AM    I agree with the above findings and plan. Pt renal function improving and INR trending down on Vit K.  Pt still with decompensated cirrhosis. Continue current treatments.   Pt

## 2021-11-29 NOTE — PROGRESS NOTES
Nephrology Note  Roger Singh MD          Patient seen and examined. Chart reviewed. Significant other at bedside  Mentation has been improving daily  Still requiring phenylephrine for BP support  Denies N/V. Diarrhea improved - 2 episodes yesterday  Hypotensive       Vital SignsBP (!) 72/60   Pulse 64   Temp 98 °F (36.7 °C) (Oral)   Resp 15   Ht 6' 3\" (1.905 m)   Wt 195 lb 1.6 oz (88.5 kg)   SpO2 99%   BMI 24.39 kg/m²   24HR INTAKE/OUTPUT:      Intake/Output Summary (Last 24 hours) at 11/29/2021 1009  Last data filed at 11/29/2021 0500  Gross per 24 hour   Intake 1540 ml   Output 2600 ml   Net -1060 ml         Physical Exam  General appearance: Jaundiced  Neck: No JVD  Lungs: Breath sounds decreased at the bases. No rales or ronchi. Heart: Regular rate and rhythm. No S3 gallop. No  murmrur. Abdomen: Soft non distended, non tender and normal bowel sounds. Extremeties: No edema.       ROS:  RESPIRATORY:  negative  CARDIOVASCULAR:  negative  GASTROINTESTINAL:  negative  GENITOURINARY:  negative    Current Facility-Administered Medications   Medication Dose Route Frequency Provider Last Rate Last Admin    insulin lispro (HUMALOG) injection vial 0-6 Units  0-6 Units SubCUTAneous TID WC EILEEN Fofana - CNP   3 Units at 11/29/21 0924    0.9 % sodium chloride infusion   IntraVENous PRN Alexandra Wolff MD        0.9 % sodium chloride infusion   IntraVENous PRN Alexandra Wolff MD        albumin human 25 % IV solution 25 g  25 g IntraVENous Q8H Alexandra Wolff MD   Stopped at 11/29/21 0503    sodium bicarbonate tablet 1,300 mg  1,300 mg Oral TID Chuck Justin Galarza MD   1,300 mg at 11/29/21 0747    pantoprazole (PROTONIX) injection 40 mg  40 mg IntraVENous Daily Perico Mcbride MD   40 mg at 11/29/21 0748    And    sodium chloride (PF) 0.9 % injection 10 mL  10 mL IntraVENous Daily Andra Wild MD   10 mL at 11/28/21 0841    phenylephrine (DESMOND-SYNEPHRINE) 50 mg in dextrose 5 % 250 mL infusion   mcg/min IntraVENous Continuous Carlos Hill MD 42 mL/hr at 11/29/21 0908 140 mcg/min at 11/29/21 0908    midodrine (PROAMATINE) tablet 15 mg  15 mg Oral TID WC Carlos Hill MD   15 mg at 11/29/21 0743    octreotide (SANDOSTATIN) 500 mcg in sodium chloride 0.9 % 100 mL infusion  50 mcg/hr IntraVENous Continuous Neri Govern, DO 10 mL/hr at 11/29/21 0740 50 mcg/hr at 11/29/21 0740    thiamine (B-1) 100 mg in sodium chloride 0.9 % 100 mL IVPB  100 mg IntraVENous Daily Neri Govern, DO   Stopped at 11/29/21 8483    metoprolol (LOPRESSOR) injection 2.5 mg  2.5 mg IntraVENous Q6H EILEEN Chou - CNP   2.5 mg at 11/28/21 1925    LORazepam (ATIVAN) tablet 1 mg  1 mg Oral Q1H PRN Bi Ketschke, DO        Or    LORazepam (ATIVAN) injection 1 mg  1 mg IntraVENous Q1H PRN Bi Ketschke, DO        rifaximin (XIFAXAN) tablet 550 mg  550 mg Oral BID Mary Anne Ochoa MD   550 mg at 11/29/21 0747    glucose (GLUTOSE) 40 % oral gel 15 g  15 g Oral PRN Gagan Cava, DO        dextrose 50 % IV solution  12.5 g IntraVENous PRN Gagan Cava, DO        glucagon (rDNA) injection 1 mg  1 mg IntraMUSCular PRN Gagan Cava, DO        dextrose 5 % solution  100 mL/hr IntraVENous PRN Gagan Cava, DO        sodium chloride flush 0.9 % injection 5-40 mL  5-40 mL IntraVENous 2 times per day Gagan Cava, DO   10 mL at 11/28/21 0842    sodium chloride flush 0.9 % injection 5-40 mL  5-40 mL IntraVENous PRN Gagan Cava, DO        0.9 % sodium chloride infusion  25 mL IntraVENous PRN Gagan Cava, DO        polyethylene glycol (GLYCOLAX) packet 17 g  17 g Oral Daily PRN Gagan Cava, DO        acetaminophen (TYLENOL) tablet 650 mg  650 mg Oral Q6H PRN Gagan Cava, DO        prochlorperazine (COMPAZINE) injection 10 mg  10 mg IntraVENous Q6H PRN Gagan Cava, DO        piperacillin-tazobactam (ZOSYN) 3,375 mg in dextrose 5 % 50 mL IVPB extended infusion (mini-bag)  3,375 mg IntraVENous Q8H Acrine Malinda, DO   Stopped at 11/29/21 0740    And    0.9 % sodium chloride infusion   IntraVENous Q8H Carine Malinda, DO   Stopped at 11/29/21 0914    sodium bicarbonate 150 mEq in dextrose 5 % 1,000 mL infusion   IntraVENous Continuous Chuck Cecily Diaz MD 85 mL/hr at 11/29/21 0818 New Bag at 11/29/21 0818       US GALLBLADDER RUQ   Final Result   Findings again consistent with cirrhotic liver. Ascites. Hepatofugal flow redemonstrated in the portal vein. Thickened gallbladder wall which may be reactive to the ascites and hepatic   disease. US ABDOMEN LIMITED   Final Result   Moderate volume intra-abdominal ascites. XR CHEST PORTABLE   Final Result   Mild basilar opacities are suspicious for pneumonia. This appearance can be   seen with COVID-19.         US GUIDED PARACENTESIS    (Results Pending)         Labs:    CBC:   Recent Labs     11/26/21  1013 11/27/21  1100 11/28/21  0500   WBC 10.8 10.9 12.7*   HGB 12.4* 10.8* 10.1*   * 81* 63*        Lab Results   Component Value Date    IRON 132 05/08/2018    TIBC 149 (L) 05/08/2018    FERRITIN 1,815 05/08/2018       Lab Results   Component Value Date    CALCIUM 7.2 (L) 11/29/2021    CALCIUM 7.7 (L) 11/29/2021    CALCIUM 7.1 (L) 11/28/2021    PHOS 3.1 11/28/2021    PHOS 2.0 (L) 11/15/2021    PHOS 2.7 01/19/2019    MG 1.8 11/28/2021    MG 1.8 11/28/2021    MG 1.8 11/27/2021       BMP:   Recent Labs     11/28/21  1600 11/29/21  0045 11/29/21  0500   * 130* 131*   K 3.4* 3.5 3.3*   CL 94* 95* 91*   CO2 21* 23 30*   BUN 38* 36* 33*   CREATININE 2.5* 2.4* 2.3*   GLUCOSE 163* 117* 285*             Assessment: / Plan:    1. Acute kidney injury. Acute kidney injury secondary to renal hypoperfusion with possible underlying hepatorenal syndrome.   Urine output has improved which goes against hepatorenal syndrome - Lytes: Scar 35 / Chl 56 / Osmo 289 - now w/ BUN and creatinine

## 2021-11-30 NOTE — PROGRESS NOTES
Georgina Cook MD   10 mL at 11/30/21 0843    phenylephrine (DESMOND-SYNEPHRINE) 50 mg in dextrose 5 % 250 mL infusion   mcg/min IntraVENous Continuous Georgina Cook MD 39 mL/hr at 11/30/21 0647 130 mcg/min at 11/30/21 0647    midodrine (PROAMATINE) tablet 15 mg  15 mg Oral TID WC Georgina Cook MD   15 mg at 11/30/21 0842    octreotide (SANDOSTATIN) 500 mcg in sodium chloride 0.9 % 100 mL infusion  50 mcg/hr IntraVENous Continuous Kobi Adams, DO 10 mL/hr at 11/30/21 0529 50 mcg/hr at 11/30/21 0529    thiamine (B-1) 100 mg in sodium chloride 0.9 % 100 mL IVPB  100 mg IntraVENous Daily Erroll Bryan,  mL/hr at 11/30/21 0852 100 mg at 11/30/21 0852    metoprolol (LOPRESSOR) injection 2.5 mg  2.5 mg IntraVENous Q6H EILEEN Cevallos - CNP   2.5 mg at 11/28/21 1925    LORazepam (ATIVAN) tablet 1 mg  1 mg Oral Q1H PRN Bi Ketschke, DO        Or    LORazepam (ATIVAN) injection 1 mg  1 mg IntraVENous Q1H PRN Bi Ketschke, DO        rifaximin (XIFAXAN) tablet 550 mg  550 mg Oral BID Jesus Martell MD   550 mg at 11/30/21 0842    glucose (GLUTOSE) 40 % oral gel 15 g  15 g Oral PRN Barbi Orlando, DO        dextrose 50 % IV solution  12.5 g IntraVENous PRN Barbi Orlando, DO        glucagon (rDNA) injection 1 mg  1 mg IntraMUSCular PRN Barbi Orlando, DO        dextrose 5 % solution  100 mL/hr IntraVENous PRN Barbi Orlando, DO        sodium chloride flush 0.9 % injection 5-40 mL  5-40 mL IntraVENous 2 times per day Barbi Orlando, DO   10 mL at 11/30/21 0843    sodium chloride flush 0.9 % injection 5-40 mL  5-40 mL IntraVENous PRN Barbi Orlando, DO        0.9 % sodium chloride infusion  25 mL IntraVENous PRN Barbi Orlando, DO        polyethylene glycol (GLYCOLAX) packet 17 g  17 g Oral Daily PRN Barbi Mcwilliams,         acetaminophen (TYLENOL) tablet 650 mg  650 mg Oral Q6H PRN Barbi Mcwilliams DO        prochlorperazine (COMPAZINE) injection 10 mg  10 mg IntraVENous Q6H PRN Tuyet Mike, DO        piperacillin-tazobactam (ZOSYN) 3,375 mg in dextrose 5 % 50 mL IVPB extended infusion (mini-bag)  3,375 mg IntraVENous Q8H Ismail ROYER BlandSundar, DO 12.5 mL/hr at 11/30/21 1029 3,375 mg at 11/30/21 1029    And    0.9 % sodium chloride infusion   IntraVENous Q8H Tuyet Mckeon, DO   Stopped at 11/30/21 0844       US GALLBLADDER RUQ   Final Result   Findings again consistent with cirrhotic liver. Ascites. Hepatofugal flow redemonstrated in the portal vein. Thickened gallbladder wall which may be reactive to the ascites and hepatic   disease. US ABDOMEN LIMITED   Final Result   Moderate volume intra-abdominal ascites. XR CHEST PORTABLE   Final Result   Mild basilar opacities are suspicious for pneumonia. This appearance can be   seen with COVID-19.         US GUIDED PARACENTESIS    (Results Pending)         Labs:    CBC:   Recent Labs     11/27/21  1100 11/28/21  0500 11/30/21  0530   WBC 10.9 12.7* 7.2   HGB 10.8* 10.1* 9.2*   PLT 81* 63* 38*        Lab Results   Component Value Date    IRON 132 05/08/2018    TIBC 149 (L) 05/08/2018    FERRITIN 1,815 05/08/2018       Lab Results   Component Value Date    PTH 60 11/30/2021    CALCIUM 7.9 (L) 11/30/2021    CALCIUM 7.7 (L) 11/29/2021    CALCIUM 8.0 (L) 11/29/2021    CAION 1.12 (L) 11/30/2021    PHOS 2.1 (L) 11/30/2021    PHOS 3.1 11/28/2021    PHOS 2.0 (L) 11/15/2021    MG 1.6 11/30/2021    MG 1.8 11/28/2021    MG 1.8 11/28/2021       BMP:   Recent Labs     11/29/21  1730 11/29/21  2145 11/30/21  0530    130* 131*   K 3.3* 3.3* 3.2*   CL 94* 93* 93*   CO2 27 26 26   BUN 29* 27* 24*   CREATININE 1.7* 1.6* 1.4*   GLUCOSE 91 118* 104*             Assessment: / Plan:    1. Acute kidney injury. Acute kidney injury secondary to renal hypoperfusion with possible underlying hepatorenal syndrome. Urine output has improved which goes against hepatorenal syndrome. BUN and creatinine slightly lower. We will continue gentle hydration. Will reorder urine studies. 2.  Metabolic acidosis. Patient with non-anion gap metabolic acidosis which may be reflection of diarrhea as well as acute kidney injury. Improved. 3.   Hyponatremia. Patient probably with hepatic failure associated hyponatremia along with use of spironolactone contributing to hyponatremia. Improved. 4.   Hyperkalemia. Hyperkalemia secondary acute kidney injury and use of potassium sparing diuretic --> patient now Hypokalemic. Supplement potassium and magnesium as needed    5. Hypocalcemia.     ionized calcium 1.12 / Vit D 14 / PTH 60 - Started oral Vit D on 11/29/21    6. Hypotension. Follow on pressor support and SPA. [facility]    Patient seen and examined. Chart reviewed. I had a face to face encounter with the patient. Agree with exam.    Agree with  formulation, assessment and plan as outlined above and directed by me. Addition and corrections were done as deemed appropriate. My exam and plan include: Follow renal function closely. Continue current treatment.       Cleopatra De La Cruz MD  Nephrology        Electronically signed by Cleopatra De La Cruz MD on 11/30/2021 at 1:29 PM

## 2021-11-30 NOTE — PROGRESS NOTES
PROGRESS NOTE    By [facility] DIXON GI Fellow    [facility]  Dr. Ashlie Waite MD, Dr. Loco Burgess MD, Dr Avni Garza, Dr. Russell Espinoza MD, Dr. Hua Cool, DO Risa Donahue  40 y.o.  male    SUBJECTIVE: Patient was seen and examined at bedside. No acute event reported overnight. Patient continues to improve. I had a long discussion with the patient concerning his overall health status. Patient understand the gravity of his chronic liver disease. All questions answered. Patient denies any additional GI related complaint. Hemodynamically stable with pressure support.   Afebrile    OBJECTIVE:    BP (!) 98/54   Pulse 67   Temp 99.6 °F (37.6 °C) (Oral)   Resp 18   Ht 6' 3\" (1.905 m)   Wt 195 lb 1.6 oz (88.5 kg)   SpO2 94%   BMI 24.39 kg/m²     Gen: NAD, AAO x 3  HEENT:PEERL, no icterus  Heart: RRR, no M/R/G  Lungs: CTAB  Abd.: soft, NT, ND, BS +, no G/R, no HSM  Extr.: no C/C/E, no bruising         Lab Results   Component Value Date    WBC 7.2 11/30/2021    WBC 12.7 11/28/2021    WBC 10.9 11/27/2021    HGB 9.2 11/30/2021    HGB 10.1 11/28/2021    HGB 10.8 11/27/2021    HCT 25.4 11/30/2021    MCV 96.2 11/30/2021    RDW 17.0 11/30/2021    PLT 38 11/30/2021    PLT 63 11/28/2021    PLT 81 11/27/2021     Lab Results   Component Value Date     11/30/2021    K 3.2 11/30/2021    K 5.8 11/26/2021    CL 93 11/30/2021    CO2 26 11/30/2021    BUN 24 11/30/2021    CREATININE 1.4 11/30/2021    CALCIUM 7.9 11/30/2021    PROT 5.2 11/30/2021    LABALBU 2.8 11/30/2021    BILITOT 29.4 11/30/2021    BILITOT 24.0 11/29/2021    BILITOT 24.2 11/28/2021    ALKPHOS 47 11/30/2021    ALKPHOS 50 11/29/2021    ALKPHOS 66 11/28/2021    AST 67 11/30/2021    AST 81 11/29/2021     11/28/2021    ALT 43 11/30/2021    ALT 47 11/29/2021    ALT 77 11/28/2021     Lab Results   Component Value Date    LIPASE 68 11/14/2021    LIPASE 26 07/01/2020    LIPASE 26 05/11/2020     Lab Results   Component Value Date    AMYLASE 91 01/16/2019         ASSESSMENT/PLAN:    1. Hepatic encephalopathy type C grade II, improved  · Suspect in the setting of acute renal failure/dehdration   · Stool studies never collected? · Diagnostic paracentesis planned for today to r/o SBP, pending INR.      2. Decompensated Alcohol induced cirrhosis with ascites   · MELD-Na 45 with INR 5.9 today (65-66% estimated 90 day mortality) vs (last admission 36 with INR 4.8 11/20/2021) Patient has abstained from alcohol since 11/4 and was recently discharged on 11/20 with treatment of lactulose and rifaximin as well as spironolactone and furosemide. · Overall prognosis remains poor. · Bilirubin continues to be elevated at 24. (24.2 yesterday). Continue with daily Vitamin care for elevated INR. · Liver chemistries trending down since last admission. · Continue with rifaximin and awaiting ascitic fluid cytology- plan for bedside paracentesis today. Pt received FFP for elevated INR at 4.3 this AM.      3. Acute kidney injury 2/2 renal hypoperfusion, unlikely hepatorenal syndrome, improved   · UO continue to improved. . BUN and Cr improved. Nephrology following. · Continue IVF.      4. Hypotension  · Continue Octreotide and phenylephrine for BP support. · Hold diuretic.      Other comorbidities  Hyponatremia, improved Na 131  Hyperkalemia on admission, resolved. 2/2 Spirolactone use.    Hypokalemia, K 3.3 this AM  Hypocalcemia  History of atrial fibrillation, NSR on telemetry   GERD, stable  Hx Alcohol withdrawal and intoxication      Pt was discussed with Dr. Rosalinda Everett DO  GI Fellow   11/30/2021  10:16 AM

## 2021-11-30 NOTE — CARE COORDINATION
SS NOTE: COVID NEGATIVE 11/26. READMISSION DONE TODAY. This is an ICU pt. Pt is a DNR CCA, no intubation. He is on IV Protonix, Zocyn and Vasopressors. Pt will have a paracentesis today. SW met with pt today and he relates that his mother and family are reviewing the SNF provider list and will make some choices. Later pt's mother did call this SW and requests SNF at [facility] or [place]. SW made a referral to [facility] and will await their decision. For a SNF placement pt will need a PRECERT, signed JORGE, current PT.OT notes and SW will need to complete the HENs. CLEVELAND Lomax.11/30/2021.2:24PM.      Readmission Assessment  Number of Days since last admission?: 1-7 days  Previous Disposition: Home with Home Health  Who is being Interviewed: Patient  What was the patient's/caregiver's perception as to why they think they needed to return back to the hospital?: Other (Comment) ([address] [place] [address])  Did you visit your Primary Care Physician after you left the hospital, before you returned this time?: No  Why weren't you able to visit your PCP?: Other (Comment) (NOT [address])  Did you see a specialist, such as Cardiac, Pulmonary, Orthopedic Physician, etc. after you left the hospital?: No  Who advised the patient to return to the hospital?: Other (Comment) (FAMILY)  Does the patient report anything that got in the way of taking their medications?: No  In our efforts to provide the best possible care to you and others like you, can you think of anything that we could have done to help you after you left the hospital the first time, so that you might not have needed to return so soon?: Other (Comment) (PT SAYS NO)

## 2021-11-30 NOTE — CARE COORDINATION
SS NOTE: COVID NEGATIVE 11/26- PT WILL NEED A NEGATIVE RAPID COVID 72 HOURS PRIOR TO [address] [facility] TO [facility]. READMISSION DONE TODAY. This is an ICU pt. Pt is a DNR CCA, no intubation. He is on IV Protonix, Zocyn and Vasopressors. Pt will have a paracentesis today. pt's mother did call this SW and requested SNF at [facility].  made a referral to [facility] and they will accept pt. They are waiting for the PT/OT evals and will submit for PRECERT once that is available to them. For a SNF placement pt will need a PRECERT, signed JORGE, current PT.OT notes and SW will need to complete the HENs. CLEVELAND Kaur.11/30/2021.4:12PM.

## 2021-11-30 NOTE — PROGRESS NOTES
Pulmonary/Critical Care Progress Note    We are following patient for alcoholic cirrhosis, still pressor dependent, hyperbilirubinemia, coagulopathy, hyponatremia, ascites, JULIOimproved    SUBJECTIVE:  Patient is clearly improving and now has an appetite. He is not having any bleeding and we will initiate a diet. This may help in weaning him off pressors (phenylephrine) which is now down to 34 mcg/kg/min. However, his pro time remained elevated after 2 units of FFP given consecutively yesterday. We will give an additional 2 units since it did have some effect on his pro time but they will be given early in the morning so that he can have his paracentesis tomorrow. The patient's magnesium level and potassium levels were also low. These will be replaced. Phosphorus level is borderline but should improve after we initiate a diet. He remains on Protonix once per day and the octreotide drip.     MEDICATIONS:   midodrine        potassium bicarb-citric acid  20 mEq Oral Once    vitamin D3  5,000 Units Oral Daily    magnesium sulfate  2,000 mg IntraVENous Once    insulin lispro  0-6 Units SubCUTAneous TID WC    albumin human  25 g IntraVENous Q8H    pantoprazole  40 mg IntraVENous Daily    And    sodium chloride (PF)  10 mL IntraVENous Daily    midodrine  15 mg Oral TID WC    thiamine (VITAMIN B1) IVPB  100 mg IntraVENous Daily    metoprolol  2.5 mg IntraVENous Q6H    rifaximin  550 mg Oral BID    sodium chloride flush  5-40 mL IntraVENous 2 times per day    piperacillin-tazobactam  3,375 mg IntraVENous Q8H      sodium chloride      sodium chloride      sodium chloride 1,000 mL (11/30/21 1255)    phenylephrine (DESMOND-SYNEPHRINE) 50mg/250mL infusion 115 mcg/min (11/30/21 1252)    octreotide (SANDOSTATIN) infusion 50 mcg/hr (11/30/21 1635)    dextrose      sodium chloride      sodium chloride 12.5 mL/hr at 11/30/21 1500     sodium chloride, sodium chloride, LORazepam **OR** LORazepam **OR** [DISCONTINUED] LORazepam **OR** [DISCONTINUED] LORazepam **OR** [DISCONTINUED] LORazepam **OR** [DISCONTINUED] LORazepam **OR** [DISCONTINUED] LORazepam **OR** [DISCONTINUED] LORazepam, glucose, dextrose, glucagon (rDNA), dextrose, sodium chloride flush, sodium chloride, polyethylene glycol, acetaminophen **OR** [DISCONTINUED] acetaminophen, prochlorperazine      REVIEW OF SYSTEMS:  Constitutional: Denies fever, weight loss, night sweats, and fatigue  Skin: Denies pigmentation, dark lesions, and rashes   HEENT: Denies hearing loss, tinnitus, ear drainage, epistaxis, sore throat, and hoarseness. Cardiovascular: Denies palpitations, chest pain, and chest pressure. Respiratory: Denies cough, dyspnea at rest, hemoptysis, apnea, and choking. Gastrointestinal: Denies nausea, vomiting, poor appetite, diarrhea, heartburn or reflux  Genitourinary: Denies dysuria, frequency, urgency or hematuria  Musculoskeletal: Denies myalgias, muscle weakness, and bone pain  Neurological: Denies dizziness, vertigo, headache, and focal weakness  Psychological: Denies anxiety and depression  Endocrine: Denies heat intolerance and cold intolerance  Hematopoietic/Lymphatic: Denies bleeding problems and blood transfusions    OBJECTIVE:  Vitals:    11/30/21 1200   BP:    Pulse:    Resp:    Temp: 98.4 °F (36.9 °C)   SpO2:         O2 Flow Rate (L/min): 5 L/min  O2 Device: None (Room air)    PHYSICAL EXAM:  Constitutional: No fever, chills, diaphoresis  Skin: Icteric sclera and skin noted  HEENT: Unremarkable  Neck: JVD, lymphadenopathy, thyromegaly  Cardiovascular: S1, S2 regular. No S3 murmurs rubs present  Respiratory: Clear to auscultation bilaterally  Gastrointestinal: Mildly distended, no change from previous 2 days of exam  Genitourinary: No CVA tenderness  Extremities: No clubbing, cyanosis, or edema  Neurological: Awake, alert, oriented x3. No evidence of focal motor or sensory deficits. No evidence of asterixis.   He is far more oriented and mentally intact than he has been on any of his previous days  Psychological: Modestly depressed affect    LABS:  WBC   Date Value Ref Range Status   11/30/2021 7.2 4.5 - 11.5 E9/L Final   11/28/2021 12.7 (H) 4.5 - 11.5 E9/L Final   11/27/2021 10.9 4.5 - 11.5 E9/L Final     Hemoglobin   Date Value Ref Range Status   11/30/2021 9.2 (L) 12.5 - 16.5 g/dL Final   11/28/2021 10.1 (L) 12.5 - 16.5 g/dL Final   11/27/2021 10.8 (L) 12.5 - 16.5 g/dL Final     Hematocrit   Date Value Ref Range Status   11/30/2021 25.4 (L) 37.0 - 54.0 % Final   11/28/2021 27.5 (L) 37.0 - 54.0 % Final   11/27/2021 29.7 (L) 37.0 - 54.0 % Final     MCV   Date Value Ref Range Status   11/30/2021 96.2 80.0 - 99.9 fL Final   11/28/2021 93.9 80.0 - 99.9 fL Final   11/27/2021 94.3 80.0 - 99.9 fL Final     Platelets   Date Value Ref Range Status   11/30/2021 38 (L) 130 - 450 E9/L Final   11/28/2021 63 (L) 130 - 450 E9/L Final   11/27/2021 81 (L) 130 - 450 E9/L Final     Sodium   Date Value Ref Range Status   11/30/2021 131 (L) 132 - 146 mmol/L Final   11/29/2021 130 (L) 132 - 146 mmol/L Final   11/29/2021 133 132 - 146 mmol/L Final     Potassium   Date Value Ref Range Status   11/30/2021 3.2 (L) 3.5 - 5.0 mmol/L Final   11/29/2021 3.3 (L) 3.5 - 5.0 mmol/L Final   11/29/2021 3.3 (L) 3.5 - 5.0 mmol/L Final     Potassium reflex Magnesium   Date Value Ref Range Status   11/26/2021 5.8 (H) 3.5 - 5.0 mmol/L Final   11/14/2021 3.1 (L) 3.5 - 5.0 mmol/L Final     Chloride   Date Value Ref Range Status   11/30/2021 93 (L) 98 - 107 mmol/L Final   11/29/2021 93 (L) 98 - 107 mmol/L Final   11/29/2021 94 (L) 98 - 107 mmol/L Final     CO2   Date Value Ref Range Status   11/30/2021 26 22 - 29 mmol/L Final   11/29/2021 26 22 - 29 mmol/L Final   11/29/2021 27 22 - 29 mmol/L Final     BUN   Date Value Ref Range Status   11/30/2021 24 (H) 6 - 20 mg/dL Final   11/29/2021 27 (H) 6 - 20 mg/dL Final   11/29/2021 29 (H) 6 - 20 mg/dL Final     CREATININE   Date Value Ref Range Status   11/30/2021 1.4 (H) 0.7 - 1.2 mg/dL Final   11/29/2021 1.6 (H) 0.7 - 1.2 mg/dL Final   11/29/2021 1.7 (H) 0.7 - 1.2 mg/dL Final     Glucose   Date Value Ref Range Status   11/30/2021 104 (H) 74 - 99 mg/dL Final   11/29/2021 118 (H) 74 - 99 mg/dL Final   11/29/2021 91 74 - 99 mg/dL Final     Calcium   Date Value Ref Range Status   11/30/2021 7.9 (L) 8.6 - 10.2 mg/dL Final   11/29/2021 7.7 (L) 8.6 - 10.2 mg/dL Final   11/29/2021 8.0 (L) 8.6 - 10.2 mg/dL Final     Total Protein   Date Value Ref Range Status   11/30/2021 5.2 (L) 6.4 - 8.3 g/dL Final   11/29/2021 4.5 (L) 6.4 - 8.3 g/dL Final   11/28/2021 5.2 (L) 6.4 - 8.3 g/dL Final     Albumin   Date Value Ref Range Status   11/30/2021 2.8 (L) 3.5 - 5.2 g/dL Final   11/29/2021 2.2 (L) 3.5 - 5.2 g/dL Final   11/28/2021 2.2 (L) 3.5 - 5.2 g/dL Final     Total Bilirubin   Date Value Ref Range Status   11/30/2021 29.4 (H) 0.0 - 1.2 mg/dL Final   11/29/2021 24.0 (H) 0.0 - 1.2 mg/dL Final   11/28/2021 24.2 (H) 0.0 - 1.2 mg/dL Final     Alkaline Phosphatase   Date Value Ref Range Status   11/30/2021 47 40 - 129 U/L Final   11/29/2021 50 40 - 129 U/L Final   11/28/2021 66 40 - 129 U/L Final     AST   Date Value Ref Range Status   11/30/2021 67 (H) 0 - 39 U/L Final   11/29/2021 81 (H) 0 - 39 U/L Final   11/28/2021 148 (H) 0 - 39 U/L Final     ALT   Date Value Ref Range Status   11/30/2021 43 (H) 0 - 40 U/L Final   11/29/2021 47 (H) 0 - 40 U/L Final   11/28/2021 77 (H) 0 - 40 U/L Final     GFR Non-   Date Value Ref Range Status   11/30/2021 55 >=60 mL/min/1.73 Final     Comment:     Chronic Kidney Disease: less than 60 ml/min/1.73 sq.m. Kidney Failure: less than 15 ml/min/1.73 sq.m. Results valid for patients 18 years and older. 11/29/2021 47 >=60 mL/min/1.73 Final     Comment:     Chronic Kidney Disease: less than 60 ml/min/1.73 sq.m. Kidney Failure: less than 15 ml/min/1.73 sq.m.   Results valid for patients 18 years and a.m.  10. FFP x2 units tomorrow to be followed by paracentesis    ATTESTATION:  ICU Staff Physician note of personal involvement in Care  As the attending physician, I certify that I personally reviewed the patients history and personally examined the patient to confirm the physical findings described above,  And that I reviewed the relevant imaging studies and available reports. I also discussed the differential diagnosis and all of the proposed management plans with the patient and individuals accompanying the patient to this visit. They had the opportunity to ask questions about the proposed management plans and to have those questions answered. This patient has a high probability of sudden, clinically significant deterioration, which requires the highest level of physician preparedness to intervene urgently. I managed/supervised life or organ supporting interventions that required frequent physician assessment. I devoted my full attention to the direct care of this patient for the amount of time indicated below. Time I spent with the family or surrogate(s) is included only if the patient was incapable of providing the necessary information or participating in medical decisions  Time devoted to teaching and to any procedures I billed separately is not included.     CRITICAL CARE TIME:  33 minutes    Electronically signed by Krystal Gonzalez MD on 11/30/2021 at 4:44 PM

## 2021-11-30 NOTE — PROGRESS NOTES
[address] [facility]  [facility]         Date:2021                                                   Patient Name: Ruddy Mayen     MRN: [id number]     : 1975     Room: [address] [place], [place] [address]       Evaluating OT: Karla Hendricks OTR/L; [id number]       Referring Provider and Orders/Date:   OT eval and treat Start: 21, End: 21, ONE TIME, Standing Count: 1 Occurrences, R    Kevin Savage DO     Diagnosis:   1. Hepatorenal syndrome ([facility] [address]    2. Chronic liver failure without hepatic coma ([facility] [address]    3.  Acute kidney injury [facility])              Pertinent Medical History:        Past Medical History:   Diagnosis Date    Anxiety     Depression     Esophagitis     ETOH abuse     Gastritis     GERD (gastroesophageal reflux disease)     Hematuria     Pancreatitis           Past Surgical History:   Procedure Laterality Date    KNEE SURGERY      ACL    UPPER GASTROINTESTINAL ENDOSCOPY  2018    UPPER GASTROINTESTINAL ENDOSCOPY N/A 2018    EGD BIOPSY performed by Villa Murphy MD at [facility] 2019    EGD ESOPHAGOGASTRODUODENOSCOPY performed by Uri Garcia DO at [facility] 2019    EGD CONTROL HEMORRHAGE performed by Uri Garcia DO at [facility] ENDOSCOPY       Precautions:  Fall Risk, tremors     Recommended placement: subacute vs IRF    Assessment of current deficits     [x] Functional mobility  [x]ADLs  [x] Strength               []Cognition     [x] Functional transfers   [x] IADLs         [x] Safety Awareness   [x]Endurance     [] Fine Coordination              [x] Balance      [] Vision/perception   []Sensation      [x]Gross Motor Coordination  [] ROM  [] Delirium                   [] Motor Control     OT PLAN OF CARE   OT POC based on physician orders, patient diagnosis and results of clinical assessment    Frequency/Duration 1-3 days/wk for 2 weeks PRN   Specific OT Treatment Interventions to include:   * Instruction/training on adapted ADL techniques and AE recommendations to increase functional independence within precautions       * Training on energy conservation strategies, correct breathing pattern and techniques to improve independence/tolerance for self-care routine  * Functional transfer/mobility training/DME recommendations for increased independence, safety, and fall prevention  * Patient/Family education to increase follow through with safety techniques and functional independence  * Recommendation of environmental modifications for increased safety with functional transfers/mobility and ADLs  * Therapeutic exercise to improve motor endurance, ROM, and functional strength for ADLs/functional transfers  * Therapeutic activities to facilitate/challenge dynamic balance, stand tolerance for increased safety and independence with ADLs  * Therapeutic activities to facilitate gross/fine motor skills for increased independence with ADLs  * Neuro-muscular re-education: facilitation of righting/equilibrium reactions, midline orientation, scapular stability/mobility, normalization of muscle tone, and facilitation of volitional active controled movement     Recommended Adaptive Equipment/DME: shower, TBD      Home Living: Patient lives alone in a ranch home with 3 steps  to enter without Rail. He reports he can have assist from his girl friend or mother if required. Laundry in the basement with a standard flight of steps with rails. Pt recently DC from therapy. Bathroom setup: tub shower with grab bars. Standard toilet   DME owned: none      Prior Level of Function: indep with ADLs , indep with IADLs; ambulated indep   Driving: yes   Occupation: none   Enjoys: fishing,hunting, odd jobs around the house, dog     Pain Level: none  Cognition: A&O: 3/4 with situational confusion;  Follows 3 step directions              Memory:  fair-              Sequencing:  Fair              Problem solving:  Fair-              Judgement/safety:  fair-  with impulsivity and decreased awareness of deficits    AM-PAC Daily Activity Inpatient   How much help for putting on and taking off regular lower body clothing?: A Lot  How much help for Bathing?: A Little  How much help for Toileting?: A Little  How much help for putting on and taking off regular upper body clothing?: A Lot  How much help for taking care of personal grooming?: A Little  How much help for eating meals?: A Little  AM-PAC Inpatient Daily Activity Raw Score: 16  AM-PAC Inpatient ADL T-Scale Score : 35.96  ADL Inpatient CMS 0-100% Score: 53.32  ADL Inpatient CMS G-Code Modifier : CK    Functional Assessment:     Initial Eval Status  Date: 11/30/2021   Treatment Status  Date: STGs = LTGs  Time frame: 10-14 days   Feeding Minimal Assist with coordination and to set up/open containers. Clear diet on eval.   indep   Grooming Minimal Assist with oral care, face wash, shampoo and hair comb sitting EOB. Pt self limiting and provided with encouragement for highest level of function. Stand by Assist    UB Dressing Max Assist with gown management. Pt overwhelmed with lines and IV, as well as his visitor. SBA   LB Dressing Minimal Assist with socks from sitting EOB with extended time. Pt declining brief and pants. Stand by Assist    Bathing Minimal Assist with sponge bathing sitting/standing EOB with decreased thoroughness. Stand by Assist    Toileting Minimal Assist with use of urinal. Pt reported that he just used the 3:1 prior to arrival and declined to complete with OT. Stand by Assist    Bed Mobility  Supine to sit: Supervision   Sit to supine: Supervision  With impulsivity   Supine to sit: Independent   Sit to supine: Independent    Functional Transfers Minimal Assist for safety and balance.  Limited with lines  Stand by Assist    Functional Mobility Minimal Assist for safety and balance only short functional distance with line limitations  Stand by Assist    Balance Sitting:     Static:  good    Dynamic:fair  Standing: fair-  Sitting:    Dynamic:fair+  Standing: fair   Activity Tolerance Vitals with activity:92/50 HR 60 02 97% room air  Standing tolerance <30secs with fatigue and impulsivity. Increase standing tolerance for >4min with stable vital signs for carry over into toileting, functional tranfers and indep in ADLs   Visual/  Perceptual Glasses: not Present; WFL    Reports change in vision since admission: No     NA   Ken UE Strengthening  3+/5 generally  4+/5MMT generally for carry over into self care, functional transfers and functional mobility with AD. Hand Dominance  [x] Right  [] Left    AROM (PROM) Strength Additional Info:    RUE  WFL 3+/5 Fair  and fair- FMC/dexterity noted during ADL tasks  Opposition [x] Intact [] Impaired  Finger to nose [x] Intact [] Impaired     LUE WFL 3+/5 Fair  and fair FMC/dexterity noted during ADL tasks  Opposition [x] Intact [] Impaired  Finger to nose [x] Intact [] Impaired     Hearing: WFL  Sensation:  No c/o numbness or tingling   Tone: WFL   Edema: none    Comments: Upon arrival patient supine with mother present. Pt required max A for most UB ADLs and min A LB ADLs tasks. Limited with min A for standing during LB ADLs and functional transfers. The biggest barriers reflect that of functional transfers, functional mobility, UB/LB ADLs, cognition, activity tolerance, balance, safety and strengthening. At end of session, patient supine with call light and phone within reach, all lines and tubes intact. Overall patient demonstrated decreased independence and safety during completion of ADL/functional transfer/mobility tasks.  Nursing updated on pt position and status following OT eval. Pt would benefit from continued skilled OT to increase safety and independence with completion of ADL/IADL tasks for functional independence and quality of life. Treatment: OT treatment provided this date includes:   Instruction, education and training on safe facilitation and adapted techniques for completion of ADLs. These include neuromuscular reeducation to facilitate balance/righting reactions, safe functional transfer techniques and on energy conservation/work simplification for completion of ADLs. Education provided on hand/feet placement with bed rails and body mechanics for fall prevention. Cues for energy conservation and safety for in the home at DC, including modifications and DME. Pt with confusion and limited awareness of deficits. Extended time to complete all tasks, including skilled monitoring of patient's response during treatment session and vital signs. Prior to and at the end of session, environmental modifications / line management completed for patients safety and efficiency of treatment session. See above for further details. Rehab Potential: Good for established goals     Patient / Family Goal: Pain management      Patient and/or family were instructed on functional diagnosis, prognosis/goals and OT plan of care. Demonstrated good understanding. Eval Complexity: Low  · History: Brief review of medical records and additional review of physical, cognitive, or psychosocial history related to current functional performance  · Exam: 3+ performance deficits  · Assistance/Modification: Mod assistance or modifications required to perform tasks. May have comorbidities that affect occupational performance.     Time In: 0908  Time Out: 1888  Total Treatment Time: 10    Min Units   OT Eval Low 97165  x  1   OT Eval Medium 11619      OT Eval High 50034      OT Re-Eval [id number]       Therapeutic Ex 40493       Therapeutic Activities 46886  10 1    ADL/Self Care 96016       Orthotic Management 10288       Manual 77509     Neuro Re-Ed 69530       Non-Billable Time          Evaluation Time additionally includes thorough review of current medical information, gathering information on past medical history/social history and prior level of function, interpretation of standardized testing/informal observation of tasks, assessment of data and development of plan of care and goals.             Torin Garcia OTR/L; [id number]

## 2021-11-30 NOTE — PROGRESS NOTES
Physical Therapy    Physical Therapy Initial Evaluation/Plan of Care    Room #:  5725/3764-79  Patient Name: Shanti Sutton  YOB: 1975  MRN: [id number]    Date of Service: 11/30/2021     Tentative placement recommendation: Subacute rehab  Equipment recommendation: To be determined      Evaluating Physical Therapist: Abby Rodriguez, PT  #[id number]      Specific Provider Orders/Date/Referring Provider :  11/29/21 1615   PT eval and treat Start: 11/29/21 1615, End: 11/29/21 1615, ONE TIME, Standing Count: 1 Occurrences, R    Almita Servant, DO      Admitting Diagnosis:   Hepatorenal syndrome ([address] [K76.7]  Acute kidney injury ([address] [N17.9]  Chronic liver failure without hepatic coma ([address] [K72.10]       Surgery: none  Visit Diagnoses       Codes    Chronic liver failure without hepatic coma ([address]     K72.10    Acute kidney injury ([address]     N17.9          Patient Active Problem List   Diagnosis    Alcohol-induced acute pancreatitis    Anxiety    Depression    ETOH abuse    Hematemesis    Atrial fibrillation with rapid ventricular response ([address]    Alcohol withdrawal syndrome without complication (HCC)    Lactic acidosis    Hypokalemia    Severe protein-calorie malnutrition (HCC)    Paroxysmal atrial fibrillation (HCC)    Hyperbilirubinemia    Hypomagnesemia    Bilirubinemia    UGIB (upper gastrointestinal bleed)    Hepatorenal syndrome ([address]        ASSESSMENT of Current Deficits Patient exhibits decreased strength, balance and endurance impairing functional mobility, transfers, gait , gait distance and tolerance to activity along with low blood pressure are barriers to d/c and require skilled intervention during hospital stay to attain pre hospital level of function. Decreased strength, balance , low blood pressure and endurance  increases patient's risk for fall.          PHYSICAL THERAPY  PLAN OF CARE       Physical therapy plan of care is established based on physician order,  patient diagnosis and clinical assessment    Current Treatment Recommendations:    -Standing Balance: Perform strengthening exercises in standing to promote motor control with or without upper extremity support  and Challenge balance utilizing reaching  activities beyond center of gravity    -Transfers: Provide instruction on proper hand and foot position for adequate transfer of weight onto lower extremities and use of gait device if needed and Cues for hand placement, technique and safety. Provide stabilization to prevent fall   -Gait: Gait training, Standing activities to improve: base of support, weight shift, weight bearing , Exercises to improve trunk control and Exercises to improve hip and knee control   -Endurance: Utilize Supervised activities to increase level of endurance to allow for safe functional mobility including transfers and gait   -Stairs: Stair training with instruction on proper technique and hand placement on rail    PT long term treatment goals are located in below grid    Patient and or family understand(s) diagnosis, prognosis, and plan of care. Frequency of treatments: Patient will be seen  3-5 times/week.          Prior Level of Function: Patient ambulated independently    Rehab Potential: good    for baseline    Past medical history:   Past Medical History:   Diagnosis Date    Anxiety     Depression     Esophagitis     ETOH abuse     Gastritis     GERD (gastroesophageal reflux disease)     Hematuria     Pancreatitis      Past Surgical History:   Procedure Laterality Date    KNEE SURGERY      ACL    UPPER GASTROINTESTINAL ENDOSCOPY  05/09/2018    UPPER GASTROINTESTINAL ENDOSCOPY N/A 5/8/2018    EGD BIOPSY performed by Kendrick Wolff MD at [facility] 27 1/21/2019    EGD ESOPHAGOGASTRODUODENOSCOPY performed by Irene Angulo DO at [facility] 27 1/21/2019    EGD CONTROL HEMORRHAGE performed by Lela Romero Manish Rico DO at [facility]:    Precautions: Up with assistance and Up as tolerated, falls and alarm ,    Social history: Patient lives alone in a ranch home  with 3 steps  to enter without Rail  Tub shower grab bars    Equipment owned: None,       [facility]   How much difficulty turning over in bed?: A Little  How much difficulty sitting down on / standing up from a chair with arms?: A Lot  How much difficulty moving from lying on back to sitting on side of bed?: A Lot  How much help from another person moving to and from a bed to a chair?: A Lot  How much help from another person needed to walk in hospital room?: A Lot  How much help from another person for climbing 3-5 steps with a railing?: A Lot  AM-PAC Inpatient Mobility Raw Score : 13  AM-PAC Inpatient T-Scale Score : 36.74  Mobility Inpatient CMS 0-100% Score: 64.91  Mobility Inpatient CMS G-Code Modifier : CL    Nursing cleared patient for PT evaluation. The admitting diagnosis and active problem list as listed above have been reviewed prior to the initiation of this evaluation. OBJECTIVE;   Initial Evaluation  Date: 11/30/2021 Treatment Date:     Short Term/ Long Term   Goals   Was pt agreeable to Eval/treatment? Yes  To be met in 5 days   Pain level   0/10        Bed Mobility    Rolling: Supervision     Supine to sit: Minimal assist of 1    Sit to supine: Minimal assist of 1    Scooting: Minimal assist of 1    Rolling: Independent    Supine to sit: Independent    Sit to supine: Independent    Scooting: Independent     Transfers Sit to stand:  Moderate assist of 1  From commode, min a from bed  Sit to stand: Independent     Ambulation    2-3 steps using  no device with Moderate assist of 1   for balance and safety and cues for upright posture and safety   75 feet using  least restrictive device versus no device with Independent    Stair negotiation: ascended and descended   Not assessed 3 steps with rail independent    ROM Within functional limits    Increase range of motion 10% of affected joints    Strength BUE:  refer to OT eval  RLE:  3/5  LLE:  3/5  Increase strength in affected mm groups by 1/3 grade   Balance Sitting EOB:  fair    Dynamic Standing:  poor    Sitting EOB:  good    Dynamic Standing: fair       Patient is Alert & Oriented x person, place, time and situation and follows directions  slowly  Sensation:  Patient  denies numbness/tingling   Edema:  no   Endurance: poor tolerance d/t low bp    Vitals: room air   Blood Pressure at rest 106/64 Blood Pressure during session 96/46   Heart Rate at rest 64 Heart Rate during session 72   SPO2 at rest 94%  SPO2 during session 94%     Patient education  Patient educated on role of Physical Therapy, risks of immobility, safety and plan of care and  importance of mobility while in hospital      Patient response to education:   Pt verbalized understanding Pt demonstrated skill Pt requires further education in this area   Yes Partial Yes      Treatment:  Patient practiced and was instructed/facilitated in the following treatment: Patient   Sat edge of bed 10 minutes with Supervision  to increase dynamic sitting balance and activity tolerance. seated and standing challenges      Therapeutic Exercises:  not performed       At end of session, patient in bed with  call light and phone within reach,  all lines and tubes intact, nursing notified. Patient would benefit from continued skilled Physical Therapy to improve functional independence and quality of life.          Patient's/ family goals   home    Time in  26  Time out  515    Total Treatment Time  10 minutes    Evaluation time includes thorough review of current medical information, gathering information on past medical history/social history and prior level of function, completion of standardized testing/informal observation of tasks, assessment of data, and development of Plan of care and goals.     CPT codes:  Low Complexity PT evaluation (33548)  Neuromuscular reeducation ([id number])   10 minutes  1 unit(s)  Non billable time 5 minutes    Erik Palacio, PT

## 2021-12-01 NOTE — PROGRESS NOTES
Assessment and Plan  Patient is a 40 y.o. male with the following medical Problems:   1. Alcoholic liver cirrhosis  2. Acute kidney injury suspected prerenal azotemia versus hepatorenal syndrome  3. Hepatic encephalopathy  4. Hyponatremia  5. Bilateral pleural effusions  6. Jaundice  7. Ascites      Plan of care:  1. IV fluid as per nephrology recommendation  2. Continue with ceftriaxone  3. Midodrine 50 mg 3 times a day  4. With lactulose or rifaximin  5. Oral vitamin K  6. Awaiting paracentesis once INR is corrected. 7.  Wean Lavon-Synephrine as tolerated. History of Present Illness:   Patient is a 21-TAYI-CJX man with alcoholic liver cirrhosis who was recently discharged from the hospital on November 21, 2021. He presented this time with worsening mental status. Patient is clinically encephalopathy however he is awake and follows some commands. He was started on lactulose or rifaximin. Patient kidney function worsened over the last few weeks. Patient has no fever, chills, rigors. Goals of care were discussed with the patient's mom and his girlfriend. Patient was made DNR CCA with no intubation. Patient's overall condition will be reevaluated in the next 24 to 48 hours to decide about comfort care measures. At this stage patient is not a candidate for liver transplant due to his recent alcohol consumption. Daily progress:  December 1, 2021: Patient continues to improve. He is alert and oriented x3. He has no fever, chills, rigors. He is awaiting for paracentesis. He endorses no complaints. Past Medical History:  Past Medical History:   Diagnosis Date    Anxiety     Depression     Esophagitis     ETOH abuse     Gastritis     GERD (gastroesophageal reflux disease)     Hematuria     Pancreatitis         Family History:   No family history on file.     Allergies:         Trenna Allan nut oil]    Social history:  Social History     Socioeconomic History    Marital status: D3 (CHOLECALCIFEROL) tablet 5,000 Units, 5,000 Units, Oral, Daily, Tamela Antonio MD, 5,000 Units at 12/01/21 1135    0.9 % sodium chloride infusion, , IntraVENous, PRN, Tamela Antonio MD    insulin lispro (HUMALOG) injection vial 0-6 Units, 0-6 Units, SubCUTAneous, TID , Brennon Barragan, EILEEN - CNP, 4 Units at 11/30/21 2222    0.9 % sodium chloride infusion, , IntraVENous, PRN, Tamela Antonio MD    0.9 % sodium chloride infusion, , IntraVENous, PRN, Tamela Antonio MD    0.9 % sodium chloride infusion, 1,000 mL, IntraVENous, Continuous, Lesly Olszewski, APRN - CNP, Last Rate: 75 mL/hr at 12/01/21 0834, 1,000 mL at 12/01/21 0834    albumin human 25 % IV solution 25 g, 25 g, IntraVENous, Q8H, Tamela Antonio MD, Stopped at 12/01/21 1232    pantoprazole (PROTONIX) injection 40 mg, 40 mg, IntraVENous, Daily, 40 mg at 12/01/21 0853 **AND** sodium chloride (PF) 0.9 % injection 10 mL, 10 mL, IntraVENous, Daily, Chanell Dill MD, 10 mL at 12/01/21 0853    phenylephrine (DESMOND-SYNEPHRINE) 50 mg in dextrose 5 % 250 mL infusion,  mcg/min, IntraVENous, Continuous, Chanell Dill MD, Last Rate: 22.5 mL/hr at 12/01/21 1446, 75 mcg/min at 12/01/21 1446    midodrine (PROAMATINE) tablet 15 mg, 15 mg, Oral, TID , Chanell Dill MD, 15 mg at 12/01/21 1135    octreotide (SANDOSTATIN) 500 mcg in sodium chloride 0.9 % 100 mL infusion, 50 mcg/hr, IntraVENous, Continuous, Matt Yeh DO, Last Rate: 10 mL/hr at 12/01/21 1434, 50 mcg/hr at 12/01/21 1434    thiamine (B-1) 100 mg in sodium chloride 0.9 % 100 mL IVPB, 100 mg, IntraVENous, Daily, Barb Robles DO, Stopped at 12/01/21 1113    LORazepam (ATIVAN) tablet 1 mg, 1 mg, Oral, Q1H PRN **OR** LORazepam (ATIVAN) injection 1 mg, 1 mg, IntraVENous, Q1H PRN **OR** [DISCONTINUED] LORazepam (ATIVAN) tablet 2 mg, 2 mg, Oral, Q1H PRN **OR** [DISCONTINUED] LORazepam (ATIVAN) injection 2 mg, 2 mg, IntraVENous, Q1H PRN **OR** [DISCONTINUED] LORazepam (ATIVAN) tablet 3 mg, 3 mg, Oral, Q1H PRN **OR** [DISCONTINUED] LORazepam (ATIVAN) injection 3 mg, 3 mg, IntraVENous, Q1H PRN **OR** [DISCONTINUED] LORazepam (ATIVAN) tablet 4 mg, 4 mg, Oral, Q1H PRN **OR** [DISCONTINUED] LORazepam (ATIVAN) injection 4 mg, 4 mg, IntraVENous, Q1H PRN, Bi Carrillo,     rifaximin (XIFAXAN) tablet 550 mg, 550 mg, Oral, BID, Silvana Llanos MD, 550 mg at 12/01/21 1135    glucose (GLUTOSE) 40 % oral gel 15 g, 15 g, Oral, PRN, Ruthell Sans, DO    dextrose 50 % IV solution, 12.5 g, IntraVENous, PRN, Ruthell Sans, DO    glucagon (rDNA) injection 1 mg, 1 mg, IntraMUSCular, PRN, Ismail U Sundar, DO    dextrose 5 % solution, 100 mL/hr, IntraVENous, PRN, Ismail U Sundar, DO    sodium chloride flush 0.9 % injection 5-40 mL, 5-40 mL, IntraVENous, 2 times per day, Ruthell Sans, DO, 10 mL at 12/01/21 0854    sodium chloride flush 0.9 % injection 5-40 mL, 5-40 mL, IntraVENous, PRN, Ismail U Sundar, DO    0.9 % sodium chloride infusion, 25 mL, IntraVENous, PRN, Ismail U Sundar, DO    polyethylene glycol (GLYCOLAX) packet 17 g, 17 g, Oral, Daily PRN, Ruthell Sans, DO    acetaminophen (TYLENOL) tablet 650 mg, 650 mg, Oral, Q6H PRN **OR** [DISCONTINUED] acetaminophen (TYLENOL) suppository 650 mg, 650 mg, Rectal, Q6H PRN, Ruthell Sans, DO    prochlorperazine (COMPAZINE) injection 10 mg, 10 mg, IntraVENous, Q6H PRN, Ruthell Sans, DO    piperacillin-tazobactam (ZOSYN) 3,375 mg in dextrose 5 % 50 mL IVPB extended infusion (mini-bag), 3,375 mg, IntraVENous, Q8H, Stopped at 12/01/21 1514 **AND** 0.9 % sodium chloride infusion, , IntraVENous, Q8H, Ismail U DO Sundar, Last Rate: 12.5 mL/hr at 12/01/21 1439, New Bag at 12/01/21 1439    Review of Systems:   10 points systems were reviewed and were unremarkable except what has been mentioned in the HPI.     Physical Exam:   Vital Signs:  /60   Pulse 73   Temp 99 °F (37.2 °C) (Oral)   Resp 17   Ht 6' 3\" (1.905 m)   Wt 195 lb 1.6 oz (88.5 kg)   SpO2 95%   BMI 24.39 kg/m²     Input/Output: In: 1460.3 [I.V.:1023.7; Blood:96.7]  Out: 1550     Oxygen requirements: RA    Ventilator Information:  Vent Information  SpO2: 95 %    General appearance: Ill looking, jaundiced and pale, not in pain or distress, in no respiratory distress    HEENT: Atraumatic/normocephalic, EOMI, JEFFRY, pharynx clear, moist mucosa, redness of the uvula appreciated,   Neck: Supple, no jugular venous distension, lymphadenopathy, thyromegaly or carotid bruits  Chest: Creased breath sounds, no wheezing, no crackles and no tenderness over ribs   Cardiovascular: Normal S1 , S2, regular rate and rhythm, no murmur, rub or gallop  Abdomen: +ve ascites, Normal sounds present, soft, lax with no tenderness  Extremities: No edema. Pulses are equally present. Skin: intact, no rashes   Neurologic: AOX3, No focal deficit     Investigations:  Labs, radiological imaging and cardiac work up were reviewed        ICU STAFF PHYSICIAN NOTE OF PERSONAL INVOLVEMENT IN CARE  As the attending physician, I certify that I personally reviewed the patient's history and personally examined the patient to confirm the physical findings described above, and that I reviewed the relevant imaging studies and available reports. I also discussed the differential diagnosis and all of the proposed management plans with the patient and individuals accompanying the patient to this visit. They had the opportunity to ask questions about the proposed management plans and to have those questions answered. This patient has a high probability of sudden, clinically significant deterioration, which requires the highest level of physician preparedness to intervene urgently. I managed/supervised life or organ supporting interventions that required frequent physician assessment. I devoted my full attention to the direct care of this patient for the amount of time indicated below.   Time I spent with family or surrogate(s) is included only if the patient was incapable of providing the necessary information or participating in medical decision-making. Time devoted to teaching and to any procedures I billed separately is not included.   Critical Care Time: 35 minutes      Electronically signed by Michele Galindo MD on 12/1/2021 at 3:53 PM

## 2021-12-01 NOTE — PROGRESS NOTES
Internal Medicine Progress Note         Primary Care Physician: Marybeth Villegas DO   Admitting Physician:  Mona Hilton DO  Admission date and time: 11/26/2021  9:25 AM    Room:  [address] [place] [address]  Admitting diagnosis: Hepatorenal syndrome ([facility] [K76.7]  Acute kidney injury ([facility] [N17.9]  Chronic liver failure without hepatic coma [facility]) [K72.10]    Patient Name: Juan Go  MRN: [id number]    Date of Service: 12/1/2021       Subjective: Mary Lou Olguin is a 40 y.o. male ,12/1/2021, at the bedside. Patient doing very poorly multiple problems at hand including hepatorenal syndrome and dropping blood pressure. Patient has very low urine output with evidence of metabolic acidosis. Currently being followed by multiple subspecialists. 11/2611/27/2021-medical coverage by Dr. Chely Key    11/28/2021  Patient seen examined on PCU. Patient's mother is at the bedside and case discussed in detail. Patient is much more alert this morning. Patient oriented to person place. He denies any significant pain at this time. There is no lower leg edema and abdomen is distended but very soft. Serum sodium is up to 129 with a potassium 3.2. CO2 electrolytes is 18 and improved but BUN/creatinine is 44/3.2 which is improved compared to 11/27. Total bili is 24.2 with WBC 12.7 hemoglobin 10.1. Platelet count is down to 63. INR was 7.8. Temperature 98.8 with heart rate of 70 and blood pressure is 115/67. Patient has been put on phenylephrine drip with improvement in blood pressure. Urine output about 400 cc a shift. 11/29/2021  Patient seen and examined in PCU. Patient's mother is at the bedside and case discussed. Patient is still alert and oriented x23 with episodes of confusion. Patient denies any problem with chest or abdominal pain. Patient strength is still very poor with the patient still very weak on his feet. Patient appetite has improved with less nausea.   Patient still has persistent hand tremor which she has had for years according to the mother. But this has been gotten worse. BUN/creatinine 33/2.2 with serum sodium 130. Blood sugars ranging [id number]. INR is down to 5.7 today. Temperature ranges 90.7100.4 with a heart rate of 61 blood pressure 113/63. O2 sat 97% on room air at rest.  Patient still on phenylephrine drip. Urine output ranges 800-1300 cc a shift. 11/30/2021  Patient seen examined on PCU. Patient states he feels fairly good again today. He denies any chest or abdominal pain. Patient states he is very hungry and is still on liquids. Hemoglobin 9.2 with platelet count of down to 38. BUN/creatinine 24/1.4 with potassium 3.2 and sodium 131. Temperature 99 with heart rate 64 blood pressure 99/57. O2 sat 93% on room air. Phenylephrine drip still running. Urine output ranges to 200-4000 cc a shift. Patient is for abdominal paracentesis today. 12/1/2021  Patient seen examined in PCU. Patient with 2 family members at the bedside. Case was discussed. Patient alert and oriented x3. Patient's upper extremity tremors are moderately improved. Patient had questions about abdominal paracentesis. BUN/creatinine 25/1.4 today with total bilirubin 29. WBC 7.5 hemoglobin 8.2 and platelet count 62. Temperature ranges 97.998.3 with heart rate of 68 blood pressure range from 82/51112/64. O2 sats 100% on room air. Patient still on phenylephrine drip for blood pressure/ renal perfusion. Abdominal paracentesis when okay with GI, intensivist.    Review of System: Limited at the present time  Constitutional:   Denies fever or chills, weight loss or gain, fatigue or malaise. HEENT:   Denies ear pain, sore throat, sinus or eye problems. Cardiovascular:   Denies any chest pain, irregular heartbeats, or palpitations. Respiratory:   Denies shortness of breath, coughing, sputum production, hemoptysis, or wheezing. Gastrointestinal:   Denies nausea, vomiting, diarrhea, or constipation.   Denies 5,000 Units Oral Daily    insulin lispro  0-6 Units SubCUTAneous TID WC    albumin human  25 g IntraVENous Q8H    pantoprazole  40 mg IntraVENous Daily    And    sodium chloride (PF)  10 mL IntraVENous Daily    midodrine  15 mg Oral TID WC    thiamine (VITAMIN B1) IVPB  100 mg IntraVENous Daily    rifaximin  550 mg Oral BID    sodium chloride flush  5-40 mL IntraVENous 2 times per day    piperacillin-tazobactam  3,375 mg IntraVENous Q8H     Continuous Infusions:   sodium chloride      sodium chloride      sodium chloride      sodium chloride 1,000 mL (12/01/21 0834)    phenylephrine (DESMOND-SYNEPHRINE) 50mg/250mL infusion 85 mcg/min (12/01/21 1051)    octreotide (SANDOSTATIN) infusion 50 mcg/hr (12/01/21 0730)    dextrose      sodium chloride      sodium chloride Stopped (12/01/21 0843)       Objective Data:  CBC with Differential:    Lab Results   Component Value Date    WBC 7.5 12/01/2021    RBC 2.34 12/01/2021    HGB 8.2 12/01/2021    HCT 23.2 12/01/2021    PLT 62 12/01/2021    MCV 99.1 12/01/2021    MCH 35.0 12/01/2021    MCHC 35.3 12/01/2021    RDW 17.0 12/01/2021    METASPCT 0.9 11/20/2021    LYMPHOPCT 28.1 12/01/2021    MONOPCT 5.3 12/01/2021    BASOPCT 1.8 12/01/2021    MONOSABS 0.38 12/01/2021    LYMPHSABS 2.10 12/01/2021    EOSABS 0.20 12/01/2021    BASOSABS 0.14 12/01/2021     CMP:    Lab Results   Component Value Date     12/01/2021    K 3.8 12/01/2021    K 5.8 11/26/2021    CL 98 12/01/2021    CO2 23 12/01/2021    BUN 25 12/01/2021    CREATININE 1.4 12/01/2021    GFRAA >60 12/01/2021    LABGLOM 55 12/01/2021    GLUCOSE 127 12/01/2021    PROT 5.0 12/01/2021    LABALBU 3.1 12/01/2021    CALCIUM 7.8 12/01/2021    BILITOT 29.0 12/01/2021    ALKPHOS 37 12/01/2021    AST 65 12/01/2021    ALT 34 12/01/2021              Assessment:    · Acute hepatic encephalopathy  · Hepatorenal syndrome probable hepatopulmonary syndrome  · Acute hypoxic respiratory failure  · Hypotonic hypervolemic

## 2021-12-01 NOTE — PROGRESS NOTES
Nephrology Note  Sonal Ibarra MD          Patient seen and examined. Patient's family is present at the bedside. Chart reviewed. He again was unable to have his paracentesis because of a prolonged PT/INR. FFP this am anticipating paracentesis today  Awake and alert visiting w/ family   In no acute distress. Low dose pressor support       Vital SignsBP 108/60   Pulse 68   Temp 99 °F (37.2 °C) (Oral)   Resp 16   Ht 6' 3\" (1.905 m)   Wt 195 lb 1.6 oz (88.5 kg)   SpO2 97%   BMI 24.39 kg/m²   24HR INTAKE/OUTPUT:      Intake/Output Summary (Last 24 hours) at 12/1/2021 1124  Last data filed at 12/1/2021 0710  Gross per 24 hour   Intake 3350.2 ml   Output 1500 ml   Net 1850.2 ml         Physical Exam  General appearance: Jaundiced  Neck: No JVD  Lungs: Breath sounds decreased at the bases. No rales or ronchi. Heart: Regular rate and rhythm. No S3 gallop. No  murmrur. Abdomen: Soft non distended, non tender and normal bowel sounds. Extremeties: No edema.       ROS:  RESPIRATORY:  negative  CARDIOVASCULAR:  negative  GASTROINTESTINAL:  negative  GENITOURINARY:  negative    Current Facility-Administered Medications   Medication Dose Route Frequency Provider Last Rate Last Admin    midodrine (PROAMATINE) tablet 15 mg  15 mg Oral TID  Georgina Cook MD        phytonadione (VITAMIN K) tablet 10 mg  10 mg Oral Once Georgina Cook MD        vitamin D3 (CHOLECALCIFEROL) tablet 5,000 Units  5,000 Units Oral Daily Basim Barrientos MD   5,000 Units at 11/30/21 1741    0.9 % sodium chloride infusion   IntraVENous PRN Basim Barrientos MD        insulin lispro (HUMALOG) injection vial 0-6 Units  0-6 Units SubCUTAneous TID  EILEEN Cevallos - CNP   4 Units at 11/30/21 2222    0.9 % sodium chloride infusion   IntraVENous PRN Basim Barrientos MD        0.9 % sodium chloride infusion   IntraVENous PRMALCOLM Barrientos MD        0.9 % sodium chloride infusion  1,000 mL IntraVENous Continuous Andrew Kelsey Neri Guo APRN - CNP 75 mL/hr at 12/01/21 0834 1,000 mL at 12/01/21 0834    albumin human 25 % IV solution 25 g  25 g IntraVENous Q8H Lacey Jha MD   Stopped at 12/01/21 0158    pantoprazole (PROTONIX) injection 40 mg  40 mg IntraVENous Daily Manpreet Ngo MD   40 mg at 12/01/21 9274    And    sodium chloride (PF) 0.9 % injection 10 mL  10 mL IntraVENous Daily Manpreet Ngo MD   10 mL at 12/01/21 0853    phenylephrine (DESMOND-SYNEPHRINE) 50 mg in dextrose 5 % 250 mL infusion   mcg/min IntraVENous Continuous Alonzo Wild MD 25.5 mL/hr at 12/01/21 1051 85 mcg/min at 12/01/21 1051    midodrine (PROAMATINE) tablet 15 mg  15 mg Oral TID WC Manpreet Ngo MD   15 mg at 11/30/21 1737    octreotide (SANDOSTATIN) 500 mcg in sodium chloride 0.9 % 100 mL infusion  50 mcg/hr IntraVENous Continuous Ethrosenda Cabrera DO 10 mL/hr at 12/01/21 0730 50 mcg/hr at 12/01/21 0730    thiamine (B-1) 100 mg in sodium chloride 0.9 % 100 mL IVPB  100 mg IntraVENous Daily Ethelebrenda Rick, DO   Stopped at 12/01/21 1113    LORazepam (ATIVAN) tablet 1 mg  1 mg Oral Q1H PRN Bi Ketschke, DO        Or    LORazepam (ATIVAN) injection 1 mg  1 mg IntraVENous Q1H PRN Bi Ketschke, DO        rifaximin (XIFAXAN) tablet 550 mg  550 mg Oral BID Vassie Najjar, MD   550 mg at 11/30/21 2217    glucose (GLUTOSE) 40 % oral gel 15 g  15 g Oral PRN Jesse Arbour, DO        dextrose 50 % IV solution  12.5 g IntraVENous PRN Jesse Arbour, DO        glucagon (rDNA) injection 1 mg  1 mg IntraMUSCular PRN Jesse Arbour, DO        dextrose 5 % solution  100 mL/hr IntraVENous PRN Jesse Arbour, DO        sodium chloride flush 0.9 % injection 5-40 mL  5-40 mL IntraVENous 2 times per day Jesse Arbour, DO   10 mL at 12/01/21 0854    sodium chloride flush 0.9 % injection 5-40 mL  5-40 mL IntraVENous PRN Jesse Mendenhall DO        0.9 % sodium chloride infusion  25 mL IntraVENous PRN Eudelia Joyce, DO        polyethylene glycol [facility]) packet 17 g  17 g Oral Daily PRN Eudelia Joyce, DO        acetaminophen (TYLENOL) tablet 650 mg  650 mg Oral Q6H PRN Eudelia Joyce, DO        prochlorperazine (COMPAZINE) injection 10 mg  10 mg IntraVENous Q6H PRN Eudelia Joyce, DO        piperacillin-tazobactam (ZOSYN) 3,375 mg in dextrose 5 % 50 mL IVPB extended infusion (mini-bag)  3,375 mg IntraVENous Q8H Eudelia Joyce, DO   Stopped at 12/01/21 4346    And    0.9 % sodium chloride infusion   IntraVENous Q8H Eudelia Joyce, DO   Stopped at 12/01/21 5272       US GALLBLADDER RUQ   Final Result   Findings again consistent with cirrhotic liver. Ascites. Hepatofugal flow redemonstrated in the portal vein. Thickened gallbladder wall which may be reactive to the ascites and hepatic   disease. US ABDOMEN LIMITED   Final Result   Moderate volume intra-abdominal ascites. XR CHEST PORTABLE   Final Result   Mild basilar opacities are suspicious for pneumonia. This appearance can be   seen with COVID-19.         US GUIDED PARACENTESIS    (Results Pending)         Labs:    CBC:   Recent Labs     11/30/21  0530 12/01/21  0430   WBC 7.2 7.5   HGB 9.2* 8.2*   PLT 38* 62*        Lab Results   Component Value Date    IRON 132 05/08/2018    TIBC 149 (L) 05/08/2018    FERRITIN 1,815 05/08/2018       Lab Results   Component Value Date    PTH 60 11/30/2021    CALCIUM 7.8 (L) 12/01/2021    CALCIUM 7.9 (L) 11/30/2021    CALCIUM 7.7 (L) 11/29/2021    CAION 1.12 (L) 11/30/2021    PHOS 2.1 (L) 11/30/2021    PHOS 3.1 11/28/2021    PHOS 2.0 (L) 11/15/2021    MG 2.0 12/01/2021    MG 1.6 11/30/2021    MG 1.8 11/28/2021       BMP:   Recent Labs     11/29/21  2145 11/30/21  0530 12/01/21  0430   * 131* 132   K 3.3* 3.2* 3.8   CL 93* 93* 98   CO2 26 26 23   BUN 27* 24* 25*   CREATININE 1.6* 1.4* 1.4*   GLUCOSE 118* 104* 127*             Assessment: / Plan:    1. Acute kidney injury. Acute kidney injury secondary to renal hypoperfusion with possible underlying hepatorenal syndrome. Urine output has improved which goes against hepatorenal syndrome. BUN and creatinine trending lower. We will continue gentle hydration for now      2. Metabolic acidosis. Patient with non-anion gap metabolic acidosis which may be reflection of diarrhea as well as acute kidney injury. Improved. 3.   Hyponatremia. Patient probably with hepatic failure associated hyponatremia along with use of spironolactone contributing to hyponatremia. SNa trending higher - continue gentle IVF    4. Hyperkalemia. Hyperkalemia secondary acute kidney injury and use of potassium sparing diuretic --> patient now Hypokalemic. Supplement potassium and magnesium as needed    5. Hypocalcemia.     ionized calcium 1.12 / Vit D 14 / PTH 60 - Started oral Vit D on 11/29/21    6. Hypotension. Follow on pressor support and SPA. [facility]  Patient seen and examined. Family member is present at the bedside. Chart reviewed. I had a face to face encounter with the patient. Agree with exam.    Agree with  formulation, assessment and plan as outlined above and directed by me. Addition and corrections were done as deemed appropriate. My exam and plan include:    Patient taking well orally. Will Hep-Lock IV fluids after current bag. Continue current treatment.       Izaiah Bonilla MD  Nephrology        Electronically signed by Izaiah Bonilla MD on 12/1/2021 at 1:15 PM

## 2021-12-01 NOTE — PROGRESS NOTES
PROGRESS NOTE    By Joe Allison D.O GI Fellow    [facility]  Dr. Honey Bazzi MD, Dr. Madalyn Galloway MD, Dr Suzanne Freeman, Dr. Hoda Wellington MD, Dr. Shaggy Kamara, DO Violette Gatica  40 y.o.  male    SUBJECTIVE: Patient was seen and examined at bedside this morning. No acute event reported overnight. Patient continued to show improvement in mentation patient is at baseline now. Patient kidney function continued to improve his creatinine this morning is 1.4. Patient has adequate urine output. INR is also trending down at 3.4. Bilirubin however remains elevated at 29. Patient is tolerating diet well. Patient is scheduled to have a diagnostic paracentesis tomorrow per interventional radiologist.  Patient did not have any additional complaint. Hemodynamically are stable on phenylephrine.   Afebrile otherwise    OBJECTIVE:    /64   Pulse 72   Temp 97.9 °F (36.6 °C)   Resp 18   Ht 6' 3\" (1.905 m)   Wt 195 lb 1.6 oz (88.5 kg)   SpO2 94%   BMI 24.39 kg/m²     Gen: NAD, AAO x 3  HEENT:PEERL, no icterus  Heart: RRR, no M/R/G  Lungs: CTAB  Abd.: soft, NT, ND, BS +, no G/R, no HSM  Extr.: no C/C/E, no bruising         Lab Results   Component Value Date    WBC 7.5 12/01/2021    WBC 7.2 11/30/2021    WBC 12.7 11/28/2021    HGB 8.2 12/01/2021    HGB 9.2 11/30/2021    HGB 10.1 11/28/2021    HCT 23.2 12/01/2021    MCV 99.1 12/01/2021    RDW 17.0 12/01/2021    PLT 62 12/01/2021    PLT 38 11/30/2021    PLT 63 11/28/2021     Lab Results   Component Value Date     12/01/2021    K 3.8 12/01/2021    K 5.8 11/26/2021    CL 98 12/01/2021    CO2 23 12/01/2021    BUN 25 12/01/2021    CREATININE 1.4 12/01/2021    CALCIUM 7.8 12/01/2021    PROT 5.0 12/01/2021    LABALBU 3.1 12/01/2021    BILITOT 29.0 12/01/2021    BILITOT 29.4 11/30/2021    BILITOT 24.0 11/29/2021    ALKPHOS 37 12/01/2021    ALKPHOS 47 11/30/2021    ALKPHOS 50 11/29/2021    AST 65 12/01/2021    AST 67 11/30/2021    AST 81 11/29/2021    ALT 34 12/01/2021    ALT 43 11/30/2021    ALT 47 11/29/2021     Lab Results   Component Value Date    LIPASE 68 11/14/2021    LIPASE 26 07/01/2020    LIPASE 26 05/11/2020     Lab Results   Component Value Date    AMYLASE 91 01/16/2019         ASSESSMENT/PLAN:    1. Hepatic encephalopathy type C grade II, improved  · Suspect in the setting of acute renal failure/dehdration   · Diagnostic paracentesis planned for tomorrow per IR to r/o SBP, current INR 3.4.       2. Decompensated Alcohol induced cirrhosis with ascites   · MELD-Na 45 with INR 5.9 today (65-66% estimated 90 day mortality) vs (last admission 35 with INR 4.8 11/20/2021) Patient has abstained from alcohol since 11/4 and was recently discharged on 11/20 with treatment of lactulose and rifaximin as well as spironolactone and furosemide. · Overall prognosis remains poor.   · Bilirubin continues to be elevated at 29 >>24 >>24.2 . Continue with daily Vitamin care for elevated INR.   · Liver chemistries trending down since last admission.   Continue with rifaximin and awaiting ascitic fluid cytology     3. Acute kidney injury 2/2 renal hypoperfusion, unlikely hepatorenal syndrome, improved   · UO continue to improved. . BUN and Cr improved. Nephrology following.   · Continue IVF as well as pressure support per primary team.      4. Hypotension  · Continue Octreotide and phenylephrine for BP support. · Hold diuretic.      Other comorbidities  Hyponatremia, improved   Hyperkalemia on admission, resolved. 2/2 Spirolactone use. Hypokalemia, resolved  Hypocalcemia, corrected calcium 5.6  History of atrial fibrillation, NSR on telemetry   GERD, stable  Hx Alcohol withdrawal and intoxication    Pt was discussed with Dr. Chula Santoyo DO  GI Fellow   12/1/2021  10:16 AM    Pt seen and independently examined. Pertinent notes and lab work reviewed.   Monitor reviewed showing sinus rhythm  D/w Dr. Chrissy Mohan with physical exam and A&P.      Mary Pastor MD  12/1/2021  2:17 PM

## 2021-12-01 NOTE — CARE COORDINATION
SS NOTE: COVID NEGATIVE 11/26- PT WILL NEED A NEGATIVE RAPID COVID 72 HOURS PRIOR TO [facility] TO [facility] SKILLED. READMISSION DONE TODAY. This is an ICU pt. Pt is a DNR CCA, no intubation. He is on IV Protonix, Zocyn. [facility] skilled  accepted pt- they will begin [address] [facility]. For a SNF placement pt will need a PRECERT, signed JORGE, current PT.OT notes and SW will need to complete the HENs. [place] Mata.12/01/2021.4:15P

## 2021-12-02 NOTE — CARE COORDINATION
SS NOTE: COVID NEGATIVE 11/26- PT WILL NEED A NEGATIVE RAPID COVID 72 HOURS PRIOR TO [address] [facility] TO [facility] SKILLED. READMISSION DONE TODAY. This is an ICU pt. Pt is a DNR CCA, no intubation. He is on IV Protonix, Zocyn and Neosynephrine. [facility]  accepted pt- they will begin [address]. For a SNF placement pt will need a PRECERT, signed JORGE, current PT.OT notes and SW will need to complete the HENs. [place] Enrique.12/02/2021.12:55PM.

## 2021-12-02 NOTE — PROGRESS NOTES
OT BEDSIDE TREATMENT NOTE      Date:2021  Patient Name: Violette Gatica  MRN: [id number]  : 1975  Room: [address] [place] [address]        Evaluating OT: Ladonna German, ECTORR/L; [id number]        Referring Provider and Orders/Date:   OT eval and treat Start: 21, End: 21, ONE TIME, Standing Count: 1 Occurrences, R    Kyler Younger DO     Diagnosis:   1. Hepatorenal syndrome ([facility] [address]    2. Chronic liver failure without hepatic coma ([facility] [address]    3. Acute kidney injury [facility])               Pertinent Medical History:        Past Medical History        Past Medical History:   Diagnosis Date    Anxiety      Depression      Esophagitis      ETOH abuse      Gastritis      GERD (gastroesophageal reflux disease)      Hematuria      Pancreatitis               Past Surgical History         Past Surgical History:   Procedure Laterality Date    KNEE SURGERY         ACL    UPPER GASTROINTESTINAL ENDOSCOPY   2018    UPPER GASTROINTESTINAL ENDOSCOPY N/A 2018     EGD BIOPSY performed by Madalyn Galloway MD at [address] [place] [address] 2019     EGD ESOPHAGOGASTRODUODENOSCOPY performed by Shaggy Kamara DO at [address] [place] [address]   2019     EGD CONTROL HEMORRHAGE performed by Shaggy Kamara DO at [place] [address]           Precautions:  Fall Risk, tremors     Recommended placement: subacute vs IRF    Assessment of current deficits     [x]? Functional mobility           [x]? ADLs           [x]? Strength                   []?Cognition     [x]? Functional transfers         [x]? IADLs         [x]? Safety Awareness   [x]? Endurance     []? Fine Coordination                        [x]? Balance      []? Vision/perception    []? Sensation       [x]? Gross Motor Coordination            []? ROM           []?  Delirium                   []? Motor Control      OT PLAN OF CARE   OT POC based on physician orders, patient diagnosis and results of clinical assessment     Frequency/Duration 1-3 days/wk for 2 weeks PRN   Specific OT Treatment Interventions to include:   * Instruction/training on adapted ADL techniques and AE recommendations to increase functional independence within precautions       * Training on energy conservation strategies, correct breathing pattern and techniques to improve independence/tolerance for self-care routine  * Functional transfer/mobility training/DME recommendations for increased independence, safety, and fall prevention  * Patient/Family education to increase follow through with safety techniques and functional independence  * Recommendation of environmental modifications for increased safety with functional transfers/mobility and ADLs  * Therapeutic exercise to improve motor endurance, ROM, and functional strength for ADLs/functional transfers  * Therapeutic activities to facilitate/challenge dynamic balance, stand tolerance for increased safety and independence with ADLs  * Therapeutic activities to facilitate gross/fine motor skills for increased independence with ADLs  * Neuro-muscular re-education: facilitation of righting/equilibrium reactions, midline orientation, scapular stability/mobility, normalization of muscle tone, and facilitation of volitional active controled movement      Recommended Adaptive Equipment/DME: shower chair, TBD       Home Living: Patient lives alone in a ranch home with 3 steps  to enter without Rail. He reports he can have assist from his girl friend or mother if required. Laundry in the basement with a standard flight of steps with rails. Pt recently DC from therapy.    Bathroom setup: tub shower with grab bars.  Standard toilet   DME owned: none      Prior Level of Function: indep with ADLs , indep with IADLs; ambulated indep   Driving: yes   Occupation: none   Enjoys: fishing,hunting, odd jobs around the house, dog     Pain Level: none  Cognition: A&O: 3/4; Follows 3 step directions              Memory:  fair-              Sequencing:  Fair              Problem solving:  Fair-              Judgement/safety:  fair-  with impulsivity and decreased awareness of deficits     AM-PAC Daily Activity Inpatient   How much help for putting on and taking off regular lower body clothing?: A Lot  How much help for Bathing?: A Little  How much help for Toileting?: A Little  How much help for putting on and taking off regular upper body clothing?: A Lot  How much help for taking care of personal grooming?: A Little  How much help for eating meals?: A Little  AM-[facility] Inpatient Daily Activity Raw Score: 16  AM-PAC Inpatient ADL T-Scale Score : 35.96  ADL Inpatient CMS 0-100% Score: 53.32  ADL Inpatient CMS G-Code Modifier : CK     Functional Assessment:      Initial Eval Status  Date: 11/30/2021   Treatment Status  Date:12/2/2021 STGs = LTGs  Time frame: 10-14 days   Feeding Minimal Assist with coordination and to set up/open containers. Clear diet on eval.  Pt able to manage container and  bring container to mouth to rinse after oral hygiene  indep   Grooming Minimal Assist with oral care, face wash, shampoo and hair comb sitting EOB. Pt self limiting and provided with encouragement for highest level of function. Pt able to manage containers when seated in bed and complete oral hygiene at SBA; pt required min A to don shampoo cap; pt able to bring B hands to head to wash and towel dry hair; pt required min A for thoroughness to comb hair d/t no mirror  Stand by Assist    UB Dressing Max Assist with gown management. Pt overwhelmed with lines and IV, as well as his visitor. Mod A to change gowns with assist to fasten gown around IV sites and to thread monitor lines through gown; assist to tie/untie back of gown d/t tremoring intermittently  SBA   LB Dressing Minimal Assist with socks from sitting EOB with extended time. Pt declining brief and pants.   N/T; pt states he had just changed his socks today  Stand by Assist    Bathing Minimal Assist with sponge bathing sitting/standing EOB with decreased thoroughness. Pt able to wash UB and LE's when seated in bed; assist to wash back ; pt declined washing feet at this time Stand by Assist    Toileting Minimal Assist with use of urinal. Pt reported that he just used the 3:1 prior to arrival and declined to complete with OT.  N/T  Stand by Assist    Bed Mobility  Supine to sit: Supervision   Sit to supine: Supervision  With impulsivity  Supervision for supine to sit; supervision for scooting; sit to supine at supervision Supine to sit: Independent   Sit to supine: Independent    Functional Transfers Minimal Assist for safety and balance. Limited with lines N/T; pt seated in bed unsupported during session for ADL tasks  Stand by Assist    Functional Mobility Minimal Assist for safety and balance only short functional distance with line limitations N/T d/t decreased endurance  Stand by Assist    Balance Sitting:     Static:  good    Dynamic:fair  Standing: fair- Sitting:     Static:  good    Dynamic:fair  Standing: N/T Sitting:    Dynamic:fair+  Standing: fair   Activity Tolerance Vitals with activity:92/50 HR 60 02 97% room air  Standing tolerance <30secs with fatigue and impulsivity. Fair minus; pt limited d/t fatigue/tremoring  Increase standing tolerance for >4min with stable vital signs for carry over into toileting, functional tranfers and indep in ADLs   Visual/  Perceptual Glasses: not Present; WFL     Reports change in vision since admission: No      NA   Ken UE Strengthening  3+/5 generally   4+/5MMT generally for carry over into self care, functional transfers and functional mobility with AD.       Hand Dominance  [x]? Right   []? Left     AROM (PROM) Strength Additional Info:    RUE  WFL 3+/5 Fair  and fair- FMC/dexterity noted during ADL tasks  Opposition [x]? Intact []? Impaired  Finger to nose [x]? Intact []?  Impaired      LUE WFL 3+/5 Fair  and fair FMC/dexterity noted during ADL tasks  Opposition [x]? Intact []? Impaired  Finger to nose [x]? Intact []? Impaired      Hearing: WFL  Sensation:  No c/o numbness or tingling   Tone: WFL   Edema: B LE's; nsg aware     Comments: Patient cleared by nursing staff. Upon arrival pt supine in bed with HOB slightly elevated. Pt agreeable to OT tx session. Pt educated with regards to bed mobility, hand placement, safety awareness, static sitting balance, ADL retraining,  grooming tasks, UE/LE bathing, UE dressing, pericare, ECT's. At end of session pt seated in bed with HOB elevated  with all lines and tubes intact, call light within reach. Overall, pt demonstrated decreased independence and safety during completion of ADL/functional transfers/mobility tasks. Pt would benefit from continued skilled OT to increase safety and independence with completion of ADL/IADL tasks for functional independence and quality of life. Pt required cues and education as noted above for safe facilitation and completion of tasks. Therapist provided skilled monitoring of patient's response during treatment session. Prior to and at the end of session, environmental modifications / line management completed for patients safety and efficiency of treatment session. Overall, patient demonstrates minimal/moderate difficulties with completion of BADLs and IADLs. Factors contributing to these difficulties include tremoring, decreased endurance, and generalized weakness. As noted above, patient likely to benefit from further OT intervention to increase independence, safety, and overall quality of life. Treatment:     ? Bed mobility: Facilitated bed mobility with cues for proper body mechanics and sequencing to prepare for ADL completion. ? ADL completion: Self-care retraining for the above-mentioned ADLs; training on proper hand placement, safety technique, sequencing, and energy conservation techniques. ?  Postural Balance: Sitting balance retraining to improve righting reactions with postural changes during ADLs. ? Skilled positioning: Proper positioning to improve interaction with environment, overall functioning       · Pt has made fair progress   · OT 1-3x/week for 5-7 days during hospitalization       Treatment Time also includes thorough review of current medical information, gathering information on past medical history/social history and prior level of function, informal observation of tasks, assessment of data and education on plan of care and goals.     Treatment Time In: 3:55 PM   Treatment Time Out: 4:20 PM            Treatment Charges: Mins Units   ADL/Home Mgt     [id number] 25 2   Thera Activities     [id number]     Ther Ex                 [id number]     Manual Therapy    07390     Neuro Re-ed         09525     Orthotic manage/training                               [id number]     Non Billable Time     Total Timed Treatment 25 1838 GIDEON Howard/CHARLIE #[id number]

## 2021-12-02 NOTE — PROGRESS NOTES
Internal Medicine Progress Note         Primary Care Physician: Lexy Ashton DO   Admitting Physician:  Stephanie Kincaid DO  Admission date and time: 11/26/2021  9:25 AM    Room:  [address] [place] [address]  Admitting diagnosis: Hepatorenal syndrome ([facility] [address] [K76.7]  Acute kidney injury ([facility] [address] [N17.9]  Chronic liver failure without hepatic coma [facility]) [K72.10]    Patient Name: Emilee Marsh  MRN: [id number]    Date of Service: 12/2/2021       Subjective: Carlos Hill is a 40 y.o. male ,12/2/2021, at the bedside. Patient doing very poorly multiple problems at hand including hepatorenal syndrome and dropping blood pressure. Patient has very low urine output with evidence of metabolic acidosis. Currently being followed by multiple subspecialists. 11/2611/27/2021-medical coverage by Dr. Elizabeth Jimenez    11/28/2021  Patient seen examined on PCU. Patient's mother is at the bedside and case discussed in detail. Patient is much more alert this morning. Patient oriented to person place. He denies any significant pain at this time. There is no lower leg edema and abdomen is distended but very soft. Serum sodium is up to 129 with a potassium 3.2. CO2 electrolytes is 18 and improved but BUN/creatinine is 44/3.2 which is improved compared to 11/27. Total bili is 24.2 with WBC 12.7 hemoglobin 10.1. Platelet count is down to 63. INR was 7.8. Temperature 98.8 with heart rate of 70 and blood pressure is 115/67. Patient has been put on phenylephrine drip with improvement in blood pressure. Urine output about 400 cc a shift. 11/29/2021  Patient seen and examined in PCU. Patient's mother is at the bedside and case discussed. Patient is still alert and oriented x23 with episodes of confusion. Patient denies any problem with chest or abdominal pain. Patient strength is still very poor with the patient still very weak on his feet. Patient appetite has improved with less nausea.   Patient still has persistent hand tremor which she has had for years according to the mother. But this has been gotten worse. BUN/creatinine 33/2.2 with serum sodium 130. Blood sugars ranging [id number]. INR is down to 5.7 today. Temperature ranges 90.7100.4 with a heart rate of 61 blood pressure 113/63. O2 sat 97% on room air at rest.  Patient still on phenylephrine drip. Urine output ranges 800-1300 cc a shift. 11/30/2021  Patient seen examined on PCU. Patient states he feels fairly good again today. He denies any chest or abdominal pain. Patient states he is very hungry and is still on liquids. Hemoglobin 9.2 with platelet count of down to 38. BUN/creatinine 24/1.4 with potassium 3.2 and sodium 131. Temperature 99 with heart rate 64 blood pressure 99/57. O2 sat 93% on room air. Phenylephrine drip still running. Urine output ranges to 200-4000 cc a shift. Patient is for abdominal paracentesis today. 12/1/2021  Patient seen examined in PCU. Patient with 2 family members at the bedside. Case was discussed. Patient alert and oriented x3. Patient's upper extremity tremors are moderately improved. Patient had questions about abdominal paracentesis. BUN/creatinine 25/1.4 today with total bilirubin 29. WBC 7.5 hemoglobin 8.2 and platelet count 62. Temperature ranges 97.998.3 with heart rate of 68 blood pressure range from 82/51112/64. O2 sats 100% on room air. Patient still on phenylephrine drip for blood pressure/ renal perfusion. Abdominal paracentesis when okay with GI, intensivist.    12/2/2021   Patient seen examined on PCU. Patient denies any specific pain at this time. Patient is little bit depressed about not getting his paracentesis performed yet. BUN/creatinine 18/1.2 with hemoglobin is 7.5 platelet count is down to 29. Patient received fresh frozen plasma and INR yesterday morning was 3.4 and today is 5. Temperature 90.3 with heart rate of 67 and blood pressure 110/70. Pulmonology and GI notes reviewed.   With kidney function about back to baseline phenylephrine drip will be weaned off. Review of System: Limited at the present time  Constitutional:   Denies fever or chills, weight loss or gain, fatigue or malaise. HEENT:   Denies ear pain, sore throat, sinus or eye problems. Cardiovascular:   Denies any chest pain, irregular heartbeats, or palpitations. Respiratory:   Denies shortness of breath, coughing, sputum production, hemoptysis, or wheezing. Gastrointestinal:   Denies nausea, vomiting, diarrhea, or constipation. Denies any abdominal pain. Genitourinary:    Denies any urgency, frequency, hematuria. Voiding  without difficulty. Extremities:   Denies lower extremity swelling, edema or cyanosis. Neurology:    Denies any headache or focal neurological deficits, Denies generalized weakness or memory difficulty. Psch:   Denies being anxious or depressed. Musculoskeletal:    Denies  myalgias, joint complaints or back pain. Integumentary:   Denies any rashes, ulcers, or excoriations. Denies bruising. Hematologic/Lymphatic:  Denies bruising or bleeding. Physical Exam:  No intake/output data recorded. Intake/Output Summary (Last 24 hours) at 12/2/2021 1518  Last data filed at 12/2/2021 1200  Gross per 24 hour   Intake 5431.04 ml   Output 1475 ml   Net 3956.04 ml   I/O last 3 completed shifts: In: 8079 [P.O.:240; I.V.:3914; IV Piggyback:1277.1]  Out: 4582 [Urine:1475]  Patient Vitals for the past 96 hrs (Last 3 readings):   Weight   12/02/21 0600 208 lb (94.3 kg)     Vital Signs:   Blood pressure 110/70, pulse 60, temperature 98.3 °F (36.8 °C), temperature source Oral, resp. rate 14, height 6' 3\" (1.905 m), weight 208 lb (94.3 kg), SpO2 95 %. General appearance:  Patient alert but lethargic. Appears chronically ill  Head:  Normocephalic. No masses, lesions or tenderness. Eyes:  PERRLA. EOMI. sclera icteric. Buccal mucosa moist.  ENT:  Ears normal. Mucosa normal.  Neck:    Supple. Trachea midline. No thyromegaly. No JVD. No bruits. Heart:    Rhythm regular. Tachycardic. No murmurs. Lungs:    Diminished  Abdomen:   Soft. Non-tender. Non-distended. Bowel sounds positive. No organomegaly or masses. No pain on palpation. Newly distended  Extremities:    Peripheral pulses present. No peripheral edema. No ulcers. No cyanosis. No clubbing.   Neurologic:    Responsive but lethargic  Integumentary:  No rashes skin jaundice  Genitalia/Breast:  Hickman catheter    Medication:  Scheduled Meds:   phytonadione  10 mg Oral Daily    vitamin D3  5,000 Units Oral Daily    insulin lispro  0-6 Units SubCUTAneous TID WC    albumin human  25 g IntraVENous Q8H    pantoprazole  40 mg IntraVENous Daily    And    sodium chloride (PF)  10 mL IntraVENous Daily    midodrine  15 mg Oral TID WC    thiamine (VITAMIN B1) IVPB  100 mg IntraVENous Daily    rifaximin  550 mg Oral BID    sodium chloride flush  5-40 mL IntraVENous 2 times per day    piperacillin-tazobactam  3,375 mg IntraVENous Q8H     Continuous Infusions:   sodium chloride      sodium chloride      sodium chloride      phenylephrine (DESMOND-SYNEPHRINE) 50mg/250mL infusion 90 mcg/min (12/02/21 1200)    octreotide (SANDOSTATIN) infusion 50 mcg/hr (12/02/21 1200)    dextrose      sodium chloride      sodium chloride Stopped (12/02/21 0605)       Objective Data:  CBC with Differential:    Lab Results   Component Value Date    WBC 5.5 12/02/2021    RBC 2.14 12/02/2021    HGB 7.5 12/02/2021    HCT 21.2 12/02/2021    PLT 29 12/02/2021    MCV 99.1 12/02/2021    MCH 35.0 12/02/2021    MCHC 35.4 12/02/2021    RDW 16.6 12/02/2021    METASPCT 0.9 11/20/2021    LYMPHOPCT 17.7 12/02/2021    MONOPCT 6.2 12/02/2021    BASOPCT 0.7 12/02/2021    MONOSABS 0.33 12/02/2021    LYMPHSABS 0.99 12/02/2021    EOSABS 0.44 12/02/2021    BASOSABS 0.00 12/02/2021     CMP:    Lab Results   Component Value Date     12/02/2021    K 3.8 12/02/2021    K 5.8 11/26/2021     12/02/2021    CO2 21 12/02/2021    BUN 18 12/02/2021    CREATININE 1.2 12/02/2021    GFRAA >60 12/02/2021    LABGLOM >60 12/02/2021    GLUCOSE 114 12/02/2021    PROT 5.3 12/02/2021    LABALBU 3.3 12/02/2021    CALCIUM 8.3 12/02/2021    BILITOT 27.5 12/02/2021    ALKPHOS 52 12/02/2021    AST 47 12/02/2021    ALT 28 12/02/2021              Assessment:    · Acute hepatic encephalopathy  · Hepatorenal syndrome probable hepatopulmonary syndrome  · Acute hypoxic respiratory failure  · Hypotonic hypervolemic hyponatremia  · JULIO on CKD  · Pneumonia possible aspiration  · Decompensated cirrhosis  · Hyperbilirubinemia  · Chronic normocytic anemia  · Atrial fibrillation  · History of anxiety and depression  · History of alcohol abuse  · GERD        Plan:     · Patient is doing very poorly at the present time. The patient does have advanced liver disease with decompensated cirrhosis and not currently a candidate for liver transplant. Symptoms are suggestive of severe electrolyte imbalance with probable hepatorenal syndrome. He also is currently hypotensive. I have discussed the condition with the mother and fiancé along with Dr. Whitley Ritter have discussed his CODE STATUS and agree on no intubation or chest compressions. We have also discussed possibility of transition into hospice pending his status. Continue aggressive care for the next 24 to 48 hours and observe response  · Electrolyte imbalance of hyponatremia being followed by nephrology  · Metabolic acidosis being corrected by bicarb infusion  · GI is following regarding his hepatic cirrhosis  · Vasopressor has been instituted  · Salt poor albumin to expand volume  · Blood culture has been obtained.   Zosyn to cover for spontaneous bacterial peritonitis  · Patient is auto anticoagulated vitamin K supplementation  · Treat elevated ammonia level  · Might require paracentesis  · Prognosis is very poor but he is improving short-term          Dane Kim DO, LEATHA HERNANDEZIJerardo  12/2/2021  3:18 PM

## 2021-12-02 NOTE — PROGRESS NOTES
PROGRESS NOTE  By Tessie Fritz M.D. [facility]  Dr. Ila Young M.D.,  Dr. Eleln Roche M.D.,   Dr. Hector Egan D.O.,  Dr. William Barrientos M.D.,  GHANSHYAM GastelumO.,  Olga Lyons D.OJerardo Emilee Fat  40 y.o.  male    SUBJECTIVE:  Denies abdominal pain. Denies nausea vomiting. No family at bedside    OBJECTIVE:    BP (!) 101/58   Pulse 69   Temp 98.3 °F (36.8 °C) (Oral)   Resp 22   Ht 6' 3\" (1.905 m)   Wt 208 lb (94.3 kg)   SpO2 95%   BMI 26.00 kg/m²     General: NAD/ male. AAO x3  HEENT: Persistent scleral icterus/moist oral mucosa  Neck: Supple trachea midline  Chest: Symmetric excursion/nonlabored respirations  Cor: Regular  Abd.: Soft and moderately distended. Nontender  Extr.:  Decreased muscle tone above throughout  Skin: Gross jaundice. Warm and dry        DATA:    Monitor data reviewed -sinus rhythm noted. Lab Results   Component Value Date    WBC 5.5 12/02/2021    RBC 2.14 12/02/2021    HGB 7.5 12/02/2021    HCT 21.2 12/02/2021    MCV 99.1 12/02/2021    MCH 35.0 12/02/2021    MCHC 35.4 12/02/2021    RDW 16.6 12/02/2021    PLT 29 12/02/2021    MPV 10.5 12/02/2021     Lab Results   Component Value Date     12/02/2021    K 3.8 12/02/2021    K 5.8 11/26/2021     12/02/2021    CO2 21 12/02/2021    BUN 18 12/02/2021    CREATININE 1.2 12/02/2021    CALCIUM 8.3 12/02/2021    PROT 5.3 12/02/2021    LABALBU 3.3 12/02/2021    BILITOT 27.5 12/02/2021    ALKPHOS 52 12/02/2021    AST 47 12/02/2021    ALT 28 12/02/2021     Lab Results   Component Value Date    LIPASE 68 11/14/2021     Lab Results   Component Value Date    AMYLASE 91 01/16/2019         ASSESSMENT/PLAN:    1. Hepatic encephalopathy type C grade II, improved  -Improved resorptive with patient mental status at baseline  -Paracentesis not done with INR of 5       2.   Cirrhosis   -decompensated Alcohol induced  -As above ascites   -MELD-Na 45 with INR 5.9 today (65-66% estimated 90 day mortality) vs (last admission 36 with INR 4.8 11/20/2021) Patient has abstained from alcohol since 11/4 and was recently discharged on 11/20 with treatment of lactulose and rifaximin as well as spironolactone and furosemide.  -Overall prognosis poor however some decreased  Bilirubin  -Continue rifaximin and lactulose including on discharge    3. Renal failure   -acute kidney injury 2/2 renal hypoperfusion, unlikely hepatorenal syndrome, improved        4. Hypotension  -Continue current     Other comorbidities  Hyponatremia, improved   Hyperkalemia on admission, resolved. 2/2 Spirolactone use. Hypokalemia, resolved  Hypocalcemia, corrected calcium 5.6  History of atrial fibrillation, NSR on telemetry   GERD, stable  Hx Alcohol withdrawal and intoxication    Shannen Chase MD  12/2/2021  12:41 PM    NOTE:  This report was transcribed using voice recognition software. Every effort was made to ensure accuracy; however, inadvertent computerized transcription errors may be present.

## 2021-12-02 NOTE — PROGRESS NOTES
Assessment and Plan  Patient is a 40 y.o. male with the following medical Problems:   1. Alcoholic liver cirrhosis  2. Acute kidney injury suspected prerenal azotemia versus hepatorenal syndrome  3. Hepatic encephalopathy  4. Hyponatremia  5. Bilateral pleural effusions  6. Jaundice  7. Ascites      Plan of care:  1. IV fluid as per nephrology recommendation  2. Continue with ceftriaxone  3. Midodrine 50 mg 3 times a day  4. C/W lactulose or rifaximin  5. Oral vitamin K  6. Awaiting paracentesis once INR is corrected. 7.  Wean Lavon-Synephrine as tolerated target map of 55. History of Present Illness:   Patient is a 52-FZMR-AEL man with alcoholic liver cirrhosis who was recently discharged from the hospital on November 21, 2021. He presented this time with worsening mental status. Patient is clinically encephalopathy however he is awake and follows some commands. He was started on lactulose or rifaximin. Patient kidney function worsened over the last few weeks. Patient has no fever, chills, rigors. Goals of care were discussed with the patient's mom and his girlfriend. Patient was made DNR CCA with no intubation. Patient's overall condition will be reevaluated in the next 24 to 48 hours to decide about comfort care measures. At this stage patient is not a candidate for liver transplant due to his recent alcohol consumption. Daily progress:  December 1, 2021: Patient continues to improve. He is alert and oriented x3. He has no fever, chills, rigors. He is awaiting for paracentesis. He endorses no complaints. December 2, 2021: Patient is more awake and interactive. Lavon-Synephrine is being weaned slowly. He has no fever, chills, rigors. Patient expected to have chronically low blood pressure due to his liver cirrhosis.     Past Medical History:  Past Medical History:   Diagnosis Date    Anxiety     Depression     Esophagitis     ETOH abuse     Gastritis     GERD (gastroesophageal reflux disease)     Hematuria     Pancreatitis         Family History:   No family history on file. Allergies:         Saralyn Pickler nut oil]    Social history:  Social History     Socioeconomic History    Marital status:      Spouse name: Not on file    Number of children: 1    Years of education: Not on file    Highest education level: Not on file   Occupational History    Not on file   Tobacco Use    Smoking status: Never Smoker    Smokeless tobacco: Never Used   Vaping Use    Vaping Use: Never used   Substance and Sexual Activity    Alcohol use: Yes     Comment: 5th of liquour daily. Previous 1/15/2019    Drug use: No    Sexual activity: Not Currently     Partners: Female   Other Topics Concern    Not on file   Social History Narrative    Has a 15 y.o. son who lives in [place]. Social Determinants of Health     Financial Resource Strain:     Difficulty of Paying Living Expenses: Not on file   Food Insecurity:     Worried About Running Out of Food in the Last Year: Not on file    Ele of Food in the Last Year: Not on file   Transportation Needs:     Lack of Transportation (Medical): Not on file    Lack of Transportation (Non-Medical):  Not on file   Physical Activity:     Days of Exercise per Week: Not on file    Minutes of Exercise per Session: Not on file   Stress:     Feeling of Stress : Not on file   Social Connections:     Frequency of Communication with Friends and Family: Not on file    Frequency of Social Gatherings with Friends and Family: Not on file    Attends [employer] Services: Not on file    Active Member of Clubs or Organizations: Not on file    Attends Club or Organization Meetings: Not on file    Marital Status: Not on file   Intimate Partner Violence:     Fear of Current or Ex-Partner: Not on file    Emotionally Abused: Not on file    Physically Abused: Not on file    Sexually Abused: Not on file   Housing Stability:     Unable to Pay for Housing in the Last Year: Not on file    Number of Places Lived in the Last Year: Not on file    Unstable Housing in the Last Year: Not on file       Current Medications:     Current Facility-Administered Medications:     phytonadione (VITAMIN K) tablet 10 mg, 10 mg, Oral, Daily, Judy Healy MD, 10 mg at 12/02/21 0831    melatonin tablet 6 mg, 6 mg, Oral, Nightly PRN, EILEEN Nunez CNP, 6 mg at 12/01/21 2009    vitamin D3 (CHOLECALCIFEROL) tablet 5,000 Units, 5,000 Units, Oral, Daily, Shameka Jimenez MD, 5,000 Units at 12/02/21 0831    0.9 % sodium chloride infusion, , IntraVENous, PRN, Shameka Jimenez MD    insulin lispro (HUMALOG) injection vial 0-6 Units, 0-6 Units, SubCUTAneous, TID , EILEEN Sims CNP, 4 Units at 11/30/21 2222    0.9 % sodium chloride infusion, , IntraVENous, PRN, Shameka Jimenez MD    0.9 % sodium chloride infusion, , IntraVENous, PRN, Shameka Jimenez MD    albumin human 25 % IV solution 25 g, 25 g, IntraVENous, Q8H, Shameka Jimenez MD, Stopped at 12/02/21 1159    pantoprazole (PROTONIX) injection 40 mg, 40 mg, IntraVENous, Daily, 40 mg at 12/02/21 0831 **AND** sodium chloride (PF) 0.9 % injection 10 mL, 10 mL, IntraVENous, Daily, Judy Healy MD, 10 mL at 12/02/21 9443    phenylephrine (DESMOND-SYNEPHRINE) 50 mg in dextrose 5 % 250 mL infusion,  mcg/min, IntraVENous, Continuous, Judy Healy MD, Last Rate: 27 mL/hr at 12/02/21 1200, 90 mcg/min at 12/02/21 1200    midodrine (PROAMATINE) tablet 15 mg, 15 mg, Oral, TID , Judy Healy MD, 15 mg at 12/02/21 1112    octreotide (SANDOSTATIN) 500 mcg in sodium chloride 0.9 % 100 mL infusion, 50 mcg/hr, IntraVENous, Continuous, Matt Yeh DO, Last Rate: 10 mL/hr at 12/02/21 1200, 50 mcg/hr at 12/02/21 1200    thiamine (B-1) 100 mg in sodium chloride 0.9 % 100 mL IVPB, 100 mg, IntraVENous, Daily, Shae Sams DO, Stopped at 12/02/21 0901   LORazepam (ATIVAN) tablet 1 mg, 1 mg, Oral, Q1H PRN **OR** LORazepam (ATIVAN) injection 1 mg, 1 mg, IntraVENous, Q1H PRN **OR** [DISCONTINUED] LORazepam (ATIVAN) tablet 2 mg, 2 mg, Oral, Q1H PRN **OR** [DISCONTINUED] LORazepam (ATIVAN) injection 2 mg, 2 mg, IntraVENous, Q1H PRN **OR** [DISCONTINUED] LORazepam (ATIVAN) tablet 3 mg, 3 mg, Oral, Q1H PRN **OR** [DISCONTINUED] LORazepam (ATIVAN) injection 3 mg, 3 mg, IntraVENous, Q1H PRN **OR** [DISCONTINUED] LORazepam (ATIVAN) tablet 4 mg, 4 mg, Oral, Q1H PRN **OR** [DISCONTINUED] LORazepam (ATIVAN) injection 4 mg, 4 mg, IntraVENous, Q1H PRN, Bi Carrillo,     rifaximin (XIFAXAN) tablet 550 mg, 550 mg, Oral, BID, Wendy Marks MD, 550 mg at 12/02/21 0830    glucose (GLUTOSE) 40 % oral gel 15 g, 15 g, Oral, PRN, Sondra Olp, DO    dextrose 50 % IV solution, 12.5 g, IntraVENous, PRN, Ismail U Sundar, DO    glucagon (rDNA) injection 1 mg, 1 mg, IntraMUSCular, PRN, Ismail U Sundar, DO    dextrose 5 % solution, 100 mL/hr, IntraVENous, PRN, Ismail U Sundar, DO    sodium chloride flush 0.9 % injection 5-40 mL, 5-40 mL, IntraVENous, 2 times per day, Sondra Olp, DO, 10 mL at 12/02/21 0768    sodium chloride flush 0.9 % injection 5-40 mL, 5-40 mL, IntraVENous, PRN, Sondra Olp, DO    0.9 % sodium chloride infusion, 25 mL, IntraVENous, PRN, Sondra Olp, DO    polyethylene glycol (GLYCOLAX) packet 17 g, 17 g, Oral, Daily PRN, Sondra Olp, DO    acetaminophen (TYLENOL) tablet 650 mg, 650 mg, Oral, Q6H PRN **OR** [DISCONTINUED] acetaminophen (TYLENOL) suppository 650 mg, 650 mg, Rectal, Q6H PRN, Sondra Olp, DO    prochlorperazine (COMPAZINE) injection 10 mg, 10 mg, IntraVENous, Q6H PRN, Sondra Olp, DO    piperacillin-tazobactam (ZOSYN) 3,375 mg in dextrose 5 % 50 mL IVPB extended infusion (mini-bag), 3,375 mg, IntraVENous, Q8H, Last Rate: 12.5 mL/hr at 12/02/21 1100, 3,375 mg at 12/02/21 1100 **AND** 0.9 % sodium chloride infusion, , IntraVENous, Q8H, Renemayela Saritha , Stopped at 12/02/21 0586    Review of Systems:   10 points systems were reviewed and were unremarkable except what has been mentioned in the HPI. Physical Exam:   Vital Signs:  /70   Pulse 60   Temp 98.3 °F (36.8 °C) (Oral)   Resp 14   Ht 6' 3\" (1.905 m)   Wt 208 lb (94.3 kg)   SpO2 95%   BMI 26.00 kg/m²     Input/Output: In: 4216 [P.O.:240; I.V.:3914]  Out: 1475     Oxygen requirements: RA    Ventilator Information:  Vent Information  SpO2: 95 %    General appearance: Ill looking, jaundiced and pale, not in pain or distress, in no respiratory distress    HEENT: Atraumatic/normocephalic, EOMI, JEFFRY, pharynx clear, moist mucosa, redness of the uvula appreciated,   Neck: Supple, no jugular venous distension, lymphadenopathy, thyromegaly or carotid bruits  Chest: Creased breath sounds, no wheezing, no crackles and no tenderness over ribs   Cardiovascular: Normal S1 , S2, regular rate and rhythm, no murmur, rub or gallop  Abdomen: +ve ascites, Normal sounds present, soft, lax with no tenderness  Extremities: No edema. Pulses are equally present. Skin: intact, no rashes   Neurologic: AOX3, No focal deficit     Investigations:  Labs, radiological imaging and cardiac work up were reviewed        ICU STAFF PHYSICIAN NOTE OF PERSONAL INVOLVEMENT IN CARE  As the attending physician, I certify that I personally reviewed the patient's history and personally examined the patient to confirm the physical findings described above, and that I reviewed the relevant imaging studies and available reports. I also discussed the differential diagnosis and all of the proposed management plans with the patient and individuals accompanying the patient to this visit. They had the opportunity to ask questions about the proposed management plans and to have those questions answered.     This patient has a high probability of sudden, clinically significant deterioration, which requires the highest level of physician preparedness to intervene urgently. I managed/supervised life or organ supporting interventions that required frequent physician assessment. I devoted my full attention to the direct care of this patient for the amount of time indicated below. Time I spent with family or surrogate(s) is included only if the patient was incapable of providing the necessary information or participating in medical decision-making. Time devoted to teaching and to any procedures I billed separately is not included.   Critical Care Time: 35 minutes      Electronically signed by Asmita Dawkins MD on 12/2/2021 at 2:50 PM

## 2021-12-02 NOTE — PROGRESS NOTES
Physical Therapy    Physical Therapy Treatment Note/Plan of Care    Room #:  0934/4783-19  Patient Name: Billie Fleischer  YOB: 1975  MRN: [id number]    Date of Service: 12/2/2021     Tentative placement recommendation: Subacute rehab  Equipment recommendation: To be determined      Evaluating Physical Therapist: Jocelyne Luis, PT  #[id number]      Specific Provider Orders/Date/Referring Provider :  11/29/21 1615   PT eval and treat Start: 11/29/21 1615, End: 11/29/21 1615, ONE TIME, Standing Count: 1 Occurrences, R    Adelaida [place],       Admitting Diagnosis:   Hepatorenal syndrome ([address] [K76.7]  Acute kidney injury ([address] [N17.9]  Chronic liver failure without hepatic coma ([address] [K72.10]       Surgery: none  Visit Diagnoses       Codes    Chronic liver failure without hepatic coma ([address]     K72.10    Acute kidney injury ([address]     N17.9          Patient Active Problem List   Diagnosis    Alcohol-induced acute pancreatitis    Anxiety    Depression    ETOH abuse    Hematemesis    Atrial fibrillation with rapid ventricular response ([address]    Alcohol withdrawal syndrome without complication (HCC)    Lactic acidosis    Hypokalemia    Severe protein-calorie malnutrition (HCC)    Paroxysmal atrial fibrillation (HCC)    Hyperbilirubinemia    Hypomagnesemia    Bilirubinemia    UGIB (upper gastrointestinal bleed)    Hepatorenal syndrome (HCC)        ASSESSMENT of Current Deficits Patient exhibits decreased strength, balance and endurance impairing functional mobility, transfers, gait , gait distance and tolerance to activity Patient improved ambulation distance today and demonstrated appropriate endurance for today's treatment session. Patient demonstrates a waddling pattern while ambulating. Cues for toes forward. Patient would continue to benefit from therapy to increase strength, balance, and functional mobility limitations.       PHYSICAL THERAPY  PLAN OF CARE       Physical therapy plan of care is established based on physician order,  patient diagnosis and clinical assessment    Current Treatment Recommendations:    -Standing Balance: Perform strengthening exercises in standing to promote motor control with or without upper extremity support  and Challenge balance utilizing reaching  activities beyond center of gravity    -Transfers: Provide instruction on proper hand and foot position for adequate transfer of weight onto lower extremities and use of gait device if needed and Cues for hand placement, technique and safety. Provide stabilization to prevent fall   -Gait: Gait training, Standing activities to improve: base of support, weight shift, weight bearing , Exercises to improve trunk control and Exercises to improve hip and knee control   -Endurance: Utilize Supervised activities to increase level of endurance to allow for safe functional mobility including transfers and gait   -Stairs: Stair training with instruction on proper technique and hand placement on rail    PT long term treatment goals are located in below grid    Patient and or family understand(s) diagnosis, prognosis, and plan of care. Frequency of treatments: Patient will be seen  3-5 times/week.          Prior Level of Function: Patient ambulated independently    Rehab Potential: good    for baseline    Past medical history:   Past Medical History:   Diagnosis Date    Anxiety     Depression     Esophagitis     ETOH abuse     Gastritis     GERD (gastroesophageal reflux disease)     Hematuria     Pancreatitis      Past Surgical History:   Procedure Laterality Date    KNEE SURGERY      ACL    UPPER GASTROINTESTINAL ENDOSCOPY  05/09/2018    UPPER GASTROINTESTINAL ENDOSCOPY N/A 5/8/2018    EGD BI[address]SY performed by Maria Elena Erazo MD [address] 1/21/2019    EGD ESOPHAGOGASTRODUODENOSCOPY performed by Hay Verduzco DO at [address] 1/21/2019    EGD CONTROL HEMORRHAGE performed by Jake Hussein DO at [address]:    Precautions: Up with assistance and Up as tolerated, falls and alarm ,    Social history: Patient lives alone in a ranch home  with 3 steps  to enter without Rail  Tub shower grab bars    Equipment owned: None,       [address]   How much difficulty turning over in bed?: A Little  How much difficulty sitting down on / standing up from a chair with arms?: A Little  How much difficulty moving from lying on back to sitting on side of bed?: A Little  How much help from another person moving to and from a bed to a chair?: A Little  How much help from another person needed to walk in hospital room?: A Little  How much help from another person for climbing 3-5 steps with a railing?: A Little  AM-PAC Inpatient Mobility Raw Score : 18  AM-PAC Inpatient T-Scale Score : 43.63  Mobility Inpatient CMS 0-100% Score: 46.58  Mobility Inpatient CMS G-Code Modifier : CK    Nursing cleared patient for PT treatment. OBJECTIVE;   Initial Evaluation  Date: 11/30/2021 Treatment Date:  12/2/2021   Short Term/ Long Term   Goals   Was pt agreeable to Eval/treatment? Yes yes To be met in 5 days   Pain level   0/10    0/10    Bed Mobility    Rolling: Supervision     Supine to sit: Minimal assist of 1    Sit to supine: Minimal assist of 1    Scooting: Minimal assist of 1   Rolling: Supervision    Supine to sit: Supervision    Sit to supine: Supervision    Scooting: Supervision     Rolling: Independent    Supine to sit: Independent    Sit to supine: Independent    Scooting: Independent     Transfers Sit to stand:  Moderate assist of 1  From commode, min a from bed Sit to stand: Supervision    x2   Sit to stand: Independent     Ambulation    2-3 steps using  no device with Moderate assist of 1   for balance and safety and cues for upright posture and safety 4x15 feet using  no device with Supervision    cues for safety and toes forward/neutral    75 feet using  least restrictive device versus no device with Independent    Stair negotiation: ascended and descended   Not assessed  Not assessed   3 steps with rail independent    ROM Within functional limits    Increase range of motion 10% of affected joints    Strength BUE:  refer to OT eval  RLE:  3/5  LLE:  3/5  Increase strength in affected mm groups by 1/3 grade   Balance Sitting EOB:  fair    Dynamic Standing:  poor   Sitting EOB: good -  Dynamic Standing: fair +   Sitting EOB:  good    Dynamic Standing: fair       Patient is Alert & Oriented x person, place, time and situation and follows directions  slowly  Sensation:  Patient  denies numbness/tingling   Edema:  no   Endurance: fair      Vitals: room air   Blood Pressure at rest  Blood Pressure during session    Heart Rate at rest 84 Heart Rate during session    SPO2 at rest 97%  SPO2 during session %     Patient education  Patient educated on role of Physical Therapy, risks of immobility, safety and plan of care,  importance of mobility while in hospital  and safety      Patient response to education:   Pt verbalized understanding Pt demonstrated skill Pt requires further education in this area   Yes Partial Yes      Treatment:  Patient practiced and was instructed/facilitated in the following treatment: Patient transferred to EOB. Sat edge of bed 10 minutes with Supervision  to increase dynamic sitting balance and activity tolerance. Patient performed seated exercises. Patient stood and ambulated in room and returned to EOB. Patient stood up once more and ambulated in room and returned to EOB and transferred to supine. Therapeutic Exercises:  ankle pumps, long arc quad and seated marching x 15 reps       At end of session, patient in bed with  call light and phone within reach,  all lines and tubes intact, nursing notified.       Patient would benefit from continued skilled Physical Therapy to improve functional independence and quality of life.          Patient's/ family goals   home    Time in  250  Time out  307    Total Treatment Time  17 minutes      CPT codes:    Therapeutic activities (78575)   10 minutes  1 unit(s)  Therapeutic exercises (65691)   7 minutes  0 unit(s)    Alexis Dowell Steven PTA COM#[id number]

## 2021-12-03 NOTE — PROGRESS NOTES
He has no fever, chills, rigors. Kidney function remains stable. Past Medical History:  Past Medical History:   Diagnosis Date    Anxiety     Depression     Esophagitis     ETOH abuse     Gastritis     GERD (gastroesophageal reflux disease)     Hematuria     Pancreatitis         Family History:   No family history on file. Allergies:         Castro Rashida nut oil]    Social history:  Social History     Socioeconomic History    Marital status:      Spouse name: Not on file    Number of children: 1    Years of education: Not on file    Highest education level: Not on file   Occupational History    Not on file   Tobacco Use    Smoking status: Never Smoker    Smokeless tobacco: Never Used   Vaping Use    Vaping Use: Never used   Substance and Sexual Activity    Alcohol use: Yes     Comment: 5th of liquour daily. Previous 1/15/2019    Drug use: No    Sexual activity: Not Currently     Partners: Female   Other Topics Concern    Not on file   Social History Narrative    Has a 15 y.o. son who lives in [place]. Social Determinants of Health     Financial Resource Strain:     Difficulty of Paying Living Expenses: Not on file   Food Insecurity:     Worried About Running Out of Food in the Last Year: Not on file    Ele of Food in the Last Year: Not on file   Transportation Needs:     Lack of Transportation (Medical): Not on file    Lack of Transportation (Non-Medical):  Not on file   Physical Activity:     Days of Exercise per Week: Not on file    Minutes of Exercise per Session: Not on file   Stress:     Feeling of Stress : Not on file   Social Connections:     Frequency of Communication with Friends and Family: Not on file    Frequency of Social Gatherings with Friends and Family: Not on file    Attends [employer] Services: Not on file    Active Member of Clubs or Organizations: Not on file    Attends Club or Organization Meetings: Not on file    Marital Status: Not on file   Intimate Partner Violence:     Fear of Current or Ex-Partner: Not on file    Emotionally Abused: Not on file    Physically Abused: Not on file    Sexually Abused: Not on file   Housing Stability:     Unable to Pay for Housing in the Last Year: Not on file    Number of Lc in the Last Year: Not on file    Unstable Housing in the Last Year: Not on file       Current Medications:     Current Facility-Administered Medications:     potassium & sodium phosphates (PHOS-NAK) 280-160-250 MG packet 250 mg, 1 packet, Oral, TID, EILEEN Zee - CNP, 250 mg at 12/03/21 1500    potassium chloride (KLOR-CON M) extended release tablet 20 mEq, 20 mEq, Oral, BID , EILEEN Zee - CNP, 20 mEq at 12/03/21 1253    sodium bicarbonate tablet 650 mg, 650 mg, Oral, BID, EILEEN Zee - CNP, 650 mg at 12/03/21 1254    phytonadione (VITAMIN K) tablet 10 mg, 10 mg, Oral, Daily, Meño Roth MD, 10 mg at 12/03/21 0805    melatonin tablet 6 mg, 6 mg, Oral, Nightly PRN, EILEEN Cedeño - CNP, 6 mg at 12/01/21 2009    vitamin D3 (CHOLECALCIFEROL) tablet 5,000 Units, 5,000 Units, Oral, Daily, Hamida Deras MD, 5,000 Units at 12/03/21 0807    0.9 % sodium chloride infusion, , IntraVENous, PRN, Hamida Deras MD    insulin lispro (HUMALOG) injection vial 0-6 Units, 0-6 Units, SubCUTAneous, TID , EILEEN Overton CNP, 4 Units at 11/30/21 2222    0.9 % sodium chloride infusion, , IntraVENous, PRN, Hamida Deras MD    0.9 % sodium chloride infusion, , IntraVENous, PRN, Hamida Deras MD    albumin human 25 % IV solution 25 g, 25 g, IntraVENous, Q8H, Hamida Deras MD, Stopped at 12/03/21 1107    pantoprazole (PROTONIX) injection 40 mg, 40 mg, IntraVENous, Daily, 40 mg at 12/03/21 0805 **AND** sodium chloride (PF) 0.9 % injection 10 mL, 10 mL, IntraVENous, Daily, Meño Roth MD, 10 mL at 12/03/21 0805    phenylephrine (DESMOND-SYNEPHRINE) 50 mg in dextrose 5 % 250 mL infusion,  mcg/min, IntraVENous, Continuous, Joe Trejo MD, Last Rate: 15 mL/hr at 12/03/21 1453, 50 mcg/min at 12/03/21 1453    midodrine (PROAMATINE) tablet 15 mg, 15 mg, Oral, TID WC, Joe Trejo MD, 15 mg at 12/03/21 1253    octreotide (SANDOSTATIN) 500 mcg in sodium chloride 0.9 % 100 mL infusion, 50 mcg/hr, IntraVENous, Continuous, Stacie Bay, DO, Last Rate: 10 mL/hr at 12/03/21 1446, 50 mcg/hr at 12/03/21 1446    thiamine (B-1) 100 mg in sodium chloride 0.9 % 100 mL IVPB, 100 mg, IntraVENous, Daily, Elspeth Cord, DO, Stopped at 12/03/21 3411    LORazepam (ATIVAN) tablet 1 mg, 1 mg, Oral, Q1H PRN **OR** LORazepam (ATIVAN) injection 1 mg, 1 mg, IntraVENous, Q1H PRN **OR** [DISCONTINUED] LORazepam (ATIVAN) tablet 2 mg, 2 mg, Oral, Q1H PRN **OR** [DISCONTINUED] LORazepam (ATIVAN) injection 2 mg, 2 mg, IntraVENous, Q1H PRN **OR** [DISCONTINUED] LORazepam (ATIVAN) tablet 3 mg, 3 mg, Oral, Q1H PRN **OR** [DISCONTINUED] LORazepam (ATIVAN) injection 3 mg, 3 mg, IntraVENous, Q1H PRN **OR** [DISCONTINUED] LORazepam (ATIVAN) tablet 4 mg, 4 mg, Oral, Q1H PRN **OR** [DISCONTINUED] LORazepam (ATIVAN) injection 4 mg, 4 mg, IntraVENous, Q1H PRN, Bi Carrillo,     rifaximin (XIFAXAN) tablet 550 mg, 550 mg, Oral, BID, Adam Barroso MD, 550 mg at 12/03/21 0806    glucose (GLUTOSE) 40 % oral gel 15 g, 15 g, Oral, PRN, Jade Johnson, DO    dextrose 50 % IV solution, 12.5 g, IntraVENous, PRN, Jade Johnson, DO    glucagon (rDNA) injection 1 mg, 1 mg, IntraMUSCular, PRN, Ismail U Sundar, DO    dextrose 5 % solution, 100 mL/hr, IntraVENous, PRN, Ismail U Sundar, DO    sodium chloride flush 0.9 % injection 5-40 mL, 5-40 mL, IntraVENous, 2 times per day, Jade Johnson, DO, 10 mL at 12/03/21 0807    sodium chloride flush 0.9 % injection 5-40 mL, 5-40 mL, IntraVENous, PRN, Ismail U Sundar, DO    0.9 % sodium chloride infusion, 25 mL, IntraVENous, PRN, Inocencio Camacho DO    polyethylene glycol (GLYCOLAX) packet 17 g, 17 g, Oral, Daily PRN, Inocencio Camacho DO    acetaminophen (TYLENOL) tablet 650 mg, 650 mg, Oral, Q6H PRN **OR** [DISCONTINUED] acetaminophen (TYLENOL) suppository 650 mg, 650 mg, Rectal, Q6H PRN, Inocencio Camacho DO    prochlorperazine (COMPAZINE) injection 10 mg, 10 mg, IntraVENous, Q6H PRN, Inocencio Camacho DO    piperacillin-tazobactam (ZOSYN) 3,375 mg in dextrose 5 % 50 mL IVPB extended infusion (mini-bag), 3,375 mg, IntraVENous, Q8H, Last Rate: 12.5 mL/hr at 12/03/21 1253, 3,375 mg at 12/03/21 1253 **AND** 0.9 % sodium chloride infusion, , IntraVENous, Q8H, Ismail U Sundar, DO, Last Rate: 12.5 mL/hr at 12/03/21 1447, New Bag at 12/03/21 1447    Review of Systems:   10 points systems were reviewed and were unremarkable except what has been mentioned in the HPI. Physical Exam:   Vital Signs:  /67   Pulse 72   Temp 98.9 °F (37.2 °C)   Resp 20   Ht 6' 3\" (1.905 m)   Wt 208 lb (94.3 kg)   SpO2 93%   BMI 26.00 kg/m²     Input/Output: In: 1835 [I.V.:1291]  Out: 450     Oxygen requirements: RA    Ventilator Information:  Vent Information  SpO2: 93 %    General appearance: Ill looking, jaundiced and pale, not in pain or distress, in no respiratory distress    HEENT: Atraumatic/normocephalic, EOMI, JEFFRY, pharynx clear, moist mucosa, redness of the uvula appreciated,   Neck: Supple, no jugular venous distension, lymphadenopathy, thyromegaly or carotid bruits  Chest: Creased breath sounds, no wheezing, no crackles and no tenderness over ribs   Cardiovascular: Normal S1 , S2, regular rate and rhythm, no murmur, rub or gallop  Abdomen: +ve ascites, Normal sounds present, soft, lax with no tenderness  Extremities: No edema. Pulses are equally present.    Skin: intact, no rashes   Neurologic: AOX3, No focal deficit     Investigations:  Labs, radiological imaging and cardiac work up were reviewed        ICU STAFF PHYSICIAN NOTE OF PERSONAL INVOLVEMENT IN CARE  As the attending physician, I certify that I personally reviewed the patient's history and personally examined the patient to confirm the physical findings described above, and that I reviewed the relevant imaging studies and available reports. I also discussed the differential diagnosis and all of the proposed management plans with the patient and individuals accompanying the patient to this visit. They had the opportunity to ask questions about the proposed management plans and to have those questions answered. This patient has a high probability of sudden, clinically significant deterioration, which requires the highest level of physician preparedness to intervene urgently. I managed/supervised life or organ supporting interventions that required frequent physician assessment. I devoted my full attention to the direct care of this patient for the amount of time indicated below. Time I spent with family or surrogate(s) is included only if the patient was incapable of providing the necessary information or participating in medical decision-making. Time devoted to teaching and to any procedures I billed separately is not included.   Critical Care Time: 35 minutes      Electronically signed by Carlos Hill MD on 12/3/2021 at 4:15 PM

## 2021-12-03 NOTE — CARE COORDINATION
SS NOTE: COVID NEGATIVE 11/26- PT WILL NEED A NEGATIVE RAPID COVID 72 HOURS PRIOR TO [address] [address] [facility] TO [facility] SKILLED. READMISSION DONE TODAY. This is an ICU pt. Pt is a DNR CCA, no intubation. Pt is on room air. Pt to have Plasma. PTT and INR ar elevated. Replacing electrolytes. He is on IV Protonix, Zocyn and Neosynephrine. [facility]  accepted pt- they will begin PRECERT once pt is stable for SNF placement. For a SNF placement pt will need a PRECERT, signed JORGE, current PT.OT notes and SW will need to complete the HENs. [place].12/03/2021.2:52PM.

## 2021-12-03 NOTE — PROGRESS NOTES
Nephrology Note  Marisol Gonzalez MD          Patient seen and examined. Patient's family is present at the bedside. Chart reviewed. Patient seen examined on PICU  AAO X 3. Denies any chest or significant abdominal pain. Low dose pressor support continues  Persistently elevated PT/INR        Vital SignsBP (!) 105/52   Pulse 65   Temp 98.9 °F (37.2 °C)   Resp 16   Ht 6' 3\" (1.905 m)   Wt 208 lb (94.3 kg)   SpO2 94%   BMI 26.00 kg/m²   24HR INTAKE/OUTPUT:      Intake/Output Summary (Last 24 hours) at 12/3/2021 1027  Last data filed at 12/3/2021 0700  Gross per 24 hour   Intake 1603.3 ml   Output 1150 ml   Net 453.3 ml         Physical Exam  General appearance: Jaundiced  Neck: No JVD  Lungs: Breath sounds decreased at the bases. No rales or ronchi. Heart: Regular rate and rhythm. No S3 gallop. No  murmrur. Abdomen: Soft non distended, non tender and normal bowel sounds. Extremeties: No edema.       ROS:  RESPIRATORY:  negative  CARDIOVASCULAR:  negative  GASTROINTESTINAL:  negative  GENITOURINARY:  negative    Current Facility-Administered Medications   Medication Dose Route Frequency Provider Last Rate Last Admin    phytonadione (VITAMIN K) tablet 10 mg  10 mg Oral Daily Tahir Muniz MD   10 mg at 12/03/21 0805    melatonin tablet 6 mg  6 mg Oral Nightly PRN Artist Serum, APRN - CNP   6 mg at 12/01/21 2009    vitamin D3 (CHOLECALCIFEROL) tablet 5,000 Units  5,000 Units Oral Daily Debby Cesar MD   5,000 Units at 12/03/21 0807    0.9 % sodium chloride infusion   IntraVENous PRN Debby Cesar MD        insulin lispro (HUMALOG) injection vial 0-6 Units  0-6 Units SubCUTAneous TID  Yessy Leaks, APRN - CNP   4 Units at 11/30/21 2222    0.9 % sodium chloride infusion   IntraVENous PRN Debby Cesar MD        0.9 % sodium chloride infusion   IntraVENous PRN Debby Cesar MD        albumin human 25 % IV solution 25 g  25 g IntraVENous Q8H Debby Cesar  mL/hr at 12/03/21 1007 25 g at 12/03/21 1007    pantoprazole (PROTONIX) injection 40 mg  40 mg IntraVENous Daily Marisela Burris MD   40 mg at 12/03/21 0805    And    sodium chloride (PF) 0.9 % injection 10 mL  10 mL IntraVENous Daily Marisela Burris MD   10 mL at 12/03/21 0805    phenylephrine (DESMOND-SYNEPHRINE) 50 mg in dextrose 5 % 250 mL infusion   mcg/min IntraVENous Continuous Marisela Burris MD 21 mL/hr at 12/03/21 0231 70 mcg/min at 12/03/21 0231    midodrine (PROAMATINE) tablet 15 mg  15 mg Oral TID WC Marisela Burris MD   15 mg at 12/03/21 0805    octreotide (SANDOSTATIN) 500 mcg in sodium chloride 0.9 % 100 mL infusion  50 mcg/hr IntraVENous Continuous Deniz Ramsey, DO 10 mL/hr at 12/03/21 0231 50 mcg/hr at 12/03/21 0231    thiamine (B-1) 100 mg in sodium chloride 0.9 % 100 mL IVPB  100 mg IntraVENous Daily Deniz Ramsey, DO   Stopped at 12/03/21 7472    LORazepam (ATIVAN) tablet 1 mg  1 mg Oral Q1H PRN Bi Ketschke, DO        Or    LORazepam (ATIVAN) injection 1 mg  1 mg IntraVENous Q1H PRN Bi Ketschke, DO        rifaximin (XIFAXAN) tablet 550 mg  550 mg Oral BID Silvana Lalnos MD   550 mg at 12/03/21 0806    glucose (GLUTOSE) 40 % oral gel 15 g  15 g Oral PRN Ruthell Sans, DO        dextrose 50 % IV solution  12.5 g IntraVENous PRN Ruthell Sans, DO        glucagon (rDNA) injection 1 mg  1 mg IntraMUSCular PRN Ruthell Sans, DO        dextrose 5 % solution  100 mL/hr IntraVENous PRN Ruthell Sans, DO        sodium chloride flush 0.9 % injection 5-40 mL  5-40 mL IntraVENous 2 times per day Ruthell Sans, DO   10 mL at 12/03/21 0807    sodium chloride flush 0.9 % injection 5-40 mL  5-40 mL IntraVENous PRN Ruthell Sans, DO        0.9 % sodium chloride infusion  25 mL IntraVENous PRN Ruthell Sans, DO        polyethylene glycol (GLYCOLAX) packet 17 g  17 g Oral Daily PRN Sakshi Davenport,         acetaminophen (TYLENOL) tablet 650 mg  650 mg Oral Q6H PRN [place], DO        prochlorperazine (COMPAZINE) injection 10 mg  10 mg IntraVENous Q6H PRN [place], DO        piperacillin-tazobactam (ZOSYN) 3,375 mg in dextrose 5 % 50 mL IVPB extended infusion (mini-bag)  3,375 mg IntraVENous Q8H [place], DO   Stopped at 12/03/21 0515    And    0.9 % sodium chloride infusion   IntraVENous Q8H [place], DO   Stopped at 12/03/21 4874       US GALLBLADDER RUQ   Final Result   Findings again consistent with cirrhotic liver. Ascites. Hepatofugal flow redemonstrated in the portal vein. Thickened gallbladder wall which may be reactive to the ascites and hepatic   disease. US ABDOMEN LIMITED   Final Result   Moderate volume intra-abdominal ascites. XR CHEST PORTABLE   Final Result   Mild basilar opacities are suspicious for pneumonia. This appearance can be   seen with COVID-19.         US GUIDED PARACENTESIS    (Results Pending)         Labs:    CBC:   Recent Labs     12/01/21  0430 12/02/21  0550 12/03/21  0600   WBC 7.5 5.5 5.7   HGB 8.2* 7.5* 7.2*   PLT 62* 29* 29*        Lab Results   Component Value Date    IRON 132 05/08/2018    TIBC 149 (L) 05/08/2018    FERRITIN 1,815 05/08/2018       Lab Results   Component Value Date    PTH 60 11/30/2021    CALCIUM 8.0 (L) 12/03/2021    CALCIUM 8.3 (L) 12/02/2021    CALCIUM 7.8 (L) 12/01/2021    CAION 1.12 (L) 11/30/2021    PHOS 1.8 (L) 12/03/2021    PHOS 1.4 (L) 12/02/2021    PHOS 2.1 (L) 11/30/2021    MG 1.6 12/03/2021    MG 1.7 12/02/2021    MG 2.0 12/01/2021       BMP:   Recent Labs     12/01/21  0430 12/02/21  0550 12/03/21  0600    136 136   K 3.8 3.8 3.6   CL 98 104 105   CO2 23 21* 18*   BUN 25* 18 14   CREATININE 1.4* 1.2 1.0   GLUCOSE 127* 114* 122*             Assessment: / Plan:    1. Acute kidney injury. Acute kidney injury secondary to renal hypoperfusion with possible underlying hepatorenal syndrome.   Urine output has improved which goes against hepatorenal syndrome. Now Resolved      2. Metabolic acidosis. Patient with non-anion gap metabolic acidosis which may be reflection of diarrhea as well as acute kidney injury. PLAN: 12/3/21: started oral bicarb and follow      3. Hyponatremia. Patient probably with hepatic failure associated hyponatremia along with use of spironolactone contributing to hyponatremia. SNa back to normal levels - follow off IVF    4. Hypokalemia. Supplement potassium and magnesium as needed    5. Hypocalcemia.   ionized calcium 1.12 / Vit D 14 / PTH 60 - Started oral Vit D on 11/29/21    6. Hypotension. Follow on pressor support and SPA. 7. Hypophosphatemia. Supplement orally as needed for goal 2.5             [facility]    Patient seen and examined. Chart reviewed. Continues to be on pressor support. I had a face to face encounter with the patient. Agree with exam.    Agree with  formulation, assessment and plan as outlined above and directed by me. Addition and corrections were done as deemed appropriate. My exam and plan include:     Continue current treatment.       Milena Nunn MD  Nephrology        Electronically signed by Milena Nunn MD on 12/3/2021 at 2:17 PM

## 2021-12-03 NOTE — PROGRESS NOTES
PROGRESS NOTE    By Nano Brandt D.O GI Fellow    [facility]  Dr. Martin Saravia MD, Dr. Mike Sinha MD, Dr Vitaliy Fountain, Dr. Jacqueline Morrison MD, Dr. Aletha Azul, DO Shanti Sutton  40 y.o.  male    SUBJECTIVE:  She is resting comfortably this a.m. with no complaints. Patient denies any GI bleed.       OBJECTIVE:    BP (!) 105/52   Pulse 65   Temp 98.9 °F (37.2 °C)   Resp 16   Ht 6' 3\" (1.905 m)   Wt 208 lb (94.3 kg)   SpO2 94%   BMI 26.00 kg/m²     Gen: NAD, AAO x 3  HEENT:PEERL, scleral icterus  Heart: RRR, no M/R/G  Lungs: CTAB  Abd.: soft, NT, ND, BS +, no G/R, no HSM  Extr.: no C/C/E, no bruising patient is visibly jaundiced         Lab Results   Component Value Date    WBC 5.7 12/03/2021    WBC 5.5 12/02/2021    WBC 7.5 12/01/2021    HGB 7.2 12/03/2021    HGB 7.5 12/02/2021    HGB 8.2 12/01/2021    HCT 21.1 12/03/2021    .5 12/03/2021    RDW 16.5 12/03/2021    PLT 29 12/03/2021    PLT 29 12/02/2021    PLT 62 12/01/2021     Lab Results   Component Value Date     12/03/2021    K 3.6 12/03/2021    K 5.8 11/26/2021     12/03/2021    CO2 18 12/03/2021    BUN 14 12/03/2021    CREATININE 1.0 12/03/2021    CALCIUM 8.0 12/03/2021    PROT 5.2 12/03/2021    LABALBU 3.4 12/03/2021    BILITOT 24.1 12/03/2021    BILITOT 27.5 12/02/2021    BILITOT 29.0 12/01/2021    ALKPHOS 52 12/03/2021    ALKPHOS 52 12/02/2021    ALKPHOS 37 12/01/2021    AST 43 12/03/2021    AST 47 12/02/2021    AST 65 12/01/2021    ALT 24 12/03/2021    ALT 28 12/02/2021    ALT 34 12/01/2021     Lab Results   Component Value Date    LIPASE 68 11/14/2021    LIPASE 26 07/01/2020    LIPASE 26 05/11/2020     Lab Results   Component Value Date    AMYLASE 91 01/16/2019         ASSESSMENT/PLAN:       1. Hepatic encephalopathy type C grade II  Patient has been on 30 g of lactulose 2 times a days with rifaximin 550 mg p.o. 3 times a day as well.    Patient mentation has significantly improved compared to yesterday. Sondra Flowers is A&O x3 and is able to answer all question appropriately. Patient does not have asterixis on exam   We will continue to monitor mental status continue with lactulose and rifaximin.   This will be titrated to up to 2-3 soft bowel movement per day.     2. Alcohol induced cirrhosis, decompensated  Meld sodium score 35.    -Diuretics per nephrology  -Obtain paracentesis once INR acceptable range  Patient was educated and advised on the importance of abstain from alcohol completely otherwise he is long-term survival odds significantly decreased.  Patient seem to understand and is agreeable with this plan.       3. Renal failure   -acute kidney injury 2/2 renal hypoperfusion, unlikely hepatorenal syndrome, improved         Patient INR remains significantly elevated at 6.9 however patient with no significant change in mentation patient oriented x3. We will give patient oral vitamin K  for possible underlying nutritional deficiencies due to known alcohol abuse. Continue with supportive care. Patient was counseled about increased risk of death with continued alcohol abuse. Pt was discussed with  Dr. Manuel Guadalupe DO  GI Fellow   12/3/2021  11:37 AM    Pt seen and independently examined. Pertinent notes and lab work reviewed. Monitor reviewed showing sinus rhythm. D/w Dr. Oscar Marie with physical exam and A&P. Discussed with patient/family at bedside - all questions answered - agreeable with the plan as delineated.     Ila Vance MD  12/3/2021  3:14 PM

## 2021-12-03 NOTE — PROGRESS NOTES
Internal Medicine Progress Note         Primary Care Physician: Krunal Garrido DO   Admitting Physician:  Megan Avila DO  Admission date and time: 11/26/2021  9:25 AM    Room:  [address] [place] [address]  Admitting diagnosis: Hepatorenal syndrome ([facility] [address] [K76.7]  Acute kidney injury ([facility] [address] [N17.9]  Chronic liver failure without hepatic coma [facility]) [K72.10]    Patient Name: Daquan Klein  MRN: [id number]    Date of Service: 12/3/2021       Subjective: Rosita Garcia is a 40 y.o. male ,12/3/2021, at the bedside. Patient doing very poorly multiple problems at hand including hepatorenal syndrome and dropping blood pressure. Patient has very low urine output with evidence of metabolic acidosis. Currently being followed by multiple subspecialists. 11/2611/27/2021-medical coverage by Dr. Allison Medrano    11/28/2021  Patient seen examined on PCU. Patient's mother is at the bedside and case discussed in detail. Patient is much more alert this morning. Patient oriented to person place. He denies any significant pain at this time. There is no lower leg edema and abdomen is distended but very soft. Serum sodium is up to 129 with a potassium 3.2. CO2 electrolytes is 18 and improved but BUN/creatinine is 44/3.2 which is improved compared to 11/27. Total bili is 24.2 with WBC 12.7 hemoglobin 10.1. Platelet count is down to 63. INR was 7.8. Temperature 98.8 with heart rate of 70 and blood pressure is 115/67. Patient has been put on phenylephrine drip with improvement in blood pressure. Urine output about 400 cc a shift. 11/29/2021  Patient seen and examined in PCU. Patient's mother is at the bedside and case discussed. Patient is still alert and oriented x23 with episodes of confusion. Patient denies any problem with chest or abdominal pain. Patient strength is still very poor with the patient still very weak on his feet. Patient appetite has improved with less nausea.   Patient still has persistent hand tremor which she has had for years according to the mother. But this has been gotten worse. BUN/creatinine 33/2.2 with serum sodium 130. Blood sugars ranging [id number]. INR is down to 5.7 today. Temperature ranges 90.7100.4 with a heart rate of 61 blood pressure 113/63. O2 sat 97% on room air at rest.  Patient still on phenylephrine drip. Urine output ranges 800-1300 cc a shift. 11/30/2021  Patient seen examined on PCU. Patient states he feels fairly good again today. He denies any chest or abdominal pain. Patient states he is very hungry and is still on liquids. Hemoglobin 9.2 with platelet count of down to 38. BUN/creatinine 24/1.4 with potassium 3.2 and sodium 131. Temperature 99 with heart rate 64 blood pressure 99/57. O2 sat 93% on room air. Phenylephrine drip still running. Urine output ranges to 200-4000 cc a shift. Patient is for abdominal paracentesis today. 12/1/2021  Patient seen examined in PCU. Patient with 2 family members at the bedside. Case was discussed. Patient alert and oriented x3. Patient's upper extremity tremors are moderately improved. Patient had questions about abdominal paracentesis. BUN/creatinine 25/1.4 today with total bilirubin 29. WBC 7.5 hemoglobin 8.2 and platelet count 62. Temperature ranges 97.998.3 with heart rate of 68 blood pressure range from 82/51112/64. O2 sats 100% on room air. Patient still on phenylephrine drip for blood pressure/ renal perfusion. Abdominal paracentesis when okay with GI, intensivist.    12/2/2021   Patient seen examined on PCU. Patient denies any specific pain at this time. Patient is little bit depressed about not getting his paracentesis performed yet. BUN/creatinine 18/1.2 with hemoglobin is 7.5 platelet count is down to 29. Patient received fresh frozen plasma and INR yesterday morning was 3.4 and today is 5. Temperature 90.3 with heart rate of 67 and blood pressure 110/70. Pulmonology and GI notes reviewed.   With kidney past 96 hrs (Last 3 readings):   Weight   12/02/21 0600 208 lb (94.3 kg)     Vital Signs:   Blood pressure (!) 105/52, pulse 65, temperature 98.9 °F (37.2 °C), resp. rate 16, height 6' 3\" (1.905 m), weight 208 lb (94.3 kg), SpO2 94 %. General appearance:  Patient alert but lethargic. Appears chronically ill  Head:  Normocephalic. No masses, lesions or tenderness. Eyes:  PERRLA. EOMI. sclera icteric. Buccal mucosa moist.  ENT:  Ears normal. Mucosa normal.  Neck:    Supple. Trachea midline. No thyromegaly. No JVD. No bruits. Heart:    Rhythm regular. Tachycardic. No murmurs. Lungs:    Diminished  Abdomen:   Soft. Non-tender. Non-distended. Bowel sounds positive. No organomegaly or masses. No pain on palpation. Newly distended  Extremities:    Peripheral pulses present. No peripheral edema. No ulcers. No cyanosis. No clubbing.   Neurologic:    Responsive but lethargic  Integumentary:  No rashes skin jaundice  Genitalia/Breast:  Hickman catheter    Medication:  Scheduled Meds:   potassium & sodium phosphates  1 packet Oral TID    magnesium sulfate  2,000 mg IntraVENous Once    potassium chloride  20 mEq Oral BID WC    sodium bicarbonate  650 mg Oral BID    phytonadione  10 mg Oral Daily    vitamin D3  5,000 Units Oral Daily    insulin lispro  0-6 Units SubCUTAneous TID     albumin human  25 g IntraVENous Q8H    pantoprazole  40 mg IntraVENous Daily    And    sodium chloride (PF)  10 mL IntraVENous Daily    midodrine  15 mg Oral TID     thiamine (VITAMIN B1) IVPB  100 mg IntraVENous Daily    rifaximin  550 mg Oral BID    sodium chloride flush  5-40 mL IntraVENous 2 times per day    piperacillin-tazobactam  3,375 mg IntraVENous Q8H     Continuous Infusions:   sodium chloride      sodium chloride      sodium chloride      phenylephrine (DESMOND-SYNEPHRINE) 50mg/250mL infusion 70 mcg/min (12/03/21 0231)    octreotide (SANDOSTATIN) infusion 50 mcg/hr (12/03/21 0231)    dextrose      sodium of transition into hospice pending his status. Continue aggressive care for the next 24 to 48 hours and observe response  · Electrolyte imbalance of hyponatremia being followed by nephrology  · Metabolic acidosis being corrected by bicarb infusion  · GI is following regarding his hepatic cirrhosis  · Vasopressor has been instituted  · Salt poor albumin to expand volume  · Blood culture has been obtained.   Zosyn to cover for spontaneous bacterial peritonitis  · Patient is auto anticoagulated vitamin K supplementation  · Treat elevated ammonia level  · Paracentesis as needed  · Prognosis is very poor but he is improving short-term          Julieta Burdick DO, F.A.C.O.I.  12/3/2021  11:46 AM

## 2021-12-04 NOTE — PROGRESS NOTES
Progress Note  12/4/2021 1:36 PM  Subjective:   Admit Date: 11/26/2021  PCP: Krunal Garrido DO  Interval History: Patient examined , alert responsive feels ok     Diet: ADULT DIET; Regular    Data:   Scheduled Meds:   potassium & sodium phosphates  1 packet Oral TID    sodium bicarbonate  650 mg Oral BID    phytonadione  10 mg Oral Daily    vitamin D3  5,000 Units Oral Daily    insulin lispro  0-6 Units SubCUTAneous TID WC    albumin human  25 g IntraVENous Q8H    pantoprazole  40 mg IntraVENous Daily    And    sodium chloride (PF)  10 mL IntraVENous Daily    midodrine  15 mg Oral TID WC    thiamine (VITAMIN B1) IVPB  100 mg IntraVENous Daily    rifaximin  550 mg Oral BID    sodium chloride flush  5-40 mL IntraVENous 2 times per day    piperacillin-tazobactam  3,375 mg IntraVENous Q8H     Continuous Infusions:   sodium chloride      sodium chloride      sodium chloride      sodium chloride      phenylephrine (DESMOND-SYNEPHRINE) 50mg/250mL infusion 35 mcg/min (12/04/21 0200)    dextrose      sodium chloride      sodium chloride Stopped (12/04/21 0629)     PRN Meds:sodium chloride, melatonin, sodium chloride, sodium chloride, sodium chloride, LORazepam **OR** LORazepam **OR** [DISCONTINUED] LORazepam **OR** [DISCONTINUED] LORazepam **OR** [DISCONTINUED] LORazepam **OR** [DISCONTINUED] LORazepam **OR** [DISCONTINUED] LORazepam **OR** [DISCONTINUED] LORazepam, glucose, dextrose, glucagon (rDNA), dextrose, sodium chloride flush, sodium chloride, polyethylene glycol, acetaminophen **OR** [DISCONTINUED] acetaminophen, prochlorperazine  I/O last 3 completed shifts: In: 200 [P.O.:350;  I.V.:1070; IV Piggyback:480]  Out: 1400 [Urine:900; Stool:500]  I/O this shift:  In: 350 [Blood:350]  Out: -     Intake/Output Summary (Last 24 hours) at 12/4/2021 1336  Last data filed at 12/4/2021 0900  Gross per 24 hour   Intake 2250 ml   Output 1400 ml   Net 850 ml     CBC:   Recent Labs     12/02/21  2041 12/03/21  0600 12/04/21  0450   WBC 5.5 5.7 5.3   HGB 7.5* 7.2* 6.6*   PLT 29* 29* 29*     BMP:    Recent Labs     12/02/21  0550 12/03/21  0600 12/04/21  0450    136 137   K 3.8 3.6 4.1    105 107   CO2 21* 18* 18*   BUN 18 14 11   CREATININE 1.2 1.0 0.9   GLUCOSE 114* 122* 120*     Hepatic:   Recent Labs     12/02/21  0550 12/03/21  0600 12/04/21  0450   AST 47* 43* 38   ALT 28 24 20   BILITOT 27.5* 24.1* 23.4*   ALKPHOS 52 52 49     Troponin: No results for input(s): TROPONINI in the last 72 hours. BNP: No results for input(s): BNP in the last 72 hours. Lipids: No results for input(s): CHOL, HDL in the last 72 hours. Invalid input(s): LDLCALCU  ABGs: No results found for: PHART, PO2ART, GYA6UNC  INR:   Recent Labs     12/02/21  0550 12/03/21  0600 12/04/21  0515   INR 5.0 6.3* 6.6*       -----------------------------------------------------------------  RAD: US GALLBLADDER RUQ    Result Date: 11/28/2021  EXAMINATION: RIGHT UPPER QUADRANT ULTRASOUND 11/27/2021 5:24 pm COMPARISON: 11/14/2021 HISTORY: ORDERING SYSTEM PROVIDED HISTORY: decomp cirrhosis, CBD measurement TECHNOLOGIST PROVIDED HISTORY: Reason for exam:->decomp cirrhosis, CBD measurement What reading provider will be dictating this exam?->CRC FINDINGS: LIVER:  Nodular contour of the liver consistent with cirrhosis. The liver is somewhat heterogeneous but without obvious focal abnormality. There is apparent hepatofugal flow again identified within the main portal vein. BILIARY SYSTEM:  Gallbladder is incompletely distended with diffuse wall thickening. No sonographic Carrasco's sign. The wall thickening is nonspecific but could be reactive to ascites and hepatic parenchymal disease. No obvious cholelithiasis. Common bile duct is within normal limits measuring 6 mm. RIGHT KIDNEY: The right kidney is grossly unremarkable without evidence of hydronephrosis. PANCREAS:  Visualized portions of the pancreas are unremarkable.  OTHER: Moderate ascites. Findings again consistent with cirrhotic liver. Ascites. Hepatofugal flow redemonstrated in the portal vein. Thickened gallbladder wall which may be reactive to the ascites and hepatic disease. XR CHEST PORTABLE    Result Date: 11/26/2021  EXAMINATION: ONE XRAY VIEW OF THE CHEST 11/26/2021 7:15 am COMPARISON: One-view chest x-ray 11/14/2021 HISTORY: ORDERING SYSTEM PROVIDED HISTORY: SOB TECHNOLOGIST PROVIDED HISTORY: Reason for exam:->SOB FINDINGS: The cardiac silhouette is within normal limits. The mediastinal contours are within normal limits. The lungs are hypoinflated with resulting bronchovascular crowding. Mild basilar opacities appear new/increased. No significant pleural effusions or pneumothorax. Osseous degenerative changes are present. EKG leads overlie the chest.     Mild basilar opacities are suspicious for pneumonia. This appearance can be seen with COVID-19.     US ABDOMEN LIMITED    Result Date: 11/26/2021  EXAMINATION: RIGHT UPPER QUADRANT ULTRASOUND 11/26/2021 5:54 pm COMPARISON: None. HISTORY: ORDERING SYSTEM PROVIDED HISTORY: ascites/JULIO TECHNOLOGIST PROVIDED HISTORY: Reason for exam:->ascites/JULIO What reading provider will be dictating this exam?->CRC FINDINGS: Superficial sonographic evaluation of the 4 quadrants of the abdomen performed using a combination of grayscale technique. Imaging done to assess for the presence of intra-abdominal ascites. Patient was laying on their left-side. Moderate volume simple appearing intra-abdominal ascites in the left abdomen. Moderate volume intra-abdominal ascites. Objective:   Vitals: /71   Pulse 70   Temp 98.5 °F (36.9 °C) (Oral)   Resp 16   Ht 6' 3\" (1.905 m)   Wt 208 lb (94.3 kg)   SpO2 97%   BMI 26.00 kg/m²   General appearance: appears stated age   Skin:  No rashes or lesions deep jaundice   HEENT: Head: Normocephalic, no lesions, without obvious abnormality.   Neck: no adenopathy, no carotid bruit, no JVD, supple, symmetrical, trachea midline and thyroid not enlarged, symmetric, no tenderness/mass/nodules  Lungs: clear to auscultation bilaterally  Heart: regular rate and rhythm, S1, S2 normal, no murmur, click, rub or gallop  Abdomen: soft, non-tender; bowel sounds normal; no masses,  no organomegaly  Extremities: extremities normal, atraumatic, no cyanosis or edema  Neurologic: Mental status: Alert, oriented, thought content appropriate    Assessment:   Patient Active Problem List:     Alcohol-induced acute pancreatitis     Anxiety     Depression     ETOH abuse     Hematemesis     Atrial fibrillation with rapid ventricular response (HCC)     Alcohol withdrawal syndrome without complication (HCC)     Lactic acidosis     Hypokalemia     Severe protein-calorie malnutrition (HCC)     Paroxysmal atrial fibrillation (HCC)     Hyperbilirubinemia     Hypomagnesemia     Bilirubinemia     UGIB (upper gastrointestinal bleed)     Hepatorenal syndrome (HCC)    Plan:   Assessment: / Plan:     1. Acute kidney injury. Acute kidney injury secondary to renal hypoperfusion with possible underlying hepatorenal syndrome. Urine output has improved , JULIO  Now Resolved      2. Metabolic acidosis. Patient with non-anion gap metabolic acidosis which may be reflection of diarrhea as well as acute kidney injury. PLAN: 12/3/21: started oral bicarb and follow  bicarb better at 18      3. Hyponatremia. Patient probably with hepatic failure associated hyponatremia along with use of spironolactone contributing to hyponatremia. SNa back to normal levels      4. Hypokalemia. Supplement potassium and magnesium as needed, k better      5. Hypocalcemia.   ionized calcium 1.12 / Vit D 14 / PTH 60 - Started oral Vit D on 11/29/21     6. Hypotension. Follow on pressor support and SPA.       7. Hypophosphatemia. Supplement orally as needed for goal 2.5    8.  Coagulopathy     Aime Myers MD

## 2021-12-04 NOTE — PROGRESS NOTES
weaned slowly. He has no fever, chills, rigors. Kidney function remains stable. December 4, 2021: Patient's hemoglobin dropped slightly. He will be transfused 1 unit of packed red blood cells. He continues to be on Lavon-Synephrine drip however it has been weaned down slowly. He has no fever, chills, rigors. His kidney function improved. He denies abdominal pain. Past Medical History:  Past Medical History:   Diagnosis Date    Anxiety     Depression     Esophagitis     ETOH abuse     Gastritis     GERD (gastroesophageal reflux disease)     Hematuria     Pancreatitis         Family History:   No family history on file. Allergies:         Margaret Graces nut oil]    Social history:  Social History     Socioeconomic History    Marital status:      Spouse name: Not on file    Number of children: 1    Years of education: Not on file    Highest education level: Not on file   Occupational History    Not on file   Tobacco Use    Smoking status: Never Smoker    Smokeless tobacco: Never Used   Vaping Use    Vaping Use: Never used   Substance and Sexual Activity    Alcohol use: Yes     Comment: 5th of liquour daily. Previous 1/15/2019    Drug use: No    Sexual activity: Not Currently     Partners: Female   Other Topics Concern    Not on file   Social History Narrative    Has a 15 y.o. son who lives in [place]. Social Determinants of Health     Financial Resource Strain:     Difficulty of Paying Living Expenses: Not on file   Food Insecurity:     Worried About Running Out of Food in the Last Year: Not on file    Ele of Food in the Last Year: Not on file   Transportation Needs:     Lack of Transportation (Medical): Not on file    Lack of Transportation (Non-Medical):  Not on file   Physical Activity:     Days of Exercise per Week: Not on file    Minutes of Exercise per Session: Not on file   Stress:     Feeling of Stress : Not on file   Social Connections:     Frequency of Communication with Friends and Family: Not on file    Frequency of Social Gatherings with Friends and Family: Not on file    Attends Shinto Services: Not on file    Active Member of Clubs or Organizations: Not on file    Attends Club or Organization Meetings: Not on file    Marital Status: Not on file   Intimate Partner Violence:     Fear of Current or Ex-Partner: Not on file    Emotionally Abused: Not on file    Physically Abused: Not on file    Sexually Abused: Not on file   Housing Stability:     Unable to Pay for Housing in the Last Year: Not on file    Number of [place] in the Last Year: Not on file    Unstable Housing in the Last Year: Not on file       Current Medications:     Current Facility-Administered Medications:     0.9 % sodium chloride infusion, , IntraVENous, PRN, Loistine Given, APRN - CNP    potassium & sodium phosphates (PHOS-NAK) 280-160-250 MG packet 250 mg, 1 packet, Oral, TID, Carline Beavers APRN - CNP, 250 mg at 12/04/21 1041    sodium bicarbonate tablet 650 mg, 650 mg, Oral, BID, Carline Beavers APRN - CNP, 650 mg at 12/04/21 3457    phytonadione (VITAMIN K) tablet 10 mg, 10 mg, Oral, Daily, Darvin Wild MD, 10 mg at 12/04/21 7661    melatonin tablet 6 mg, 6 mg, Oral, Nightly PRN, Sonya Given, APRN - CNP, 6 mg at 12/03/21 2113    vitamin D3 (CHOLECALCIFEROL) tablet 5,000 Units, 5,000 Units, Oral, Daily, Isidro Wall MD, 5,000 Units at 12/04/21 0906    0.9 % sodium chloride infusion, , IntraVENous, PRN, Isidro Wall MD    insulin lispro (HUMALOG) injection vial 0-6 Units, 0-6 Units, SubCUTAneous, TID WC, Brant Torres APRN - CNP, 4 Units at 11/30/21 2222    0.9 % sodium chloride infusion, , IntraVENous, PRNIsidro MD    0.9 % sodium chloride infusion, , IntraVENous, PRN, Isidro Wall MD    albumin human 25 % IV solution 25 g, 25 g, IntraVENous, Q8H, Isidro Wall MD, Stopped at 12/04/21 1315    pantoprazole (PROTONIX) injection 40 mg, 40 mg, IntraVENous, Daily, 40 mg at 12/04/21 0903 **AND** sodium chloride (PF) 0.9 % injection 10 mL, 10 mL, IntraVENous, Daily, Sophie Keane MD, 10 mL at 12/04/21 0903    phenylephrine (DESMOND-SYNEPHRINE) 50 mg in dextrose 5 % 250 mL infusion,  mcg/min, IntraVENous, Continuous, Sophie Keane MD, Last Rate: 10.5 mL/hr at 12/04/21 0200, 35 mcg/min at 12/04/21 0200    midodrine (PROAMATINE) tablet 15 mg, 15 mg, Oral, TID WC, Sophie Keane MD, 15 mg at 12/04/21 1306    thiamine (B-1) 100 mg in sodium chloride 0.9 % 100 mL IVPB, 100 mg, IntraVENous, Daily, Ebb DO Silviano, Stopped at 12/04/21 1330    LORazepam (ATIVAN) tablet 1 mg, 1 mg, Oral, Q1H PRN **OR** LORazepam (ATIVAN) injection 1 mg, 1 mg, IntraVENous, Q1H PRN **OR** [DISCONTINUED] LORazepam (ATIVAN) tablet 2 mg, 2 mg, Oral, Q1H PRN **OR** [DISCONTINUED] LORazepam (ATIVAN) injection 2 mg, 2 mg, IntraVENous, Q1H PRN **OR** [DISCONTINUED] LORazepam (ATIVAN) tablet 3 mg, 3 mg, Oral, Q1H PRN **OR** [DISCONTINUED] LORazepam (ATIVAN) injection 3 mg, 3 mg, IntraVENous, Q1H PRN **OR** [DISCONTINUED] LORazepam (ATIVAN) tablet 4 mg, 4 mg, Oral, Q1H PRN **OR** [DISCONTINUED] LORazepam (ATIVAN) injection 4 mg, 4 mg, IntraVENous, Q1H PRN, Bi Carrillo DO    rifaximin (XIFAXAN) tablet 550 mg, 550 mg, Oral, BID, Wendy Marks MD, 550 mg at 12/04/21 0903    glucose (GLUTOSE) 40 % oral gel 15 g, 15 g, Oral, PRN, Sondra Olp, DO    dextrose 50 % IV solution, 12.5 g, IntraVENous, PRN, Sondra Olp, DO    glucagon (rDNA) injection 1 mg, 1 mg, IntraMUSCular, PRN, Ismail U Sundar, DO    dextrose 5 % solution, 100 mL/hr, IntraVENous, PRN, Ismail U Sundar, DO    sodium chloride flush 0.9 % injection 5-40 mL, 5-40 mL, IntraVENous, 2 times per day, Sondra Olp, DO, 20 mL at 12/04/21 0907    sodium chloride flush 0.9 % injection 5-40 mL, 5-40 mL, IntraVENous, PRN, Ismail U Sundar, DO    0.9 % sodium chloride infusion, 25 mL, IntraVENous, PRN, [place] Coupe, DO    polyethylene glycol (GLYCOLAX) packet 17 g, 17 g, Oral, Daily PRN, [place] Coupe, DO    acetaminophen (TYLENOL) tablet 650 mg, 650 mg, Oral, Q6H PRN **OR** [DISCONTINUED] acetaminophen (TYLENOL) suppository 650 mg, 650 mg, Rectal, Q6H PRN, [place] Coupe, DO    prochlorperazine (COMPAZINE) injection 10 mg, 10 mg, IntraVENous, Q6H PRN, [place] Coupe, DO    piperacillin-tazobactam (ZOSYN) 3,375 mg in dextrose 5 % 50 mL IVPB extended infusion (mini-bag), 3,375 mg, IntraVENous, Q8H, Last Rate: 12.5 mL/hr at 12/04/21 1207, 3,375 mg at 12/04/21 1207 **AND** 0.9 % sodium chloride infusion, , IntraVENous, Q8H, [place] Coupe, DO, Stopped at 12/04/21 3320    Review of Systems:   10 points systems were reviewed and were unremarkable except what has been mentioned in the HPI. Physical Exam:   Vital Signs:  /71   Pulse 70   Temp 98.5 °F (36.9 °C) (Oral)   Resp 16   Ht 6' 3\" (1.905 m)   Wt 208 lb (94.3 kg)   SpO2 97%   BMI 26.00 kg/m²     Input/Output: In: 800 [P.O.:350; Blood:350]  Out: 900     Oxygen requirements: RA    Ventilator Information:  Vent Information  SpO2: 97 %    General appearance: Ill looking, jaundiced and pale, not in pain or distress, in no respiratory distress    HEENT: Atraumatic/normocephalic, EOMI, JEFFRY, pharynx clear, moist mucosa, redness of the uvula appreciated,   Neck: Supple, no jugular venous distension, lymphadenopathy, thyromegaly or carotid bruits  Chest: Creased breath sounds, no wheezing, no crackles and no tenderness over ribs   Cardiovascular: Normal S1 , S2, regular rate and rhythm, no murmur, rub or gallop  Abdomen: +ve ascites, Normal sounds present, soft, lax with no tenderness  Extremities: No edema. Pulses are equally present.    Skin: intact, no rashes   Neurologic: AOX3, No focal deficit     Investigations:  Labs, radiological imaging and cardiac work up were reviewed        ICU STAFF PHYSICIAN NOTE OF PERSONAL INVOLVEMENT IN CARE  As the attending physician, I certify that I personally reviewed the patient's history and personally examined the patient to confirm the physical findings described above, and that I reviewed the relevant imaging studies and available reports. I also discussed the differential diagnosis and all of the proposed management plans with the patient and individuals accompanying the patient to this visit. They had the opportunity to ask questions about the proposed management plans and to have those questions answered. This patient has a high probability of sudden, clinically significant deterioration, which requires the highest level of physician preparedness to intervene urgently. I managed/supervised life or organ supporting interventions that required frequent physician assessment. I devoted my full attention to the direct care of this patient for the amount of time indicated below. Time I spent with family or surrogate(s) is included only if the patient was incapable of providing the necessary information or participating in medical decision-making. Time devoted to teaching and to any procedures I billed separately is not included.   Critical Care Time: 35 minutes      Electronically signed by Shabnam Lazcano MD on 12/4/2021 at 1:33 PM

## 2021-12-04 NOTE — PROGRESS NOTES
PROGRESS NOTE    By Carline Stewart D.O GI Fellow    [facility]  Dr. Lesley Roque MD, Dr. Parth Wu MD, Dr Haroon Gandara, Dr. Ludy Reyes MD, Dr. Devang Alcala,       Bre Jassi  40 y.o.  male    SUBJECTIVE:  She is resting comfortably this a.m. with no complaints. Patient denies any GI bleed.       OBJECTIVE:    /68   Pulse 83   Temp 98.5 °F (36.9 °C) (Oral)   Resp 18   Ht 6' 3\" (1.905 m)   Wt 208 lb (94.3 kg)   SpO2 99%   BMI 26.00 kg/m²     Gen: NAD, AAO x 3  HEENT:PEERL, scleral icterus  Heart: RRR, no M/R/G  Lungs: CTAB  Abd.: soft, NT, ND, BS +, no G/R, no HSM  Extr.: no C/C/E, no bruising patient is visibly jaundiced      Stool (measured) : 500 mL  Lab Results   Component Value Date    WBC 5.3 12/04/2021    WBC 5.7 12/03/2021    WBC 5.5 12/02/2021    HGB 6.6 12/04/2021    HGB 7.2 12/03/2021    HGB 7.5 12/02/2021    HCT 19.3 12/04/2021    .6 12/04/2021    RDW 16.8 12/04/2021    PLT 29 12/04/2021    PLT 29 12/03/2021    PLT 29 12/02/2021     Lab Results   Component Value Date     12/04/2021    K 4.1 12/04/2021    K 5.8 11/26/2021     12/04/2021    CO2 18 12/04/2021    BUN 11 12/04/2021    CREATININE 0.9 12/04/2021    CALCIUM 8.4 12/04/2021    PROT 5.2 12/04/2021    LABALBU 3.5 12/04/2021    BILITOT 23.4 12/04/2021    BILITOT 24.1 12/03/2021    BILITOT 27.5 12/02/2021    ALKPHOS 49 12/04/2021    ALKPHOS 52 12/03/2021    ALKPHOS 52 12/02/2021    AST 38 12/04/2021    AST 43 12/03/2021    AST 47 12/02/2021    ALT 20 12/04/2021    ALT 24 12/03/2021    ALT 28 12/02/2021     Lab Results   Component Value Date    LIPASE 68 11/14/2021    LIPASE 26 07/01/2020    LIPASE 26 05/11/2020     Lab Results   Component Value Date    AMYLASE 91 01/16/2019         ASSESSMENT/PLAN:       1. Hepatic encephalopathy type C grade II  Patient has been on 30 g of lactulose 2 times a days with rifaximin 550 mg p.o. 3 times a day as well.    Patient mentation has significantly improved compared to yesterday. Park Starks is A&O x3 and is able to answer all question appropriately. Patient does not have asterixis on exam   We will continue to monitor mental status continue with lactulose and rifaximin.   This will be titrated to up to 2-3 soft bowel movement per day. -Supportive care     2. Alcohol induced cirrhosis, decompensated  Meld sodium score 35.    -Diuretics per nephrology  -Obtain paracentesis once INR acceptable range  Patient was educated and advised on the importance of abstain from alcohol completely otherwise he is long-term survival odds significantly decreased.  Patient seem to understand and is agreeable with this plan.       3. Renal failure - resolved   -acute kidney injury 2/2 renal hypoperfusion, unlikely hepatorenal syndrome, improved   -Okay to wean off the vassopressor  from GI point of view       4. Acute on chronic normocytic anemia  -Vital signs stable over signs of GI bleed on exam  -We will did trend down to 6.6 this a.m.  -Mostly a component of dilution with patient with large amount of ascites on exam  -Paracentesis once patient INR stabilized  -Continue with supportive care         Patient INR remains significantly elevated at 6.9 however patient with no significant change in mentation patient oriented x3. We will give patient oral vitamin K  for possible underlying nutritional deficiencies due to known alcohol abuse. Continue with supportive care. Patient was counseled about increased risk of death with continued alcohol abuse.       Pt was discussed with  Dr. Sen Jorgensen DO  GI Fellow   12/4/2021  8:12 AM

## 2021-12-04 NOTE — PROGRESS NOTES
she has had for years according to the mother. But this has been gotten worse. BUN/creatinine 33/2.2 with serum sodium 130. Blood sugars ranging [id number]. INR is down to 5.7 today. Temperature ranges 90.7100.4 with a heart rate of 61 blood pressure 113/63. O2 sat 97% on room air at rest.  Patient still on phenylephrine drip. Urine output ranges 800-1300 cc a shift. 11/30/2021  Patient seen examined on PCU. Patient states he feels fairly good again today. He denies any chest or abdominal pain. Patient states he is very hungry and is still on liquids. Hemoglobin 9.2 with platelet count of down to 38. BUN/creatinine 24/1.4 with potassium 3.2 and sodium 131. Temperature 99 with heart rate 64 blood pressure 99/57. O2 sat 93% on room air. Phenylephrine drip still running. Urine output ranges to 200-4000 cc a shift. Patient is for abdominal paracentesis today. 12/1/2021  Patient seen examined in PCU. Patient with 2 family members at the bedside. Case was discussed. Patient alert and oriented x3. Patient's upper extremity tremors are moderately improved. Patient had questions about abdominal paracentesis. BUN/creatinine 25/1.4 today with total bilirubin 29. WBC 7.5 hemoglobin 8.2 and platelet count 62. Temperature ranges 97.998.3 with heart rate of 68 blood pressure range from 82/51112/64. O2 sats 100% on room air. Patient still on phenylephrine drip for blood pressure/ renal perfusion. Abdominal paracentesis when okay with GI, intensivist.    12/2/2021   Patient seen examined on PCU. Patient denies any specific pain at this time. Patient is little bit depressed about not getting his paracentesis performed yet. BUN/creatinine 18/1.2 with hemoglobin is 7.5 platelet count is down to 29. Patient received fresh frozen plasma and INR yesterday morning was 3.4 and today is 5. Temperature 90.3 with heart rate of 67 and blood pressure 110/70. Pulmonology and GI notes reviewed.   With kidney function about back to baseline phenylephrine drip will be weaned off.    12/3/2021  Patient seen examined on PCU. Patient alert and oriented x3. Patient denies any chest or significant abdominal pain. Abdomen distended but soft. BUN/creatinine 14/1.0 with blood sugars ranging 103126. Serum phosphorus 1.8. Total bili was 24.1 today and has improved somewhat. Hemoglobin is down to 7.2 today. INR is up to 6.3 today. Heart rate 65 with blood pressure 105/52 with O2 sat 94% on room air at rest.  Urinary output is good. Patient is still on Lavon-Synephrine and somatostatin drip. 12/4/2021  Patient seen examined on PCU. Patient received 1 unit packed RBC today with low hemoglobin. Patient denies any problem with chest pain and there is no continuous abdominal discomfort. Patient denies any unusual shortness of breath. BUN/creatinine 11/0.9 with serum magnesium 1.5. Phosphorus 1.7 with WBC 5.3 and hemoglobin 6.6. Total bili 23.4, INR 6.6 today. Temperature 98.5 with heart rate of 74 blood pressure 101/62. O2 sat 96% with the patient being titrated down on the Neosporin drip. Review of System: Limited at the present time  Constitutional:   Denies fever or chills, weight loss or gain, fatigue or malaise. HEENT:   Denies ear pain, sore throat, sinus or eye problems. Cardiovascular:   Denies any chest pain, irregular heartbeats, or palpitations. Respiratory:   Denies shortness of breath, coughing, sputum production, hemoptysis, or wheezing. Gastrointestinal:   Denies nausea, vomiting, diarrhea, or constipation. Denies any abdominal pain. Genitourinary:    Denies any urgency, frequency, hematuria. Voiding  without difficulty. Extremities:   Denies lower extremity swelling, edema or cyanosis. Neurology:    Denies any headache or focal neurological deficits, Denies generalized weakness or memory difficulty. Psch:   Denies being anxious or depressed.   Musculoskeletal:    Denies  myalgias, joint complaints or back pain. Integumentary:   Denies any rashes, ulcers, or excoriations. Denies bruising. Hematologic/Lymphatic:  Denies bruising or bleeding. Physical Exam:  I/O this shift:  In: 350 [Blood:350]  Out: -     Intake/Output Summary (Last 24 hours) at 12/4/2021 1206  Last data filed at 12/4/2021 0900  Gross per 24 hour   Intake 2250 ml   Output 1400 ml   Net 850 ml   I/O last 3 completed shifts: In: 200 [P.O.:350; I.V.:1070; IV Piggyback:480]  Out: 1400 [Urine:900; Stool:500]  Patient Vitals for the past 96 hrs (Last 3 readings):   Weight   12/02/21 0600 208 lb (94.3 kg)     Vital Signs:   Blood pressure 101/62, pulse 74, temperature 98.5 °F (36.9 °C), temperature source Oral, resp. rate 17, height 6' 3\" (1.905 m), weight 208 lb (94.3 kg), SpO2 96 %. General appearance:  Patient alert but lethargic. Appears chronically ill  Head:  Normocephalic. No masses, lesions or tenderness. Eyes:  PERRLA. EOMI. sclera icteric. Buccal mucosa moist.  ENT:  Ears normal. Mucosa normal.  Neck:    Supple. Trachea midline. No thyromegaly. No JVD. No bruits. Heart:    Rhythm regular. Tachycardic. No murmurs. Lungs:    Diminished  Abdomen:   Soft. Non-tender. Non-distended. Bowel sounds positive. No organomegaly or masses. No pain on palpation. Newly distended  Extremities:    Peripheral pulses present. No peripheral edema. No ulcers. No cyanosis. No clubbing.   Neurologic:    Responsive but lethargic  Integumentary:  No rashes skin jaundice  Genitalia/Breast:  Hickman catheter    Medication:  Scheduled Meds:   potassium & sodium phosphates  1 packet Oral TID    sodium bicarbonate  650 mg Oral BID    phytonadione  10 mg Oral Daily    vitamin D3  5,000 Units Oral Daily    insulin lispro  0-6 Units SubCUTAneous TID WC    albumin human  25 g IntraVENous Q8H    pantoprazole  40 mg IntraVENous Daily    And    sodium chloride (PF)  10 mL IntraVENous Daily    midodrine  15 mg Oral TID WC    thiamine (VITAMIN B1) IVPB  100 mg IntraVENous Daily    rifaximin  550 mg Oral BID    sodium chloride flush  5-40 mL IntraVENous 2 times per day    piperacillin-tazobactam  3,375 mg IntraVENous Q8H     Continuous Infusions:   sodium chloride      sodium chloride      sodium chloride      sodium chloride      phenylephrine (DESMOND-SYNEPHRINE) 50mg/250mL infusion 35 mcg/min (12/04/21 0200)    dextrose      sodium chloride      sodium chloride Stopped (12/04/21 0629)       Objective Data:  CBC with Differential:    Lab Results   Component Value Date    WBC 5.3 12/04/2021    RBC 1.90 12/04/2021    HGB 6.6 12/04/2021    HCT 19.3 12/04/2021    PLT 29 12/04/2021    .6 12/04/2021    MCH 34.7 12/04/2021    MCHC 34.2 12/04/2021    RDW 16.8 12/04/2021    METASPCT 0.9 11/20/2021    LYMPHOPCT 16.0 12/04/2021    MONOPCT 11.0 12/04/2021    BASOPCT 0.0 12/04/2021    MONOSABS 0.58 12/04/2021    LYMPHSABS 0.85 12/04/2021    EOSABS 0.16 12/04/2021    BASOSABS 0.00 12/04/2021     CMP:    Lab Results   Component Value Date     12/04/2021    K 4.1 12/04/2021    K 5.8 11/26/2021     12/04/2021    CO2 18 12/04/2021    BUN 11 12/04/2021    CREATININE 0.9 12/04/2021    GFRAA >60 12/04/2021    LABGLOM >60 12/04/2021    GLUCOSE 120 12/04/2021    PROT 5.2 12/04/2021    LABALBU 3.5 12/04/2021    CALCIUM 8.4 12/04/2021    BILITOT 23.4 12/04/2021    ALKPHOS 49 12/04/2021    AST 38 12/04/2021    ALT 20 12/04/2021              Assessment:    · Acute hepatic encephalopathy  · Hepatorenal syndrome probable hepatopulmonary syndrome  · Acute hypoxic respiratory failure  · Hypotonic hypervolemic hyponatremia  · JULIO on CKD  · Pneumonia possible aspiration  · Decompensated cirrhosis  · Hyperbilirubinemia  · Chronic normocytic anemia  · Atrial fibrillation  · History of anxiety and depression  · History of alcohol abuse  · GERD        Plan:     · Patient is doing very poorly at the present time.   The patient does have advanced liver

## 2021-12-05 NOTE — PROGRESS NOTES
Internal Medicine Progress Note         Primary Care Physician: Gennaro Najjar, DO   Admitting Physician:  Ellan Kanner, DO  Admission date and time: 11/26/2021  9:25 AM    Room:  [address] [place] [address]  Admitting diagnosis: Hepatorenal syndrome ([facility] [address] [K76.7]  Acute kidney injury ([facility] [address] [N17.9]  Chronic liver failure without hepatic coma [facility]) [K72.10]    Patient Name: Linda Song  MRN: [id number]    Date of Service: 12/5/2021       Subjective: Yony Sheikh is a 40 y.o. male ,12/5/2021, at the bedside. Patient doing very poorly multiple problems at hand including hepatorenal syndrome and dropping blood pressure. Patient has very low urine output with evidence of metabolic acidosis. Currently being followed by multiple subspecialists. 11/2611/27/2021-medical coverage by Dr. Erinn Montoya    11/28/2021  Patient seen examined on PCU. Patient's mother is at the bedside and case discussed in detail. Patient is much more alert this morning. Patient oriented to person place. He denies any significant pain at this time. There is no lower leg edema and abdomen is distended but very soft. Serum sodium is up to 129 with a potassium 3.2. CO2 electrolytes is 18 and improved but BUN/creatinine is 44/3.2 which is improved compared to 11/27. Total bili is 24.2 with WBC 12.7 hemoglobin 10.1. Platelet count is down to 63. INR was 7.8. Temperature 98.8 with heart rate of 70 and blood pressure is 115/67. Patient has been put on phenylephrine drip with improvement in blood pressure. Urine output about 400 cc a shift. 11/29/2021  Patient seen and examined in PCU. Patient's mother is at the bedside and case discussed. Patient is still alert and oriented x23 with episodes of confusion. Patient denies any problem with chest or abdominal pain. Patient strength is still very poor with the patient still very weak on his feet. Patient appetite has improved with less nausea.   Patient still has persistent hand tremor which she has had for years according to the mother. But this has been gotten worse. BUN/creatinine 33/2.2 with serum sodium 130. Blood sugars ranging [id number]. INR is down to 5.7 today. Temperature ranges 90.7100.4 with a heart rate of 61 blood pressure 113/63. O2 sat 97% on room air at rest.  Patient still on phenylephrine drip. Urine output ranges 800-1300 cc a shift. 11/30/2021  Patient seen examined on PCU. Patient states he feels fairly good again today. He denies any chest or abdominal pain. Patient states he is very hungry and is still on liquids. Hemoglobin 9.2 with platelet count of down to 38. BUN/creatinine 24/1.4 with potassium 3.2 and sodium 131. Temperature 99 with heart rate 64 blood pressure 99/57. O2 sat 93% on room air. Phenylephrine drip still running. Urine output ranges to 200-4000 cc a shift. Patient is for abdominal paracentesis today. 12/1/2021  Patient seen examined in PCU. Patient with 2 family members at the bedside. Case was discussed. Patient alert and oriented x3. Patient's upper extremity tremors are moderately improved. Patient had questions about abdominal paracentesis. BUN/creatinine 25/1.4 today with total bilirubin 29. WBC 7.5 hemoglobin 8.2 and platelet count 62. Temperature ranges 97.998.3 with heart rate of 68 blood pressure range from 82/51112/64. O2 sats 100% on room air. Patient still on phenylephrine drip for blood pressure/ renal perfusion. Abdominal paracentesis when okay with GI, intensivist.    12/2/2021   Patient seen examined on PCU. Patient denies any specific pain at this time. Patient is little bit depressed about not getting his paracentesis performed yet. BUN/creatinine 18/1.2 with hemoglobin is 7.5 platelet count is down to 29. Patient received fresh frozen plasma and INR yesterday morning was 3.4 and today is 5. Temperature 90.3 with heart rate of 67 and blood pressure 110/70. Pulmonology and GI notes reviewed.   With kidney function about back to baseline phenylephrine drip will be weaned off.    12/3/2021  Patient seen examined on PCU. Patient alert and oriented x3. Patient denies any chest or significant abdominal pain. Abdomen distended but soft. BUN/creatinine 14/1.0 with blood sugars ranging 103126. Serum phosphorus 1.8. Total bili was 24.1 today and has improved somewhat. Hemoglobin is down to 7.2 today. INR is up to 6.3 today. Heart rate 65 with blood pressure 105/52 with O2 sat 94% on room air at rest.  Urinary output is good. Patient is still on Lavon-Synephrine and somatostatin drip. 12/4/2021  Patient seen examined on PCU. Patient received 1 unit packed RBC today with low hemoglobin. Patient denies any problem with chest pain and there is no continuous abdominal discomfort. Patient denies any unusual shortness of breath. BUN/creatinine 11/0.9 with serum magnesium 1.5. Phosphorus 1.7 with WBC 5.3 and hemoglobin 6.6. Total bili 23.4, INR 6.6 today. Temperature 98.5 with heart rate of 74 blood pressure 101/62. O2 sat 96% with the patient being titrated down on the Neosporin drip. 12/5/2021   Patient seen examined on PCU. Case discussed with patient's family at the bedside again today. Patient complaining of pain behind his upper arms in the tricep area. Patient says it started a couple days ago. More of a constant burning sensation. There is no rash noted but there is some ecchymosis noted on the upper portion of the arm and tricep area. This area is tender to palpation. There is no masses or hematoma noted. BUN/creatinine 12/1.0. Serum magnesium 1.4 again with phosphorus 1.7. Blood sugars still low 100s. Total bili is 24.8 today with a WBC 5.8 hemoglobin 6.7 early this morning with a repeat 7.1. Temperature 97.5 heart rate 78 blood pressure 118/64. Patient was taken off the Neosporin drip yesterday afternoon but was put back on this morning. O2 sat is 95% on room air.   Urine output ranges 150-450 cc shift. Patient given potassium and magnesium supplement. Review of System: Limited at the present time  Constitutional:   Denies fever or chills, weight loss or gain, fatigue or malaise. HEENT:   Denies ear pain, sore throat, sinus or eye problems. Cardiovascular:   Denies any chest pain, irregular heartbeats, or palpitations. Respiratory:   Denies shortness of breath, coughing, sputum production, hemoptysis, or wheezing. Gastrointestinal:   Denies nausea, vomiting, diarrhea, or constipation. Denies any abdominal pain. Genitourinary:    Denies any urgency, frequency, hematuria. Voiding  without difficulty. Extremities:   Denies lower extremity swelling, edema or cyanosis. Neurology:    Denies any headache or focal neurological deficits, Denies generalized weakness or memory difficulty. Psch:   Denies being anxious or depressed. Musculoskeletal:    Denies  myalgias, joint complaints or back pain. Integumentary:   Denies any rashes, ulcers, or excoriations. Denies bruising. Hematologic/Lymphatic:  Denies bruising or bleeding. Physical Exam:  I/O this shift: In: 0 [P.O.:350; I.V.:100; IV Piggyback:250]  Out: 450 [Urine:450]    Intake/Output Summary (Last 24 hours) at 12/5/2021 1246  Last data filed at 12/5/2021 1000  Gross per 24 hour   Intake 2143.75 ml   Output 1600 ml   Net 543.75 ml   I/O last 3 completed shifts: In: 2188.8 [P.O.:1050; I.V.:45; Blood:693.8; IV Piggyback:400]  Out: 1150 [Urine:850; Stool:300]  Patient Vitals for the past 96 hrs (Last 3 readings):   Weight   12/05/21 0531 212 lb (96.2 kg)   12/02/21 0600 208 lb (94.3 kg)     Vital Signs:   Blood pressure 119/64, pulse 78, temperature 97.5 °F (36.4 °C), temperature source Oral, resp. rate 20, height 6' 3\" (1.905 m), weight 212 lb (96.2 kg), SpO2 95 %. General appearance:  Patient alert but lethargic. Appears chronically ill  Head:  Normocephalic. No masses, lesions or tenderness. Eyes:  PERRLA. EOMI. sclera icteric. Buccal mucosa moist.  ENT:  Ears normal. Mucosa normal.  Neck:    Supple. Trachea midline. No thyromegaly. No JVD. No bruits. Heart:    Rhythm regular. Tachycardic. No murmurs. Lungs:    Diminished  Abdomen:   Soft. Non-tender. Non-distended. Bowel sounds positive. No organomegaly or masses. No pain on palpation. Newly distended  Extremities:    Peripheral pulses present. No peripheral edema. No ulcers. No cyanosis. No clubbing.   Neurologic:    Responsive but lethargic  Integumentary:  No rashes skin jaundice  Genitalia/Breast:  Hickman catheter    Medication:  Scheduled Meds:   sodium bicarbonate  650 mg Oral BID    phytonadione  10 mg Oral Daily    vitamin D3  5,000 Units Oral Daily    insulin lispro  0-6 Units SubCUTAneous TID WC    albumin human  25 g IntraVENous Q8H    pantoprazole  40 mg IntraVENous Daily    And    sodium chloride (PF)  10 mL IntraVENous Daily    midodrine  15 mg Oral TID WC    thiamine (VITAMIN B1) IVPB  100 mg IntraVENous Daily    rifaximin  550 mg Oral BID    sodium chloride flush  5-40 mL IntraVENous 2 times per day    piperacillin-tazobactam  3,375 mg IntraVENous Q8H     Continuous Infusions:   sodium chloride      sodium chloride      sodium chloride      sodium chloride      sodium chloride      dextrose      sodium chloride      sodium chloride Stopped (12/05/21 0800)       Objective Data:  CBC with Differential:    Lab Results   Component Value Date    WBC 5.3 12/05/2021    RBC 1.96 12/05/2021    HGB 7.1 12/05/2021    HCT 20.3 12/05/2021    PLT 32 12/05/2021    MCV 97.4 12/05/2021    MCH 34.2 12/05/2021    MCHC 35.1 12/05/2021    RDW 19.4 12/05/2021    METASPCT 0.9 11/20/2021    LYMPHOPCT 15.7 12/05/2021    MONOPCT 8.7 12/05/2021    BASOPCT 0.9 12/05/2021    MONOSABS 0.48 12/05/2021    LYMPHSABS 0.85 12/05/2021    EOSABS 0.32 12/05/2021    BASOSABS 0.05 12/05/2021     CMP:    Lab Results   Component Value Date     12/05/2021    K 3.8 12/05/2021    K 5.8 11/26/2021     12/05/2021    CO2 16 12/05/2021    BUN 12 12/05/2021    CREATININE 1.0 12/05/2021    GFRAA >60 12/05/2021    LABGLOM >60 12/05/2021    GLUCOSE 117 12/05/2021    PROT 5.2 12/05/2021    LABALBU 3.5 12/05/2021    CALCIUM 8.5 12/05/2021    BILITOT 24.8 12/05/2021    ALKPHOS 45 12/05/2021    AST 42 12/05/2021    ALT 20 12/05/2021              Assessment:    · Acute hepatic encephalopathy  · Hepatorenal syndrome probable hepatopulmonary syndrome  · Acute hypoxic respiratory failure  · Hypotonic hypervolemic hyponatremia  · JULIO on CKD  · Pneumonia possible aspiration  · Decompensated cirrhosis  · Hyperbilirubinemia  · Chronic normocytic anemia  · Atrial fibrillation  · History of anxiety and depression  · History of alcohol abuse  · GERD        Plan:     · Patient is doing very poorly at the present time. The patient does have advanced liver disease with decompensated cirrhosis and not currently a candidate for liver transplant. Symptoms are suggestive of severe electrolyte imbalance with probable hepatorenal syndrome. He also is currently hypotensive. I have discussed the condition with the mother and fiancé along with Dr. Wilian Velez have discussed his CODE STATUS and agree on no intubation or chest compressions. We have also discussed possibility of transition into hospice pending his status. Continue aggressive care for the next 24 to 48 hours and observe response  · Electrolyte imbalance of hyponatremia being followed by nephrology  · Metabolic acidosis being corrected by bicarb infusion  · GI is following regarding his hepatic cirrhosis  · Vasopressor has been instituted  · Salt poor albumin to expand volume  · Blood culture has been obtained.   Zosyn to cover for spontaneous bacterial peritonitis  · Patient is auto anticoagulated vitamin K supplementation  · Treat elevated ammonia level  · Paracentesis as needed  · Prognosis is very poor but he is improving short-term          Dorota Jay DO, F.A.C.O.I.  12/5/2021  12:46 PM

## 2021-12-05 NOTE — PROGRESS NOTES
Called libby back and explained we would wait until after surgery    Sometimes Dr Mary Ayers has it pulled at home and if  Not I can get him in on Monday  He understood  Case review with Dr. Cynthia Li who was made aware of H/H 7.1 and INR 8.3. [address] [facility] transfer order.  HAWA

## 2021-12-05 NOTE — PROGRESS NOTES
PROGRESS NOTE    By Suma Graves D.O GI Fellow    [facility]  Dr. Jaiden Castanon MD, Dr. Nitza Garner MD, Dr Margaux Beaulieu, Dr. Ramiro Kurtz MD, Dr. Eufemia Cross, DO      Molly Salgadobasil  40 y.o.  male    SUBJECTIVE:  She is resting comfortably this a.m. with no complaints. Patient denies any GI bleed.       OBJECTIVE:    /72   Pulse 86   Temp 97.5 °F (36.4 °C) (Oral)   Resp 19   Ht 6' 3\" (1.905 m)   Wt 212 lb (96.2 kg)   SpO2 95%   BMI 26.50 kg/m²     Gen: NAD, AAO x 3  HEENT:PEERL, scleral icterus  Heart: RRR, no M/R/G  Lungs: CTAB  Abd.: soft, NT, ND, BS +, no G/R, no HSM  Extr.: no C/C/E, no bruising patient is visibly jaundiced      Stool (measured) : 300 mL  Lab Results   Component Value Date    WBC 5.3 12/05/2021    WBC 5.3 12/04/2021    WBC 5.7 12/03/2021    HGB 6.7 12/05/2021    HGB 6.6 12/04/2021    HGB 7.2 12/03/2021    HCT 19.1 12/05/2021    MCV 97.4 12/05/2021    RDW 19.4 12/05/2021    PLT 32 12/05/2021    PLT 29 12/04/2021    PLT 29 12/03/2021     Lab Results   Component Value Date     12/05/2021    K 3.8 12/05/2021    K 5.8 11/26/2021     12/05/2021    CO2 16 12/05/2021    BUN 12 12/05/2021    CREATININE 1.0 12/05/2021    CALCIUM 8.5 12/05/2021    PROT 5.2 12/05/2021    LABALBU 3.5 12/05/2021    BILITOT 24.8 12/05/2021    BILITOT 23.4 12/04/2021    BILITOT 24.1 12/03/2021    ALKPHOS 45 12/05/2021    ALKPHOS 49 12/04/2021    ALKPHOS 52 12/03/2021    AST 42 12/05/2021    AST 38 12/04/2021    AST 43 12/03/2021    ALT 20 12/05/2021    ALT 20 12/04/2021    ALT 24 12/03/2021     Lab Results   Component Value Date    LIPASE 68 11/14/2021    LIPASE 26 07/01/2020    LIPASE 26 05/11/2020     Lab Results   Component Value Date    AMYLASE 91 01/16/2019         ASSESSMENT/PLAN:       1. Hepatic encephalopathy type C grade II  Patient has been on 30 g of lactulose 2 times a days with rifaximin 550 mg p.o. 3 times a day as well.    Patient mentation has significantly improved compared to yesterday. Mary Avila is A&O x3 and is able to answer all question appropriately. Patient does not have asterixis on exam   We will continue to monitor mental status continue with lactulose and rifaximin.   This will be titrated to up to 2-3 soft bowel movement per day. -Supportive care     2. Alcohol induced cirrhosis, decompensated  Meld sodium score 35.    -Diuretics per nephrology  -Obtain paracentesis once INR acceptable range  Patient was educated and advised on the importance of abstain from alcohol completely otherwise he is long-term survival odds significantly decreased.  Patient seem to understand and is agreeable with this plan.       3. Renal failure - resolved   -acute kidney injury 2/2 renal hypoperfusion, unlikely hepatorenal syndrome, improved   -Okay to wean off the vassopressor  from GI point of view       4. Acute on chronic normocytic anemia  -Vital signs stable over signs of GI bleed on exam  -We will did trend down to 6.6 this a.m.  -Mostly a component of dilution with patient with large amount of ascites on exam  -Paracentesis once patient INR stabilized  -Continue with supportive care         Patient INR remains significantly elevated  however patient with no significant change in mentation patient oriented x3. Continue with  oral vitamin K  for possible underlying nutritional deficiencies due to known alcohol abuse. Continue with supportive care. Patient was counseled about increased risk of death with continued alcohol abuse.       Pt was discussed with  Dr. Willy Man DO  GI Fellow   12/5/2021  8:53 AM

## 2021-12-05 NOTE — PROGRESS NOTES
Physical Therapy      Nursing stated to leave patient be today due to being tired and exhausted. Patient was given a blood stated by nursing. Therapy will attempt treatment at a later time/date.      Aung Gusman [facility] LDB#[id number]

## 2021-12-05 NOTE — PROGRESS NOTES
Progress Note  12/5/2021 1:21 PM  Subjective:   Admit Date: 11/26/2021  PCP: Giovanni Cleveland DO  Interval History: patient examined deep jaundice well otherwise     Diet: ADULT DIET; Regular    Data:   Scheduled Meds:   sodium bicarbonate  650 mg Oral BID    phytonadione  10 mg Oral Daily    vitamin D3  5,000 Units Oral Daily    insulin lispro  0-6 Units SubCUTAneous TID WC    albumin human  25 g IntraVENous Q8H    pantoprazole  40 mg IntraVENous Daily    And    sodium chloride (PF)  10 mL IntraVENous Daily    midodrine  15 mg Oral TID WC    thiamine (VITAMIN B1) IVPB  100 mg IntraVENous Daily    rifaximin  550 mg Oral BID    sodium chloride flush  5-40 mL IntraVENous 2 times per day    piperacillin-tazobactam  3,375 mg IntraVENous Q8H     Continuous Infusions:   sodium chloride      sodium chloride      sodium chloride      sodium chloride      sodium chloride      dextrose      sodium chloride      sodium chloride Stopped (12/05/21 0800)     PRN Meds:sodium chloride, sodium chloride, lidocaine, melatonin, sodium chloride, sodium chloride, sodium chloride, LORazepam **OR** LORazepam **OR** [DISCONTINUED] LORazepam **OR** [DISCONTINUED] LORazepam **OR** [DISCONTINUED] LORazepam **OR** [DISCONTINUED] LORazepam **OR** [DISCONTINUED] LORazepam **OR** [DISCONTINUED] LORazepam, glucose, dextrose, glucagon (rDNA), dextrose, sodium chloride flush, sodium chloride, polyethylene glycol, acetaminophen **OR** [DISCONTINUED] acetaminophen, prochlorperazine  I/O last 3 completed shifts: In: 2188.8 [P.O.:1050; I.V.:45; Blood:693.8; IV Piggyback:400]  Out: 1150 [Urine:850; Stool:300]  I/O this shift:  In: 1050 [P.O.:350;  I.V.:100; Blood:350; IV Piggyback:250]  Out: 450 [Urine:450]    Intake/Output Summary (Last 24 hours) at 12/5/2021 1321  Last data filed at 12/5/2021 1000  Gross per 24 hour   Intake 2043.75 ml   Output 1600 ml   Net 443.75 ml     CBC:   Recent Labs     12/03/21  0600 12/03/21  0600 12/04/21  0450 12/05/21  0505 12/05/21  0830   WBC 5.7  --  5.3 5.3  --    HGB 7.2*   < > 6.6* 6.7* 7.1*   PLT 29*  --  29* 32*  --     < > = values in this interval not displayed. BMP:    Recent Labs     12/03/21  0600 12/04/21  0450 12/05/21  0505    137 136   K 3.6 4.1 3.8    107 108*   CO2 18* 18* 16*   BUN 14 11 12   CREATININE 1.0 0.9 1.0   GLUCOSE 122* 120* 117*     Hepatic:   Recent Labs     12/03/21  0600 12/04/21  0450 12/05/21  0505   AST 43* 38 42*   ALT 24 20 20   BILITOT 24.1* 23.4* 24.8*   ALKPHOS 52 49 45     Troponin: No results for input(s): TROPONINI in the last 72 hours. BNP: No results for input(s): BNP in the last 72 hours. Lipids: No results for input(s): CHOL, HDL in the last 72 hours. Invalid input(s): LDLCALCU  ABGs: No results found for: PHART, PO2ART, VNR8DPV  INR:   Recent Labs     12/03/21  0600 12/04/21  0515 12/05/21  0830   INR 6.3* 6.6* 8.3*       -----------------------------------------------------------------  RAD: US GALLBLADDER RUQ    Result Date: 11/28/2021  EXAMINATION: RIGHT UPPER QUADRANT ULTRASOUND 11/27/2021 5:24 pm COMPARISON: 11/14/2021 HISTORY: ORDERING SYSTEM PROVIDED HISTORY: decomp cirrhosis, CBD measurement TECHNOLOGIST PROVIDED HISTORY: Reason for exam:->decomp cirrhosis, CBD measurement What reading provider will be dictating this exam?->CRC FINDINGS: LIVER:  Nodular contour of the liver consistent with cirrhosis. The liver is somewhat heterogeneous but without obvious focal abnormality. There is apparent hepatofugal flow again identified within the main portal vein. BILIARY SYSTEM:  Gallbladder is incompletely distended with diffuse wall thickening. No sonographic Carrasco's sign. The wall thickening is nonspecific but could be reactive to ascites and hepatic parenchymal disease. No obvious cholelithiasis. Common bile duct is within normal limits measuring 6 mm.  RIGHT KIDNEY: The right kidney is grossly unremarkable without evidence of hydronephrosis. PANCREAS:  Visualized portions of the pancreas are unremarkable. OTHER: Moderate ascites. Findings again consistent with cirrhotic liver. Ascites. Hepatofugal flow redemonstrated in the portal vein. Thickened gallbladder wall which may be reactive to the ascites and hepatic disease. XR CHEST PORTABLE    Result Date: 11/26/2021  EXAMINATION: ONE XRAY VIEW OF THE CHEST 11/26/2021 7:15 am COMPARISON: One-view chest x-ray 11/14/2021 HISTORY: ORDERING SYSTEM PROVIDED HISTORY: SOB TECHNOLOGIST PROVIDED HISTORY: Reason for exam:->SOB FINDINGS: The cardiac silhouette is within normal limits. The mediastinal contours are within normal limits. The lungs are hypoinflated with resulting bronchovascular crowding. Mild basilar opacities appear new/increased. No significant pleural effusions or pneumothorax. Osseous degenerative changes are present. EKG leads overlie the chest.     Mild basilar opacities are suspicious for pneumonia. This appearance can be seen with COVID-19.     US ABDOMEN LIMITED    Result Date: 11/26/2021  EXAMINATION: RIGHT UPPER QUADRANT ULTRASOUND 11/26/2021 5:54 pm COMPARISON: None. HISTORY: ORDERING SYSTEM PROVIDED HISTORY: ascites/JULIO TECHNOLOGIST PROVIDED HISTORY: Reason for exam:->ascites/JULIO What reading provider will be dictating this exam?->CRC FINDINGS: Superficial sonographic evaluation of the 4 quadrants of the abdomen performed using a combination of grayscale technique. Imaging done to assess for the presence of intra-abdominal ascites. Patient was laying on their left-side. Moderate volume simple appearing intra-abdominal ascites in the left abdomen. Moderate volume intra-abdominal ascites.        Objective:   Vitals: /74   Pulse 76   Temp 98.5 °F (36.9 °C) (Oral)   Resp 22   Ht 6' 3\" (1.905 m)   Wt 212 lb (96.2 kg)   SpO2 95%   BMI 26.50 kg/m²   General appearance: appears stated age   Skin:  No rashes or lesions  HEENT: Head: Normocephalic, no lesions, without obvious abnormality. Neck: no adenopathy, no carotid bruit, no JVD, supple, symmetrical, trachea midline and thyroid not enlarged, symmetric, no tenderness/mass/nodules  Lungs: clear to auscultation bilaterally  Heart: regular rate and rhythm, S1, S2 normal, no murmur, click, rub or gallop  Abdomen: Distended , ascites ++  Extremities: extremities normal, atraumatic, no cyanosis or edema  Neurologic: Mental status: Alert, oriented, thought content appropriate    Assessment:   Patient Active Problem List:     Alcohol-induced acute pancreatitis     Anxiety     Depression     ETOH abuse     Hematemesis     Atrial fibrillation with rapid ventricular response (HCC)     Alcohol withdrawal syndrome without complication (HCC)     Lactic acidosis     Hypokalemia     Severe protein-calorie malnutrition (HCC)     Paroxysmal atrial fibrillation (HCC)     Hyperbilirubinemia     Hypomagnesemia     Bilirubinemia     UGIB (upper gastrointestinal bleed)     Hepatorenal syndrome (HCC)    Plan:   Assessment: / Plan:     1.  Acute kidney injury.  Acute kidney injury secondary to renal hypoperfusion with possible underlying hepatorenal syndrome.  Urine output has improved , JULIO  Now Resolved      2.  Metabolic acidosis.  Patient with non-anion gap metabolic acidosis which may be reflection of diarrhea as well as acute kidney injury.   PLAN: 12/3/21: started oral bicarb -- increase dose      3.   Hyponatremia.  Patient probably with hepatic failure associated hyponatremia along with use of spironolactone contributing to hyponatremia. SNa back to normal levels      4.   Hypokalemia.  Supplement potassium and magnesium as needed, k better      5.  Hypocalcemia.   ionized calcium 1.12 / Vit D 14 / PTH 60 - Started oral Vit D on 11/29/21     6.  Hypotension.  Follow on pressor support and SPA.       7. Hypophosphatemia.  Supplement orally as needed for goal 2.5     8.  Coagulopathy     Javon Palomino MD

## 2021-12-06 NOTE — PROGRESS NOTES
Physical Therapy    Physical Therapy Treatment Note/Plan of Care    Room #:  2110/0004-69  Patient Name: Simón Ruelas  YOB: 1975  MRN: [id number]    Date of Service: 12/6/2021     Tentative placement recommendation: Subacute rehab  Equipment recommendation: To be determined      Evaluating Physical Therapist: Heron Bean PT  #[id number]      Specific Provider Orders/Date/Referring Provider :  11/29/21 1615   PT eval and treat Start: 11/29/21 1615, End: 11/29/21 1615, ONE TIME, Standing Count: 1 Occurrences R    Dariel Gr DO      Admitting Diagnosis:   Hepatorenal syndrome ([address] [K76.7]  Acute kidney injury ([address] [N17.9]  Chronic liver failure without hepatic coma ([address] [K72.10]       Surgery: none  Visit Diagnoses       Codes    Chronic liver failure without hepatic coma ([address]     K72.10    Acute kidney injury ([address]     N17.9          Patient Active Problem List   Diagnosis    Alcohol-induced acute pancreatitis    Anxiety    Depression    ETOH abuse    Hematemesis    Atrial fibrillation with rapid ventricular response ([address]    Alcohol withdrawal syndrome without complication (HCC)    Lactic acidosis    Hypokalemia    Severe protein-calorie malnutrition (HCC)    Paroxysmal atrial fibrillation (HCC)    Hyperbilirubinemia    Hypomagnesemia    Bilirubinemia    UGIB (upper gastrointestinal bleed)    Hepatorenal syndrome (HCC)        ASSESSMENT of Current Deficits Patient exhibits decreased strength, balance and endurance impairing functional mobility, transfers, gait , gait distance and tolerance to activity Patient demonstrates slight tremor today with movement and limited in function d/t fatigue and lacking endurance. Patient benefits from use of wheeled walker today d/t instability with upright posture. Patient would continue to benefit from therapy to increase strength, balance, and functional mobility limitations.       PHYSICAL THERAPY  PLAN OF CARE       Physical therapy plan of care is established based on physician order,  patient diagnosis and clinical assessment    Current Treatment Recommendations:    -Standing Balance: Perform strengthening exercises in standing to promote motor control with or without upper extremity support  and Challenge balance utilizing reaching  activities beyond center of gravity    -Transfers: Provide instruction on proper hand and foot position for adequate transfer of weight onto lower extremities and use of gait device if needed and Cues for hand placement, technique and safety. Provide stabilization to prevent fall   -Gait: Gait training, Standing activities to improve: base of support, weight shift, weight bearing , Exercises to improve trunk control and Exercises to improve hip and knee control   -Endurance: Utilize Supervised activities to increase level of endurance to allow for safe functional mobility including transfers and gait   -Stairs: Stair training with instruction on proper technique and hand placement on rail    PT long term treatment goals are located in below grid    Patient and or family understand(s) diagnosis, prognosis, and plan of care. Frequency of treatments: Patient will be seen  3-5 times/week.          Prior Level of Function: Patient ambulated independently    Rehab Potential: good    for baseline    Past medical history:   Past Medical History:   Diagnosis Date    Anxiety     Depression     Esophagitis     ETOH abuse     Gastritis     GERD (gastroesophageal reflux disease)     Hematuria     Pancreatitis      Past Surgical History:   Procedure Laterality Date    KNEE SURGERY      ACL    UPPER GASTROINTESTINAL ENDOSCOPY  05/09/2018    UPPER GASTROINTESTINAL ENDOSCOPY N/A 5/8/2018    EGD BIOPSY performed by Anahi Stephens MD at [address] [facility] 1/21/2019    EGD ESOPHAGOGASTRODUODENOSCOPY performed by Rosa Waller DO at [address] [facility] 1/21/2019    EGD CONTROL HEMORRHAGE performed by Eufemia Cross DO at [address] [facility]:    Precautions: Up with assistance and Up as tolerated, falls and alarm ,    Social history: Patient lives alone in a ranch home  with 3 steps  to enter without Rail  Tub shower grab bars    Equipment owned: None,       [address]   How much difficulty turning over in bed?: None  How much difficulty sitting down on / standing up from a chair with arms?: A Little  How much difficulty moving from lying on back to sitting on side of bed?: A Little  How much help from another person moving to and from a bed to a chair?: A Little  How much help from another person needed to walk in hospital room?: A Little  How much help from another person for climbing 3-5 steps with a railing?: A Little  AM-PAC Inpatient Mobility Raw Score : 19  AM-PAC Inpatient T-Scale Score : 45.44  Mobility Inpatient CMS 0-100% Score: 41.77  Mobility Inpatient CMS G-Code Modifier : CK    Nursing cleared patient for PT treatment. Patient's father present during session. OBJECTIVE;   Initial Evaluation  Date: 11/30/2021 Treatment Date:  12/2/2021   Short Term/ Long Term   Goals   Was pt agreeable to Eval/treatment? Yes yes To be met in 5 days   Pain level   0/10    0/10    Bed Mobility    Rolling: Supervision     Supine to sit: Minimal assist of 1    Sit to supine: Minimal assist of 1    Scooting: Minimal assist of 1   Rolling: Independent   Supine to sit: Supervision    Sit to supine: Supervision    Scooting: Supervision     Rolling: Independent    Supine to sit: Independent    Sit to supine: Independent    Scooting: Independent     Transfers Sit to stand:  Moderate assist of 1  From commode, min a from bed Sit to stand: Supervision    x5 reps   Sit to stand: Independent     Ambulation    2-3 steps using  no device with Moderate assist of 1   for balance and safety and cues for upright posture and safety 2x30 feet using  wheeled walker with Minimal assist of 1  assist with IV pole cues for safety and pacing    75 feet using  least restrictive device versus no device with Independent    Stair negotiation: ascended and descended   Not assessed  Not assessed   3 steps with rail independent    ROM Within functional limits    Increase range of motion 10% of affected joints    Strength BUE:  refer to OT eval  RLE:  3/5  LLE:  3/5  Increase strength in affected mm groups by 1/3 grade   Balance Sitting EOB:  fair    Dynamic Standing:  poor   Sitting EOB: good -  Dynamic Standing: fair +   Sitting EOB:  good    Dynamic Standing: fair       Patient is Alert & Oriented x person, place, time and situation and follows directions  slowly  Sensation:  Patient  denies numbness/tingling   Edema:  no   Endurance: fair  -    Vitals: room air   Blood Pressure at rest  Blood Pressure during session    Heart Rate at rest  Heart Rate during session    SPO2 at rest %  SPO2 during session %     Patient education  Patient educated on role of Physical Therapy, risks of immobility, safety and plan of care, energy conservation,  importance of mobility while in hospital , ankle pumps, quad set and glut set for edema control, blood clot prevention and safety      Patient response to education:   Pt verbalized understanding Pt demonstrated skill Pt requires further education in this area   Yes Partial Yes      Treatment:  Patient practiced and was instructed/facilitated in the following treatment: Patient performed supine exercises and transferred to EO. Sat edge of bed 15 minutes with Supervision  to increase dynamic sitting balance and activity tolerance. Patient stood to wheeled walker to amb to doorway and back, requiring seated rest breaks d/t fatigue. Patient transferred back to supine position and positioned for comfort.       Therapeutic Exercises:  ankle pumps, quad sets, glut sets, hip abduction/adduction and straight leg raise x 15 reps       At end of session, patient in bed with father present  call light and phone within reach,  all lines and tubes intact, nursing notified. Patient would benefit from continued skilled Physical Therapy to improve functional independence and quality of life.          Patient's/ family goals   home    Time in  5  Time out  1023    Total Treatment Time  60 minutes      CPT codes:    Therapeutic activities (77190)   30 minutes  2 unit(s)  Therapeutic exercises (50537)   15 minutes  1 unit(s)  Non billable time 15 minutes    Melony Gregg, ALANA  #[id number]

## 2021-12-06 NOTE — CARE COORDINATION
SS NOTE: COVID NEGATIVE 11/26- PT WILL NEED A NEGATIVE RAPID COVID 72 HOURS PRIOR TO [address] [facility] [place] 2[place] TO [facility] SKILLED. READMISSION DONE TODAY. This is an ICU pt. Pt is in Contact Iso for C-diff. Pt needs a Paracentisis however his INR is to low. Pt has 2 peripheral lines. Pt is a DNR CCA, no intubation. Pt is on room air. Pt had 2 units of Plasma. Pt is on IV  Zocyn. [facility] skilled  accepted pt- they will begin PRECERT once pt is stable for SNF placement. For a SNF placement pt will need a PRECERT, signed JORGE, current PT.OT notes and SW will need to complete the HENs. [place] Vera.12/06/2021.4:31PM.

## 2021-12-06 NOTE — PROGRESS NOTES
Internal Medicine Progress Note         Primary Care Physician: Jarrett Ramos DO   Admitting Physician:  Jose Brown DO  Admission date and time: 11/26/2021  9:25 AM    Room:  [address] [place] [address]  Admitting diagnosis: Hepatorenal syndrome ([facility] [address] [K76.7]  Acute kidney injury ([facility] [address] [N17.9]  Chronic liver failure without hepatic coma [facility]) [K72.10]    Patient Name: Rachael Pugh  MRN: [id number]    Date of Service: 12/6/2021       Subjective: Mari Mackenzie is a 40 y.o. male ,12/6/2021, at the bedside. Patient doing very poorly multiple problems at hand including hepatorenal syndrome and dropping blood pressure. Patient has very low urine output with evidence of metabolic acidosis. Currently being followed by multiple subspecialists. 11/2611/27/2021-medical coverage by Dr. Allan Brumfield    11/28/2021  Patient seen examined on PCU. Patient's mother is at the bedside and case discussed in detail. Patient is much more alert this morning. Patient oriented to person place. He denies any significant pain at this time. There is no lower leg edema and abdomen is distended but very soft. Serum sodium is up to 129 with a potassium 3.2. CO2 electrolytes is 18 and improved but BUN/creatinine is 44/3.2 which is improved compared to 11/27. Total bili is 24.2 with WBC 12.7 hemoglobin 10.1. Platelet count is down to 63. INR was 7.8. Temperature 98.8 with heart rate of 70 and blood pressure is 115/67. Patient has been put on phenylephrine drip with improvement in blood pressure. Urine output about 400 cc a shift. 11/29/2021  Patient seen and examined in PCU. Patient's mother is at the bedside and case discussed. Patient is still alert and oriented x23 with episodes of confusion. Patient denies any problem with chest or abdominal pain. Patient strength is still very poor with the patient still very weak on his feet. Patient appetite has improved with less nausea.   Patient still has persistent hand tremor which she has had for years according to the mother. But this has been gotten worse. BUN/creatinine 33/2.2 with serum sodium 130. Blood sugars ranging [id number]. INR is down to 5.7 today. Temperature ranges 90.7100.4 with a heart rate of 61 blood pressure 113/63. O2 sat 97% on room air at rest.  Patient still on phenylephrine drip. Urine output ranges 800-1300 cc a shift. 11/30/2021  Patient seen examined on PCU. Patient states he feels fairly good again today. He denies any chest or abdominal pain. Patient states he is very hungry and is still on liquids. Hemoglobin 9.2 with platelet count of down to 38. BUN/creatinine 24/1.4 with potassium 3.2 and sodium 131. Temperature 99 with heart rate 64 blood pressure 99/57. O2 sat 93% on room air. Phenylephrine drip still running. Urine output ranges to 200-4000 cc a shift. Patient is for abdominal paracentesis today. 12/1/2021  Patient seen examined in PCU. Patient with 2 family members at the bedside. Case was discussed. Patient alert and oriented x3. Patient's upper extremity tremors are moderately improved. Patient had questions about abdominal paracentesis. BUN/creatinine 25/1.4 today with total bilirubin 29. WBC 7.5 hemoglobin 8.2 and platelet count 62. Temperature ranges 97.998.3 with heart rate of 68 blood pressure range from 82/51112/64. O2 sats 100% on room air. Patient still on phenylephrine drip for blood pressure/ renal perfusion. Abdominal paracentesis when okay with GI, intensivist.    12/2/2021   Patient seen examined on PCU. Patient denies any specific pain at this time. Patient is little bit depressed about not getting his paracentesis performed yet. BUN/creatinine 18/1.2 with hemoglobin is 7.5 platelet count is down to 29. Patient received fresh frozen plasma and INR yesterday morning was 3.4 and today is 5. Temperature 90.3 with heart rate of 67 and blood pressure 110/70. Pulmonology and GI notes reviewed.   With kidney function about back to baseline phenylephrine drip will be weaned off.    12/3/2021  Patient seen examined on PCU. Patient alert and oriented x3. Patient denies any chest or significant abdominal pain. Abdomen distended but soft. BUN/creatinine 14/1.0 with blood sugars ranging 103126. Serum phosphorus 1.8. Total bili was 24.1 today and has improved somewhat. Hemoglobin is down to 7.2 today. INR is up to 6.3 today. Heart rate 65 with blood pressure 105/52 with O2 sat 94% on room air at rest.  Urinary output is good. Patient is still on Lavon-Synephrine and somatostatin drip. 12/4/2021  Patient seen examined on PCU. Patient received 1 unit packed RBC today with low hemoglobin. Patient denies any problem with chest pain and there is no continuous abdominal discomfort. Patient denies any unusual shortness of breath. BUN/creatinine 11/0.9 with serum magnesium 1.5. Phosphorus 1.7 with WBC 5.3 and hemoglobin 6.6. Total bili 23.4, INR 6.6 today. Temperature 98.5 with heart rate of 74 blood pressure 101/62. O2 sat 96% with the patient being titrated down on the Neosporin drip. 12/5/2021   Patient seen examined on PCU. Case discussed with patient's family at the bedside again today. Patient complaining of pain behind his upper arms in the tricep area. Patient says it started a couple days ago. More of a constant burning sensation. There is no rash noted but there is some ecchymosis noted on the upper portion of the arm and tricep area. This area is tender to palpation. There is no masses or hematoma noted. BUN/creatinine 12/1.0. Serum magnesium 1.4 again with phosphorus 1.7. Blood sugars still low 100s. Total bili is 24.8 today with a WBC 5.8 hemoglobin 6.7 early this morning with a repeat 7.1. Temperature 97.5 heart rate 78 blood pressure 118/64. Patient was taken off the Neosporin drip yesterday afternoon but was put back on this morning. O2 sat is 95% on room air.   Urine output ranges 150-450 cc shift. Patient given potassium and magnesium supplement. 12/6/2021  Patient seen examined on PCU. Patient is upset because he does not know what his INR is. Patient states his bilateral posterior upper arm discomfort is improved. There is no chest pain. Patient's abdomen is slowly getting bigger. BUN/creatinine 12/0.9. CO2 was 16 on the electrolytes. Total bili is 26.4 with hemoglobin 6.8 and WBC 5.5. INR is pending. Temperature 90.4 with heart rate of 81 with blood pressure 104/59 with an O2 sat 98%. We will type and cross give 1 more unit of packed RBC now. Consult hematology regarding coagulopathy. Review of System: Limited at the present time  Constitutional:   Denies fever or chills, weight loss or gain, fatigue or malaise. HEENT:   Denies ear pain, sore throat, sinus or eye problems. Cardiovascular:   Denies any chest pain, irregular heartbeats, or palpitations. Respiratory:   Denies shortness of breath, coughing, sputum production, hemoptysis, or wheezing. Gastrointestinal:   Denies nausea, vomiting, diarrhea, or constipation. Denies any abdominal pain. Genitourinary:    Denies any urgency, frequency, hematuria. Voiding  without difficulty. Extremities:   Denies lower extremity swelling, edema or cyanosis. Neurology:    Denies any headache or focal neurological deficits, Denies generalized weakness or memory difficulty. Psch:   Denies being anxious or depressed. Musculoskeletal:    Denies  myalgias, joint complaints or back pain. Integumentary:   Denies any rashes, ulcers, or excoriations. Denies bruising. Hematologic/Lymphatic:  Denies bruising or bleeding. Physical Exam:  No intake/output data recorded. Intake/Output Summary (Last 24 hours) at 12/6/2021 1158  Last data filed at 12/6/2021 0700  Gross per 24 hour   Intake 1400 ml   Output 1400 ml   Net 0 ml   I/O last 3 completed shifts: In: 9335 [P.O.:1450;  I.V.:150; Blood:700; IV Piggyback:750]  Out: [address] [Urine:1750; Stool:100]  Patient Vitals for the past 96 hrs (Last 3 readings):   Weight   12/05/21 0531 212 lb (96.2 kg)     Vital Signs:   Blood pressure 98/64, pulse 86, temperature 98.2 °F (36.8 °C), temperature source Oral, resp. rate 20, height 6' 3\" (1.905 m), weight 212 lb (96.2 kg), SpO2 93 %. General appearance:  Patient alert but lethargic. Appears chronically ill  Head:  Normocephalic. No masses, lesions or tenderness. Eyes:  PERRLA. EOMI. sclera icteric. Buccal mucosa moist.  ENT:  Ears normal. Mucosa normal.  Neck:    Supple. Trachea midline. No thyromegaly. No JVD. No bruits. Heart:    Rhythm regular. Tachycardic. No murmurs. Lungs:    Diminished  Abdomen:   Soft. Non-tender. Non-distended. Bowel sounds positive. No organomegaly or masses. No pain on palpation. Newly distended  Extremities:    Peripheral pulses present. No peripheral edema. No ulcers. No cyanosis. No clubbing.   Neurologic:    Responsive but lethargic  Integumentary:  No rashes skin jaundice  Genitalia/Breast:  Hickman catheter    Medication:  Scheduled Meds:   sodium bicarbonate  1,300 mg Oral BID    potassium & sodium phosphates  1 packet Oral TID    phytonadione  10 mg Oral Daily    vitamin D3  5,000 Units Oral Daily    insulin lispro  0-6 Units SubCUTAneous TID     albumin human  25 g IntraVENous Q8H    pantoprazole  40 mg IntraVENous Daily    And    sodium chloride (PF)  10 mL IntraVENous Daily    midodrine  15 mg Oral TID     thiamine (VITAMIN B1) IVPB  100 mg IntraVENous Daily    rifaximin  550 mg Oral BID    sodium chloride flush  5-40 mL IntraVENous 2 times per day    piperacillin-tazobactam  3,375 mg IntraVENous Q8H     Continuous Infusions:   sodium chloride      sodium chloride      sodium chloride      sodium chloride      sodium chloride      dextrose      sodium chloride      sodium chloride Stopped (12/06/21 1045)       Objective Data:  CBC with Differential:    Lab Results Component Value Date    WBC 5.5 12/06/2021    RBC 1.98 12/06/2021    HGB 6.8 12/06/2021    HCT 18.9 12/06/2021    PLT 34 12/06/2021    MCV 95.5 12/06/2021    MCH 34.3 12/06/2021    MCHC 36.0 12/06/2021    RDW 19.2 12/06/2021    METASPCT 0.9 11/20/2021    LYMPHOPCT 19.5 12/06/2021    MONOPCT 7.1 12/06/2021    MYELOPCT 2.7 12/06/2021    BASOPCT 0.9 12/06/2021    MONOSABS 0.38 12/06/2021    LYMPHSABS 1.10 12/06/2021    EOSABS 0.19 12/06/2021    BASOSABS 0.05 12/06/2021     CMP:    Lab Results   Component Value Date     12/06/2021    K 4.1 12/06/2021    K 5.8 11/26/2021     12/06/2021    CO2 16 12/06/2021    BUN 12 12/06/2021    CREATININE 0.9 12/06/2021    GFRAA >60 12/06/2021    LABGLOM >60 12/06/2021    GLUCOSE 83 12/06/2021    PROT 5.6 12/06/2021    LABALBU 3.9 12/06/2021    CALCIUM 8.7 12/06/2021    BILITOT 26.4 12/06/2021    ALKPHOS 57 12/06/2021    AST 45 12/06/2021    ALT 20 12/06/2021              Assessment:    · Acute hepatic encephalopathy  · Hepatorenal syndrome probable hepatopulmonary syndrome  · Acute hypoxic respiratory failure  · Hypotonic hypervolemic hyponatremia  · JULIO on CKD  · Pneumonia possible aspiration  · Decompensated cirrhosis  · Hyperbilirubinemia  · Chronic normocytic anemia  · Atrial fibrillation  · History of anxiety and depression  · History of alcohol abuse  · GERD        Plan:     · Patient is doing very poorly at the present time. The patient does have advanced liver disease with decompensated cirrhosis and not currently a candidate for liver transplant. Symptoms are suggestive of severe electrolyte imbalance with probable hepatorenal syndrome. He also is currently hypotensive. I have discussed the condition with the mother and fiancé along with Dr. Kristian Bridges have discussed his CODE STATUS and agree on no intubation or chest compressions. We have also discussed possibility of transition into hospice pending his status.   Continue aggressive care for the next 24 to 48 hours and observe response  · Electrolyte imbalance of hyponatremia being followed by nephrology  · Metabolic acidosis being corrected by bicarb infusion  · GI is following regarding his hepatic cirrhosis  · Vasopressor has been instituted  · Salt poor albumin to expand volume  · Blood culture has been obtained.   Zosyn to cover for spontaneous bacterial peritonitis  · Patient is auto anticoagulated vitamin K supplementation  · Treat elevated ammonia level  · Paracentesis as needed  · Prognosis is very poor but he is improving short-term    -Type and screen 1 unit packed RBC now  -Consult hematology regarding severe coagulopathy  -We will try to discuss with GI regarding patient's discharge planning and care with family having multiple questions regarding this          Ellan Kanner, DO, JOHN.C.O.I.  12/6/2021  11:58 AM

## 2021-12-06 NOTE — PROGRESS NOTES
Comprehensive Nutrition Assessment    Type and Reason for Visit:  Reassess    Nutrition Recommendations/Plan: Continue diet and start ONS BID Ensure Pudding    Nutrition Assessment:  Pt admitted w/ hepatorenal syndrome and electrolyte abnormalities. H/o ETOH abuse & cirrhosis. Pt tolerating diet w/ spordiac oral intakes. Will start ONS BID and moitor    Malnutrition Assessment:  Malnutrition Status:  Insufficient data      Estimated Daily Nutrient Needs:  Energy (kcal):  0823-6721 (MSJ 1855 x 1.2 SF); Weight Used for Energy Requirements:  Current     Protein (g):   (1.0-1.2 g/kg CBW); Weight Used for Protein Requirements:  Ideal        Fluid (ml/day):  5354-8672; Method Used for Fluid Requirements:  1 ml/kcal      Nutrition Related Findings:  A/ox3, +BS, abd distended, trace BLE edema, +I/O +15.7L      Wounds:  None       Current Nutrition Therapies:    ADULT DIET; Regular; Low Sodium (2 gm)  ADULT ORAL NUTRITION SUPPLEMENT; Lunch, Dinner; Fortified Pudding Oral Supplement    Anthropometric Measures:  · Height: 6' 3\" (190.5 cm)  · Current Body Weight: 196 lb (88.9 kg) (Asdmit wt # likely r/t fluid status)   · Usual Body Weight:  (LILIYA d/t lack of EMR hx)     · Ideal Body Weight: 196 lbs; % Ideal Body Weight 100 %   · BMI: 24.5  · Adjusted Body Weight:  ; No Adjustment   · BMI Categories: Normal Weight (BMI 18.5-24. 9)       Nutrition Diagnosis:   · Inadequate oral intake related to catabolic illness (ETOH abuse) as evidenced by intake 51-75%, poor intake prior to admission      Nutrition Interventions:   Food and/or Nutrient Delivery:  Continue Current Diet, Start Oral Nutrition Supplement  Nutrition Education/Counseling:  Education not appropriate   Coordination of Nutrition Care:  Continue to monitor while inpatient    Goals:  Consume >75% meals       Nutrition Monitoring and Evaluation:   Behavioral-Environmental Outcomes:  None Identified   Food/Nutrient Intake Outcomes:  Food and Nutrient Intake,

## 2021-12-06 NOTE — PROGRESS NOTES
PROGRESS NOTE    By Bismark Redmond D.O GI Fellow    [facility]  Dr. Unique Motley MD, Dr. Lawanda Nicholson MD, Dr Finn Madrid, Dr. Pascale Vieyra MD, Dr. Laine Wallace, [facility] Median  40 y.o.  male    SUBJECTIVE:  She is resting comfortably this a.m. with no complaints. Patient denies any GI bleed.       OBJECTIVE:    BP 98/64   Pulse 86   Temp 98.2 °F (36.8 °C) (Oral)   Resp 20   Ht 6' 3\" (1.905 m)   Wt 212 lb (96.2 kg)   SpO2 93%   BMI 26.50 kg/m²     Gen: NAD, AAO x 3  HEENT:PEERL, scleral icterus  Heart: RRR, no M/R/G  Lungs: CTAB  Abd.: soft, NT, ND, BS +, no G/R, no HSM  Extr.: no C/C/E, no bruising patient is visibly jaundiced      Stool (measured) : 100 mL  Lab Results   Component Value Date    WBC 5.3 12/05/2021    WBC 5.3 12/04/2021    WBC 5.7 12/03/2021    HGB 7.1 12/05/2021    HGB 6.7 12/05/2021    HGB 6.6 12/04/2021    HCT 20.3 12/05/2021    MCV 97.4 12/05/2021    RDW 19.4 12/05/2021    PLT 32 12/05/2021    PLT 29 12/04/2021    PLT 29 12/03/2021     Lab Results   Component Value Date     12/05/2021    K 3.8 12/05/2021    K 5.8 11/26/2021     12/05/2021    CO2 16 12/05/2021    BUN 12 12/05/2021    CREATININE 1.0 12/05/2021    CALCIUM 8.5 12/05/2021    PROT 5.2 12/05/2021    LABALBU 3.5 12/05/2021    BILITOT 24.8 12/05/2021    BILITOT 23.4 12/04/2021    BILITOT 24.1 12/03/2021    ALKPHOS 45 12/05/2021    ALKPHOS 49 12/04/2021    ALKPHOS 52 12/03/2021    AST 42 12/05/2021    AST 38 12/04/2021    AST 43 12/03/2021    ALT 20 12/05/2021    ALT 20 12/04/2021    ALT 24 12/03/2021     Lab Results   Component Value Date    LIPASE 68 11/14/2021    LIPASE 26 07/01/2020    LIPASE 26 05/11/2020     Lab Results   Component Value Date    AMYLASE 91 01/16/2019         ASSESSMENT/PLAN:       1. Hepatic encephalopathy type C grade II  Patient has been on 30 g of lactulose 2 times a days with rifaximin 550 mg p.o. 3 times a day as well.    Patient mentation has significantly improved compared to yesterday. Griffin Romo is A&O x3 and is able to answer all question appropriately. Patient does not have asterixis on exam   We will continue to monitor mental status continue with lactulose and rifaximin.   This will be titrated to up to 2-3 soft bowel movement per day. -Supportive care     2. Alcohol induced cirrhosis, decompensated  Meld sodium score 35.    -Diuretics per nephrology  -Obtain paracentesis once INR acceptable range  Patient was educated and advised on the importance of abstain from alcohol completely otherwise he is long-term survival odds significantly decreased.  Patient seem to understand and is agreeable with this plan.       3. Renal failure - resolved   -acute kidney injury 2/2 renal hypoperfusion, unlikely hepatorenal syndrome, improved   -Okay to wean off the vassopressor  from GI point of view       4. Acute on chronic normocytic anemia  -Vital signs stable over signs of GI bleed on exam  -We will did trend down to 6.6 this a.m.  -Mostly a component of dilution with patient with large amount of ascites on exam  -Paracentesis once patient INR stabilized  -Continue with supportive care         Patient INR remains significantly elevated yesterday. Repeat lab this a.m. is pending. However patient with no significant change in mentation patient oriented x3. Continue with  oral vitamin K  for possible underlying nutritional deficiencies due to known alcohol abuse. Continue with supportive care. Patient was counseled about increased risk of death with continued alcohol abuse. Pt was discussed with  Dr. Annalisa Dash DO  GI Fellow   12/6/2021  8:17 AM    Pt seen and independently examined. Pertinent notes and lab work reviewed. .  D/w Dr. Lenny Keller with physical exam and A&P. Discussed with patient- all questions answered - agreeable with the plan as delineated.     Tamy Yan MD  12/6/2021  1:11 PM

## 2021-12-06 NOTE — CONSULTS
[facility]  Hematology/Oncology  Consult      Patient Name: Jason Yee  YOB: 1975  PCP: Jaylyn Estevez DO   Referring Provider:      Reason for Consultation:   Chief Complaint   Patient presents with    Altered Mental Status     HX of liver failure        History of Present Illness:  55-year-old man with recent diagnosis of alcoholic liver cirrhosis recently discharged from the hospital and readmitted with altered mental status, confusion and hepatic encephalopathy. On admission he was noted to be hyponatremic with renal failure. On admission his hemoglobin was 12.4 with a sodium of 124, BUN of 45 and creatinine of 3.9. He was deeply jaundiced with a bilirubin of 23.9. He also had moderate thrombocytopenia on admission with a platelet count of 475    His hemoglobin and platelet count have been steadily dropping since admission and on today's CBC his hemoglobin was down to 6.8 with a platelet count of 34. MCV was 95 with a normal WBC count of 5.5 with unremarkable differential except for mild lymphopenia. His serum creatinine has recovered and is down to 0.9. His AST remains slightly elevated however with a markedly elevated bilirubin of 26.4 and hypoproteinemia with normal albumin  Patient with progressive abdominal distention and will likely require paracentesis  We are consulted for his anemia, thrombocytopenia and coagulopathy.   Most recent PT was 76 seconds with an INR of 8.3  He is on daily subcutaneous vitamin K injection  He had been on Lavon-Synephrine drip but is now on midodrine  He continues on Xifaxan as well as antibiotics with Zosyn  He was also on Sandostatin drip without any overt active bleeding at this time  His B12 and folate levels were normal      Diagnostic Data:     Past Medical History:   Diagnosis Date    Anxiety     Depression     Esophagitis     ETOH abuse     Gastritis     GERD (gastroesophageal reflux disease)     Hematuria     Pancreatitis        Patient Active Problem List    Diagnosis Date Noted    Hepatorenal syndrome ([facility] 11/26/2021    UGIB (upper gastrointestinal bleed) 11/16/2021    Bilirubinemia 11/14/2021    Hyperbilirubinemia     Hypomagnesemia     Paroxysmal atrial fibrillation (HCC)     Severe protein-calorie malnutrition ([facility] 01/18/2019    Hypokalemia     Hematemesis 01/16/2019    Atrial fibrillation with rapid ventricular response ([facility] 01/16/2019    [facility] syndrome [facility] complication [facility] 23/82/7928    Lactic acidosis 01/16/2019    Anxiety     Depression     ETOH abuse     Alcohol-induced acute pancreatitis 05/07/2018        Past Surgical History:   Procedure Laterality Date    KNEE SURGERY      ACL    UPPER GASTROINTESTINAL ENDOSCOPY  05/09/2018    UPPER GASTROINTESTINAL ENDOSCOPY N/A 5/8/2018    EGD BIOPSY performed by Parth Wu MD at [address] [place] [address] Avenue 1/21/2019    EGD ESOPHAGOGASTRODUODENOSCOPY performed by Devang Alcala DO at [address] [place] [address]  1/21/2019    EGD CONTROL HEMORRHAGE performed by Devang Alcala DO at [facility] History  No family history on file. Social History    TOBACCO:   reports that he has never smoked. He has never used smokeless tobacco.  ETOH:   reports current alcohol use. Home Medications  Prior to Admission medications    Medication Sig Start Date End Date Taking?  Authorizing Provider   spironolactone (ALDACTONE) 100 MG tablet Take 1 tablet by mouth daily 11/23/21   Jeremie Borrero DO   furosemide (LASIX) 40 MG tablet Take 1 tablet by mouth daily 11/23/21   Jeremie Borrero DO   lactulose [facility]) 10 GM/15ML solution Take 30 mLs by mouth 3 times daily 11/22/21   Jeremie Borrero DO   potassium chloride (KLOR-CON M) 20 MEQ extended release tablet Take 1 tablet by mouth 3 times daily (with meals) 11/22/21   Jeremie Borrero DO   rifaximin (XIFAXAN) 550 MG tablet Take 1 tablet by mouth 3 times daily 11/22/21   Elane Quails, DO   omeprazole (PRILOSEC) 20 MG delayed release capsule Take 20-40 mg by mouth daily    Historical Provider, MD       Allergies  Allergies   Allergen Reactions    [place] [Macadamia Nut Oil] Swelling       Review of Systems: Relevant for intermittent episodes of confusion, disorientation, deep jaundice, fatigue, easy bruisability and abdominal bloating      Objective  BP 98/64   Pulse 86   Temp 98.2 °F (36.8 °C) (Oral)   Resp 20   Ht 6' 3\" (1.905 m)   Wt 212 lb (96.2 kg)   SpO2 93%   BMI 26.50 kg/m²     Physical Exam:   Performance Status:  General: Alert, resting comfortably, intermittently confused. Head and neck : PERRLA, EOMI . Sclera  icteric. Oropharynx : Clear  Neck: no JVD,  no adenopathy, no carotid bruit  Heart: Regular rate and regular rhythm, no murmur  Lungs: Clear to auscultation   Extremities: No edema,no cyanosis, no clubbing. Abdomen: Soft, non-tender;  Distended with ascites  Scrotal swelling with hematoma  Skin: Icteric with few ecchymosis  Neurologic:Cranial nerves grossly intact. No focal motor or sensory deficits .     Recent Laboratory Data-   Lab Results   Component Value Date    WBC 5.5 12/06/2021    HGB 6.8 (LL) 12/06/2021    HCT 18.9 (L) 12/06/2021    MCV 95.5 12/06/2021    PLT 34 (L) 12/06/2021    LYMPHOPCT 19.5 (L) 12/06/2021    RBC 1.98 (L) 12/06/2021    MCH 34.3 12/06/2021    MCHC 36.0 (H) 12/06/2021    RDW 19.2 (H) 12/06/2021    NEUTOPHILPCT 66.4 12/06/2021    MONOPCT 7.1 12/06/2021    BASOPCT 0.9 12/06/2021    NEUTROABS 3.80 12/06/2021    LYMPHSABS 1.10 (L) 12/06/2021    MONOSABS 0.38 12/06/2021    EOSABS 0.19 12/06/2021    BASOSABS 0.05 12/06/2021       Lab Results   Component Value Date     12/06/2021    K 4.1 12/06/2021     (H) 12/06/2021    CO2 16 (L) 12/06/2021    BUN 12 12/06/2021    CREATININE 0.9 12/06/2021    GLUCOSE 83 12/06/2021    CALCIUM 8.7 12/06/2021    PROT 5.6 (L) 12/06/2021    LABALBU 3.9 12/06/2021    BILITOT 26.4 (H) 12/06/2021    ALKPHOS 57 12/06/2021    AST 45 (H) 12/06/2021    ALT 20 12/06/2021    LABGLOM >60 12/06/2021    GFRAA >60 12/06/2021       Lab Results   Component Value Date    IRON 132 05/08/2018    TIBC 149 (L) 05/08/2018    FERRITIN 1,815 05/08/2018           Radiology-    US GALLBLADDER RUQ    Result Date: 11/28/2021  EXAMINATION: RIGHT UPPER QUADRANT ULTRASOUND 11/27/2021 5:24 pm COMPARISON: 11/14/2021 HISTORY: ORDERING SYSTEM PROVIDED HISTORY: decomp cirrhosis, CBD measurement TECHNOLOGIST PROVIDED HISTORY: Reason for exam:->decomp cirrhosis, CBD measurement What reading provider will be dictating this exam?->CRC FINDINGS: LIVER:  Nodular contour of the liver consistent with cirrhosis. The liver is somewhat heterogeneous but without obvious focal abnormality. There is apparent hepatofugal flow again identified within the main portal vein. BILIARY SYSTEM:  Gallbladder is incompletely distended with diffuse wall thickening. No sonographic Carrasco's sign. The wall thickening is nonspecific but could be reactive to ascites and hepatic parenchymal disease. No obvious cholelithiasis. Common bile duct is within normal limits measuring 6 mm. RIGHT KIDNEY: The right kidney is grossly unremarkable without evidence of hydronephrosis. PANCREAS:  Visualized portions of the pancreas are unremarkable. OTHER: Moderate ascites. Findings again consistent with cirrhotic liver. Ascites. Hepatofugal flow redemonstrated in the portal vein. Thickened gallbladder wall which may be reactive to the ascites and hepatic disease. XR CHEST PORTABLE    Result Date: 11/26/2021  EXAMINATION: ONE XRAY VIEW OF THE CHEST 11/26/2021 7:15 am COMPARISON: One-view chest x-ray 11/14/2021 HISTORY: ORDERING SYSTEM PROVIDED HISTORY: SOB TECHNOLOGIST PROVIDED HISTORY: Reason for exam:->SOB FINDINGS: The cardiac silhouette is within normal limits.   The mediastinal contours are within normal limits. The lungs are hypoinflated with resulting bronchovascular crowding. Mild basilar opacities appear new/increased. No significant pleural effusions or pneumothorax. Osseous degenerative changes are present. EKG leads overlie the chest.     Mild basilar opacities are suspicious for pneumonia. This appearance can be seen with COVID-19. XR CHEST 1 VIEW    Result Date: 11/14/2021  EXAMINATION: ONE XRAY VIEW OF THE CHEST 11/14/2021 10:50 pm COMPARISON: Chest series from July 1, 2020 HISTORY: ORDERING SYSTEM PROVIDED HISTORY: Rule out acute pulmonary pathology/infection TECHNOLOGIST PROVIDED HISTORY: Reason for exam:->Rule out acute pulmonary pathology/infection FINDINGS: Elevation of the right hemidiaphragm. Possible right lower lung ill-defined opacity. Cannot exclude small pleural effusion on the right. Left lung is clear. No pneumothorax. Cardiomediastinal silhouette and pulmonary vascularity appear within normal limits. Osseous and thoracic soft tissue structures demonstrate no acute findings. Elevation of the right hemidiaphragm and possible right lower lung opacity. Small right pleural effusion may be present. RECOMMENDATION: (Recommend upright PA and lateral chest radiographs 6-8 weeks after resolution of patient's symptoms to ensure complete resolution of radiographic findings.)     US ABDOMEN LIMITED    Result Date: 11/26/2021  EXAMINATION: RIGHT UPPER QUADRANT ULTRASOUND 11/26/2021 5:54 pm COMPARISON: None. HISTORY: ORDERING SYSTEM PROVIDED HISTORY: ascites/JULIO TECHNOLOGIST PROVIDED HISTORY: Reason for exam:->ascites/JULIO What reading provider will be dictating this exam?->CRC FINDINGS: Superficial sonographic evaluation of the 4 quadrants of the abdomen performed using a combination of grayscale technique. Imaging done to assess for the presence of intra-abdominal ascites. Patient was laying on their left-side.  Moderate volume simple appearing intra-abdominal ascites in the left abdomen. Moderate volume intra-abdominal ascites. US ABDOMEN LIMITED Specify organ? LIVER, GALLBLADDER, PANCREAS    Result Date: 11/14/2021  EXAMINATION: RIGHT UPPER QUADRANT ULTRASOUND 11/14/2021 10:20 pm COMPARISON: 01/18/2019 HISTORY: ORDERING SYSTEM PROVIDED HISTORY: Jaundice TECHNOLOGIST PROVIDED HISTORY: Reason for exam:->Jaundice Specify organ?->LIVER Specify organ?->GALLBLADDER Specify organ?->PANCREAS What reading provider will be dictating this exam?->CRC FINDINGS: LIVER:  The liver appears cirrhotic. No focal liver lesions seen. There is no intrahepatic biliary dilatation. Doppler evaluation of the liver demonstrates reversal flow within the portal vein. This is a new finding as compared to 2019 BILIARY SYSTEM:  The gallbladder is very distended. There is thickening of the gallbladder wall measuring up to 11 mm. No cholelithiasis seen. There is pericholecystic fluid which may relate to ascites rather than acute inflammatory fluid. Common bile duct is within normal limits measuring 3.6 mm. RIGHT KIDNEY: The right kidney is grossly unremarkable without evidence of hydronephrosis. PANCREAS:  Limited visualization due to bowel gas. OTHER: Moderate ascites. 1. Cirrhotic liver with moderate ascites. 2. Suspected hepatofugal flow in the portal vein. 3. Distended gallbladder with marked wall thickening. No cholelithiasis seen. There is pericholecystic fluid although this probably represents ascites rather than inflammatory fluid. IR US GUIDED PARACENTESIS    Result Date: 11/15/2021  PROCEDURE: PARACENTESIS WITHOUT IMAGE GUIDANCE US ABDOMEN LIMITED 11/15/2021 HISTORY: ORDERING SYSTEM PROVIDED HISTORY: Rule out SBP TECHNOLOGIST PROVIDED HISTORY: Reason for exam:->Rule out SBP TECHNIQUE: Informed consent was obtained after a detailed explanation of the procedure including risks, benefits, and alternatives. Universal protocol was followed.   A limited ultrasound of the abdomen was check iron studies and reticulocyte count. Coagulopathy with markedly elevated PT/INR related to severe hepatocellular disease and liver failure  Has been on daily vitamin K subcu but will require 2 units of FFP today and they need to be repeated tomorrow in anticipation of potential paracentesis  In view of scrotal hematoma and platelet dysfunction due to prior uremia will also transfuse with 1 dose of platelets  We will follow      Kieran Barbosa M.D., F.A.C.P.   Electronically signed 12/6/2021 at 1:34 PM

## 2021-12-06 NOTE — PROGRESS NOTES
Progress Note  12/6/2021 10:21 AM  Subjective:   Admit Date: 11/26/2021  PCP: Gudelia Aguirre, DO  Interval History:    Awake and alert. He is awaiting labs repeated to see if he will have OR today. Diet: ADULT DIET; Regular; Low Sodium (2 gm)    Data:   Scheduled Meds:   potassium & sodium phosphates  1 packet Oral TID    sodium bicarbonate  650 mg Oral BID    phytonadione  10 mg Oral Daily    vitamin D3  5,000 Units Oral Daily    insulin lispro  0-6 Units SubCUTAneous TID WC    albumin human  25 g IntraVENous Q8H    pantoprazole  40 mg IntraVENous Daily    And    sodium chloride (PF)  10 mL IntraVENous Daily    midodrine  15 mg Oral TID WC    thiamine (VITAMIN B1) IVPB  100 mg IntraVENous Daily    rifaximin  550 mg Oral BID    sodium chloride flush  5-40 mL IntraVENous 2 times per day    piperacillin-tazobactam  3,375 mg IntraVENous Q8H     Continuous Infusions:   sodium chloride      sodium chloride      sodium chloride      sodium chloride      sodium chloride      dextrose      sodium chloride      sodium chloride 12.5 mL/hr at 12/06/21 0639     PRN Meds:sodium chloride, acetaminophen **OR** [DISCONTINUED] acetaminophen, traMADol, sodium chloride, lidocaine, melatonin, sodium chloride, sodium chloride, sodium chloride, LORazepam **OR** LORazepam **OR** [DISCONTINUED] LORazepam **OR** [DISCONTINUED] LORazepam **OR** [DISCONTINUED] LORazepam **OR** [DISCONTINUED] LORazepam **OR** [DISCONTINUED] LORazepam **OR** [DISCONTINUED] LORazepam, glucose, dextrose, glucagon (rDNA), dextrose, sodium chloride flush, sodium chloride, polyethylene glycol, prochlorperazine  I/O last 3 completed shifts: In: 1948 [P.O.:1450; I.V.:150; Blood:700; IV Piggyback:750]  Out: 8130 [Urine:1750; Stool:100]  No intake/output data recorded.     Intake/Output Summary (Last 24 hours) at 12/6/2021 1021  Last data filed at 12/6/2021 0700  Gross per 24 hour   Intake 1400 ml   Output 1400 ml   Net 0 ml     CBC: Recent Labs     12/04/21 0450 12/05/21  0505 12/05/21  0830   WBC 5.3 5.3  --    HGB 6.6* 6.7* 7.1*   PLT 29* 32*  --      BMP:    Recent Labs     12/04/21  0450 12/05/21  0505    136   K 4.1 3.8    108*   CO2 18* 16*   BUN 11 12   CREATININE 0.9 1.0   GLUCOSE 120* 117*     Hepatic:   Recent Labs     12/04/21  0450 12/05/21  0505   AST 38 42*   ALT 20 20   BILITOT 23.4* 24.8*   ALKPHOS 49 45     Troponin: No results for input(s): TROPONINI in the last 72 hours. BNP: No results for input(s): BNP in the last 72 hours. Lipids: No results for input(s): CHOL, HDL in the last 72 hours. Invalid input(s): LDLCALCU  ABGs: No results found for: PHART, PO2ART, XMD2PHJ  INR:   Recent Labs     12/04/21  0515 12/05/21  0830   INR 6.6* 8.3*       -----------------------------------------------------------------  RAD: US GALLBLADDER RUQ    Result Date: 11/28/2021  EXAMINATION: RIGHT UPPER QUADRANT ULTRASOUND 11/27/2021 5:24 pm COMPARISON: 11/14/2021 HISTORY: ORDERING SYSTEM PROVIDED HISTORY: decomp cirrhosis, CBD measurement TECHNOLOGIST PROVIDED HISTORY: Reason for exam:->decomp cirrhosis, CBD measurement What reading provider will be dictating this exam?->CRC FINDINGS: LIVER:  Nodular contour of the liver consistent with cirrhosis. The liver is somewhat heterogeneous but without obvious focal abnormality. There is apparent hepatofugal flow again identified within the main portal vein. BILIARY SYSTEM:  Gallbladder is incompletely distended with diffuse wall thickening. No sonographic Carrasco's sign. The wall thickening is nonspecific but could be reactive to ascites and hepatic parenchymal disease. No obvious cholelithiasis. Common bile duct is within normal limits measuring 6 mm. RIGHT KIDNEY: The right kidney is grossly unremarkable without evidence of hydronephrosis. PANCREAS:  Visualized portions of the pancreas are unremarkable. OTHER: Moderate ascites.      Findings again consistent with cirrhotic liver. Ascites. Hepatofugal flow redemonstrated in the portal vein. Thickened gallbladder wall which may be reactive to the ascites and hepatic disease. XR CHEST PORTABLE    Result Date: 11/26/2021  EXAMINATION: ONE XRAY VIEW OF THE CHEST 11/26/2021 7:15 am COMPARISON: One-view chest x-ray 11/14/2021 HISTORY: ORDERING SYSTEM PROVIDED HISTORY: SOB TECHNOLOGIST PROVIDED HISTORY: Reason for exam:->SOB FINDINGS: The cardiac silhouette is within normal limits. The mediastinal contours are within normal limits. The lungs are hypoinflated with resulting bronchovascular crowding. Mild basilar opacities appear new/increased. No significant pleural effusions or pneumothorax. Osseous degenerative changes are present. EKG leads overlie the chest.     Mild basilar opacities are suspicious for pneumonia. This appearance can be seen with COVID-19.     US ABDOMEN LIMITED    Result Date: 11/26/2021  EXAMINATION: RIGHT UPPER QUADRANT ULTRASOUND 11/26/2021 5:54 pm COMPARISON: None. HISTORY: ORDERING SYSTEM PROVIDED HISTORY: ascites/JULIO TECHNOLOGIST PROVIDED HISTORY: Reason for exam:->ascites/JULIO What reading provider will be dictating this exam?->CRC FINDINGS: Superficial sonographic evaluation of the 4 quadrants of the abdomen performed using a combination of grayscale technique. Imaging done to assess for the presence of intra-abdominal ascites. Patient was laying on their left-side. Moderate volume simple appearing intra-abdominal ascites in the left abdomen. Moderate volume intra-abdominal ascites. Objective:   Vitals: BP 98/64   Pulse 86   Temp 98.2 °F (36.8 °C) (Oral)   Resp 20   Ht 6' 3\" (1.905 m)   Wt 212 lb (96.2 kg)   SpO2 93%   BMI 26.50 kg/m²   General appearance: appears stated age   Skin:  Jaundice   HEENT: Head: Normocephalic, no lesions, without obvious abnormality.  Sclarea  jaundice   Neck: no adenopathy, no carotid bruit, no JVD, supple, symmetrical, trachea midline and thyroid not enlarged, symmetric, no tenderness/mass/nodules  Lungs: clear to auscultation bilaterally  Heart: regular rate and rhythm, S1, S2 normal, no murmur, click, rub or gallop  Abdomen: Distended , ascites ++  Extremities: extremities normal, atraumatic, no cyanosis or edema  Neurologic: Mental status: Alert, oriented, thought content appropriate    Assessment:   Patient Active Problem List:     Alcohol-induced acute pancreatitis     Anxiety     Depression     ETOH abuse     Hematemesis     Atrial fibrillation with rapid ventricular response (HCC)     Alcohol withdrawal syndrome without complication (HCC)     Lactic acidosis     Hypokalemia     Severe protein-calorie malnutrition (HCC)     Paroxysmal atrial fibrillation (HCC)     Hyperbilirubinemia     Hypomagnesemia     Bilirubinemia     UGIB (upper gastrointestinal bleed)     Hepatorenal syndrome (HCC)    Plan:   Assessment: / Plan:     1.  Acute kidney injury.  Acute kidney injury secondary to renal hypoperfusion with possible underlying hepatorenal syndrome.  Urine output has improved , JULIO  Now Resolved      2.  Metabolic acidosis.  Patient with non-anion gap metabolic acidosis which may be reflection of diarrhea as well as acute kidney injury.   12/3/21: started oral bicarb. Will up titrate to 1300 mg po bid and follow      3.   Hyponatremia.  Patient probably with hepatic failure associated hyponatremia along with use of spironolactone contributing to hyponatremia.      4.   Hypokalemia.  Supplement potassium and magnesium as needed.      5.  Hypocalcemia.   ionized calcium 1.12 / Vit D 14 / PTH 60 - Started oral Vit D on 11/29/21     6.  Hypotension.  Follow on pressor support and SPA.       7. Hypophosphatemia.  Supplement orally as needed for goal 2.5     8. Coagulopathy     EILEEN Rondon - CNP     Patient seen and examined. Chart reviewed. I had a face to face encounter with the patient.    Agree with exam.    Agree with  formulation, assessment and plan as outlined above and directed by me. Addition and corrections were done as deemed appropriate. My exam and plan include:     Continue current treatment.       Milena Nunn MD  Nephrology        Electronically signed by Milena Nunn MD on 12/6/2021 at 4:44 PM

## 2021-12-07 NOTE — PROGRESS NOTES
Physical Therapy        [id number]    Patient unavailable for physical therapy treatment due to patient not being appropriate today as he is currently receiving platelets and feels tired. Will attempt session at later time/date.      Deana Faust, PTA  #[id number]

## 2021-12-07 NOTE — PROGRESS NOTES
[facility]  Hematology/Oncology  Consult      Patient Name: Delmar Llanes  YOB: 1975  PCP: Ines Zamora DO   Referring Provider:      Reason for Consultation:   Chief Complaint   Patient presents with    Altered Mental Status     HX of liver failure        Subjective:  Conversive appropriately. Abdominal distention. Minimal pain. No overt bleeding    History of Present Illness:  50-year-old man with recent diagnosis of alcoholic liver cirrhosis recently discharged from the hospital and readmitted with altered mental status, confusion and hepatic encephalopathy. On admission he was noted to be hyponatremic with renal failure. On admission his hemoglobin was 12.4 with a sodium of 124, BUN of 45 and creatinine of 3.9. He was deeply jaundiced with a bilirubin of 23.9. He also had moderate thrombocytopenia on admission with a platelet count of 380    His hemoglobin and platelet count have been steadily dropping since admission and on today's CBC his hemoglobin was down to 6.8 with a platelet count of 34. MCV was 95 with a normal WBC count of 5.5 with unremarkable differential except for mild lymphopenia. His serum creatinine has recovered and is down to 0.9. His AST remains slightly elevated however with a markedly elevated bilirubin of 26.4 and hypoproteinemia with normal albumin  Patient with progressive abdominal distention and will likely require paracentesis  We are consulted for his anemia, thrombocytopenia and coagulopathy.   Most recent PT was 76 seconds with an INR of 8.3  He is on daily subcutaneous vitamin K injection  He had been on Lavon-Synephrine drip but is now on midodrine  He continues on Xifaxan as well as antibiotics with Zosyn  He was also on Sandostatin drip without any overt active bleeding at this time  His B12 and folate levels were normal      Diagnostic Data:     Past Medical History:   Diagnosis Date    Anxiety     Depression     Esophagitis     ETOH abuse     Gastritis     GERD (gastroesophageal reflux disease)     Hematuria     Pancreatitis        Patient Active Problem List    Diagnosis Date Noted    Hepatorenal syndrome ([facility] [address] 11/26/2021    UGIB (upper gastrointestinal bleed) 11/16/2021    Bilirubinemia 11/14/2021    Hyperbilirubinemia     Hypomagnesemia     Paroxysmal atrial fibrillation (HCC)     Severe protein-calorie malnutrition ([facility] [address] 01/18/2019    Hypokalemia     Hematemesis 01/16/2019    Atrial fibrillation with rapid ventricular response ([facility] [address] 01/16/2019    Alcohol withdrawal syndrome without complication ([facility] [address] 34/69/5514    Lactic acidosis 01/16/2019    Anxiety     Depression     ETOH abuse     Alcohol-induced acute pancreatitis 05/07/2018        Past Surgical History:   Procedure Laterality Date    KNEE SURGERY      ACL    UPPER GASTROINTESTINAL ENDOSCOPY  05/09/2018    UPPER GASTROINTESTINAL ENDOSCOPY N/A 5/8/2018    EGD BIOPSY performed by Ev Saravia MD at [address] 1/21/2019    EGD ESOPHAGOGASTRODUODENOSCOPY performed by Nneka Ellis DO at [address]  1/21/2019    EGD CONTROL HEMORRHAGE performed by Nneka Ellis DO at [address] [facility] History  No family history on file. Social History    TOBACCO:   reports that he has never smoked. He has never used smokeless tobacco.  ETOH:   reports current alcohol use. Home Medications  Prior to Admission medications    Medication Sig Start Date End Date Taking?  Authorizing Provider   spironolactone (ALDACTONE) 100 MG tablet Take 1 tablet by mouth daily 11/23/21   Sasha Smith DO   furosemide (LASIX) 40 MG tablet Take 1 tablet by mouth daily 11/23/21   Sasha Smith DO   lactulose [facility]) 10 GM/15ML solution Take 30 mLs by mouth 3 times daily 11/22/21   Sasha Smith DO   potassium chloride (KLOR-CON M) 20 MEQ extended release tablet Take 1 tablet by mouth 3 times daily (with meals) 11/22/21   Carnella Gitelman, DO   rifaximin (XIFAXAN) 550 MG tablet Take 1 tablet by mouth 3 times daily 11/22/21   Carnella Gitelman, DO   omeprazole (PRILOSEC) 20 MG delayed release capsule Take 20-40 mg by mouth daily    Historical Provider, MD       Allergies  Allergies   Allergen Reactions    Streetman [Macadamia Nut Oil] Swelling       Review of Systems: Relevant for intermittent episodes of confusion, disorientation, deep jaundice, fatigue, easy bruisability and abdominal bloating      Objective  /67   Pulse 79   Temp 98.5 °F (36.9 °C) (Oral)   Resp 16   Ht 6' 3\" (1.905 m)   Wt 212 lb (96.2 kg)   SpO2 95%   BMI 26.50 kg/m²     Physical Exam:   Performance Status:  General: Alert, resting comfortably, intermittently confused. Head and neck : PERRLA, EOMI . Sclera  icteric. Oropharynx : Clear  Neck: no JVD,  no adenopathy, no carotid bruit  Heart: Regular rate and regular rhythm, no murmur  Lungs: Clear to auscultation   Extremities: No edema,no cyanosis, no clubbing. Abdomen: Soft, non-tender;  Distended with ascites  Scrotal swelling with hematoma  Skin: Icteric with few ecchymosis  Neurologic:Cranial nerves grossly intact. No focal motor or sensory deficits .     Recent Laboratory Data-   Lab Results   Component Value Date    WBC 6.0 12/07/2021    HGB 6.7 (LL) 12/07/2021    HCT 18.9 (L) 12/07/2021    MCV 91.7 12/07/2021    PLT 39 (L) 12/07/2021    LYMPHOPCT 14.8 (L) 12/07/2021    RBC 2.06 (L) 12/07/2021    MCH 32.5 12/07/2021    MCHC 35.4 (H) 12/07/2021    RDW 21.0 (H) 12/07/2021    NEUTOPHILPCT 72.2 12/07/2021    MONOPCT 3.5 12/07/2021    BASOPCT 2.6 (H) 12/07/2021    NEUTROABS 4.32 12/07/2021    LYMPHSABS 0.90 (L) 12/07/2021    MONOSABS 0.24 12/07/2021    EOSABS 0.42 12/07/2021    BASOSABS 0.16 12/07/2021       Lab Results   Component Value Date     12/07/2021    K 3.9 12/07/2021     (H) 12/07/2021    CO2 16 (L) 12/07/2021    BUN 12 12/07/2021 FINDINGS: The cardiac silhouette is within normal limits. The mediastinal contours are within normal limits. The lungs are hypoinflated with resulting bronchovascular crowding. Mild basilar opacities appear new/increased. No significant pleural effusions or pneumothorax. Osseous degenerative changes are present. EKG leads overlie the chest.     Mild basilar opacities are suspicious for pneumonia. This appearance can be seen with COVID-19. XR CHEST 1 VIEW    Result Date: 11/14/2021  EXAMINATION: ONE XRAY VIEW OF THE CHEST 11/14/2021 10:50 pm COMPARISON: Chest series from July 1, 2020 HISTORY: ORDERING SYSTEM PROVIDED HISTORY: Rule out acute pulmonary pathology/infection TECHNOLOGIST PROVIDED HISTORY: Reason for exam:->Rule out acute pulmonary pathology/infection FINDINGS: Elevation of the right hemidiaphragm. Possible right lower lung ill-defined opacity. Cannot exclude small pleural effusion on the right. Left lung is clear. No pneumothorax. Cardiomediastinal silhouette and pulmonary vascularity appear within normal limits. Osseous and thoracic soft tissue structures demonstrate no acute findings. Elevation of the right hemidiaphragm and possible right lower lung opacity. Small right pleural effusion may be present. RECOMMENDATION: (Recommend upright PA and lateral chest radiographs 6-8 weeks after resolution of patient's symptoms to ensure complete resolution of radiographic findings.)     US ABDOMEN LIMITED    Result Date: 11/26/2021  EXAMINATION: RIGHT UPPER QUADRANT ULTRASOUND 11/26/2021 5:54 pm COMPARISON: None. HISTORY: ORDERING SYSTEM PROVIDED HISTORY: ascites/JULIO TECHNOLOGIST PROVIDED HISTORY: Reason for exam:->ascites/JULIO What reading provider will be dictating this exam?->CRC FINDINGS: Superficial sonographic evaluation of the 4 quadrants of the abdomen performed using a combination of grayscale technique. Imaging done to assess for the presence of intra-abdominal ascites.  Patient was laying on their left-side. Moderate volume simple appearing intra-abdominal ascites in the left abdomen. Moderate volume intra-abdominal ascites. US ABDOMEN LIMITED Specify organ? LIVER, GALLBLADDER, PANCREAS    Result Date: 11/14/2021  EXAMINATION: RIGHT UPPER QUADRANT ULTRASOUND 11/14/2021 10:20 pm COMPARISON: 01/18/2019 HISTORY: ORDERING SYSTEM PROVIDED HISTORY: Jaundice TECHNOLOGIST PROVIDED HISTORY: Reason for exam:->Jaundice Specify organ?->LIVER Specify organ?->GALLBLADDER Specify organ?->PANCREAS What reading provider will be dictating this exam?->CRC FINDINGS: LIVER:  The liver appears cirrhotic. No focal liver lesions seen. There is no intrahepatic biliary dilatation. Doppler evaluation of the liver demonstrates reversal flow within the portal vein. This is a new finding as compared to 2019 BILIARY SYSTEM:  The gallbladder is very distended. There is thickening of the gallbladder wall measuring up to 11 mm. No cholelithiasis seen. There is pericholecystic fluid which may relate to ascites rather than acute inflammatory fluid. Common bile duct is within normal limits measuring 3.6 mm. RIGHT KIDNEY: The right kidney is grossly unremarkable without evidence of hydronephrosis. PANCREAS:  Limited visualization due to bowel gas. OTHER: Moderate ascites. 1. Cirrhotic liver with moderate ascites. 2. Suspected hepatofugal flow in the portal vein. 3. Distended gallbladder with marked wall thickening. No cholelithiasis seen. There is pericholecystic fluid although this probably represents ascites rather than inflammatory fluid.      IR US GUIDED PARACENTESIS    Result Date: 11/15/2021  PROCEDURE: PARACENTESIS WITHOUT IMAGE GUIDANCE US ABDOMEN LIMITED 11/15/2021 HISTORY: ORDERING SYSTEM PROVIDED HISTORY: Rule out SBP TECHNOLOGIST PROVIDED HISTORY: Reason for exam:->Rule out SBP TECHNIQUE: Informed consent was obtained after a detailed explanation of the procedure including risks, benefits, and alternatives. Universal protocol was followed. A limited ultrasound of the abdomen was performed. The right abdomen was prepped and draped in sterile fashion and local anesthesia was achieved with lidocaine. An 8 Chinese needle sheath was advanced into ascites and paracentesis was performed. The patient tolerated the procedure well. FINDINGS: Limited ultrasound of the abdomen demonstrates ascites. A total of 6400 cc was removed. Successful paracentesis. US DUP ABD PEL RETRO SCROT COMPLETE    Result Date: 11/14/2021  EXAMINATION: DOPPLER EVALUATION OF THE PELVIS 11/14/2021 10:20 pm COMPARISON: None. HISTORY: ORDERING SYSTEM PROVIDED HISTORY: Rule out PVT TECHNOLOGIST PROVIDED HISTORY: Reason for exam:->Rule out PVT What reading provider will be dictating this exam?->CRC FINDINGS: Color flow and spectral waveform evaluation of hepatic vasculature performed. The visualized portal vein appears patent. The there is no evidence for portal venous thrombus. There is, however, apparent reversal flow within the portal vein. Appropriate hepatic arterial flow demonstrated. Hepatic veins are not image. There is a cirrhotic appearing liver with moderate ascites. There is gallbladder distension with wall thickening. Doppler evaluation demonstrates hepatofugal portal venous flow. No evidence for portal venous thrombus. Moderate ascites. Gallbladder distension with wall thickening. ASSESSMENT/PLAN :   [id number] man  Alcoholic liver cirrhosis  Hepatorenal syndrome with return of renal function to normal  Hepatic encephalopathy resolved  Hyponatremia resolved  Severe jaundice with elevated bilirubin.   Ultrasound of abdomen shows nodular contour of the liver with thickening of the gallbladder wall with flow demonstrated in the portal vein and moderate ascites    Severe anemia and thrombocytopenia multifactorial related to myelosuppression by alcohol with a component of hemodilution as well as peripheral sequestration by portal hypertension and hypersplenism  There is no overt GI bleed  To check iron studies and reticulocyte count. Coagulopathy with markedly elevated PT/INR related to severe hepatocellular disease and liver failure  Has been on daily vitamin K subcu but will require 2 units of FFP today and they need to be repeated tomorrow in anticipation of potential paracentesis  In view of scrotal hematoma and platelet dysfunction due to prior uremia will also transfuse with 1 dose of platelets    20/8/80  - CBC with Hgb 6.7. Platelets 39. S/p 3 pRBC and 2 FFP  - INR still 5. On Vit K. Additional FFP may be needed to get INR to safe range for paracentesis. Repeat INR pending  - 1 unit platelets pending  - Bili stable at 24.  Cytopenias due to both myelosuppression from alcohol consumption and liver cirrhosis/hypersplenism  - Will continue to follow    Allyn Spence MD  Electronically signed 12/7/2021 at 12:49 PM

## 2021-12-07 NOTE — PROGRESS NOTES
Patient is resting comfortably in bed. Bed in lowest, rails engaged, call light 
on lap.

Vital Signs within normal limits. WCTM. Progress Note  12/7/2021 10:32 AM  Subjective:   Admit Date: 11/26/2021  PCP: Ines Zamora DO  Interval History:    Awake and alert. Had FFP hopeful for paracentesis today      Diet: ADULT DIET; Regular; Low Sodium (2 gm)  ADULT ORAL NUTRITION SUPPLEMENT; Lunch, Dinner; Fortified Pudding Oral Supplement    Data:   Scheduled Meds:   sodium bicarbonate  1,300 mg Oral BID    [Held by provider] phytonadione  10 mg Oral Daily    vitamin D3  5,000 Units Oral Daily    insulin lispro  0-6 Units SubCUTAneous TID WC    pantoprazole  40 mg IntraVENous Daily    And    sodium chloride (PF)  10 mL IntraVENous Daily    midodrine  15 mg Oral TID WC    thiamine (VITAMIN B1) IVPB  100 mg IntraVENous Daily    rifaximin  550 mg Oral BID    sodium chloride flush  5-40 mL IntraVENous 2 times per day    piperacillin-tazobactam  3,375 mg IntraVENous Q8H     Continuous Infusions:   sodium chloride      sodium chloride      sodium chloride      sodium chloride      sodium chloride      dextrose      sodium chloride      sodium chloride Stopped (12/07/21 0800)     PRN Meds:sodium chloride, sodium chloride, sodium chloride, sodium chloride, sodium chloride, acetaminophen **OR** [DISCONTINUED] acetaminophen, traMADol, lidocaine, melatonin, LORazepam **OR** LORazepam **OR** [DISCONTINUED] LORazepam **OR** [DISCONTINUED] LORazepam **OR** [DISCONTINUED] LORazepam **OR** [DISCONTINUED] LORazepam **OR** [DISCONTINUED] LORazepam **OR** [DISCONTINUED] LORazepam, glucose, dextrose, glucagon (rDNA), dextrose, sodium chloride flush, sodium chloride, polyethylene glycol, prochlorperazine  I/O last 3 completed shifts:   In: 1625 [Blood:1625]  Out: 240 [Urine:240]  I/O this shift:  In: -   Out: 650 [Urine:650]    Intake/Output Summary (Last 24 hours) at 12/7/2021 1032  Last data filed at 12/7/2021 0800  Gross per 24 hour   Intake 1625 ml   Output 890 ml   Net 735 ml     CBC:   Recent Labs     12/05/21  0505 12/05/21  0830 12/06/21  0957 12/06/21  2145 12/07/21  0530   WBC 5.3  --  5.5  --  6.0   HGB 6.7*   < > 6.8* 6.4* 6.7*   PLT 32*  --  34*  --  39*    < > = values in this interval not displayed. BMP:    Recent Labs     12/05/21  0505 12/06/21  0957 12/07/21  0530    142 139   K 3.8 4.1 3.9   * 111* 110*   CO2 16* 16* 16*   BUN 12 12 12   CREATININE 1.0 0.9 0.9   GLUCOSE 117* 83 93     Hepatic:   Recent Labs     12/05/21  0505 12/06/21  0957 12/07/21  0530   AST 42* 45* 41*   ALT 20 20 18   BILITOT 24.8* 26.4* 24.2*   ALKPHOS 45 57 46     Troponin: No results for input(s): TROPONINI in the last 72 hours. BNP: No results for input(s): BNP in the last 72 hours. Lipids: No results for input(s): CHOL, HDL in the last 72 hours. Invalid input(s): LDLCALCU  ABGs: No results found for: PHART, PO2ART, UQJ8HOG  INR:   Recent Labs     12/05/21  0830 12/07/21  0530   INR 8.3* 5.0       -----------------------------------------------------------------  RAD: US GALLBLADDER RUQ    Result Date: 11/28/2021  EXAMINATION: RIGHT UPPER QUADRANT ULTRASOUND 11/27/2021 5:24 pm COMPARISON: 11/14/2021 HISTORY: ORDERING SYSTEM PROVIDED HISTORY: decomp cirrhosis, CBD measurement TECHNOLOGIST PROVIDED HISTORY: Reason for exam:->decomp cirrhosis, CBD measurement What reading provider will be dictating this exam?->CRC FINDINGS: LIVER:  Nodular contour of the liver consistent with cirrhosis. The liver is somewhat heterogeneous but without obvious focal abnormality. There is apparent hepatofugal flow again identified within the main portal vein. BILIARY SYSTEM:  Gallbladder is incompletely distended with diffuse wall thickening. No sonographic Carrasco's sign. The wall thickening is nonspecific but could be reactive to ascites and hepatic parenchymal disease. No obvious cholelithiasis. Common bile duct is within normal limits measuring 6 mm. RIGHT KIDNEY: The right kidney is grossly unremarkable without evidence of hydronephrosis.  PANCREAS: Visualized portions of the pancreas are unremarkable. OTHER: Moderate ascites. Findings again consistent with cirrhotic liver. Ascites. Hepatofugal flow redemonstrated in the portal vein. Thickened gallbladder wall which may be reactive to the ascites and hepatic disease. XR CHEST PORTABLE    Result Date: 11/26/2021  EXAMINATION: ONE XRAY VIEW OF THE CHEST 11/26/2021 7:15 am COMPARISON: One-view chest x-ray 11/14/2021 HISTORY: ORDERING SYSTEM PROVIDED HISTORY: SOB TECHNOLOGIST PROVIDED HISTORY: Reason for exam:->SOB FINDINGS: The cardiac silhouette is within normal limits. The mediastinal contours are within normal limits. The lungs are hypoinflated with resulting bronchovascular crowding. Mild basilar opacities appear new/increased. No significant pleural effusions or pneumothorax. Osseous degenerative changes are present. EKG leads overlie the chest.     Mild basilar opacities are suspicious for pneumonia. This appearance can be seen with COVID-19.     US ABDOMEN LIMITED    Result Date: 11/26/2021  EXAMINATION: RIGHT UPPER QUADRANT ULTRASOUND 11/26/2021 5:54 pm COMPARISON: None. HISTORY: ORDERING SYSTEM PROVIDED HISTORY: ascites/JULIO TECHNOLOGIST PROVIDED HISTORY: Reason for exam:->ascites/JULIO What reading provider will be dictating this exam?->CRC FINDINGS: Superficial sonographic evaluation of the 4 quadrants of the abdomen performed using a combination of grayscale technique. Imaging done to assess for the presence of intra-abdominal ascites. Patient was laying on their left-side. Moderate volume simple appearing intra-abdominal ascites in the left abdomen. Moderate volume intra-abdominal ascites. Objective:   Vitals: /67   Pulse 79   Temp 98.5 °F (36.9 °C) (Oral)   Resp 16   Ht 6' 3\" (1.905 m)   Wt 212 lb (96.2 kg)   SpO2 95%   BMI 26.50 kg/m²   General appearance: appears stated age   Skin:  Jaundice   HEENT: Head: Normocephalic, no lesions, without obvious abnormality. Sclarea  jaundice   Neck: no adenopathy, no carotid bruit, no JVD, supple, symmetrical, trachea midline and thyroid not enlarged, symmetric, no tenderness/mass/nodules  Lungs: clear to auscultation bilaterally  Heart: regular rate and rhythm, S1, S2 normal, no murmur, click, rub or gallop  Abdomen: Distended , ascites ++  Extremities: extremities normal, atraumatic, no cyanosis or edema  Neurologic: Mental status: Alert, oriented, thought content appropriate    Assessment:   Patient Active Problem List:     Alcohol-induced acute pancreatitis     Anxiety     Depression     ETOH abuse     Hematemesis     Atrial fibrillation with rapid ventricular response (HCC)     Alcohol withdrawal syndrome without complication (HCC)     Lactic acidosis     Hypokalemia     Severe protein-calorie malnutrition (HCC)     Paroxysmal atrial fibrillation (HCC)     Hyperbilirubinemia     Hypomagnesemia     Bilirubinemia     UGIB (upper gastrointestinal bleed)     Hepatorenal syndrome (HCC)    Plan:   Assessment: / Plan:     1.  Acute kidney injury.  Acute kidney injury secondary to renal hypoperfusion with possible underlying hepatorenal syndrome.  Urine output has improved , JULIO  Now Resolved      2.  Metabolic acidosis.  Patient with non-anion gap metabolic acidosis which may be reflection of diarrhea as well as acute kidney injury.   12/3/21: started oral bicarb. Up titrated to 1300 mg po bid 12/6     3.   Hyponatremia.  Patient probably with hepatic failure associated hyponatremia along with use of spironolactone contributing to hyponatremia.      4.   Hypokalemia.  Supplement potassium and magnesium as needed. Mg 1.5- supplement      5.  Hypocalcemia.   ionized calcium 1.12 / Vit D 14 / PTH 60 - Started oral Vit D on 11/29/21     6.  Hypotension. Stable      7. Hypophosphatemia.    Supplement orally as needed for goal 2.5     8.  Coagulopathy-  FFP and platelets given this am  Transfuse as needed per primary/ hem    Catherine Jimenez, APRN - CNP      Patient seen and examined. Chart reviewed. I had a face to face encounter with the patient. Agree with exam.    Agree with  formulation, assessment and plan as outlined above and directed by me. Addition and corrections were done as deemed appropriate. My exam and plan include:     Continue current treatment.       Alonso Parekh MD  Nephrology        Electronically signed by Alonso Parekh MD on 12/7/2021 at 3:59 PM

## 2021-12-07 NOTE — PROGRESS NOTES
Internal Medicine Progress Note         Primary Care Physician: Alma Rosa Nava DO   Admitting Physician:  Josi Alcazar DO  Admission date and time: 11/26/2021  9:25 AM    Room:  [address] [place] [address]  Admitting diagnosis: Hepatorenal syndrome ([facility] [address] [K76.7]  Acute kidney injury ([facility] [address] [N17.9]  Chronic liver failure without hepatic coma [facility]) [K72.10]    Patient Name: Bernadette Prescott  MRN: [id number]    Date of Service: 12/7/2021       Subjective: Cesar Fink is a 40 y.o. male ,12/7/2021, at the bedside. Patient doing very poorly multiple problems at hand including hepatorenal syndrome and dropping blood pressure. Patient has very low urine output with evidence of metabolic acidosis. Currently being followed by multiple subspecialists. 11/2611/27/2021-medical coverage by Dr. Amanda Kerns    11/28/2021  Patient seen examined on PCU. Patient's mother is at the bedside and case discussed in detail. Patient is much more alert this morning. Patient oriented to person place. He denies any significant pain at this time. There is no lower leg edema and abdomen is distended but very soft. Serum sodium is up to 129 with a potassium 3.2. CO2 electrolytes is 18 and improved but BUN/creatinine is 44/3.2 which is improved compared to 11/27. Total bili is 24.2 with WBC 12.7 hemoglobin 10.1. Platelet count is down to 63. INR was 7.8. Temperature 98.8 with heart rate of 70 and blood pressure is 115/67. Patient has been put on phenylephrine drip with improvement in blood pressure. Urine output about 400 cc a shift. 11/29/2021  Patient seen and examined in PCU. Patient's mother is at the bedside and case discussed. Patient is still alert and oriented x23 with episodes of confusion. Patient denies any problem with chest or abdominal pain. Patient strength is still very poor with the patient still very weak on his feet. Patient appetite has improved with less nausea.   Patient still has persistent hand tremor which she has had for years according to the mother. But this has been gotten worse. BUN/creatinine 33/2.2 with serum sodium 130. Blood sugars ranging [id number]. INR is down to 5.7 today. Temperature ranges 90.7100.4 with a heart rate of 61 blood pressure 113/63. O2 sat 97% on room air at rest.  Patient still on phenylephrine drip. Urine output ranges 800-1300 cc a shift. 11/30/2021  Patient seen examined on PCU. Patient states he feels fairly good again today. He denies any chest or abdominal pain. Patient states he is very hungry and is still on liquids. Hemoglobin 9.2 with platelet count of down to 38. BUN/creatinine 24/1.4 with potassium 3.2 and sodium 131. Temperature 99 with heart rate 64 blood pressure 99/57. O2 sat 93% on room air. Phenylephrine drip still running. Urine output ranges to 200-4000 cc a shift. Patient is for abdominal paracentesis today. 12/1/2021  Patient seen examined in PCU. Patient with 2 family members at the bedside. Case was discussed. Patient alert and oriented x3. Patient's upper extremity tremors are moderately improved. Patient had questions about abdominal paracentesis. BUN/creatinine 25/1.4 today with total bilirubin 29. WBC 7.5 hemoglobin 8.2 and platelet count 62. Temperature ranges 97.998.3 with heart rate of 68 blood pressure range from 82/51112/64. O2 sats 100% on room air. Patient still on phenylephrine drip for blood pressure/ renal perfusion. Abdominal paracentesis when okay with GI, intensivist.    12/2/2021   Patient seen examined on PCU. Patient denies any specific pain at this time. Patient is little bit depressed about not getting his paracentesis performed yet. BUN/creatinine 18/1.2 with hemoglobin is 7.5 platelet count is down to 29. Patient received fresh frozen plasma and INR yesterday morning was 3.4 and today is 5. Temperature 90.3 with heart rate of 67 and blood pressure 110/70. Pulmonology and GI notes reviewed.   With kidney function about back to baseline phenylephrine drip will be weaned off.    12/3/2021  Patient seen examined on PCU. Patient alert and oriented x3. Patient denies any chest or significant abdominal pain. Abdomen distended but soft. BUN/creatinine 14/1.0 with blood sugars ranging 103126. Serum phosphorus 1.8. Total bili was 24.1 today and has improved somewhat. Hemoglobin is down to 7.2 today. INR is up to 6.3 today. Heart rate 65 with blood pressure 105/52 with O2 sat 94% on room air at rest.  Urinary output is good. Patient is still on Lavon-Synephrine and somatostatin drip. 12/4/2021  Patient seen examined on PCU. Patient received 1 unit packed RBC today with low hemoglobin. Patient denies any problem with chest pain and there is no continuous abdominal discomfort. Patient denies any unusual shortness of breath. BUN/creatinine 11/0.9 with serum magnesium 1.5. Phosphorus 1.7 with WBC 5.3 and hemoglobin 6.6. Total bili 23.4, INR 6.6 today. Temperature 98.5 with heart rate of 74 blood pressure 101/62. O2 sat 96% with the patient being titrated down on the Neosporin drip. 12/5/2021   Patient seen examined on PCU. Case discussed with patient's family at the bedside again today. Patient complaining of pain behind his upper arms in the tricep area. Patient says it started a couple days ago. More of a constant burning sensation. There is no rash noted but there is some ecchymosis noted on the upper portion of the arm and tricep area. This area is tender to palpation. There is no masses or hematoma noted. BUN/creatinine 12/1.0. Serum magnesium 1.4 again with phosphorus 1.7. Blood sugars still low 100s. Total bili is 24.8 today with a WBC 5.8 hemoglobin 6.7 early this morning with a repeat 7.1. Temperature 97.5 heart rate 78 blood pressure 118/64. Patient was taken off the Neosporin drip yesterday afternoon but was put back on this morning. O2 sat is 95% on room air.   Urine output ranges 150-450 cc shift. Patient given potassium and magnesium supplement. 12/6/2021  Patient seen examined on PCU. Patient is upset because he does not know what his INR is. Patient states his bilateral posterior upper arm discomfort is improved. There is no chest pain. Patient's abdomen is slowly getting bigger. BUN/creatinine 12/0.9. CO2 was 16 on the electrolytes. Total bili is 26.4 with hemoglobin 6.8 and WBC 5.5. INR is pending. Temperature 90.4 with heart rate of 81 with blood pressure 104/59 with an O2 sat 98%. We will type and cross give 1 more unit of packed RBC now. Consult hematology regarding coagulopathy. 12/7/2021  Patient seen examined on PCU. Patient is about the same. Abdomen is getting more distended. Oncology note reviewed. BUN/creatinine 12/0.9 with a CO2 of 16. Total bili is 24.2 with hemoglobin 6.7 with the patient receiving 2 units of packed RBCs last 24 hours. Platelet count is 39. Temperature 98.5 heart rate is 79 blood pressure 112/67. O2 sat 99% room air at rest. Urine output is good. Patient to receive 2 more units of fresh frozen plasma today with patient receiving platelets yesterday. Patient hopefully can have the abdominal paracentesis today after INR has been performed. Review of System: Limited at the present time  Constitutional:   Denies fever or chills, weight loss or gain, fatigue or malaise. HEENT:   Denies ear pain, sore throat, sinus or eye problems. Cardiovascular:   Denies any chest pain, irregular heartbeats, or palpitations. Respiratory:   Denies shortness of breath, coughing, sputum production, hemoptysis, or wheezing. Gastrointestinal:   Denies nausea, vomiting, diarrhea, or constipation. Denies any abdominal pain. Genitourinary:    Denies any urgency, frequency, hematuria. Voiding  without difficulty. Extremities:   Denies lower extremity swelling, edema or cyanosis.    Neurology:    Denies any headache or focal neurological deficits, Denies generalized weakness or memory difficulty. Psch:   Denies being anxious or depressed. Musculoskeletal:    Denies  myalgias, joint complaints or back pain. Integumentary:   Denies any rashes, ulcers, or excoriations. Denies bruising. Hematologic/Lymphatic:  Denies bruising or bleeding. Physical Exam:  I/O this shift:  In: -   Out: 650 [Urine:650]    Intake/Output Summary (Last 24 hours) at 12/7/2021 1201  Last data filed at 12/7/2021 0800  Gross per 24 hour   Intake 1625 ml   Output 890 ml   Net 735 ml   I/O last 3 completed shifts: In: 0 [Blood:1625]  Out: 240 [Urine:240]  Patient Vitals for the past 96 hrs (Last 3 readings):   Weight   12/05/21 0531 212 lb (96.2 kg)     Vital Signs:   Blood pressure 112/67, pulse 79, temperature 98.5 °F (36.9 °C), temperature source Oral, resp. rate 16, height 6' 3\" (1.905 m), weight 212 lb (96.2 kg), SpO2 95 %. General appearance:  Patient alert but lethargic. Appears chronically ill  Head:  Normocephalic. No masses, lesions or tenderness. Eyes:  PERRLA. EOMI. sclera icteric. Buccal mucosa moist.  ENT:  Ears normal. Mucosa normal.  Neck:    Supple. Trachea midline. No thyromegaly. No JVD. No bruits. Heart:    Rhythm regular. Tachycardic. No murmurs. Lungs:    Diminished  Abdomen:   Soft. Non-tender. Non-distended. Bowel sounds positive. No organomegaly or masses. No pain on palpation. Newly distended  Extremities:    Peripheral pulses present. No peripheral edema. No ulcers. No cyanosis. No clubbing.   Neurologic:    Responsive but lethargic  Integumentary:  No rashes skin jaundice  Genitalia/Breast:  Hickman catheter    Medication:  Scheduled Meds:   magnesium sulfate  2,000 mg IntraVENous Once    sodium bicarbonate  1,300 mg Oral BID    [Held by provider] phytonadione  10 mg Oral Daily    vitamin D3  5,000 Units Oral Daily    insulin lispro  0-6 Units SubCUTAneous TID WC    pantoprazole  40 mg IntraVENous Daily    And    sodium chloride (PF)  10 mL IntraVENous Daily    midodrine  15 mg Oral TID WC    thiamine (VITAMIN B1) IVPB  100 mg IntraVENous Daily    rifaximin  550 mg Oral BID    sodium chloride flush  5-40 mL IntraVENous 2 times per day    piperacillin-tazobactam  3,375 mg IntraVENous Q8H     Continuous Infusions:   sodium chloride      sodium chloride      sodium chloride      sodium chloride      sodium chloride      dextrose      sodium chloride      sodium chloride Stopped (12/07/21 0800)       Objective Data:  CBC with Differential:    Lab Results   Component Value Date    WBC 6.0 12/07/2021    RBC 2.06 12/07/2021    HGB 6.7 12/07/2021    HCT 18.9 12/07/2021    PLT 39 12/07/2021    MCV 91.7 12/07/2021    MCH 32.5 12/07/2021    MCHC 35.4 12/07/2021    RDW 21.0 12/07/2021    METASPCT 0.9 11/20/2021    LYMPHOPCT 14.8 12/07/2021    MONOPCT 3.5 12/07/2021    MYELOPCT 2.7 12/06/2021    BASOPCT 2.6 12/07/2021    MONOSABS 0.24 12/07/2021    LYMPHSABS 0.90 12/07/2021    EOSABS 0.42 12/07/2021    BASOSABS 0.16 12/07/2021     CMP:    Lab Results   Component Value Date     12/07/2021    K 3.9 12/07/2021    K 5.8 11/26/2021     12/07/2021    CO2 16 12/07/2021    BUN 12 12/07/2021    CREATININE 0.9 12/07/2021    GFRAA >60 12/07/2021    LABGLOM >60 12/07/2021    GLUCOSE 93 12/07/2021    PROT 5.2 12/07/2021    LABALBU 3.5 12/07/2021    CALCIUM 8.2 12/07/2021    BILITOT 24.2 12/07/2021    ALKPHOS 46 12/07/2021    AST 41 12/07/2021    ALT 18 12/07/2021              Assessment:    · Acute hepatic encephalopathy  · Hepatorenal syndrome probable hepatopulmonary syndrome  · Acute hypoxic respiratory failure  · Hypotonic hypervolemic hyponatremia  · JULIO on CKD  · Pneumonia possible aspiration  · Decompensated cirrhosis  · Hyperbilirubinemia  · Chronic normocytic anemia  · Atrial fibrillation  · History of anxiety and depression  · History of alcohol abuse  · GERD        Plan:     · Patient is doing very poorly at the present time.   The patient does have advanced liver disease with decompensated cirrhosis and not currently a candidate for liver transplant. Symptoms are suggestive of severe electrolyte imbalance with probable hepatorenal syndrome. He also is currently hypotensive. I have discussed the condition with the mother and fiancé along with Dr. Kristian Bridges have discussed his CODE STATUS and agree on no intubation or chest compressions. We have also discussed possibility of transition into hospice pending his status. Continue aggressive care for the next 24 to 48 hours and observe response  · Electrolyte imbalance of hyponatremia being followed by nephrology  · Metabolic acidosis being corrected by bicarb infusion  · GI is following regarding his hepatic cirrhosis  · Vasopressor has been instituted  · Salt poor albumin to expand volume  · Blood culture has been obtained. Zosyn to cover for spontaneous bacterial peritonitis  · Patient is auto anticoagulated vitamin K supplementation  · Treat elevated ammonia level  · Paracentesis as needed  · Prognosis is very poor but he is improving short-term    -Consult hematology regarding severe coagulopathy  -We will try to discuss with GI regarding patient's discharge planning and care with family having multiple questions regarding this  -Patient has received 2 units of packed RBCs in the last 24 hours.  -Patient will receive 2 units fresh frozen plasma today and did receive 2 yesterday. Patient will have an INR following this and hopefully get the paracentesis done performed.         Calvin Baldwin DO, F.A.C.O.I.  12/7/2021  12:01 PM

## 2021-12-07 NOTE — PROGRESS NOTES
PROGRESS NOTE    By Ayla Sanchez D.O GI Fellow    [facility]  Dr. Kristian Pinto MD, Dr. Emanuel Beltre MD, Dr Mary Jane Power, Dr. Tom Theodore MD, Dr. Rom Gordon, [facility]  40 y.o.  male    SUBJECTIVE:  Patient  is resting comfortably this a.m. with no complaints. Patient denies any GI bleed.       OBJECTIVE:    /67   Pulse 79   Temp 98.5 °F (36.9 °C) (Oral)   Resp 16   Ht 6' 3\" (1.905 m)   Wt 212 lb (96.2 kg)   SpO2 95%   BMI 26.50 kg/m²     Gen: NAD, AAO x 3  HEENT:PEERL, scleral icterus  Heart: RRR, no M/R/G  Lungs: CTAB  Abd.: soft, NT, ND, BS +, no G/R, no HSM  Extr.: no C/C/E, no bruising patient is visibly jaundiced      Stool (measured) : 100 mL  Lab Results   Component Value Date    WBC 6.0 12/07/2021    WBC 5.5 12/06/2021    WBC 5.3 12/05/2021    HGB 6.7 12/07/2021    HGB 6.4 12/06/2021    HGB 6.8 12/06/2021    HCT 18.9 12/07/2021    MCV 91.7 12/07/2021    RDW 21.0 12/07/2021    PLT 39 12/07/2021    PLT 34 12/06/2021    PLT 32 12/05/2021     Lab Results   Component Value Date     12/07/2021    K 3.9 12/07/2021    K 5.8 11/26/2021     12/07/2021    CO2 16 12/07/2021    BUN 12 12/07/2021    CREATININE 0.9 12/07/2021    CALCIUM 8.2 12/07/2021    PROT 5.2 12/07/2021    LABALBU 3.5 12/07/2021    BILITOT 24.2 12/07/2021    BILITOT 26.4 12/06/2021    BILITOT 24.8 12/05/2021    ALKPHOS 46 12/07/2021    ALKPHOS 57 12/06/2021    ALKPHOS 45 12/05/2021    AST 41 12/07/2021    AST 45 12/06/2021    AST 42 12/05/2021    ALT 18 12/07/2021    ALT 20 12/06/2021    ALT 20 12/05/2021     Lab Results   Component Value Date    LIPASE 68 11/14/2021    LIPASE 26 07/01/2020    LIPASE 26 05/11/2020     Lab Results   Component Value Date    AMYLASE 91 01/16/2019         ASSESSMENT/PLAN:       1. Hepatic encephalopathy type C grade II  Patient has been on 30 g of lactulose 2 times a days with rifaximin 550 mg p.o. 3 times a day as well.    Patient mentation has significantly improved compared to yesterday. Lashonda Nobles is A&O x3 and is able to answer all question appropriately. Patient does not have asterixis on exam   We will continue to monitor mental status continue with lactulose and rifaximin.   This will be titrated to up to 2-3 soft bowel movement per day. -Supportive care     2. Alcohol induced cirrhosis, decompensated  Meld sodium score 35.    -Diuretics per nephrology  -Obtain paracentesis once INR acceptable range  Patient was educated and advised on the importance of abstain from alcohol completely otherwise he is long-term survival odds significantly decreased.  Patient seem to understand and is agreeable with this plan.       3. Renal failure - resolved   -acute kidney injury 2/2 renal hypoperfusion, unlikely hepatorenal syndrome, improved   -Okay to wean off the vassopressor  from GI point of view       4. Acute on chronic normocytic anemia  -Vital signs stable over signs of GI bleed on exam  -We will did trend down to 6.6 this a.m.  -Mostly a component of dilution with patient with large amount of ascites on exam  -Paracentesis once patient INR stabilized  -Continue with supportive care       Patient was seen by hematology appreciate recommendations. Patient was given trans-FFP with improvement of INR to 5 this AM.  Patient continues to deny any melena hematochezia or hematemesis. Patient once again found to be anemic and is mostly secondary to fluid overload patient still waiting for paracentesis once INR has improved. Continue with supportive care entertain possible EGD          Pt was discussed with  Dr. Lauren Yeager DO  GI Fellow   12/7/2021  8:55 AM    Pt seen and independently examined. Pertinent notes and lab work reviewed. D/w Dr. Shakir Currie with physical exam and A&P. Discussed with patient - all questions answered - agreeable with the plan as delineated.     Pura Brady MD  12/7/2021  12:53 PM

## 2021-12-08 NOTE — PROGRESS NOTES
Progress Note  12/8/2021 9:42 AM  Subjective:   Admit Date: 11/26/2021  PCP: Alma Rosa Nava, DO  Interval History:    Awake and alert. Labs were pending at the time of my visit. Diet: ADULT DIET; Regular; Low Sodium (2 gm)  ADULT ORAL NUTRITION SUPPLEMENT; Lunch, Dinner; Fortified Pudding Oral Supplement    Data:   Scheduled Meds:   sodium bicarbonate  1,300 mg Oral BID    [Held by provider] phytonadione  10 mg Oral Daily    vitamin D3  5,000 Units Oral Daily    insulin lispro  0-6 Units SubCUTAneous TID WC    pantoprazole  40 mg IntraVENous Daily    And    sodium chloride (PF)  10 mL IntraVENous Daily    midodrine  15 mg Oral TID WC    thiamine (VITAMIN B1) IVPB  100 mg IntraVENous Daily    rifaximin  550 mg Oral BID    sodium chloride flush  5-40 mL IntraVENous 2 times per day    piperacillin-tazobactam  3,375 mg IntraVENous Q8H     Continuous Infusions:   sodium chloride      sodium chloride      sodium chloride      sodium chloride      sodium chloride      dextrose      sodium chloride      sodium chloride 12.5 mL/hr at 12/08/21 0606     PRN Meds:sodium chloride, sodium chloride, sodium chloride, sodium chloride, sodium chloride, acetaminophen **OR** [DISCONTINUED] acetaminophen, traMADol, lidocaine, melatonin, LORazepam **OR** LORazepam **OR** [DISCONTINUED] LORazepam **OR** [DISCONTINUED] LORazepam **OR** [DISCONTINUED] LORazepam **OR** [DISCONTINUED] LORazepam **OR** [DISCONTINUED] LORazepam **OR** [DISCONTINUED] LORazepam, glucose, dextrose, glucagon (rDNA), dextrose, sodium chloride flush, sodium chloride, polyethylene glycol, prochlorperazine  I/O last 3 completed shifts:   In: 1029.5 [P.O.:480; Blood:549.5]  Out: 650 [Urine:650]  I/O this shift:  In: -   Out: 575 [Urine:575]    Intake/Output Summary (Last 24 hours) at 12/8/2021 0942  Last data filed at 12/8/2021 0728  Gross per 24 hour   Intake 909.45 ml   Output 575 ml   Net 334.45 ml     CBC:   Recent Labs     12/06/21  0957 12/06/21  2145 12/07/21  0530   WBC 5.5  --  6.0   HGB 6.8* 6.4* 6.7*   PLT 34*  --  39*     BMP:    Recent Labs     12/06/21  0957 12/07/21  0530    139   K 4.1 3.9   * 110*   CO2 16* 16*   BUN 12 12   CREATININE 0.9 0.9   GLUCOSE 83 93     Hepatic:   Recent Labs     12/06/21  0957 12/07/21  0530   AST 45* 41*   ALT 20 18   BILITOT 26.4* 24.2*   ALKPHOS 57 46     Troponin: No results for input(s): TROPONINI in the last 72 hours. BNP: No results for input(s): BNP in the last 72 hours. Lipids: No results for input(s): CHOL, HDL in the last 72 hours. Invalid input(s): LDLCALCU  ABGs: No results found for: PHART, PO2ART, [id number]  INR:   Recent Labs     12/07/21  0530   INR 5.0       -----------------------------------------------------------------  RAD: US GALLBLADDER RUQ    Result Date: 11/28/2021  EXAMINATION: RIGHT UPPER QUADRANT ULTRASOUND 11/27/2021 5:24 pm COMPARISON: 11/14/2021 HISTORY: ORDERING SYSTEM PROVIDED HISTORY: decomp cirrhosis, CBD measurement TECHNOLOGIST PROVIDED HISTORY: Reason for exam:->decomp cirrhosis, CBD measurement What reading provider will be dictating this exam?->CRC FINDINGS: LIVER:  Nodular contour of the liver consistent with cirrhosis. The liver is somewhat heterogeneous but without obvious focal abnormality. There is apparent hepatofugal flow again identified within the main portal vein. BILIARY SYSTEM:  Gallbladder is incompletely distended with diffuse wall thickening. No sonographic Carrasco's sign. The wall thickening is nonspecific but could be reactive to ascites and hepatic parenchymal disease. No obvious cholelithiasis. Common bile duct is within normal limits measuring 6 mm. RIGHT KIDNEY: The right kidney is grossly unremarkable without evidence of hydronephrosis. PANCREAS:  Visualized portions of the pancreas are unremarkable. OTHER: Moderate ascites. Findings again consistent with cirrhotic liver. Ascites.  Hepatofugal flow redemonstrated in the portal vein. Thickened gallbladder wall which may be reactive to the ascites and hepatic disease. XR CHEST PORTABLE    Result Date: 11/26/2021  EXAMINATION: ONE XRAY VIEW OF THE CHEST 11/26/2021 7:15 am COMPARISON: One-view chest x-ray 11/14/2021 HISTORY: ORDERING SYSTEM PROVIDED HISTORY: SOB TECHNOLOGIST PROVIDED HISTORY: Reason for exam:->SOB FINDINGS: The cardiac silhouette is within normal limits. The mediastinal contours are within normal limits. The lungs are hypoinflated with resulting bronchovascular crowding. Mild basilar opacities appear new/increased. No significant pleural effusions or pneumothorax. Osseous degenerative changes are present. EKG leads overlie the chest.     Mild basilar opacities are suspicious for pneumonia. This appearance can be seen with COVID-19.     US ABDOMEN LIMITED    Result Date: 11/26/2021  EXAMINATION: RIGHT UPPER QUADRANT ULTRASOUND 11/26/2021 5:54 pm COMPARISON: None. HISTORY: ORDERING SYSTEM PROVIDED HISTORY: ascites/JULIO TECHNOLOGIST PROVIDED HISTORY: Reason for exam:->ascites/JULIO What reading provider will be dictating this exam?->CRC FINDINGS: Superficial sonographic evaluation of the 4 quadrants of the abdomen performed using a combination of grayscale technique. Imaging done to assess for the presence of intra-abdominal ascites. Patient was laying on their left-side. Moderate volume simple appearing intra-abdominal ascites in the left abdomen. Moderate volume intra-abdominal ascites. Objective:   Vitals: /74   Pulse 83   Temp 98.3 °F (36.8 °C) (Oral)   Resp 18   Ht 6' 3\" (1.905 m)   Wt 212 lb (96.2 kg)   SpO2 97%   BMI 26.50 kg/m²   General appearance: appears stated age   Skin:  Jaundice   HEENT: Head: Normocephalic, no lesions, without obvious abnormality.  Sclarea  jaundice   Neck: no adenopathy, no carotid bruit, no JVD, supple, symmetrical, trachea midline and thyroid not enlarged, symmetric, no tenderness/mass/nodules  Lungs: clear to auscultation bilaterally  Heart: regular rate and rhythm, S1, S2 normal, no murmur, click, rub or gallop  Abdomen: Distended , ascites ++  Extremities: extremities normal, atraumatic, no cyanosis or edema  Neurologic: Mental status: Alert, oriented, thought content appropriate    Assessment:   Patient Active Problem List:     Alcohol-induced acute pancreatitis     Anxiety     Depression     ETOH abuse     Hematemesis     Atrial fibrillation with rapid ventricular response (HCC)     Alcohol withdrawal syndrome without complication (HCC)     Lactic acidosis     Hypokalemia     Severe protein-calorie malnutrition (HCC)     Paroxysmal atrial fibrillation (HCC)     Hyperbilirubinemia     Hypomagnesemia     Bilirubinemia     UGIB (upper gastrointestinal bleed)     Hepatorenal syndrome (HCC)    Plan:   Assessment: / Plan:     1.  Acute kidney injury.  Acute kidney injury secondary to renal hypoperfusion with possible underlying hepatorenal syndrome.  Urine output has improved , JULIO  Now Resolved      2.  Metabolic acidosis.  Patient with non-anion gap metabolic acidosis which may be reflection of diarrhea as well as acute kidney injury.   12/3/21: started oral bicarb. Up titrated to 1300 mg po bid 12/6  Beginning to improve     3.   Hyponatremia.  Patient probably with hepatic failure associated hyponatremia along with use of spironolactone contributing to hyponatremia.      4.   Hypokalemia.  Supplement potassium and magnesium as needed. Mg 1.6- supplement      5.  Hypocalcemia.   ionized calcium 1.12 / Vit D 14 / PTH 60 - Started oral Vit D on 11/29/21     6.  Hypotension. Stable      7. Hypophosphatemia.    Supplement orally as needed for goal 2.5  PO4 1.8- supplement     8. Coagulopathy-  FFP and platelets given   Transfuse as needed per primary/ hem    Wyona Mac, APRN - CNP        Patient seen and examined. No Family members present at the bedside. Chart reviewed.   I had a face to face encounter with the

## 2021-12-08 NOTE — PROGRESS NOTES
Room #:   7822/7034-44    Date: 2021       Patient Name: Marie Peralta  : 1975      MRN: [id number]     Patient unavailable for physical therapy treatment due to refusal. Patient states he his abdomen and scrotum are swollen, unable to move due to pain. Will attempt PT treatment at a later time. Thank you.         Sonia Strickland, PT

## 2021-12-08 NOTE — CARE COORDINATION
SS NOTE: COVID NEGATIVE 11/26- PT WILL NEED A NEGATIVE RAPID COVID 72 HOURS PRIOR TO [facility] TO [facility] SKILLED. READMISSION DONE TODAY. Pt is on room air. Pt is in Contact Iso for C-diff. Pt needs a Paracentisis however his INR is to low. Pt has 2 peripheral lines. Ammonia level elevated. Pt is a DNR CCA, no intubation. Pt had 2 units of Plasma and Platelets yesterday and Monday. Pt is on IV  Zosyn and Compazine. [facility] skilled  accepted pt- they will begin PRECERT once pt is stable for SNF placement. For a SNF placement pt will need a PRECERT, signed JORGE, current PT.OT notes and SW will need to complete the HENs. CLEVELAND Holley.12/08/2021.12:23PM.

## 2021-12-08 NOTE — PROGRESS NOTES
Internal Medicine Progress Note         Primary Care Physician: Manjeet Alonzo DO   Admitting Physician:  Dane Kim DO  Admission date and time: 11/26/2021  9:25 AM    Room:  [address] [place] [address]  Admitting diagnosis: Hepatorenal syndrome ([facility] [address] [K76.7]  Acute kidney injury ([facility] [address] [N17.9]  Chronic liver failure without hepatic coma [facility]) [K72.10]    Patient Name: Tessie Skinner  MRN: [id number]    Date of Service: 12/8/2021       Subjective: Endy Arriola is a 40 y.o. male ,12/8/2021, at the bedside. Patient doing very poorly multiple problems at hand including hepatorenal syndrome and dropping blood pressure. Patient has very low urine output with evidence of metabolic acidosis. Currently being followed by multiple subspecialists. 11/2611/27/2021-medical coverage by Dr. Jenelle Montgomery    11/28/2021  Patient seen examined on PCU. Patient's mother is at the bedside and case discussed in detail. Patient is much more alert this morning. Patient oriented to person place. He denies any significant pain at this time. There is no lower leg edema and abdomen is distended but very soft. Serum sodium is up to 129 with a potassium 3.2. CO2 electrolytes is 18 and improved but BUN/creatinine is 44/3.2 which is improved compared to 11/27. Total bili is 24.2 with WBC 12.7 hemoglobin 10.1. Platelet count is down to 63. INR was 7.8. Temperature 98.8 with heart rate of 70 and blood pressure is 115/67. Patient has been put on phenylephrine drip with improvement in blood pressure. Urine output about 400 cc a shift. 11/29/2021  Patient seen and examined in PCU. Patient's mother is at the bedside and case discussed. Patient is still alert and oriented x23 with episodes of confusion. Patient denies any problem with chest or abdominal pain. Patient strength is still very poor with the patient still very weak on his feet. Patient appetite has improved with less nausea.   Patient still has persistent hand tremor which she has had for years according to the mother. But this has been gotten worse. BUN/creatinine 33/2.2 with serum sodium 130. Blood sugars ranging [id number]. INR is down to 5.7 today. Temperature ranges 90.7100.4 with a heart rate of 61 blood pressure 113/63. O2 sat 97% on room air at rest.  Patient still on phenylephrine drip. Urine output ranges 800-1300 cc a shift. 11/30/2021  Patient seen examined on PCU. Patient states he feels fairly good again today. He denies any chest or abdominal pain. Patient states he is very hungry and is still on liquids. Hemoglobin 9.2 with platelet count of down to 38. BUN/creatinine 24/1.4 with potassium 3.2 and sodium 131. Temperature 99 with heart rate 64 blood pressure 99/57. O2 sat 93% on room air. Phenylephrine drip still running. Urine output ranges to 200-4000 cc a shift. Patient is for abdominal paracentesis today. 12/1/2021  Patient seen examined in PCU. Patient with 2 family members at the bedside. Case was discussed. Patient alert and oriented x3. Patient's upper extremity tremors are moderately improved. Patient had questions about abdominal paracentesis. BUN/creatinine 25/1.4 today with total bilirubin 29. WBC 7.5 hemoglobin 8.2 and platelet count 62. Temperature ranges 97.998.3 with heart rate of 68 blood pressure range from 82/51112/64. O2 sats 100% on room air. Patient still on phenylephrine drip for blood pressure/ renal perfusion. Abdominal paracentesis when okay with GI, intensivist.    12/2/2021   Patient seen examined on PCU. Patient denies any specific pain at this time. Patient is little bit depressed about not getting his paracentesis performed yet. BUN/creatinine 18/1.2 with hemoglobin is 7.5 platelet count is down to 29. Patient received fresh frozen plasma and INR yesterday morning was 3.4 and today is 5. Temperature 90.3 with heart rate of 67 and blood pressure 110/70. Pulmonology and GI notes reviewed.   With kidney function about back to baseline phenylephrine drip will be weaned off.    12/3/2021  Patient seen examined on PCU. Patient alert and oriented x3. Patient denies any chest or significant abdominal pain. Abdomen distended but soft. BUN/creatinine 14/1.0 with blood sugars ranging 103126. Serum phosphorus 1.8. Total bili was 24.1 today and has improved somewhat. Hemoglobin is down to 7.2 today. INR is up to 6.3 today. Heart rate 65 with blood pressure 105/52 with O2 sat 94% on room air at rest.  Urinary output is good. Patient is still on Lavon-Synephrine and somatostatin drip. 12/4/2021  Patient seen examined on PCU. Patient received 1 unit packed RBC today with low hemoglobin. Patient denies any problem with chest pain and there is no continuous abdominal discomfort. Patient denies any unusual shortness of breath. BUN/creatinine 11/0.9 with serum magnesium 1.5. Phosphorus 1.7 with WBC 5.3 and hemoglobin 6.6. Total bili 23.4, INR 6.6 today. Temperature 98.5 with heart rate of 74 blood pressure 101/62. O2 sat 96% with the patient being titrated down on the Neosporin drip. 12/5/2021   Patient seen examined on PCU. Case discussed with patient's family at the bedside again today. Patient complaining of pain behind his upper arms in the tricep area. Patient says it started a couple days ago. More of a constant burning sensation. There is no rash noted but there is some ecchymosis noted on the upper portion of the arm and tricep area. This area is tender to palpation. There is no masses or hematoma noted. BUN/creatinine 12/1.0. Serum magnesium 1.4 again with phosphorus 1.7. Blood sugars still low 100s. Total bili is 24.8 today with a WBC 5.8 hemoglobin 6.7 early this morning with a repeat 7.1. Temperature 97.5 heart rate 78 blood pressure 118/64. Patient was taken off the Neosporin drip yesterday afternoon but was put back on this morning. O2 sat is 95% on room air.   Urine output ranges 150-450 cc shift. Patient given potassium and magnesium supplement. 12/6/2021  Patient seen examined on PCU. Patient is upset because he does not know what his INR is. Patient states his bilateral posterior upper arm discomfort is improved. There is no chest pain. Patient's abdomen is slowly getting bigger. BUN/creatinine 12/0.9. CO2 was 16 on the electrolytes. Total bili is 26.4 with hemoglobin 6.8 and WBC 5.5. INR is pending. Temperature 90.4 with heart rate of 81 with blood pressure 104/59 with an O2 sat 98%. We will type and cross give 1 more unit of packed RBC now. Consult hematology regarding coagulopathy. 12/7/2021  Patient seen examined on PCU. Patient is about the same. Abdomen is getting more distended. Oncology note reviewed. BUN/creatinine 12/0.9 with a CO2 of 16. Total bili is 24.2 with hemoglobin 6.7 with the patient receiving 2 units of packed RBCs last 24 hours. Platelet count is 39. Temperature 98.5 heart rate is 79 blood pressure 112/67. O2 sat 99% room air at rest. Urine output is good. Patient to receive 2 more units of fresh frozen plasma today with patient receiving platelets yesterday. Patient hopefully can have the abdominal paracentesis today after INR has been performed. 12/8/2021  Patient seen examined on PCU. Patient complained of increased abdominal distention and discomfort. Patient denies any chest pain. Patient little bit frustrated about being in the hospital so long and having his abnormal lab work including low hemoglobin and persistently elevated INR. Hemoglobin 6.2 today with a WBC of 4.4. Platelet count 39 with BUN/creatinine 10/0.8. Blood sugars in the low 100s. Total bili is 27.7. Temperature 90.3 with heart rate 83 and blood pressure 114/74. O2 sat 97% room air at rest.      Review of System: Limited at the present time  Constitutional:   Denies fever or chills, weight loss or gain, fatigue or malaise.   HEENT:   Denies ear pain, sore throat, sinus or eye problems. Cardiovascular:   Denies any chest pain, irregular heartbeats, or palpitations. Respiratory:   Denies shortness of breath, coughing, sputum production, hemoptysis, or wheezing. Gastrointestinal:   Denies nausea, vomiting, diarrhea, or constipation. Denies any abdominal pain. Genitourinary:    Denies any urgency, frequency, hematuria. Voiding  without difficulty. Extremities:   Denies lower extremity swelling, edema or cyanosis. Neurology:    Denies any headache or focal neurological deficits, Denies generalized weakness or memory difficulty. Psch:   Denies being anxious or depressed. Musculoskeletal:    Denies  myalgias, joint complaints or back pain. Integumentary:   Denies any rashes, ulcers, or excoriations. Denies bruising. Hematologic/Lymphatic:  Denies bruising or bleeding. Physical Exam:  I/O this shift:  In: -   Out: 575 [Urine:575]    Intake/Output Summary (Last 24 hours) at 12/8/2021 1223  Last data filed at 12/8/2021 0728  Gross per 24 hour   Intake 1192.5 ml   Output 575 ml   Net 617.5 ml   I/O last 3 completed shifts: In: 1312.5 [P.O.:480; Blood:832.5]  Out: 650 [Urine:650]  Patient Vitals for the past 96 hrs (Last 3 readings):   Weight   12/05/21 0531 212 lb (96.2 kg)     Vital Signs:   Blood pressure 114/74, pulse 83, temperature 98.3 °F (36.8 °C), temperature source Oral, resp. rate 18, height 6' 3\" (1.905 m), weight 212 lb (96.2 kg), SpO2 97 %. General appearance:  Patient alert but lethargic. Appears chronically ill  Head:  Normocephalic. No masses, lesions or tenderness. Eyes:  PERRLA. EOMI. sclera icteric. Buccal mucosa moist.  ENT:  Ears normal. Mucosa normal.  Neck:    Supple. Trachea midline. No thyromegaly. No JVD. No bruits. Heart:    Rhythm regular. Tachycardic. No murmurs. Lungs:    Diminished  Abdomen:   Soft. Non-tender. Non-distended. Bowel sounds positive. No organomegaly or masses. No pain on palpation.   Newly distended  Extremities:    Peripheral pulses present. No peripheral edema. No ulcers. No cyanosis. No clubbing.   Neurologic:    Responsive but lethargic  Integumentary:  No rashes skin jaundice  Genitalia/Breast:  Hickman catheter    Medication:  Scheduled Meds:   magnesium sulfate  2,000 mg IntraVENous Once    phosphorus  500 mg Oral Once    sodium bicarbonate  1,300 mg Oral BID    [Held by provider] phytonadione  10 mg Oral Daily    vitamin D3  5,000 Units Oral Daily    insulin lispro  0-6 Units SubCUTAneous TID WC    pantoprazole  40 mg IntraVENous Daily    And    sodium chloride (PF)  10 mL IntraVENous Daily    midodrine  15 mg Oral TID WC    thiamine (VITAMIN B1) IVPB  100 mg IntraVENous Daily    rifaximin  550 mg Oral BID    sodium chloride flush  5-40 mL IntraVENous 2 times per day    piperacillin-tazobactam  3,375 mg IntraVENous Q8H     Continuous Infusions:   sodium chloride      sodium chloride      sodium chloride      sodium chloride      sodium chloride      dextrose      sodium chloride      sodium chloride Stopped (12/08/21 0806)       Objective Data:  CBC with Differential:    Lab Results   Component Value Date    WBC 4.4 12/08/2021    RBC 1.89 12/08/2021    HGB 6.2 12/08/2021    HCT 18.0 12/08/2021    PLT 39 12/08/2021    MCV 95.2 12/08/2021    MCH 32.8 12/08/2021    MCHC 34.4 12/08/2021    RDW 22.2 12/08/2021    METASPCT 0.9 11/20/2021    LYMPHOPCT 21.2 12/08/2021    MONOPCT 3.5 12/08/2021    MYELOPCT 2.7 12/06/2021    BASOPCT 0.9 12/08/2021    MONOSABS 0.18 12/08/2021    LYMPHSABS 0.92 12/08/2021    EOSABS 0.15 12/08/2021    BASOSABS 0.04 12/08/2021     CMP:    Lab Results   Component Value Date     12/08/2021    K 3.9 12/08/2021    K 5.8 11/26/2021     12/08/2021    CO2 17 12/08/2021    BUN 10 12/08/2021    CREATININE 0.8 12/08/2021    GFRAA >60 12/08/2021    LABGLOM >60 12/08/2021    GLUCOSE 129 12/08/2021    PROT 5.3 12/08/2021    LABALBU 3.4 12/08/2021 CALCIUM 8.6 12/08/2021    BILITOT 27.7 12/08/2021    ALKPHOS 62 12/08/2021    AST 45 12/08/2021    ALT 19 12/08/2021              Assessment:    · Acute hepatic encephalopathy  · Hepatorenal syndrome probable hepatopulmonary syndrome  · Acute hypoxic respiratory failure  · Hypotonic hypervolemic hyponatremia  · JULIO on CKD  · Pneumonia possible aspiration  · Decompensated cirrhosis  · Hyperbilirubinemia  · Chronic normocytic anemia  · Atrial fibrillation  · History of anxiety and depression  · History of alcohol abuse  · GERD        Plan:     · Patient is doing very poorly at the present time. The patient does have advanced liver disease with decompensated cirrhosis and not currently a candidate for liver transplant. Symptoms are suggestive of severe electrolyte imbalance with probable hepatorenal syndrome. He also is currently hypotensive. I have discussed the condition with the mother and fiancé along with Dr. Whitley Ritter have discussed his CODE STATUS and agree on no intubation or chest compressions. We have also discussed possibility of transition into hospice pending his status. Continue aggressive care for the next 24 to 48 hours and observe response  · Electrolyte imbalance of hyponatremia being followed by nephrology  · Metabolic acidosis being corrected by bicarb infusion  · GI is following regarding his hepatic cirrhosis  · Vasopressor has been instituted  · Salt poor albumin to expand volume  · Blood culture has been obtained.   Zosyn to cover for spontaneous bacterial peritonitis  · Patient is auto anticoagulated vitamin K supplementation  · Treat elevated ammonia level  · Paracentesis as needed  · Prognosis is very poor but he is improving short-term    -Consult hematology regarding severe coagulopathy  -We will try to discuss with GI regarding patient's discharge planning and care with family having multiple questions regarding this  -Patient has received 2 units of packed RBCs   -Patient will receive 2 units fresh frozen plasma today and did receive 2 yesterday. Patient will have an INR following this and hopefully get the paracentesis done performed.         Georges Ferguson DO, LEATHAA.C.O.I.  12/8/2021  12:23 PM

## 2021-12-08 NOTE — PROGRESS NOTES
ASSESSMENT/PLAN:       1. Hepatic encephalopathy type C grade II  Patient has been on 30 g of lactulose 2 times a days with rifaximin 550 mg p.o. 3 times a day as well.    Patient mentation has significantly improved compared to yesterday.  Patient is A&O x3 and is able to answer all question appropriately. Patient does not have asterixis on exam   We will continue to monitor mental status continue with lactulose and rifaximin.   This will be titrated to up to 2-3 soft bowel movement per day. -Supportive care     2. Alcohol induced cirrhosis, decompensated  Meld sodium score 35.    -Diuretics per nephrology  -Obtain paracentesis once INR acceptable range  Patient was educated and advised on the importance of abstain from alcohol completely otherwise he is long-term survival odds significantly decreased.  Patient seem to understand and is agreeable with this plan.       3. Renal failure - resolved   -acute kidney injury 2/2 renal hypoperfusion, unlikely hepatorenal syndrome, improved   -Okay to wean off the vassopressor  from GI point of view       4. Acute on chronic normocytic anemia  -Vital signs stable over signs of GI bleed on exam  -We will did trend down to 6.6 this a.m.  -Mostly a component of dilution with patient with large amount of ascites on exam  -Paracentesis once patient INR stabilized  -Continue with supportive care     5 volume overload. -Patient is + 17L for the admission   -Slightly exacerbated with attempt to reverse patient's INR with fresh frozen plasma  -Consider IV diuretic therapy will defer to nephrologist patient was admitted with  acute renal failure. Patient receiving blood products per hematology more FFP along with platelets. Repeat lab work this a.m. is pending. Patient continues to display no outward signs of GI bleeding.   Patient is +17 L for the admission and this is most likely playing a role in patient's continued anemia along with frequent blood draws. Patient's case discussed with patient and mother at the bedside and they voiced understanding of what was explained. Pt was discussed with  Dr. Rolande Collet, DO  GI Fellow   12/8/2021  8:48 AM      Pt seen and independently examined. Pertinent notes and lab work reviewed. Monitor reviewed showing sinus rhythm. D/w Dr. Vicki Brewer with physical exam and A&P. Discussed with patient - all questions answered - agreeable with the plan as delineated.     Shea Guerrero MD  12/8/2021  12:27 PM

## 2021-12-08 NOTE — PROGRESS NOTES
[facility]  Hematology/Oncology  Consult      Patient Name: Chilo Quintana  YOB: 1975  PCP: Giovanni Cleveland DO   Referring Provider:      Reason for Consultation:   Chief Complaint   Patient presents with    Altered Mental Status     HX of liver failure        Subjective:  Remains profoundly jaundiced and complaining of worsening abdominal distention and weight gain. No overt bleeding    History of Present Illness:  67-year-old man with recent diagnosis of alcoholic liver cirrhosis recently discharged from the hospital and readmitted with altered mental status, confusion and hepatic encephalopathy. On admission he was noted to be hyponatremic with renal failure. On admission his hemoglobin was 12.4 with a sodium of 124, BUN of 45 and creatinine of 3.9. He was deeply jaundiced with a bilirubin of 23.9. He also had moderate thrombocytopenia on admission with a platelet count of 946    His hemoglobin and platelet count have been steadily dropping since admission and on today's CBC his hemoglobin was down to 6.8 with a platelet count of 34. MCV was 95 with a normal WBC count of 5.5 with unremarkable differential except for mild lymphopenia. His serum creatinine has recovered and is down to 0.9. His AST remains slightly elevated however with a markedly elevated bilirubin of 26.4 and hypoproteinemia with normal albumin  Patient with progressive abdominal distention and will likely require paracentesis  We are consulted for his anemia, thrombocytopenia and coagulopathy.   Most recent PT was 76 seconds with an INR of 8.3  He is on daily subcutaneous vitamin K injection  He had been on Lavon-Synephrine drip but is now on midodrine  He continues on Xifaxan as well as antibiotics with Zosyn  He was also on Sandostatin drip without any overt active bleeding at this time  His B12 and folate levels were normal      Diagnostic Data:     Past Medical History:   Diagnosis Date    Anxiety     Depression     Esophagitis     ETOH abuse     Gastritis     GERD (gastroesophageal reflux disease)     Hematuria     Pancreatitis        Patient Active Problem List    Diagnosis Date Noted    Hepatorenal syndrome ([facility] [address] 11/26/2021    UGIB (upper gastrointestinal bleed) 11/16/2021    Bilirubinemia 11/14/2021    Hyperbilirubinemia     Hypomagnesemia     Paroxysmal atrial fibrillation (HCC)     Severe protein-calorie malnutrition ([address] 01/18/2019    Hypokalemia     Hematemesis 01/16/2019    Atrial fibrillation with rapid ventricular response ([address] 01/16/2019    Alcohol withdrawal syndrome without complication ([address] 28/58/2653    Lactic acidosis 01/16/2019    Anxiety     Depression     ETOH abuse     Alcohol-induced acute pancreatitis 05/07/2018        Past Surgical History:   Procedure Laterality Date    KNEE SURGERY      ACL    UPPER GASTROINTESTINAL ENDOSCOPY  05/09/2018    UPPER GASTROINTESTINAL ENDOSCOPY N/A 5/8/2018    EGD BIOPSY performed by Indy Haro MD at [address] [place] [address] 1/21/2019    EGD ESOPHAGOGASTRODUODENOSCOPY performed by Alpa Rojas DO at [address] [place] [address]  1/21/2019    EGD CONTROL HEMORRHAGE performed by Alpa Rojas DO at [address] [place] [address] History  No family history on file. Social History    TOBACCO:   reports that he has never smoked. He has never used smokeless tobacco.  ETOH:   reports current alcohol use. Home Medications  Prior to Admission medications    Medication Sig Start Date End Date Taking?  Authorizing Provider   spironolactone (ALDACTONE) 100 MG tablet Take 1 tablet by mouth daily 11/23/21   Josi Alcazar DO   furosemide (LASIX) 40 MG tablet Take 1 tablet by mouth daily 11/23/21   Josi Alcazar DO   lactulose [facility]) 10 GM/15ML solution Take 30 mLs by mouth 3 times daily 11/22/21   Josi Alcazra DO   potassium chloride (KLOR-CON M) 20 MEQ extended release tablet Take 1 tablet by mouth 3 times daily (with meals) 11/22/21   Julieta Burdick DO   rifaximin (XIFAXAN) 550 MG tablet Take 1 tablet by mouth 3 times daily 11/22/21   Julieta Burdick DO   omeprazole (PRILOSEC) 20 MG delayed release capsule Take 20-40 mg by mouth daily    Historical Provider, MD       Allergies  Allergies   Allergen Reactions    Moody [Macadamia Nut Oil] Swelling       Review of Systems: Relevant for intermittent episodes of confusion, disorientation, deep jaundice, fatigue, easy bruisability and abdominal bloating      Objective  /74   Pulse 83   Temp 98.3 °F (36.8 °C) (Oral)   Resp 18   Ht 6' 3\" (1.905 m)   Wt 212 lb (96.2 kg)   SpO2 97%   BMI 26.50 kg/m²     Physical Exam:   Performance Status:  General: Alert, resting comfortably, intermittently confused. Head and neck : PERRLA, EOMI . Sclera  icteric. Oropharynx : Clear  Neck: no JVD,  no adenopathy, no carotid bruit  Heart: Regular rate and regular rhythm, no murmur  Lungs: Clear to auscultation   Extremities: No edema,no cyanosis, no clubbing. Abdomen: Soft, non-tender;  Distended with ascites  Scrotal swelling with hematoma  Skin: Icteric with few ecchymosis  Neurologic:Cranial nerves grossly intact. No focal motor or sensory deficits .     Recent Laboratory Data-   Lab Results   Component Value Date    WBC 6.0 12/07/2021    HGB 6.7 (LL) 12/07/2021    HCT 18.9 (L) 12/07/2021    MCV 91.7 12/07/2021    PLT 39 (L) 12/07/2021    LYMPHOPCT 14.8 (L) 12/07/2021    RBC 2.06 (L) 12/07/2021    MCH 32.5 12/07/2021    MCHC 35.4 (H) 12/07/2021    RDW 21.0 (H) 12/07/2021    NEUTOPHILPCT 72.2 12/07/2021    MONOPCT 3.5 12/07/2021    BASOPCT 2.6 (H) 12/07/2021    NEUTROABS 4.32 12/07/2021    LYMPHSABS 0.90 (L) 12/07/2021    MONOSABS 0.24 12/07/2021    EOSABS 0.42 12/07/2021    BASOSABS 0.16 12/07/2021       Lab Results   Component Value Date     12/07/2021    K 3.9 12/07/2021     (H) 12/07/2021    CO2 16 (L) 12/07/2021    BUN 12 12/07/2021    CREATININE 0.9 12/07/2021    GLUCOSE 93 12/07/2021    CALCIUM 8.2 (L) 12/07/2021    PROT 5.2 (L) 12/07/2021    LABALBU 3.5 12/07/2021    BILITOT 24.2 (H) 12/07/2021    ALKPHOS 46 12/07/2021    AST 41 (H) 12/07/2021    ALT 18 12/07/2021    LABGLOM >60 12/07/2021    GFRAA >60 12/07/2021       Lab Results   Component Value Date    IRON 132 05/08/2018    TIBC 149 (L) 05/08/2018    FERRITIN 1,815 05/08/2018           Radiology-    US GALLBLADDER RUQ    Result Date: 11/28/2021  EXAMINATION: RIGHT UPPER QUADRANT ULTRASOUND 11/27/2021 5:24 pm COMPARISON: 11/14/2021 HISTORY: ORDERING SYSTEM PROVIDED HISTORY: decomp cirrhosis, CBD measurement TECHNOLOGIST PROVIDED HISTORY: Reason for exam:->decomp cirrhosis, CBD measurement What reading provider will be dictating this exam?->CRC FINDINGS: LIVER:  Nodular contour of the liver consistent with cirrhosis. The liver is somewhat heterogeneous but without obvious focal abnormality. There is apparent hepatofugal flow again identified within the main portal vein. BILIARY SYSTEM:  Gallbladder is incompletely distended with diffuse wall thickening. No sonographic Carrasco's sign. The wall thickening is nonspecific but could be reactive to ascites and hepatic parenchymal disease. No obvious cholelithiasis. Common bile duct is within normal limits measuring 6 mm. RIGHT KIDNEY: The right kidney is grossly unremarkable without evidence of hydronephrosis. PANCREAS:  Visualized portions of the pancreas are unremarkable. OTHER: Moderate ascites. Findings again consistent with cirrhotic liver. Ascites. Hepatofugal flow redemonstrated in the portal vein. Thickened gallbladder wall which may be reactive to the ascites and hepatic disease.      XR CHEST PORTABLE    Result Date: 11/26/2021  EXAMINATION: ONE XRAY VIEW OF THE CHEST 11/26/2021 7:15 am COMPARISON: One-view chest x-ray 11/14/2021 HISTORY: ORDERING SYSTEM PROVIDED HISTORY: SOB TECHNOLOGIST PROVIDED HISTORY: Reason for exam:->SOB FINDINGS: The cardiac silhouette is within normal limits. The mediastinal contours are within normal limits. The lungs are hypoinflated with resulting bronchovascular crowding. Mild basilar opacities appear new/increased. No significant pleural effusions or pneumothorax. Osseous degenerative changes are present. EKG leads overlie the chest.     Mild basilar opacities are suspicious for pneumonia. This appearance can be seen with COVID-19. XR CHEST 1 VIEW    Result Date: 11/14/2021  EXAMINATION: ONE XRAY VIEW OF THE CHEST 11/14/2021 10:50 pm COMPARISON: Chest series from July 1, 2020 HISTORY: ORDERING SYSTEM PROVIDED HISTORY: Rule out acute pulmonary pathology/infection TECHNOLOGIST PROVIDED HISTORY: Reason for exam:->Rule out acute pulmonary pathology/infection FINDINGS: Elevation of the right hemidiaphragm. Possible right lower lung ill-defined opacity. Cannot exclude small pleural effusion on the right. Left lung is clear. No pneumothorax. Cardiomediastinal silhouette and pulmonary vascularity appear within normal limits. Osseous and thoracic soft tissue structures demonstrate no acute findings. Elevation of the right hemidiaphragm and possible right lower lung opacity. Small right pleural effusion may be present. RECOMMENDATION: (Recommend upright PA and lateral chest radiographs 6-8 weeks after resolution of patient's symptoms to ensure complete resolution of radiographic findings.)     US ABDOMEN LIMITED    Result Date: 11/26/2021  EXAMINATION: RIGHT UPPER QUADRANT ULTRASOUND 11/26/2021 5:54 pm COMPARISON: None. HISTORY: ORDERING SYSTEM PROVIDED HISTORY: ascites/JULIO TECHNOLOGIST PROVIDED HISTORY: Reason for exam:->ascites/JULIO What reading provider will be dictating this exam?->CRC FINDINGS: Superficial sonographic evaluation of the 4 quadrants of the abdomen performed using a combination of grayscale technique.   Imaging done to assess for the presence of intra-abdominal ascites. Patient was laying on their left-side. Moderate volume simple appearing intra-abdominal ascites in the left abdomen. Moderate volume intra-abdominal ascites. US ABDOMEN LIMITED Specify organ? LIVER, GALLBLADDER, PANCREAS    Result Date: 11/14/2021  EXAMINATION: RIGHT UPPER QUADRANT ULTRASOUND 11/14/2021 10:20 pm COMPARISON: 01/18/2019 HISTORY: ORDERING SYSTEM PROVIDED HISTORY: Jaundice TECHNOLOGIST PROVIDED HISTORY: Reason for exam:->Jaundice Specify organ?->LIVER Specify organ?->GALLBLADDER Specify organ?->PANCREAS What reading provider will be dictating this exam?->CRC FINDINGS: LIVER:  The liver appears cirrhotic. No focal liver lesions seen. There is no intrahepatic biliary dilatation. Doppler evaluation of the liver demonstrates reversal flow within the portal vein. This is a new finding as compared to 2019 BILIARY SYSTEM:  The gallbladder is very distended. There is thickening of the gallbladder wall measuring up to 11 mm. No cholelithiasis seen. There is pericholecystic fluid which may relate to ascites rather than acute inflammatory fluid. Common bile duct is within normal limits measuring 3.6 mm. RIGHT KIDNEY: The right kidney is grossly unremarkable without evidence of hydronephrosis. PANCREAS:  Limited visualization due to bowel gas. OTHER: Moderate ascites. 1. Cirrhotic liver with moderate ascites. 2. Suspected hepatofugal flow in the portal vein. 3. Distended gallbladder with marked wall thickening. No cholelithiasis seen. There is pericholecystic fluid although this probably represents ascites rather than inflammatory fluid.      IR US GUIDED PARACENTESIS    Result Date: 11/15/2021  PROCEDURE: PARACENTESIS WITHOUT IMAGE GUIDANCE US ABDOMEN LIMITED 11/15/2021 HISTORY: ORDERING SYSTEM PROVIDED HISTORY: Rule out SBP TECHNOLOGIST PROVIDED HISTORY: Reason for exam:->Rule out SBP TECHNIQUE: Informed consent was obtained after a detailed explanation of the procedure including risks, benefits, and alternatives. Universal protocol was followed. A limited ultrasound of the abdomen was performed. The right abdomen was prepped and draped in sterile fashion and local anesthesia was achieved with lidocaine. An 8 Icelandic needle sheath was advanced into ascites and paracentesis was performed. The patient tolerated the procedure well. FINDINGS: Limited ultrasound of the abdomen demonstrates ascites. A total of 6400 cc was removed. Successful paracentesis. US DUP ABD PEL RETRO SCROT COMPLETE    Result Date: 11/14/2021  EXAMINATION: DOPPLER EVALUATION OF THE PELVIS 11/14/2021 10:20 pm COMPARISON: None. HISTORY: ORDERING SYSTEM PROVIDED HISTORY: Rule out PVT TECHNOLOGIST PROVIDED HISTORY: Reason for exam:->Rule out PVT What reading provider will be dictating this exam?->CRC FINDINGS: Color flow and spectral waveform evaluation of hepatic vasculature performed. The visualized portal vein appears patent. The there is no evidence for portal venous thrombus. There is, however, apparent reversal flow within the portal vein. Appropriate hepatic arterial flow demonstrated. Hepatic veins are not image. There is a cirrhotic appearing liver with moderate ascites. There is gallbladder distension with wall thickening. Doppler evaluation demonstrates hepatofugal portal venous flow. No evidence for portal venous thrombus. Moderate ascites. Gallbladder distension with wall thickening. ASSESSMENT/PLAN :   [id number] man  Alcoholic liver cirrhosis  Hepatorenal syndrome with return of renal function to normal  Hepatic encephalopathy resolved  Hyponatremia resolved  Severe jaundice with elevated bilirubin.   Ultrasound of abdomen shows nodular contour of the liver with thickening of the gallbladder wall with flow demonstrated in the portal vein and moderate ascites    Severe anemia and thrombocytopenia multifactorial related to myelosuppression by alcohol with a component of hemodilution as well as peripheral sequestration by portal hypertension and hypersplenism  There is no overt GI bleed  To check iron studies and reticulocyte count. Coagulopathy with markedly elevated PT/INR related to severe hepatocellular disease and liver failure  Has been on daily vitamin K subcu but will require 2 units of FFP today and they need to be repeated tomorrow in anticipation of potential paracentesis  In view of scrotal hematoma and platelet dysfunction due to prior uremia will also transfuse with 1 dose of platelets    96/6/74  - CBC with Hgb 6.7. Platelets 39. S/p 3 pRBC and 2 FFP  - INR still 5. On Vit K. Additional FFP may be needed to get INR to safe range for paracentesis. Repeat INR pending  - 1 unit platelets pending  - Bili stable at 24. Cytopenias due to both myelosuppression from alcohol consumption and liver cirrhosis/hypersplenism  - Will continue to follow    12/8/2021  Remains deeply icteric  Status post transfusion yesterday with packed RBCs/FFP/platelets  His labs today show further rising bilirubin to 27.7. No significant change in CBC despite multiple transfusions. His hemoglobin remains low at 6.2 and platelet count is 39.   PT/INR pending      Patient with coagulopathy related to liver failure from alcoholic liver cirrhosis  He has short term response to FFP's and will receive additional FFP today in an attempt to lower his INR prior to possible palliative paracentesis  His anemia is multifactorial and related to severe myelosuppression and alcohol injury to bone marrow as well as a component of peripheral sequestration by hypersplenism secondary to portal hypertension and advanced liver cirrhosis  Prognosis very poor          Marie Wilson MD  Electronically signed 12/8/2021 at 9:40 AM

## 2021-12-09 NOTE — PROGRESS NOTES
Spoke with lab concerning patient's labs that were due to be drawn at [id number] post blood product infusions. Lab reports that labs have been drawn however she will need to look for the vials and find out why they haven't resulted yet and will call back to verify.

## 2021-12-09 NOTE — PROGRESS NOTES
Made Dr Binta Zamora aware Hgb 6.0, PT 50.9, INR 4.3, new order for 1U PRBC and 2UFFP, nurse made aware

## 2021-12-09 NOTE — PROGRESS NOTES
PROGRESS NOTE  By Lamont Eubanks M.D. [facility]  Dr. Valerie Rutherford M.D.,  Dr. Loraine Vance M.D.,   Dr. Cornelius Rowe D.O.,  Dr. Oliva Quinones M.D.,  Dr. Merissa Garsia D.O.,  Chintan Hoffman D.O. [place]  40 y.o.  male    SUBJECTIVE:  Denies abdominal pain. Denies nausea vomiting    OBJECTIVE:    /71   Pulse 91   Temp 98 °F (36.7 °C) (Axillary)   Resp 19   Ht 6' 3\" (1.905 m)   Wt 212 lb (96.2 kg)   SpO2 99%   BMI 26.50 kg/m²     General: NAD/ male. AAO x3  HEENT: Moist oral mucosa/persistent scleral icterus  Neck: Supple with trachea midline  Chest: CTA B  Cor: Regular  Abd.: Soft/distended. Nontender  Extr.:  Decreased muscle tone and bulk throughout  Skin: Warm and dry. Generalized icterus       DATA:    Monitor data reviewed -sinus rhythm noted. Stool (measured) : 100 mL  Lab Results   Component Value Date    WBC 4.0 12/09/2021    RBC 1.81 12/09/2021    HGB 6.0 12/09/2021    HCT 17.5 12/09/2021    MCV 96.7 12/09/2021    MCH 33.1 12/09/2021    MCHC 34.3 12/09/2021    RDW 22.6 12/09/2021    PLT 46 12/09/2021    MPV 10.4 12/09/2021     Lab Results   Component Value Date     12/09/2021    K 3.8 12/09/2021    K 5.8 11/26/2021     12/09/2021    CO2 17 12/09/2021    BUN 10 12/09/2021    CREATININE 0.8 12/09/2021    CALCIUM 8.8 12/09/2021    PROT 5.6 12/09/2021    LABALBU 3.8 12/09/2021    BILITOT 30.2 12/09/2021    ALKPHOS 68 12/09/2021    AST 45 12/09/2021    ALT 19 12/09/2021     Lab Results   Component Value Date    LIPASE 68 11/14/2021     Lab Results   Component Value Date    AMYLASE 91 01/16/2019         ASSESSMENT/PLAN:    1. Hepatic encephalopathy type C grade II  -Patient has been on 30 g of lactulose 2 times a days with rifaximin 550 mg p.o. 3 times a day as well.    Mental status appears to be at baseline -  patient is A&O x3 and is able to answer all question appropriately.  continue with lactulose and rifaximin.  -  titrate to up to 2-3 soft bowel movement per day. -Supportive care     2. Alcohol induced cirrhosis, decompensated  Meld sodium score 35.    -Diuretics per nephrology  -Obtain paracentesis once INR acceptable range -4.3 today  Patient was educated and advised on the importance of abstain from alcohol completely otherwise he is long-term survival odds significantly decreased.  Patient seem to understand and is agreeable with this plan.        3.  Renal failure - resolved   -acute kidney injury 2/2 renal hypoperfusion, unlikely hepatorenal syndrome, improved   -Okay to wean off the vassopressor  from GI point of view        4. Acute on chronic normocytic anemia  -Vital signs stable over signs of GI bleed on exam  -Further decreased to 6 g/dL today -receiving blood transfusion  -Likely component of dilution with patient with large amount of ascites on exam -cannot completely exclude gastrointestinal source of blood loss  -Paracentesis once patient INR stabilized  -Continue with supportive care  -Consider further evaluation with upper endoscopy tomorrow     Above has been discussed with the patient all questions answered to satisfaction. He is agreeable with the plan as delineated including plan for endoscopy as above.       Isabelle Seo MD  12/9/2021  11:51 AM    NOTE:  This report was transcribed using voice recognition software. Every effort was made to ensure accuracy; however, inadvertent computerized transcription errors may be present.

## 2021-12-09 NOTE — DISCHARGE SUMMARY
Department of Internal Medicine  ds    PCP: Dee Lund DO  Admitting Physician: Dr. Keller Billsarah  Consultants: Gastroenterology  Date of Service: 11/14/2021    CHIEF COMPLAINT: Abdominal distention and discomfort and hematemesis/coffee-ground emesis    HISTORY OF PRESENT ILLNESS:    Patient is 50-year-old male who presented to the ED due to abdominal distention with associated hematemesis and coffee-ground emesis. Patient states that she has a history of alcoholic hepatitis. That over the last month he has noticed increased abdominal distention. Initially he started out having abdominal pain. Pain became bloating. Now he is feeling more distended and when his stay during the ED he did develop some generalized abdominal discomfort. He does admit to associated hematemesis and coffee-ground emesis. He denies any melena or bleeding per rectum. Patient states that his symptoms began after he resumed alcohol use. His jaundice returned and worsened. As such he quit drinking again about 2 weeks ago. He denies any fever or chills. Denies any chest pain. Denies any shortness of breath. 11/15/2021  Patient seen and examined in the ED hold area. Patient feels better today than yesterday. Patient has little discomfort around paracentesis site. Patient is hungry. He denies any nausea vomiting or shortness of breath. Patient has been n.p.o. for EGD today but was canceled recently. Potassium 3.3 today with BUN/creatinine 11/0.8. Patient's total bilirubin is still elevated at 19.7 with moderate elevation of transaminase. Hemoglobin 1.6 with platelet count 88. INR is 4.3 today. Temperature 98.1 with heart rate 99 and O2 sat 96% on room air at rest.    11/16/2021  Patient seen examined in [facility]. Patient is little bit more alert today. Patient with only fair appetite. Patient has a vague abdominal discomfort but improved from admission. Patient still fairly weak.   BUN/creatinine is 12/1.0 with serum sodium 131 potassium 3.3. Total bili was 20.2 today with hemoglobin 12.2 and WBC 11.3. Temperature 96.9 with heart rate of 100 and blood pressure 131/80. O2 sat 96% room air at rest.  GI note reviewed. Patient with increased INR and we are attempting to get it down with vitamin K, may need fresh frozen plasma. 11/17/2021  Patient seen examined on [address]. He looks about the same. Patient complains of vague abdominal discomfort. Patient has poor appetite. He denies any chest pain or dizziness. Serum sodium 131 potassium 2.6 today. BUN/creatinine 13/1.0. Total bili is 21.5 with elevation of transaminase. Patient's INR went up to 3.9. Hemoglobin 1.9 with platelet count 81 WBC 10.3. Temperature 98.3 with blood pressure 119/84. O2 sat 95% room air at rest.    11/18/2021  Patient seen examined on [address]. Patient continues to get a little bit drowsier every day. Patient currently denies any chest or abdominal pain. Patient occasionally has some abdominal discomfort. He denies any nausea vomiting but does have poor appetite. Serum potassium is still low this morning at 2.7 after patient receiving multiple doses of IV KCl along with oral KCl. Patient's bilirubin is increased again at 22.9 with a serum ammonia yesterday at 98. WBC is 9.4 with hemoglobin 12.4. Platelet count 78. INR 3.9. Temperature 98 with heart rate of 90 and blood pressure 126/64. O2 sat 95% room air at rest.    11/19/2021  Patient seen examined on [address]. Patient sitting up in chair currently still has some tremors in his arms with intention. He denies any chest pain. Patient has very mild abdominal discomfort with some mildmoderate abdominal distention. He is alert and oriented x3 at this time. BUN/creatinine 14/1.07 potassium 3.0. Serum ammonia 110 with a total bili 22.2. [place] 12.1 with platelet count of 73. Temperature 98.1 with heart rate in 90 and blood pressure 106/68. O2 sat 97% room air at rest.  Urine output is very good. / note reviewed and apparently the patient is refusing transfer to extended care facility for rehab but [facility] health care agency cannot see him until Tuesday. 11/20/2021  Patient seen and examined on PCU. Patient seems to be little bit more alert again today. Patient's and tremors are improved. Patient denies any chest pain. There is no unusual shortness of breath. Patient has some very mild abdominal discomfort. Temperatures ranges 97. 6nine 9.2. Heart rate 88 with blood pressure 107/56. O2 sat 95% on room air at rest.    11/21/2021  Patient seen examined on PCU. Patient seems about the same. The patient is alert and oriented x3. Patient has less hand arm tremor. Sodium is 125 BUN/creatinine 16/1.2. Total bilirubin 22.8. Platelet count 68 with hemoglobin 0.6. INR is 4.2. Temperature 98.4 with heart rate 94 blood pressure 113/67. O2 sat 96% on room air at rest.  Discharge home when okay with GI.    11/22/2021  Patient seen examined on PCU. Patient refusing to go to extended care facility for rehab. Patient understands that he still very weak but he says that he can do it with the help of his girlfriend and family. On the other hand the family called in and talked with  and stated that they do not think he is safe to go home and that he should go to rehab. This was repeated to the patient and he reinforced the idea that he does not half to go to rehab if he does not want to and he wants to go home. WBC 10.6 hemoglobin 11.7. Platelet count stable 72. Temperature 99.3 with heart rate 87 blood pressure 102/59. O2 sat 93% on room air at rest.  Urinary output is good. GI note and  note reviewed.     Patient stable for dischargerefusing to go to extended-care facility for rehab      PAST MEDICAL Hx:  Past Medical History:   Diagnosis Date    Anxiety     Depression     Esophagitis     ETOH abuse     Gastritis     GERD (gastroesophageal reflux disease)     Hematuria     Pancreatitis        PAST SURGICAL Hx:   Past Surgical History:   Procedure Laterality Date    KNEE SURGERY      ACL    UPPER GASTROINTESTINAL ENDOSCOPY  05/09/2018    UPPER GASTROINTESTINAL ENDOSCOPY N/A 5/8/2018    EGD BIOPSY performed by Rachel Cook MD at [address] 1/21/2019    EGD ESOPHAGOGASTRODUODENOSCOPY performed by Hernandez Jerome DO at [facility]  1/21/2019    EGD CONTROL HEMORRHAGE performed by Hernandez Jerome DO at [address] [facility] Hx:  No family history on file. HOME MEDICATIONS:  Prior to Admission medications    Medication Sig Start Date End Date Taking?  Authorizing Provider   spironolactone (ALDACTONE) 100 MG tablet Take 1 tablet by mouth daily 11/23/21  Yes Dorota Arrow, DO   furosemide (LASIX) 40 MG tablet Take 1 tablet by mouth daily 11/23/21  Yes Dorota Arrow, DO   lactulose (CHRONULAC) 10 GM/15ML solution Take 30 mLs by mouth 3 times daily 11/22/21  Yes Dorota Arrow, DO   potassium chloride (KLOR-CON M) 20 MEQ extended release tablet Take 1 tablet by mouth 3 times daily (with meals) 11/22/21  Yes Dorota Arrow, DO   rifaximin (XIFAXAN) 550 MG tablet Take 1 tablet by mouth 3 times daily 11/22/21  Yes Dorota Arrow, DO   omeprazole (PRILOSEC) 20 MG delayed release capsule Take 20-40 mg by mouth daily   Yes Historical Provider, MD       ALLERGIES:  Francisco Cranker nut oil]    SOCIAL Hx:  Social History     Socioeconomic History    Marital status:      Spouse name: Not on file    Number of children: 1    Years of education: Not on file    Highest education level: Not on file   Occupational History    Not on file   Tobacco Use    Smoking status: Never Smoker    Smokeless tobacco: Never Used   Vaping Use    Vaping Use: Never used   Substance and Sexual Activity    Alcohol use: Yes     Comment: 5th of liquour orthopnea. Gastrointestinal:   Denies nausea, vomiting, diarrhea, or constipation. Denies any abdominal pain. Denies change in bowel habits or stools. Genito-Urinary:    Denies any urgency, frequency, hematuria. Voiding without difficulty. Musculoskeletal:   Denies joint pain, joint stiffness, joint swelling or muscle pain    Neurology:    Denies any headache or focal neurological deficits. No weakness or paresthesia. Derm:    Denies any rashes, ulcers, or excoriations. Denies bruising. Extremities:   Denies any lower extremity swelling or edema. PHYSICAL EXAM: Abnormal findings noted  VITALS:  Vitals:    11/22/21 1545   BP: 116/70   Pulse: 86   Resp: 16   Temp: 97.7 °F (36.5 °C)   SpO2: 96%         CONSTITUTIONAL:    Awake, alert, cooperative, no apparent distress, and appears stated age    EYES:     EOMI, sclera clear, conjunctiva normal    ENT:    Normocephalic, atraumatic,  External ears without lesions. NECK:    Supple, symmetrical, trachea midline, no JVD    HEMATOLOGIC/LYMPHATICS:    No cervical lymphadenopathy and no supraclavicular lymphadenopathy    LUNGS:    Symmetric. No increased work of breathing, good air exchange, clear to auscultation bilaterally, no wheezes, rhonchi, or rales,     CARDIOVASCULAR:    Normal apical impulse, regular rate and rhythm, normal S1 and S2, no S3 or S4, and no murmur noted    ABDOMEN:    soft, non-distended,  Patient's abdomen is distended  Patient does have abdominal discomfort    MUSCULOSKELETAL:    There is no redness, warmth, or swelling of the joints. NEUROLOGIC:    Awake, alert, oriented to name, place and time. SKIN:    No bruising or bleeding. No redness, warmth, or swelling    EXTREMITIES:    Peripheral pulses present. No edema, cyanosis, or swelling.     LINES/CATHETERS     LABORATORY DATA:  CBC with Differential:    Lab Results   Component Value Date    WBC 4.0 12/09/2021    RBC 1.81 12/09/2021    HGB 6.0 12/09/2021    HCT 17.5 12/09/2021    PLT 46 12/09/2021    MCV 96.7 12/09/2021    MCH 33.1 12/09/2021    MCHC 34.3 12/09/2021    RDW 22.6 12/09/2021    METASPCT 0.9 11/20/2021    LYMPHOPCT 22.3 12/09/2021    MONOPCT 4.5 12/09/2021    MYELOPCT 2.7 12/06/2021    BASOPCT 1.8 12/09/2021    MONOSABS 0.20 12/09/2021    LYMPHSABS 0.88 12/09/2021    EOSABS 0.11 12/09/2021    BASOSABS 0.07 12/09/2021     CMP:    Lab Results   Component Value Date     12/09/2021    K 3.8 12/09/2021    K 5.8 11/26/2021     12/09/2021    CO2 17 12/09/2021    BUN 10 12/09/2021    CREATININE 0.8 12/09/2021    GFRAA >60 12/09/2021    LABGLOM >60 12/09/2021    GLUCOSE 76 12/09/2021    PROT 5.6 12/09/2021    LABALBU 3.8 12/09/2021    CALCIUM 8.8 12/09/2021    BILITOT 30.2 12/09/2021    ALKPHOS 68 12/09/2021    AST 45 12/09/2021    ALT 19 12/09/2021       ASSESSMENT/PLAN:  1. Decompensated hepatic cirrhosis  2. Frequently recurring ascites  3. History of alcohol abuse  4. Alcoholic hepatitis  5. Hepatic encephalopathy  6. Hematemesis   7. Gallbladder distention with wall thickening  8. Right pleural effusion  9. Anxiety and depression  10. GERD  11. History of pancreatitis  12. History of paroxysmal atrial fibrillation  13.  Severe hyperbilirubinemia      PLAN:  Discharge home with home health    Lasix 40 mg p.o. daily  KCl 20 mEq 1 3 times daily  Xifaxan 550 mg 3 times daily  Spironolactone 100 mg daily    Lactulose 30 cc 3 times daily    Stop amiodarone, metoprolol XL, Xarelto    Patient to follow-up with GI in 1 week    Patient follow-up primary care physician in 1 week or as directed    Patient was strongly directed not to consume any more alcoholic beverages    Patient's long-term prognosis is very poor      Bimal Goyal DO  4:23 PM  12/9/2021

## 2021-12-09 NOTE — PROGRESS NOTES
[facility]  Hematology/Oncology  Consult      Patient Name: Gregory Purdy  YOB: 1975  PCP: Maeve Goncalves DO   Referring Provider:      Reason for Consultation:   Chief Complaint   Patient presents with    Altered Mental Status     HX of liver failure        Subjective:  Remains profoundly jaundiced and complaining of worsening abdominal distention and weight gain. Hemoglobin dropped to 6. S/p blood transfusion. Reports worsening of scrotal ecchymosis for the past couple of days. History of Present Illness:  27-year-old man with recent diagnosis of alcoholic liver cirrhosis recently discharged from the hospital and readmitted with altered mental status, confusion and hepatic encephalopathy. On admission he was noted to be hyponatremic with renal failure. On admission his hemoglobin was 12.4 with a sodium of 124, BUN of 45 and creatinine of 3.9. He was deeply jaundiced with a bilirubin of 23.9. He also had moderate thrombocytopenia on admission with a platelet count of 316    His hemoglobin and platelet count have been steadily dropping since admission and on today's CBC his hemoglobin was down to 6.8 with a platelet count of 34. MCV was 95 with a normal WBC count of 5.5 with unremarkable differential except for mild lymphopenia. His serum creatinine has recovered and is down to 0.9. His AST remains slightly elevated however with a markedly elevated bilirubin of 26.4 and hypoproteinemia with normal albumin  Patient with progressive abdominal distention and will likely require paracentesis  We are consulted for his anemia, thrombocytopenia and coagulopathy.   Most recent PT was 76 seconds with an INR of 8.3  He is on daily subcutaneous vitamin K injection  He had been on Lavon-Synephrine drip but is now on midodrine  He continues on Xifaxan as well as antibiotics with Zosyn  He was also on Sandostatin drip without any overt active bleeding at this time  His B12 and folate levels were normal      Diagnostic Data:     Past Medical History:   Diagnosis Date    Anxiety     Depression     Esophagitis     ETOH abuse     Gastritis     GERD (gastroesophageal reflux disease)     Hematuria     Pancreatitis        Patient Active Problem List    Diagnosis Date Noted    Hepatorenal syndrome ([facility] [address] 11/26/2021    UGIB (upper gastrointestinal bleed) 11/16/2021    Bilirubinemia 11/14/2021    Hyperbilirubinemia     Hypomagnesemia     Paroxysmal atrial fibrillation (HCC)     Severe protein-calorie malnutrition ([address] 01/18/2019    Hypokalemia     Hematemesis 01/16/2019    Atrial fibrillation with rapid ventricular response ([address] 01/16/2019    Alcohol withdrawal syndrome without complication ([address] 77/21/5966    Lactic acidosis 01/16/2019    Anxiety     Depression     ETOH abuse     Alcohol-induced acute pancreatitis 05/07/2018        Past Surgical History:   Procedure Laterality Date    KNEE SURGERY      ACL    UPPER GASTROINTESTINAL ENDOSCOPY  05/09/2018    UPPER GASTROINTESTINAL ENDOSCOPY N/A 5/8/2018    EGD BIOPSY performed by Anthony Coreas MD at [address] [place] [address] Avenue N/A 1/21/2019    EGD ESOPHAGOGASTRODUODENOSCOPY performed by Anthony Tolbert DO at [address] [place] [address] Avenue  1/21/2019    EGD CONTROL HEMORRHAGE performed by Anthony Tolbert DO at [facility] History  No family history on file. Social History    TOBACCO:   reports that he has never smoked. He has never used smokeless tobacco.  ETOH:   reports current alcohol use. Home Medications  Prior to Admission medications    Medication Sig Start Date End Date Taking?  Authorizing Provider   spironolactone (ALDACTONE) 100 MG tablet Take 1 tablet by mouth daily 11/23/21   Fuentes Prieto DO   furosemide (LASIX) 40 MG tablet Take 1 tablet by mouth daily 11/23/21   Fuentes Prieto DO   lactulose (CHRONULAC) 10 GM/15ML solution Take 30 mLs by mouth 3 times daily 11/22/21   Zachary Langley,    potassium chloride (KLOR-CON M) 20 MEQ extended release tablet Take 1 tablet by mouth 3 times daily (with meals) 11/22/21   Zachary Langley, DO   rifaximin (XIFAXAN) 550 MG tablet Take 1 tablet by mouth 3 times daily 11/22/21   Zachary Langley, DO   omeprazole (PRILOSEC) 20 MG delayed release capsule Take 20-40 mg by mouth daily    Historical Provider, MD       Allergies  Allergies   Allergen Reactions    Osteen [Macadamia Nut Oil] Swelling       Review of Systems: Relevant for intermittent episodes of confusion, disorientation, deep jaundice, fatigue, easy bruisability and abdominal bloating      Objective  /77   Pulse 91   Temp 98 °F (36.7 °C) (Axillary)   Resp 19   Ht 6' 3\" (1.905 m)   Wt 212 lb (96.2 kg)   SpO2 99%   BMI 26.50 kg/m²     Physical Exam:   Performance Status:  General: Alert, resting comfortably, intermittently confused. Head and neck : PERRLA, EOMI . Sclera  icteric. Oropharynx : Clear  Neck: no JVD,  no adenopathy, no carotid bruit  Heart: Regular rate and regular rhythm, no murmur  Lungs: Clear to auscultation   Extremities: No edema,no cyanosis, no clubbing. Abdomen: Soft, non-tender;  Distended with ascites  Scrotal swelling with hematoma  Skin: Icteric with few ecchymosis  Neurologic:Cranial nerves grossly intact. No focal motor or sensory deficits .     Recent Laboratory Data-   Lab Results   Component Value Date    WBC 4.0 (L) 12/09/2021    HGB 6.0 (LL) 12/09/2021    HCT 17.5 (L) 12/09/2021    MCV 96.7 12/09/2021    PLT 46 (L) 12/09/2021    LYMPHOPCT 22.3 12/09/2021    RBC 1.81 (L) 12/09/2021    MCH 33.1 12/09/2021    MCHC 34.3 12/09/2021    RDW 22.6 (H) 12/09/2021    NEUTOPHILPCT 68.8 12/09/2021    MONOPCT 4.5 12/09/2021    BASOPCT 1.8 12/09/2021    NEUTROABS 2.76 12/09/2021    LYMPHSABS 0.88 (L) 12/09/2021    MONOSABS 0.20 12/09/2021    EOSABS 0.11 12/09/2021    BASOSABS 0.07 12/09/2021       Lab Results   Component Value 11/14/2021 HISTORY: ORDERING SYSTEM PROVIDED HISTORY: SOB TECHNOLOGIST PROVIDED HISTORY: Reason for exam:->SOB FINDINGS: The cardiac silhouette is within normal limits. The mediastinal contours are within normal limits. The lungs are hypoinflated with resulting bronchovascular crowding. Mild basilar opacities appear new/increased. No significant pleural effusions or pneumothorax. Osseous degenerative changes are present. EKG leads overlie the chest.     Mild basilar opacities are suspicious for pneumonia. This appearance can be seen with COVID-19. XR CHEST 1 VIEW    Result Date: 11/14/2021  EXAMINATION: ONE XRAY VIEW OF THE CHEST 11/14/2021 10:50 pm COMPARISON: Chest series from July 1, 2020 HISTORY: ORDERING SYSTEM PROVIDED HISTORY: Rule out acute pulmonary pathology/infection TECHNOLOGIST PROVIDED HISTORY: Reason for exam:->Rule out acute pulmonary pathology/infection FINDINGS: Elevation of the right hemidiaphragm. Possible right lower lung ill-defined opacity. Cannot exclude small pleural effusion on the right. Left lung is clear. No pneumothorax. Cardiomediastinal silhouette and pulmonary vascularity appear within normal limits. Osseous and thoracic soft tissue structures demonstrate no acute findings. Elevation of the right hemidiaphragm and possible right lower lung opacity. Small right pleural effusion may be present. RECOMMENDATION: (Recommend upright PA and lateral chest radiographs 6-8 weeks after resolution of patient's symptoms to ensure complete resolution of radiographic findings.)     US ABDOMEN LIMITED    Result Date: 11/26/2021  EXAMINATION: RIGHT UPPER QUADRANT ULTRASOUND 11/26/2021 5:54 pm COMPARISON: None.  HISTORY: ORDERING SYSTEM PROVIDED HISTORY: ascites/JULIO TECHNOLOGIST PROVIDED HISTORY: Reason for exam:->ascites/JULIO What reading provider will be dictating this exam?->CRC FINDINGS: Superficial sonographic evaluation of the 4 quadrants of the abdomen performed using a combination of grayscale technique. Imaging done to assess for the presence of intra-abdominal ascites. Patient was laying on their left-side. Moderate volume simple appearing intra-abdominal ascites in the left abdomen. Moderate volume intra-abdominal ascites. US ABDOMEN LIMITED Specify organ? LIVER, GALLBLADDER, PANCREAS    Result Date: 11/14/2021  EXAMINATION: RIGHT UPPER QUADRANT ULTRASOUND 11/14/2021 10:20 pm COMPARISON: 01/18/2019 HISTORY: ORDERING SYSTEM PROVIDED HISTORY: Jaundice TECHNOLOGIST PROVIDED HISTORY: Reason for exam:->Jaundice Specify organ?->LIVER Specify organ?->GALLBLADDER Specify organ?->PANCREAS What reading provider will be dictating this exam?->CRC FINDINGS: LIVER:  The liver appears cirrhotic. No focal liver lesions seen. There is no intrahepatic biliary dilatation. Doppler evaluation of the liver demonstrates reversal flow within the portal vein. This is a new finding as compared to 2019 BILIARY SYSTEM:  The gallbladder is very distended. There is thickening of the gallbladder wall measuring up to 11 mm. No cholelithiasis seen. There is pericholecystic fluid which may relate to ascites rather than acute inflammatory fluid. Common bile duct is within normal limits measuring 3.6 mm. RIGHT KIDNEY: The right kidney is grossly unremarkable without evidence of hydronephrosis. PANCREAS:  Limited visualization due to bowel gas. OTHER: Moderate ascites. 1. Cirrhotic liver with moderate ascites. 2. Suspected hepatofugal flow in the portal vein. 3. Distended gallbladder with marked wall thickening. No cholelithiasis seen. There is pericholecystic fluid although this probably represents ascites rather than inflammatory fluid.      IR US GUIDED PARACENTESIS    Result Date: 11/15/2021  PROCEDURE: PARACENTESIS WITHOUT IMAGE GUIDANCE US ABDOMEN LIMITED 11/15/2021 HISTORY: ORDERING SYSTEM PROVIDED HISTORY: Rule out SBP TECHNOLOGIST PROVIDED HISTORY: Reason for exam:->Rule out SBP thrombocytopenia multifactorial related to myelosuppression by alcohol with a component of hemodilution as well as peripheral sequestration by portal hypertension and hypersplenism  There is no overt GI bleed  To check iron studies and reticulocyte count. Coagulopathy with markedly elevated PT/INR related to severe hepatocellular disease and liver failure  Has been on daily vitamin K subcu but will require 2 units of FFP today and they need to be repeated tomorrow in anticipation of potential paracentesis  In view of scrotal hematoma and platelet dysfunction due to prior uremia will also transfuse with 1 dose of platelets    70/6/58  - CBC with Hgb 6.7. Platelets 39. S/p 3 pRBC and 2 FFP  - INR still 5. On Vit K. Additional FFP may be needed to get INR to safe range for paracentesis. Repeat INR pending  - 1 unit platelets pending  - Bili stable at 24. Cytopenias due to both myelosuppression from alcohol consumption and liver cirrhosis/hypersplenism  - Will continue to follow    12/8/2021  Remains deeply icteric  Status post transfusion yesterday with packed RBCs/FFP/platelets  His labs today show further rising bilirubin to 27.7. No significant change in CBC despite multiple transfusions. His hemoglobin remains low at 6.2 and platelet count is 39. PT/INR pending      Patient with coagulopathy related to liver failure from alcoholic liver cirrhosis  He has short term response to FFP's and will receive additional FFP today in an attempt to lower his INR prior to possible palliative paracentesis  His anemia is multifactorial and related to severe myelosuppression and alcohol injury to bone marrow as well as a component of peripheral sequestration by hypersplenism secondary to portal hypertension and advanced liver cirrhosis  Prognosis very poor    12/9/2021.   -Coagulopathic due to cirrhosis.  -Worsening scrotal hematoma. Possible GI bleeding. Hemoglobin dropped to 6.  Multifactorial. Scheduled for EGD tomorrow to r/o varices. S/p blood transfusion. Paracentesis pending till INR is normal.  He has received 10 units of FFP's during this admission. INR today is 4  Checked fibrinogen. Less than 80. We will give cryoprecipitate 10 units. S/p vitamin K. Bili is 30. Poor prognosis. Will follow.          Feng Iglesias MD  Electronically signed 12/9/2021 at 1:17 PM

## 2021-12-09 NOTE — PROGRESS NOTES
Room #:   3556/0240-14    Date: 2021       Patient Name: Simón Ruelas  : 1975      MRN: [id number]     Patient unavailable for physical therapy treatment due to nursing about to give patient blood. Will attempt PT treatment at a later time. Thank you.         Franco Barahona, PT

## 2021-12-09 NOTE — PROGRESS NOTES
Progress Note  12/9/2021 9:39 AM  Subjective:   Admit Date: 11/26/2021  PCP: Ines Zamora DO  Interval History:    12/9/21- awake and alert. He is for blood and plasma today as well as magnesium      Diet: ADULT DIET; Regular; Low Sodium (2 gm)  ADULT ORAL NUTRITION SUPPLEMENT; Lunch, Dinner; Fortified Pudding Oral Supplement    Data:   Scheduled Meds:   magnesium sulfate  2,000 mg IntraVENous Once    sodium bicarbonate  1,300 mg Oral TID    [Held by provider] phytonadione  10 mg Oral Daily    vitamin D3  5,000 Units Oral Daily    insulin lispro  0-6 Units SubCUTAneous TID WC    pantoprazole  40 mg IntraVENous Daily    And    sodium chloride (PF)  10 mL IntraVENous Daily    midodrine  15 mg Oral TID WC    thiamine (VITAMIN B1) IVPB  100 mg IntraVENous Daily    rifaximin  550 mg Oral BID    sodium chloride flush  5-40 mL IntraVENous 2 times per day    piperacillin-tazobactam  3,375 mg IntraVENous Q8H     Continuous Infusions:   sodium chloride      sodium chloride      sodium chloride      sodium chloride      sodium chloride      sodium chloride      sodium chloride      sodium chloride      dextrose      sodium chloride      sodium chloride Stopped (12/09/21 0851)     PRN Meds:sodium chloride, sodium chloride, sodium chloride, sodium chloride, sodium chloride, sodium chloride, sodium chloride, sodium chloride, acetaminophen **OR** [DISCONTINUED] acetaminophen, traMADol, lidocaine, melatonin, LORazepam **OR** LORazepam **OR** [DISCONTINUED] LORazepam **OR** [DISCONTINUED] LORazepam **OR** [DISCONTINUED] LORazepam **OR** [DISCONTINUED] LORazepam **OR** [DISCONTINUED] LORazepam **OR** [DISCONTINUED] LORazepam, glucose, dextrose, glucagon (rDNA), dextrose, sodium chloride flush, sodium chloride, polyethylene glycol, prochlorperazine  I/O last 3 completed shifts: In: 441 [P.O.:240; Blood:201]  Out: 3861 [Urine:8425]  No intake/output data recorded.     Intake/Output Summary (Last 24 hours) at 12/9/2021 0939  Last data filed at 12/9/2021 0979  Gross per 24 hour   Intake 441 ml   Output 4150 ml   Net -3709 ml     CBC:   Recent Labs     12/07/21  0530 12/08/21  1102 12/09/21  0630   WBC 6.0 4.4* 4.0*   HGB 6.7* 6.2* 6.0*   PLT 39* 39* 46*     BMP:    Recent Labs     12/07/21  0530 12/08/21  1102 12/09/21  0630    137 140   K 3.9 3.9 3.8   * 108* 107   CO2 16* 17* 17*   BUN 12 10 10   CREATININE 0.9 0.8 0.8   GLUCOSE 93 129* 76     Hepatic:   Recent Labs     12/07/21  0530 12/08/21  1102 12/09/21  0630   AST 41* 45* 45*   ALT 18 19 19   BILITOT 24.2* 27.7* 30.2*   ALKPHOS 46 62 68     Troponin: No results for input(s): TROPONINI in the last 72 hours. BNP: No results for input(s): BNP in the last 72 hours. Lipids: No results for input(s): CHOL, HDL in the last 72 hours. Invalid input(s): LDLCALCU  ABGs: No results found for: PHART, PO2ART, YYT7OHD  INR:   Recent Labs     12/07/21  0530 12/08/21  1457 12/09/21  0630   INR 5.0 4.9 4.3       -----------------------------------------------------------------  RAD: US GALLBLADDER RUQ    Result Date: 11/28/2021  EXAMINATION: RIGHT UPPER QUADRANT ULTRASOUND 11/27/2021 5:24 pm COMPARISON: 11/14/2021 HISTORY: ORDERING SYSTEM PROVIDED HISTORY: decomp cirrhosis, CBD measurement TECHNOLOGIST PROVIDED HISTORY: Reason for exam:->decomp cirrhosis, CBD measurement What reading provider will be dictating this exam?->CRC FINDINGS: LIVER:  Nodular contour of the liver consistent with cirrhosis. The liver is somewhat heterogeneous but without obvious focal abnormality. There is apparent hepatofugal flow again identified within the main portal vein. BILIARY SYSTEM:  Gallbladder is incompletely distended with diffuse wall thickening. No sonographic Carrasco's sign. The wall thickening is nonspecific but could be reactive to ascites and hepatic parenchymal disease. No obvious cholelithiasis. Common bile duct is within normal limits measuring 6 mm.  RIGHT KIDNEY: The right kidney is grossly unremarkable without evidence of hydronephrosis. PANCREAS:  Visualized portions of the pancreas are unremarkable. OTHER: Moderate ascites. Findings again consistent with cirrhotic liver. Ascites. Hepatofugal flow redemonstrated in the portal vein. Thickened gallbladder wall which may be reactive to the ascites and hepatic disease. XR CHEST PORTABLE    Result Date: 11/26/2021  EXAMINATION: ONE XRAY VIEW OF THE CHEST 11/26/2021 7:15 am COMPARISON: One-view chest x-ray 11/14/2021 HISTORY: ORDERING SYSTEM PROVIDED HISTORY: SOB TECHNOLOGIST PROVIDED HISTORY: Reason for exam:->SOB FINDINGS: The cardiac silhouette is within normal limits. The mediastinal contours are within normal limits. The lungs are hypoinflated with resulting bronchovascular crowding. Mild basilar opacities appear new/increased. No significant pleural effusions or pneumothorax. Osseous degenerative changes are present. EKG leads overlie the chest.     Mild basilar opacities are suspicious for pneumonia. This appearance can be seen with COVID-19.     US ABDOMEN LIMITED    Result Date: 11/26/2021  EXAMINATION: RIGHT UPPER QUADRANT ULTRASOUND 11/26/2021 5:54 pm COMPARISON: None. HISTORY: ORDERING SYSTEM PROVIDED HISTORY: ascites/JULIO TECHNOLOGIST PROVIDED HISTORY: Reason for exam:->ascites/JULIO What reading provider will be dictating this exam?->CRC FINDINGS: Superficial sonographic evaluation of the 4 quadrants of the abdomen performed using a combination of grayscale technique. Imaging done to assess for the presence of intra-abdominal ascites. Patient was laying on their left-side. Moderate volume simple appearing intra-abdominal ascites in the left abdomen. Moderate volume intra-abdominal ascites.        Objective:   Vitals: /68   Pulse 91   Temp 97.9 °F (36.6 °C) (Oral)   Resp 17   Ht 6' 3\" (1.905 m)   Wt 212 lb (96.2 kg)   SpO2 99%   BMI 26.50 kg/m²   General appearance: appears stated age Skin:  Jaundice   HEENT: Head: Normocephalic, no lesions, without obvious abnormality. Sclarea  jaundice   Neck: no adenopathy, no carotid bruit, no JVD, supple, symmetrical, trachea midline and thyroid not enlarged, symmetric, no tenderness/mass/nodules  Lungs: clear to auscultation bilaterally  Heart: regular rate and rhythm, S1, S2 normal, no murmur, click, rub or gallop  Abdomen: Distended , ascites ++  Extremities: extremities normal, atraumatic, no cyanosis or edema  Neurologic: Mental status: Alert, oriented, thought content appropriate    Assessment:   Patient Active Problem List:     Alcohol-induced acute pancreatitis     Anxiety     Depression     ETOH abuse     Hematemesis     Atrial fibrillation with rapid ventricular response (HCC)     Alcohol withdrawal syndrome without complication (HCC)     Lactic acidosis     Hypokalemia     Severe protein-calorie malnutrition (HCC)     Paroxysmal atrial fibrillation (HCC)     Hyperbilirubinemia     Hypomagnesemia     Bilirubinemia     UGIB (upper gastrointestinal bleed)     Hepatorenal syndrome (HCC)    Plan:   Assessment: / Plan:     1.  Acute kidney injury.  Acute kidney injury secondary to renal hypoperfusion with possible underlying hepatorenal syndrome.  Urine output has improved and was 4725 ml past 24 hours. , JULIO  Now Resolved      2.  Metabolic acidosis.  Patient with non-anion gap metabolic acidosis which may be reflection of diarrhea as well as acute kidney injury.   12/3/21: started oral bicarb. Up titrated to 1300 mg po bid 12/6 and to tid 12/8    3.   Hyponatremia.  Patient probably with hepatic failure associated hyponatremia along with use of spironolactone contributing to hyponatremia.      4.   Hypokalemia.  Supplement potassium and magnesium as needed. Mg 1.4- supplement      5.  Hypocalcemia.   ionized calcium 1.12 / Vit D 14 / PTH 60 - Started oral Vit D on 11/29/21     6.  Hypotension. Stable      7. Hypophosphatemia.    Supplement orally as needed for goal 2.5  PO4 1.8-->2.6 S/P supplement     8. Coagulopathy-  Plasma and blood to be given today  Transfuse as needed per primary/ hem    EILEEN Guadarrama - CNP        Patient seen and examined. Chart reviewed. I had a face to face encounter with the patient. Agree with exam.    Agree with  formulation, assessment and plan as outlined above and directed by me. Addition and corrections were done as deemed appropriate. My exam and plan include:    Hypotension improved. Continue current treatment.       Lon Lema MD  Nephrology        Electronically signed by Lon Lema MD on 12/9/2021 at 1:06 PM

## 2021-12-09 NOTE — PROGRESS NOTES
she has had for years according to the mother. But this has been gotten worse. BUN/creatinine 33/2.2 with serum sodium 130. Blood sugars ranging [id number]. INR is down to 5.7 today. Temperature ranges 90.7100.4 with a heart rate of 61 blood pressure 113/63. O2 sat 97% on room air at rest.  Patient still on phenylephrine drip. Urine output ranges 800-1300 cc a shift. 11/30/2021  Patient seen examined on PCU. Patient states he feels fairly good again today. He denies any chest or abdominal pain. Patient states he is very hungry and is still on liquids. Hemoglobin 9.2 with platelet count of down to 38. BUN/creatinine 24/1.4 with potassium 3.2 and sodium 131. Temperature 99 with heart rate 64 blood pressure 99/57. O2 sat 93% on room air. Phenylephrine drip still running. Urine output ranges to 200-4000 cc a shift. Patient is for abdominal paracentesis today. 12/1/2021  Patient seen examined in PCU. Patient with 2 family members at the bedside. Case was discussed. Patient alert and oriented x3. Patient's upper extremity tremors are moderately improved. Patient had questions about abdominal paracentesis. BUN/creatinine 25/1.4 today with total bilirubin 29. WBC 7.5 hemoglobin 8.2 and platelet count 62. Temperature ranges 97.998.3 with heart rate of 68 blood pressure range from 82/51112/64. O2 sats 100% on room air. Patient still on phenylephrine drip for blood pressure/ renal perfusion. Abdominal paracentesis when okay with GI, intensivist.    12/2/2021   Patient seen examined on PCU. Patient denies any specific pain at this time. Patient is little bit depressed about not getting his paracentesis performed yet. BUN/creatinine 18/1.2 with hemoglobin is 7.5 platelet count is down to 29. Patient received fresh frozen plasma and INR yesterday morning was 3.4 and today is 5. Temperature 90.3 with heart rate of 67 and blood pressure 110/70. Pulmonology and GI notes reviewed.   With kidney function about back to baseline phenylephrine drip will be weaned off.    12/3/2021  Patient seen examined on PCU. Patient alert and oriented x3. Patient denies any chest or significant abdominal pain. Abdomen distended but soft. BUN/creatinine 14/1.0 with blood sugars ranging 103126. Serum phosphorus 1.8. Total bili was 24.1 today and has improved somewhat. Hemoglobin is down to 7.2 today. INR is up to 6.3 today. Heart rate 65 with blood pressure 105/52 with O2 sat 94% on room air at rest.  Urinary output is good. Patient is still on Lavon-Synephrine and somatostatin drip. 12/4/2021  Patient seen examined on PCU. Patient received 1 unit packed RBC today with low hemoglobin. Patient denies any problem with chest pain and there is no continuous abdominal discomfort. Patient denies any unusual shortness of breath. BUN/creatinine 11/0.9 with serum magnesium 1.5. Phosphorus 1.7 with WBC 5.3 and hemoglobin 6.6. Total bili 23.4, INR 6.6 today. Temperature 98.5 with heart rate of 74 blood pressure 101/62. O2 sat 96% with the patient being titrated down on the Neosporin drip. 12/5/2021   Patient seen examined on PCU. Case discussed with patient's family at the bedside again today. Patient complaining of pain behind his upper arms in the tricep area. Patient says it started a couple days ago. More of a constant burning sensation. There is no rash noted but there is some ecchymosis noted on the upper portion of the arm and tricep area. This area is tender to palpation. There is no masses or hematoma noted. BUN/creatinine 12/1.0. Serum magnesium 1.4 again with phosphorus 1.7. Blood sugars still low 100s. Total bili is 24.8 today with a WBC 5.8 hemoglobin 6.7 early this morning with a repeat 7.1. Temperature 97.5 heart rate 78 blood pressure 118/64. Patient was taken off the Neosporin drip yesterday afternoon but was put back on this morning. O2 sat is 95% on room air.   Urine output ranges 150-450 cc shift. Patient given potassium and magnesium supplement. 12/6/2021  Patient seen examined on PCU. Patient is upset because he does not know what his INR is. Patient states his bilateral posterior upper arm discomfort is improved. There is no chest pain. Patient's abdomen is slowly getting bigger. BUN/creatinine 12/0.9. CO2 was 16 on the electrolytes. Total bili is 26.4 with hemoglobin 6.8 and WBC 5.5. INR is pending. Temperature 90.4 with heart rate of 81 with blood pressure 104/59 with an O2 sat 98%. We will type and cross give 1 more unit of packed RBC now. Consult hematology regarding coagulopathy. 12/7/2021  Patient seen examined on PCU. Patient is about the same. Abdomen is getting more distended. Oncology note reviewed. BUN/creatinine 12/0.9 with a CO2 of 16. Total bili is 24.2 with hemoglobin 6.7 with the patient receiving 2 units of packed RBCs last 24 hours. Platelet count is 39. Temperature 98.5 heart rate is 79 blood pressure 112/67. O2 sat 99% room air at rest. Urine output is good. Patient to receive 2 more units of fresh frozen plasma today with patient receiving platelets yesterday. Patient hopefully can have the abdominal paracentesis today after INR has been performed. 12/8/2021  Patient seen examined on PCU. Patient complained of increased abdominal distention and discomfort. Patient denies any chest pain. Patient little bit frustrated about being in the hospital so long and having his abnormal lab work including low hemoglobin and persistently elevated INR. Hemoglobin 6.2 today with a WBC of 4.4. Platelet count 39 with BUN/creatinine 10/0.8. Blood sugars in the low 100s. Total bili is 27.7. Temperature 90.3 with heart rate 83 and blood pressure 114/74. O2 sat 97% room air at rest.    12/9/2021  Patient seen examined on PCU. Patient is receiving 2 units packed RBCs today along with cryoprecipitate and fresh frozen plasma.   INR to be repeated after that.  BUN/creatinine 10/0.8. Blood sugars range 76117. Hemoglobin 6.0 today with WBC of 4 and platelet count 46. INR is 4.7. Temperature is 98 with heart rate 91 blood pressure 170/77 O2 sat 99% room air at rest.      Review of System: Limited at the present time  Constitutional:   Denies fever or chills, weight loss or gain, fatigue or malaise. HEENT:   Denies ear pain, sore throat, sinus or eye problems. Cardiovascular:   Denies any chest pain, irregular heartbeats, or palpitations. Respiratory:   Denies shortness of breath, coughing, sputum production, hemoptysis, or wheezing. Gastrointestinal:   Denies nausea, vomiting, diarrhea, or constipation. Denies any abdominal pain. Genitourinary:    Denies any urgency, frequency, hematuria. Voiding  without difficulty. Extremities:   Denies lower extremity swelling, edema or cyanosis. Neurology:    Denies any headache or focal neurological deficits, Denies generalized weakness or memory difficulty. Psch:   Denies being anxious or depressed. Musculoskeletal:    Denies  myalgias, joint complaints or back pain. Integumentary:   Denies any rashes, ulcers, or excoriations. Denies bruising. Hematologic/Lymphatic:  Denies bruising or bleeding. Physical Exam:  I/O this shift:  In: -   Out: 350 [Urine:350]    Intake/Output Summary (Last 24 hours) at 12/9/2021 1309  Last data filed at 12/9/2021 1245  Gross per 24 hour   Intake 441 ml   Output 4500 ml   Net -4059 ml   I/O last 3 completed shifts: In: 441 [P.O.:240; Blood:201]  Out: 5843 [Urine:4725]  No data found. Vital Signs:   Blood pressure 117/77, pulse 91, temperature 98 °F (36.7 °C), temperature source Axillary, resp. rate 19, height 6' 3\" (1.905 m), weight 212 lb (96.2 kg), SpO2 99 %. General appearance:  Patient alert but lethargic. Appears chronically ill  Head:  Normocephalic. No masses, lesions or tenderness. Eyes:  PERRLA. EOMI. sclera icteric.   Buccal mucosa moist.  ENT:  Ears normal. Mucosa normal.  Neck:    Supple. Trachea midline. No thyromegaly. No JVD. No bruits. Heart:    Rhythm regular. Tachycardic. No murmurs. Lungs:    Diminished  Abdomen:   Soft. Non-tender. Non-distended. Bowel sounds positive. No organomegaly or masses. No pain on palpation. Newly distended  Extremities:    Peripheral pulses present. No peripheral edema. No ulcers. No cyanosis. No clubbing.   Neurologic:    Responsive but lethargic  Integumentary:  No rashes skin jaundice  Genitalia/Breast:  Hickman catheter    Medication:  Scheduled Meds:   sodium bicarbonate  1,300 mg Oral TID    [Held by provider] phytonadione  10 mg Oral Daily    vitamin D3  5,000 Units Oral Daily    insulin lispro  0-6 Units SubCUTAneous TID WC    pantoprazole  40 mg IntraVENous Daily    And    sodium chloride (PF)  10 mL IntraVENous Daily    midodrine  15 mg Oral TID WC    thiamine (VITAMIN B1) IVPB  100 mg IntraVENous Daily    rifaximin  550 mg Oral BID    sodium chloride flush  5-40 mL IntraVENous 2 times per day    piperacillin-tazobactam  3,375 mg IntraVENous Q8H     Continuous Infusions:   sodium chloride      sodium chloride      sodium chloride      sodium chloride      sodium chloride      sodium chloride      sodium chloride      sodium chloride      dextrose      sodium chloride      sodium chloride Stopped (12/09/21 0851)       Objective Data:  CBC with Differential:    Lab Results   Component Value Date    WBC 4.0 12/09/2021    RBC 1.81 12/09/2021    HGB 6.0 12/09/2021    HCT 17.5 12/09/2021    PLT 46 12/09/2021    MCV 96.7 12/09/2021    MCH 33.1 12/09/2021    MCHC 34.3 12/09/2021    RDW 22.6 12/09/2021    METASPCT 0.9 11/20/2021    LYMPHOPCT 22.3 12/09/2021    MONOPCT 4.5 12/09/2021    MYELOPCT 2.7 12/06/2021    BASOPCT 1.8 12/09/2021    MONOSABS 0.20 12/09/2021    LYMPHSABS 0.88 12/09/2021    EOSABS 0.11 12/09/2021    BASOSABS 0.07 12/09/2021     CMP:    Lab Results   Component Value Date     12/09/2021    K 3.8 12/09/2021    K 5.8 11/26/2021     12/09/2021    CO2 17 12/09/2021    BUN 10 12/09/2021    CREATININE 0.8 12/09/2021    GFRAA >60 12/09/2021    LABGLOM >60 12/09/2021    GLUCOSE 76 12/09/2021    PROT 5.6 12/09/2021    LABALBU 3.8 12/09/2021    CALCIUM 8.8 12/09/2021    BILITOT 30.2 12/09/2021    ALKPHOS 68 12/09/2021    AST 45 12/09/2021    ALT 19 12/09/2021              Assessment:    · Acute hepatic encephalopathy  · Hepatorenal syndrome probable hepatopulmonary syndrome  · Acute hypoxic respiratory failure  · Hypotonic hypervolemic hyponatremia  · JULIO on CKD  · Pneumonia possible aspiration  · Decompensated cirrhosis  · Hyperbilirubinemia  · Chronic normocytic anemia  · Atrial fibrillation  · History of anxiety and depression  · History of alcohol abuse  · GERD        Plan:     · Patient is doing very poorly at the present time. The patient does have advanced liver disease with decompensated cirrhosis and not currently a candidate for liver transplant. Symptoms are suggestive of severe electrolyte imbalance with probable hepatorenal syndrome. He also is currently hypotensive. I have discussed the condition with the mother and fiancé along with Dr. Jacob Grow have discussed his CODE STATUS and agree on no intubation or chest compressions. We have also discussed possibility of transition into hospice pending his status. Continue aggressive care for the next 24 to 48 hours and observe response  · Electrolyte imbalance of hyponatremia being followed by nephrology  · Metabolic acidosis being corrected by bicarb infusion  · GI is following regarding his hepatic cirrhosis  · Vasopressor has been instituted  · Salt poor albumin to expand volume  · Blood culture has been obtained.   Zosyn to cover for spontaneous bacterial peritonitis  · Patient is auto anticoagulated vitamin K supplementation  · Treat elevated ammonia level  · Paracentesis as needed  · Prognosis is very poor but he is improving short-term    -Consult hematology regarding severe coagulopathy  -We will try to discuss with GI regarding patient's discharge planning and care with family having multiple questions regarding this  -Patient has received 2 units of packed RBCs   -Patient will receive 2 units fresh frozen plasma     -Patient received 2 units packed RBCs, 2 units fresh frozen plasma, cryoprecipitate 12/9  -EGD possibly tomorrow    oMna Hilton DO, F.A.C.O.I.  12/9/2021  1:09 PM

## 2021-12-09 NOTE — CARE COORDINATION
SS NOTE: COVID NEGATIVE 11/26- PT WILL NEED A NEGATIVE RAPID COVID 72 HOURS PRIOR TO [address] [facility] TO [facility] SKILLED. Pt is on room air. Pt is in Contact Iso for C-diff. Pt needs a Paracentisis however his INR is to low. Pt to be reevaluated for the Para once he recieves 2 units of Plazma. Pt has 2 peripheral lines. Pt is a DNR CCA, no intubation. Pt is on IV  Zosyn and Compazine. [facility] skilled  accepted pt- they will begin PRECERT once pt is stable for SNF placement. For a SNF placement pt will need a PRECERT, signed JORGE, current PT.OT notes and SW will need to complete the HENs. CLEVELAND Mason.12/09/2021.5:43PM.

## 2021-12-10 NOTE — PROGRESS NOTES
Physical Therapy    Physical Therapy Treatment Note/Plan of Care    Room #:  7846/2279-32  Patient Name: Blanco Borrero  YOB: 1975  MRN: [id number]    Date of Service: 12/10/2021     Tentative placement recommendation: Subacute rehab  Equipment recommendation: To be determined      Evaluating Physical Therapist: Bear Alvarez PT  #[id number]      Specific Provider Orders/Date/Referring Provider :  11/29/21 1615   PT eval and treat Start: 11/29/21 1615, End: 11/29/21 1615, ONE TIME, Standing Count: 1 Occurrences, R    Hannah Lopez DO      Admitting Diagnosis:   Hepatorenal syndrome ([facility] [address] [K76.7]  Acute kidney injury ([address] [N17.9]  Chronic liver failure without hepatic coma ([address] [K72.10]       Surgery: none  Visit Diagnoses       Codes    Chronic liver failure without hepatic coma ([address]     K72.10    Acute kidney injury ([address]     N17.9          Patient Active Problem List   Diagnosis    Alcohol-induced acute pancreatitis    Anxiety    Depression    ETOH abuse    Hematemesis    Atrial fibrillation with rapid ventricular response ([address]    Alcohol withdrawal syndrome without complication (HCC)    Lactic acidosis    Hypokalemia    Severe protein-calorie malnutrition (HCC)    Paroxysmal atrial fibrillation (HCC)    Hyperbilirubinemia    Hypomagnesemia    Bilirubinemia    UGIB (upper gastrointestinal bleed)    Hepatorenal syndrome (HCC)        ASSESSMENT of Current Deficits Patient exhibits decreased strength, balance and endurance impairing functional mobility, transfers, gait , gait distance and tolerance to activity Patient  limited in function d/t fatigue and lacking endurance, and due to swollen abdomen and scrotum. Patient would continue to benefit from therapy to increase strength, balance, and functional mobility limitations.       PHYSICAL THERAPY  PLAN OF CARE       Physical therapy plan of care is established based on physician order,  patient diagnosis and clinical assessment    Current Treatment Recommendations:    -Standing Balance: Perform strengthening exercises in standing to promote motor control with or without upper extremity support  and Challenge balance utilizing reaching  activities beyond center of gravity    -Transfers: Provide instruction on proper hand and foot position for adequate transfer of weight onto lower extremities and use of gait device if needed and Cues for hand placement, technique and safety. Provide stabilization to prevent fall   -Gait: Gait training, Standing activities to improve: base of support, weight shift, weight bearing , Exercises to improve trunk control and Exercises to improve hip and knee control   -Endurance: Utilize Supervised activities to increase level of endurance to allow for safe functional mobility including transfers and gait   -Stairs: Stair training with instruction on proper technique and hand placement on rail    PT long term treatment goals are located in below grid    Patient and or family understand(s) diagnosis, prognosis, and plan of care. Frequency of treatments: Patient will be seen  3-5 times/week.          Prior Level of Function: Patient ambulated independently    Rehab Potential: good    for baseline    Past medical history:   Past Medical History:   Diagnosis Date    Anxiety     Depression     Esophagitis     ETOH abuse     Gastritis     GERD (gastroesophageal reflux disease)     Hematuria     Pancreatitis      Past Surgical History:   Procedure Laterality Date    KNEE SURGERY      ACL    UPPER GASTROINTESTINAL ENDOSCOPY  05/09/2018    UPPER GASTROINTESTINAL ENDOSCOPY N/A 5/8/2018    EGD BIOPSY performed by Loco Burgess MD at [place] [address] 1/21/2019    EGD ESOPHAGOGASTRODUODENOSCOPY performed by Hua Cool DO at [place] [address]  1/21/2019    EGD CONTROL HEMORRHAGE performed by Hua Cool DO at [place] [address] SUBJECTIVE:    Precautions: Up with assistance and Up as tolerated, falls and alarm ,    Social history: Patient lives alone in a ranch home  with 3 steps  to enter without Rail  Tub shower grab bars    Equipment owned: None,       [address]   How much difficulty turning over in bed?: None  How much difficulty sitting down on / standing up from a chair with arms?: A Little  How much difficulty moving from lying on back to sitting on side of bed?: A Little  How much help from another person moving to and from a bed to a chair?: A Little  How much help from another person needed to walk in hospital room?: A Little  How much help from another person for climbing 3-5 steps with a railing?: A Little  AM-PAC Inpatient Mobility Raw Score : 19  AM-PAC Inpatient T-Scale Score : 45.44  Mobility Inpatient CMS 0-100% Score: 41.77  Mobility Inpatient CMS G-Code Modifier : CK    Nursing cleared patient for PT treatment. OBJECTIVE;   Initial Evaluation  Date: 11/30/2021 Treatment Date:  12/2/2021   Short Term/ Long Term   Goals   Was pt agreeable to Eval/treatment? Yes yes To be met in 5 days   Pain level   0/10    0/10    Bed Mobility    Rolling: Supervision     Supine to sit: Minimal assist of 1    Sit to supine: Minimal assist of 1    Scooting: Minimal assist of 1   Rolling: Independent   Supine to sit: Supervision    Sit to supine: Supervision    Scooting: Supervision     Rolling: Independent    Supine to sit: Independent    Sit to supine: Independent    Scooting: Independent     Transfers Sit to stand:  Moderate assist of 1  From commode, min a from bed Sit to stand: Supervision    x2 reps   Sit to stand: Independent     Ambulation    2-3 steps using  no device with Moderate assist of 1   for balance and safety and cues for upright posture and safety not assessed  75 feet using  least restrictive device versus no device with Independent    Stair negotiation: ascended and descended   Not assessed  Not assessed   3 steps with rail independent    ROM Within functional limits    Increase range of motion 10% of affected joints    Strength BUE:  refer to OT eval  RLE:  3/5  LLE:  3/5  Increase strength in affected mm groups by 1/3 grade   Balance Sitting EOB:  fair    Dynamic Standing:  poor   Sitting EOB: good -  Dynamic Standing: fair +   Sitting EOB:  good    Dynamic Standing: fair       Patient is Alert & Oriented x person, place, time and situation and follows directions  slowly  Sensation:  Patient  denies numbness/tingling   Edema:  no   Endurance: fair  -    Vitals: room air   Blood Pressure at rest  Blood Pressure during session    Heart Rate at rest  Heart Rate during session    SPO2 at rest %  SPO2 during session %     Patient education  Patient educated on role of Physical Therapy, risks of immobility, safety and plan of care, energy conservation,  importance of mobility while in hospital , ankle pumps, quad set and glut set for edema control, blood clot prevention and safety      Patient response to education:   Pt verbalized understanding Pt demonstrated skill Pt requires further education in this area   Yes Partial Yes      Treatment:  Patient practiced and was instructed/facilitated in the following treatment: Patient performed supine exercises and transferred to [facility]. Sat edge of bed 15 minutes with Supervision  to increase dynamic sitting balance and activity tolerance. Patient stood x2 reps to scoot to [facility]. Patient transferred back to supine position and positioned for comfort. Therapeutic Exercises:  ankle pumps, quad sets, glut sets, hip abduction/adduction and straight leg raise x 15 reps       At end of session, patient in bed with father present  call light and phone within reach,  all lines and tubes intact, nursing notified. Patient would benefit from continued skilled Physical Therapy to improve functional independence and quality of life. Patient's/ family goals   home    Time in  1115  Time out  1138    Total Treatment Time  23 minutes      CPT codes:    Therapeutic activities (12227)   10 minutes  1 unit(s)  Therapeutic exercises (55889)   13 minutes  1 unit(s)    Popeye Landeros, [address] [facility]  #[id number]

## 2021-12-10 NOTE — CARE COORDINATION
SS NOTE: COVID NEGATIVE 11/26- PT WILL NEED A NEGATIVE RAPID COVID 72 HOURS PRIOR TO [facility] TO [facility]. Pt is on room air. Pt is in Contact Iso for C-diff. Pt is very jaundice today. He is on a regular diet. He is alert and oriented x4. Pt has a dark spot on his buttocks. His H&H is not normal. Pt to have INR to see if it is stable to have a Paracentisis. He is also to have an EGD today. Pt is a DNR CCA, no intubation. Pt is on IV  Zosyn and Compazine. [facility]  accept[facility] pt- they will begin PRECERT once pt is stable for SNF placement. For a SNF placement pt will need a PRECERT, signed JOREG, current PT.OT notes and SW will need to complete the HENs. [place] Bartholomew.12/10/2021.12:17PM.

## 2021-12-10 NOTE — PROGRESS NOTES
OT BEDSIDE TREATMENT NOTE      Date:12/10/2021  Patient Name: Bernadette Prescott  MRN: [id number]  : 1975  Room: [address] [place] [address]        Evaluating OT: Jeri Shepard OTR/CHARLIE; [id number]        Referring Provider and Orders/Date:   OT eval and treat Start: 21, End: 21, ONE TIME, Standing Count: 1 Occurrences, R    Josi Alcazar DO     Diagnosis:   1. Hepatorenal syndrome ([facility] [address]    2. Chronic liver failure without hepatic coma ([facility] [address]    3. Acute kidney injury [facility])               Pertinent Medical History:        Past Medical History        Past Medical History:   Diagnosis Date    Anxiety      Depression      Esophagitis      ETOH abuse      Gastritis      GERD (gastroesophageal reflux disease)      Hematuria      Pancreatitis               Past Surgical History         Past Surgical History:   Procedure Laterality Date    KNEE SURGERY         ACL    UPPER GASTROINTESTINAL ENDOSCOPY   2018    UPPER GASTROINTESTINAL ENDOSCOPY N/A 2018     EGD BIOPSY performed by Indy Haro MD at [facility] 2019     EGD ESOPHAGOGASTRODUODENOSCOPY performed by Alpa Rojas DO at [facility]   2019     EGD CONTROL HEMORRHAGE performed by Alpa Rojas DO at [place] [address]           Precautions:  Fall Risk, tremors     Recommended placement: subacute vs IRF    Assessment of current deficits     [x]? Functional mobility           [x]? ADLs           [x]? Strength                   []?Cognition     [x]? Functional transfers         [x]? IADLs         [x]? Safety Awareness   [x]? Endurance     []? Fine Coordination                        [x]? Balance      []? Vision/perception    []? Sensation       [x]? Gross Motor Coordination            []? ROM           []?  Delirium                   []? Motor Control      OT PLAN OF CARE   OT POC based on physician orders, patient diagnosis and results of clinical assessment     Frequency/Duration 1-3 days/wk for 2 weeks PRN   Specific OT Treatment Interventions to include:   * Instruction/training on adapted ADL techniques and AE recommendations to increase functional independence within precautions       * Training on energy conservation strategies, correct breathing pattern and techniques to improve independence/tolerance for self-care routine  * Functional transfer/mobility training/DME recommendations for increased independence, safety, and fall prevention  * Patient/Family education to increase follow through with safety techniques and functional independence  * Recommendation of environmental modifications for increased safety with functional transfers/mobility and ADLs  * Therapeutic exercise to improve motor endurance, ROM, and functional strength for ADLs/functional transfers  * Therapeutic activities to facilitate/challenge dynamic balance, stand tolerance for increased safety and independence with ADLs  * Therapeutic activities to facilitate gross/fine motor skills for increased independence with ADLs  * Neuro-muscular re-education: facilitation of righting/equilibrium reactions, midline orientation, scapular stability/mobility, normalization of muscle tone, and facilitation of volitional active controled movement      Recommended Adaptive Equipment/DME: shower chair, TBD       Home Living: Patient lives alone in a ranch home with 3 steps  to enter without Rail. He reports he can have assist from his girl friend or mother if required. Laundry in the basement with a standard flight of steps with rails. Pt recently DC from therapy.    Bathroom setup: tub shower with grab bars.  Standard toilet   DME owned: none      Prior Level of Function: indep with ADLs , indep with IADLs; ambulated indep   Driving: yes   Occupation: none   Enjoys: fishing,hunting, odd jobs around the house, dog     Pain Level: pt c/o pain in back and abdominal region; unquantified; nsg aware; pt states he may have paracentesis later  Cognition: A&O: 3/4; Follows 3 step directions              Memory:  fair-              Sequencing:  Fair              Problem solving:  Fair-              Judgement/safety:  fair-  with decreased awareness of deficits     AM-PAC Daily Activity Inpatient   How much help for putting on and taking off regular lower body clothing?: A Lot  How much help for Bathing?: A Little  How much help for Toileting?: A Little  How much help for putting on and taking off regular upper body clothing?: A Lot  How much help for taking care of personal grooming?: A Little  How much help for eating meals?: A Little  AM-[facility] Inpatient Daily Activity Raw Score: 16  AM-PAC Inpatient ADL T-Scale Score : 35.96  ADL Inpatient CMS 0-100% Score: 53.32  ADL Inpatient CMS G-Code Modifier : CK     Functional Assessment:      Initial Eval Status  Date: 11/30/2021   Treatment Status  Date:12/10/ 2021 STGs = LTGs  Time frame: 10-14 days   Feeding Minimal Assist with coordination and to set up/open containers. Clear diet on eval.  N/T; pt is NPO at this time indep   Grooming Minimal Assist with oral care, face wash, shampoo and hair comb sitting EOB. Pt self limiting and provided with encouragement for highest level of function. Pt able to don shampoo cap with min A; pt able to bring B hands to head to wash and towel dry hair and comb hair; pt declined oral hygiene; pt able to wash face with set up assist  Stand by Assist    UB Dressing Max Assist with gown management. Pt overwhelmed with lines and IV, as well as his visitor. Mod A to change gowns with assist to fasten gown around IV sites and to thread monitor lines through gown; assist to tie/untie back of gown d/t c/o pain SBA   LB Dressing Minimal Assist with socks from sitting EOB with extended time. Pt declining brief and pants.   N/T; pt declined  Stand by Assist    Bathing Minimal Assist with sponge bathing sitting/standing EOB with decreased thoroughness. Pt able to wash UB and complete pericare when seated in bed; assist to wash back ; pt declined washing LE's at this time Stand by Assist    Toileting Minimal Assist with use of urinal. Pt reported that he just used the 3:1 prior to arrival and declined to complete with OT.  N/T  Stand by Assist    Bed Mobility  Supine to sit: Supervision   Sit to supine: Supervision  With impulsivity  Supervision for supine to sit; pt used B handrails to pull to sitting; supervision for scooting; sit to supine at supervision Supine to sit: Independent   Sit to supine: Independent    Functional Transfers Minimal Assist for safety and balance. Limited with lines N/T; pt had worked with PT prior to session  Stand by [employer] for safety and balance only short functional distance with line limitations N/T d/t decreased endurance  Stand by Assist    Balance Sitting:     Static:  good    Dynamic:fair  Standing: fair- Sitting:     Static:  good    Dynamic:fair  Standing: N/T Sitting:    Dynamic:fair+  Standing: fair   Activity Tolerance Vitals with activity:92/50 HR 60 02 97% room air  Standing tolerance <30secs with fatigue and impulsivity. Fair minus; pt limited d/t fatigue/pain all over Increase standing tolerance for >4min with stable vital signs for carry over into toileting, functional tranfers and indep in ADLs   Visual/  Perceptual Glasses: not Present; WFL     Reports change in vision since admission: No      NA   Ken UE Strengthening  3+/5 generally   4+/5MMT generally for carry over into self care, functional transfers and functional mobility with AD.       Hand Dominance  [x]? Right   []? Left     AROM (PROM) Strength Additional Info:    RUE  WFL 3+/5 Fair  and fair- FMC/dexterity noted during ADL tasks  Opposition [x]? Intact []? Impaired  Finger to nose [x]? Intact []? Impaired      LUE WFL 3+/5 Fair  and fair FMC/dexterity noted during ADL tasks  Opposition [x]? Intact []? Impaired  Finger to nose [x]? Intact []? Impaired      Hearing: WFL  Sensation:  No c/o numbness or tingling   Tone: WFL   Edema: B LE's; nsg aware     Comments: Patient cleared by nursing staff. Upon arrival pt supine in bed with HOB slightly elevated. Pt agreeable to OT tx session. Pt educated with regards to bed mobility, hand placement, safety awareness, static sitting balance, ADL retraining,  grooming tasks, UE bathing, UE dressing, pericare, ECT's. At end of session pt seated in bed with HOB elevated  with all lines and tubes intact, call light within reach. Overall, pt demonstrated decreased independence and safety during completion of ADL/functional transfers/mobility tasks. Pt would benefit from continued skilled OT to increase safety and independence with completion of ADL/IADL tasks for functional independence and quality of life. Pt required cues and education as noted above for safe facilitation and completion of tasks. Therapist provided skilled monitoring of patient's response during treatment session. Prior to and at the end of session, environmental modifications / line management completed for patients safety and efficiency of treatment session. Overall, patient demonstrates minimal/moderate difficulties with completion of BADLs and IADLs. Factors contributing to these difficulties include pain, fatigue, intermittent tremoring, decreased endurance, and generalized weakness. As noted above, patient likely to benefit from further OT intervention to increase independence, safety, and overall quality of life. Treatment:     ? Bed mobility: Facilitated bed mobility with cues for proper body mechanics and sequencing to prepare for ADL completion. ? ADL completion: Self-care retraining for the above-mentioned ADLs; training on proper hand placement, safety technique, sequencing, and energy conservation techniques. ?  Postural Balance: Sitting balance retraining to improve

## 2021-12-10 NOTE — ANESTHESIA PRE PROCEDURE
infusion  250 mL IntraVENous PRN Renetta Lincoln, DO        0.9 % sodium chloride infusion   IntraVENous PRN Marie Wilson MD        0.9 % sodium chloride infusion   IntraVENous PRN Marie Wilson MD        0.9 % sodium chloride infusion   IntraVENous PRN Marie Wilson MD        0.9 % sodium chloride infusion   IntraVENous PRN Renetta Lincoln, DO        acetaminophen (TYLENOL) tablet 500 mg  500 mg Oral Q6H PRN Renetta Lincoln, DO        traMADol Ren Sox) tablet 25 mg  25 mg Oral Q6H PRN Renetta Lincoln, DO   25 mg at 12/09/21 2021    lidocaine 4 % external patch 1 patch  1 patch TransDERmal Daily PRN EILEEN Gutierrez - CNP        [Held by provider] phytonadione (VITAMIN K) tablet 10 mg  10 mg Oral Daily Joe Trejo MD   10 mg at 12/06/21 1042    melatonin tablet 6 mg  6 mg Oral Nightly PRN EILEEN Gutierrez CNP   6 mg at 12/09/21 2021    vitamin D3 (CHOLECALCIFEROL) tablet 5,000 Units  5,000 Units Oral Daily Nichole Stovall MD   5,000 Units at 12/10/21 0921    pantoprazole (PROTONIX) injection 40 mg  40 mg IntraVENous Daily Joe Trejo MD   40 mg at 12/10/21 8995    And    sodium chloride (PF) 0.9 % injection 10 mL  10 mL IntraVENous Daily Joe Trejo MD   10 mL at 12/10/21 0917    midodrine (PROAMATINE) tablet 15 mg  15 mg Oral TID WC Joe Trejo MD   15 mg at 12/10/21 0913    thiamine (B-1) 100 mg in sodium chloride 0.9 % 100 mL IVPB  100 mg IntraVENous Daily Elspeth Cord,  mL/hr at 12/10/21 1147 100 mg at 12/10/21 1147    LORazepam (ATIVAN) tablet 1 mg  1 mg Oral Q1H PRN Bi Carrillo DO        Or    LORazepam (ATIVAN) injection 1 mg  1 mg IntraVENous Q1H PRN Bi Ketmarcke, DO        rifaximin (XIFAXAN) tablet 550 mg  550 mg Oral BID Adam Barroso MD   550 mg at 12/10/21 0914    dextrose 50 % IV solution  12.5 g IntraVENous PRN Jade Johnson,         dextrose 5 % solution  100 mL/hr IntraVENous PRN Kriste Leavens, DO        sodium chloride flush 0.9 % injection 5-40 mL  5-40 mL IntraVENous 2 times per day Kriste Leavens, DO   10 mL at 12/09/21 0929    sodium chloride flush 0.9 % injection 5-40 mL  5-40 mL IntraVENous PRN Kriste Leavens, DO        0.9 % sodium chloride infusion  25 mL IntraVENous PRN Kriste Leavens, DO        polyethylene glycol (GLYCOLAX) packet 17 g  17 g Oral Daily PRN Kriste Leavens, DO        prochlorperazine (COMPAZINE) injection 10 mg  10 mg IntraVENous Q6H PRN Kriste Leavens, DO        piperacillin-tazobactam (ZOSYN) 3,375 mg in dextrose 5 % 50 mL IVPB extended infusion (mini-bag)  3,375 mg IntraVENous Q8H Ismail U Sundar, DO 12.5 mL/hr at 12/10/21 0921 3,375 mg at 12/10/21 6317    And    0.9 % sodium chloride infusion   IntraVENous Q8H Kriste Leavens, DO   Stopped at 12/10/21 0030       Allergies: Allergies   Allergen Reactions    [place] [Macadamia Nut Oil] Swelling       Problem List:    Patient Active Problem List   Diagnosis Code    Alcohol-induced acute pancreatitis K85.20    Anxiety F41.9    Depression F32. A    ETOH abuse F10.10    Hematemesis K92.0    Atrial fibrillation with rapid ventricular response (HCC) I48.91    Alcohol withdrawal syndrome without complication (HCC) I05.420    Lactic acidosis E87.2    Hypokalemia E87.6    Severe protein-calorie malnutrition (HCC) E43    Paroxysmal atrial fibrillation (HCC) I48.0    Hyperbilirubinemia E80.6    Hypomagnesemia E83.42    Bilirubinemia E80.6    UGIB (upper gastrointestinal bleed) K92.2    Hepatorenal syndrome (HCC) K76.7       Past Medical History:        Diagnosis Date    Anxiety     Depression     Esophagitis     ETOH abuse     Gastritis     GERD (gastroesophageal reflux disease)     Hematuria     Pancreatitis        Past Surgical History:        Procedure Laterality Date    KNEE SURGERY      ACL    UPPER GASTROINTESTINAL ENDOSCOPY  05/09/2018    UPPER GASTROINTESTINAL ENDOSCOPY N/A 5/8/2018    EGD BIOPSY performed by Rachel Cook MD at [address] [facility] 1/21/2019    EGD ESOPHAGOGASTRODUODENOSCOPY performed by Hernandez Jerome DO at [facility]  1/21/2019    EGD CONTROL HEMORRHAGE performed by Hernandez Jerome DO at [address] [facility] History:    Social History     Tobacco Use    Smoking status: Never Smoker    Smokeless tobacco: Never Used   Substance Use Topics    Alcohol use: Yes     Comment: 5th of liquour daily. Previous 1/15/2019                                Counseling given: Not Answered      Vital Signs (Current):   Vitals:    12/09/21 1621 12/09/21 1634 12/10/21 0030 12/10/21 0900   BP: 117/65 96/74 109/69 105/72   Pulse: 103  82 91   Resp: 25  16 16   Temp: 36.3 °C (97.3 °F) 36.5 °C (97.7 °F) 36.5 °C (97.7 °F) 36.9 °C (98.4 °F)   TempSrc:  Oral Oral Oral   SpO2:   95% 95%   Weight:       Height:                                                  BP Readings from Last 3 Encounters:   12/10/21 105/72   11/22/21 116/70   07/04/20 128/80       NPO Status:  Last intake- 12/9/21 @ 1800  Pt reports receiving sips of water w/ meds today. BMI:   Wt Readings from Last 3 Encounters:   12/05/21 212 lb (96.2 kg)   11/14/21 215 lb (97.5 kg)   07/02/20 210 lb 3.2 oz (95.3 kg)     Body mass index is 26.5 kg/m².     CBC:   Lab Results   Component Value Date    WBC 4.5 12/10/2021    RBC 2.01 12/10/2021    HGB 6.6 12/10/2021    HCT 19.5 12/10/2021    MCV 97.0 12/10/2021    RDW 21.9 12/10/2021    PLT 50 12/10/2021       CMP:   Lab Results   Component Value Date     12/09/2021    K 3.8 12/09/2021    K 5.8 11/26/2021     12/09/2021    CO2 17 12/09/2021    BUN 10 12/09/2021    CREATININE 0.8 12/09/2021    GFRAA >60 12/09/2021    LABGLOM >60 12/09/2021    GLUCOSE 76 12/09/2021    PROT 5.6 12/09/2021 CALCIUM 8.8 12/09/2021    BILITOT 30.2 12/09/2021    ALKPHOS 68 12/09/2021    AST 45 12/09/2021    ALT 19 12/09/2021       POC Tests: No results for input(s): POCGLU, POCNA, POCK, POCCL, POCBUN, POCHEMO, POCHCT in the last 72 hours. Coags:   Lab Results   Component Value Date    PROTIME 61.5 12/10/2021    INR 5.2 12/10/2021    APTT 37.3 07/17/2018       HCG (If Applicable): No results found for: PREGTESTUR, PREGSERUM, HCG, HCGQUANT     ABGs: No results found for: PHART, PO2ART, XXC0KFZ, PMH8AMS, BEART, W1GFGNZP     Type & Screen (If Applicable):  No results found for: LABABO, LABRH    Drug/Infectious Status (If Applicable):  No results found for: HIV, HEPCAB    COVID-19 Screening (If Applicable):   Lab Results   Component Value Date    COVID19 Not Detected 11/26/2021     ECHO 7/2020  Summary   Left ventricular size is grossly normal.   Mild left ventricular concentric hypertrophy noted. Ejection fraction is measured at 63%. No evidence of left ventricular mass or thrombus noted. No regional wall motion abnormalities seen. The left atrium is borderline dilated. Interatrial septum appears intact. No evidence of thrombus within left atrium. Mildly dilated right ventricle. No evidence of a thrombus in the right ventricle. Mildly enlarged right atrium size. Physiologic and/or trace mitral regurgitation is present. No evidence of mitral valve stenosis. Physiologic and/or trace tricuspid regurgitation. RVSP is 24.11 mmHg. Irregular rhythm--atrial fibrillation.       Signature      ----------------------------------------------------------------   Electronically signed by Luisana Pleitez DO(Interpreting   physician) on 07/02/2020 05:31 PM      EKG 11/2021  Normal sinus rhythm  Low voltage QRS   Prolonged QT  Abnormal ECG  When compared with ECG of 01-JUL-2020 19:42,  sinus rhythm has replaced A-flutter with RVR  Confirmed by Jerardo Dalton ([id number]) on 11/27/2021 7:20:13 PM        Anesthesia Evaluation  Patient summary reviewed and Nursing notes reviewed no history of anesthetic complications:   Airway: Mallampati: II  TM distance: >3 FB     Mouth opening: > = 3 FB Dental:          Pulmonary:Negative Pulmonary ROS breath sounds clear to auscultation                             Cardiovascular:Negative CV ROS  Exercise tolerance: good (>4 METS),   (+) dysrhythmias (paroxysmal a-fib. Pt now NSR per tele monitor. ): atrial fibrillation,       ECG reviewed  Rhythm: regular  Rate: normal  Echocardiogram reviewed         Beta Blocker:  Not on Beta Blocker         Neuro/Psych:   (+) psychiatric history (ETOH abuse. ):depression/anxiety             GI/Hepatic/Renal:   (+) GERD:, liver disease (hepatorental syndrome): coagulopathy from hepatic dysfunction,          ROS comment: Pancreatitis, esophagitis. Pt staes last drink of alcohol was 30-45 days ago. .   Endo/Other:    (+) blood dyscrasia (most recent Hgb check is 6.6. Platelets are 50. INR 5.2.): anemia and thrombocytopenia:., electrolyte abnormalities (Magnesium 1.4. ), . Abdominal:             Vascular: negative vascular ROS. Other Findings:           Anesthesia Plan      MAC     ASA 4       Induction: intravenous. MIPS: Prophylactic antiemetics administered. Anesthetic plan and risks discussed with patient. Use of blood products discussed with patient whom consented to blood products. Plan discussed with CRNA and attending.                   Agata Langford RN   12/10/2021

## 2021-12-10 NOTE — PROGRESS NOTES
[facility]  Hematology/Oncology  Consult      Patient Name: Romel Fernandez  YOB: 1975  PCP: Frieda Sommers DO   Referring Provider:      Reason for Consultation:   Chief Complaint   Patient presents with    Altered Mental Status     HX of liver failure        Subjective:  Remains profoundly jaundiced. Significant discomfort related to abdominal distention and weight gain. Hemoglobin remains critically low. Scrotal ecchymosis and edema continue to be significant    History of Present Illness:  52-year-old man with recent diagnosis of alcoholic liver cirrhosis recently discharged from the hospital and readmitted with altered mental status, confusion and hepatic encephalopathy. On admission he was noted to be hyponatremic with renal failure. On admission his hemoglobin was 12.4 with a sodium of 124, BUN of 45 and creatinine of 3.9. He was deeply jaundiced with a bilirubin of 23.9. He also had moderate thrombocytopenia on admission with a platelet count of 156    His hemoglobin and platelet count have been steadily dropping since admission and on today's CBC his hemoglobin was down to 6.8 with a platelet count of 34. MCV was 95 with a normal WBC count of 5.5 with unremarkable differential except for mild lymphopenia. His serum creatinine has recovered and is down to 0.9. His AST remains slightly elevated however with a markedly elevated bilirubin of 26.4 and hypoproteinemia with normal albumin  Patient with progressive abdominal distention and will likely require paracentesis  We are consulted for his anemia, thrombocytopenia and coagulopathy.   Most recent PT was 76 seconds with an INR of 8.3  He is on daily subcutaneous vitamin K injection  He had been on Lavon-Synephrine drip but is now on midodrine  He continues on Xifaxan as well as antibiotics with Zosyn  He was also on Sandostatin drip without any overt active bleeding at this time  His B12 and folate levels were  12/09/2021    K 3.8 12/09/2021     12/09/2021    CO2 17 (L) 12/09/2021    BUN 10 12/09/2021    CREATININE 0.8 12/09/2021    GLUCOSE 76 12/09/2021    CALCIUM 8.8 12/09/2021    PROT 5.6 (L) 12/09/2021    LABALBU 3.8 12/09/2021    BILITOT 30.2 (H) 12/09/2021    ALKPHOS 68 12/09/2021    AST 45 (H) 12/09/2021    ALT 19 12/09/2021    LABGLOM >60 12/09/2021    GFRAA >60 12/09/2021       Lab Results   Component Value Date    IRON 132 05/08/2018    TIBC 149 (L) 05/08/2018    FERRITIN 1,815 05/08/2018           Radiology-    US GALLBLADDER RUQ    Result Date: 11/28/2021  EXAMINATION: RIGHT UPPER QUADRANT ULTRASOUND 11/27/2021 5:24 pm COMPARISON: 11/14/2021 HISTORY: ORDERING SYSTEM PROVIDED HISTORY: decomp cirrhosis, CBD measurement TECHNOLOGIST PROVIDED HISTORY: Reason for exam:->decomp cirrhosis, CBD measurement What reading provider will be dictating this exam?->CRC FINDINGS: LIVER:  Nodular contour of the liver consistent with cirrhosis. The liver is somewhat heterogeneous but without obvious focal abnormality. There is apparent hepatofugal flow again identified within the main portal vein. BILIARY SYSTEM:  Gallbladder is incompletely distended with diffuse wall thickening. No sonographic Carrasco's sign. The wall thickening is nonspecific but could be reactive to ascites and hepatic parenchymal disease. No obvious cholelithiasis. Common bile duct is within normal limits measuring 6 mm. RIGHT KIDNEY: The right kidney is grossly unremarkable without evidence of hydronephrosis. PANCREAS:  Visualized portions of the pancreas are unremarkable. OTHER: Moderate ascites. Findings again consistent with cirrhotic liver. Ascites. Hepatofugal flow redemonstrated in the portal vein. Thickened gallbladder wall which may be reactive to the ascites and hepatic disease.      XR CHEST PORTABLE    Result Date: 11/26/2021  EXAMINATION: ONE XRAY VIEW OF THE CHEST 11/26/2021 7:15 am COMPARISON: One-view chest x-ray 11/14/2021 HISTORY: ORDERING SYSTEM PROVIDED HISTORY: SOB TECHNOLOGIST PROVIDED HISTORY: Reason for exam:->SOB FINDINGS: The cardiac silhouette is within normal limits. The mediastinal contours are within normal limits. The lungs are hypoinflated with resulting bronchovascular crowding. Mild basilar opacities appear new/increased. No significant pleural effusions or pneumothorax. Osseous degenerative changes are present. EKG leads overlie the chest.     Mild basilar opacities are suspicious for pneumonia. This appearance can be seen with COVID-19. XR CHEST 1 VIEW    Result Date: 11/14/2021  EXAMINATION: ONE XRAY VIEW OF THE CHEST 11/14/2021 10:50 pm COMPARISON: Chest series from July 1, 2020 HISTORY: ORDERING SYSTEM PROVIDED HISTORY: Rule out acute pulmonary pathology/infection TECHNOLOGIST PROVIDED HISTORY: Reason for exam:->Rule out acute pulmonary pathology/infection FINDINGS: Elevation of the right hemidiaphragm. Possible right lower lung ill-defined opacity. Cannot exclude small pleural effusion on the right. Left lung is clear. No pneumothorax. Cardiomediastinal silhouette and pulmonary vascularity appear within normal limits. Osseous and thoracic soft tissue structures demonstrate no acute findings. Elevation of the right hemidiaphragm and possible right lower lung opacity. Small right pleural effusion may be present. RECOMMENDATION: (Recommend upright PA and lateral chest radiographs 6-8 weeks after resolution of patient's symptoms to ensure complete resolution of radiographic findings.)     US ABDOMEN LIMITED    Result Date: 11/26/2021  EXAMINATION: RIGHT UPPER QUADRANT ULTRASOUND 11/26/2021 5:54 pm COMPARISON: None.  HISTORY: ORDERING SYSTEM PROVIDED HISTORY: ascites/JULIO TECHNOLOGIST PROVIDED HISTORY: Reason for exam:->ascites/JULIO What reading provider will be dictating this exam?->CRC FINDINGS: Superficial sonographic evaluation of the 4 quadrants of the abdomen performed using a combination of grayscale technique. Imaging done to assess for the presence of intra-abdominal ascites. Patient was laying on their left-side. Moderate volume simple appearing intra-abdominal ascites in the left abdomen. Moderate volume intra-abdominal ascites. US ABDOMEN LIMITED Specify organ? LIVER, GALLBLADDER, PANCREAS    Result Date: 11/14/2021  EXAMINATION: RIGHT UPPER QUADRANT ULTRASOUND 11/14/2021 10:20 pm COMPARISON: 01/18/2019 HISTORY: ORDERING SYSTEM PROVIDED HISTORY: Jaundice TECHNOLOGIST PROVIDED HISTORY: Reason for exam:->Jaundice Specify organ?->LIVER Specify organ?->GALLBLADDER Specify organ?->PANCREAS What reading provider will be dictating this exam?->CRC FINDINGS: LIVER:  The liver appears cirrhotic. No focal liver lesions seen. There is no intrahepatic biliary dilatation. Doppler evaluation of the liver demonstrates reversal flow within the portal vein. This is a new finding as compared to 2019 BILIARY SYSTEM:  The gallbladder is very distended. There is thickening of the gallbladder wall measuring up to 11 mm. No cholelithiasis seen. There is pericholecystic fluid which may relate to ascites rather than acute inflammatory fluid. Common bile duct is within normal limits measuring 3.6 mm. RIGHT KIDNEY: The right kidney is grossly unremarkable without evidence of hydronephrosis. PANCREAS:  Limited visualization due to bowel gas. OTHER: Moderate ascites. 1. Cirrhotic liver with moderate ascites. 2. Suspected hepatofugal flow in the portal vein. 3. Distended gallbladder with marked wall thickening. No cholelithiasis seen. There is pericholecystic fluid although this probably represents ascites rather than inflammatory fluid.      IR US GUIDED PARACENTESIS    Result Date: 11/15/2021  PROCEDURE: PARACENTESIS WITHOUT IMAGE GUIDANCE US ABDOMEN LIMITED 11/15/2021 HISTORY: ORDERING SYSTEM PROVIDED HISTORY: Rule out SBP TECHNOLOGIST PROVIDED HISTORY: Reason for exam:->Rule out SBP TECHNIQUE: Informed consent was obtained after a detailed explanation of the procedure including risks, benefits, and alternatives. Universal protocol was followed. A limited ultrasound of the abdomen was performed. The right abdomen was prepped and draped in sterile fashion and local anesthesia was achieved with lidocaine. An 8 Divehi needle sheath was advanced into ascites and paracentesis was performed. The patient tolerated the procedure well. FINDINGS: Limited ultrasound of the abdomen demonstrates ascites. A total of 6400 cc was removed. Successful paracentesis. US DUP ABD PEL RETRO SCROT COMPLETE    Result Date: 11/14/2021  EXAMINATION: DOPPLER EVALUATION OF THE PELVIS 11/14/2021 10:20 pm COMPARISON: None. HISTORY: ORDERING SYSTEM PROVIDED HISTORY: Rule out PVT TECHNOLOGIST PROVIDED HISTORY: Reason for exam:->Rule out PVT What reading provider will be dictating this exam?->CRC FINDINGS: Color flow and spectral waveform evaluation of hepatic vasculature performed. The visualized portal vein appears patent. The there is no evidence for portal venous thrombus. There is, however, apparent reversal flow within the portal vein. Appropriate hepatic arterial flow demonstrated. Hepatic veins are not image. There is a cirrhotic appearing liver with moderate ascites. There is gallbladder distension with wall thickening. Doppler evaluation demonstrates hepatofugal portal venous flow. No evidence for portal venous thrombus. Moderate ascites. Gallbladder distension with wall thickening. ASSESSMENT/PLAN :   [id number]-DET man  Alcoholic liver cirrhosis  Hepatorenal syndrome with return of renal function to normal  Hepatic encephalopathy resolved  Hyponatremia resolved  Severe jaundice with elevated bilirubin.   Ultrasound of abdomen shows nodular contour of the liver with thickening of the gallbladder wall with flow demonstrated in the portal vein and moderate ascites    Severe anemia and thrombocytopenia multifactorial related to myelosuppression by alcohol with a component of hemodilution as well as peripheral sequestration by portal hypertension and hypersplenism  There is no overt GI bleed  To check iron studies and reticulocyte count. Coagulopathy with markedly elevated PT/INR related to severe hepatocellular disease and liver failure  Has been on daily vitamin K subcu but will require 2 units of FFP today and they need to be repeated tomorrow in anticipation of potential paracentesis  In view of scrotal hematoma and platelet dysfunction due to prior uremia will also transfuse with 1 dose of platelets    73/8/04  - CBC with Hgb 6.7. Platelets 39. S/p 3 pRBC and 2 FFP  - INR still 5. On Vit K. Additional FFP may be needed to get INR to safe range for paracentesis. Repeat INR pending  - 1 unit platelets pending  - Bili stable at 24. Cytopenias due to both myelosuppression from alcohol consumption and liver cirrhosis/hypersplenism  - Will continue to follow    12/8/2021  Remains deeply icteric  Status post transfusion yesterday with packed RBCs/FFP/platelets  His labs today show further rising bilirubin to 27.7. No significant change in CBC despite multiple transfusions. His hemoglobin remains low at 6.2 and platelet count is 39. PT/INR pending      Patient with coagulopathy related to liver failure from alcoholic liver cirrhosis  He has short term response to FFP's and will receive additional FFP today in an attempt to lower his INR prior to possible palliative paracentesis  His anemia is multifactorial and related to severe myelosuppression and alcohol injury to bone marrow as well as a component of peripheral sequestration by hypersplenism secondary to portal hypertension and advanced liver cirrhosis  Prognosis very poor    12/9/2021.   -Coagulopathic due to cirrhosis.  -Worsening scrotal hematoma. Possible GI bleeding. Hemoglobin dropped to 6. Multifactorial. Scheduled for EGD tomorrow to r/o varices. S/p blood transfusion. Paracentesis pending till INR is normal.  He has received 10 units of FFP's during this admission. INR today is 4  Checked fibrinogen. Less than 80. We will give cryoprecipitate 10 units. S/p vitamin K. Bili is 30. Poor prognosis. 12/10/21  - Coagulopathy slightly improved, INR 3.9. Fibrinogen up to 197  - Continues to require significant numbers of blood products including FFP, pRBCs, platelets and cryo. Continue to transfuse as needed. Ordered 1 pRBC and 2 FFP.  May need additional cryo as well  - CBC with Hgb 6.6 today  - Plan for EGD today with GI  - Bili is up to 30  - Overall poor prognosis      Jay Kemp MD  Electronically signed 12/10/2021 at 12:11 PM

## 2021-12-10 NOTE — PROGRESS NOTES
Internal Medicine Progress Note         Primary Care Physician: Zahida Gross DO   Admitting Physician:  Julieta Burdick DO  Admission date and time: 11/26/2021  9:25 AM    Room:  [address] [place] [address]  Admitting diagnosis: Hepatorenal syndrome ([facility] [address] [K76.7]  Acute kidney injury ([facility] [address] [N17.9]  Chronic liver failure without hepatic coma [facility]) [K72.10]    Patient Name: Lito Martinez  MRN: [id number]    Date of Service: 12/10/2021       Subjective: Claudia Oconnor is a 40 y.o. male ,12/10/2021, at the bedside. Patient doing very poorly multiple problems at hand including hepatorenal syndrome and dropping blood pressure. Patient has very low urine output with evidence of metabolic acidosis. Currently being followed by multiple subspecialists. 11/2611/27/2021-medical coverage by Dr. Justina Buchanan    11/28/2021  Patient seen examined on PCU. Patient's mother is at the bedside and case discussed in detail. Patient is much more alert this morning. Patient oriented to person place. He denies any significant pain at this time. There is no lower leg edema and abdomen is distended but very soft. Serum sodium is up to 129 with a potassium 3.2. CO2 electrolytes is 18 and improved but BUN/creatinine is 44/3.2 which is improved compared to 11/27. Total bili is 24.2 with WBC 12.7 hemoglobin 10.1. Platelet count is down to 63. INR was 7.8. Temperature 98.8 with heart rate of 70 and blood pressure is 115/67. Patient has been put on phenylephrine drip with improvement in blood pressure. Urine output about 400 cc a shift. 11/29/2021  Patient seen and examined in PCU. Patient's mother is at the bedside and case discussed. Patient is still alert and oriented x23 with episodes of confusion. Patient denies any problem with chest or abdominal pain. Patient strength is still very poor with the patient still very weak on his feet. Patient appetite has improved with less nausea.   Patient still has persistent hand tremor which she has had for years according to the mother. But this has been gotten worse. BUN/creatinine 33/2.2 with serum sodium 130. Blood sugars ranging [id number]. INR is down to 5.7 today. Temperature ranges 90.7100.4 with a heart rate of 61 blood pressure 113/63. O2 sat 97% on room air at rest.  Patient still on phenylephrine drip. Urine output ranges 800-1300 cc a shift. 11/30/2021  Patient seen examined on PCU. Patient states he feels fairly good again today. He denies any chest or abdominal pain. Patient states he is very hungry and is still on liquids. Hemoglobin 9.2 with platelet count of down to 38. BUN/creatinine 24/1.4 with potassium 3.2 and sodium 131. Temperature 99 with heart rate 64 blood pressure 99/57. O2 sat 93% on room air. Phenylephrine drip still running. Urine output ranges to 200-4000 cc a shift. Patient is for abdominal paracentesis today. 12/1/2021  Patient seen examined in PCU. Patient with 2 family members at the bedside. Case was discussed. Patient alert and oriented x3. Patient's upper extremity tremors are moderately improved. Patient had questions about abdominal paracentesis. BUN/creatinine 25/1.4 today with total bilirubin 29. WBC 7.5 hemoglobin 8.2 and platelet count 62. Temperature ranges 97.998.3 with heart rate of 68 blood pressure range from 82/51112/64. O2 sats 100% on room air. Patient still on phenylephrine drip for blood pressure/ renal perfusion. Abdominal paracentesis when okay with GI, intensivist.    12/2/2021   Patient seen examined on PCU. Patient denies any specific pain at this time. Patient is little bit depressed about not getting his paracentesis performed yet. BUN/creatinine 18/1.2 with hemoglobin is 7.5 platelet count is down to 29. Patient received fresh frozen plasma and INR yesterday morning was 3.4 and today is 5. Temperature 90.3 with heart rate of 67 and blood pressure 110/70. Pulmonology and GI notes reviewed.   With kidney function about back to baseline phenylephrine drip will be weaned off.    12/3/2021  Patient seen examined on PCU. Patient alert and oriented x3. Patient denies any chest or significant abdominal pain. Abdomen distended but soft. BUN/creatinine 14/1.0 with blood sugars ranging 103126. Serum phosphorus 1.8. Total bili was 24.1 today and has improved somewhat. Hemoglobin is down to 7.2 today. INR is up to 6.3 today. Heart rate 65 with blood pressure 105/52 with O2 sat 94% on room air at rest.  Urinary output is good. Patient is still on Lavon-Synephrine and somatostatin drip. 12/4/2021  Patient seen examined on PCU. Patient received 1 unit packed RBC today with low hemoglobin. Patient denies any problem with chest pain and there is no continuous abdominal discomfort. Patient denies any unusual shortness of breath. BUN/creatinine 11/0.9 with serum magnesium 1.5. Phosphorus 1.7 with WBC 5.3 and hemoglobin 6.6. Total bili 23.4, INR 6.6 today. Temperature 98.5 with heart rate of 74 blood pressure 101/62. O2 sat 96% with the patient being titrated down on the Neosporin drip. 12/5/2021   Patient seen examined on PCU. Case discussed with patient's family at the bedside again today. Patient complaining of pain behind his upper arms in the tricep area. Patient says it started a couple days ago. More of a constant burning sensation. There is no rash noted but there is some ecchymosis noted on the upper portion of the arm and tricep area. This area is tender to palpation. There is no masses or hematoma noted. BUN/creatinine 12/1.0. Serum magnesium 1.4 again with phosphorus 1.7. Blood sugars still low 100s. Total bili is 24.8 today with a WBC 5.8 hemoglobin 6.7 early this morning with a repeat 7.1. Temperature 97.5 heart rate 78 blood pressure 118/64. Patient was taken off the Neosporin drip yesterday afternoon but was put back on this morning. O2 sat is 95% on room air.   Urine output ranges 150-450 cc shift. Patient given potassium and magnesium supplement. 12/6/2021  Patient seen examined on PCU. Patient is upset because he does not know what his INR is. Patient states his bilateral posterior upper arm discomfort is improved. There is no chest pain. Patient's abdomen is slowly getting bigger. BUN/creatinine 12/0.9. CO2 was 16 on the electrolytes. Total bili is 26.4 with hemoglobin 6.8 and WBC 5.5. INR is pending. Temperature 90.4 with heart rate of 81 with blood pressure 104/59 with an O2 sat 98%. We will type and cross give 1 more unit of packed RBC now. Consult hematology regarding coagulopathy. 12/7/2021  Patient seen examined on PCU. Patient is about the same. Abdomen is getting more distended. Oncology note reviewed. BUN/creatinine 12/0.9 with a CO2 of 16. Total bili is 24.2 with hemoglobin 6.7 with the patient receiving 2 units of packed RBCs last 24 hours. Platelet count is 39. Temperature 98.5 heart rate is 79 blood pressure 112/67. O2 sat 99% room air at rest. Urine output is good. Patient to receive 2 more units of fresh frozen plasma today with patient receiving platelets yesterday. Patient hopefully can have the abdominal paracentesis today after INR has been performed. 12/8/2021  Patient seen examined on PCU. Patient complained of increased abdominal distention and discomfort. Patient denies any chest pain. Patient little bit frustrated about being in the hospital so long and having his abnormal lab work including low hemoglobin and persistently elevated INR. Hemoglobin 6.2 today with a WBC of 4.4. Platelet count 39 with BUN/creatinine 10/0.8. Blood sugars in the low 100s. Total bili is 27.7. Temperature 90.3 with heart rate 83 and blood pressure 114/74. O2 sat 97% room air at rest.    12/9/2021  Patient seen examined on PCU. Patient is receiving 2 units packed RBCs today along with cryoprecipitate and fresh frozen plasma.   INR to be repeated after that.  BUN/creatinine 10/0.8. Blood sugars range 76117. Hemoglobin 6.0 today with WBC of 4 and platelet count 46. INR is 4.7. Temperature is 98 with heart rate 91 blood pressure 170/77 O2 sat 99% room air at rest.    12/10/2021  Patient seen examined on PCU. Case discussed in detail with GI today. Hematology and nephrology note reviewed. Patient feels about the same. Patient has abdominal discomfort secondary to the ascites. He denies any chest pain. There is no nausea vomiting but he has poor appetite. Hemoglobin 6.6 today with patient hemoglobin 6.0 yesterday and repeat 6.9 yesterday afternoon. Temperature 98.4 with heart rate 91 blood pressure 105/72. O2 sat 95% on room air at rest.  Pending lab work today. Patient supposed to go for EGD today. Patient given 2 units of fresh frozen plasma again today with 1 unit of typed and crossed packed RBCs. Case was discussed with the invasive radiologist today. He states that he has no problem doing the abdominal paracentesis today. Review of System: Limited at the present time  Constitutional:   Denies fever or chills, weight loss or gain, fatigue or malaise. HEENT:   Denies ear pain, sore throat, sinus or eye problems. Cardiovascular:   Denies any chest pain, irregular heartbeats, or palpitations. Respiratory:   Denies shortness of breath, coughing, sputum production, hemoptysis, or wheezing. Gastrointestinal:   Denies nausea, vomiting, diarrhea, or constipation. Denies any abdominal pain. Genitourinary:    Denies any urgency, frequency, hematuria. Voiding  without difficulty. Extremities:   Denies lower extremity swelling, edema or cyanosis. Neurology:    Denies any headache or focal neurological deficits, Denies generalized weakness or memory difficulty. Psch:   Denies being anxious or depressed. Musculoskeletal:    Denies  myalgias, joint complaints or back pain. Integumentary:   Denies any rashes, ulcers, or excoriations.   Denies bruising. Hematologic/Lymphatic:  Denies bruising or bleeding. Physical Exam:  I/O this shift:  In: -   Out: 250 [Urine:250]    Intake/Output Summary (Last 24 hours) at 12/10/2021 1010  Last data filed at 12/10/2021 0908  Gross per 24 hour   Intake 859.87 ml   Output 1400 ml   Net -540.13 ml   I/O last 3 completed shifts: In: 859.9 [Blood:859.9]  Out: 1150 [Urine:1150]  No data found. Vital Signs:   Blood pressure 105/72, pulse 91, temperature 98.4 °F (36.9 °C), temperature source Oral, resp. rate 16, height 6' 3\" (1.905 m), weight 212 lb (96.2 kg), SpO2 95 %. General appearance:  Patient alert but lethargic. Appears chronically ill  Head:  Normocephalic. No masses, lesions or tenderness. Eyes:  PERRLA. EOMI. sclera icteric. Buccal mucosa moist.  ENT:  Ears normal. Mucosa normal.  Neck:    Supple. Trachea midline. No thyromegaly. No JVD. No bruits. Heart:    Rhythm regular. Tachycardic. No murmurs. Lungs:    Diminished  Abdomen:   Soft. Non-tender. Non-distended. Bowel sounds positive. No organomegaly or masses. No pain on palpation. Newly distended  Extremities:    Peripheral pulses present. No peripheral edema. No ulcers. No cyanosis. No clubbing.   Neurologic:    Responsive but lethargic  Integumentary:  No rashes skin jaundice  Genitalia/Breast:  Hickman catheter    Medication:  Scheduled Meds:   furosemide  40 mg IntraVENous Once    sodium bicarbonate  1,300 mg Oral TID    [Held by provider] phytonadione  10 mg Oral Daily    vitamin D3  5,000 Units Oral Daily    insulin lispro  0-6 Units SubCUTAneous TID WC    pantoprazole  40 mg IntraVENous Daily    And    sodium chloride (PF)  10 mL IntraVENous Daily    midodrine  15 mg Oral TID WC    thiamine (VITAMIN B1) IVPB  100 mg IntraVENous Daily    rifaximin  550 mg Oral BID    sodium chloride flush  5-40 mL IntraVENous 2 times per day    piperacillin-tazobactam  3,375 mg IntraVENous Q8H     Continuous Infusions:   sodium chloride      sodium chloride      sodium chloride      sodium chloride      sodium chloride      sodium chloride      sodium chloride      sodium chloride      dextrose      sodium chloride      sodium chloride Stopped (12/10/21 0030)       Objective Data:  CBC with Differential:    Lab Results   Component Value Date    WBC 4.0 12/09/2021    RBC 1.81 12/09/2021    HGB 6.9 12/09/2021    HCT 20.2 12/09/2021    PLT 46 12/09/2021    MCV 96.7 12/09/2021    MCH 33.1 12/09/2021    MCHC 34.3 12/09/2021    RDW 22.6 12/09/2021    METASPCT 0.9 11/20/2021    LYMPHOPCT 22.3 12/09/2021    MONOPCT 4.5 12/09/2021    MYELOPCT 2.7 12/06/2021    BASOPCT 1.8 12/09/2021    MONOSABS 0.20 12/09/2021    LYMPHSABS 0.88 12/09/2021    EOSABS 0.11 12/09/2021    BASOSABS 0.07 12/09/2021     CMP:    Lab Results   Component Value Date     12/09/2021    K 3.8 12/09/2021    K 5.8 11/26/2021     12/09/2021    CO2 17 12/09/2021    BUN 10 12/09/2021    CREATININE 0.8 12/09/2021    GFRAA >60 12/09/2021    LABGLOM >60 12/09/2021    GLUCOSE 76 12/09/2021    PROT 5.6 12/09/2021    LABALBU 3.8 12/09/2021    CALCIUM 8.8 12/09/2021    BILITOT 30.2 12/09/2021    ALKPHOS 68 12/09/2021    AST 45 12/09/2021    ALT 19 12/09/2021              Assessment:    · Acute hepatic encephalopathy  · Hepatorenal syndrome probable hepatopulmonary syndrome  · Acute hypoxic respiratory failure  · Hypotonic hypervolemic hyponatremia  · JULIO on CKD  · Pneumonia possible aspiration  · Decompensated cirrhosis  · Hyperbilirubinemia  · Chronic normocytic anemia  · Atrial fibrillation  · History of anxiety and depression  · History of alcohol abuse  · GERD        Plan:     · Patient is doing very poorly at the present time. The patient does have advanced liver disease with decompensated cirrhosis and not currently a candidate for liver transplant. Symptoms are suggestive of severe electrolyte imbalance with probable hepatorenal syndrome. He also is currently hypotensive.   I have discussed the condition with the mother and fiancé along with Dr. Jassi Leon have discussed his CODE STATUS and agree on no intubation or chest compressions. We have also discussed possibility of transition into hospice pending his status. Continue aggressive care for the next 24 to 48 hours and observe response  · Electrolyte imbalance of hyponatremia being followed by nephrology  · Metabolic acidosis being corrected by bicarb infusion  · GI is following regarding his hepatic cirrhosis  · Vasopressor has been instituted  · Salt poor albumin to expand volume  · Blood culture has been obtained.   Zosyn to cover for spontaneous bacterial peritonitis  · Patient is auto anticoagulated vitamin K supplementation  · Treat elevated ammonia level  · Paracentesis as needed  · Prognosis is very poor but he is improving short-term    -Consult hematology regarding severe coagulopathy  -We will try to discuss with GI regarding patient's discharge planning and care with family having multiple questions regarding this  -Patient has received 2 units of packed RBCs   -Patient will receive 2 units fresh frozen plasma     -Patient received 2 units packed RBCs, 2 units fresh frozen plasma, cryoprecipitate 12/9  -Patient received 1 unit packed RBC along with 2 units of fresh frozen plasma 12/10    -EGD and abdominal paracentesis 12/10    Mega Phillips DO, F.A.C.O.I.  12/10/2021  10:10 AM

## 2021-12-10 NOTE — PROGRESS NOTES
Interval H&P  Patient seen on the fifth floor this AM.  Patient's mother at the bedside case discussed with patient and mother in great detail. Patient's repeat lab work this a.m. is pending. Patient denies any overt signs of GI bleed. Vitals:    12/10/21 0030   BP: 109/69   Pulse: 82   Resp: 16   Temp: 97.7 °F (36.5 °C)   SpO2: 95%        Plan: Proceed with EGD this afternoon to evaluate.        Kay Corrales DO  GI Fellow   9:10 AM

## 2021-12-10 NOTE — PROGRESS NOTES
Progress Note  12/10/2021 10:02 AM  Subjective:   Admit Date: 11/26/2021  PCP: Alma Rosa Nava,   Interval History:    12/10/21- awake and alert. He is for EGD today. Mother at the bedside updated       Diet: Diet NPO Exceptions are: Sips of Water with Meds    Data:   Scheduled Meds:   sodium bicarbonate  1,300 mg Oral TID    [Held by provider] phytonadione  10 mg Oral Daily    vitamin D3  5,000 Units Oral Daily    insulin lispro  0-6 Units SubCUTAneous TID WC    pantoprazole  40 mg IntraVENous Daily    And    sodium chloride (PF)  10 mL IntraVENous Daily    midodrine  15 mg Oral TID WC    thiamine (VITAMIN B1) IVPB  100 mg IntraVENous Daily    rifaximin  550 mg Oral BID    sodium chloride flush  5-40 mL IntraVENous 2 times per day    piperacillin-tazobactam  3,375 mg IntraVENous Q8H     Continuous Infusions:   sodium chloride      sodium chloride      sodium chloride      sodium chloride      sodium chloride      sodium chloride      sodium chloride      sodium chloride      dextrose      sodium chloride      sodium chloride Stopped (12/10/21 0030)     PRN Meds:sodium chloride, sodium chloride, sodium chloride, sodium chloride, sodium chloride, sodium chloride, sodium chloride, sodium chloride, acetaminophen **OR** [DISCONTINUED] acetaminophen, traMADol, lidocaine, melatonin, LORazepam **OR** LORazepam **OR** [DISCONTINUED] LORazepam **OR** [DISCONTINUED] LORazepam **OR** [DISCONTINUED] LORazepam **OR** [DISCONTINUED] LORazepam **OR** [DISCONTINUED] LORazepam **OR** [DISCONTINUED] LORazepam, glucose, dextrose, glucagon (rDNA), dextrose, sodium chloride flush, sodium chloride, polyethylene glycol, prochlorperazine  I/O last 3 completed shifts:   In: 859.9 [Blood:859.9]  Out: 1150 [Urine:1150]  I/O this shift:  In: -   Out: 250 [Urine:250]    Intake/Output Summary (Last 24 hours) at 12/10/2021 1002  Last data filed at 12/10/2021 0908  Gross per 24 hour   Intake 859.87 ml   Output 1400 ml Net -540.13 ml     CBC:   Recent Labs     12/08/21  1102 12/09/21  0630 12/09/21  1746   WBC 4.4* 4.0*  --    HGB 6.2* 6.0* 6.9*   PLT 39* 46*  --      BMP:    Recent Labs     12/08/21  1102 12/09/21  0630    140   K 3.9 3.8   * 107   CO2 17* 17*   BUN 10 10   CREATININE 0.8 0.8   GLUCOSE 129* 76     Hepatic:   Recent Labs     12/08/21  1102 12/09/21  0630   AST 45* 45*   ALT 19 19   BILITOT 27.7* 30.2*   ALKPHOS 62 68     Troponin: No results for input(s): TROPONINI in the last 72 hours. BNP: No results for input(s): BNP in the last 72 hours. Lipids: No results for input(s): CHOL, HDL in the last 72 hours. Invalid input(s): LDLCALCU  ABGs: No results found for: PHART, PO2ART, UVD8JDA  INR:   Recent Labs     12/09/21  0630 12/09/21  1011 12/09/21  1746   INR 4.3 4.7 3.9       -----------------------------------------------------------------  RAD: US GALLBLADDER RUQ    Result Date: 11/28/2021  EXAMINATION: RIGHT UPPER QUADRANT ULTRASOUND 11/27/2021 5:24 pm COMPARISON: 11/14/2021 HISTORY: ORDERING SYSTEM PROVIDED HISTORY: decomp cirrhosis, CBD measurement TECHNOLOGIST PROVIDED HISTORY: Reason for exam:->decomp cirrhosis, CBD measurement What reading provider will be dictating this exam?->CRC FINDINGS: LIVER:  Nodular contour of the liver consistent with cirrhosis. The liver is somewhat heterogeneous but without obvious focal abnormality. There is apparent hepatofugal flow again identified within the main portal vein. BILIARY SYSTEM:  Gallbladder is incompletely distended with diffuse wall thickening. No sonographic Carrasco's sign. The wall thickening is nonspecific but could be reactive to ascites and hepatic parenchymal disease. No obvious cholelithiasis. Common bile duct is within normal limits measuring 6 mm. RIGHT KIDNEY: The right kidney is grossly unremarkable without evidence of hydronephrosis. PANCREAS:  Visualized portions of the pancreas are unremarkable. OTHER: Moderate ascites. Findings again consistent with cirrhotic liver. Ascites. Hepatofugal flow redemonstrated in the portal vein. Thickened gallbladder wall which may be reactive to the ascites and hepatic disease. XR CHEST PORTABLE    Result Date: 11/26/2021  EXAMINATION: ONE XRAY VIEW OF THE CHEST 11/26/2021 7:15 am COMPARISON: One-view chest x-ray 11/14/2021 HISTORY: ORDERING SYSTEM PROVIDED HISTORY: SOB TECHNOLOGIST PROVIDED HISTORY: Reason for exam:->SOB FINDINGS: The cardiac silhouette is within normal limits. The mediastinal contours are within normal limits. The lungs are hypoinflated with resulting bronchovascular crowding. Mild basilar opacities appear new/increased. No significant pleural effusions or pneumothorax. Osseous degenerative changes are present. EKG leads overlie the chest.     Mild basilar opacities are suspicious for pneumonia. This appearance can be seen with COVID-19.     US ABDOMEN LIMITED    Result Date: 11/26/2021  EXAMINATION: RIGHT UPPER QUADRANT ULTRASOUND 11/26/2021 5:54 pm COMPARISON: None. HISTORY: ORDERING SYSTEM PROVIDED HISTORY: ascites/JULIO TECHNOLOGIST PROVIDED HISTORY: Reason for exam:->ascites/JULIO What reading provider will be dictating this exam?->CRC FINDINGS: Superficial sonographic evaluation of the 4 quadrants of the abdomen performed using a combination of grayscale technique. Imaging done to assess for the presence of intra-abdominal ascites. Patient was laying on their left-side. Moderate volume simple appearing intra-abdominal ascites in the left abdomen. Moderate volume intra-abdominal ascites. Objective:   Vitals: /72   Pulse 91   Temp 98.4 °F (36.9 °C) (Oral)   Resp 16   Ht 6' 3\" (1.905 m)   Wt 212 lb (96.2 kg)   SpO2 95%   BMI 26.50 kg/m²   General appearance: appears stated age   Skin:  Jaundice   HEENT: Head: Normocephalic, no lesions, without obvious abnormality.  Sclarea  jaundice   Neck: no adenopathy, no carotid bruit, no JVD, supple, symmetrical, trachea midline and thyroid not enlarged, symmetric, no tenderness/mass/nodules  Lungs: clear to auscultation bilaterally  Heart: regular rate and rhythm, S1, S2 normal, no murmur, click, rub or gallop  Abdomen: Distended , ascites ++  Extremities: extremities normal, atraumatic, no cyanosis or edema  Neurologic: Mental status: Alert, oriented, thought content appropriate    Assessment:   Patient Active Problem List:     Alcohol-induced acute pancreatitis     Anxiety     Depression     ETOH abuse     Hematemesis     Atrial fibrillation with rapid ventricular response (HCC)     Alcohol withdrawal syndrome without complication (HCC)     Lactic acidosis     Hypokalemia     Severe protein-calorie malnutrition (HCC)     Paroxysmal atrial fibrillation (HCC)     Hyperbilirubinemia     Hypomagnesemia     Bilirubinemia     UGIB (upper gastrointestinal bleed)     Hepatorenal syndrome (HCC)    Plan:   Assessment: / Plan:     1.  Acute kidney injury.  Acute kidney injury secondary to renal hypoperfusion with possible underlying hepatorenal syndrome.  Urine output has improved and was 1150 ml past 24 hours. , JULIO  Now Resolved. Now  with edema will dose loop as needed and follow. Follow BP's closely as has been hypotensive     2.  Metabolic acidosis.  Patient with non-anion gap metabolic acidosis which may be reflection of diarrhea as well as acute kidney injury.   12/3/21: started oral bicarb. Up titrated to 1300 mg po bid 12/6 and to tid 12/8     3.   Hypomagnesemia.    Supplement potassium and magnesium as needed. Mg 1.4- supplement      4. Hypokalemia. Improved. 5.  Hypocalcemia.   ionized calcium 1.12 / Vit D 14 / PTH 60 - Started oral Vit D on 11/29/21     6.  Hypotension. Stable follow with diuresis     7. Hypophosphatemia.    Supplement orally as needed for goal 2.5  PO4 1.8-->2.6 S/P supplement     8.  Coagulopathy-  Plasma and blood to be given today  Transfuse as needed per primary/ hem    Note labs not resulted at the time of this note    EILEEN Dale CNP      Patient seen and examined. Chart reviewed. I had a face to face encounter with the patient. Agree with exam.    Agree with  formulation, assessment and plan as outlined above and directed by me. Addition and corrections were done as deemed appropriate. My exam and plan include: hypotension improved. Hypotension also partly due to profound anemia. Patient on midodrine. Continue bicarbonate supplement. Continue current treatment.       Ramírez Joyner MD  Nephrology        Electronically signed by Ramírez Joyner MD on 12/10/2021 at 1:15 PM

## 2021-12-11 NOTE — PROGRESS NOTES
PROGRESS NOTE  [facility]  Dr. Barry Marques MD, Dr. Rachel Cook MD, Dr Randall Gray, Dr. Maria Del Rosario Vivas MD, Dr. Hernandez Jerome, DO Marie Peralta  40 y.o.  male    SUBJECTIVE: [place]. Patient feels slightly better today. Still complaining of scrotal discomfort. Underwent paracentesis yesterday. Also with some abdominal bloating    OBJECTIVE:     /64   Pulse 86   Temp 98.5 °F (36.9 °C) (Oral)   Resp 14   Ht 6' 3\" (1.905 m)   Wt 212 lb (96.2 kg)   SpO2 98%   BMI 26.50 kg/m²     Gen: NAD, AAO x 3  HEENT:PEERL, grossly icteric  Heart: RRR, no M/R/G  Lungs: CTAB  Abd.: soft, NT, ND  Extr.: Diffuse anasarca  Skin: Grossly jaundiced      Stool (measured) : 100 mL  Lab Results   Component Value Date    WBC 4.6 12/11/2021    WBC 4.5 12/10/2021    WBC 4.0 12/09/2021    HGB 8.0 12/11/2021    HGB 6.6 12/10/2021    HGB 6.9 12/09/2021    HCT 23.5 12/11/2021    MCV 98.3 12/11/2021    RDW 21.7 12/11/2021    PLT 58 12/11/2021    PLT 50 12/10/2021    PLT 46 12/09/2021     Lab Results   Component Value Date     12/10/2021    K 4.0 12/10/2021    K 5.8 11/26/2021     12/10/2021    CO2 19 12/10/2021    BUN 10 12/10/2021    CREATININE 0.8 12/10/2021    CALCIUM 9.0 12/10/2021    PROT 5.8 12/10/2021    LABALBU 3.6 12/10/2021    BILITOT 32.9 12/10/2021    BILITOT 30.2 12/09/2021    BILITOT 27.7 12/08/2021    ALKPHOS 54 12/10/2021    ALKPHOS 68 12/09/2021    ALKPHOS 62 12/08/2021    AST 47 12/10/2021    AST 45 12/09/2021    AST 45 12/08/2021    ALT 18 12/10/2021    ALT 19 12/09/2021    ALT 19 12/08/2021     Lab Results   Component Value Date    LIPASE 68 11/14/2021    LIPASE 26 07/01/2020    LIPASE 26 05/11/2020     Lab Results   Component Value Date    AMYLASE 91 01/16/2019         ASSESSMENT/PLAN:  1. Hepatic encephalopathy type C grade II - Resolved  - Continue lactulose 30g BID with rifaximin 550 mg p.o.  TID  - Mental status appears to be at baseline- Supportive care     2. Alcohol induced cirrhosis, decompensated  Meld sodium score 35.    - Diuretics per nephrology; Discussed with Dr. Marybeth Galvez, increased today  - s/p paracentesis yesterday; high SAAG, no SBP, culture pending  - INR remains elevated; repeat today pending; consider repeat paranteral Vit K  - If antibiotics is only for SBP, okay to discontinue; defer to primary    3.  Renal failure - resolved   - Possible HRS-JULIO  - Discussed with nephrology  - Diuretics as above     4.  Acute on chronic normocytic anemia  -Vital signs stable over signs of GI bleed on exam  -CT A/P yesterday with pelvic hematoma, most likely source of recurrent drop in H&H  -Plan for non-emergent EGD on Monday to assess for varices     Discussed with Dr. Say Workman DO  GI Fellow   2021  10:42 AM     Pt seen and independently examined. Pertinent notes and lab work reviewed. Monitor reviewed showing sinus rhythm.   D/w Dr. Ryan Kumari with physical exam and A&P    Tessie Fritz MD  2021  11:07 AM

## 2021-12-11 NOTE — PROGRESS NOTES
[id number]  Last data filed at 12/11/2021 0849  Gross per 24 hour   Intake 991.67 ml   Output 1625 ml   Net -633.33 ml     CBC:   Recent Labs     12/08/21  1102 12/08/21  1102 12/09/21  0630 12/09/21  1746 12/10/21  1038   WBC 4.4*  --  4.0*  --  4.5   HGB 6.2*   < > 6.0* 6.9* 6.6*   PLT 39*  --  46*  --  50*    < > = values in this interval not displayed. BMP:    Recent Labs     12/08/21  1102 12/09/21  0630 12/10/21  1038    140 138   K 3.9 3.8 4.0   * 107 105   CO2 17* 17* 19*   BUN 10 10 10   CREATININE 0.8 0.8 0.8   GLUCOSE 129* 76 73*     Hepatic:   Recent Labs     12/08/21  1102 12/09/21  0630 12/10/21  1038   AST 45* 45* 47*   ALT 19 19 18   BILITOT 27.7* 30.2* 32.9*   ALKPHOS 62 68 54     Troponin: No results for input(s): TROPONINI in the last 72 hours. BNP: No results for input(s): BNP in the last 72 hours. Lipids: No results for input(s): CHOL, HDL in the last 72 hours. Invalid input(s): LDLCALCU  ABGs: No results found for: PHART, PO2ART, [id number]  INR:   Recent Labs     12/09/21  1011 12/09/21  1746 12/10/21  1038   INR 4.7 3.9 5.2*       -----------------------------------------------------------------  RAD: US GALLBLADDER RUQ    Result Date: 11/28/2021  EXAMINATION: RIGHT UPPER QUADRANT ULTRASOUND 11/27/2021 5:24 pm COMPARISON: 11/14/2021 HISTORY: ORDERING SYSTEM PROVIDED HISTORY: decomp cirrhosis, CBD measurement TECHNOLOGIST PROVIDED HISTORY: Reason for exam:->decomp cirrhosis, CBD measurement What reading provider will be dictating this exam?->CRC FINDINGS: LIVER:  Nodular contour of the liver consistent with cirrhosis. The liver is somewhat heterogeneous but without obvious focal abnormality. There is apparent hepatofugal flow again identified within the main portal vein. BILIARY SYSTEM:  Gallbladder is incompletely distended with diffuse wall thickening. No sonographic Carrasco's sign.   The wall thickening is nonspecific but could be reactive to ascites and hepatic parenchymal disease. No obvious cholelithiasis. Common bile duct is within normal limits measuring 6 mm. RIGHT KIDNEY: The right kidney is grossly unremarkable without evidence of hydronephrosis. PANCREAS:  Visualized portions of the pancreas are unremarkable. OTHER: Moderate ascites. Findings again consistent with cirrhotic liver. Ascites. Hepatofugal flow redemonstrated in the portal vein. Thickened gallbladder wall which may be reactive to the ascites and hepatic disease. XR CHEST PORTABLE    Result Date: 11/26/2021  EXAMINATION: ONE XRAY VIEW OF THE CHEST 11/26/2021 7:15 am COMPARISON: One-view chest x-ray 11/14/2021 HISTORY: ORDERING SYSTEM PROVIDED HISTORY: SOB TECHNOLOGIST PROVIDED HISTORY: Reason for exam:->SOB FINDINGS: The cardiac silhouette is within normal limits. The mediastinal contours are within normal limits. The lungs are hypoinflated with resulting bronchovascular crowding. Mild basilar opacities appear new/increased. No significant pleural effusions or pneumothorax. Osseous degenerative changes are present. EKG leads overlie the chest.     Mild basilar opacities are suspicious for pneumonia. This appearance can be seen with COVID-19.     US ABDOMEN LIMITED    Result Date: 11/26/2021  EXAMINATION: RIGHT UPPER QUADRANT ULTRASOUND 11/26/2021 5:54 pm COMPARISON: None. HISTORY: ORDERING SYSTEM PROVIDED HISTORY: ascites/JULIO TECHNOLOGIST PROVIDED HISTORY: Reason for exam:->ascites/JULIO What reading provider will be dictating this exam?->CRC FINDINGS: Superficial sonographic evaluation of the 4 quadrants of the abdomen performed using a combination of grayscale technique. Imaging done to assess for the presence of intra-abdominal ascites. Patient was laying on their left-side. Moderate volume simple appearing intra-abdominal ascites in the left abdomen. Moderate volume intra-abdominal ascites.        Objective:   Vitals: /64   Pulse 86   Temp 98.5 °F (36.9 °C) (Oral)   Resp 14   Ht 6' 3\" (1.905 m)   Wt 212 lb (96.2 kg)   SpO2 98%   BMI 26.50 kg/m²   General appearance: appears stated age   Skin:  Jaundice   HEENT: Head: Normocephalic, no lesions, without obvious abnormality. Sclarea  jaundice   Neck: no adenopathy, no carotid bruit, no JVD, supple, symmetrical, trachea midline and thyroid not enlarged, symmetric, no tenderness/mass/nodules  Lungs: clear to auscultation bilaterally  Heart: regular rate and rhythm, S1, S2 normal, no murmur, click, rub or gallop  Abdomen: Distended , ascites ++  Extremities: extremities normal, atraumatic, no cyanosis or edema  Neurologic: Mental status: Alert, oriented, thought content appropriate    Assessment:   Patient Active Problem List:     Alcohol-induced acute pancreatitis     Anxiety     Depression     ETOH abuse     Hematemesis     Atrial fibrillation with rapid ventricular response (HCC)     Alcohol withdrawal syndrome without complication (HCC)     Lactic acidosis     Hypokalemia     Severe protein-calorie malnutrition (HCC)     Paroxysmal atrial fibrillation (HCC)     Hyperbilirubinemia     Hypomagnesemia     Bilirubinemia     UGIB (upper gastrointestinal bleed)     Hepatorenal syndrome (HCC)    Plan:   Assessment: / Plan:     1.  Acute kidney injury.  Acute kidney injury secondary to renal hypoperfusion with possible underlying hepatorenal syndrome.  Urine output has improved and was 900 ml past 24 hours. , JULIO  Now Resolved. Now  with edema will dose loop as needed and follow. Follow BP's closely as has been hypotensive     2.  Metabolic acidosis.  Patient with non-anion gap metabolic acidosis which may be reflection of diarrhea as well as acute kidney injury.   12/3/21: started oral bicarb. Up titrated to 1300 mg po bid 12/6 and to tid 12/8     3.   Hypomagnesemia.    Supplement potassium and magnesium as needed. Mg 1.3- supplement     4. Hypokalemia. Improved.     5.  Hypocalcemia.   ionized calcium 1.12 / Vit D 14 / PTH 60 - Started oral Vit D on

## 2021-12-11 NOTE — PROGRESS NOTES
Internal Medicine Progress Note         Primary Care Physician: Filemon Gamble DO   Admitting Physician:  Maureen Villegas DO  Admission date and time: 11/26/2021  9:25 AM    Room:  [address] [place] [address]  Admitting diagnosis: Hepatorenal syndrome ([facility] [address] [K76.7]  Acute kidney injury ([facility] [address] [N17.9]  Chronic liver failure without hepatic coma [facility]) [K72.10]    Patient Name: Cheryle Hof  MRN: [id number]    Date of Service: 12/11/2021       Subjective: Soledad Lovell is a 40 y.o. male ,12/11/2021, at the bedside. Patient doing very poorly multiple problems at hand including hepatorenal syndrome and dropping blood pressure. Patient has very low urine output with evidence of metabolic acidosis. Currently being followed by multiple subspecialists. 11/2611/27/2021-medical coverage by Dr. Maryam Monaco    11/28/2021  Patient seen examined on PCU. Patient's mother is at the bedside and case discussed in detail. Patient is much more alert this morning. Patient oriented to person place. He denies any significant pain at this time. There is no lower leg edema and abdomen is distended but very soft. Serum sodium is up to 129 with a potassium 3.2. CO2 electrolytes is 18 and improved but BUN/creatinine is 44/3.2 which is improved compared to 11/27. Total bili is 24.2 with WBC 12.7 hemoglobin 10.1. Platelet count is down to 63. INR was 7.8. Temperature 98.8 with heart rate of 70 and blood pressure is 115/67. Patient has been put on phenylephrine drip with improvement in blood pressure. Urine output about 400 cc a shift. 11/29/2021  Patient seen and examined in PCU. Patient's mother is at the bedside and case discussed. Patient is still alert and oriented x23 with episodes of confusion. Patient denies any problem with chest or abdominal pain. Patient strength is still very poor with the patient still very weak on his feet. Patient appetite has improved with less nausea.   Patient still has persistent hand tremor which she has had for years according to the mother. But this has been gotten worse. BUN/creatinine 33/2.2 with serum sodium 130. Blood sugars ranging [id number]. INR is down to 5.7 today. Temperature ranges 90.7100.4 with a heart rate of 61 blood pressure 113/63. O2 sat 97% on room air at rest.  Patient still on phenylephrine drip. Urine output ranges 800-1300 cc a shift. 11/30/2021  Patient seen examined on PCU. Patient states he feels fairly good again today. He denies any chest or abdominal pain. Patient states he is very hungry and is still on liquids. Hemoglobin 9.2 with platelet count of down to 38. BUN/creatinine 24/1.4 with potassium 3.2 and sodium 131. Temperature 99 with heart rate 64 blood pressure 99/57. O2 sat 93% on room air. Phenylephrine drip still running. Urine output ranges to 200-4000 cc a shift. Patient is for abdominal paracentesis today. 12/1/2021  Patient seen examined in PCU. Patient with 2 family members at the bedside. Case was discussed. Patient alert and oriented x3. Patient's upper extremity tremors are moderately improved. Patient had questions about abdominal paracentesis. BUN/creatinine 25/1.4 today with total bilirubin 29. WBC 7.5 hemoglobin 8.2 and platelet count 62. Temperature ranges 97.998.3 with heart rate of 68 blood pressure range from 82/51112/64. O2 sats 100% on room air. Patient still on phenylephrine drip for blood pressure/ renal perfusion. Abdominal paracentesis when okay with GI, intensivist.    12/2/2021   Patient seen examined on PCU. Patient denies any specific pain at this time. Patient is little bit depressed about not getting his paracentesis performed yet. BUN/creatinine 18/1.2 with hemoglobin is 7.5 platelet count is down to 29. Patient received fresh frozen plasma and INR yesterday morning was 3.4 and today is 5. Temperature 90.3 with heart rate of 67 and blood pressure 110/70. Pulmonology and GI notes reviewed.   With kidney function about back to baseline phenylephrine drip will be weaned off.    12/3/2021  Patient seen examined on PCU. Patient alert and oriented x3. Patient denies any chest or significant abdominal pain. Abdomen distended but soft. BUN/creatinine 14/1.0 with blood sugars ranging 103126. Serum phosphorus 1.8. Total bili was 24.1 today and has improved somewhat. Hemoglobin is down to 7.2 today. INR is up to 6.3 today. Heart rate 65 with blood pressure 105/52 with O2 sat 94% on room air at rest.  Urinary output is good. Patient is still on Lavon-Synephrine and somatostatin drip. 12/4/2021  Patient seen examined on PCU. Patient received 1 unit packed RBC today with low hemoglobin. Patient denies any problem with chest pain and there is no continuous abdominal discomfort. Patient denies any unusual shortness of breath. BUN/creatinine 11/0.9 with serum magnesium 1.5. Phosphorus 1.7 with WBC 5.3 and hemoglobin 6.6. Total bili 23.4, INR 6.6 today. Temperature 98.5 with heart rate of 74 blood pressure 101/62. O2 sat 96% with the patient being titrated down on the Neosporin drip. 12/5/2021   Patient seen examined on PCU. Case discussed with patient's family at the bedside again today. Patient complaining of pain behind his upper arms in the tricep area. Patient says it started a couple days ago. More of a constant burning sensation. There is no rash noted but there is some ecchymosis noted on the upper portion of the arm and tricep area. This area is tender to palpation. There is no masses or hematoma noted. BUN/creatinine 12/1.0. Serum magnesium 1.4 again with phosphorus 1.7. Blood sugars still low 100s. Total bili is 24.8 today with a WBC 5.8 hemoglobin 6.7 early this morning with a repeat 7.1. Temperature 97.5 heart rate 78 blood pressure 118/64. Patient was taken off the Neosporin drip yesterday afternoon but was put back on this morning. O2 sat is 95% on room air.   Urine output ranges 150-450 cc shift. Patient given potassium and magnesium supplement. 12/6/2021  Patient seen examined on PCU. Patient is upset because he does not know what his INR is. Patient states his bilateral posterior upper arm discomfort is improved. There is no chest pain. Patient's abdomen is slowly getting bigger. BUN/creatinine 12/0.9. CO2 was 16 on the electrolytes. Total bili is 26.4 with hemoglobin 6.8 and WBC 5.5. INR is pending. Temperature 90.4 with heart rate of 81 with blood pressure 104/59 with an O2 sat 98%. We will type and cross give 1 more unit of packed RBC now. Consult hematology regarding coagulopathy. 12/7/2021  Patient seen examined on PCU. Patient is about the same. Abdomen is getting more distended. Oncology note reviewed. BUN/creatinine 12/0.9 with a CO2 of 16. Total bili is 24.2 with hemoglobin 6.7 with the patient receiving 2 units of packed RBCs last 24 hours. Platelet count is 39. Temperature 98.5 heart rate is 79 blood pressure 112/67. O2 sat 99% room air at rest. Urine output is good. Patient to receive 2 more units of fresh frozen plasma today with patient receiving platelets yesterday. Patient hopefully can have the abdominal paracentesis today after INR has been performed. 12/8/2021  Patient seen examined on PCU. Patient complained of increased abdominal distention and discomfort. Patient denies any chest pain. Patient little bit frustrated about being in the hospital so long and having his abnormal lab work including low hemoglobin and persistently elevated INR. Hemoglobin 6.2 today with a WBC of 4.4. Platelet count 39 with BUN/creatinine 10/0.8. Blood sugars in the low 100s. Total bili is 27.7. Temperature 90.3 with heart rate 83 and blood pressure 114/74. O2 sat 97% room air at rest.    12/9/2021  Patient seen examined on PCU. Patient is receiving 2 units packed RBCs today along with cryoprecipitate and fresh frozen plasma.   INR to be repeated after problems. Cardiovascular:   Denies any chest pain, irregular heartbeats, or palpitations. Respiratory:   Denies shortness of breath, coughing, sputum production, hemoptysis, or wheezing. Gastrointestinal:   Denies nausea, vomiting, diarrhea, or constipation. Denies any abdominal pain. Genitourinary:    Denies any urgency, frequency, hematuria. Voiding  without difficulty. Extremities:   Denies lower extremity swelling, edema or cyanosis. Neurology:    Denies any headache or focal neurological deficits, Denies generalized weakness or memory difficulty. Psch:   Denies being anxious or depressed. Musculoskeletal:    Denies  myalgias, joint complaints or back pain. Integumentary:   Denies any rashes, ulcers, or excoriations. Denies bruising. Hematologic/Lymphatic:  Denies bruising or bleeding. Physical Exam:  I/O this shift:  In: -   Out: 725 [Urine:725]    Intake/Output Summary (Last 24 hours) at 12/11/2021 1141  Last data filed at 12/11/2021 0849  Gross per 24 hour   Intake 991.67 ml   Output 1375 ml   Net -383.33 ml   I/O last 3 completed shifts: In: 991.7 [Blood:991.7]  Out: 900 [Urine:900]  No data found. Vital Signs:   Blood pressure 109/64, pulse 86, temperature 98.5 °F (36.9 °C), temperature source Oral, resp. rate 14, height 6' 3\" (1.905 m), weight 212 lb (96.2 kg), SpO2 98 %. General appearance:  Patient alert but lethargic. Appears chronically ill  Head:  Normocephalic. No masses, lesions or tenderness. Eyes:  PERRLA. EOMI. sclera icteric. Buccal mucosa moist.  ENT:  Ears normal. Mucosa normal.  Neck:    Supple. Trachea midline. No thyromegaly. No JVD. No bruits. Heart:    Rhythm regular. Tachycardic. No murmurs. Lungs:    Diminished  Abdomen:   Soft. Non-tender. Non-distended. Bowel sounds positive. No organomegaly or masses. No pain on palpation. Newly distended  Extremities:    Peripheral pulses present. No peripheral edema. No ulcers. No cyanosis.  No clubbing.   Neurologic:    Responsive but lethargic  Integumentary:  No rashes skin jaundice  Genitalia/Breast:  Hickman catheter    Medication:  Scheduled Meds:   magnesium sulfate  4,000 mg IntraVENous Once    furosemide  20 mg IntraVENous TID    potassium phosphate IVPB  15 mmol IntraVENous Once    [START ON 12/12/2021] pantoprazole  40 mg Oral QAM AC    sodium bicarbonate  1,300 mg Oral TID    [Held by provider] phytonadione  10 mg Oral Daily    vitamin D3  5,000 Units Oral Daily    midodrine  15 mg Oral TID WC    thiamine (VITAMIN B1) IVPB  100 mg IntraVENous Daily    rifaximin  550 mg Oral BID    sodium chloride flush  5-40 mL IntraVENous 2 times per day    piperacillin-tazobactam  3,375 mg IntraVENous Q8H     Continuous Infusions:   sodium chloride      sodium chloride      sodium chloride      sodium chloride      sodium chloride      sodium chloride      sodium chloride      sodium chloride      sodium chloride      dextrose      sodium chloride      sodium chloride Stopped (12/11/21 0842)       Objective Data:  CBC with Differential:    Lab Results   Component Value Date    WBC 4.6 12/11/2021    RBC 2.39 12/11/2021    HGB 8.0 12/11/2021    HCT 23.5 12/11/2021    PLT 58 12/11/2021    MCV 98.3 12/11/2021    MCH 33.5 12/11/2021    MCHC 34.0 12/11/2021    RDW 21.7 12/11/2021    METASPCT 0.9 11/20/2021    LYMPHOPCT 21.7 12/11/2021    MONOPCT 12.8 12/11/2021    MYELOPCT 2.7 12/06/2021    BASOPCT 1.3 12/11/2021    MONOSABS 0.59 12/11/2021    LYMPHSABS 1.00 12/11/2021    EOSABS 0.10 12/11/2021    BASOSABS 0.06 12/11/2021     CMP:    Lab Results   Component Value Date     12/11/2021    K 3.9 12/11/2021    K 5.8 11/26/2021     12/11/2021    CO2 19 12/11/2021    BUN 12 12/11/2021    CREATININE 0.7 12/11/2021    GFRAA >60 12/11/2021    LABGLOM >60 12/11/2021    GLUCOSE 80 12/11/2021    PROT 6.0 12/11/2021    LABALBU 3.7 12/11/2021    CALCIUM 9.4 12/11/2021    BILITOT 38.5 12/11/2021 ALKPHOS 50 12/11/2021    AST 50 12/11/2021    ALT 19 12/11/2021              Assessment:    · Acute hepatic encephalopathy  · Hepatorenal syndrome probable hepatopulmonary syndrome  · Acute hypoxic respiratory failure  · Hypotonic hypervolemic hyponatremia  · JULIO on CKD  · Pneumonia possible aspiration  · Decompensated cirrhosis  · Hyperbilirubinemia  · Chronic normocytic anemia  · Atrial fibrillation  · History of anxiety and depression  · History of alcohol abuse  · GERD        Plan:     · Patient is doing very poorly at the present time. The patient does have advanced liver disease with decompensated cirrhosis and not currently a candidate for liver transplant. Symptoms are suggestive of severe electrolyte imbalance with probable hepatorenal syndrome. He also is currently hypotensive. I have discussed the condition with the mother and fiancé along with Dr. Jacob Grow have discussed his CODE STATUS and agree on no intubation or chest compressions. We have also discussed possibility of transition into hospice pending his status. Continue aggressive care for the next 24 to 48 hours and observe response  · Electrolyte imbalance of hyponatremia being followed by nephrology  · Metabolic acidosis being corrected by bicarb infusion  · GI is following regarding his hepatic cirrhosis  · Vasopressor has been instituted  · Salt poor albumin to expand volume  · Blood culture has been obtained.   Zosyn to cover for spontaneous bacterial peritonitis  · Patient is auto anticoagulated vitamin K supplementation  · Treat elevated ammonia level  · Paracentesis as needed  · Prognosis is very poor but he is improving short-term    -Consult hematology regarding severe coagulopathy  -We will try to discuss with GI regarding patient's discharge planning and care with family having multiple questions regarding this  -Patient has received 2 units of packed RBCs   -Patient will receive 2 units fresh frozen plasma     -Patient received 2 units packed RBCs, 2 units fresh frozen plasma, cryoprecipitate 12/9  -Patient received 1 unit packed RBC along with 2 units of fresh frozen plasma 12/10    -Abdominal paracentesis 12/ cc    EGD possibly on 12/14    Discharge home when okay with GI, hematology    Mega Phillips DO, F.ANABEL.C.O.I.  12/11/2021  11:41 AM

## 2021-12-12 NOTE — PROGRESS NOTES
Internal Medicine Progress Note         Primary Care Physician: Luis Duque DO   Admitting Physician:  Juan F Mckeon DO  Admission date and time: 11/26/2021  9:25 AM    Room:  [address] [place] [address]  Admitting diagnosis: Hepatorenal syndrome ([address] [K76.7]  Acute kidney injury ([address] [N17.9]  Chronic liver failure without hepatic coma [facility]) [K72.10]    Patient Name: Humble Lambert  MRN: [id number]    Date of Service: 12/12/2021       Subjective: Mp Quintero is a 40 y.o. male ,12/12/2021, at the bedside. Patient doing very poorly multiple problems at hand including hepatorenal syndrome and dropping blood pressure. Patient has very low urine output with evidence of metabolic acidosis. Currently being followed by multiple subspecialists. 11/2611/27/2021-medical coverage by Dr. Majo Silva    11/28/2021  Patient seen examined on PCU. Patient's mother is at the bedside and case discussed in detail. Patient is much more alert this morning. Patient oriented to person place. He denies any significant pain at this time. There is no lower leg edema and abdomen is distended but very soft. Serum sodium is up to 129 with a potassium 3.2. CO2 electrolytes is 18 and improved but BUN/creatinine is 44/3.2 which is improved compared to 11/27. Total bili is 24.2 with WBC 12.7 hemoglobin 10.1. Platelet count is down to 63. INR was 7.8. Temperature 98.8 with heart rate of 70 and blood pressure is 115/67. Patient has been put on phenylephrine drip with improvement in blood pressure. Urine output about 400 cc a shift. 11/29/2021  Patient seen and examined in PCU. Patient's mother is at the bedside and case discussed. Patient is still alert and oriented x23 with episodes of confusion. Patient denies any problem with chest or abdominal pain. Patient strength is still very poor with the patient still very weak on his feet. Patient appetite has improved with less nausea.   Patient still has persistent hand tremor which function about back to baseline phenylephrine drip will be weaned off.    12/3/2021  Patient seen examined on PCU. Patient alert and oriented x3. Patient denies any chest or significant abdominal pain. Abdomen distended but soft. BUN/creatinine 14/1.0 with blood sugars ranging 103126. Serum phosphorus 1.8. Total bili was 24.1 today and has improved somewhat. Hemoglobin is down to 7.2 today. INR is up to 6.3 today. Heart rate 65 with blood pressure 105/52 with O2 sat 94% on room air at rest.  Urinary output is good. Patient is still on Lavon-Synephrine and somatostatin drip. 12/4/2021  Patient seen examined on PCU. Patient received 1 unit packed RBC today with low hemoglobin. Patient denies any problem with chest pain and there is no continuous abdominal discomfort. Patient denies any unusual shortness of breath. BUN/creatinine 11/0.9 with serum magnesium 1.5. Phosphorus 1.7 with WBC 5.3 and hemoglobin 6.6. Total bili 23.4, INR 6.6 today. Temperature 98.5 with heart rate of 74 blood pressure 101/62. O2 sat 96% with the patient being titrated down on the Neosporin drip. 12/5/2021   Patient seen examined on PCU. Case discussed with patient's family at the bedside again today. Patient complaining of pain behind his upper arms in the tricep area. Patient says it started a couple days ago. More of a constant burning sensation. There is no rash noted but there is some ecchymosis noted on the upper portion of the arm and tricep area. This area is tender to palpation. There is no masses or hematoma noted. BUN/creatinine 12/1.0. Serum magnesium 1.4 again with phosphorus 1.7. Blood sugars still low 100s. Total bili is 24.8 today with a WBC 5.8 hemoglobin 6.7 early this morning with a repeat 7.1. Temperature 97.5 heart rate 78 blood pressure 118/64. Patient was taken off the Neosporin drip yesterday afternoon but was put back on this morning. O2 sat is 95% on room air.   Urine output ranges 150-450 cc shift. Patient given potassium and magnesium supplement. 12/6/2021  Patient seen examined on PCU. Patient is upset because he does not know what his INR is. Patient states his bilateral posterior upper arm discomfort is improved. There is no chest pain. Patient's abdomen is slowly getting bigger. BUN/creatinine 12/0.9. CO2 was 16 on the electrolytes. Total bili is 26.4 with hemoglobin 6.8 and WBC 5.5. INR is pending. Temperature 90.4 with heart rate of 81 with blood pressure 104/59 with an O2 sat 98%. We will type and cross give 1 more unit of packed RBC now. Consult hematology regarding coagulopathy. 12/7/2021  Patient seen examined on PCU. Patient is about the same. Abdomen is getting more distended. Oncology note reviewed. BUN/creatinine 12/0.9 with a CO2 of 16. Total bili is 24.2 with hemoglobin 6.7 with the patient receiving 2 units of packed RBCs last 24 hours. Platelet count is 39. Temperature 98.5 heart rate is 79 blood pressure 112/67. O2 sat 99% room air at rest. Urine output is good. Patient to receive 2 more units of fresh frozen plasma today with patient receiving platelets yesterday. Patient hopefully can have the abdominal paracentesis today after INR has been performed. 12/8/2021  Patient seen examined on PCU. Patient complained of increased abdominal distention and discomfort. Patient denies any chest pain. Patient little bit frustrated about being in the hospital so long and having his abnormal lab work including low hemoglobin and persistently elevated INR. Hemoglobin 6.2 today with a WBC of 4.4. Platelet count 39 with BUN/creatinine 10/0.8. Blood sugars in the low 100s. Total bili is 27.7. Temperature 90.3 with heart rate 83 and blood pressure 114/74. O2 sat 97% room air at rest.    12/9/2021  Patient seen examined on PCU. Patient is receiving 2 units packed RBCs today along with cryoprecipitate and fresh frozen plasma.   INR to be repeated after that.  BUN/creatinine 10/0.8. Blood sugars range 76117. Hemoglobin 6.0 today with WBC of 4 and platelet count 46. INR is 4.7. Temperature is 98 with heart rate 91 blood pressure 170/77 O2 sat 99% room air at rest.    12/10/2021  Patient seen examined on PCU. Case discussed in detail with GI today. Hematology and nephrology note reviewed. Patient feels about the same. Patient has abdominal discomfort secondary to the ascites. He denies any chest pain. There is no nausea vomiting but he has poor appetite. Hemoglobin 6.6 today with patient hemoglobin 6.0 yesterday and repeat 6.9 yesterday afternoon. Temperature 98.4 with heart rate 91 blood pressure 105/72. O2 sat 95% on room air at rest.  Pending lab work today. Patient supposed to go for EGD today. Patient given 2 units of fresh frozen plasma again today with 1 unit of typed and crossed packed RBCs. Case was discussed with the invasive radiologist today. He states that he has no problem doing the abdominal paracentesis today. 12/11/2021  Patient seen and examined on PCU. Patient states he feels a bit better today after abdominal paracentesis. Patient had abdominal paracentesis yesterday for 4650 cc. CT the abdomen last night showed a large hematoma measuring 7.8 x 4.1 x 8.9 cm in the left lower pelvis with some mass-effect on the prostate and distal rectum. This also showed a large hydrocele. BUN/creatinine was 12/0.7 with magnesium 1.4. Serum phosphorus little bit low at 2.1 with WBC 4.6 and hemoglobin 8. Platelet count stable at 58. INR is 5.3. Temperature 98.5 heart rate 86 blood pressure 109/64. O2 sat 98% on room air at rest.  Urine output is good. Patient was supposed to have EGD done yesterday but this apparently been held until Tuesday. 12/12/2021  Patient seen examined on PCU. Patient's family is at the bedside and case discussed. Patient denies any new discomfort or problem. Patient is set up for EGD tomorrow. BUN/creatinine 12/0.8 with total bili of 41. WBC 4.4 with hemoglobin 7.5. Platelet count 47. INR 6.2 today. Temperature 98.2 with heart rate 89 blood pressure 110/68. O2 sat 99% room air at rest.  Urine output is excellent. Review of System: Limited at the present time  Constitutional:   Denies fever or chills, weight loss or gain, fatigue or malaise. HEENT:   Denies ear pain, sore throat, sinus or eye problems. Cardiovascular:   Denies any chest pain, irregular heartbeats, or palpitations. Respiratory:   Denies shortness of breath, coughing, sputum production, hemoptysis, or wheezing. Gastrointestinal:   Denies nausea, vomiting, diarrhea, or constipation. Denies any abdominal pain. Genitourinary:    Denies any urgency, frequency, hematuria. Voiding  without difficulty. Extremities:   Denies lower extremity swelling, edema or cyanosis. Neurology:    Denies any headache or focal neurological deficits, Denies generalized weakness or memory difficulty. Psch:   Denies being anxious or depressed. Musculoskeletal:    Denies  myalgias, joint complaints or back pain. Integumentary:   Denies any rashes, ulcers, or excoriations. Denies bruising. Hematologic/Lymphatic:  Denies bruising or bleeding. Physical Exam:  No intake/output data recorded. Intake/Output Summary (Last 24 hours) at 12/12/2021 1151  Last data filed at 12/12/2021 9001  Gross per 24 hour   Intake 1479.17 ml   Output 3630 ml   Net -2150.83 ml   I/O last 3 completed shifts: In: 1779.2 [P.O.:1320; IV Piggyback:459.2]  Out: 8649 [Urine:4355]  Patient Vitals for the past 96 hrs (Last 3 readings):   Weight   12/12/21 0052 205 lb 8 oz (93.2 kg)     Vital Signs:   Blood pressure 110/60, pulse 89, temperature 98.2 °F (36.8 °C), temperature source Oral, resp. rate 14, height 6' 3\" (1.905 m), weight 205 lb 8 oz (93.2 kg), SpO2 99 %. General appearance:  Patient alert but lethargic. Appears chronically ill  Head:  Normocephalic.  No masses, lesions or tenderness. Eyes:  PERRLA. EOMI. sclera icteric. Buccal mucosa moist.  ENT:  Ears normal. Mucosa normal.  Neck:    Supple. Trachea midline. No thyromegaly. No JVD. No bruits. Heart:    Rhythm regular. Tachycardic. No murmurs. Lungs:    Diminished  Abdomen:   Soft. Non-tender. Non-distended. Bowel sounds positive. No organomegaly or masses. No pain on palpation. Newly distended  Extremities:    Peripheral pulses present. No peripheral edema. No ulcers. No cyanosis. No clubbing.   Neurologic:    Responsive but lethargic  Integumentary:  No rashes skin jaundice  Genitalia/Breast:  Hickman catheter    Medication:  Scheduled Meds:   magnesium sulfate  4,000 mg IntraVENous Once    furosemide  20 mg IntraVENous TID    pantoprazole  40 mg Oral QAM AC    sodium bicarbonate  1,300 mg Oral TID    [Held by provider] phytonadione  10 mg Oral Daily    vitamin D3  5,000 Units Oral Daily    midodrine  15 mg Oral TID WC    thiamine (VITAMIN B1) IVPB  100 mg IntraVENous Daily    rifaximin  550 mg Oral BID    sodium chloride flush  5-40 mL IntraVENous 2 times per day    piperacillin-tazobactam  3,375 mg IntraVENous Q8H     Continuous Infusions:   sodium chloride      sodium chloride      sodium chloride      sodium chloride      sodium chloride      sodium chloride      sodium chloride      sodium chloride      sodium chloride      dextrose      sodium chloride      sodium chloride 12.5 mL/hr at 12/12/21 0042       Objective Data:  CBC with Differential:    Lab Results   Component Value Date    WBC 4.4 12/12/2021    RBC 2.25 12/12/2021    HGB 7.5 12/12/2021    HCT 22.0 12/12/2021    PLT 47 12/12/2021    MCV 97.8 12/12/2021    MCH 33.3 12/12/2021    MCHC 34.1 12/12/2021    RDW 22.2 12/12/2021    METASPCT 0.9 11/20/2021    LYMPHOPCT 18.9 12/12/2021    MONOPCT 13.5 12/12/2021    MYELOPCT 2.7 12/06/2021    BASOPCT 1.1 12/12/2021    MONOSABS 0.59 12/12/2021    LYMPHSABS 0.83 12/12/2021 EOSABS 0.09 12/12/2021    BASOSABS 0.05 12/12/2021     CMP:    Lab Results   Component Value Date     12/12/2021    K 3.6 12/12/2021    K 5.8 11/26/2021     12/12/2021    CO2 19 12/12/2021    BUN 12 12/12/2021    CREATININE 0.8 12/12/2021    GFRAA >60 12/12/2021    LABGLOM >60 12/12/2021    GLUCOSE 70 12/12/2021    PROT 6.0 12/12/2021    LABALBU 3.6 12/12/2021    CALCIUM 9.3 12/12/2021    BILITOT 41.0 12/12/2021    ALKPHOS 54 12/12/2021    AST 55 12/12/2021    ALT 20 12/12/2021              Assessment:    · Acute hepatic encephalopathy  · Hepatorenal syndrome probable hepatopulmonary syndrome  · Acute hypoxic respiratory failure  · Hypotonic hypervolemic hyponatremia  · JULIO on CKD  · Pneumonia possible aspiration  · Decompensated cirrhosis  · Hyperbilirubinemia  · Chronic normocytic anemia  · Atrial fibrillation  · History of anxiety and depression  · History of alcohol abuse  · GERD        Plan:     · Patient is doing very poorly at the present time. The patient does have advanced liver disease with decompensated cirrhosis and not currently a candidate for liver transplant. Symptoms are suggestive of severe electrolyte imbalance with probable hepatorenal syndrome. He also is currently hypotensive. I have discussed the condition with the mother and fiancé along with Dr. Zainab Smith have discussed his CODE STATUS and agree on no intubation or chest compressions. We have also discussed possibility of transition into hospice pending his status. Continue aggressive care for the next 24 to 48 hours and observe response  · Electrolyte imbalance of hyponatremia being followed by nephrology  · Metabolic acidosis being corrected by bicarb infusion  · GI is following regarding his hepatic cirrhosis  · Vasopressor has been instituted  · Salt poor albumin to expand volume  · Blood culture has been obtained.   Zosyn to cover for spontaneous bacterial peritonitis  · Patient is auto anticoagulated vitamin K supplementation  · Treat elevated ammonia level  · Paracentesis as needed  · Prognosis is very poor but he is improving short-term    -Consult hematology regarding severe coagulopathy  -We will try to discuss with GI regarding patient's discharge planning and care with family having multiple questions regarding this  -Patient has received 2 units of packed RBCs   -Patient will receive 2 units fresh frozen plasma     -Patient received 2 units packed RBCs, 2 units fresh frozen plasma, cryoprecipitate 12/9  -Patient received 1 unit packed RBC along with 2 units of fresh frozen plasma 12/10    -Abdominal paracentesis 12/ cc    EGD possibly on 12/13    Discharge home when okay with GI, hematology    Julieta Burdick DO, F.A.C.O.I.  12/12/2021  11:51 AM

## 2021-12-12 NOTE — PROGRESS NOTES
PROGRESS NOTE  By Gerald Schuster M.D. [facility]  Dr. Taylor Rodas M.D.,  Dr. Maria Elena Erazo M.D.,   Dr. Daryle Staple, D.O.,  Dr. Ryder Dyson M.D.,  Dr. Hay Verduzco D.O.,  Krunal Zavala D.O. Bl[place] Borrero  40 y.o.  male    SUBJECTIVE:  No new complaints    OBJECTIVE:    /60   Pulse 89   Temp 98.2 °F (36.8 °C) (Oral)   Resp 14   Ht 6' 3\" (1.905 m)   Wt 205 lb 8 oz (93.2 kg)   SpO2 99%   BMI 25.69 kg/m²     General: NAD/ male  HEENT: Persistent scleral icterus/moist oral mucosa  Neck: Supple/trachea midline  Chest: Symmetric excursion/nonlabored with respirations  Cor: Regular  Abd.: Soft/distended/nontender  Extr.:  Decreased muscle tone and bulk throughout  Skin: Warm and dry/persistent icterus      DATA:    Monitor data reviewed -sinus rhythm noted. Stool (measured) : 100 mL  Lab Results   Component Value Date    WBC 4.4 12/12/2021    RBC 2.25 12/12/2021    HGB 7.5 12/12/2021    HCT 22.0 12/12/2021    MCV 97.8 12/12/2021    MCH 33.3 12/12/2021    MCHC 34.1 12/12/2021    RDW 22.2 12/12/2021    PLT 47 12/12/2021    MPV 10.2 12/12/2021     Lab Results   Component Value Date     12/11/2021    K 3.9 12/11/2021    K 5.8 11/26/2021     12/11/2021    CO2 19 12/11/2021    BUN 12 12/11/2021    CREATININE 0.7 12/11/2021    CALCIUM 9.4 12/11/2021    PROT 6.0 12/11/2021    LABALBU 3.7 12/11/2021    BILITOT 38.5 12/11/2021    ALKPHOS 50 12/11/2021    AST 50 12/11/2021    ALT 19 12/11/2021     Lab Results   Component Value Date    LIPASE 68 11/14/2021     Lab Results   Component Value Date    AMYLASE 91 01/16/2019         ASSESSMENT/PLAN:    1. Hepatic encephalopathy type C grade II - Resolved  - Continue lactulose / rifaximin  -treat lactulose to 2-3 bowel movements daily  - Mental status appears to be at baseline   Supportive care     2. Alcohol induced cirrhosis, decompensated  Meld-Na score 35.    - Diuretics per nephrology;  Discussed with  Elida, aminah today  - s/p paracentesis yesterday; high SAAG, no SBP, culture pending  - INR remains elevated; repeat today pending; consider repeat paranteral Vit K  - If antibiotics is only for SBP, okay to discontinue; defer to primary     3.  Renal failure - resolved   - Possible HRS-JULIO  - Discussed with nephrology  - Diuretics as above     4.  Acute on chronic normocytic anemia  -Vital signs stable over signs of GI bleed on exam  -CT A/P yesterday with pelvic hematoma, most likely source of recurrent drop in H&H  -Plan for non-emergent EGD on Monday to assess for varices    Scott Figueroa MD  12/12/2021  10:42 AM    NOTE:  This report was transcribed using voice recognition software. Every effort was made to ensure accuracy; however, inadvertent computerized transcription errors may be present.

## 2021-12-12 NOTE — PROGRESS NOTES
Physical Therapy    Physical Therapy Treatment Note/Plan of Care    Room #:  1974/1632-92  Patient Name: Daquan Klein  YOB: 1975  MRN: [id number]    Date of Service: 12/12/2021     Tentative placement recommendation: Subacute rehab  Equipment recommendation: To be determined      Evaluating Physical Therapist: Lela Saha, PT  #[id number]      Specific Provider Orders/Date/Referring Provider :  11/29/21 1615   PT eval and treat Start: 11/29/21 1615, End: 11/29/21 1615, ONE TIME, Standing Count: 1 Occurrences, R    Megan Knife, DO      Admitting Diagnosis:   Hepatorenal syndrome ([address] [K76.7]  Acute kidney injury ([address] [N17.9]  Chronic liver failure without hepatic coma ([address] [K72.10]       Surgery: none  Visit Diagnoses       Codes    Chronic liver failure without hepatic coma ([address]     K72.10    Acute kidney injury ([address]     N17.9          Patient Active Problem List   Diagnosis    Alcohol-induced acute pancreatitis    Anxiety    Depression    ETOH abuse    Hematemesis    Atrial fibrillation with rapid ventricular response ([address]    Alcohol withdrawal syndrome without complication (HCC)    Lactic acidosis    Hypokalemia    Severe protein-calorie malnutrition (HCC)    Paroxysmal atrial fibrillation (HCC)    Hyperbilirubinemia    Hypomagnesemia    Bilirubinemia    UGIB (upper gastrointestinal bleed)    Hepatorenal syndrome (HCC)        ASSESSMENT of Current Deficits Patient exhibits decreased strength, balance and endurance impairing functional mobility, transfers, gait , gait distance and tolerance to activity Patient  limited in function d/t fatigue and lacking endurance, and due to swollen abdomen and scrotum accompanied by pain. Patient tolerates sitting edge of bed, however with seated exercises, patient experiences tightness in bilateral hamstrings and muscle quivering with exercises in UE and LE d/t weakness/fatigue.  Patient would continue to benefit from therapy to increase strength, balance, and functional mobility limitations. PHYSICAL THERAPY  PLAN OF CARE       Physical therapy plan of care is established based on physician order,  patient diagnosis and clinical assessment    Current Treatment Recommendations:    -Standing Balance: Perform strengthening exercises in standing to promote motor control with or without upper extremity support  and Challenge balance utilizing reaching  activities beyond center of gravity    -Transfers: Provide instruction on proper hand and foot position for adequate transfer of weight onto lower extremities and use of gait device if needed and Cues for hand placement, technique and safety. Provide stabilization to prevent fall   -Gait: Gait training, Standing activities to improve: base of support, weight shift, weight bearing , Exercises to improve trunk control and Exercises to improve hip and knee control   -Endurance: Utilize Supervised activities to increase level of endurance to allow for safe functional mobility including transfers and gait   -Stairs: Stair training with instruction on proper technique and hand placement on rail    PT long term treatment goals are located in below grid    Patient and or family understand(s) diagnosis, prognosis, and plan of care. Frequency of treatments: Patient will be seen  3-5 times/week.          Prior Level of Function: Patient ambulated independently    Rehab Potential: good    for baseline    Past medical history:   Past Medical History:   Diagnosis Date    Anxiety     Depression     Esophagitis     ETOH abuse     Gastritis     GERD (gastroesophageal reflux disease)     Hematuria     Pancreatitis      Past Surgical History:   Procedure Laterality Date    KNEE SURGERY      ACL    UPPER GASTROINTESTINAL ENDOSCOPY  05/09/2018    UPPER GASTROINTESTINAL ENDOSCOPY N/A 5/8/2018    EGD BIOPSY performed by Nivia Rosales MD at [address] [place] [address] 1/21/2019    EGD ESOPHAGOGASTRODUODENOSCOPY performed by Irene Angulo DO at [address] [facility]  1/21/2019    EGD CONTROL HEMORRHAGE performed by Irene Angulo DO at [address] [facility]:    Precautions: Up with assistance and Up as tolerated, falls and alarm ,    Social history: Patient lives alone in a ranch home  with 3 steps  to enter without Rail  Tub shower grab bars    Equipment owned: None,       [address]   How much difficulty turning over in bed?: None  How much difficulty sitting down on / standing up from a chair with arms?: A Little  How much difficulty moving from lying on back to sitting on side of bed?: None  How much help from another person moving to and from a bed to a chair?: A Little  How much help from another person needed to walk in hospital room?: A Little  How much help from another person for climbing 3-5 steps with a railing?: A Lot  AM-PAC Inpatient Mobility Raw Score : 19  AM-PAC Inpatient T-Scale Score : 45.44  Mobility Inpatient CMS 0-100% Score: 41.77  Mobility Inpatient CMS G-Code Modifier : CK    Nursing cleared patient for PT treatment. Patient agreeable to sitting edge of bed. OBJECTIVE;   Initial Evaluation  Date: 11/30/2021 Treatment Date:  12/2/2021   Short Term/ Long Term   Goals   Was pt agreeable to Eval/treatment? Yes yes To be met in 5 days   Pain level   0/10    10/10 in scrotum    Bed Mobility    Rolling: Supervision     Supine to sit: Minimal assist of 1    Sit to supine: Minimal assist of 1    Scooting: Minimal assist of 1   Rolling: Independent   Supine to sit: Independent   Sit to supine: Not assessed patient seated edge of bed   Scooting: Independent    Rolling: Independent    Supine to sit: Independent    Sit to supine: Independent    Scooting: Independent     Transfers Sit to stand:  Moderate assist of 1  From commode, min a from bed Sit to stand: Not assessed     Sit to stand: Independent     Ambulation    2-3 steps using  no device with Moderate assist of 1   for balance and safety and cues for upright posture and safety not assessed  75 feet using  least restrictive device versus no device with Independent    Stair negotiation: ascended and descended   Not assessed  Not assessed   3 steps with rail independent    ROM Within functional limits    Increase range of motion 10% of affected joints    Strength BUE:  refer to OT eval  RLE:  3/5  LLE:  3/5  Increase strength in affected mm groups by 1/3 grade   Balance Sitting EOB:  fair    Dynamic Standing:  poor   Sitting EOB: good   Dynamic Standing: not assessed    Sitting EOB:  good    Dynamic Standing: fair       Patient is Alert & Oriented x person, place, time and situation and follows directions  slowly  Sensation:  Patient  denies numbness/tingling   Edema:  no   Endurance: fair      Vitals: room air   Blood Pressure at rest  Blood Pressure during session    Heart Rate at rest  Heart Rate during session    SPO2 at rest %  SPO2 during session %     Patient education  Patient educated on role of Physical Therapy, risks of immobility, safety and plan of care, energy conservation,  importance of mobility while in hospital , safety  and seated exercises     Patient response to education:   Pt verbalized understanding Pt demonstrated skill Pt requires further education in this area   Yes Partial Yes      Treatment:  Patient practiced and was instructed/facilitated in the following treatment: Patient transferred to edge of bed and performed seated exercises as well as eating some of breakfast.   Patient remained sitting up on edge of bed at end of session and nursing was to be returning with pain pills.      Therapeutic Exercises:  ankle pumps, long arc quad and seated marching x 10 reps       At end of session, patient sitting edge of bed with nursing to be returning with pain pill  call light and phone within reach,  all lines and tubes intact, nursing notified. Patient would benefit from continued skilled Physical Therapy to improve functional independence and quality of life.          Patient's/ family goals   home    Time in  1117  Time out  1134    Total Treatment Time  17 minutes      CPT codes:  Therapeutic activities (84309)   17 minutes  1 unit(s)    Janessa Joel, [facility]  #[id number]

## 2021-12-12 NOTE — PROGRESS NOTES
Progress Note  12/12/2021 9:05 AM  Subjective:   Admit Date: 11/26/2021  PCP: Luis Duque DO  Interval History:    12/12/21- resting in bed, no distress. Diet: ADULT DIET; Regular; Low Sodium (2 gm); 2000 ml  ADULT ORAL NUTRITION SUPPLEMENT; Breakfast, Lunch, Dinner; Standard High Calorie/High Protein Oral Supplement    Data:   Scheduled Meds:   furosemide  20 mg IntraVENous TID    pantoprazole  40 mg Oral QAM AC    sodium bicarbonate  1,300 mg Oral TID    [Held by provider] phytonadione  10 mg Oral Daily    vitamin D3  5,000 Units Oral Daily    midodrine  15 mg Oral TID WC    thiamine (VITAMIN B1) IVPB  100 mg IntraVENous Daily    rifaximin  550 mg Oral BID    sodium chloride flush  5-40 mL IntraVENous 2 times per day    piperacillin-tazobactam  3,375 mg IntraVENous Q8H     Continuous Infusions:   sodium chloride      sodium chloride      sodium chloride      sodium chloride      sodium chloride      sodium chloride      sodium chloride      sodium chloride      sodium chloride      dextrose      sodium chloride      sodium chloride 12.5 mL/hr at 12/12/21 0042     PRN Meds:sodium chloride, sodium chloride, sodium chloride, sodium chloride, sodium chloride, sodium chloride, sodium chloride, sodium chloride, sodium chloride, acetaminophen **OR** [DISCONTINUED] acetaminophen, traMADol, lidocaine, melatonin, LORazepam **OR** LORazepam **OR** [DISCONTINUED] LORazepam **OR** [DISCONTINUED] LORazepam **OR** [DISCONTINUED] LORazepam **OR** [DISCONTINUED] LORazepam **OR** [DISCONTINUED] LORazepam **OR** [DISCONTINUED] LORazepam, dextrose, dextrose, sodium chloride flush, sodium chloride, polyethylene glycol, prochlorperazine  I/O last 3 completed shifts: In: 1779.2 [P.O.:1320; IV Piggyback:459.2]  Out: 0711 [Urine:4355]  No intake/output data recorded.     Intake/Output Summary (Last 24 hours) at 12/12/2021 0905  Last data filed at 12/12/2021 0637  Gross per 24 hour   Intake 1779.17 ml Output 3630 ml   Net -1850.83 ml     CBC:   Recent Labs     12/09/21  1746 12/10/21  1038 12/11/21  0941   WBC  --  4.5 4.6   HGB 6.9* 6.6* 8.0*   PLT  --  50* 58*     BMP:    Recent Labs     12/10/21  1038 12/11/21  0941    141   K 4.0 3.9    106   CO2 19* 19*   BUN 10 12   CREATININE 0.8 0.7   GLUCOSE 73* 80     Hepatic:   Recent Labs     12/10/21  1038 12/11/21  0941   AST 47* 50*   ALT 18 19   BILITOT 32.9* 38.5*   ALKPHOS 54 50     Troponin: No results for input(s): TROPONINI in the last 72 hours. BNP: No results for input(s): BNP in the last 72 hours. Lipids: No results for input(s): CHOL, HDL in the last 72 hours. Invalid input(s): LDLCALCU  ABGs: No results found for: PHART, PO2ART, [id number]  INR:   Recent Labs     12/09/21  1746 12/10/21  1038 12/11/21  0941   INR 3.9 5.2* 5.3*       -----------------------------------------------------------------  RAD: US GALLBLADDER RUQ    Result Date: 11/28/2021  EXAMINATION: RIGHT UPPER QUADRANT ULTRASOUND 11/27/2021 5:24 pm COMPARISON: 11/14/2021 HISTORY: ORDERING SYSTEM PROVIDED HISTORY: decomp cirrhosis, CBD measurement TECHNOLOGIST PROVIDED HISTORY: Reason for exam:->decomp cirrhosis, CBD measurement What reading provider will be dictating this exam?->CRC FINDINGS: LIVER:  Nodular contour of the liver consistent with cirrhosis. The liver is somewhat heterogeneous but without obvious focal abnormality. There is apparent hepatofugal flow again identified within the main portal vein. BILIARY SYSTEM:  Gallbladder is incompletely distended with diffuse wall thickening. No sonographic Carrasco's sign. The wall thickening is nonspecific but could be reactive to ascites and hepatic parenchymal disease. No obvious cholelithiasis. Common bile duct is within normal limits measuring 6 mm. RIGHT KIDNEY: The right kidney is grossly unremarkable without evidence of hydronephrosis. PANCREAS:  Visualized portions of the pancreas are unremarkable.  OTHER: Moderate ascites. Findings again consistent with cirrhotic liver. Ascites. Hepatofugal flow redemonstrated in the portal vein. Thickened gallbladder wall which may be reactive to the ascites and hepatic disease. XR CHEST PORTABLE    Result Date: 11/26/2021  EXAMINATION: ONE XRAY VIEW OF THE CHEST 11/26/2021 7:15 am COMPARISON: One-view chest x-ray 11/14/2021 HISTORY: ORDERING SYSTEM PROVIDED HISTORY: SOB TECHNOLOGIST PROVIDED HISTORY: Reason for exam:->SOB FINDINGS: The cardiac silhouette is within normal limits. The mediastinal contours are within normal limits. The lungs are hypoinflated with resulting bronchovascular crowding. Mild basilar opacities appear new/increased. No significant pleural effusions or pneumothorax. Osseous degenerative changes are present. EKG leads overlie the chest.     Mild basilar opacities are suspicious for pneumonia. This appearance can be seen with COVID-19.     US ABDOMEN LIMITED    Result Date: 11/26/2021  EXAMINATION: RIGHT UPPER QUADRANT ULTRASOUND 11/26/2021 5:54 pm COMPARISON: None. HISTORY: ORDERING SYSTEM PROVIDED HISTORY: ascites/JULIO TECHNOLOGIST PROVIDED HISTORY: Reason for exam:->ascites/JULIO What reading provider will be dictating this exam?->CRC FINDINGS: Superficial sonographic evaluation of the 4 quadrants of the abdomen performed using a combination of grayscale technique. Imaging done to assess for the presence of intra-abdominal ascites. Patient was laying on their left-side. Moderate volume simple appearing intra-abdominal ascites in the left abdomen. Moderate volume intra-abdominal ascites. Objective:   Vitals: /60   Pulse 89   Temp 98.2 °F (36.8 °C) (Oral)   Resp 14   Ht 6' 3\" (1.905 m)   Wt 205 lb 8 oz (93.2 kg)   SpO2 99%   BMI 25.69 kg/m²   General appearance: appears stated age   Skin:  Jaundice   HEENT: Head: Normocephalic, no lesions, without obvious abnormality.  Sclarea  jaundice   Neck: no adenopathy, no carotid bruit, no JVD, supple, symmetrical, trachea midline and thyroid not enlarged, symmetric, no tenderness/mass/nodules  Lungs: clear to auscultation bilaterally  Heart: regular rate and rhythm, S1, S2 normal, no murmur, click, rub or gallop  Abdomen: Distended , ascites ++  Extremities: 2+ bilat LE edema  Neurologic: Mental status: Alert, oriented, thought content appropriate    Assessment:   Patient Active Problem List:     Alcohol-induced acute pancreatitis     Anxiety     Depression     ETOH abuse     Hematemesis     Atrial fibrillation with rapid ventricular response (HCC)     Alcohol withdrawal syndrome without complication (HCC)     Lactic acidosis     Hypokalemia     Severe protein-calorie malnutrition (HCC)     Paroxysmal atrial fibrillation (HCC)     Hyperbilirubinemia     Hypomagnesemia     Bilirubinemia     UGIB (upper gastrointestinal bleed)     Hepatorenal syndrome (HCC)    Plan:   Assessment: / Plan:     1.  Acute kidney injury.  Acute kidney injury secondary to renal hypoperfusion with possible underlying hepatorenal syndrome.  Urine output has improved and was 900 ml past 24 hours. , JULIO  Now Resolved. Now  with edema will dose loop as needed and follow. Follow BP's closely as has been hypotensive     2.  Metabolic acidosis.  Patient with non-anion gap metabolic acidosis which may be reflection of diarrhea as well as acute kidney injury.   12/3/21: started oral bicarb. Up titrated to 1300 mg po bid 12/6 and to tid 12/8     3.   Hypomagnesemia.    Supplement potassium and magnesium as needed. Mg 1.3-->1.6 supplement 4 grams again today     4. Hypokalemia. Improved. Will give dose po with large volume urine output    5.  Hypocalcemia.   ionized calcium 1.12 / Vit D 14 / PTH 60 - Started oral Vit D on 11/29/21     6.  Hypotension. On midodrine   Stable follow with diuresis     7. Hypophosphatemia.    Supplement orally as needed for goal 2.5  PO4 1.8-->2.6-->2.1 supplement     8.  Coagulopathy-  Plasma and blood to be given today  Transfuse as needed per primary/ hem    9. Edema-  In the setting of cirrhosis  On lasix 20 mg IV Q 8 hours  UOP 4355 ml past 24 hours     No labs at the time of this note    EILEEN Ferrer - CNP   Patient seen and examined. I had a face to face encounter with the patient. Pt has no new complaints of CP and no increase in SOB  Agree with exam.    Agree with  formulation, assessment and plan as outlined above and directed by me. Addition and corrections were done as deemed appropriate. My exam and plan include:     A/P:  1. Edema-diuresing well on the furosemide 20mg IV [id number] continue the same for present-follow electrolytes    Continue current treatment.     RAFA Marrero MD

## 2021-12-13 NOTE — PROGRESS NOTES
Internal Medicine Progress Note         Primary Care Physician: Cesar Hankins DO   Admitting Physician:  Evita Malhotra DO  Admission date and time: 11/26/2021  9:25 AM    Room:  [address] [place] [address]  Admitting diagnosis: Hepatorenal syndrome ([facility] [address] [K76.7]  Acute kidney injury ([facility] [address] [N17.9]  Chronic liver failure without hepatic coma [facility]) [K72.10]    Patient Name: Alma Rosa Bunn  MRN: [id number]    Date of Service: 12/13/2021       Subjective: Georgi Riedel is a 40 y.o. male ,12/13/2021, at the bedside. Patient doing very poorly multiple problems at hand including hepatorenal syndrome and dropping blood pressure. Patient has very low urine output with evidence of metabolic acidosis. Currently being followed by multiple subspecialists. 11/2611/27/2021-medical coverage by Dr. Lor Fajardo    11/28/2021  Patient seen examined on PCU. Patient's mother is at the bedside and case discussed in detail. Patient is much more alert this morning. Patient oriented to person place. He denies any significant pain at this time. There is no lower leg edema and abdomen is distended but very soft. Serum sodium is up to 129 with a potassium 3.2. CO2 electrolytes is 18 and improved but BUN/creatinine is 44/3.2 which is improved compared to 11/27. Total bili is 24.2 with WBC 12.7 hemoglobin 10.1. Platelet count is down to 63. INR was 7.8. Temperature 98.8 with heart rate of 70 and blood pressure is 115/67. Patient has been put on phenylephrine drip with improvement in blood pressure. Urine output about 400 cc a shift. 11/29/2021  Patient seen and examined in PCU. Patient's mother is at the bedside and case discussed. Patient is still alert and oriented x23 with episodes of confusion. Patient denies any problem with chest or abdominal pain. Patient strength is still very poor with the patient still very weak on his feet. Patient appetite has improved with less nausea.   Patient still has persistent hand tremor which she has had for years according to the mother. But this has been gotten worse. BUN/creatinine 33/2.2 with serum sodium 130. Blood sugars ranging [id number]. INR is down to 5.7 today. Temperature ranges 90.7100.4 with a heart rate of 61 blood pressure 113/63. O2 sat 97% on room air at rest.  Patient still on phenylephrine drip. Urine output ranges 800-1300 cc a shift. 11/30/2021  Patient seen examined on PCU. Patient states he feels fairly good again today. He denies any chest or abdominal pain. Patient states he is very hungry and is still on liquids. Hemoglobin 9.2 with platelet count of down to 38. BUN/creatinine 24/1.4 with potassium 3.2 and sodium 131. Temperature 99 with heart rate 64 blood pressure 99/57. O2 sat 93% on room air. Phenylephrine drip still running. Urine output ranges to 200-4000 cc a shift. Patient is for abdominal paracentesis today. 12/1/2021  Patient seen examined in PCU. Patient with 2 family members at the bedside. Case was discussed. Patient alert and oriented x3. Patient's upper extremity tremors are moderately improved. Patient had questions about abdominal paracentesis. BUN/creatinine 25/1.4 today with total bilirubin 29. WBC 7.5 hemoglobin 8.2 and platelet count 62. Temperature ranges 97.998.3 with heart rate of 68 blood pressure range from 82/51112/64. O2 sats 100% on room air. Patient still on phenylephrine drip for blood pressure/ renal perfusion. Abdominal paracentesis when okay with GI, intensivist.    12/2/2021   Patient seen examined on PCU. Patient denies any specific pain at this time. Patient is little bit depressed about not getting his paracentesis performed yet. BUN/creatinine 18/1.2 with hemoglobin is 7.5 platelet count is down to 29. Patient received fresh frozen plasma and INR yesterday morning was 3.4 and today is 5. Temperature 90.3 with heart rate of 67 and blood pressure 110/70. Pulmonology and GI notes reviewed.   With kidney function about back to baseline phenylephrine drip will be weaned off.    12/3/2021  Patient seen examined on PCU. Patient alert and oriented x3. Patient denies any chest or significant abdominal pain. Abdomen distended but soft. BUN/creatinine 14/1.0 with blood sugars ranging 103126. Serum phosphorus 1.8. Total bili was 24.1 today and has improved somewhat. Hemoglobin is down to 7.2 today. INR is up to 6.3 today. Heart rate 65 with blood pressure 105/52 with O2 sat 94% on room air at rest.  Urinary output is good. Patient is still on Lavon-Synephrine and somatostatin drip. 12/4/2021  Patient seen examined on PCU. Patient received 1 unit packed RBC today with low hemoglobin. Patient denies any problem with chest pain and there is no continuous abdominal discomfort. Patient denies any unusual shortness of breath. BUN/creatinine 11/0.9 with serum magnesium 1.5. Phosphorus 1.7 with WBC 5.3 and hemoglobin 6.6. Total bili 23.4, INR 6.6 today. Temperature 98.5 with heart rate of 74 blood pressure 101/62. O2 sat 96% with the patient being titrated down on the Neosporin drip. 12/5/2021   Patient seen examined on PCU. Case discussed with patient's family at the bedside again today. Patient complaining of pain behind his upper arms in the tricep area. Patient says it started a couple days ago. More of a constant burning sensation. There is no rash noted but there is some ecchymosis noted on the upper portion of the arm and tricep area. This area is tender to palpation. There is no masses or hematoma noted. BUN/creatinine 12/1.0. Serum magnesium 1.4 again with phosphorus 1.7. Blood sugars still low 100s. Total bili is 24.8 today with a WBC 5.8 hemoglobin 6.7 early this morning with a repeat 7.1. Temperature 97.5 heart rate 78 blood pressure 118/64. Patient was taken off the Neosporin drip yesterday afternoon but was put back on this morning. O2 sat is 95% on room air.   Urine output ranges 150-450 cc shift. Patient given potassium and magnesium supplement. 12/6/2021  Patient seen examined on PCU. Patient is upset because he does not know what his INR is. Patient states his bilateral posterior upper arm discomfort is improved. There is no chest pain. Patient's abdomen is slowly getting bigger. BUN/creatinine 12/0.9. CO2 was 16 on the electrolytes. Total bili is 26.4 with hemoglobin 6.8 and WBC 5.5. INR is pending. Temperature 90.4 with heart rate of 81 with blood pressure 104/59 with an O2 sat 98%. We will type and cross give 1 more unit of packed RBC now. Consult hematology regarding coagulopathy. 12/7/2021  Patient seen examined on PCU. Patient is about the same. Abdomen is getting more distended. Oncology note reviewed. BUN/creatinine 12/0.9 with a CO2 of 16. Total bili is 24.2 with hemoglobin 6.7 with the patient receiving 2 units of packed RBCs last 24 hours. Platelet count is 39. Temperature 98.5 heart rate is 79 blood pressure 112/67. O2 sat 99% room air at rest. Urine output is good. Patient to receive 2 more units of fresh frozen plasma today with patient receiving platelets yesterday. Patient hopefully can have the abdominal paracentesis today after INR has been performed. 12/8/2021  Patient seen examined on PCU. Patient complained of increased abdominal distention and discomfort. Patient denies any chest pain. Patient little bit frustrated about being in the hospital so long and having his abnormal lab work including low hemoglobin and persistently elevated INR. Hemoglobin 6.2 today with a WBC of 4.4. Platelet count 39 with BUN/creatinine 10/0.8. Blood sugars in the low 100s. Total bili is 27.7. Temperature 90.3 with heart rate 83 and blood pressure 114/74. O2 sat 97% room air at rest.    12/9/2021  Patient seen examined on PCU. Patient is receiving 2 units packed RBCs today along with cryoprecipitate and fresh frozen plasma.   INR to be repeated after that.  BUN/creatinine 10/0.8. Blood sugars range 76117. Hemoglobin 6.0 today with WBC of 4 and platelet count 46. INR is 4.7. Temperature is 98 with heart rate 91 blood pressure 170/77 O2 sat 99% room air at rest.    12/10/2021  Patient seen examined on PCU. Case discussed in detail with GI today. Hematology and nephrology note reviewed. Patient feels about the same. Patient has abdominal discomfort secondary to the ascites. He denies any chest pain. There is no nausea vomiting but he has poor appetite. Hemoglobin 6.6 today with patient hemoglobin 6.0 yesterday and repeat 6.9 yesterday afternoon. Temperature 98.4 with heart rate 91 blood pressure 105/72. O2 sat 95% on room air at rest.  Pending lab work today. Patient supposed to go for EGD today. Patient given 2 units of fresh frozen plasma again today with 1 unit of typed and crossed packed RBCs. Case was discussed with the invasive radiologist today. He states that he has no problem doing the abdominal paracentesis today. 12/11/2021  Patient seen and examined on PCU. Patient states he feels a bit better today after abdominal paracentesis. Patient had abdominal paracentesis yesterday for 4650 cc. CT the abdomen last night showed a large hematoma measuring 7.8 x 4.1 x 8.9 cm in the left lower pelvis with some mass-effect on the prostate and distal rectum. This also showed a large hydrocele. BUN/creatinine was 12/0.7 with magnesium 1.4. Serum phosphorus little bit low at 2.1 with WBC 4.6 and hemoglobin 8. Platelet count stable at 58. INR is 5.3. Temperature 98.5 heart rate 86 blood pressure 109/64. O2 sat 98% on room air at rest.  Urine output is good. Patient was supposed to have EGD done yesterday but this apparently been held until Tuesday. 12/12/2021  Patient seen examined on PCU. Patient's family is at the bedside and case discussed. Patient denies any new discomfort or problem. Patient is set up for EGD tomorrow. BUN/creatinine 12/0.8 with total bili of 41. WBC 4.4 with hemoglobin 7.5. Platelet count 47. INR 6.2 today. Temperature 98.2 with heart rate 89 blood pressure 110/68. O2 sat 99% room air at rest.  Urine output is excellent. 12/13/2021  Patient seen and examined in PCU. Patient very very lethargic today. BUN/creatinine 13/0.8 with potassium 3.4. Serum ammonia is elevated to 102. Total bilirubin is 44.5 with WBC 4.4 with hemoglobin 7.8 and platelet count 52. INR 6.8 today. 90.1 with heart rate 91 blood pressure 161/69. O2 sat 98% on room air at rest.  Case discussed with GI today. The EGD will be put on hold. Patient was last serum ammonia was 43 on admission. Lactulose was restarted. Review of System: Limited at the present time  Constitutional:   Denies fever or chills, weight loss or gain, fatigue or malaise. HEENT:   Denies ear pain, sore throat, sinus or eye problems. Cardiovascular:   Denies any chest pain, irregular heartbeats, or palpitations. Respiratory:   Denies shortness of breath, coughing, sputum production, hemoptysis, or wheezing. Gastrointestinal:   Denies nausea, vomiting, diarrhea, or constipation. Denies any abdominal pain. Genitourinary:    Denies any urgency, frequency, hematuria. Voiding  without difficulty. Extremities:   Denies lower extremity swelling, edema or cyanosis. Neurology:    Denies any headache or focal neurological deficits, Denies generalized weakness or memory difficulty. Psch:   Denies being anxious or depressed. Musculoskeletal:    Denies  myalgias, joint complaints or back pain. Integumentary:   Denies any rashes, ulcers, or excoriations. Denies bruising. Hematologic/Lymphatic:  Denies bruising or bleeding. Physical Exam:  No intake/output data recorded.     Intake/Output Summary (Last 24 hours) at 12/13/2021 1223  Last data filed at 12/13/2021 0505  Gross per 24 hour   Intake 750 ml   Output 650 ml   Net 100 ml   I/O last 3 completed shifts: In: 1030 [P.O.:780; IV Piggyback:250]  Out: 8729 [Urine:1425]  Patient Vitals for the past 96 hrs (Last 3 readings):   Weight   12/12/21 0052 205 lb 8 oz (93.2 kg)     Vital Signs:   Blood pressure 116/69, pulse 91, temperature 98.1 °F (36.7 °C), temperature source Oral, resp. rate 10, height 6' 3\" (1.905 m), weight 205 lb 8 oz (93.2 kg), SpO2 99 %. General appearance:  Patient alert but lethargic. Appears chronically ill  Head:  Normocephalic. No masses, lesions or tenderness. Eyes:  PERRLA. EOMI. sclera icteric. Buccal mucosa moist.  ENT:  Ears normal. Mucosa normal.  Neck:    Supple. Trachea midline. No thyromegaly. No JVD. No bruits. Heart:    Rhythm regular. Tachycardic. No murmurs. Lungs:    Diminished  Abdomen:   Soft. Non-tender. Non-distended. Bowel sounds positive. No organomegaly or masses. No pain on palpation. Newly distended  Extremities:    Peripheral pulses present. No peripheral edema. No ulcers. No cyanosis. No clubbing.   Neurologic:    Responsive but lethargic  Integumentary:  No rashes skin jaundice  Genitalia/Breast:  Hickman catheter    Medication:  Scheduled Meds:   furosemide  20 mg IntraVENous TID    pantoprazole  40 mg Oral QAM AC    sodium bicarbonate  1,300 mg Oral TID    [Held by provider] phytonadione  10 mg Oral Daily    vitamin D3  5,000 Units Oral Daily    midodrine  15 mg Oral TID WC    thiamine (VITAMIN B1) IVPB  100 mg IntraVENous Daily    rifaximin  550 mg Oral BID    sodium chloride flush  5-40 mL IntraVENous 2 times per day    piperacillin-tazobactam  3,375 mg IntraVENous Q8H     Continuous Infusions:   sodium chloride      sodium chloride      sodium chloride      sodium chloride      sodium chloride      sodium chloride      sodium chloride      sodium chloride      sodium chloride      dextrose      sodium chloride      sodium chloride Stopped (12/13/21 1150)       Objective Data:  CBC with Differential:    Lab Results   Component Value Date    WBC 4.4 12/13/2021    RBC 2.31 12/13/2021    HGB 7.8 12/13/2021    HCT 22.3 12/13/2021    HCT 22.4 12/13/2021    PLT 52 12/13/2021    MCV 96.5 12/13/2021    MCH 33.8 12/13/2021    MCHC 35.0 12/13/2021    RDW 22.5 12/13/2021    METASPCT 0.9 12/13/2021    LYMPHOPCT 13.9 12/13/2021    MONOPCT 9.6 12/13/2021    MYELOPCT 2.7 12/06/2021    BASOPCT 0.9 12/13/2021    MONOSABS 0.44 12/13/2021    LYMPHSABS 0.62 12/13/2021    EOSABS 0.15 12/13/2021    BASOSABS 0.04 12/13/2021     CMP:    Lab Results   Component Value Date     12/13/2021    K 3.4 12/13/2021    K 5.8 11/26/2021     12/13/2021    CO2 21 12/13/2021    BUN 13 12/13/2021    CREATININE 0.8 12/13/2021    GFRAA >60 12/13/2021    LABGLOM >60 12/13/2021    GLUCOSE 88 12/13/2021    PROT 6.0 12/13/2021    LABALBU 3.7 12/13/2021    CALCIUM 9.7 12/13/2021    BILITOT 44.5 12/13/2021    ALKPHOS 66 12/13/2021    AST 55 12/13/2021    ALT 21 12/13/2021              Assessment:    · Acute hepatic encephalopathy  · Hepatorenal syndrome probable hepatopulmonary syndrome  · Acute hypoxic respiratory failure  · Hypotonic hypervolemic hyponatremia  · JULIO on CKD  · Pneumonia possible aspiration  · Decompensated cirrhosis  · Hyperbilirubinemia  · Chronic normocytic anemia  · Atrial fibrillation  · History of anxiety and depression  · History of alcohol abuse  · GERD        Plan:     · Patient is doing very poorly at the present time. The patient does have advanced liver disease with decompensated cirrhosis and not currently a candidate for liver transplant. Symptoms are suggestive of severe electrolyte imbalance with probable hepatorenal syndrome. He also is currently hypotensive. I have discussed the condition with the mother and fiancé along with Dr. Benjamin Matthews have discussed his CODE STATUS and agree on no intubation or chest compressions. We have also discussed possibility of transition into hospice pending his status.   Continue aggressive care for the next 24 to 48 hours and observe response  · Electrolyte imbalance of hyponatremia being followed by nephrology  · Metabolic acidosis being corrected by bicarb infusion  · GI is following regarding his hepatic cirrhosis  · Vasopressor has been instituted  · Salt poor albumin to expand volume  · Blood culture has been obtained.   Zosyn to cover for spontaneous bacterial peritonitis  · Patient is auto anticoagulated vitamin K supplementation  · Treat elevated ammonia level  · Paracentesis as needed  · Prognosis is very poor but he is improving short-term    -Consult hematology regarding severe coagulopathy  -We will try to discuss with GI regarding patient's discharge planning and care with family having multiple questions regarding this  -Patient has received 2 units of packed RBCs   -Patient will receive 2 units fresh frozen plasma     -Patient received 2 units packed RBCs, 2 units fresh frozen plasma, cryoprecipitate 12/9  -Patient received 1 unit packed RBC along with 2 units of fresh frozen plasma 12/10    -Abdominal paracentesis 12/ cc    EGD possibly on 12/13    Discharge home when okay with GI, hematology    Kyler Younger DO, F.A.C.O.I.  12/13/2021  12:23 PM

## 2021-12-13 NOTE — PROGRESS NOTES
Progress Note  12/13/2021 5:54 AM  Subjective:   Admit Date: 11/26/2021  PCP: Zaheer Pires,   Interval History:    12/13/21- Sleeping, awakens easily, states he feels ok, falls back to sleep easily. Diet: Diet NPO Exceptions are: Sips of Water with Meds    Data:   Scheduled Meds:   furosemide  20 mg IntraVENous TID    pantoprazole  40 mg Oral QAM AC    sodium bicarbonate  1,300 mg Oral TID    [Held by provider] phytonadione  10 mg Oral Daily    vitamin D3  5,000 Units Oral Daily    midodrine  15 mg Oral TID WC    thiamine (VITAMIN B1) IVPB  100 mg IntraVENous Daily    rifaximin  550 mg Oral BID    sodium chloride flush  5-40 mL IntraVENous 2 times per day    piperacillin-tazobactam  3,375 mg IntraVENous Q8H     Continuous Infusions:   sodium chloride      sodium chloride      sodium chloride      sodium chloride      sodium chloride      sodium chloride      sodium chloride      sodium chloride      sodium chloride      dextrose      sodium chloride      sodium chloride 12.5 mL/hr at 12/13/21 0200     PRN Meds:sodium chloride, sodium chloride, sodium chloride, sodium chloride, sodium chloride, sodium chloride, sodium chloride, sodium chloride, sodium chloride, acetaminophen **OR** [DISCONTINUED] acetaminophen, traMADol, lidocaine, melatonin, LORazepam **OR** LORazepam **OR** [DISCONTINUED] LORazepam **OR** [DISCONTINUED] LORazepam **OR** [DISCONTINUED] LORazepam **OR** [DISCONTINUED] LORazepam **OR** [DISCONTINUED] LORazepam **OR** [DISCONTINUED] LORazepam, dextrose, dextrose, sodium chloride flush, sodium chloride, polyethylene glycol, prochlorperazine  I/O last 3 completed shifts:   In: 1141.9 [P.O.:840; IV Piggyback:301.9]  Out: 3468 [Urine:2525]  I/O this shift:  In: 60 [P.O.:60]  Out: -     Intake/Output Summary (Last 24 hours) at 12/13/2021 0554  Last data filed at 12/13/2021 0505  Gross per 24 hour   Intake 1030 ml   Output 2125 ml   Net -1095 ml     CBC:   Recent Labs sounds  Extremities: 1+ BLE  Neurologic: Mental status: drowsy    Assessment:   Patient Active Problem List:     Alcohol-induced acute pancreatitis     Anxiety     Depression     ETOH abuse     Hematemesis     Atrial fibrillation with rapid ventricular response (HCC)     Alcohol withdrawal syndrome without complication (HCC)     Lactic acidosis     Hypokalemia     Severe protein-calorie malnutrition (HCC)     Paroxysmal atrial fibrillation (HCC)     Hyperbilirubinemia     Hypomagnesemia     Bilirubinemia     UGIB (upper gastrointestinal bleed)     Hepatorenal syndrome (HCC)    Plan:   Assessment: / Plan:     1.  Acute kidney injury.  Acute kidney injury secondary to renal hypoperfusion with possible underlying hepatorenal syndrome. Urine output has improved  JULIO  Now Resolved. Now  with edema will dose loop as needed and follow. Follow BP's closely as has been hypotensive  UOP last 24 hrs 1425 ml +     2.  Metabolic acidosis.  Patient with non-anion gap metabolic acidosis which may be reflection of diarrhea as well as acute kidney injury.   12/3/21: started oral bicarb. Up titrated to 1300 mg po bid 12/6 and to tid 12/8  CO2 improved to 21 today     3.   Hypomagnesemia.    Supplement potassium and magnesium as needed. Mg 1.8 today     4. Hypokalemia. Improved. Supplement today with KCL 40 mEq po    5.  Hypocalcemia.   ionized calcium 1.12 / Vit D 14 / PTH 60 - Started oral Vit D on 11/29/21 12/13 Ca++9.7    6.  Hypotension. On midodrine   Stable follow with diuresis     7. Hypophosphatemia.    Supplement orally as needed for goal 2.5  PO4 1.8-->2.6-->2.1-->2.7-->2.4   On PhosNak TID supplement     8. Coagulopathy-  Plasma and blood to be given today  Transfuse as needed per primary/ hem    9. Edema-  In the setting of cirrhosis  On lasix 20 mg IV Q 8 hours  UOP 1425 ml + 2 unmeasured occurances past 24 hours        EILEEN Charles - CNS   Patient seen and examined. Chart reviewed.   I had a face to face encounter with the patient. Agree with exam.    Agree with  formulation, assessment and plan as outlined above and directed by me. Addition and corrections were done as deemed appropriate. My exam and plan include:     Continue current treatment.       Nhi Sierra MD  Nephrology        Electronically signed by Nhi Sierra MD on 12/13/2021 at 2:09 PM

## 2021-12-13 NOTE — PROGRESS NOTES
PROGRESS NOTE  [facility]  Dr. Valerie Rutherford MD, Dr. Loraine Vance MD, Dr Terrance Lin, Dr. Rafita Medeiros MD, Dr. Merissa Garsia, DO      Count includes the [facility]  40 y.o.  male    SUBJECTIVE: Patient very lethargic today, arousable, but not obeying commands or answering questions. Per mother who is sitting at bedside, he was slightly lethargic yesterday, much worse today. Mother and RN do not know if he is having bowel movements.  Swelling markedly improved    OBJECTIVE:     /60   Pulse 90   Temp 97.9 °F (36.6 °C) (Oral)   Resp 15   Ht 6' 3\" (1.905 m)   Wt 205 lb 8 oz (93.2 kg)   SpO2 98%   BMI 25.69 kg/m²     Gen: NAD, AAO x 3  HEENT:PEERL, grossly icteric  Heart: RRR, no M/R/G  Lungs: CTAB  Abd.: soft, NT, ND  Extr.: Swelling improved  Skin: Grossly jaundiced      Stool (measured) : 100 mL  Lab Results   Component Value Date    WBC 4.4 12/12/2021    WBC 4.6 12/11/2021    WBC 4.5 12/10/2021    HGB 7.5 12/12/2021    HGB 8.0 12/11/2021    HGB 6.6 12/10/2021    HCT 22.0 12/12/2021    MCV 97.8 12/12/2021    RDW 22.2 12/12/2021    PLT 47 12/12/2021    PLT 58 12/11/2021    PLT 50 12/10/2021     Lab Results   Component Value Date     12/12/2021    K 3.6 12/12/2021    K 5.8 11/26/2021     12/12/2021    CO2 19 12/12/2021    BUN 12 12/12/2021    CREATININE 0.8 12/12/2021    CALCIUM 9.3 12/12/2021    PROT 6.0 12/12/2021    LABALBU 3.6 12/12/2021    BILITOT 41.0 12/12/2021    BILITOT 38.5 12/11/2021    BILITOT 32.9 12/10/2021    ALKPHOS 54 12/12/2021    ALKPHOS 50 12/11/2021    ALKPHOS 54 12/10/2021    AST 55 12/12/2021    AST 50 12/11/2021    AST 47 12/10/2021    ALT 20 12/12/2021    ALT 19 12/11/2021    ALT 18 12/10/2021     Lab Results   Component Value Date    LIPASE 68 11/14/2021    LIPASE 26 07/01/2020    LIPASE 26 05/11/2020     Lab Results   Component Value Date    AMYLASE 91 01/16/2019         ASSESSMENT/PLAN:  1. Hepatic encephalopathy type C grade II  - Continue lactulose 30g BID with rifaximin 550 mg p.o. TID  - Mental status acutely worsened today  - Unclear if patient is having bowel movements  - Pending ammonia level; if elevated, will increase lactulose and/or start lactulose enemas     2. Alcohol induced cirrhosis, decompensated  - MELD-Na score 35.    - Diuretics per nephrology; edema improving  - Repeat BMP pending today  - s/p paracentesis yesterday; high SAAG, no SBP, culture pending  - INR remains elevated; repeat today pending  - If antibiotics is only for SBP, okay to discontinue; defer to primary    3.  Renal failure - resolved   - Possible HRS-JULIO  - Discussed with nephrology  - Diuretics as above     4.  Acute on chronic normocytic anemia  -Vital signs stable over signs of GI bleed on exam  -CT A/P with pelvic hematoma, most likely source of recurrent drop in H&H  - Hold off on non-emergent EGD today considering acutely worsened mental status    5. Jaundice  - Related to decompensated cirrhosis vs hemolysis with known pelvic hematoma  - Check direct bilirubin, LDH, haptoglobin, reticulocyte count     Will discuss with Dr. Rosalia Patiño DO  GI Fellow   12/13/2021  10:22 AM     Patient seen and independently examined. Pertinent notes and lab work reviewed. Monitor reviewed showing sinus rhythm. D/w Dr. Gary Jesus with physical exam and A&P.     Leilani Caballero MD  12/13/2021  12:11 PM

## 2021-12-13 NOTE — PROGRESS NOTES
Physical Therapy        0516/0516-01    Patient unavailable for physical therapy treatment due to patient declining therapy at this time. Will attempt session at later time/date.      Mike Kelley, PTA  #[id number]

## 2021-12-13 NOTE — PROGRESS NOTES
[facility]  Hematology/Oncology  Consult      Patient Name: Steven Baugh  YOB: 1975  PCP: Nirmala Trevizo DO   Referring Provider:      Reason for Consultation:   Chief Complaint   Patient presents with    Altered Mental Status     HX of liver failure        Subjective:   Confused and disoriented this morning with altered mental status  Remains profoundly jaundiced. History of Present Illness:  43-year-old man with recent diagnosis of alcoholic liver cirrhosis recently discharged from the hospital and readmitted with altered mental status, confusion and hepatic encephalopathy. On admission he was noted to be hyponatremic with renal failure. On admission his hemoglobin was 12.4 with a sodium of 124, BUN of 45 and creatinine of 3.9. He was deeply jaundiced with a bilirubin of 23.9. He also had moderate thrombocytopenia on admission with a platelet count of 999    His hemoglobin and platelet count have been steadily dropping since admission and on today's CBC his hemoglobin was down to 6.8 with a platelet count of 34. MCV was 95 with a normal WBC count of 5.5 with unremarkable differential except for mild lymphopenia. His serum creatinine has recovered and is down to 0.9. His AST remains slightly elevated however with a markedly elevated bilirubin of 26.4 and hypoproteinemia with normal albumin  Patient with progressive abdominal distention and will likely require paracentesis  We are consulted for his anemia, thrombocytopenia and coagulopathy.   Most recent PT was 76 seconds with an INR of 8.3  He is on daily subcutaneous vitamin K injection  He had been on Lavon-Synephrine drip but is now on midodrine  He continues on Xifaxan as well as antibiotics with Zosyn  He was also on Sandostatin drip without any overt active bleeding at this time  His B12 and folate levels were normal      Diagnostic Data:     Past Medical History:   Diagnosis Date    Anxiety     Depression     Esophagitis     ETOH abuse     Gastritis     GERD (gastroesophageal reflux disease)     Hematuria     Pancreatitis        Patient Active Problem List    Diagnosis Date Noted    Hepatorenal syndrome ([facility] [address] 11/26/2021    UGIB (upper gastrointestinal bleed) 11/16/2021    Bilirubinemia 11/14/2021    Hyperbilirubinemia     Hypomagnesemia     Paroxysmal atrial fibrillation (HCC)     Severe protein-calorie malnutrition ([address] 01/18/2019    Hypokalemia     Hematemesis 01/16/2019    Atrial fibrillation with rapid ventricular response ([address] 01/16/2019    Alcohol withdrawal syndrome without complication ([address] 37/26/7685    Lactic acidosis 01/16/2019    Anxiety     Depression     ETOH abuse     Alcohol-induced acute pancreatitis 05/07/2018        Past Surgical History:   Procedure Laterality Date    KNEE SURGERY      ACL    UPPER GASTROINTESTINAL ENDOSCOPY  05/09/2018    UPPER GASTROINTESTINAL ENDOSCOPY N/A 5/8/2018    EGD BIOPSY performed by Sindi Camacho MD at [address] [place] [address] Avenue 1/21/2019    EGD ESOPHAGOGASTRODUODENOSCOPY performed by Demetrius Floyd DO at [address] [facility]  1/21/2019    EGD CONTROL HEMORRHAGE performed by Demetrius Floyd DO at [facility] History  No family history on file. Social History    TOBACCO:   reports that he has never smoked. He has never used smokeless tobacco.  ETOH:   reports current alcohol use. Home Medications  Prior to Admission medications    Medication Sig Start Date End Date Taking?  Authorizing Provider   spironolactone (ALDACTONE) 100 MG tablet Take 1 tablet by mouth daily 11/23/21   Ellan Kanner, DO   furosemide (LASIX) 40 MG tablet Take 1 tablet by mouth daily 11/23/21   Ellan Kanner, DO   lactulose [facility]) 10 GM/15ML solution Take 30 mLs by mouth 3 times daily 11/22/21   Ellan Kanner, DO   potassium chloride (KLOR-CON M) 20 MEQ extended release tablet Take 1 tablet by mouth 3 times daily (with meals) 11/22/21   Jaya Meera, DO   rifaximin (XIFAXAN) 550 MG tablet Take 1 tablet by mouth 3 times daily 11/22/21   Jaya Meera, DO   omeprazole (PRILOSEC) 20 MG delayed release capsule Take 20-40 mg by mouth daily    Historical Provider, MD       Allergies  Allergies   Allergen Reactions    [place] [Macadamia Nut Oil] Swelling       Review of Systems: Relevant for intermittent episodes of confusion, disorientation, deep jaundice, fatigue, easy bruisability and abdominal bloating      Objective  /60   Pulse 90   Temp 97.9 °F (36.6 °C) (Oral)   Resp 15   Ht 6' 3\" (1.905 m)   Wt 205 lb 8 oz (93.2 kg)   SpO2 98%   BMI 25.69 kg/m²     Physical Exam:   Performance Status:  General: Alert, resting comfortably, intermittently confused. Head and neck : PERRLA, EOMI . Sclera  icteric. Oropharynx : Clear  Neck: no JVD,  no adenopathy, no carotid bruit  Heart: Regular rate and regular rhythm, no murmur  Lungs: Clear to auscultation   Extremities: No edema,no cyanosis, no clubbing. Abdomen: Soft, non-tender;  Distended with ascites  Scrotal swelling with hematoma  Skin: Icteric with few ecchymosis  Neurologic:Cranial nerves grossly intact. No focal motor or sensory deficits .     Recent Laboratory Data-   Lab Results   Component Value Date    WBC 4.4 (L) 12/13/2021    HGB 7.8 (L) 12/13/2021    HCT 22.3 (L) 12/13/2021    HCT 22.4 (L) 12/13/2021    MCV 96.5 12/13/2021    PLT 52 (L) 12/13/2021    LYMPHOPCT 13.9 (L) 12/13/2021    RBC 2.31 (L) 12/13/2021    MCH 33.8 12/13/2021    MCHC 35.0 (H) 12/13/2021    RDW 22.5 (H) 12/13/2021    NEUTOPHILPCT 71.3 12/13/2021    MONOPCT 9.6 12/13/2021    BASOPCT 0.9 12/13/2021    NEUTROABS 3.17 12/13/2021    LYMPHSABS 0.62 (L) 12/13/2021    MONOSABS 0.44 12/13/2021    EOSABS 0.15 12/13/2021    BASOSABS 0.04 12/13/2021       Lab Results   Component Value Date     12/12/2021    K 3.6 12/12/2021     12/12/2021    CO2 19 (L) 12/12/2021    BUN 12 12/12/2021    CREATININE 0.8 12/12/2021    GLUCOSE 70 (L) 12/12/2021    CALCIUM 9.3 12/12/2021    PROT 6.0 (L) 12/12/2021    LABALBU 3.6 12/12/2021    BILITOT 41.0 (H) 12/12/2021    ALKPHOS 54 12/12/2021    AST 55 (H) 12/12/2021    ALT 20 12/12/2021    LABGLOM >60 12/12/2021    GFRAA >60 12/12/2021       Lab Results   Component Value Date    IRON 132 05/08/2018    TIBC 149 (L) 05/08/2018    FERRITIN 1,815 05/08/2018           Radiology-    US GALLBLADDER RUQ    Result Date: 11/28/2021  EXAMINATION: RIGHT UPPER QUADRANT ULTRASOUND 11/27/2021 5:24 pm COMPARISON: 11/14/2021 HISTORY: ORDERING SYSTEM PROVIDED HISTORY: decomp cirrhosis, CBD measurement TECHNOLOGIST PROVIDED HISTORY: Reason for exam:->decomp cirrhosis, CBD measurement What reading provider will be dictating this exam?->CRC FINDINGS: LIVER:  Nodular contour of the liver consistent with cirrhosis. The liver is somewhat heterogeneous but without obvious focal abnormality. There is apparent hepatofugal flow again identified within the main portal vein. BILIARY SYSTEM:  Gallbladder is incompletely distended with diffuse wall thickening. No sonographic Carrasco's sign. The wall thickening is nonspecific but could be reactive to ascites and hepatic parenchymal disease. No obvious cholelithiasis. Common bile duct is within normal limits measuring 6 mm. RIGHT KIDNEY: The right kidney is grossly unremarkable without evidence of hydronephrosis. PANCREAS:  Visualized portions of the pancreas are unremarkable. OTHER: Moderate ascites. Findings again consistent with cirrhotic liver. Ascites. Hepatofugal flow redemonstrated in the portal vein. Thickened gallbladder wall which may be reactive to the ascites and hepatic disease.      XR CHEST PORTABLE    Result Date: 11/26/2021  EXAMINATION: ONE XRAY VIEW OF THE CHEST 11/26/2021 7:15 am COMPARISON: One-view chest x-ray 11/14/2021 HISTORY: ORDERING SYSTEM PROVIDED HISTORY: SOB TECHNOLOGIST PROVIDED HISTORY: Reason for exam:->SOB FINDINGS: The cardiac silhouette is within normal limits. The mediastinal contours are within normal limits. The lungs are hypoinflated with resulting bronchovascular crowding. Mild basilar opacities appear new/increased. No significant pleural effusions or pneumothorax. Osseous degenerative changes are present. EKG leads overlie the chest.     Mild basilar opacities are suspicious for pneumonia. This appearance can be seen with COVID-19. XR CHEST 1 VIEW    Result Date: 11/14/2021  EXAMINATION: ONE XRAY VIEW OF THE CHEST 11/14/2021 10:50 pm COMPARISON: Chest series from July 1, 2020 HISTORY: ORDERING SYSTEM PROVIDED HISTORY: Rule out acute pulmonary pathology/infection TECHNOLOGIST PROVIDED HISTORY: Reason for exam:->Rule out acute pulmonary pathology/infection FINDINGS: Elevation of the right hemidiaphragm. Possible right lower lung ill-defined opacity. Cannot exclude small pleural effusion on the right. Left lung is clear. No pneumothorax. Cardiomediastinal silhouette and pulmonary vascularity appear within normal limits. Osseous and thoracic soft tissue structures demonstrate no acute findings. Elevation of the right hemidiaphragm and possible right lower lung opacity. Small right pleural effusion may be present. RECOMMENDATION: (Recommend upright PA and lateral chest radiographs 6-8 weeks after resolution of patient's symptoms to ensure complete resolution of radiographic findings.)     US ABDOMEN LIMITED    Result Date: 11/26/2021  EXAMINATION: RIGHT UPPER QUADRANT ULTRASOUND 11/26/2021 5:54 pm COMPARISON: None. HISTORY: ORDERING SYSTEM PROVIDED HISTORY: ascites/JULIO TECHNOLOGIST PROVIDED HISTORY: Reason for exam:->ascites/JULIO What reading provider will be dictating this exam?->CRC FINDINGS: Superficial sonographic evaluation of the 4 quadrants of the abdomen performed using a combination of grayscale technique.   Imaging done to assess for the presence procedure including risks, benefits, and alternatives. Universal protocol was followed. A limited ultrasound of the abdomen was performed. The right abdomen was prepped and draped in sterile fashion and local anesthesia was achieved with lidocaine. An 8 Setswana needle sheath was advanced into ascites and paracentesis was performed. The patient tolerated the procedure well. FINDINGS: Limited ultrasound of the abdomen demonstrates ascites. A total of 6400 cc was removed. Successful paracentesis. US DUP ABD PEL RETRO SCROT COMPLETE    Result Date: 11/14/2021  EXAMINATION: DOPPLER EVALUATION OF THE PELVIS 11/14/2021 10:20 pm COMPARISON: None. HISTORY: ORDERING SYSTEM PROVIDED HISTORY: Rule out PVT TECHNOLOGIST PROVIDED HISTORY: Reason for exam:->Rule out PVT What reading provider will be dictating this exam?->CRC FINDINGS: Color flow and spectral waveform evaluation of hepatic vasculature performed. The visualized portal vein appears patent. The there is no evidence for portal venous thrombus. There is, however, apparent reversal flow within the portal vein. Appropriate hepatic arterial flow demonstrated. Hepatic veins are not image. There is a cirrhotic appearing liver with moderate ascites. There is gallbladder distension with wall thickening. Doppler evaluation demonstrates hepatofugal portal venous flow. No evidence for portal venous thrombus. Moderate ascites. Gallbladder distension with wall thickening. ASSESSMENT/PLAN :   [id number] man  Alcoholic liver cirrhosis  Hepatorenal syndrome with return of renal function to normal  Hepatic encephalopathy resolved  Hyponatremia resolved  Severe jaundice with elevated bilirubin.   Ultrasound of abdomen shows nodular contour of the liver with thickening of the gallbladder wall with flow demonstrated in the portal vein and moderate ascites    Severe anemia and thrombocytopenia multifactorial related to myelosuppression by alcohol with a component of hemodilution as well as peripheral sequestration by portal hypertension and hypersplenism  There is no overt GI bleed  To check iron studies and reticulocyte count. Coagulopathy with markedly elevated PT/INR related to severe hepatocellular disease and liver failure  Has been on daily vitamin K subcu but will require 2 units of FFP today and they need to be repeated tomorrow in anticipation of potential paracentesis  In view of scrotal hematoma and platelet dysfunction due to prior uremia will also transfuse with 1 dose of platelets    41/1/89  - CBC with Hgb 6.7. Platelets 39. S/p 3 pRBC and 2 FFP  - INR still 5. On Vit K. Additional FFP may be needed to get INR to safe range for paracentesis. Repeat INR pending  - 1 unit platelets pending  - Bili stable at 24. Cytopenias due to both myelosuppression from alcohol consumption and liver cirrhosis/hypersplenism  - Will continue to follow    12/8/2021  Remains deeply icteric  Status post transfusion yesterday with packed RBCs/FFP/platelets  His labs today show further rising bilirubin to 27.7. No significant change in CBC despite multiple transfusions. His hemoglobin remains low at 6.2 and platelet count is 39. PT/INR pending      Patient with coagulopathy related to liver failure from alcoholic liver cirrhosis  He has short term response to FFP's and will receive additional FFP today in an attempt to lower his INR prior to possible palliative paracentesis  His anemia is multifactorial and related to severe myelosuppression and alcohol injury to bone marrow as well as a component of peripheral sequestration by hypersplenism secondary to portal hypertension and advanced liver cirrhosis  Prognosis very poor    12/9/2021.   -Coagulopathic due to cirrhosis.  -Worsening scrotal hematoma. Possible GI bleeding. Hemoglobin dropped to 6. Multifactorial. Scheduled for EGD tomorrow to r/o varices. S/p blood transfusion.  Paracentesis pending till INR is normal.  He has received 10 units of FFP's during this admission. INR today is 4  Checked fibrinogen. Less than 80. We will give cryoprecipitate 10 units. S/p vitamin K. Bili is 30. Poor prognosis. 12/10/21  - Coagulopathy slightly improved, INR 3.9. Fibrinogen up to 197  - Continues to require significant numbers of blood products including FFP, pRBCs, platelets and cryo. Continue to transfuse as needed. Ordered 1 pRBC and 2 FFP. May need additional cryo as well  - CBC with Hgb 6.6 today  - Plan for EGD today with GI  - Bili is up to 30  - Overall poor prognosis    12/13/2021  Confused and disoriented this morning with altered mental status.   He is status post paracentesis on 1210 and 4600 cc of ascitic fluid were removed  Remains deeply jaundiced with progressively worsening liver failure and his bilirubin was up to 41 yesterday  CBC fairly stable today with a hemoglobin of 7.8 and improvement in platelet count to 52    Severe coagulopathy related to liver failure persists with INR of 6.8  Worsening hepatic encephalopathy followed by GI  Prognosis very guarded    Juan M Fernández MD  Electronically signed 12/13/2021 at 11:37 AM

## 2021-12-13 NOTE — ANESTHESIA PRE PROCEDURE
Department of Anesthesiology  Preprocedure Note       Name:  Ryan Fu   Age:  40 y.o.  :  1975                                          MRN:  [id number]         Date:  2021      Surgeon: Ronnie Estrada):  Dorothy Bejarano MD    Procedure: Procedure(s):  EGD ESOPHAGOGASTRODUODENOSCOPY    Medications prior to admission:   Prior to Admission medications    Medication Sig Start Date End Date Taking? Authorizing Provider   spironolactone (ALDACTONE) 100 MG tablet Take 1 tablet by mouth daily 21   Xander Shin, DO   furosemide (LASIX) 40 MG tablet Take 1 tablet by mouth daily 21   Xander Shin, DO   lactulose [facility]) 10 GM/15ML solution Take 30 mLs by mouth 3 times daily 21   Xander Shin, DO   potassium chloride (KLOR-CON M) 20 MEQ extended release tablet Take 1 tablet by mouth 3 times daily (with meals) 21   Xander Shin, DO   rifaximin (XIFAXAN) 550 MG tablet Take 1 tablet by mouth 3 times daily 21   Xander Shin, DO   omeprazole (PRILOSEC) 20 MG delayed release capsule Take 20-40 mg by mouth daily    Historical Provider, MD       Current medications:    No current facility-administered medications for this visit. No current outpatient medications on file.      Facility-Administered Medications Ordered in Other Visits   Medication Dose Route Frequency Provider Last Rate Last Admin    furosemide (LASIX) injection 20 mg  20 mg IntraVENous TID EILEEN Lion - CNP   20 mg at 21 0932    pantoprazole (PROTONIX) tablet 40 mg  40 mg Oral QAM AC Mukarram Amine, DO   40 mg at 21 0636    0.9 % sodium chloride infusion   IntraVENous PRN Malaika Hazel MD        0.9 % sodium chloride infusion   IntraVENous PRN Malaika Hazel MD        0.9 % sodium chloride infusion   IntraVENous PRN Rik Hagan MD        0.9 % sodium chloride infusion   IntraVENous PRN Rik Hagan MD        sodium bicarbonate tablet 1,300 mg  1,300 mg Oral TID Chuck Justin Galarza MD   1,300 mg at 12/13/21 0939    0.9 % sodium chloride infusion   IntraVENous PRN Shahriar Ahumada MD        0.9 % sodium chloride infusion   IntraVENous PRN Shahriar Ahumada MD        0.9 % sodium chloride infusion   IntraVENous PRN Shahriar Ahumada MD        0.9 % sodium chloride infusion   IntraVENous PRN Shahriar Ahumada MD        0.9 % sodium chloride infusion   IntraVENous PRN Geofm Decree, DO        acetaminophen (TYLENOL) tablet 500 mg  500 mg Oral Q6H PRN Geofm Decree, DO        traMADol Renetta Mass) tablet 25 mg  25 mg Oral Q6H PRN Geofm Decree, DO   25 mg at 12/12/21 1147    lidocaine 4 % external patch 1 patch  1 patch TransDERmal Daily PRN EILEEN Gillespie CNP        [Held by provider] phytonadione (VITAMIN K) tablet 10 mg  10 mg Oral Daily Perico Mcbride MD   10 mg at 12/06/21 1042    melatonin tablet 6 mg  6 mg Oral Nightly PRN EILEEN Gillespie CNP   6 mg at 12/09/21 2021    vitamin D3 (CHOLECALCIFEROL) tablet 5,000 Units  5,000 Units Oral Daily Alexandra Wolff MD   5,000 Units at 12/13/21 0941    midodrine (PROAMATINE) tablet 15 mg  15 mg Oral TID  Perico Mcbride MD   15 mg at 12/13/21 0936    thiamine (B-1) 100 mg in sodium chloride 0.9 % 100 mL IVPB  100 mg IntraVENous Daily Lei Durham,  mL/hr at 12/13/21 0933 100 mg at 12/13/21 0933    LORazepam (ATIVAN) tablet 1 mg  1 mg Oral Q1H PRN Bi Carrillo DO        Or    LORazepam (ATIVAN) injection 1 mg  1 mg IntraVENous Q1H PRN Bi [place] DO        rifaximin (XIFAXAN) tablet 550 mg  550 mg Oral BID Rosy Fontenot MD   550 mg at 12/13/21 0936    dextrose 50 % IV solution  12.5 g IntraVENous PRN Concepcion Rafaelling, DO        dextrose 5 % solution  100 mL/hr IntraVENous PRN Concepcion Rafaelling, DO        sodium chloride flush 0.9 % injection 5-40 mL  5-40 mL IntraVENous 2 times per day Concepcion Ureña, DO   10 mL at 12/13/21 0941    sodium chloride flush 0.9 % injection 5-40 mL  5-40 mL IntraVENous PRN Mary Nares, DO        0.9 % sodium chloride infusion  25 mL IntraVENous PRN Mary Nares, DO        polyethylene glycol (GLYCOLAX) packet 17 g  17 g Oral Daily PRN Mary Nares, DO        prochlorperazine (COMPAZINE) injection 10 mg  10 mg IntraVENous Q6H PRN Mary Nares, DO        piperacillin-tazobactam (ZOSYN) 3,375 mg in dextrose 5 % 50 mL IVPB extended infusion (mini-bag)  3,375 mg IntraVENous Q8H Mary Nares, DO   Stopped at 12/13/21 0940    And    0.9 % sodium chloride infusion   IntraVENous Q8H Ismail U Sundar, DO 12.5 mL/hr at 12/13/21 0940 New Bag at 12/13/21 0940       Allergies: Allergies   Allergen Reactions    Horseheads [Macadamia Nut Oil] Swelling       Problem List:    Patient Active Problem List   Diagnosis Code    Alcohol-induced acute pancreatitis K85.20    Anxiety F41.9    Depression F32. A    ETOH abuse F10.10    Hematemesis K92.0    Atrial fibrillation with rapid ventricular response (HCC) I48.91    Alcohol withdrawal syndrome without complication (HCC) M92.588    Lactic acidosis E87.2    Hypokalemia E87.6    Severe protein-calorie malnutrition (HCC) E43    Paroxysmal atrial fibrillation (HCC) I48.0    Hyperbilirubinemia E80.6    Hypomagnesemia E83.42    Bilirubinemia E80.6    UGIB (upper gastrointestinal bleed) K92.2    Hepatorenal syndrome (HCC) K76.7       Past Medical History:        Diagnosis Date    Anxiety     Depression     Esophagitis     ETOH abuse     Gastritis     GERD (gastroesophageal reflux disease)     Hematuria     Pancreatitis        Past Surgical History:        Procedure Laterality Date    KNEE SURGERY      ACL    UPPER GASTROINTESTINAL ENDOSCOPY  05/09/2018    UPPER GASTROINTESTINAL ENDOSCOPY N/A 5/8/2018    EGD [facility] Ming Durham MD at [facility] N/A 1/21/2019    EGD ESOPHAGOGASTRODUODENOSCOPY performed by Laine Wallace DO at [facility] ENDOSCOPY  1/21/2019    EGD CONTROL HEMORRHAGE performed by Laine Wallace DO at [address] History:    Social History     Tobacco Use    Smoking status: Never Smoker    Smokeless tobacco: Never Used   Substance Use Topics    Alcohol use: Yes     Comment: 5th of liquour daily. Previous 1/15/2019                                Counseling given: Not Answered      Vital Signs (Current): There were no vitals filed for this visit. BP Readings from Last 3 Encounters:   12/13/21 110/60   11/22/21 116/70   07/04/20 128/80       NPO Status:  Last intake- 12/9/21 @ 1800  Pt reports receiving sips of water w/ meds today. BMI:   Wt Readings from Last 3 Encounters:   12/12/21 205 lb 8 oz (93.2 kg)   11/14/21 215 lb (97.5 kg)   07/02/20 210 lb 3.2 oz (95.3 kg)     There is no height or weight on file to calculate BMI.    CBC:   Lab Results   Component Value Date    WBC 4.4 12/12/2021    RBC 2.25 12/12/2021    HGB 7.5 12/12/2021    HCT 22.0 12/12/2021    MCV 97.8 12/12/2021    RDW 22.2 12/12/2021    PLT 47 12/12/2021       CMP:   Lab Results   Component Value Date     12/12/2021    K 3.6 12/12/2021    K 5.8 11/26/2021     12/12/2021    CO2 19 12/12/2021    BUN 12 12/12/2021    CREATININE 0.8 12/12/2021    GFRAA >60 12/12/2021    LABGLOM >60 12/12/2021    GLUCOSE 70 12/12/2021    PROT 6.0 12/12/2021    CALCIUM 9.3 12/12/2021    BILITOT 41.0 12/12/2021    ALKPHOS 54 12/12/2021    AST 55 12/12/2021    ALT 20 12/12/2021       POC Tests: No results for input(s): POCGLU, POCNA, POCK, POCCL, POCBUN, POCHEMO, POCHCT in the last 72 hours.     Coags:   Lab Results   Component Value Date    PROTIME 73.5 12/12/2021    INR 6.2 12/12/2021    APTT 37.3 07/17/2018       HCG (If Applicable): No results found for: PREGTESTUR, PREGSERUM, HCG, HCGQUANT     ABGs: No results found for: PHART, PO2ART, WXF1REZ, UOG3RFD, BEART, B1TJLQCN     Type & Screen (If Applicable):  No results found for: LABABO, LABRH    Drug/Infectious Status (If Applicable):  No results found for: HIV, HEPCAB    COVID-19 Screening (If Applicable):   Lab Results   Component Value Date    COVID19 Not Detected 11/26/2021     ECHO 7/2020  Summary   Left ventricular size is grossly normal.   Mild left ventricular concentric hypertrophy noted. Ejection fraction is measured at 63%. No evidence of left ventricular mass or thrombus noted. No regional wall motion abnormalities seen. The left atrium is borderline dilated. Interatrial septum appears intact. No evidence of thrombus within left atrium. Mildly dilated right ventricle. No evidence of a thrombus in the right ventricle. Mildly enlarged right atrium size. Physiologic and/or trace mitral regurgitation is present. No evidence of mitral valve stenosis. Physiologic and/or trace tricuspid regurgitation. RVSP is 24.11 mmHg. Irregular rhythm--atrial fibrillation.       Signature      ----------------------------------------------------------------   Electronically signed by Danisha Mar DO(Interpreting   physician) on 07/02/2020 05:31 PM      EKG 11/2021  Normal sinus rhythm  Low voltage QRS   Prolonged QT  Abnormal ECG  When compared with ECG of 01-JUL-2020 19:42,  sinus rhythm has replaced A-flutter with RVR  Confirmed by Ronda Back ([id number]) on 11/27/2021 7:20:13 PM        Anesthesia Evaluation  Patient summary reviewed and Nursing notes reviewed no history of anesthetic complications:   Airway: Mallampati: II  TM distance: >3 FB     Mouth opening: > = 3 FB Dental:          Pulmonary:Negative Pulmonary ROS breath sounds clear to auscultation      (-) not a current smoker                           Cardiovascular:Negative CV ROS  Exercise tolerance: good (>4 METS),   (+) dysrhythmias (paroxysmal a-fib. Pt now NSR per tele monitor. ): atrial fibrillation,       ECG reviewed  Rhythm: regular  Rate: normal  Echocardiogram reviewed         Beta Blocker:  Not on Beta Blocker         Neuro/Psych:   (+) psychiatric history (ETOH abuse. ):depression/anxiety             GI/Hepatic/Renal:   (+) GERD: no interval change, liver disease (hepatorental syndrome): coagulopathy from hepatic dysfunction,          ROS comment: Alcohol induced pancreatitis with esophagitis. Pt states last drink of alcohol was 30-45 days ago. .   Endo/Other:    (+) blood dyscrasia (Hbg 7.5 & Hct 22.0 with platelet count of 10B. INR 6.2 yesterday.): anemia and thrombocytopenia:., electrolyte abnormalities (Magnesium 1.4. ), . Pt had no PAT visit       Abdominal:             Vascular: negative vascular ROS. Other Findings:             Anesthesia Plan      MAC     ASA 4     (Note copied from a previous encounter with the appropriate addendums and changes)  Induction: intravenous. Anesthetic plan and risks discussed with patient. Plan discussed with CRNA. Darren Colby Lesches, DO   12/13/2021    History, data, and pertinent studies from chart review. Above represents information available via the shared medical record including previous anesthetic, medication, and allergy history.   Confirmation of above and final disposition per DOS anesthesiologist.    Allie Jolley DO  Staff Anesthesiologist  December 13, 2021  9:55 AM

## 2021-12-14 NOTE — PROGRESS NOTES
Dr Jaleel Woody per face to face and Dr Candida Mejia per charge RN are both aware of pt labs this a.m.

## 2021-12-14 NOTE — PROGRESS NOTES
Physical Therapy    Physical Therapy Treatment Note/Plan of Care    Room #:  2740/7039-17  Patient Name: Brayden Li  YOB: 1975  MRN: [id number]    Date of Service: 12/14/2021     Tentative placement recommendation: Subacute rehab  Equipment recommendation: To be determined      Evaluating Physical Therapist: Wendy Benitez, PT  #[id number]      Specific Provider Orders/Date/Referring Provider :  11/29/21 1615   PT eval and treat Start: 11/29/21 1615, End: 11/29/21 1615, ONE TIME, Standing Count: 1 Occurrences, R    Matt Thompson DO      Admitting Diagnosis:   Hepatorenal syndrome ([address] [K76.7]  Acute kidney injury ([address] [N17.9]  Chronic liver failure without hepatic coma ([address] [K72.10]       Surgery: none  Visit Diagnoses       Codes    Chronic liver failure without hepatic coma ([address]     K72.10    Acute kidney injury ([address]     N17.9          Patient Active Problem List   Diagnosis    Alcohol-induced acute pancreatitis    Anxiety    Depression    ETOH abuse    Hematemesis    Atrial fibrillation with rapid ventricular response ([address]    Alcohol withdrawal syndrome without complication (HCC)    Lactic acidosis    Hypokalemia    Severe protein-calorie malnutrition (HCC)    Paroxysmal atrial fibrillation (HCC)    Hyperbilirubinemia    Hypomagnesemia    Bilirubinemia    UGIB (upper gastrointestinal bleed)    Hepatorenal syndrome (HCC)        ASSESSMENT of Current Deficits Patient exhibits decreased strength, balance and endurance impairing functional mobility, transfers, gait , gait distance and tolerance to activity Patient  limited in gait d/t fatigue and lacking endurance, and pain. Confusion noted. Tactile cues needed for walker control, advancement of lower extremities for side stepping. Patient would continue to benefit from therapy to increase strength, balance, and functional mobility limitations.       PHYSICAL THERAPY  PLAN OF CARE       Physical therapy plan of care is established based on physician order,  patient diagnosis and clinical assessment    Current Treatment Recommendations:    -Standing Balance: Perform strengthening exercises in standing to promote motor control with or without upper extremity support  and Challenge balance utilizing reaching  activities beyond center of gravity    -Transfers: Provide instruction on proper hand and foot position for adequate transfer of weight onto lower extremities and use of gait device if needed and Cues for hand placement, technique and safety. Provide stabilization to prevent fall   -Gait: Gait training, Standing activities to improve: base of support, weight shift, weight bearing , Exercises to improve trunk control and Exercises to improve hip and knee control   -Endurance: Utilize Supervised activities to increase level of endurance to allow for safe functional mobility including transfers and gait   -Stairs: Stair training with instruction on proper technique and hand placement on rail    PT long term treatment goals are located in below grid    Patient and or family understand(s) diagnosis, prognosis, and plan of care. Frequency of treatments: Patient will be seen  3-5 times/week.          Prior Level of Function: Patient ambulated independently    Rehab Potential: good    for baseline    Past medical history:   Past Medical History:   Diagnosis Date    Anxiety     Depression     Esophagitis     ETOH abuse     Gastritis     GERD (gastroesophageal reflux disease)     Hematuria     Pancreatitis      Past Surgical History:   Procedure Laterality Date    KNEE SURGERY      ACL    UPPER GASTROINTESTINAL ENDOSCOPY  05/09/2018    UPPER GASTROINTESTINAL ENDOSCOPY N/A 5/8/2018    EGD BIOPSY performed by Juan Wilson MD at [facility] N/A 1/21/2019    EGD ESOPHAGOGASTRODUODENOSCOPY performed by Cuba Pollack DO at [facility]  1/21/2019 EGD CONTROL HEMORRHAGE performed by Irene Angulo DO at [facility]:    Precautions: Up with assistance and Up as tolerated, falls and alarm ,    Social history: Patient lives alone in a ranch home  with 3 steps  to enter without Rail  Tub shower grab bars    Equipment owned: None,       [facility]   How much difficulty turning over in bed?: A Little  How much difficulty sitting down on / standing up from a chair with arms?: A Lot  How much difficulty moving from lying on back to sitting on side of bed?: A Little  How much help from another person moving to and from a bed to a chair?: A Lot  How much help from another person needed to walk in hospital room?: A Lot  How much help from another person for climbing 3-5 steps with a railing?: A Lot  AM-PAC Inpatient Mobility Raw Score : 14  AM-PAC Inpatient T-Scale Score : 38.1  Mobility Inpatient CMS 0-100% Score: 61.29  Mobility Inpatient CMS G-Code Modifier : CL    Nursing cleared patient for PT treatment. OBJECTIVE;   Initial Evaluation  Date: 11/30/2021 Treatment Date:  12/2/2021   Short Term/ Long Term   Goals   Was pt agreeable to Eval/treatment? Yes yes To be met in 5 days   Pain level   0/10    10/10 in scrotum    Bed Mobility    Rolling: Supervision     Supine to sit: Minimal assist of 1    Sit to supine: Minimal assist of 1    Scooting: Minimal assist of 1   Rolling: Independent   Supine to sit: Supervision    Sit to supine: Supervision    Scooting: Supervision     Rolling: Independent    Supine to sit: Independent    Sit to supine: Independent    Scooting: Independent     Transfers Sit to stand: Moderate assist of 1  From commode, min a from bed Sit to stand:  Moderate assist of 1    Sit to stand: Independent     Ambulation    2-3 steps using  no device with Moderate assist of 1   for balance and safety and cues for upright posture and safety 5 side steps using  wheeled walker with Moderate assist of 1   cues for walker control, advancement of LE, balance and safety. 75 feet using  least restrictive device versus no device with Independent    Stair negotiation: ascended and descended   Not assessed  Not assessed   3 steps with rail independent    ROM Within functional limits    Increase range of motion 10% of affected joints    Strength BUE:  refer to OT eval  RLE:  3/5  LLE:  3/5  Increase strength in affected mm groups by 1/3 grade   Balance Sitting EOB:  fair    Dynamic Standing:  poor   Sitting EOB: good   Dynamic Standing: fair minus  Wheeled walker. Sitting EOB:  good    Dynamic Standing: fair       Patient is Alert & Oriented x person, place, time and situation and follows directions  slowly  Sensation:  Patient  denies numbness/tingling   Edema:  no   Endurance: fair      Vitals: room air   Blood Pressure at rest  Blood Pressure during session    Heart Rate at rest  Heart Rate during session    SPO2 at rest %  SPO2 during session %     Patient education  Patient educated on role of Physical Therapy, risks of immobility, safety and plan of care, energy conservation,  importance of mobility while in hospital , safety  and seated exercises     Patient response to education:   Pt verbalized understanding Pt demonstrated skill Pt requires further education in this area   Yes Partial Yes      Treatment:  Patient practiced and was instructed/facilitated in the following treatment: strong sternum rubs to wake patient. patient needing to use urinal, therapist assisted with hygiene. Sat edge of bed 10 minutes with Minimal assist of 1 to increase dynamic sitting balance and activity tolerance. stood, changed chux. performed seated exercise. Stood again and took side steps to head of bed. Assisted back to supine, positioned for comfort.       Therapeutic Exercises:  ankle pumps, long arc quad and seated marching x 10 reps       At end of session, patient in bed with alarm  call light and phone within reach,  all lines and tubes intact, nursing notified. Patient would benefit from continued skilled Physical Therapy to improve functional independence and quality of life.          Patient's/ family goals   home    Time in  56  Time out  317    Total Treatment Time  23 minutes      CPT codes:  Therapeutic activities (62217)   15 minutes  1 unit(s)  Therapeutic exercises (80041)   8 minutes  1 unit(s)    Leana Ennis, [facility]  #[id number]

## 2021-12-14 NOTE — PROGRESS NOTES
OT SESSION ATTEMPT     Date:2021  Patient Name: Cheryle Hof  MRN: [id number]  : 1975  Room: [address] [place] [address]     OCCUPATIONAL THERAPY TREATMENT NOTE    [address] [place] [address]      Attempted OT session this date:    [] unavailable due to other medical staff currently with pt   [] on hold per nursing staff   [] on hold per nursing staff secondary to lab / radiology results    [] declined treatment  this date, pt initially agreed to therapy session, however with delayed response, upon asking pt to sit EOB pt then declined with more delayed response. Asked pt orientation questions without response, pt having blank stare, with mumbling,  Benefits of participation in therapy reviewed with    [] off unit   [] Other:     Continue with current OT [facility] at a later date/time.       Jose G MENESES/CHARLIE [id number]

## 2021-12-14 NOTE — PROGRESS NOTES
Internal Medicine Progress Note         Primary Care Physician: Zahida Gross DO   Admitting Physician:  Julieta Burdick DO  Admission date and time: 11/26/2021  9:25 AM    Room:  [address] [place] [address]  Admitting diagnosis: Hepatorenal syndrome ([facility] [address] [K76.7]  Acute kidney injury ([facility] [address] [N17.9]  Chronic liver failure without hepatic coma [facility]) [K72.10]    Patient Name: Lito Martinez  MRN: [id number]    Date of Service: 12/14/2021       Subjective: Claudia Oconnor is a 40 y.o. male ,12/14/2021, at the bedside. Patient doing very poorly multiple problems at hand including hepatorenal syndrome and dropping blood pressure. Patient has very low urine output with evidence of metabolic acidosis. Currently being followed by multiple subspecialists. 11/2611/27/2021-medical coverage by Dr. Justina Buchanan    11/28/2021  Patient seen examined on PCU. Patient's mother is at the bedside and case discussed in detail. Patient is much more alert this morning. Patient oriented to person place. He denies any significant pain at this time. There is no lower leg edema and abdomen is distended but very soft. Serum sodium is up to 129 with a potassium 3.2. CO2 electrolytes is 18 and improved but BUN/creatinine is 44/3.2 which is improved compared to 11/27. Total bili is 24.2 with WBC 12.7 hemoglobin 10.1. Platelet count is down to 63. INR was 7.8. Temperature 98.8 with heart rate of 70 and blood pressure is 115/67. Patient has been put on phenylephrine drip with improvement in blood pressure. Urine output about 400 cc a shift. 11/29/2021  Patient seen and examined in PCU. Patient's mother is at the bedside and case discussed. Patient is still alert and oriented x23 with episodes of confusion. Patient denies any problem with chest or abdominal pain. Patient strength is still very poor with the patient still very weak on his feet. Patient appetite has improved with less nausea.   Patient still has persistent hand tremor which she has had for years according to the mother. But this has been gotten worse. BUN/creatinine 33/2.2 with serum sodium 130. Blood sugars ranging [id number]. INR is down to 5.7 today. Temperature ranges 90.7100.4 with a heart rate of 61 blood pressure 113/63. O2 sat 97% on room air at rest.  Patient still on phenylephrine drip. Urine output ranges 800-1300 cc a shift. 11/30/2021  Patient seen examined on PCU. Patient states he feels fairly good again today. He denies any chest or abdominal pain. Patient states he is very hungry and is still on liquids. Hemoglobin 9.2 with platelet count of down to 38. BUN/creatinine 24/1.4 with potassium 3.2 and sodium 131. Temperature 99 with heart rate 64 blood pressure 99/57. O2 sat 93% on room air. Phenylephrine drip still running. Urine output ranges to 200-4000 cc a shift. Patient is for abdominal paracentesis today. 12/1/2021  Patient seen examined in PCU. Patient with 2 family members at the bedside. Case was discussed. Patient alert and oriented x3. Patient's upper extremity tremors are moderately improved. Patient had questions about abdominal paracentesis. BUN/creatinine 25/1.4 today with total bilirubin 29. WBC 7.5 hemoglobin 8.2 and platelet count 62. Temperature ranges 97.998.3 with heart rate of 68 blood pressure range from 82/51112/64. O2 sats 100% on room air. Patient still on phenylephrine drip for blood pressure/ renal perfusion. Abdominal paracentesis when okay with GI, intensivist.    12/2/2021   Patient seen examined on PCU. Patient denies any specific pain at this time. Patient is little bit depressed about not getting his paracentesis performed yet. BUN/creatinine 18/1.2 with hemoglobin is 7.5 platelet count is down to 29. Patient received fresh frozen plasma and INR yesterday morning was 3.4 and today is 5. Temperature 90.3 with heart rate of 67 and blood pressure 110/70. Pulmonology and GI notes reviewed.   With kidney function about back to baseline phenylephrine drip will be weaned off.    12/3/2021  Patient seen examined on PCU. Patient alert and oriented x3. Patient denies any chest or significant abdominal pain. Abdomen distended but soft. BUN/creatinine 14/1.0 with blood sugars ranging 103126. Serum phosphorus 1.8. Total bili was 24.1 today and has improved somewhat. Hemoglobin is down to 7.2 today. INR is up to 6.3 today. Heart rate 65 with blood pressure 105/52 with O2 sat 94% on room air at rest.  Urinary output is good. Patient is still on Lavon-Synephrine and somatostatin drip. 12/4/2021  Patient seen examined on PCU. Patient received 1 unit packed RBC today with low hemoglobin. Patient denies any problem with chest pain and there is no continuous abdominal discomfort. Patient denies any unusual shortness of breath. BUN/creatinine 11/0.9 with serum magnesium 1.5. Phosphorus 1.7 with WBC 5.3 and hemoglobin 6.6. Total bili 23.4, INR 6.6 today. Temperature 98.5 with heart rate of 74 blood pressure 101/62. O2 sat 96% with the patient being titrated down on the Neosporin drip. 12/5/2021   Patient seen examined on PCU. Case discussed with patient's family at the bedside again today. Patient complaining of pain behind his upper arms in the tricep area. Patient says it started a couple days ago. More of a constant burning sensation. There is no rash noted but there is some ecchymosis noted on the upper portion of the arm and tricep area. This area is tender to palpation. There is no masses or hematoma noted. BUN/creatinine 12/1.0. Serum magnesium 1.4 again with phosphorus 1.7. Blood sugars still low 100s. Total bili is 24.8 today with a WBC 5.8 hemoglobin 6.7 early this morning with a repeat 7.1. Temperature 97.5 heart rate 78 blood pressure 118/64. Patient was taken off the Neosporin drip yesterday afternoon but was put back on this morning. O2 sat is 95% on room air.   Urine output ranges 150-450 cc shift. Patient given potassium and magnesium supplement. 12/6/2021  Patient seen examined on PCU. Patient is upset because he does not know what his INR is. Patient states his bilateral posterior upper arm discomfort is improved. There is no chest pain. Patient's abdomen is slowly getting bigger. BUN/creatinine 12/0.9. CO2 was 16 on the electrolytes. Total bili is 26.4 with hemoglobin 6.8 and WBC 5.5. INR is pending. Temperature 90.4 with heart rate of 81 with blood pressure 104/59 with an O2 sat 98%. We will type and cross give 1 more unit of packed RBC now. Consult hematology regarding coagulopathy. 12/7/2021  Patient seen examined on PCU. Patient is about the same. Abdomen is getting more distended. Oncology note reviewed. BUN/creatinine 12/0.9 with a CO2 of 16. Total bili is 24.2 with hemoglobin 6.7 with the patient receiving 2 units of packed RBCs last 24 hours. Platelet count is 39. Temperature 98.5 heart rate is 79 blood pressure 112/67. O2 sat 99% room air at rest. Urine output is good. Patient to receive 2 more units of fresh frozen plasma today with patient receiving platelets yesterday. Patient hopefully can have the abdominal paracentesis today after INR has been performed. 12/8/2021  Patient seen examined on PCU. Patient complained of increased abdominal distention and discomfort. Patient denies any chest pain. Patient little bit frustrated about being in the hospital so long and having his abnormal lab work including low hemoglobin and persistently elevated INR. Hemoglobin 6.2 today with a WBC of 4.4. Platelet count 39 with BUN/creatinine 10/0.8. Blood sugars in the low 100s. Total bili is 27.7. Temperature 90.3 with heart rate 83 and blood pressure 114/74. O2 sat 97% room air at rest.    12/9/2021  Patient seen examined on PCU. Patient is receiving 2 units packed RBCs today along with cryoprecipitate and fresh frozen plasma.   INR to be repeated after that.  BUN/creatinine 10/0.8. Blood sugars range 76117. Hemoglobin 6.0 today with WBC of 4 and platelet count 46. INR is 4.7. Temperature is 98 with heart rate 91 blood pressure 170/77 O2 sat 99% room air at rest.    12/10/2021  Patient seen examined on PCU. Case discussed in detail with GI today. Hematology and nephrology note reviewed. Patient feels about the same. Patient has abdominal discomfort secondary to the ascites. He denies any chest pain. There is no nausea vomiting but he has poor appetite. Hemoglobin 6.6 today with patient hemoglobin 6.0 yesterday and repeat 6.9 yesterday afternoon. Temperature 98.4 with heart rate 91 blood pressure 105/72. O2 sat 95% on room air at rest.  Pending lab work today. Patient supposed to go for EGD today. Patient given 2 units of fresh frozen plasma again today with 1 unit of typed and crossed packed RBCs. Case was discussed with the invasive radiologist today. He states that he has no problem doing the abdominal paracentesis today. 12/11/2021  Patient seen and examined on PCU. Patient states he feels a bit better today after abdominal paracentesis. Patient had abdominal paracentesis yesterday for 4650 cc. CT the abdomen last night showed a large hematoma measuring 7.8 x 4.1 x 8.9 cm in the left lower pelvis with some mass-effect on the prostate and distal rectum. This also showed a large hydrocele. BUN/creatinine was 12/0.7 with magnesium 1.4. Serum phosphorus little bit low at 2.1 with WBC 4.6 and hemoglobin 8. Platelet count stable at 58. INR is 5.3. Temperature 98.5 heart rate 86 blood pressure 109/64. O2 sat 98% on room air at rest.  Urine output is good. Patient was supposed to have EGD done yesterday but this apparently been held until Tuesday. 12/12/2021  Patient seen examined on PCU. Patient's family is at the bedside and case discussed. Patient denies any new discomfort or problem. Patient is set up for EGD tomorrow. Denies any abdominal pain. Genitourinary:    Denies any urgency, frequency, hematuria. Voiding  without difficulty. Extremities:   Denies lower extremity swelling, edema or cyanosis. Neurology:    Denies any headache or focal neurological deficits, Denies generalized weakness or memory difficulty. Psch:   Denies being anxious or depressed. Musculoskeletal:    Denies  myalgias, joint complaints or back pain. Integumentary:   Denies any rashes, ulcers, or excoriations. Denies bruising. Hematologic/Lymphatic:  Denies bruising or bleeding. Physical Exam:  No intake/output data recorded. Intake/Output Summary (Last 24 hours) at 12/14/2021 1207  Last data filed at 12/13/2021 1421  Gross per 24 hour   Intake 0 ml   Output 600 ml   Net -600 ml   I/O last 3 completed shifts: In: 0   Out: 600 [Urine:600]  Patient Vitals for the past 96 hrs (Last 3 readings):   Weight   12/14/21 0528 198 lb 13.7 oz (90.2 kg)   12/12/21 0052 205 lb 8 oz (93.2 kg)     Vital Signs:   Blood pressure 123/67, pulse 96, temperature 98 °F (36.7 °C), temperature source Oral, resp. rate 13, height 6' 3\" (1.905 m), weight 198 lb 13.7 oz (90.2 kg), SpO2 97 %. General appearance:  Patient alert but lethargic. Appears chronically ill  Head:  Normocephalic. No masses, lesions or tenderness. Eyes:  PERRLA. EOMI. sclera icteric. Buccal mucosa moist.  ENT:  Ears normal. Mucosa normal.  Neck:    Supple. Trachea midline. No thyromegaly. No JVD. No bruits. Heart:    Rhythm regular. Tachycardic. No murmurs. Lungs:    Diminished  Abdomen:   Soft. Non-tender. Non-distended. Bowel sounds positive. No organomegaly or masses. No pain on palpation. Newly distended  Extremities:    Peripheral pulses present. No peripheral edema. No ulcers. No cyanosis. No clubbing.   Neurologic:    Responsive but lethargic  Integumentary:  No rashes skin jaundice  Genitalia/Breast:  Hickman catheter    Medication:  Scheduled Meds:   lactulose enema   Rectal Once    lactulose  20 g Oral TID    furosemide  20 mg IntraVENous TID    pantoprazole  40 mg Oral QAM AC    sodium bicarbonate  1,300 mg Oral TID    [Held by provider] phytonadione  10 mg Oral Daily    vitamin D3  5,000 Units Oral Daily    midodrine  15 mg Oral TID WC    thiamine (VITAMIN B1) IVPB  100 mg IntraVENous Daily    rifaximin  550 mg Oral BID    sodium chloride flush  5-40 mL IntraVENous 2 times per day    piperacillin-tazobactam  3,375 mg IntraVENous Q8H     Continuous Infusions:   sodium chloride      sodium chloride      sodium chloride      sodium chloride      sodium chloride      sodium chloride      sodium chloride      sodium chloride      sodium chloride      dextrose      sodium chloride      sodium chloride 12.5 mL/hr at 12/14/21 0835       Objective Data:  CBC with Differential:    Lab Results   Component Value Date    WBC 4.8 12/14/2021    RBC 2.38 12/14/2021    HGB 8.1 12/14/2021    HCT 23.3 12/14/2021    PLT 52 12/14/2021    MCV 97.9 12/14/2021    MCH 34.0 12/14/2021    MCHC 34.8 12/14/2021    RDW 23.7 12/14/2021    METASPCT 0.9 12/13/2021    LYMPHOPCT 17.4 12/14/2021    MONOPCT 8.7 12/14/2021    MYELOPCT 2.7 12/06/2021    BASOPCT 1.7 12/14/2021    MONOSABS 0.43 12/14/2021    LYMPHSABS 0.82 12/14/2021    EOSABS 0.42 12/14/2021    BASOSABS 0.08 12/14/2021     CMP:    Lab Results   Component Value Date     12/14/2021    K 3.3 12/14/2021    K 5.8 11/26/2021     12/14/2021    CO2 20 12/14/2021    BUN 14 12/14/2021    CREATININE 0.8 12/14/2021    GFRAA >60 12/14/2021    LABGLOM >60 12/14/2021    GLUCOSE 91 12/14/2021    PROT 6.3 12/14/2021    LABALBU 3.7 12/14/2021    CALCIUM 9.5 12/14/2021    BILITOT 47.5 12/14/2021    ALKPHOS 57 12/14/2021    AST 66 12/14/2021    ALT 24 12/14/2021              Assessment:    · Acute hepatic encephalopathy  · Hepatorenal syndrome probable hepatopulmonary syndrome  · Acute hypoxic respiratory failure  · Hypotonic hypervolemic hyponatremia  · JULIO on CKD  · Pneumonia possible aspiration  · Decompensated cirrhosis  · Hyperbilirubinemia  · Chronic normocytic anemia  · Atrial fibrillation  · History of anxiety and depression  · History of alcohol abuse  · GERD        Plan:     · Patient is doing very poorly at the present time. The patient does have advanced liver disease with decompensated cirrhosis and not currently a candidate for liver transplant. Symptoms are suggestive of severe electrolyte imbalance with probable hepatorenal syndrome. He also is currently hypotensive. I have discussed the condition with the mother and fiancé along with Dr. Jose Rafael Rowley have discussed his CODE STATUS and agree on no intubation or chest compressions. We have also discussed possibility of transition into hospice pending his status. Continue aggressive care for the next 24 to 48 hours and observe response  · Electrolyte imbalance of hyponatremia being followed by nephrology  · Metabolic acidosis being corrected by bicarb infusion  · GI is following regarding his hepatic cirrhosis  · Vasopressor has been instituted  · Salt poor albumin to expand volume  · Blood culture has been obtained.   Zosyn to cover for spontaneous bacterial peritonitis  · Patient is auto anticoagulated vitamin K supplementation  · Treat elevated ammonia level  · Paracentesis as needed  · Prognosis is very poor but he is improving short-term    -Consult hematology regarding severe coagulopathy  -We will try to discuss with GI regarding patient's discharge planning and care with family having multiple questions regarding this  -Patient has received 2 units of packed RBCs   -Patient will receive 2 units fresh frozen plasma     -Patient received 2 units packed RBCs, 2 units fresh frozen plasma, cryoprecipitate 12/9  -Patient received 1 unit packed RBC along with 2 units of fresh frozen plasma 12/10    -Abdominal paracentesis 12/ cc    EGD possibly on 12/13    Discharge home when okay with GI, hematology    Almita Carpenter DO, F.[email or web address]  12/14/2021  12:07 PM

## 2021-12-14 NOTE — PROGRESS NOTES
PROGRESS NOTE  [facility]  Dr. Jaiden Castanon MD, Dr. Nitza Garner MD, Dr Margaux Beaulieu, Dr. Ramiro Kurtz MD, Dr. Eufemia Cross, DO      Molly Parikh  40 y.o.  male    [address] No reported bowel movements. Patient remains lethargic, arouses to voice, follows some simple commands, but not oriented. Not really answering questions    OBJECTIVE:     /67   Pulse 96   Temp 98 °F (36.7 °C) (Oral)   Resp 13   Ht 6' 3\" (1.905 m)   Wt 198 lb 13.7 oz (90.2 kg)   SpO2 97%   BMI 24.86 kg/m²     Gen: NAD, lethargic  HEENT:PEERL, grossly icteric  Heart: RRR, no M/R/G  Lungs: CTAB  Abd.: soft, NT, ND  Extr.: Swelling improved  Skin: Grossly jaundiced      Stool (measured) : 100 mL  Lab Results   Component Value Date    WBC 4.8 12/14/2021    WBC 4.4 12/13/2021    WBC 4.4 12/12/2021    HGB 8.1 12/14/2021    HGB 7.8 12/13/2021    HGB 7.5 12/12/2021    HCT 23.3 12/14/2021    MCV 97.9 12/14/2021    RDW 23.7 12/14/2021    PLT 52 12/14/2021    PLT 52 12/13/2021    PLT 47 12/12/2021     Lab Results   Component Value Date     12/14/2021    K 3.3 12/14/2021    K 5.8 11/26/2021     12/14/2021    CO2 20 12/14/2021    BUN 14 12/14/2021    CREATININE 0.8 12/14/2021    CALCIUM 9.5 12/14/2021    PROT 6.3 12/14/2021    LABALBU 3.7 12/14/2021    BILITOT 47.5 12/14/2021    BILITOT 44.5 12/13/2021    BILITOT 41.0 12/12/2021    ALKPHOS 57 12/14/2021    ALKPHOS 66 12/13/2021    ALKPHOS 54 12/12/2021    AST 66 12/14/2021    AST 55 12/13/2021    AST 55 12/12/2021    ALT 24 12/14/2021    ALT 21 12/13/2021    ALT 20 12/12/2021     Lab Results   Component Value Date    LIPASE 68 11/14/2021    LIPASE 26 07/01/2020    LIPASE 26 05/11/2020     Lab Results   Component Value Date    AMYLASE 91 01/16/2019         ASSESSMENT/PLAN:  1. Hepatic encephalopathy type C grade II  - Lactulose increased to 20g QID with rifaximin 550 mg p.o.  TID  - Mental status remains encephalopathic  - Unclear if patient is having bowel movements  - Start lactulose enema daily PRN if no BM     2. Alcohol induced cirrhosis, decompensated  - MELD-Na score 42, predominantly driven by jaundice  - Diuretics per nephrology; edema improving  - s/p paracentesis 12/12/2021; high SAAG, no SBP, culture pending  - INR remains elevated; repeat today pending  - If antibiotics is only for SBP, okay to discontinue; will discuss with primary, in consideration of possibly treating with steroids for alcoholic hepatitis  - Most likely worsening mental status is related to hyperammonemia in setting of decreased bowel movements; but concern for superimposed decompensation; rechecked RUQ US with doppler, no PVT or hepatic vein thrombosis; pending repeat viral panel and liver serologies  - Will discuss with [facility] and/or  transplant programs for potential transfer/recommendations    3.  Renal failure - resolved   - Possible HRS-JULIO  - Diuretics per nephrologist     4.  Acute on chronic normocytic anemia  -Vital signs stable over signs of GI bleed on exam  -CT A/P with pelvic hematoma, most likely source of recurrent drop in H&H  - Hold off on non-emergent EGD today considering acutely worsened mental status    5. Jaundice  - Related to decompensated cirrhosis vs hemolysis with known pelvic hematoma  - Significant portion of unconjugated hyperbilirubinemia, with low haptoglobin and elevated LDH, consistent with hemolysis related to known pelvic hematoma     Will discuss with Dr. Karlos Guo DO  GI Fellow   12/14/2021  10:07 AM     Patient seen and independently examined. Pertinent notes and lab work reviewed. D/w Dr. Elpidio Vargas with physical exam and A&P.   Discussed with patient's family including his mother over the phone- all questions answered -explained rationale for considering transfer to tertiary care center for transplant evaluation -mother somewhat contemplative but agreeable in principle    Mani Hernandez MD  12/14/2021  1:52 PM

## 2021-12-14 NOTE — PROGRESS NOTES
Comprehensive Nutrition Assessment    Type and Reason for Visit:  Reassess    Nutrition Recommendations/Plan: Monitor Nutrition progression    Nutrition Assessment:  Pt admitted w/ hepatorenal syndrome and electrolyte abnormalities. H/o ETOH abuse & cirrhosis. Pt is NPO will monitor nutrition progression    Malnutrition Assessment:  Malnutrition Status:  Insufficient data        Estimated Daily Nutrient Needs:  Energy (kcal):  9561-8473 (MSJ 1855 x 1.2 SF); Weight Used for Energy Requirements:  Current     Protein (g):   (1.0-1.2 g/kg CBW); Weight Used for Protein Requirements:  Ideal        Fluid (ml/day):  9855-4880; Method Used for Fluid Requirements:  1 ml/kcal      Nutrition Related Findings:  Pt lethargic,responds to voice, abd distended/taut/gross ascites, BLE trace edema      Wounds:  None       Current Nutrition Therapies:    Diet NPO Exceptions are: Sips of Water with Meds    Anthropometric Measures:  · Height: 6' 3\" (190.5 cm)  · Current Body Weight: 198 lb (89.8 kg) (12/14 bed scale)   · Usual Body Weight:  (LILIYA d/t lack of EMR hx)     · Ideal Body Weight: 196 lbs; % Ideal Body Weight 101 %   · BMI: 24.7  · Adjusted Body Weight:  ; No Adjustment   · BMI Categories: Normal Weight (BMI 18.5-24. 9)       Nutrition Diagnosis:   · Inadequate oral intake related to catabolic illness (ETOH abuse) as evidenced by intake 51-75%, poor intake prior to admission      Nutrition Interventions:   Food and/or Nutrient Delivery:  Continue Current Diet, Start Oral Nutrition Supplement  Nutrition Education/Counseling:  Education not appropriate   Coordination of Nutrition Care:  Continue to monitor while inpatient    Goals:  Monitor nutrition progression       Nutrition Monitoring and Evaluation:   Behavioral-Environmental Outcomes:  None Identified   Food/Nutrient Intake Outcomes:  Diet Advancement/Tolerance  Physical Signs/Symptoms Outcomes:  Biochemical Data, Fluid Status or Edema, Nutrition Focused Physical Findings, Skin, Weight     Discharge Planning:     Too soon to determine     Electronically signed by Michele Hogan RD, LD on 12/14/21 at 1:35 PM EST    Contact: [phone]

## 2021-12-14 NOTE — CARE COORDINATION
SS NOTE: COVID NEGATIVE 11/26- PT WILL NEED A NEGATIVE RAPID COVID 72 HOURS PRIOR TO [facility] SKILL[facility]. Pt is on room air. Pt is in Contact Iso for C-diff. Pt having episodes of confusion - he is incontinent of urine. He needs a 24 hour urine- which cannot be done d/t incontinence. Pt was able to have a Paracentesis. His INR is shin. Pt is a DNR CCA, no intubation. Pt is on IV  Zosyn, Lasix and Compazine. Pt will need a line he currently has a peripheral line. [facility]  accepted pt- they will begin PRECERT once pt is stable for SNF placement. For a SNF placement pt will need a PRECERT, signed JORGE, current PT.OT notes and SW will need to complete the HENs. [place] Bragg.12/14/2021.12:05PM.

## 2021-12-14 NOTE — PROGRESS NOTES
Progress Note  12/14/2021 2:48 PM  Subjective:   Admit Date: 11/26/2021  PCP: Sunny Fox DO  Interval History: patient examined doing well     Diet: ADULT DIET; Regular; Low Sodium (2 gm); 2000 ml    Data:   Scheduled Meds:   midodrine  10 mg Oral TID WC    thiamine mononitrate  100 mg Oral Daily    prednisoLONE  40 mg Oral Daily    pentoxifylline  400 mg Oral TID WC    lactulose  20 g Oral TID    furosemide  20 mg IntraVENous TID    pantoprazole  40 mg Oral QAM AC    sodium bicarbonate  1,300 mg Oral TID    [Held by provider] phytonadione  10 mg Oral Daily    vitamin D3  5,000 Units Oral Daily    rifaximin  550 mg Oral BID    sodium chloride flush  5-40 mL IntraVENous 2 times per day     Continuous Infusions:   sodium chloride      sodium chloride      sodium chloride      sodium chloride      sodium chloride      sodium chloride      sodium chloride      sodium chloride      sodium chloride      dextrose      sodium chloride       PRN Meds:sodium chloride, sodium chloride, sodium chloride, sodium chloride, sodium chloride, sodium chloride, sodium chloride, sodium chloride, sodium chloride, traMADol, lidocaine, melatonin, LORazepam **OR** LORazepam **OR** [DISCONTINUED] LORazepam **OR** [DISCONTINUED] LORazepam **OR** [DISCONTINUED] LORazepam **OR** [DISCONTINUED] LORazepam **OR** [DISCONTINUED] LORazepam **OR** [DISCONTINUED] LORazepam, dextrose, dextrose, sodium chloride flush, sodium chloride, polyethylene glycol, prochlorperazine  I/O last 3 completed shifts:   In: 0   Out: 600 [Urine:600]  I/O this shift:  In: -   Out: 200 [Urine:200]    Intake/Output Summary (Last 24 hours) at 12/14/2021 1448  Last data filed at 12/14/2021 1348  Gross per 24 hour   Intake    Output 200 ml   Net -200 ml     CBC:   Recent Labs     12/12/21  0827 12/13/21  0939 12/14/21  0557   WBC 4.4* 4.4* 4.8   HGB 7.5* 7.8* 8.1*   PLT 47* 52* 52*     BMP:    Recent Labs     12/12/21  0827 12/13/21  9053 12/14/21  0557    140 139   K 3.6 3.4* 3.3*    104 103   CO2 19* 21* 20*   BUN 12 13 14   CREATININE 0.8 0.8 0.8   GLUCOSE 70* 88 91     Hepatic:   Recent Labs     12/12/21  0827 12/13/21  0939 12/14/21  0557   AST 55* 55* 66*   ALT 20 21 24   BILITOT 41.0* 44.5* 47.5*   ALKPHOS 54 66 57     Troponin: No results for input(s): TROPONINI in the last 72 hours. BNP: No results for input(s): BNP in the last 72 hours. Lipids: No results for input(s): CHOL, HDL in the last 72 hours. Invalid input(s): LDLCALCU  ABGs: No results found for: PHART, PO2ART, IEZ4SON  INR:   Recent Labs     12/12/21  0827 12/13/21  0939 12/14/21  0942   INR 6.2* 6.8* 7.3*       -----------------------------------------------------------------  RAD: US GALLBLADDER RUQ    Result Date: 11/28/2021  EXAMINATION: RIGHT UPPER QUADRANT ULTRASOUND 11/27/2021 5:24 pm COMPARISON: 11/14/2021 HISTORY: ORDERING SYSTEM PROVIDED HISTORY: decomp cirrhosis, CBD measurement TECHNOLOGIST PROVIDED HISTORY: Reason for exam:->decomp cirrhosis, CBD measurement What reading provider will be dictating this exam?->CRC FINDINGS: LIVER:  Nodular contour of the liver consistent with cirrhosis. The liver is somewhat heterogeneous but without obvious focal abnormality. There is apparent hepatofugal flow again identified within the main portal vein. BILIARY SYSTEM:  Gallbladder is incompletely distended with diffuse wall thickening. No sonographic Carrasco's sign. The wall thickening is nonspecific but could be reactive to ascites and hepatic parenchymal disease. No obvious cholelithiasis. Common bile duct is within normal limits measuring 6 mm. RIGHT KIDNEY: The right kidney is grossly unremarkable without evidence of hydronephrosis. PANCREAS:  Visualized portions of the pancreas are unremarkable. OTHER: Moderate ascites. Findings again consistent with cirrhotic liver. Ascites. Hepatofugal flow redemonstrated in the portal vein.  Thickened gallbladder wall which may be reactive to the ascites and hepatic disease. XR CHEST PORTABLE    Result Date: 11/26/2021  EXAMINATION: ONE XRAY VIEW OF THE CHEST 11/26/2021 7:15 am COMPARISON: One-view chest x-ray 11/14/2021 HISTORY: ORDERING SYSTEM PROVIDED HISTORY: SOB TECHNOLOGIST PROVIDED HISTORY: Reason for exam:->SOB FINDINGS: The cardiac silhouette is within normal limits. The mediastinal contours are within normal limits. The lungs are hypoinflated with resulting bronchovascular crowding. Mild basilar opacities appear new/increased. No significant pleural effusions or pneumothorax. Osseous degenerative changes are present. EKG leads overlie the chest.     Mild basilar opacities are suspicious for pneumonia. This appearance can be seen with COVID-19.     US ABDOMEN LIMITED    Result Date: 11/26/2021  EXAMINATION: RIGHT UPPER QUADRANT ULTRASOUND 11/26/2021 5:54 pm COMPARISON: None. HISTORY: ORDERING SYSTEM PROVIDED HISTORY: ascites/JULIO TECHNOLOGIST PROVIDED HISTORY: Reason for exam:->ascites/JULIO What reading provider will be dictating this exam?->CRC FINDINGS: Superficial sonographic evaluation of the 4 quadrants of the abdomen performed using a combination of grayscale technique. Imaging done to assess for the presence of intra-abdominal ascites. Patient was laying on their left-side. Moderate volume simple appearing intra-abdominal ascites in the left abdomen. Moderate volume intra-abdominal ascites. Objective:   Vitals: /62   Pulse 91   Temp 98.4 °F (36.9 °C) (Oral)   Resp 13   Ht 6' 3\" (1.905 m)   Wt 198 lb 13.7 oz (90.2 kg)   SpO2 93%   BMI 24.86 kg/m²   General appearance: appears stated age   Skin:  No rashes or lesions Jaundice   HEENT: Head: Normocephalic, no lesions, without obvious abnormality.   Neck: no adenopathy, no carotid bruit, no JVD, supple, symmetrical, trachea midline and thyroid not enlarged, symmetric, no tenderness/mass/nodules  Lungs: clear to auscultation bilaterally  Heart: regular rate and rhythm, S1, S2 normal, no murmur, click, rub or gallop  Abdomen: soft, non-tender; bowel sounds normal; no masses,  no organomegaly  Extremities: extremities normal, atraumatic, no cyanosis or edema  Neurologic: Mental status: Alert, oriented, thought content appropriate    Assessment:   Patient Active Problem List:     Alcohol-induced acute pancreatitis     Anxiety     Depression     ETOH abuse     Hematemesis     Atrial fibrillation with rapid ventricular response (HCC)     Alcohol withdrawal syndrome without complication (HCC)     Lactic acidosis     Hypokalemia     Severe protein-calorie malnutrition (HCC)     Paroxysmal atrial fibrillation (HCC)     Hyperbilirubinemia     Hypomagnesemia     Bilirubinemia     UGIB (upper gastrointestinal bleed)     Hepatorenal syndrome (HCC)    Plan:   Assessment: / Plan:     1.  Acute kidney injury.  Acute kidney injury secondary to renal hypoperfusion with possible underlying hepatorenal syndrome. Urine output has improved  JULIO  Now Resolved. Now  with edema will dose loop as needed and follow. Follow BP's closely as has been hypotensive  UOP last 24 hrs 1425 ml +     2.  Metabolic acidosis.  Patient with non-anion gap metabolic acidosis which may be reflection of diarrhea as well as acute kidney injury.   12/3/21: started oral bicarb. Up titrated to 1300 mg po bid 12/6 and to tid 12/8  CO2 improved to 20 today     3.   Hypomagnesemia.    Supplement potassium and magnesium as needed. Mg 1.8 today     4. Hypokalemia. improving   Supplement  with KCL 40 mEq po     5.  Hypocalcemia.   ionized calcium 1.12 / Vit D 14 / PTH 60 - Started oral Vit D on 11/29/21 12/13 Ca++9.7     6.  Hypotension. On midodrine   Stable follow with diuresis     7. Hypophosphatemia.    Supplement orally as needed for goal 2.5  PO4 1.8-->2.6-->2.1-->2.7-->2.4   On PhosNak TID supplement     8.  Coagulopathy-  Plasma and blood to be given today  Transfuse as needed per primary/ hem     9. Edema- better   In the setting of cirrhosis  On lasix 20 mg IV Q 8 hours  UOP 1425 ml + 2 unmeasured occurances past 24 hours    Aime Myers MD

## 2021-12-15 NOTE — PLAN OF CARE
Problem: Falls - Risk of:  Goal: Will remain free from falls  Description: Will remain free from falls  12/15/2021 0215 by Jessica Almanza RN  Outcome: Met This Shift  12/14/2021 1516 by Benjamin Brown RN  Outcome: Met This Shift  Goal: Absence of physical injury  Description: Absence of physical injury  12/15/2021 0215 by Jessica Almanza RN  Outcome: Met This Shift  12/14/2021 1516 by Benjamin Brown RN  Outcome: Met This Shift     Problem: Skin Integrity:  Goal: Will show no infection signs and symptoms  Description: Will show no infection signs and symptoms  Outcome: Met This Shift  Goal: Absence of new skin breakdown  Description: Absence of new skin breakdown  Outcome: Met This Shift     Problem: Pain:  Goal: Pain level will decrease  Description: Pain level will decrease  Outcome: Met This Shift  Goal: Control of acute pain  Description: Control of acute pain  Outcome: Met This Shift  Goal: Control of chronic pain  Description: Control of chronic pain  Outcome: Met This Shift

## 2021-12-15 NOTE — PROGRESS NOTES
Internal Medicine Progress Note         Primary Care Physician: Sarah Altamirano DO   Admitting Physician:  Jaya Estes DO  Admission date and time: 11/26/2021  9:25 AM    Room:  [address] [place] [address]  Admitting diagnosis: Hepatorenal syndrome ([facility] [address] [K76.7]  Acute kidney injury ([facility] [address] [N17.9]  Chronic liver failure without hepatic coma [facility]) [K72.10]    Patient Name: Scarlett Franklin  MRN: [id number]    Date of Service: 12/15/2021       Subjective: [place] is a 40 y.o. male ,12/15/2021, at the bedside. Patient doing very poorly multiple problems at hand including hepatorenal syndrome and dropping blood pressure. Patient has very low urine output with evidence of metabolic acidosis. Currently being followed by multiple subspecialists. 11/2611/27/2021-medical coverage by Dr. Ressie Kawasaki    11/28/2021  Patient seen examined on PCU. Patient's mother is at the bedside and case discussed in detail. Patient is much more alert this morning. Patient oriented to person place. He denies any significant pain at this time. There is no lower leg edema and abdomen is distended but very soft. Serum sodium is up to 129 with a potassium 3.2. CO2 electrolytes is 18 and improved but BUN/creatinine is 44/3.2 which is improved compared to 11/27. Total bili is 24.2 with WBC 12.7 hemoglobin 10.1. Platelet count is down to 63. INR was 7.8. Temperature 98.8 with heart rate of 70 and blood pressure is 115/67. Patient has been put on phenylephrine drip with improvement in blood pressure. Urine output about 400 cc a shift. 11/29/2021  Patient seen and examined in PCU. Patient's mother is at the bedside and case discussed. Patient is still alert and oriented x23 with episodes of confusion. Patient denies any problem with chest or abdominal pain. Patient strength is still very poor with the patient still very weak on his feet. Patient appetite has improved with less nausea.   Patient still has persistent hand tremor which she has had for years according to the mother. But this has been gotten worse. BUN/creatinine 33/2.2 with serum sodium 130. Blood sugars ranging [id number]. INR is down to 5.7 today. Temperature ranges 90.7100.4 with a heart rate of 61 blood pressure 113/63. O2 sat 97% on room air at rest.  Patient still on phenylephrine drip. Urine output ranges 800-1300 cc a shift. 11/30/2021  Patient seen examined on PCU. Patient states he feels fairly good again today. He denies any chest or abdominal pain. Patient states he is very hungry and is still on liquids. Hemoglobin 9.2 with platelet count of down to 38. BUN/creatinine 24/1.4 with potassium 3.2 and sodium 131. Temperature 99 with heart rate 64 blood pressure 99/57. O2 sat 93% on room air. Phenylephrine drip still running. Urine output ranges to 200-4000 cc a shift. Patient is for abdominal paracentesis today. 12/1/2021  Patient seen examined in PCU. Patient with 2 family members at the bedside. Case was discussed. Patient alert and oriented x3. Patient's upper extremity tremors are moderately improved. Patient had questions about abdominal paracentesis. BUN/creatinine 25/1.4 today with total bilirubin 29. WBC 7.5 hemoglobin 8.2 and platelet count 62. Temperature ranges 97.998.3 with heart rate of 68 blood pressure range from 82/51112/64. O2 sats 100% on room air. Patient still on phenylephrine drip for blood pressure/ renal perfusion. Abdominal paracentesis when okay with GI, intensivist.    12/2/2021   Patient seen examined on PCU. Patient denies any specific pain at this time. Patient is little bit depressed about not getting his paracentesis performed yet. BUN/creatinine 18/1.2 with hemoglobin is 7.5 platelet count is down to 29. Patient received fresh frozen plasma and INR yesterday morning was 3.4 and today is 5. Temperature 90.3 with heart rate of 67 and blood pressure 110/70. Pulmonology and GI notes reviewed.   With kidney function about back to baseline phenylephrine drip will be weaned off.    12/3/2021  Patient seen examined on PCU. Patient alert and oriented x3. Patient denies any chest or significant abdominal pain. Abdomen distended but soft. BUN/creatinine 14/1.0 with blood sugars ranging 103126. Serum phosphorus 1.8. Total bili was 24.1 today and has improved somewhat. Hemoglobin is down to 7.2 today. INR is up to 6.3 today. Heart rate 65 with blood pressure 105/52 with O2 sat 94% on room air at rest.  Urinary output is good. Patient is still on Lavon-Synephrine and somatostatin drip. 12/4/2021  Patient seen examined on PCU. Patient received 1 unit packed RBC today with low hemoglobin. Patient denies any problem with chest pain and there is no continuous abdominal discomfort. Patient denies any unusual shortness of breath. BUN/creatinine 11/0.9 with serum magnesium 1.5. Phosphorus 1.7 with WBC 5.3 and hemoglobin 6.6. Total bili 23.4, INR 6.6 today. Temperature 98.5 with heart rate of 74 blood pressure 101/62. O2 sat 96% with the patient being titrated down on the Neosporin drip. 12/5/2021   Patient seen examined on PCU. Case discussed with patient's family at the bedside again today. Patient complaining of pain behind his upper arms in the tricep area. Patient says it started a couple days ago. More of a constant burning sensation. There is no rash noted but there is some ecchymosis noted on the upper portion of the arm and tricep area. This area is tender to palpation. There is no masses or hematoma noted. BUN/creatinine 12/1.0. Serum magnesium 1.4 again with phosphorus 1.7. Blood sugars still low 100s. Total bili is 24.8 today with a WBC 5.8 hemoglobin 6.7 early this morning with a repeat 7.1. Temperature 97.5 heart rate 78 blood pressure 118/64. Patient was taken off the Neosporin drip yesterday afternoon but was put back on this morning. O2 sat is 95% on room air.   Urine output ranges 150-450 cc shift. Patient given potassium and magnesium supplement. 12/6/2021  Patient seen examined on PCU. Patient is upset because he does not know what his INR is. Patient states his bilateral posterior upper arm discomfort is improved. There is no chest pain. Patient's abdomen is slowly getting bigger. BUN/creatinine 12/0.9. CO2 was 16 on the electrolytes. Total bili is 26.4 with hemoglobin 6.8 and WBC 5.5. INR is pending. Temperature 90.4 with heart rate of 81 with blood pressure 104/59 with an O2 sat 98%. We will type and cross give 1 more unit of packed RBC now. Consult hematology regarding coagulopathy. 12/7/2021  Patient seen examined on PCU. Patient is about the same. Abdomen is getting more distended. Oncology note reviewed. BUN/creatinine 12/0.9 with a CO2 of 16. Total bili is 24.2 with hemoglobin 6.7 with the patient receiving 2 units of packed RBCs last 24 hours. Platelet count is 39. Temperature 98.5 heart rate is 79 blood pressure 112/67. O2 sat 99% room air at rest. Urine output is good. Patient to receive 2 more units of fresh frozen plasma today with patient receiving platelets yesterday. Patient hopefully can have the abdominal paracentesis today after INR has been performed. 12/8/2021  Patient seen examined on PCU. Patient complained of increased abdominal distention and discomfort. Patient denies any chest pain. Patient little bit frustrated about being in the hospital so long and having his abnormal lab work including low hemoglobin and persistently elevated INR. Hemoglobin 6.2 today with a WBC of 4.4. Platelet count 39 with BUN/creatinine 10/0.8. Blood sugars in the low 100s. Total bili is 27.7. Temperature 90.3 with heart rate 83 and blood pressure 114/74. O2 sat 97% room air at rest.    12/9/2021  Patient seen examined on PCU. Patient is receiving 2 units packed RBCs today along with cryoprecipitate and fresh frozen plasma.   INR to be repeated after that.  BUN/creatinine 10/0.8. Blood sugars range 76117. Hemoglobin 6.0 today with WBC of 4 and platelet count 46. INR is 4.7. Temperature is 98 with heart rate 91 blood pressure 170/77 O2 sat 99% room air at rest.    12/10/2021  Patient seen examined on PCU. Case discussed in detail with GI today. Hematology and nephrology note reviewed. Patient feels about the same. Patient has abdominal discomfort secondary to the ascites. He denies any chest pain. There is no nausea vomiting but he has poor appetite. Hemoglobin 6.6 today with patient hemoglobin 6.0 yesterday and repeat 6.9 yesterday afternoon. Temperature 98.4 with heart rate 91 blood pressure 105/72. O2 sat 95% on room air at rest.  Pending lab work today. Patient supposed to go for EGD today. Patient given 2 units of fresh frozen plasma again today with 1 unit of typed and crossed packed RBCs. Case was discussed with the invasive radiologist today. He states that he has no problem doing the abdominal paracentesis today. 12/11/2021  Patient seen and examined on PCU. Patient states he feels a bit better today after abdominal paracentesis. Patient had abdominal paracentesis yesterday for 4650 cc. CT the abdomen last night showed a large hematoma measuring 7.8 x 4.1 x 8.9 cm in the left lower pelvis with some mass-effect on the prostate and distal rectum. This also showed a large hydrocele. BUN/creatinine was 12/0.7 with magnesium 1.4. Serum phosphorus little bit low at 2.1 with WBC 4.6 and hemoglobin 8. Platelet count stable at 58. INR is 5.3. Temperature 98.5 heart rate 86 blood pressure 109/64. O2 sat 98% on room air at rest.  Urine output is good. Patient was supposed to have EGD done yesterday but this apparently been held until Tuesday. 12/12/2021  Patient seen examined on PCU. Patient's family is at the bedside and case discussed. Patient denies any new discomfort or problem. Patient is set up for EGD tomorrow. BUN/creatinine 12/0.8 with total bili of 41. WBC 4.4 with hemoglobin 7.5. Platelet count 47. INR 6.2 today. Temperature 98.2 with heart rate 89 blood pressure 110/68. O2 sat 99% room air at rest.  Urine output is excellent. 12/13/2021  Patient seen and examined in PCU. Patient very very lethargic today. BUN/creatinine 13/0.8 with potassium 3.4. Serum ammonia is elevated to 102. Total bilirubin is 44.5 with WBC 4.4 with hemoglobin 7.8 and platelet count 52. INR 6.8 today. 90.1 with heart rate 91 blood pressure 161/69. O2 sat 98% on room air at rest.  Case discussed with GI today. The EGD will be put on hold. Patient was last serum ammonia was 43 on admission. Lactulose was restarted. 12/14/2021  Patient seen examined on PCU. Patient is still more somnolent than his normal.  Patient's family at the bedside. Case discussed with GI also. Case discussed with patient's nurse. Patient still having altered mental status secondary to metabolic encephalopathy. Patient's total bili one of the INR is increasing. Some of the increased bili may be related to the hematoma reabsorbing. GI is is in the process of contacting [facility] about transfer for evaluation of possible transplant or a further change in management. BUN/creatinine was 14/0.8 with potassium 3.3. Serum ammonia 103 with total bilirubin 47.5. Hemoglobin stable 8.1 with a WBC 4.8. INR 7.3. Temperature is 90.3 with heart rate 96 blood pressure 123/67. O2 sat 97% room air at rest.    12/15/2021  Patient seen and examined in PCU. Patient still very confused and somnolent. Case discussed with patient's nurse at the bedside. BUN/creatinine 16/0.8 with potassium 3.0. Total bili is 55.7. Hemoglobin 7.9 with platelet count of 59. INR is 8.7 today. Temperature is 97.9 with heart rate in 91 blood pressure 134/67. O2 sat 94% on room air at rest.  Urine output ranges 300-400 cc shift.   Considering patient's continued hospitalization with aggressive care and the patient still developing acute hepatic encephalopathy with progressive increase in bilirubin and INR and patient not accepted at local tertiary centers for consideration for liver transplant strong consideration should be given for [facility] with hospice. Review of System: Limited at the present time  Constitutional:   Denies fever or chills, weight loss or gain, fatigue or malaise. HEENT:   Denies ear pain, sore throat, sinus or eye problems. Cardiovascular:   Denies any chest pain, irregular heartbeats, or palpitations. Respiratory:   Denies shortness of breath, coughing, sputum production, hemoptysis, or wheezing. Gastrointestinal:   Denies nausea, vomiting, diarrhea, or constipation. Denies any abdominal pain. Genitourinary:    Denies any urgency, frequency, hematuria. Voiding  without difficulty. Extremities:   Denies lower extremity swelling, edema or cyanosis. Neurology:    Denies any headache or focal neurological deficits, Denies generalized weakness or memory difficulty. Psch:   Denies being anxious or depressed. Musculoskeletal:    Denies  myalgias, joint complaints or back pain. Integumentary:   Denies any rashes, ulcers, or excoriations. Denies bruising. Hematologic/Lymphatic:  Denies bruising or bleeding. Physical Exam:  I/O this shift:  In: -   Out: 300 [Urine:300]    Intake/Output Summary (Last 24 hours) at 12/15/2021 1621  Last data filed at 12/15/2021 1519  Gross per 24 hour   Intake 700 ml   Output 700 ml   Net 0 ml   I/O last 3 completed shifts: In: 700 [P.O.:700]  Out: 400 [Urine:400]  Patient Vitals for the past 96 hrs (Last 3 readings):   Weight   12/15/21 0508 194 lb (88 kg)   12/14/21 0528 198 lb 13.7 oz (90.2 kg)   12/12/21 0052 205 lb 8 oz (93.2 kg)     Vital Signs:   Blood pressure 134/67, pulse 91, temperature 97.9 °F (36.6 °C), temperature source Oral, resp.  rate 11, height 6' 3\" (1.905 m), weight 194 lb (88 kg), SpO2 94 %.    General appearance:  Patient alert but lethargic. Appears chronically ill  Head:  Normocephalic. No masses, lesions or tenderness. Eyes:  PERRLA. EOMI. sclera icteric. Buccal mucosa moist.  ENT:  Ears normal. Mucosa normal.  Neck:    Supple. Trachea midline. No thyromegaly. No JVD. No bruits. Heart:    Rhythm regular. Tachycardic. No murmurs. Lungs:    Diminished  Abdomen:   Soft. Non-tender. Non-distended. Bowel sounds positive. No organomegaly or masses. No pain on palpation. Newly distended  Extremities:    Peripheral pulses present. No peripheral edema. No ulcers. No cyanosis. No clubbing.   Neurologic:    Responsive but lethargic  Integumentary:  No rashes skin jaundice  Genitalia/Breast:  Hickman catheter    Medication:  Scheduled Meds:   potassium chloride  20 mEq Oral BID WC    midodrine  10 mg Oral TID WC    thiamine mononitrate  100 mg Oral Daily    pentoxifylline  400 mg Oral TID WC    lactulose  20 g Oral TID    furosemide  20 mg IntraVENous TID    pantoprazole  40 mg Oral QAM AC    sodium bicarbonate  1,300 mg Oral TID    vitamin D3  5,000 Units Oral Daily    rifaximin  550 mg Oral BID    sodium chloride flush  5-40 mL IntraVENous 2 times per day     Continuous Infusions:   sodium chloride      sodium chloride      sodium chloride      sodium chloride      sodium chloride      sodium chloride      sodium chloride      sodium chloride      sodium chloride      dextrose      sodium chloride         Objective Data:  CBC with Differential:    Lab Results   Component Value Date    WBC 7.3 12/15/2021    RBC 2.30 12/15/2021    HGB 7.9 12/15/2021    HCT 22.6 12/15/2021    PLT 59 12/15/2021    MCV 98.3 12/15/2021    MCH 34.3 12/15/2021    MCHC 35.0 12/15/2021    RDW 23.9 12/15/2021    METASPCT 0.9 12/13/2021    LYMPHOPCT 10.4 12/15/2021    MONOPCT 4.3 12/15/2021    MYELOPCT 2.7 12/06/2021    BASOPCT 0.9 12/15/2021    MONOSABS 0.29 12/15/2021    LYMPHSABS 0.73 12/15/2021 supplementation  · Treat elevated ammonia level  · Paracentesis as needed  · Prognosis is very poor but he is improving short-term    -Consult hematology regarding severe coagulopathy  -We will try to discuss with GI regarding patient's discharge planning and care with family having multiple questions regarding this  -Patient has received 2 units of packed RBCs   -Patient will receive 2 units fresh frozen plasma     -Patient received 2 units packed RBCs, 2 units fresh frozen plasma, cryoprecipitate 12/9  -Patient received 1 unit packed RBC along with 2 units of fresh frozen plasma 12/10    -Abdominal paracentesis 12/ cc    EGD possibly on 12/13    Consider discharge to extended-care facility with hospice consult    Salena Tovar DO, F.A.C.O.I.  12/15/2021  4:21 PM

## 2021-12-15 NOTE — CARE COORDINATION
SS NOTE: COVID NEGATIVE 11/26- PT WILL NEED A NEGATIVE RAPID COVID 72 HOURS [facility]. GI is attempting to have pt transferred to tertiary care at [facility]- they declined or [facility]- awaiting their decision. Pt is on room air. Pt is in Contact Iso for C-diff. Confusion improving- he can follow simple commands. Pt is in a need of a 24 hour urine- has no been able to complete d/t incontinence. Pt is a DNR CCA, no intubation. Pt is on IV  Zosyn. Pt will need a line he currently has a peripheral line. [facility]- they will begin PRECERT once pt is stable for SNF placement. For a SNF placement pt will need a PRECERT, signed JORGE, current PT.OT notes and SW will need to complete the HENs. CLEVELAND Wright.12/15/2021.12:34PM.

## 2021-12-15 NOTE — PLAN OF CARE
Problem: Pain:  Goal: Pain level will decrease  Description: Pain level will decrease  12/15/2021 1538 by Jc Mora RN  Outcome: Met This Shift     Problem: Pain:  Goal: Control of acute pain  Description: Control of acute pain  12/15/2021 1538 by Jc Mora RN  Outcome: Met This Shift     Problem: Pain:  Goal: Control of chronic pain  Description: Control of chronic pain  12/15/2021 1538 by Jc Mora RN  Outcome: Met This Shift

## 2021-12-15 NOTE — PROGRESS NOTES
Progress Note  12/15/2021 2:30 PM  Subjective:   Admit Date: 11/26/2021  PCP: Divya Sweeney, DO  Interval History: patient examined doing ok alert responsive sluggish     Diet: ADULT DIET; Regular; Low Sodium (2 gm); 2000 ml    Data:   Scheduled Meds:   midodrine  10 mg Oral TID WC    thiamine mononitrate  100 mg Oral Daily    pentoxifylline  400 mg Oral TID WC    lactulose  20 g Oral TID    furosemide  20 mg IntraVENous TID    pantoprazole  40 mg Oral QAM AC    sodium bicarbonate  1,300 mg Oral TID    vitamin D3  5,000 Units Oral Daily    rifaximin  550 mg Oral BID    sodium chloride flush  5-40 mL IntraVENous 2 times per day     Continuous Infusions:   sodium chloride      sodium chloride      sodium chloride      sodium chloride      sodium chloride      sodium chloride      sodium chloride      sodium chloride      sodium chloride      dextrose      sodium chloride       PRN Meds:sodium chloride, sodium chloride, sodium chloride, sodium chloride, sodium chloride, sodium chloride, sodium chloride, sodium chloride, sodium chloride, lidocaine, melatonin, LORazepam **OR** LORazepam **OR** [DISCONTINUED] LORazepam **OR** [DISCONTINUED] LORazepam **OR** [DISCONTINUED] LORazepam **OR** [DISCONTINUED] LORazepam **OR** [DISCONTINUED] LORazepam **OR** [DISCONTINUED] LORazepam, dextrose, dextrose, sodium chloride flush, sodium chloride, polyethylene glycol, prochlorperazine  I/O last 3 completed shifts:  In: -   Out: 200 [Urine:200]  No intake/output data recorded.   No intake or output data in the 24 hours ending 12/15/21 1430  CBC:   Recent Labs     12/13/21 0939 12/14/21 0557 12/15/21  1158   WBC 4.4* 4.8 7.3   HGB 7.8* 8.1* 7.9*   PLT 52* 52* 59*     BMP:    Recent Labs     12/13/21  0939 12/14/21  0557 12/15/21  1158    139 138   K 3.4* 3.3* 3.0*    103 99   CO2 21* 20* 21*   BUN 13 14 16   CREATININE 0.8 0.8 0.8   GLUCOSE 88 91 147*     Hepatic:   Recent Labs     12/13/21 0939 12/14/21  0557 12/15/21  1158   AST 55* 66* 62*   ALT 21 24 25   BILITOT 44.5* 47.5* 55.7*   ALKPHOS 66 57 51     Troponin: No results for input(s): TROPONINI in the last 72 hours. BNP: No results for input(s): BNP in the last 72 hours. Lipids: No results for input(s): CHOL, HDL in the last 72 hours. Invalid input(s): LDLCALCU  ABGs: No results found for: PHART, PO2ART, [id number]  INR:   Recent Labs     12/13/21  0939 12/14/21  0942 12/15/21  1158   INR 6.8* 7.3* 8.7*       -----------------------------------------------------------------  RAD: US GALLBLADDER RUQ    Result Date: 11/28/2021  EXAMINATION: RIGHT UPPER QUADRANT ULTRASOUND 11/27/2021 5:24 pm COMPARISON: 11/14/2021 HISTORY: ORDERING SYSTEM PROVIDED HISTORY: decomp cirrhosis, CBD measurement TECHNOLOGIST PROVIDED HISTORY: Reason for exam:->decomp cirrhosis, CBD measurement What reading provider will be dictating this exam?->CRC FINDINGS: LIVER:  Nodular contour of the liver consistent with cirrhosis. The liver is somewhat heterogeneous but without obvious focal abnormality. There is apparent hepatofugal flow again identified within the main portal vein. BILIARY SYSTEM:  Gallbladder is incompletely distended with diffuse wall thickening. No sonographic Carrasco's sign. The wall thickening is nonspecific but could be reactive to ascites and hepatic parenchymal disease. No obvious cholelithiasis. Common bile duct is within normal limits measuring 6 mm. RIGHT KIDNEY: The right kidney is grossly unremarkable without evidence of hydronephrosis. PANCREAS:  Visualized portions of the pancreas are unremarkable. OTHER: Moderate ascites. Findings again consistent with cirrhotic liver. Ascites. Hepatofugal flow redemonstrated in the portal vein. Thickened gallbladder wall which may be reactive to the ascites and hepatic disease.      XR CHEST PORTABLE    Result Date: 11/26/2021  EXAMINATION: ONE XRAY VIEW OF THE CHEST 11/26/2021 7:15 am COMPARISON: One-view chest x-ray 11/14/2021 HISTORY: ORDERING SYSTEM PROVIDED HISTORY: SOB TECHNOLOGIST PROVIDED HISTORY: Reason for exam:->SOB FINDINGS: The cardiac silhouette is within normal limits. The mediastinal contours are within normal limits. The lungs are hypoinflated with resulting bronchovascular crowding. Mild basilar opacities appear new/increased. No significant pleural effusions or pneumothorax. Osseous degenerative changes are present. EKG leads overlie the chest.     Mild basilar opacities are suspicious for pneumonia. This appearance can be seen with COVID-19.     US ABDOMEN LIMITED    Result Date: 11/26/2021  EXAMINATION: RIGHT UPPER QUADRANT ULTRASOUND 11/26/2021 5:54 pm COMPARISON: None. HISTORY: ORDERING SYSTEM PROVIDED HISTORY: ascites/JULIO TECHNOLOGIST PROVIDED HISTORY: Reason for exam:->ascites/JULIO What reading provider will be dictating this exam?->CRC FINDINGS: Superficial sonographic evaluation of the 4 quadrants of the abdomen performed using a combination of grayscale technique. Imaging done to assess for the presence of intra-abdominal ascites. Patient was laying on their left-side. Moderate volume simple appearing intra-abdominal ascites in the left abdomen. Moderate volume intra-abdominal ascites. Objective:   Vitals: /69   Pulse 94   Temp 98.5 °F (36.9 °C) (Oral)   Resp 17   Ht 6' 3\" (1.905 m)   Wt 194 lb (88 kg)   SpO2 94%   BMI 24.25 kg/m²   General appearance: appears stated age   Skin:  No rashes or lesions deep jaundice   HEENT: Head: Normocephalic, no lesions, without obvious abnormality.   Neck: no adenopathy, no carotid bruit, no JVD, supple, symmetrical, trachea midline and thyroid not enlarged, symmetric, no tenderness/mass/nodules  Lungs: clear to auscultation bilaterally  Heart: regular rate and rhythm, S1, S2 normal, no murmur, click, rub or gallop  Abdomen: Distention   Extremities: edema +  Neurologic: Mental status: Alert, oriented, thought content appropriate    Assessment:   Patient Active Problem List:     Alcohol-induced acute pancreatitis     Anxiety     Depression     ETOH abuse     Hematemesis     Atrial fibrillation with rapid ventricular response (HCC)     Alcohol withdrawal syndrome without complication (HCC)     Lactic acidosis     Hypokalemia     Severe protein-calorie malnutrition (HCC)     Paroxysmal atrial fibrillation (HCC)     Hyperbilirubinemia     Hypomagnesemia     Bilirubinemia     UGIB (upper gastrointestinal bleed)     Hepatorenal syndrome (HCC)    Plan:   Assessment: / Plan:     1.  Acute kidney injury.  Acute kidney injury secondary to renal hypoperfusion with possible underlying hepatorenal syndrome. Urine output has improved  JULIO  Now Resolved. Now  with edema will dose loop as needed and follow. Follow BP's closely as has been hypotensive  UOP last 24 hrs 1425 ml +     2.  Metabolic acidosis.  Patient with non-anion gap metabolic acidosis which may be reflection of diarrhea as well as acute kidney injury.   12/3/21: started oral bicarb. Up titrated to 1300 mg po bid 12/6 and to tid 12/8  CO2 improved to 20 today     3.   Hypomagnesemia.    Supplement potassium and magnesium as needed.   Mg 1.8 today     4.  Hypokalemia.  improving   Supplement  with KCL po      5.  Hypocalcemia.   ionized calcium 1.12 / Vit D 14 / PTH 60 - Started oral Vit D on 11/29/21 12/13 Ca++9.7     6.  Hypotension. On midodrine   Stable follow with diuresis     7. Hypophosphatemia.    Supplement orally as needed for goal 2.5  PO4 1.8-->2.6-->2.1-->2.7-->2.2   On PhosNak TID supplement     8.  Coagulopathy-  Plasma and blood to be given today  Transfuse as needed per primary/ hem     9. Edema- better   In the setting of cirrhosis  On lasix 20 mg IV Q 8 hours  UOP 1425 ml + 2 unmeasured occurances past 24 hours    Declined by [facility] and [facility] transfers , now  for SNF        Adama Myles MD

## 2021-12-15 NOTE — PROGRESS NOTES
20g QID with rifaximin 550 mg p.o.  TID  - Mental status remains encephalopathic, though slightly improved today  - Continue lactulose enema daily PRN if no BM   - Considering recurrent encephalopathy, re-ordered infectious workup including CXR, UA with micro, blood cultures, and rapid COVID swab  - CXR reviewed, with no obvious focal consolidation    2. Alcohol induced cirrhosis, decompensated  - MELD-Na score 42, predominantly driven by jaundice; repeat labs today pending  - Diuretics per nephrology; edema improving  - s/p paracentesis 12/12/2021; high SAAG, no SBP, culture pending  - INR remains elevated; repeat today pending  - Discussed with primary yesterday; discontinued IV Zosyn, and started empiric treatment for Alcoholic hepatitis, but today holding steroids pending infectious workup  - Most likely worsening mental status is related to hyperammonemia in setting of decreased bowel movements; but concern for superimposed decompensation; rechecked RUQ US with doppler, no PVT or hepatic vein thrombosis; pending repeat viral panel and liver serologies  - 12/14/2021 called [facility] who are not taking consults/transfers at this time; called [facility] in [facility] OF [employer] who decline transfer  - 12/15/2021 received call back from [facility] hepatology, decline transfer considering no track record with sobriety, recommended optimizing medical management and call back if encephalopathy is refractory to medical management and/or imaging indicates a vascular shunt that would require reversal  - Wean midodrine as tolerated    3.  Renal failure - resolved   - Possible HRS-JULIO  - Diuretics per nephrologist  - If no ammonia remains elevated, will discuss possible dialysis     4.  Acute on chronic normocytic anemia  -Vital signs stable over signs of GI bleed on exam  -CT A/P with pelvic hematoma, most likely source of recurrent drop in H&H  - Hold off on non-emergent EGD considering acutely worsened mental status     Will discuss with Dr. Lamont Arnold and Dr. Parvin Vilchis DO  GI Fellow   12/15/2021  10:31 AM     Patient seen and independently examined. Pertinent notes and lab work reviewed. D/w Dr. Roosevelt Corbin with physical exam and A&P.     Deborah Khan MD  12/15/2021  12:08 PM

## 2021-12-16 NOTE — CARE COORDINATION
SS NOTE: COVID NEGATIVE 11/26- PT WILL NEED A NEGATIVE RAPID COVID 72 HOURS PRIOR TO [facility] TO COMMUNITY SKILL[facility]. BEATRIZ recd the Hospice Consult today from GI. SW made contact with pt's mother, Daniella Parker [phone] today. She would like [place] for pt since this is the contracted Hospice provider for [facility]. SW completed the referral to [place] today and faxed info to Liaison there [facility]- [phone]). She will review info and reach out to pt's mother today. Pt is on room air. Pt is in Contact Iso for C-diff. Pt is a DNR CCA, no intubation. Pt is on IV  Zosyn. Pt will need a line he currently has a peripheral line. [facility]  accept[facility]- they will begin PRECERT once pt is stable for SNF placement. For a SNF placement pt will need a PRECERT, signed JORGE, current PT.OT notes and BEATRIZ will need to complete the HENs. CLEVELAND Hebert.12/16/2021.1:15PM.

## 2021-12-16 NOTE — PROGRESS NOTES
PROGRESS NOTE  [facility]  Dr. Valerie Rutherford MD, Dr. Loraine Vance MD, Dr Terrance Lin, Dr. Rafita Medeiros MD, Dr. Merissa Garsia, DO      [facility]  40 y.o.  male    SUBJECTIVE: Overnight, ordered q2hr lactulose PO and NGT. Per RN, patient was not tolerating PO meds, and attempted to place NGT. Difficulty placing NGT and patient started having epistaxis, so attempt aborted. This morning, patient is toelrating PO medications per RN, but did have episode of vomiting orange/red liquid. Patient is having BMs per RN. He is more awake and alert, reports he is not feeling very good, but knows he is in a hospital. Denies any specific pains. OBJECTIVE:     /63   Pulse 102   Temp 97 °F (36.1 °C) (Axillary)   Resp 12   Ht 6' 3\" (1.905 m)   Wt 194 lb (88 kg)   SpO2 96%   BMI 24.25 kg/m²     Gen: NAD, awake and alert. HEENT:PEERL, grossly icteric  Heart: RRR, no M/R/G  Lungs: CTAB  Abd.: soft, NT, ND  Extr.: Swelling improved  Neuro: No focal deficits. Awake and alert. Oriented to self and \"hospital\".   Skin: Grossly jaundiced      Stool (measured) : 100 mL  Lab Results   Component Value Date    WBC 11.2 12/16/2021    WBC 7.3 12/15/2021    WBC 4.8 12/14/2021    HGB 6.7 12/16/2021    HGB 7.9 12/15/2021    HGB 8.1 12/14/2021    HCT 19.5 12/16/2021    .7 12/16/2021    RDW 25.5 12/16/2021    PLT 71 12/16/2021    PLT 59 12/15/2021    PLT 52 12/14/2021     Lab Results   Component Value Date     12/16/2021    K 3.0 12/16/2021    K 5.8 11/26/2021     12/16/2021    CO2 17 12/16/2021    BUN 20 12/16/2021    CREATININE 0.9 12/16/2021    CALCIUM 9.6 12/16/2021    PROT 5.9 12/16/2021    LABALBU 3.2 12/16/2021    BILITOT 50.1 12/16/2021    BILITOT 55.7 12/15/2021    BILITOT 47.5 12/14/2021    ALKPHOS 46 12/16/2021    ALKPHOS 51 12/15/2021    ALKPHOS 57 12/14/2021    AST 62 12/16/2021    AST 62 12/15/2021    AST 66 12/14/2021    ALT 27 12/16/2021    ALT 25 12/15/2021    ALT 24 12/14/2021     Lab Results   Component Value Date    LIPASE 68 11/14/2021    LIPASE 26 07/01/2020    LIPASE 26 05/11/2020     Lab Results   Component Value Date    AMYLASE 91 01/16/2019         ASSESSMENT/PLAN:  1. Hepatic encephalopathy type C grade II - Improving  - Continue lactulose 20g q2hr with rifaximin 550 mg p.o.  BID  - Mental status remains encephalopathic, though slightly improved today  - Continue lactulose enema daily PRN if no BM   - Infectious workup negative so far, including UA, CXR and COVID test; pending blood cultures    2. Alcohol induced cirrhosis, decompensated  - MELD-Na score 42, predominantly driven by jaundice  - Diuretics per nephrology; edema improving  - s/p paracentesis 12/12/2021; high SAAG, no SBP, culture pending  - INR remains elevated; repeat today pending  - Continue pentoxifylline for alcoholic hepatitis; holding steroids pending results of  Blood culture  - 12/14/2021 called [facility] who are not taking consults/transfers at this time; called [facility] in [facility] OF [employer] who decline transfer  - 12/15/2021 received call back from [facility] hepatology, decline transfer considering no track record with sobriety, recommended optimizing medical management and call back if encephalopathy is refractory to medical management and/or imaging indicates a vascular shunt that would require reversal  - Wean midodrine as tolerated    3.  Renal failure - resolved   - Possible HRS-JULIO  - Diuretics per nephrologist  - If no ammonia remains elevated with persistent encephalopathy, will discuss possible dialysis     4.  Acute on chronic normocytic anemia  -Vital signs stable over signs of GI bleed on exam  -CT A/P with pelvic hematoma, most likely source of recurrent drop in H&H  - Hold off on non-emergent EGD considering acutely worsened mental status  - Recurrent anemia today most likely related to epistaxis overnight; transfuse 1uPRBC     Will discuss with Dr. Damaris Aj and Dr. Ivis Lara, DO  GI Fellow   12/16/2021  10:44 AM     Patient seen and independently examined. Pertinent notes and lab work reviewed. Monitor reviewed showing sinus rhythm/sinus tachycardia  D/w Dr. Thee Lopez. Agree with physical exam and A&P. Discussed with patient/family at bedside - all questions answered -discussed very poor perspective of meaningful recovery without OLT. Family are agreeable with the idea of hospice and like to further [facility] with [facility].   Please, see orders    Isabelle Seo MD  12/16/2021  11:35 AM

## 2021-12-16 NOTE — PROGRESS NOTES
Pt was unable to properly swallow medications. There was an order per Dr. Lyndon Guevara to drop NG tube if pt was unable to do so. Attempted to drop NG but was unsuccessful and to avoid trauma due to pt INR, did not continue. Charge nurse James Mora informed about situation.     Electronically signed by Heidi Sam RN on 12/16/2021 at 4:38 AM

## 2021-12-16 NOTE — CARE COORDINATION
SS NOTE: COVID NEGATIVE 11/26- PT WILL NEED A NEGATIVE RAPID COVID 72 HOURS PRIOR TO [address] [facility] TO COMMUNITY SKILLED. BEATRIZ recd the Hospice Consult today from GI. SW made contact with pt's mother, Gaetano Mcdonald [phone] today. She would like [place] for pt since this is the contracted Hospice provider for [place]. Pt is on room air. Pt is in Contact Iso for C-diff. Pt is a DNR CCA, no intubation. Pt is on IV  Zosyn. Pt will need a line he currently has a peripheral line. [facility]  accept[facility] pt- they will begin PRECERT once pt is stable for SNF placement. For a SNF placement pt will need a PRECERT, signed JORGE, current PT.OT notes and BEATRIZ will need to complete the HENs. Gean Schlatter Buckner, LSW.12/16/2021.1:15PM.

## 2021-12-16 NOTE — PROGRESS NOTES
Spoke with , , and  regarding hemoglobin result of 6.7 and INR result of 9.2; 1U RBC and 2U FFP ordered.

## 2021-12-16 NOTE — PROGRESS NOTES
Internal Medicine Progress Note         Primary Care Physician: Marybeth Villegas DO   Admitting Physician:  Mona Hilton DO  Admission date and time: 11/26/2021  9:25 AM    Room:  [address] [place] [address]  Admitting diagnosis: Hepatorenal syndrome ([facility] [address] [K76.7]  Acute kidney injury ([facility] [address] [N17.9]  Chronic liver failure without hepatic coma [facility]) [K72.10]    Patient Name: Juan Go  MRN: [id number]    Date of Service: 12/16/2021       Subjective: Mary Lou Olguin is a 40 y.o. male ,12/16/2021, at the bedside. Patient doing very poorly multiple problems at hand including hepatorenal syndrome and dropping blood pressure. Patient has very low urine output with evidence of metabolic acidosis. Currently being followed by multiple subspecialists. 11/2611/27/2021-medical coverage by Dr. Chely Key    11/28/2021  Patient seen examined on PCU. Patient's mother is at the bedside and case discussed in detail. Patient is much more alert this morning. Patient oriented to person place. He denies any significant pain at this time. There is no lower leg edema and abdomen is distended but very soft. Serum sodium is up to 129 with a potassium 3.2. CO2 electrolytes is 18 and improved but BUN/creatinine is 44/3.2 which is improved compared to 11/27. Total bili is 24.2 with WBC 12.7 hemoglobin 10.1. Platelet count is down to 63. INR was 7.8. Temperature 98.8 with heart rate of 70 and blood pressure is 115/67. Patient has been put on phenylephrine drip with improvement in blood pressure. Urine output about 400 cc a shift. 11/29/2021  Patient seen and examined in PCU. Patient's mother is at the bedside and case discussed. Patient is still alert and oriented x23 with episodes of confusion. Patient denies any problem with chest or abdominal pain. Patient strength is still very poor with the patient still very weak on his feet. Patient appetite has improved with less nausea.   Patient still has persistent hand tremor which she has had for years according to the mother. But this has been gotten worse. BUN/creatinine 33/2.2 with serum sodium 130. Blood sugars ranging [id number]. INR is down to 5.7 today. Temperature ranges 90.7100.4 with a heart rate of 61 blood pressure 113/63. O2 sat 97% on room air at rest.  Patient still on phenylephrine drip. Urine output ranges 800-1300 cc a shift. 11/30/2021  Patient seen examined on PCU. Patient states he feels fairly good again today. He denies any chest or abdominal pain. Patient states he is very hungry and is still on liquids. Hemoglobin 9.2 with platelet count of down to 38. BUN/creatinine 24/1.4 with potassium 3.2 and sodium 131. Temperature 99 with heart rate 64 blood pressure 99/57. O2 sat 93% on room air. Phenylephrine drip still running. Urine output ranges to 200-4000 cc a shift. Patient is for abdominal paracentesis today. 12/1/2021  Patient seen examined in PCU. Patient with 2 family members at the bedside. Case was discussed. Patient alert and oriented x3. Patient's upper extremity tremors are moderately improved. Patient had questions about abdominal paracentesis. BUN/creatinine 25/1.4 today with total bilirubin 29. WBC 7.5 hemoglobin 8.2 and platelet count 62. Temperature ranges 97.998.3 with heart rate of 68 blood pressure range from 82/51112/64. O2 sats 100% on room air. Patient still on phenylephrine drip for blood pressure/ renal perfusion. Abdominal paracentesis when okay with GI, intensivist.    12/2/2021   Patient seen examined on PCU. Patient denies any specific pain at this time. Patient is little bit depressed about not getting his paracentesis performed yet. BUN/creatinine 18/1.2 with hemoglobin is 7.5 platelet count is down to 29. Patient received fresh frozen plasma and INR yesterday morning was 3.4 and today is 5. Temperature 90.3 with heart rate of 67 and blood pressure 110/70. Pulmonology and GI notes reviewed.   With kidney function about back to baseline phenylephrine drip will be weaned off.    12/3/2021  Patient seen examined on PCU. Patient alert and oriented x3. Patient denies any chest or significant abdominal pain. Abdomen distended but soft. BUN/creatinine 14/1.0 with blood sugars ranging 103126. Serum phosphorus 1.8. Total bili was 24.1 today and has improved somewhat. Hemoglobin is down to 7.2 today. INR is up to 6.3 today. Heart rate 65 with blood pressure 105/52 with O2 sat 94% on room air at rest.  Urinary output is good. Patient is still on Lavon-Synephrine and somatostatin drip. 12/4/2021  Patient seen examined on PCU. Patient received 1 unit packed RBC today with low hemoglobin. Patient denies any problem with chest pain and there is no continuous abdominal discomfort. Patient denies any unusual shortness of breath. BUN/creatinine 11/0.9 with serum magnesium 1.5. Phosphorus 1.7 with WBC 5.3 and hemoglobin 6.6. Total bili 23.4, INR 6.6 today. Temperature 98.5 with heart rate of 74 blood pressure 101/62. O2 sat 96% with the patient being titrated down on the Neosporin drip. 12/5/2021   Patient seen examined on PCU. Case discussed with patient's family at the bedside again today. Patient complaining of pain behind his upper arms in the tricep area. Patient says it started a couple days ago. More of a constant burning sensation. There is no rash noted but there is some ecchymosis noted on the upper portion of the arm and tricep area. This area is tender to palpation. There is no masses or hematoma noted. BUN/creatinine 12/1.0. Serum magnesium 1.4 again with phosphorus 1.7. Blood sugars still low 100s. Total bili is 24.8 today with a WBC 5.8 hemoglobin 6.7 early this morning with a repeat 7.1. Temperature 97.5 heart rate 78 blood pressure 118/64. Patient was taken off the Neosporin drip yesterday afternoon but was put back on this morning. O2 sat is 95% on room air.   Urine output ranges 150-450 cc shift. Patient given potassium and magnesium supplement. 12/6/2021  Patient seen examined on PCU. Patient is upset because he does not know what his INR is. Patient states his bilateral posterior upper arm discomfort is improved. There is no chest pain. Patient's abdomen is slowly getting bigger. BUN/creatinine 12/0.9. CO2 was 16 on the electrolytes. Total bili is 26.4 with hemoglobin 6.8 and WBC 5.5. INR is pending. Temperature 90.4 with heart rate of 81 with blood pressure 104/59 with an O2 sat 98%. We will type and cross give 1 more unit of packed RBC now. Consult hematology regarding coagulopathy. 12/7/2021  Patient seen examined on PCU. Patient is about the same. Abdomen is getting more distended. Oncology note reviewed. BUN/creatinine 12/0.9 with a CO2 of 16. Total bili is 24.2 with hemoglobin 6.7 with the patient receiving 2 units of packed RBCs last 24 hours. Platelet count is 39. Temperature 98.5 heart rate is 79 blood pressure 112/67. O2 sat 99% room air at rest. Urine output is good. Patient to receive 2 more units of fresh frozen plasma today with patient receiving platelets yesterday. Patient hopefully can have the abdominal paracentesis today after INR has been performed. 12/8/2021  Patient seen examined on PCU. Patient complained of increased abdominal distention and discomfort. Patient denies any chest pain. Patient little bit frustrated about being in the hospital so long and having his abnormal lab work including low hemoglobin and persistently elevated INR. Hemoglobin 6.2 today with a WBC of 4.4. Platelet count 39 with BUN/creatinine 10/0.8. Blood sugars in the low 100s. Total bili is 27.7. Temperature 90.3 with heart rate 83 and blood pressure 114/74. O2 sat 97% room air at rest.    12/9/2021  Patient seen examined on PCU. Patient is receiving 2 units packed RBCs today along with cryoprecipitate and fresh frozen plasma.   INR to be repeated after that.  BUN/creatinine 10/0.8. Blood sugars range 76117. Hemoglobin 6.0 today with WBC of 4 and platelet count 46. INR is 4.7. Temperature is 98 with heart rate 91 blood pressure 170/77 O2 sat 99% room air at rest.    12/10/2021  Patient seen examined on PCU. Case discussed in detail with GI today. Hematology and nephrology note reviewed. Patient feels about the same. Patient has abdominal discomfort secondary to the ascites. He denies any chest pain. There is no nausea vomiting but he has poor appetite. Hemoglobin 6.6 today with patient hemoglobin 6.0 yesterday and repeat 6.9 yesterday afternoon. Temperature 98.4 with heart rate 91 blood pressure 105/72. O2 sat 95% on room air at rest.  Pending lab work today. Patient supposed to go for EGD today. Patient given 2 units of fresh frozen plasma again today with 1 unit of typed and crossed packed RBCs. Case was discussed with the invasive radiologist today. He states that he has no problem doing the abdominal paracentesis today. 12/11/2021  Patient seen and examined on PCU. Patient states he feels a bit better today after abdominal paracentesis. Patient had abdominal paracentesis yesterday for 4650 cc. CT the abdomen last night showed a large hematoma measuring 7.8 x 4.1 x 8.9 cm in the left lower pelvis with some mass-effect on the prostate and distal rectum. This also showed a large hydrocele. BUN/creatinine was 12/0.7 with magnesium 1.4. Serum phosphorus little bit low at 2.1 with WBC 4.6 and hemoglobin 8. Platelet count stable at 58. INR is 5.3. Temperature 98.5 heart rate 86 blood pressure 109/64. O2 sat 98% on room air at rest.  Urine output is good. Patient was supposed to have EGD done yesterday but this apparently been held until Tuesday. 12/12/2021  Patient seen examined on PCU. Patient's family is at the bedside and case discussed. Patient denies any new discomfort or problem. Patient is set up for EGD tomorrow. BUN/creatinine 12/0.8 with total bili of 41. WBC 4.4 with hemoglobin 7.5. Platelet count 47. INR 6.2 today. Temperature 98.2 with heart rate 89 blood pressure 110/68. O2 sat 99% room air at rest.  Urine output is excellent. 12/13/2021  Patient seen and examined in PCU. Patient very very lethargic today. BUN/creatinine 13/0.8 with potassium 3.4. Serum ammonia is elevated to 102. Total bilirubin is 44.5 with WBC 4.4 with hemoglobin 7.8 and platelet count 52. INR 6.8 today. 90.1 with heart rate 91 blood pressure 161/69. O2 sat 98% on room air at rest.  Case discussed with GI today. The EGD will be put on hold. Patient was last serum ammonia was 43 on admission. Lactulose was restarted. 12/14/2021  Patient seen examined on PCU. Patient is still more somnolent than his normal.  Patient's family at the bedside. Case discussed with GI also. Case discussed with patient's nurse. Patient still having altered mental status secondary to metabolic encephalopathy. Patient's total bili one of the INR is increasing. Some of the increased bili may be related to the hematoma reabsorbing. GI is is in the process of contacting [facility] about transfer for evaluation of possible transplant or a further change in management. BUN/creatinine was 14/0.8 with potassium 3.3. Serum ammonia 103 with total bilirubin 47.5. Hemoglobin stable 8.1 with a WBC 4.8. INR 7.3. Temperature is 90.3 with heart rate 96 blood pressure 123/67. O2 sat 97% room air at rest.    12/15/2021  Patient seen and examined in PCU. Patient still very confused and somnolent. Case discussed with patient's nurse at the bedside. BUN/creatinine 16/0.8 with potassium 3.0. Total bili is 55.7. Hemoglobin 7.9 with platelet count of 59. INR is 8.7 today. Temperature is 97.9 with heart rate in 91 blood pressure 134/67. O2 sat 94% on room air at rest.  Urine output ranges 300-400 cc shift.   Considering patient's continued hospitalization with aggressive care and the patient still developing acute hepatic encephalopathy with progressive increase in bilirubin and INR and patient not accepted at local tertiary centers for consideration for liver transplant strong consideration should be given for [facility] with hospice. 12/16/2021  Patient seen and examined in PCU. Case discussed with GI today. Patient will be consulted with hospice. Patient's father is at the bedside and case discussed. The patient is little bit more awake today but still very confused. BUN/creatinine 20/0.9 with serum potassium 3.0. Total bilirubin is 15.1 with INR of 9.3 with hemoglobin 6.7 and platelet count 71. Temperature is 97.3 with heart rate of 100 with blood pressure 130/63 and O2 sat 96% room air at rest.  Urine output is around 350 cc a shift. Review of System: Limited at the present time  Constitutional:   Denies fever or chills, weight loss or gain, fatigue or malaise. HEENT:   Denies ear pain, sore throat, sinus or eye problems. Cardiovascular:   Denies any chest pain, irregular heartbeats, or palpitations. Respiratory:   Denies shortness of breath, coughing, sputum production, hemoptysis, or wheezing. Gastrointestinal:   Denies nausea, vomiting, diarrhea, or constipation. Denies any abdominal pain. Genitourinary:    Denies any urgency, frequency, hematuria. Voiding  without difficulty. Extremities:   Denies lower extremity swelling, edema or cyanosis. Neurology:    Denies any headache or focal neurological deficits, Denies generalized weakness or memory difficulty. Psch:   Denies being anxious or depressed. Musculoskeletal:    Denies  myalgias, joint complaints or back pain. Integumentary:   Denies any rashes, ulcers, or excoriations. Denies bruising. Hematologic/Lymphatic:  Denies bruising or bleeding.     Physical Exam:  I/O this shift:  In: 240 [P.O.:240]  Out: 350 [Urine:350]    Intake/Output Summary (Last 24 hours) at 12/16/2021 1340  Last data filed at 12/16/2021 0845  Gross per 24 hour   Intake 480 ml   Output 650 ml   Net -170 ml   I/O last 3 completed shifts: In: 700 [P.O.:700]  Out: 700 [Urine:700]  Patient Vitals for the past 96 hrs (Last 3 readings):   Weight   12/15/21 0508 194 lb (88 kg)   12/14/21 0528 198 lb 13.7 oz (90.2 kg)     Vital Signs:   Blood pressure 130/63, pulse 102, temperature 97 °F (36.1 °C), temperature source Axillary, resp. rate 12, height 6' 3\" (1.905 m), weight 194 lb (88 kg), SpO2 96 %. General appearance:  Patient alert but lethargic. Appears chronically ill  Head:  Normocephalic. No masses, lesions or tenderness. Eyes:  PERRLA. EOMI. sclera icteric. Buccal mucosa moist.  ENT:  Ears normal. Mucosa normal.  Neck:    Supple. Trachea midline. No thyromegaly. No JVD. No bruits. Heart:    Rhythm regular. Tachycardic. No murmurs. Lungs:    Diminished  Abdomen:   Soft. Non-tender. Non-distended. Bowel sounds positive. No organomegaly or masses. No pain on palpation. Newly distended  Extremities:    Peripheral pulses present. No peripheral edema. No ulcers. No cyanosis. No clubbing.   Neurologic:    Responsive but lethargic  Integumentary:  No rashes skin jaundice  Genitalia/Breast:  Hickman catheter    Medication:  Scheduled Meds:   midodrine  5 mg Oral TID WC    potassium chloride  20 mEq Oral TID WC    lactulose  20 g Oral Q2H    thiamine mononitrate  100 mg Oral Daily    pentoxifylline  400 mg Oral TID WC    furosemide  20 mg IntraVENous TID    pantoprazole  40 mg Oral QAM AC    sodium bicarbonate  1,300 mg Oral TID    vitamin D3  5,000 Units Oral Daily    rifaximin  550 mg Oral BID    sodium chloride flush  5-40 mL IntraVENous 2 times per day     Continuous Infusions:   sodium chloride      sodium chloride      sodium chloride      sodium chloride      sodium chloride      sodium chloride      sodium chloride      sodium chloride      sodium chloride      sodium chloride      dextrose      sodium chloride         Objective Data:  CBC with Differential:    Lab Results   Component Value Date    WBC 11.2 12/16/2021    RBC 1.88 12/16/2021    HGB 6.7 12/16/2021    HCT 19.5 12/16/2021    PLT 71 12/16/2021    .7 12/16/2021    MCH 35.6 12/16/2021    MCHC 34.4 12/16/2021    RDW 25.5 12/16/2021    METASPCT 0.9 12/13/2021    LYMPHOPCT 16.2 12/16/2021    MONOPCT 18.7 12/16/2021    MYELOPCT 2.7 12/06/2021    BASOPCT 0.1 12/16/2021    MONOSABS 2.09 12/16/2021    LYMPHSABS 1.81 12/16/2021    EOSABS 0.01 12/16/2021    BASOSABS 0.01 12/16/2021     CMP:    Lab Results   Component Value Date     12/16/2021    K 3.0 12/16/2021    K 5.8 11/26/2021     12/16/2021    CO2 17 12/16/2021    BUN 20 12/16/2021    CREATININE 0.9 12/16/2021    GFRAA >60 12/16/2021    LABGLOM >60 12/16/2021    GLUCOSE 115 12/16/2021    PROT 5.9 12/16/2021    LABALBU 3.2 12/16/2021    CALCIUM 9.6 12/16/2021    BILITOT 50.1 12/16/2021    ALKPHOS 46 12/16/2021    AST 62 12/16/2021    ALT 27 12/16/2021              Assessment:    · Acute hepatic encephalopathy  · Hepatorenal syndrome probable hepatopulmonary syndrome  · Acute hypoxic respiratory failure  · Hypotonic hypervolemic hyponatremia  · JULIO on CKD  · Pneumonia possible aspiration  · Decompensated cirrhosis  · Hyperbilirubinemia  · Chronic normocytic anemia  · Atrial fibrillation  · History of anxiety and depression  · History of alcohol abuse  · GERD        Plan:     · Patient is doing very poorly at the present time. The patient does have advanced liver disease with decompensated cirrhosis and not currently a candidate for liver transplant. Symptoms are suggestive of severe electrolyte imbalance with probable hepatorenal syndrome. He also is currently hypotensive. I have discussed the condition with the mother and fiancé along with Dr. Hughes Scarce have discussed his CODE STATUS and agree on no intubation or chest compressions.   We have also discussed possibility of transition into hospice pending his status. Continue aggressive care for the next 24 to 48 hours and observe response  · Electrolyte imbalance of hyponatremia being followed by nephrology  · Metabolic acidosis being corrected by bicarb infusion  · GI is following regarding his hepatic cirrhosis  · Vasopressor has been instituted  · Salt poor albumin to expand volume  · Blood culture has been obtained.   Zosyn to cover for spontaneous bacterial peritonitis  · Patient is auto anticoagulated vitamin K supplementation  · Treat elevated ammonia level  · Paracentesis as needed  · Prognosis is very poor but he is improving short-term    -Consult hematology regarding severe coagulopathy  -We will try to discuss with GI regarding patient's discharge planning and care with family having multiple questions regarding this  -Patient has received 2 units of packed RBCs   -Patient will receive 2 units fresh frozen plasma     -Patient received 2 units packed RBCs, 2 units fresh frozen plasma, cryoprecipitate 12/9  -Patient received 1 unit packed RBC along with 2 units of fresh frozen plasma 12/10    -Abdominal paracentesis 12/ cc    EGD possibly on 12/13    Consider discharge to extended-care facility with hospice consult as a DNR CC with no readmission    Ramos Medina DO, F.A.C.O.I.  12/16/2021  1:40 PM

## 2021-12-16 NOTE — PROGRESS NOTES
Progress Note  12/16/2021 1:13 PM  Subjective:   Admit Date: 11/26/2021  PCP: Luis Duque DO  Interval History: Patient examined Deep jaundice     Diet: ADULT DIET; Regular; Low Sodium (2 gm); 2000 ml    Data:   Scheduled Meds:   midodrine  5 mg Oral TID WC    potassium chloride  20 mEq Oral TID WC    lactulose  20 g Oral Q2H    thiamine mononitrate  100 mg Oral Daily    pentoxifylline  400 mg Oral TID WC    furosemide  20 mg IntraVENous TID    pantoprazole  40 mg Oral QAM AC    sodium bicarbonate  1,300 mg Oral TID    vitamin D3  5,000 Units Oral Daily    rifaximin  550 mg Oral BID    sodium chloride flush  5-40 mL IntraVENous 2 times per day     Continuous Infusions:   sodium chloride      sodium chloride      sodium chloride      sodium chloride      sodium chloride      sodium chloride      sodium chloride      sodium chloride      sodium chloride      sodium chloride      dextrose      sodium chloride       PRN Meds:sodium chloride, sodium chloride, sodium chloride, sodium chloride, sodium chloride, sodium chloride, sodium chloride, sodium chloride, sodium chloride, sodium chloride, lidocaine, melatonin, LORazepam **OR** LORazepam **OR** [DISCONTINUED] LORazepam **OR** [DISCONTINUED] LORazepam **OR** [DISCONTINUED] LORazepam **OR** [DISCONTINUED] LORazepam **OR** [DISCONTINUED] LORazepam **OR** [DISCONTINUED] LORazepam, dextrose, dextrose, sodium chloride flush, sodium chloride, polyethylene glycol, prochlorperazine  I/O last 3 completed shifts:   In: 700 [P.O.:700]  Out: 700 [Urine:700]  I/O this shift:  In: 240 [P.O.:240]  Out: 350 [Urine:350]    Intake/Output Summary (Last 24 hours) at 12/16/2021 1313  Last data filed at 12/16/2021 0845  Gross per 24 hour   Intake 480 ml   Output 650 ml   Net -170 ml     CBC:   Recent Labs     12/14/21  0557 12/15/21  1158 12/16/21  0823   WBC 4.8 7.3 11.2   HGB 8.1* 7.9* 6.7*   PLT 52* 59* 71*     BMP:    Recent Labs     12/14/21  0557 12/15/21  1158 12/16/21  0823    138 140   K 3.3* 3.0* 3.0*    99 100   CO2 20* 21* 17*   BUN 14 16 20   CREATININE 0.8 0.8 0.9   GLUCOSE 91 147* 115*     Hepatic:   Recent Labs     12/14/21  0557 12/15/21  1158 12/16/21  0823   AST 66* 62* 62*   ALT 24 25 27   BILITOT 47.5* 55.7* 50.1*   ALKPHOS 57 51 46     Troponin: No results for input(s): TROPONINI in the last 72 hours. BNP: No results for input(s): BNP in the last 72 hours. Lipids: No results for input(s): CHOL, HDL in the last 72 hours. Invalid input(s): LDLCALCU  ABGs: No results found for: PHART, PO2ART, DIB0BLS  INR:   Recent Labs     12/14/21  0942 12/15/21  1158 12/16/21  0823   INR 7.3* 8.7* 9.2*       -----------------------------------------------------------------  RAD: US GALLBLADDER RUQ    Result Date: 11/28/2021  EXAMINATION: RIGHT UPPER QUADRANT ULTRASOUND 11/27/2021 5:24 pm COMPARISON: 11/14/2021 HISTORY: ORDERING SYSTEM PROVIDED HISTORY: decomp cirrhosis, CBD measurement TECHNOLOGIST PROVIDED HISTORY: Reason for exam:->decomp cirrhosis, CBD measurement What reading provider will be dictating this exam?->CRC FINDINGS: LIVER:  Nodular contour of the liver consistent with cirrhosis. The liver is somewhat heterogeneous but without obvious focal abnormality. There is apparent hepatofugal flow again identified within the main portal vein. BILIARY SYSTEM:  Gallbladder is incompletely distended with diffuse wall thickening. No sonographic Carrasco's sign. The wall thickening is nonspecific but could be reactive to ascites and hepatic parenchymal disease. No obvious cholelithiasis. Common bile duct is within normal limits measuring 6 mm. RIGHT KIDNEY: The right kidney is grossly unremarkable without evidence of hydronephrosis. PANCREAS:  Visualized portions of the pancreas are unremarkable. OTHER: Moderate ascites. Findings again consistent with cirrhotic liver. Ascites. Hepatofugal flow redemonstrated in the portal vein. Thickened gallbladder wall which may be reactive to the ascites and hepatic disease. XR CHEST PORTABLE    Result Date: 11/26/2021  EXAMINATION: ONE XRAY VIEW OF THE CHEST 11/26/2021 7:15 am COMPARISON: One-view chest x-ray 11/14/2021 HISTORY: ORDERING SYSTEM PROVIDED HISTORY: SOB TECHNOLOGIST PROVIDED HISTORY: Reason for exam:->SOB FINDINGS: The cardiac silhouette is within normal limits. The mediastinal contours are within normal limits. The lungs are hypoinflated with resulting bronchovascular crowding. Mild basilar opacities appear new/increased. No significant pleural effusions or pneumothorax. Osseous degenerative changes are present. EKG leads overlie the chest.     Mild basilar opacities are suspicious for pneumonia. This appearance can be seen with COVID-19.     US ABDOMEN LIMITED    Result Date: 11/26/2021  EXAMINATION: RIGHT UPPER QUADRANT ULTRASOUND 11/26/2021 5:54 pm COMPARISON: None. HISTORY: ORDERING SYSTEM PROVIDED HISTORY: ascites/JULIO TECHNOLOGIST PROVIDED HISTORY: Reason for exam:->ascites/JULIO What reading provider will be dictating this exam?->CRC FINDINGS: Superficial sonographic evaluation of the 4 quadrants of the abdomen performed using a combination of grayscale technique. Imaging done to assess for the presence of intra-abdominal ascites. Patient was laying on their left-side. Moderate volume simple appearing intra-abdominal ascites in the left abdomen. Moderate volume intra-abdominal ascites. Objective:   Vitals: /63   Pulse 102   Temp 97 °F (36.1 °C) (Axillary)   Resp 12   Ht 6' 3\" (1.905 m)   Wt 194 lb (88 kg)   SpO2 96%   BMI 24.25 kg/m²   General appearance: appears stated age   Skin:  No rashes or lesions  HEENT: Head: Normocephalic, no lesions, without obvious abnormality.   Neck: no adenopathy, no carotid bruit, no JVD, supple, symmetrical, trachea midline and thyroid not enlarged, symmetric, no tenderness/mass/nodules  Lungs: clear to auscultation for SNF vs Hospice care      Jessie Valadez MD

## 2021-12-17 NOTE — PROGRESS NOTES
Patient only responsive to pain. Moaning at times. Labored breathing. Family at bedside. Family would like to start end of life care. Stop labs per family. Stop all treatment except comfort medications and comfort treatment.

## 2021-12-17 NOTE — PROGRESS NOTES
Patient  at [phone]. Pronounced by myself and Zak Webster. Dr. Clemencia Lema notified. Family at bedside.

## 2021-12-17 NOTE — PROGRESS NOTES
Internal Medicine Progress Note         Primary Care Physician: Cole Rey DO   Admitting Physician:  Carnella Gitelman, DO  Admission date and time: 11/26/2021  9:25 AM    Room:  [address] [place] [address]  Admitting diagnosis: Hepatorenal syndrome ([facility] [K76.7]  Acute kidney injury ([facility] [N17.9]  Chronic liver failure without hepatic coma [facility]) [K72.10]    Patient Name: Kelechi Andrews  MRN: [id number]    Date of Service: 12/17/2021       Subjective: Adrien Ribeiro is a 40 y.o. male ,12/17/2021, at the bedside. Patient doing very poorly multiple problems at hand including hepatorenal syndrome and dropping blood pressure. Patient has very low urine output with evidence of metabolic acidosis. Currently being followed by multiple subspecialists. 11/2611/27/2021-medical coverage by Dr. Monica Resendiz    11/28/2021  Patient seen examined on PCU. Patient's mother is at the bedside and case discussed in detail. Patient is much more alert this morning. Patient oriented to person place. He denies any significant pain at this time. There is no lower leg edema and abdomen is distended but very soft. Serum sodium is up to 129 with a potassium 3.2. CO2 electrolytes is 18 and improved but BUN/creatinine is 44/3.2 which is improved compared to 11/27. Total bili is 24.2 with WBC 12.7 hemoglobin 10.1. Platelet count is down to 63. INR was 7.8. Temperature 98.8 with heart rate of 70 and blood pressure is 115/67. Patient has been put on phenylephrine drip with improvement in blood pressure. Urine output about 400 cc a shift. 11/29/2021  Patient seen and examined in PCU. Patient's mother is at the bedside and case discussed. Patient is still alert and oriented x23 with episodes of confusion. Patient denies any problem with chest or abdominal pain. Patient strength is still very poor with the patient still very weak on his feet. Patient appetite has improved with less nausea.   Patient still has persistent hand tremor which she has had for years according to the mother. But this has been gotten worse. BUN/creatinine 33/2.2 with serum sodium 130. Blood sugars ranging [id number]. INR is down to 5.7 today. Temperature ranges 90.7100.4 with a heart rate of 61 blood pressure 113/63. O2 sat 97% on room air at rest.  Patient still on phenylephrine drip. Urine output ranges 800-1300 cc a shift. 11/30/2021  Patient seen examined on PCU. Patient states he feels fairly good again today. He denies any chest or abdominal pain. Patient states he is very hungry and is still on liquids. Hemoglobin 9.2 with platelet count of down to 38. BUN/creatinine 24/1.4 with potassium 3.2 and sodium 131. Temperature 99 with heart rate 64 blood pressure 99/57. O2 sat 93% on room air. Phenylephrine drip still running. Urine output ranges to 200-4000 cc a shift. Patient is for abdominal paracentesis today. 12/1/2021  Patient seen examined in PCU. Patient with 2 family members at the bedside. Case was discussed. Patient alert and oriented x3. Patient's upper extremity tremors are moderately improved. Patient had questions about abdominal paracentesis. BUN/creatinine 25/1.4 today with total bilirubin 29. WBC 7.5 hemoglobin 8.2 and platelet count 62. Temperature ranges 97.998.3 with heart rate of 68 blood pressure range from 82/51112/64. O2 sats 100% on room air. Patient still on phenylephrine drip for blood pressure/ renal perfusion. Abdominal paracentesis when okay with GI, intensivist.    12/2/2021   Patient seen examined on PCU. Patient denies any specific pain at this time. Patient is little bit depressed about not getting his paracentesis performed yet. BUN/creatinine 18/1.2 with hemoglobin is 7.5 platelet count is down to 29. Patient received fresh frozen plasma and INR yesterday morning was 3.4 and today is 5. Temperature 90.3 with heart rate of 67 and blood pressure 110/70. Pulmonology and GI notes reviewed.   With kidney function about back to baseline phenylephrine drip will be weaned off.    12/3/2021  Patient seen examined on PCU. Patient alert and oriented x3. Patient denies any chest or significant abdominal pain. Abdomen distended but soft. BUN/creatinine 14/1.0 with blood sugars ranging 103126. Serum phosphorus 1.8. Total bili was 24.1 today and has improved somewhat. Hemoglobin is down to 7.2 today. INR is up to 6.3 today. Heart rate 65 with blood pressure 105/52 with O2 sat 94% on room air at rest.  Urinary output is good. Patient is still on Lavon-Synephrine and somatostatin drip. 12/4/2021  Patient seen examined on PCU. Patient received 1 unit packed RBC today with low hemoglobin. Patient denies any problem with chest pain and there is no continuous abdominal discomfort. Patient denies any unusual shortness of breath. BUN/creatinine 11/0.9 with serum magnesium 1.5. Phosphorus 1.7 with WBC 5.3 and hemoglobin 6.6. Total bili 23.4, INR 6.6 today. Temperature 98.5 with heart rate of 74 blood pressure 101/62. O2 sat 96% with the patient being titrated down on the Neosporin drip. 12/5/2021   Patient seen examined on PCU. Case discussed with patient's family at the bedside again today. Patient complaining of pain behind his upper arms in the tricep area. Patient says it started a couple days ago. More of a constant burning sensation. There is no rash noted but there is some ecchymosis noted on the upper portion of the arm and tricep area. This area is tender to palpation. There is no masses or hematoma noted. BUN/creatinine 12/1.0. Serum magnesium 1.4 again with phosphorus 1.7. Blood sugars still low 100s. Total bili is 24.8 today with a WBC 5.8 hemoglobin 6.7 early this morning with a repeat 7.1. Temperature 97.5 heart rate 78 blood pressure 118/64. Patient was taken off the Neosporin drip yesterday afternoon but was put back on this morning. O2 sat is 95% on room air.   Urine output ranges 150-450 cc shift. Patient given potassium and magnesium supplement. 12/6/2021  Patient seen examined on PCU. Patient is upset because he does not know what his INR is. Patient states his bilateral posterior upper arm discomfort is improved. There is no chest pain. Patient's abdomen is slowly getting bigger. BUN/creatinine 12/0.9. CO2 was 16 on the electrolytes. Total bili is 26.4 with hemoglobin 6.8 and WBC 5.5. INR is pending. Temperature 90.4 with heart rate of 81 with blood pressure 104/59 with an O2 sat 98%. We will type and cross give 1 more unit of packed RBC now. Consult hematology regarding coagulopathy. 12/7/2021  Patient seen examined on PCU. Patient is about the same. Abdomen is getting more distended. Oncology note reviewed. BUN/creatinine 12/0.9 with a CO2 of 16. Total bili is 24.2 with hemoglobin 6.7 with the patient receiving 2 units of packed RBCs last 24 hours. Platelet count is 39. Temperature 98.5 heart rate is 79 blood pressure 112/67. O2 sat 99% room air at rest. Urine output is good. Patient to receive 2 more units of fresh frozen plasma today with patient receiving platelets yesterday. Patient hopefully can have the abdominal paracentesis today after INR has been performed. 12/8/2021  Patient seen examined on PCU. Patient complained of increased abdominal distention and discomfort. Patient denies any chest pain. Patient little bit frustrated about being in the hospital so long and having his abnormal lab work including low hemoglobin and persistently elevated INR. Hemoglobin 6.2 today with a WBC of 4.4. Platelet count 39 with BUN/creatinine 10/0.8. Blood sugars in the low 100s. Total bili is 27.7. Temperature 90.3 with heart rate 83 and blood pressure 114/74. O2 sat 97% room air at rest.    12/9/2021  Patient seen examined on PCU. Patient is receiving 2 units packed RBCs today along with cryoprecipitate and fresh frozen plasma.   INR to be repeated after that.  BUN/creatinine 10/0.8. Blood sugars range 76117. Hemoglobin 6.0 today with WBC of 4 and platelet count 46. INR is 4.7. Temperature is 98 with heart rate 91 blood pressure 170/77 O2 sat 99% room air at rest.    12/10/2021  Patient seen examined on PCU. Case discussed in detail with GI today. Hematology and nephrology note reviewed. Patient feels about the same. Patient has abdominal discomfort secondary to the ascites. He denies any chest pain. There is no nausea vomiting but he has poor appetite. Hemoglobin 6.6 today with patient hemoglobin 6.0 yesterday and repeat 6.9 yesterday afternoon. Temperature 98.4 with heart rate 91 blood pressure 105/72. O2 sat 95% on room air at rest.  Pending lab work today. Patient supposed to go for EGD today. Patient given 2 units of fresh frozen plasma again today with 1 unit of typed and crossed packed RBCs. Case was discussed with the invasive radiologist today. He states that he has no problem doing the abdominal paracentesis today. 12/11/2021  Patient seen and examined on PCU. Patient states he feels a bit better today after abdominal paracentesis. Patient had abdominal paracentesis yesterday for 4650 cc. CT the abdomen last night showed a large hematoma measuring 7.8 x 4.1 x 8.9 cm in the left lower pelvis with some mass-effect on the prostate and distal rectum. This also showed a large hydrocele. BUN/creatinine was 12/0.7 with magnesium 1.4. Serum phosphorus little bit low at 2.1 with WBC 4.6 and hemoglobin 8. Platelet count stable at 58. INR is 5.3. Temperature 98.5 heart rate 86 blood pressure 109/64. O2 sat 98% on room air at rest.  Urine output is good. Patient was supposed to have EGD done yesterday but this apparently been held until Tuesday. 12/12/2021  Patient seen examined on PCU. Patient's family is at the bedside and case discussed. Patient denies any new discomfort or problem. Patient is set up for EGD tomorrow. BUN/creatinine 12/0.8 with total bili of 41. WBC 4.4 with hemoglobin 7.5. Platelet count 47. INR 6.2 today. Temperature 98.2 with heart rate 89 blood pressure 110/68. O2 sat 99% room air at rest.  Urine output is excellent. 12/13/2021  Patient seen and examined in PCU. Patient very very lethargic today. BUN/creatinine 13/0.8 with potassium 3.4. Serum ammonia is elevated to 102. Total bilirubin is 44.5 with WBC 4.4 with hemoglobin 7.8 and platelet count 52. INR 6.8 today. 90.1 with heart rate 91 blood pressure 161/69. O2 sat 98% on room air at rest.  Case discussed with GI today. The EGD will be put on hold. Patient was last serum ammonia was 43 on admission. Lactulose was restarted. 12/14/2021  Patient seen examined on PCU. Patient is still more somnolent than his normal.  Patient's family at the bedside. Case discussed with GI also. Case discussed with patient's nurse. Patient still having altered mental status secondary to metabolic encephalopathy. Patient's total bili one of the INR is increasing. Some of the increased bili may be related to the hematoma reabsorbing. GI is is in the process of contacting [facility] about transfer for evaluation of possible transplant or a further change in management. BUN/creatinine was 14/0.8 with potassium 3.3. Serum ammonia 103 with total bilirubin 47.5. Hemoglobin stable 8.1 with a WBC 4.8. INR 7.3. Temperature is 90.3 with heart rate 96 blood pressure 123/67. O2 sat 97% room air at rest.    12/15/2021  Patient seen and examined in PCU. Patient still very confused and somnolent. Case discussed with patient's nurse at the bedside. BUN/creatinine 16/0.8 with potassium 3.0. Total bili is 55.7. Hemoglobin 7.9 with platelet count of 59. INR is 8.7 today. Temperature is 97.9 with heart rate in 91 blood pressure 134/67. O2 sat 94% on room air at rest.  Urine output ranges 300-400 cc shift.   Considering patient's continued hospitalization with aggressive care and the patient still developing acute hepatic encephalopathy with progressive increase in bilirubin and INR and patient not accepted at local tertiary centers for consideration for liver transplant strong consideration should be given for [facility] with hospice. 12/16/2021  Patient seen and examined in PCU. Case discussed with GI today. Patient will be consulted with hospice. Patient's father is at the bedside and case discussed. The patient is little bit more awake today but still very confused. BUN/creatinine 20/0.9 with serum potassium 3.0. Total bilirubin is 15.1 with INR of 9.3 with hemoglobin 6.7 and platelet count 71. Temperature is 97.3 with heart rate of 100 with blood pressure 130/63 and O2 sat 96% room air at rest.  Urine output is around 350 cc a shift. 12/17/2021  Patient seen and examined in PCU. Nurses called earlier this morning and that the patient was moaning in only responsive to pain. Multiple family members at the bedside with the patient being unresponsive at this time with pronounced respiration. Hospice consultation initiated yesterday. The family wants all further aggressive care stop and just wanting comfort care. The patient is terminal at this time with his end-stage liver disease. All the family members are excepting the patient's terminal condition. He was started on morphine 2 mg IV push every hour and will increase 3 mg IV push every hour as needed along with Ativan 1 mg IV every 1 hour as needed. Patient is prognosis terminal      Review of System: Limited at the present time  Constitutional:   Denies fever or chills, weight loss or gain, fatigue or malaise. HEENT:   Denies ear pain, sore throat, sinus or eye problems. Cardiovascular:   Denies any chest pain, irregular heartbeats, or palpitations. Respiratory:   Denies shortness of breath, coughing, sputum production, hemoptysis, or wheezing.   Gastrointestinal:   Denies nausea, vomiting, diarrhea, or constipation. Denies any abdominal pain. Genitourinary:    Denies any urgency, frequency, hematuria. Voiding  without difficulty. Extremities:   Denies lower extremity swelling, edema or cyanosis. Neurology:    Denies any headache or focal neurological deficits, Denies generalized weakness or memory difficulty. Psch:   Denies being anxious or depressed. Musculoskeletal:    Denies  myalgias, joint complaints or back pain. Integumentary:   Denies any rashes, ulcers, or excoriations. Denies bruising. Hematologic/Lymphatic:  Denies bruising or bleeding. Physical Exam:  No intake/output data recorded. Intake/Output Summary (Last 24 hours) at 12/17/2021 1300  Last data filed at 12/16/2021 1957  Gross per 24 hour   Intake 870 ml   Output    Net 870 ml   I/O last 3 completed shifts: In: 1110 [P.O.:720; Blood:390]  Out: 350 [Urine:350]  Patient Vitals for the past 96 hrs (Last 3 readings):   Weight   12/15/21 0508 194 lb (88 kg)   12/14/21 0528 198 lb 13.7 oz (90.2 kg)     Vital Signs:   Blood pressure (!) 130/55, pulse 123, temperature 98.1 °F (36.7 °C), temperature source Temporal, resp. rate 20, height 6' 3\" (1.905 m), weight 194 lb (88 kg), SpO2 90 %. General appearance:  Patient alert but lethargic. Appears chronically ill  Head:  Normocephalic. No masses, lesions or tenderness. Eyes:  PERRLA. EOMI. sclera icteric. Buccal mucosa moist.  ENT:  Ears normal. Mucosa normal.  Neck:    Supple. Trachea midline. No thyromegaly. No JVD. No bruits. Heart:    Rhythm regular. Tachycardic. No murmurs. Lungs:    Diminished  Abdomen:   Soft. Non-tender. Non-distended. Bowel sounds positive. No organomegaly or masses. No pain on palpation. Newly distended  Extremities:    Peripheral pulses present. No peripheral edema. No ulcers. No cyanosis. No clubbing.   Neurologic:    Responsive but lethargic  Integumentary:  No rashes skin jaundice  Genitalia/Breast:  Hickman catheter    Medication:  Scheduled Meds:   sodium chloride flush  5-40 mL IntraVENous 2 times per day     Continuous Infusions:   sodium chloride      sodium chloride      sodium chloride      sodium chloride      sodium chloride      sodium chloride      sodium chloride      sodium chloride      sodium chloride      sodium chloride      dextrose      sodium chloride         Objective Data:  CBC with Differential:    Lab Results   Component Value Date    WBC 11.2 12/16/2021    RBC 1.88 12/16/2021    HGB 7.1 12/16/2021    HCT 21.3 12/16/2021    PLT 71 12/16/2021    .7 12/16/2021    MCH 35.6 12/16/2021    MCHC 34.4 12/16/2021    RDW 25.5 12/16/2021    METASPCT 0.9 12/13/2021    LYMPHOPCT 16.2 12/16/2021    MONOPCT 18.7 12/16/2021    MYELOPCT 2.7 12/06/2021    BASOPCT 0.1 12/16/2021    MONOSABS 2.09 12/16/2021    LYMPHSABS 1.81 12/16/2021    EOSABS 0.01 12/16/2021    BASOSABS 0.01 12/16/2021     CMP:    Lab Results   Component Value Date     12/16/2021    K 3.0 12/16/2021    K 5.8 11/26/2021     12/16/2021    CO2 17 12/16/2021    BUN 20 12/16/2021    CREATININE 0.9 12/16/2021    GFRAA >60 12/16/2021    LABGLOM >60 12/16/2021    GLUCOSE 115 12/16/2021    PROT 5.9 12/16/2021    LABALBU 3.2 12/16/2021    CALCIUM 9.6 12/16/2021    BILITOT 50.1 12/16/2021    ALKPHOS 46 12/16/2021    AST 62 12/16/2021    ALT 27 12/16/2021              Assessment:    · Acute hepatic encephalopathy  · Hepatorenal syndrome probable hepatopulmonary syndrome  · Acute hypoxic respiratory failure  · Hypotonic hypervolemic hyponatremia  · JULIO on CKD  · Pneumonia possible aspiration  · Decompensated cirrhosis  · Hyperbilirubinemia  · Chronic normocytic anemia  · Atrial fibrillation  · History of anxiety and depression  · History of alcohol abuse  · GERD        Plan:     · Patient is doing very poorly at the present time.   The patient does have advanced liver disease with decompensated cirrhosis and not currently a candidate for liver transplant. Symptoms are suggestive of severe electrolyte imbalance with probable hepatorenal syndrome. He also is currently hypotensive. I have discussed the condition with the mother and fiancé along with Dr. Nuzhat Farias have discussed his CODE STATUS and agree on no intubation or chest compressions. We have also discussed possibility of transition into hospice pending his status. Continue aggressive care for the next 24 to 48 hours and observe response  · Electrolyte imbalance of hyponatremia being followed by nephrology  · Metabolic acidosis being corrected by bicarb infusion  · GI is following regarding his hepatic cirrhosis  · Vasopressor has been instituted  · Salt poor albumin to expand volume  · Blood culture has been obtained. Zosyn to cover for spontaneous bacterial peritonitis  · Patient is auto anticoagulated vitamin K supplementation  · Treat elevated ammonia level  · Paracentesis as needed  · Prognosis is very poor but he is improving short-term    -Consult hematology regarding severe coagulopathy  -We will try to discuss with GI regarding patient's discharge planning and care with family having multiple questions regarding this  -Patient has received 2 units of packed RBCs   -Patient will receive 2 units fresh frozen plasma     -Patient received 2 units packed RBCs, 2 units fresh frozen plasma, cryoprecipitate 12/9  -Patient received 1 unit packed RBC along with 2 units of fresh frozen plasma 12/10    -Abdominal paracentesis 12/ cc    EGD possibly on 12/13    Morphine sulfate 2 mg IV push every 1 hour prn pain  Ativan 1 mg IV push every hour.   DNR CC      Dorota Jay DO, F.A.C.O.I.  12/17/2021  1:00 PM

## 2021-12-17 NOTE — DISCHARGE INSTR - COC
Continuity of Care Form    Patient Name: Humble Lambert   :  1975  MRN:  [id number]    Admit date:  2021  Discharge date:  ***    Code Status Order: DNR-CC   Advance Directives:      Admitting Physician:  Juan F Mckeon DO  PCP: Luis Duque DO    Discharging Nurse: [facility] Unit/Room#: 3738/7048-67  Discharging Unit Phone Number: ***    Emergency Contact:   Extended Emergency Contact Information  Primary Emergency Contact: [address] Phone: [phone]  Work Phone: [phone]  Mobile Phone: [phone]  Relation: None  Secondary Emergency Contact: [address] Phone: [phone]  Relation: Brother/Sister    Past Surgical History:  Past Surgical History:   Procedure Laterality Date    KNEE SURGERY      ACL    UPPER GASTROINTESTINAL ENDOSCOPY  2018    UPPER GASTROINTESTINAL ENDOSCOPY N/A 2018    EGD BIOPSY performed by Brooke Perez MD at [facility] 2019    EGD ESOPHAGOGASTRODUODENOSCOPY performed by Tamra Navarrete DO at [facility]  2019    EGD CONTROL HEMORRHAGE performed by Tamra Navarrete DO at [facility] ENDOSCOPY       Immunization History: There is no immunization history on file for this patient. Active Problems:  Patient Active Problem List   Diagnosis Code    Alcohol-induced acute pancreatitis K85.20    Anxiety F41.9    Depression F32. A    ETOH abuse F10.10    Hematemesis K92.0    Atrial fibrillation with rapid ventricular response (HCC) I48.91    Alcohol withdrawal syndrome without complication (HCC) X50.891    Lactic acidosis E87.2    Hypokalemia E87.6    Severe protein-calorie malnutrition (HCC) E43    Paroxysmal atrial fibrillation (HCC) I48.0    Hyperbilirubinemia E80.6    Hypomagnesemia E83.42    Bilirubinemia E80.6    UGIB (upper gastrointestinal bleed) K92.2    Hepatorenal syndrome JORGE Safety Concerns:950356318}    Impairments/Disabilities:      [address] [facility] Impairments/Disabilities:346427794}    Nutrition Therapy:  Current Nutrition Therapy:   [address] [facility] Diet List:355417014}    Routes of Feeding: {CHP DME Other Feedings:939909139}  Liquids: {Slp liquid thickness:54640}  Daily Fluid Restriction: {CHP DME Yes amt example:925880222}  Last Modified Barium Swallow with Video (Video Swallowing Test): {Done Not Done CTGA:580544646}    Treatments at the Time of Hospital Discharge:   Respiratory Treatments: ***  Oxygen Therapy:  {Therapy; copd oxygen:05751}  Ventilator:    { CC Vent UGXT:330973079}    Rehab Therapies: {THERAPEUTIC INTERVENTION:1902016066}  Weight Bearing Status/Restrictions: [address]  Weight Bearin}  Other Medical Equipment (for information only, NOT a DME order):  {EQUIPMENT:140624624}  Other Treatments: ***    Patient's personal belongings (please select all that are sent with patient):  {CHP DME Belongings:715496274}    RN SIGNATURE:  {Esignature:530971379}    CASE MANAGEMENT/SOCIAL WORK SECTION    Inpatient Status Date: ***    Readmission Risk Assessment Score:  Readmission Risk              Risk of Unplanned Readmission:  21           Discharging to Facility/ Agency   Name: [address] [facility] [address]: [phone]  [phone]      Dialysis Facility (if applicable)   Name:  Address:  Dialysis Schedule:  Phone:  Fax:    / signature: Electronically signed by Byorn Bella on 21 at 1:33 PM EST    PHYSICIAN SECTION    Prognosis: Good    Condition at Discharge: Stable    Rehab Potential (if transferring to Rehab): Good    Recommended Labs or Other Treatments After Discharge: ***    Physician Certification: I certify the above information and transfer of Evaline Epley  is necessary for the continuing treatment of the diagnosis listed and that he requires Dhaval Gonzalez for less 30 days.      Update Admission H&P: {CHP DME Changes in [facility]:102338546}    PHYSICIAN SIGNATURE:  {Esignature:[id number]}

## 2021-12-17 NOTE — CARE COORDINATION
SS NOTE: COVID NEGATIVE 11/26- PT WILL NEED A NEGATIVE RAPID COVID 72 HOURS PRIOR TO [facility] TO [facility]. SW recd contact from [facility]- [phone]). She relates that they have accepted pt and has signed him with [facility] for Hospice services once he is ready to go to [facility] for end of life care under medicaid. Pt is in Contact [facility] for C-diff. Pt is a DNR CCA, no intubation. Pt is on IV  Zosyn. Pt will need a line he currently has a peripheral line. Community skilled  accepted pt- they will begin PRECERT once pt is stable for SNF placement. For a SNF placement pt will need a PRECERT, signed JORGE, current PT.OT notes and SW will need to complete the HENs. CLEVELAND Knox.17/16/2021.11:24AM.

## 2021-12-17 NOTE — PROGRESS NOTES
input(s): CHOL, HDL in the last 72 hours. Invalid input(s): LDLCALCU  ABGs: No results found for: PHART, PO2ART, [id number]  INR:   Recent Labs     12/15/21  1158 12/16/21  0823   INR 8.7* 9.2*       -----------------------------------------------------------------  RAD: US GALLBLADDER RUQ    Result Date: 11/28/2021  EXAMINATION: RIGHT UPPER QUADRANT ULTRASOUND 11/27/2021 5:24 pm COMPARISON: 11/14/2021 HISTORY: ORDERING SYSTEM PROVIDED HISTORY: decomp cirrhosis, CBD measurement TECHNOLOGIST PROVIDED HISTORY: Reason for exam:->decomp cirrhosis, CBD measurement What reading provider will be dictating this exam?->CRC FINDINGS: LIVER:  Nodular contour of the liver consistent with cirrhosis. The liver is somewhat heterogeneous but without obvious focal abnormality. There is apparent hepatofugal flow again identified within the main portal vein. BILIARY SYSTEM:  Gallbladder is incompletely distended with diffuse wall thickening. No sonographic Carrasco's sign. The wall thickening is nonspecific but could be reactive to ascites and hepatic parenchymal disease. No obvious cholelithiasis. Common bile duct is within normal limits measuring 6 mm. RIGHT KIDNEY: The right kidney is grossly unremarkable without evidence of hydronephrosis. PANCREAS:  Visualized portions of the pancreas are unremarkable. OTHER: Moderate ascites. Findings again consistent with cirrhotic liver. Ascites. Hepatofugal flow redemonstrated in the portal vein. Thickened gallbladder wall which may be reactive to the ascites and hepatic disease. XR CHEST PORTABLE    Result Date: 11/26/2021  EXAMINATION: ONE XRAY VIEW OF THE CHEST 11/26/2021 7:15 am COMPARISON: One-view chest x-ray 11/14/2021 HISTORY: ORDERING SYSTEM PROVIDED HISTORY: SOB TECHNOLOGIST PROVIDED HISTORY: Reason for exam:->SOB FINDINGS: The cardiac silhouette is within normal limits. The mediastinal contours are within normal limits.   The lungs are hypoinflated with resulting bronchovascular crowding. Mild basilar opacities appear new/increased. No significant pleural effusions or pneumothorax. Osseous degenerative changes are present. EKG leads overlie the chest.     Mild basilar opacities are suspicious for pneumonia. This appearance can be seen with COVID-19.     US ABDOMEN LIMITED    Result Date: 11/26/2021  EXAMINATION: RIGHT UPPER QUADRANT ULTRASOUND 11/26/2021 5:54 pm COMPARISON: None. HISTORY: ORDERING SYSTEM PROVIDED HISTORY: ascites/JULIO TECHNOLOGIST PROVIDED HISTORY: Reason for exam:->ascites/JULIO What reading provider will be dictating this exam?->CRC FINDINGS: Superficial sonographic evaluation of the 4 quadrants of the abdomen performed using a combination of grayscale technique. Imaging done to assess for the presence of intra-abdominal ascites. Patient was laying on their left-side. Moderate volume simple appearing intra-abdominal ascites in the left abdomen. Moderate volume intra-abdominal ascites. Objective:   Vitals: BP (!) 130/55   Pulse 123   Temp 98.1 °F (36.7 °C) (Temporal)   Resp 20   Ht 6' 3\" (1.905 m)   Wt 194 lb (88 kg)   SpO2 90%   BMI 24.25 kg/m²   General appearance: appears stated age   Skin:  No rashes or lesions  HEENT: Head: Normocephalic, no lesions, without obvious abnormality.   Neck: no adenopathy, no carotid bruit, no JVD, supple, symmetrical, trachea midline and thyroid not enlarged, symmetric, no tenderness/mass/nodules  Lungs: clear to auscultation bilaterally  Heart: regular rate and rhythm, S1, S2 normal, no murmur, click, rub or gallop  Abdomen: soft, non-tender; bowel sounds normal; no masses,  no organomegaly  Extremities: extremities normal, atraumatic, no cyanosis or edema  Neurologic: Mental status: Alert, oriented, thought content appropriate    Assessment:   Patient Active Problem List:     Alcohol-induced acute pancreatitis     Anxiety     Depression     ETOH abuse     Hematemesis     Atrial fibrillation with rapid ventricular response (HCC)     Alcohol withdrawal syndrome without complication (HCC)     Lactic acidosis     Hypokalemia     Severe protein-calorie malnutrition (HCC)     Paroxysmal atrial fibrillation (HCC)     Hyperbilirubinemia     Hypomagnesemia     Bilirubinemia     UGIB (upper gastrointestinal bleed)     Hepatorenal syndrome (HCC)         Assessment: / Plan:     1.  Acute kidney injury.  Acute kidney injury secondary to renal hypoperfusion with possible underlying hepatorenal syndrome. Resolved - Urine output has improved     2.  Metabolic acidosis.  Patient with non-anion gap metabolic acidosis which may be reflection of diarrhea as well as acute kidney injury.   Continue oral bicarb    3.   Hypomagnesemia.    Supplement potassium and magnesium as needed.      4.  Hypokalemia.  improving   Supplement with KCL IV PRN     5.  Hypocalcemia.   ionized calcium 1.12 / Vit D 14 / PTH 60 - Started oral Vit D on 11/29/21     6.  Hypotension. On midodrine   Stable follow with diuresis     7. Hypophosphatemia.    Supplement orally as needed for goal 2.5  On PhosNak TID supplement     8. Coagulopathy-  Transfuse as needed per primary/ hem       Renal respectfully signs off now that family has chosen hospice care        EILEEN Santamaria - CNP        Chart reviewed. Agree with  formulation, assessment and plan as outlined above and directed by me. Addition and corrections were done as deemed appropriate. My exam and plan include:   WE'LL SIGN OFF. NOTHING ELSE TO ADD FROM A RENAL STANDPOINT IN THIS UNFORTUNATE SITUATION. Continue current treatment.       Lenka Moran MD  Nephrology        Electronically signed by Lenka Moran MD on 12/17/2021 at 4:09 PM

## 2021-12-20 LAB
BLOOD CULTURE, ROUTINE: NORMAL
CULTURE, BLOOD 2: NORMAL

## 2021-12-20 NOTE — DISCHARGE SUMMARY
Internal Medicine Final Note         Primary Care Physician: Parth Sams DO   Admitting Physician:  Guera Hernandez DO  Admission date and time: 11/26/2021  9:25 AM    Room:  [address] [place] [address]  Admitting diagnosis: Hepatorenal syndrome ([facility] [address] [K76.7]  Acute kidney injury ([facility] [address] [N17.9]  Chronic liver failure without hepatic coma [facility]) [K72.10]    Patient Name: Lakisha Flores  MRN: [id number]    Date of Service: 12/20/2021       Subjective: John Bruce is a 40 y.o. male ,12/20/2021, at the bedside. Patient doing very poorly multiple problems at hand including hepatorenal syndrome and dropping blood pressure. Patient has very low urine output with evidence of metabolic acidosis. Currently being followed by multiple subspecialists. 11/2611/27/2021-medical coverage by Dr. Cody Devries    11/28/2021  Patient seen examined on PCU. Patient's mother is at the bedside and case discussed in detail. Patient is much more alert this morning. Patient oriented to person place. He denies any significant pain at this time. There is no lower leg edema and abdomen is distended but very soft. Serum sodium is up to 129 with a potassium 3.2. CO2 electrolytes is 18 and improved but BUN/creatinine is 44/3.2 which is improved compared to 11/27. Total bili is 24.2 with WBC 12.7 hemoglobin 10.1. Platelet count is down to 63. INR was 7.8. Temperature 98.8 with heart rate of 70 and blood pressure is 115/67. Patient has been put on phenylephrine drip with improvement in blood pressure. Urine output about 400 cc a shift. 11/29/2021  Patient seen and examined in PCU. Patient's mother is at the bedside and case discussed. Patient is still alert and oriented x23 with episodes of confusion. Patient denies any problem with chest or abdominal pain. Patient strength is still very poor with the patient still very weak on his feet. Patient appetite has improved with less nausea.   Patient still has persistent hand tremor which she has had for years according to the mother. But this has been gotten worse. BUN/creatinine 33/2.2 with serum sodium 130. Blood sugars ranging [id number]. INR is down to 5.7 today. Temperature ranges 90.7100.4 with a heart rate of 61 blood pressure 113/63. O2 sat 97% on room air at rest.  Patient still on phenylephrine drip. Urine output ranges 800-1300 cc a shift. 11/30/2021  Patient seen examined on PCU. Patient states he feels fairly good again today. He denies any chest or abdominal pain. Patient states he is very hungry and is still on liquids. Hemoglobin 9.2 with platelet count of down to 38. BUN/creatinine 24/1.4 with potassium 3.2 and sodium 131. Temperature 99 with heart rate 64 blood pressure 99/57. O2 sat 93% on room air. Phenylephrine drip still running. Urine output ranges to 200-4000 cc a shift. Patient is for abdominal paracentesis today. 12/1/2021  Patient seen examined in PCU. Patient with 2 family members at the bedside. Case was discussed. Patient alert and oriented x3. Patient's upper extremity tremors are moderately improved. Patient had questions about abdominal paracentesis. BUN/creatinine 25/1.4 today with total bilirubin 29. WBC 7.5 hemoglobin 8.2 and platelet count 62. Temperature ranges 97.998.3 with heart rate of 68 blood pressure range from 82/51112/64. O2 sats 100% on room air. Patient still on phenylephrine drip for blood pressure/ renal perfusion. Abdominal paracentesis when okay with GI, intensivist.    12/2/2021   Patient seen examined on PCU. Patient denies any specific pain at this time. Patient is little bit depressed about not getting his paracentesis performed yet. BUN/creatinine 18/1.2 with hemoglobin is 7.5 platelet count is down to 29. Patient received fresh frozen plasma and INR yesterday morning was 3.4 and today is 5. Temperature 90.3 with heart rate of 67 and blood pressure 110/70. Pulmonology and GI notes reviewed.   With kidney function about back to baseline phenylephrine drip will be weaned off.    12/3/2021  Patient seen examined on PCU. Patient alert and oriented x3. Patient denies any chest or significant abdominal pain. Abdomen distended but soft. BUN/creatinine 14/1.0 with blood sugars ranging 103126. Serum phosphorus 1.8. Total bili was 24.1 today and has improved somewhat. Hemoglobin is down to 7.2 today. INR is up to 6.3 today. Heart rate 65 with blood pressure 105/52 with O2 sat 94% on room air at rest.  Urinary output is good. Patient is still on Lavon-Synephrine and somatostatin drip. 12/4/2021  Patient seen examined on PCU. Patient received 1 unit packed RBC today with low hemoglobin. Patient denies any problem with chest pain and there is no continuous abdominal discomfort. Patient denies any unusual shortness of breath. BUN/creatinine 11/0.9 with serum magnesium 1.5. Phosphorus 1.7 with WBC 5.3 and hemoglobin 6.6. Total bili 23.4, INR 6.6 today. Temperature 98.5 with heart rate of 74 blood pressure 101/62. O2 sat 96% with the patient being titrated down on the Neosporin drip. 12/5/2021   Patient seen examined on PCU. Case discussed with patient's family at the bedside again today. Patient complaining of pain behind his upper arms in the tricep area. Patient says it started a couple days ago. More of a constant burning sensation. There is no rash noted but there is some ecchymosis noted on the upper portion of the arm and tricep area. This area is tender to palpation. There is no masses or hematoma noted. BUN/creatinine 12/1.0. Serum magnesium 1.4 again with phosphorus 1.7. Blood sugars still low 100s. Total bili is 24.8 today with a WBC 5.8 hemoglobin 6.7 early this morning with a repeat 7.1. Temperature 97.5 heart rate 78 blood pressure 118/64. Patient was taken off the Neosporin drip yesterday afternoon but was put back on this morning. O2 sat is 95% on room air.   Urine output ranges 150-450 cc shift. Patient given potassium and magnesium supplement. 12/6/2021  Patient seen examined on PCU. Patient is upset because he does not know what his INR is. Patient states his bilateral posterior upper arm discomfort is improved. There is no chest pain. Patient's abdomen is slowly getting bigger. BUN/creatinine 12/0.9. CO2 was 16 on the electrolytes. Total bili is 26.4 with hemoglobin 6.8 and WBC 5.5. INR is pending. Temperature 90.4 with heart rate of 81 with blood pressure 104/59 with an O2 sat 98%. We will type and cross give 1 more unit of packed RBC now. Consult hematology regarding coagulopathy. 12/7/2021  Patient seen examined on PCU. Patient is about the same. Abdomen is getting more distended. Oncology note reviewed. BUN/creatinine 12/0.9 with a CO2 of 16. Total bili is 24.2 with hemoglobin 6.7 with the patient receiving 2 units of packed RBCs last 24 hours. Platelet count is 39. Temperature 98.5 heart rate is 79 blood pressure 112/67. O2 sat 99% room air at rest. Urine output is good. Patient to receive 2 more units of fresh frozen plasma today with patient receiving platelets yesterday. Patient hopefully can have the abdominal paracentesis today after INR has been performed. 12/8/2021  Patient seen examined on PCU. Patient complained of increased abdominal distention and discomfort. Patient denies any chest pain. Patient little bit frustrated about being in the hospital so long and having his abnormal lab work including low hemoglobin and persistently elevated INR. Hemoglobin 6.2 today with a WBC of 4.4. Platelet count 39 with BUN/creatinine 10/0.8. Blood sugars in the low 100s. Total bili is 27.7. Temperature 90.3 with heart rate 83 and blood pressure 114/74. O2 sat 97% room air at rest.    12/9/2021  Patient seen examined on PCU. Patient is receiving 2 units packed RBCs today along with cryoprecipitate and fresh frozen plasma.   INR to be repeated after that. BUN/creatinine 10/0.8. Blood sugars range 76117. Hemoglobin 6.0 today with WBC of 4 and platelet count 46. INR is 4.7. Temperature is 98 with heart rate 91 blood pressure 170/77 O2 sat 99% room air at rest.    12/10/2021  Patient seen examined on PCU. Case discussed in detail with GI today. Hematology and nephrology note reviewed. Patient feels about the same. Patient has abdominal discomfort secondary to the ascites. He denies any chest pain. There is no nausea vomiting but he has poor appetite. Hemoglobin 6.6 today with patient hemoglobin 6.0 yesterday and repeat 6.9 yesterday afternoon. Temperature 98.4 with heart rate 91 blood pressure 105/72. O2 sat 95% on room air at rest.  Pending lab work today. Patient supposed to go for EGD today. Patient given 2 units of fresh frozen plasma again today with 1 unit of typed and crossed packed RBCs. Case was discussed with the invasive radiologist today. He states that he has no problem doing the abdominal paracentesis today. 12/11/2021  Patient seen and examined on PCU. Patient states he feels a bit better today after abdominal paracentesis. Patient had abdominal paracentesis yesterday for 4650 cc. CT the abdomen last night showed a large hematoma measuring 7.8 x 4.1 x 8.9 cm in the left lower pelvis with some mass-effect on the prostate and distal rectum. This also showed a large hydrocele. BUN/creatinine was 12/0.7 with magnesium 1.4. Serum phosphorus little bit low at 2.1 with WBC 4.6 and hemoglobin 8. Platelet count stable at 58. INR is 5.3. Temperature 98.5 heart rate 86 blood pressure 109/64. O2 sat 98% on room air at rest.  Urine output is good. Patient was supposed to have EGD done yesterday but this apparently been held until Tuesday. 12/12/2021  Patient seen examined on PCU. Patient's family is at the bedside and case discussed. Patient denies any new discomfort or problem. Patient is set up for EGD tomorrow. BUN/creatinine 12/0.8 with total bili of 41. WBC 4.4 with hemoglobin 7.5. Platelet count 47. INR 6.2 today. Temperature 98.2 with heart rate 89 blood pressure 110/68. O2 sat 99% room air at rest.  Urine output is excellent. 12/13/2021  Patient seen and examined in PCU. Patient very very lethargic today. BUN/creatinine 13/0.8 with potassium 3.4. Serum ammonia is elevated to 102. Total bilirubin is 44.5 with WBC 4.4 with hemoglobin 7.8 and platelet count 52. INR 6.8 today. 90.1 with heart rate 91 blood pressure 161/69. O2 sat 98% on room air at rest.  Case discussed with GI today. The EGD will be put on hold. Patient was last serum ammonia was 43 on admission. Lactulose was restarted. 12/14/2021  Patient seen examined on PCU. Patient is still more somnolent than his normal.  Patient's family at the bedside. Case discussed with GI also. Case discussed with patient's nurse. Patient still having altered mental status secondary to metabolic encephalopathy. Patient's total bili one of the INR is increasing. Some of the increased bili may be related to the hematoma reabsorbing. GI is is in the process of contacting [facility] about transfer for evaluation of possible transplant or a further change in management. BUN/creatinine was 14/0.8 with potassium 3.3. Serum ammonia 103 with total bilirubin 47.5. Hemoglobin stable 8.1 with a WBC 4.8. INR 7.3. Temperature is 90.3 with heart rate 96 blood pressure 123/67. O2 sat 97% room air at rest.    12/15/2021  Patient seen and examined in PCU. Patient still very confused and somnolent. Case discussed with patient's nurse at the bedside. BUN/creatinine 16/0.8 with potassium 3.0. Total bili is 55.7. Hemoglobin 7.9 with platelet count of 59. INR is 8.7 today. Temperature is 97.9 with heart rate in 91 blood pressure 134/67. O2 sat 94% on room air at rest.  Urine output ranges 300-400 cc shift.   Considering patient's continued hospitalization with aggressive care and the patient still developing acute hepatic encephalopathy with progressive increase in bilirubin and INR and patient not accepted at local tertiary centers for consideration for liver transplant strong consideration should be given for [facility] with hospice. 12/16/2021  Patient seen and examined in PCU. Case discussed with GI today. Patient will be consulted with hospice. Patient's father is at the bedside and case discussed. The patient is little bit more awake today but still very confused. BUN/creatinine 20/0.9 with serum potassium 3.0. Total bilirubin is 15.1 with INR of 9.3 with hemoglobin 6.7 and platelet count 71. Temperature is 97.3 with heart rate of 100 with blood pressure 130/63 and O2 sat 96% room air at rest.  Urine output is around 350 cc a shift. 12/17/2021  Patient seen and examined in PCU. Nurses called earlier this morning and that the patient was moaning in only responsive to pain. Multiple family members at the bedside with the patient being unresponsive at this time with pronounced respiration. Hospice consultation initiated yesterday. The family wants all further aggressive care stop and just wanting comfort care. The patient is terminal at this time with his end-stage liver disease. All the family members are excepting the patient's terminal condition. He was started on morphine 2 mg IV push every hour and will increase 3 mg IV push every hour as needed along with Ativan 1 mg IV every 1 hour as needed.   Patient is prognosis terminal     Patent passed away 14:32    Assessment:    · Acute hepatic encephalopathy  · Hepatorenal syndrome probable hepatopulmonary syndrome  · Acute hypoxic respiratory failure  · Hypotonic hypervolemic hyponatremia  · JULIO on CKD  · Pneumonia possible aspiration  · Decompensated cirrhosis- chronic alcoholism  · Hyperbilirubinemia  · Acute on Chronic normocytic anemia with Left pelvis hematoma  · Severe coagulopathy from liver failure  · Atrial fibrillation  · History of anxiety and depression  · History of alcohol abuse  · GERD    Patient passed away 14:32      Zachary Langley DO, F.A.C.O.I.  12/18/2021  9:34 AM

## 2021-12-21 LAB
COPPER /G CREAT: 52.3 UG/G CRT (ref 10–45)
COPPER URINE: 3.4 UG/DL
COPPER, 24H UR: 20.4 UG/D (ref 3–45)
CREATININE 24 HOUR URINE: 390 MG/D (ref 1000–2500)
CREATININE URINE: 65 MG/DL
HOURS COLLECTED: 24
URINE TOTAL VOLUME: 600

## 2021-12-29 LAB
HERPES TYPE 1/2 IGM COMBINED: ABNORMAL IV
HERPES TYPE I/II IGG COMBINED: >22.4 IV
HSV 1 GLYCOPROTEIN G AB IGG: 28.2 IV
HSV 2 GLYCOPROTEIN G AB IGG: 0.57 IV

## 2022-02-08 NOTE — PROGRESS NOTES
Nephrology Note  Kendell Brandt MD          Patient seen and examined. Patient's aunt is present at the bedside. Chart reviewed. Patient is much more awake and alert. He is feeling better. Oral intake and appetite are improving. Denies shortness of breath. No nausea or dysguesia. Awake and alert . In no acute distress. Vital SignsBP (!) 96/56   Pulse 73   Temp 98.4 °F (36.9 °C) (Axillary)   Resp 18   Ht 6' 3\" (1.905 m)   Wt 195 lb 1.6 oz (88.5 kg)   SpO2 96%   BMI 24.39 kg/m²   24HR INTAKE/OUTPUT:    Intake/Output Summary (Last 24 hours) at 11/28/2021 1320  Last data filed at 11/28/2021 1200  Gross per 24 hour   Intake 2651 ml   Output 900 ml   Net 1751 ml         Physical Exam  General appearance: Jaundiced  Neck: No JVD  Lungs: Breath sounds decreased at the bases. No rales or ronchi. Heart: Regular rate and rhythm. No S3 gallop. No  murmrur. Abdomen: Soft non distended, non tender and normal bowel sounds. Extremeties: No edema.       ROS:  RESPIRATORY:  negative  CARDIOVASCULAR:  negative  GASTROINTESTINAL:  negative  GENITOURINARY:  negative    Current Facility-Administered Medications   Medication Dose Route Frequency Provider Last Rate Last Admin    sodium bicarbonate tablet 1,300 mg  1,300 mg Oral TID Chuck Roge Salgado MD   1,300 mg at 11/28/21 0826    pantoprazole (PROTONIX) injection 40 mg  40 mg IntraVENous Daily Judy Healy MD   40 mg at 11/28/21 8573    And    sodium chloride (PF) 0.9 % injection 10 mL  10 mL IntraVENous Daily Joi Wild MD   10 mL at 11/28/21 0841    phenylephrine (DESMOND-SYNEPHRINE) 50 mg in dextrose 5 % 250 mL infusion   mcg/min IntraVENous Continuous Joi Wild MD 37.5 mL/hr at 11/28/21 0708 125 mcg/min at 11/28/21 0708    midodrine (PROAMATINE) tablet 15 mg  15 mg Oral TID WC Judy Healy MD   15 mg at 11/28/21 0826    octreotide (SANDOSTATIN) 500 mcg in sodium chloride 0.9 % 100 mL infusion 11/28/21 0841    sodium bicarbonate 150 mEq in dextrose 5 % 1,000 mL infusion   IntraVENous Continuous Chuck Carl Hall MD 85 mL/hr at 11/28/21 0736 New Bag at 11/28/21 0736       US GALLBLADDER RUQ   Final Result   Findings again consistent with cirrhotic liver. Ascites. Hepatofugal flow redemonstrated in the portal vein. Thickened gallbladder wall which may be reactive to the ascites and hepatic   disease. US ABDOMEN LIMITED   Final Result   Moderate volume intra-abdominal ascites. XR CHEST PORTABLE   Final Result   Mild basilar opacities are suspicious for pneumonia. This appearance can be   seen with COVID-19. IR US GUIDED PARACENTESIS    (Results Pending)         Labs:    CBC:   Recent Labs     11/26/21  1013 11/27/21  1100 11/28/21  0500   WBC 10.8 10.9 12.7*   HGB 12.4* 10.8* 10.1*   * 81* 63*        Lab Results   Component Value Date    IRON 132 05/08/2018    TIBC 149 (L) 05/08/2018    FERRITIN 1,815 05/08/2018       Lab Results   Component Value Date    CALCIUM 7.5 (L) 11/28/2021    CALCIUM 7.6 (L) 11/27/2021    CALCIUM 8.0 (L) 11/27/2021    PHOS 2.0 (L) 11/15/2021    PHOS 2.7 01/19/2019    PHOS 1.4 (L) 01/17/2019    MG 1.8 11/28/2021    MG 1.8 11/27/2021    MG 1.8 11/15/2021       BMP:   Recent Labs     11/27/21  1100 11/27/21  2300 11/28/21  0500   * 124* 129*   K 4.2 3.2* 3.2*   CL 95* 92* 97*   CO2 14* 17* 18*   BUN 45* 45* 44*   CREATININE 3.8* 3.4* 3.2*   GLUCOSE 89 114* 122*             Assessment: / Plan:    1. Acute kidney injury. Acute kidney injury secondary to renal hypoperfusion with possible underlying hepatorenal syndrome. Urine output has improved which goes against hepatorenal syndrome. BUN and creatinine slightly lower. We will continue gentle hydration. Will reorder urine studies. 2.  Metabolic acidosis. Patient with non-anion gap metabolic acidosis which may be reflection of diarrhea as well as acute kidney injury. Improved. Continue bicarbonate supplement. .       3.   Hyponatremia. .  Patient probably with hepatic failure associated hyponatremia along with use of spironolactone contributing to hyponatremia. Improved. 4.   Hyperkalemia. Hyperkalemia secondary acute kidney injury and use of potassium sparing diuretic patient now hypokalemic. Supplement potassium and magnesium. PLAN:    5. Hypocalcemia. .   Check ionized calcium, Vit D and PTH. PLAN:    6. Hypotension. Borderline. If needed will use midodrine. Bessy Parekh MD  Nephrology  Electronically signed by Alonso Parekh MD on 11/28/2021 at 1:19 PM      Note: This report was completed utilizing computer voice recognition software. Every effort has been made to ensure accuracy, however; inadvertent computerized transcription errors may be present. No, Declined

## 2022-05-09 NOTE — CONSULTS
[facility]  Dr. Flaco Schrader M.D., Dr. Ming Durham M.D., Dr. Mejia Phan D.O., Dr. Joann Dangelo M.D., Dr. Antolin Avendano D.O. Patient Name: Billie Fleischer  MRN: [id number]  : 1975 (40 y.o. male)  Allergies: is allergic to walnut [macadamia nut oil]. Date of Service: 2021       Reason for Consultation: Hepatic encephalopathy and renal failure    HISTORY OF PRESENT ILLNESS:      The patient is a 40 y.o. male who presents with history of alcohol induced cirrhosis who presented to the hospital due to confusion. Patient was recently discharged home after being treated in the hospital for new onset cirrhosis due to alcohol. Patient was home doing fine and started developing some confusion. Patient was brought to the emergency room for evaluation and was found to be encephalopathic, hyponatremic and as well as renal failure. Gastroenterology was consulted for further evaluation of cirrhosis and hepatic encephalopathy    I personally saw the patient this morning with his significant other and mother at the bedside. Significant other mentioned that patient was recently discharged and was doing well. Patient was taking all medication from discharge as prescribed. However starting yesterday patient began to express some bouts of confusion. Patient was brought to the emergency room for further evaluation. Given patient encephalopathic state patient was unable to participate in the HPI portion of this note. Per family member at bedside patient did not have any other additional complaint. Patient had no melena, hematochezia, or hematemesis. It was determined that patient had an increase urinary output with several bouts of diarrhea. Patient was moving his bowels about 5-6 times daily. Patient has continued to abstain from alcohol. Per significant other patient has been drinking mainly water and has had decrease in oral intake.   Lab on admission show hemoglobin and hematocrit 12.4 33.9 respectively. Sodium 124 potassium 5.0 chloride 99 BUN 45 creatinine 3.9. Bili remained 23.9. Patient did not have any additional complaint. Hemodynamically stable and afebrile otherwise. REVIEW OF SYSTEMS:   Constitutional: Denies fever, chills, or unintentional weight loss. HEENT: Denies double or blurry vision, headaches, ear pain or ringing in the ears. No drainage from the ears, nose or throat. Cardiovascular: Denies any chest pain, irregular heartbeats, or palpitations. Respiratory: Denies shortness of breath, coughing, sputum production, hemoptysis, or wheezing. Gastrointestinal: Denies nausea, vomiting, diarrhea, or constipation. Denies any abdominal pain, black or bloody stools. Genitourinary: Denies any urinary urgency, frequency, hematuria. Voiding without difficulty. Endocrine: Denies sensitivity to heat or cold. Denies changes in hair, skin or nails. Denies excessive thirst, hunger or going to the bathroom more frequently than usual.  Extremities: Denies swelling or calf pain. Musculoskeletal: Denies muscle or joint pain, stiffness, arthritis, gout, or instability. Dermatology / Skin: Denies any rashes, ulcers, or excoriations. Denies bruising. Neurology: Denies any bowel or bladder incontinence, headache or focal neurological deficits. No weakness or paresthesia. Denies numbness or tingling in the hands or feet. Psychiatric: Denies nervousness/anxiety, depression or memory loss.       Past Medical History:  Past Medical History:   Diagnosis Date    Anxiety     Depression     Esophagitis     ETOH abuse     Gastritis     GERD (gastroesophageal reflux disease)     Hematuria     Pancreatitis        Past Surgical History:  Past Surgical History:   Procedure Laterality Date    KNEE SURGERY      ACL    UPPER GASTROINTESTINAL ENDOSCOPY  05/09/2018    UPPER GASTROINTESTINAL ENDOSCOPY N/A 5/8/2018    EGD BIOPSY performed by Rahcel Cook MD at [place] [address]  UPPER GASTROINTESTINAL ENDOSCOPY N/A 1/21/2019    EGD ESOPHAGOGASTRODUODENOSCOPY performed by Jake Hussein DO at [facility]  1/21/2019    EGD CONTROL HEMORRHAGE performed by Jake Hussein DO at [address] [place] [address] Medications:  Prior to Admission medications    Medication Sig Start Date End Date Taking? Authorizing Provider   spironolactone (ALDACTONE) 100 MG tablet Take 1 tablet by mouth daily 11/23/21   Traction, DO   furosemide (LASIX) 40 MG tablet Take 1 tablet by mouth daily 11/23/21   Traction, DO   lactulose [facility]) 10 GM/15ML solution Take 30 mLs by mouth 3 times daily 11/22/21   Traction, DO   potassium chloride (KLOR-CON M) 20 MEQ extended release tablet Take 1 tablet by mouth 3 times daily (with meals) 11/22/21   Traction, DO   rifaximin (XIFAXAN) 550 MG tablet Take 1 tablet by mouth 3 times daily 11/22/21   Traction, DO   omeprazole (PRILOSEC) 20 MG delayed release capsule Take 20-40 mg by mouth daily    Historical Provider, MD       Allergies: Tania Sat nut oil]    Social History:  Social History     Socioeconomic History    Marital status:      Spouse name: Not on file    Number of children: 1    Years of education: Not on file    Highest education level: Not on file   Occupational History    Not on file   Tobacco Use    Smoking status: Never Smoker    Smokeless tobacco: Never Used   Vaping Use    Vaping Use: Never used   Substance and Sexual Activity    Alcohol use: Yes     Comment: 5th of liquour daily. Previous 1/15/2019    Drug use: No    Sexual activity: Not Currently     Partners: Female   Other Topics Concern    Not on file   Social History Narrative    Has a 15 y.o. son who lives in [place].       Social Determinants of Health     Financial Resource Strain:     Difficulty of Paying Living Expenses: Not on file   Food Insecurity:     Worried About Running Out of Affibody in the Last Year: Not on file    Ran Out of Food in the Last Year: Not on file   Transportation Needs:     Lack of Transportation (Medical): Not on file    Lack of Transportation (Non-Medical): Not on file   Physical Activity:     Days of Exercise per Week: Not on file    Minutes of Exercise per Session: Not on file   Stress:     Feeling of Stress : Not on file   Social Connections:     Frequency of Communication with Friends and Family: Not on file    Frequency of Social Gatherings with Friends and Family: Not on file    Attends Gnosticism Services: Not on file    Active Member of [address] [place] [address] or Organizations: Not on file    Attends Club or Organization Meetings: Not on file    Marital Status: Not on file   Intimate Partner Violence:     Fear of Current or Ex-Partner: Not on file    Emotionally Abused: Not on file    Physically Abused: Not on file    Sexually Abused: Not on file   Housing Stability:     Unable to Pay for Housing in the Last Year: Not on file    Number of [place] in the Last Year: Not on file    Unstable Housing in the Last Year: Not on file       Family History:  No family history on file. PHYSICAL EXAM:  Vital Signs: BP (!) 94/47   Pulse 98   Temp 98.9 °F (37.2 °C) (Axillary)   Resp 18   Ht 6' 3\" (1.905 m)   Wt 195 lb 1.6 oz (88.5 kg)   SpO2 99%   BMI 24.39 kg/m²   GENERAL APPEARANCE:  awake, alert, oriented, cooperative, and in no acute distress  EYES:  Lids and lashes normal, PERRLA, EOMI, sclera clear, conjunctiva normal  HENT:  Normocephalic, without obvious abnormality, atramatic, oral pharynx with moist mucus membranes, tonsils without erythema or exudates  NECK:  Supple with no carotid bruits, JVD or thyromegaly. No cervical adenopathy  LUNGS:  Clear to auscultation bilaterally with no wheezes, rales or rhonchi. No increased work of breathing, good air exchange.   CARDIOVASCULAR: Regular rate and rhythm, no murmur  ABDOMEN:  normal bowel sounds in all 4 quadrants, soft, non-distended, non-tender, no masses palpated, no hepatosplenomegally  MUSCULOSKELETAL:  There is no redness, warmth, or swelling of the joints. Full range of motion noted. Motor strength is 5 out of 5 all extremities bilaterally. Tone is normal.  EXTREMITIES: No edema, 2+ pulses bilaterally (radial and dorsalis pedis)  NEUROLOGIC:  Awake, alert, oriented to name, place and time. Cranial nerves II-XII are grossly intact. Motor is 5 out of 5 bilaterally. SKIN: Normal skin color, texture, and turgor. There is no redness, warmth, or swelling. No bruising or bleeding, no mottling. PSYCH: Affect, behavior and insight are all within normal limits.       DATA:  Results for orders placed or performed during the hospital encounter of 11/26/21   COVID-19, Rapid    Specimen: Nasopharyngeal Swab   Result Value Ref Range    SARS-CoV-2, NAAT Not Detected Not Detected   CBC Auto Differential   Result Value Ref Range    WBC 10.8 4.5 - 11.5 E9/L    RBC 3.57 (L) 3.80 - 5.80 E12/L    Hemoglobin 12.4 (L) 12.5 - 16.5 g/dL    Hematocrit 33.9 (L) 37.0 - 54.0 %    MCV 95.0 80.0 - 99.9 fL    MCH 34.7 26.0 - 35.0 pg    MCHC 36.6 (H) 32.0 - 34.5 %    RDW 18.2 (H) 11.5 - 15.0 fL    Platelets 212 (L) 353 - 450 E9/L    MPV 10.8 7.0 - 12.0 fL    Neutrophils % 87.0 (H) 43.0 - 80.0 %    Lymphocytes % 4.0 (L) 20.0 - 42.0 %    Monocytes % 9.0 2.0 - 12.0 %    Eosinophils % 0.0 0.0 - 6.0 %    Basophils % 0.0 0.0 - 2.0 %    Neutrophils Absolute 9.40 (H) 1.80 - 7.30 E9/L    Lymphocytes Absolute 0.43 (L) 1.50 - 4.00 E9/L    Monocytes Absolute 0.97 (H) 0.10 - 0.95 E9/L    Eosinophils Absolute 0.00 (L) 0.05 - 0.50 E9/L    Basophils Absolute 0.00 0.00 - 0.20 E9/L    Anisocytosis 1+     Polychromasia 1+     Poikilocytes 1+     Jasson Cells 1+     Ovalocytes 1+     Target Cells 1+    Comprehensive Metabolic Panel w/ Reflex to MG   Result Value Ref Range    Sodium 121 (L) 132 - 146 mmol/L    Potassium reflex Magnesium 5.8 (H) 3.5 - 5.0 mmol/L    Chloride 93 (L) 98 - 107 mmol/L    CO2 13 (L) 22 - 29 mmol/L    Anion Gap 15 7 - 16 mmol/L    Glucose 64 (L) 74 - 99 mg/dL    BUN 36 (H) 6 - 20 mg/dL    CREATININE 2.9 (H) 0.7 - 1.2 mg/dL    GFR Non-African American 24 >=60 mL/min/1.73    GFR African American 29     Calcium 8.1 (L) 8.6 - 10.2 mg/dL    Total Protein 6.7 6.4 - 8.3 g/dL    Albumin 1.7 (L) 3.5 - 5.2 g/dL    Total Bilirubin 23.9 (H) 0.0 - 1.2 mg/dL    Alkaline Phosphatase 127 40 - 129 U/L    ALT 91 (H) 0 - 40 U/L     (H) 0 - 39 U/L   Troponin   Result Value Ref Range    Troponin, High Sensitivity 20 (H) 0 - 11 ng/L   Brain Natriuretic Peptide   Result Value Ref Range    Pro- (H) 0 - 125 pg/mL   Ammonia   Result Value Ref Range    Ammonia 43.0 16.0 - 60.0 umol/L   Protime-INR   Result Value Ref Range    Protime 56.3 (H) 9.3 - 12.4 sec    INR 4.8    Urinalysis, reflex to microscopic   Result Value Ref Range    Color, UA DKYELLOW Straw/Yellow    Clarity, UA Clear Clear    Glucose, Ur 100 (A) Negative mg/dL    Bilirubin Urine LARGE (A) Negative    Ketones, Urine Negative Negative mg/dL    Specific Gravity, UA 1.010 1.005 - 1.030    Blood, Urine TRACE (A) Negative    pH, UA 6.5 5.0 - 9.0    Protein, UA Negative Negative mg/dL    Urobilinogen, Urine 0.2 <2.0 E.U./dL    Nitrite, Urine Negative Negative    Leukocyte Esterase, Urine Negative Negative   Lactate, Sepsis   Result Value Ref Range    Lactic Acid, Sepsis 3.4 (H) 0.5 - 1.9 mmol/L   Lactate, Sepsis   Result Value Ref Range    Lactic Acid, Sepsis 3.1 (H) 0.5 - 1.9 mmol/L   Blood Gas, Arterial   Result Value Ref Range    Date Analyzed [id number]     Time Analyzed 1035     Source: Blood Arterial     pH, Blood Gas 7.433 7.350 - 7.450    PCO2 15.8 (LL) 35.0 - 45.0 mmHg    PO2 105.7 (H) 75.0 - 100.0 mmHg    HCO3 10.3 (L) 22.0 - 26.0 mmol/L    B.E. -11.3 (L) -3.0 - 3.0 mmol/L    O2 Sat 97.8 92.0 - 98.5 %    O2Hb 97.0 94.0 - 97.0 %    COHb 0.4 0.0 - 1.5 %    MetHb 0.4 0.0 - 1.5 %    O2 Content 17.1 mL/dL    HHb 2.2 0.0 - 5.0 %    tHb (est) 12.4 11.5 - 16.5 g/dL    Mode NC- 5 L     Comment       criticals called to stanford mckenna RN read back verified    Date Of Collection      Time Collected      Pt Temp 37.0 C     ID [id number]     Lab [id number]     Critical(s) Notified Called to    Microscopic Urinalysis   Result Value Ref Range    WBC, UA 1-3 0 - 5 /HPF    RBC, UA 2-5 0 - 2 /HPF    Bacteria, UA NONE SEEN None Seen /HPF   Ethanol   Result Value Ref Range    Ethanol Lvl <10 mg/dL   Acetaminophen level   Result Value Ref Range    Acetaminophen Level <5.0 (L) 10.0 - 30.0 mcg/mL   Urine Drug Screen   Result Value Ref Range    Amphetamine Screen, Urine NOT DETECTED Negative <1000 ng/mL    Barbiturate Screen, Ur NOT DETECTED Negative < 200 ng/mL    Benzodiazepine Screen, Urine NOT DETECTED Negative < 200 ng/mL    Cannabinoid Scrn, Ur NOT DETECTED Negative < 50ng/mL    Cocaine Metabolite Screen, Urine NOT DETECTED Negative < 300 ng/mL    Opiate Scrn, Ur NOT DETECTED Negative < 300ng/mL    PCP Screen, Urine NOT DETECTED Negative < 25 ng/mL    Methadone Screen, Urine NOT DETECTED Negative <300 ng/mL    Oxycodone Urine NOT DETECTED Negative <100 ng/mL    FENTANYL SCREEN, URINE NOT DETECTED Negative <1 ng/mL    Drug Screen Comment: see below    Basic metabolic panel   Result Value Ref Range    Sodium 119 (LL) 132 - 146 mmol/L    Potassium 4.8 3.5 - 5.0 mmol/L    Chloride 96 (L) 98 - 107 mmol/L    CO2 9 (LL) 22 - 29 mmol/L    Anion Gap 14 7 - 16 mmol/L    Glucose 100 (H) 74 - 99 mg/dL    BUN 41 (H) 6 - 20 mg/dL    CREATININE 3.2 (H) 0.7 - 1.2 mg/dL    GFR Non-African American 21 >=60 mL/min/1.73    GFR African American 26     Calcium 7.9 (L) 8.6 - 10.2 mg/dL   Urinalysis with Microscopic   Result Value Ref Range    Color, UA DKYELLOW Straw/Yellow    Clarity, UA SLCLOUDY Clear    Glucose, Ur Negative Negative mg/dL    Bilirubin Urine LARGE (A) Negative    Ketones, Urine Negative Negative mg/dL    Specific Gravity, Stage 13: Additional Anesthesia Type: 1% lidocaine with epinephrine UA 1.010 1.005 - 1.030    Blood, Urine TRACE (A) Negative    pH, UA 6.0 5.0 - 9.0    Protein, UA Negative Negative mg/dL    Urobilinogen, Urine 0.2 <2.0 E.U./dL    Nitrite, Urine Negative Negative    Leukocyte Esterase, Urine Negative Negative    WBC, UA 0-1 0 - 5 /HPF    RBC, UA NONE 0 - 2 /HPF    Bacteria, UA NONE SEEN None Seen /HPF   Basic metabolic panel   Result Value Ref Range    Sodium 124 (L) 132 - 146 mmol/L    Potassium 5.0 3.5 - 5.0 mmol/L    Chloride 99 98 - 107 mmol/L    CO2 10 (L) 22 - 29 mmol/L    Anion Gap 15 7 - 16 mmol/L    Glucose 104 (H) 74 - 99 mg/dL    BUN 45 (H) 6 - 20 mg/dL    CREATININE 3.9 (H) 0.7 - 1.2 mg/dL    GFR Non-African American 17 >=60 mL/min/1.73    GFR African American 20     Calcium 8.1 (L) 8.6 - 10.2 mg/dL   Osmolality, Serum   Result Value Ref Range    Osmolality 268 (L) 285 - 310 mOsm/Kg   Osmolality, Urine   Result Value Ref Range    Osmolality, Ur 289 (L) 300 - 900 mOsm/kg   Urea nitrogen, urine   Result Value Ref Range    Urea Nitrogen, Ur 315 (L) 800 - 1666 mg/dL   Uric Acid   Result Value Ref Range    Uric Acid, Serum 5.7 3.4 - 7.0 mg/dL   Sodium, urine, random   Result Value Ref Range    Sodium, Ur 35 Not Established mmol/L   Eosinophil smear urine   Result Value Ref Range    Eosinophil, Urine 0 0 - 1 %   Creatinine, Random Urine   Result Value Ref Range    Creatinine, Ur 85 40 - 278 mg/dL   Chloride, urine, random   Result Value Ref Range    Chloride 56 Not Established mmol/L   POCT Glucose   Result Value Ref Range    Meter Glucose 95 74 - 99 mg/dL   POCT Glucose   Result Value Ref Range    Meter Glucose 106 (H) 74 - 99 mg/dL   POCT Glucose   Result Value Ref Range    Meter Glucose 98 74 - 99 mg/dL   EKG 12 Lead   Result Value Ref Range    Ventricular Rate 100 BPM    Atrial Rate 100 BPM    P-R Interval 148 ms    QRS Duration 116 ms    Q-T Interval 388 ms    QTc Calculation (Bazett) 500 ms    P Axis 45 degrees    R Axis 30 degrees    T Axis 44 degrees IMAGING:  XR CHEST PORTABLE    Result Date: 11/26/2021  EXAMINATION: ONE XRAY VIEW OF THE CHEST 11/26/2021 7:15 am COMPARISON: One-view chest x-ray 11/14/2021 HISTORY: ORDERING SYSTEM PROVIDED HISTORY: SOB TECHNOLOGIST PROVIDED HISTORY: Reason for exam:->SOB FINDINGS: The cardiac silhouette is within normal limits. The mediastinal contours are within normal limits. The lungs are hypoinflated with resulting bronchovascular crowding. Mild basilar opacities appear new/increased. No significant pleural effusions or pneumothorax. Osseous degenerative changes are present. EKG leads overlie the chest.     Mild basilar opacities are suspicious for pneumonia. This appearance can be seen with COVID-19. XR CHEST 1 VIEW    Result Date: 11/14/2021  EXAMINATION: ONE XRAY VIEW OF THE CHEST 11/14/2021 10:50 pm COMPARISON: Chest series from July 1, 2020 HISTORY: ORDERING SYSTEM PROVIDED HISTORY: Rule out acute pulmonary pathology/infection TECHNOLOGIST PROVIDED HISTORY: Reason for exam:->Rule out acute pulmonary pathology/infection FINDINGS: Elevation of the right hemidiaphragm. Possible right lower lung ill-defined opacity. Cannot exclude small pleural effusion on the right. Left lung is clear. No pneumothorax. Cardiomediastinal silhouette and pulmonary vascularity appear within normal limits. Osseous and thoracic soft tissue structures demonstrate no acute findings. Elevation of the right hemidiaphragm and possible right lower lung opacity. Small right pleural effusion may be present. RECOMMENDATION: (Recommend upright PA and lateral chest radiographs 6-8 weeks after resolution of patient's symptoms to ensure complete resolution of radiographic findings.)     US ABDOMEN LIMITED    Result Date: 11/26/2021  EXAMINATION: RIGHT UPPER QUADRANT ULTRASOUND 11/26/2021 5:54 pm COMPARISON: None.  HISTORY: ORDERING SYSTEM PROVIDED HISTORY: ascites/JULIO TECHNOLOGIST PROVIDED HISTORY: Reason for exam:->ascites/JULIO What reading provider will be dictating this exam?->CRC FINDINGS: Superficial sonographic evaluation of the 4 quadrants of the abdomen performed using a combination of grayscale technique. Imaging done to assess for the presence of intra-abdominal ascites. Patient was laying on their left-side. Moderate volume simple appearing intra-abdominal ascites in the left abdomen. Moderate volume intra-abdominal ascites. US ABDOMEN LIMITED Specify organ? LIVER, GALLBLADDER, PANCREAS    Result Date: 11/14/2021  EXAMINATION: RIGHT UPPER QUADRANT ULTRASOUND 11/14/2021 10:20 pm COMPARISON: 01/18/2019 HISTORY: ORDERING SYSTEM PROVIDED HISTORY: Jaundice TECHNOLOGIST PROVIDED HISTORY: Reason for exam:->Jaundice Specify organ?->LIVER Specify organ?->GALLBLADDER Specify organ?->PANCREAS What reading provider will be dictating this exam?->CRC FINDINGS: LIVER:  The liver appears cirrhotic. No focal liver lesions seen. There is no intrahepatic biliary dilatation. Doppler evaluation of the liver demonstrates reversal flow within the portal vein. This is a new finding as compared to 2019 BILIARY SYSTEM:  The gallbladder is very distended. There is thickening of the gallbladder wall measuring up to 11 mm. No cholelithiasis seen. There is pericholecystic fluid which may relate to ascites rather than acute inflammatory fluid. Common bile duct is within normal limits measuring 3.6 mm. RIGHT KIDNEY: The right kidney is grossly unremarkable without evidence of hydronephrosis. PANCREAS:  Limited visualization due to bowel gas. OTHER: Moderate ascites. 1. Cirrhotic liver with moderate ascites. 2. Suspected hepatofugal flow in the portal vein. 3. Distended gallbladder with marked wall thickening. No cholelithiasis seen. There is pericholecystic fluid although this probably represents ascites rather than inflammatory fluid.      IR US GUIDED PARACENTESIS    Result Date: 11/15/2021  PROCEDURE: PARACENTESIS WITHOUT IMAGE GUIDANCE US ABDOMEN LIMITED 11/15/2021 HISTORY: ORDERING SYSTEM PROVIDED HISTORY: Rule out SBP TECHNOLOGIST PROVIDED HISTORY: Reason for exam:->Rule out SBP TECHNIQUE: Informed consent was obtained after a detailed explanation of the procedure including risks, benefits, and alternatives. Universal protocol was followed. A limited ultrasound of the abdomen was performed. The right abdomen was prepped and draped in sterile fashion and local anesthesia was achieved with lidocaine. An 8 Wolof needle sheath was advanced into ascites and paracentesis was performed. The patient tolerated the procedure well. FINDINGS: Limited ultrasound of the abdomen demonstrates ascites. A total of 6400 cc was removed. Successful paracentesis. US DUP ABD PEL RETRO SCROT COMPLETE    Result Date: 11/14/2021  EXAMINATION: DOPPLER EVALUATION OF THE PELVIS 11/14/2021 10:20 pm COMPARISON: None. HISTORY: ORDERING SYSTEM PROVIDED HISTORY: Rule out PVT TECHNOLOGIST PROVIDED HISTORY: Reason for exam:->Rule out PVT What reading provider will be dictating this exam?->CRC FINDINGS: Color flow and spectral waveform evaluation of hepatic vasculature performed. The visualized portal vein appears patent. The there is no evidence for portal venous thrombus. There is, however, apparent reversal flow within the portal vein. Appropriate hepatic arterial flow demonstrated. Hepatic veins are not image. There is a cirrhotic appearing liver with moderate ascites. There is gallbladder distension with wall thickening. Doppler evaluation demonstrates hepatofugal portal venous flow. No evidence for portal venous thrombus. Moderate ascites. Gallbladder distension with wall thickening. ASSESSMENT / PLAN:    1. Hepatic encephalopathy type C grade II likely due to acute renal failure/dehydration   Patient has been having several bouts of diarrhea (4 5/day). Urine analysis has been negative urine drug screen also negative.   Blood culture incubating. Chest x-ray showed mild basilar opacity suspicious for pneumonia. Will hold lactulose for a day or 2 given volume depletion and reported diarrhea by family. Will continue rifaximin. Patient is currently receiving Zosyn per primary team.  Paracentesis (small volume) to rule out SBP. 2. Decompensated alcohol induced cirrhosis with ascites  Meld- Na 46 (last admission 35 11/20/2021) INR 4.9. Patient has abstained from alcohol since 11/4 and was recently discharged on 11/20 with treatment of lactulose and rifaximin as well as spironolactone and furosemide. Bilirubin has been elevated since last admission hovering around 23.9 similar to last admission  Liver chemistries remained elevated but overall has been trending down since last admission. Currently alk phos 127 ALT 91  bilirubin 23.9 (alk phos 208   bilirubin 20.5 from 11/14/2021)  We will continue antibiotics as mentioned above as rifaximin   Order a paracentesis with ascitic fluid cytology (small volume only!). 3. Acute renal failure most likely due to prerenal azotemia/dehydration vs hepatorenal syndrome  Patient has been having increased diarrhea with 4-5 bowel movement per day from lactulose. Patient was also initiated on diuretic and has been having increased urinary frequency. Per significant other at bedside patient has been mostly drinking water with poor oral intake  Cannot rule out hepatorenal syndrome. Patient is currently on IV hydration with sodium bicarb. Hold diuretic. Nephrology is on board and will defer treatment of renal failure/hepatorenal syndrome per nephrology    Other comorbidities  Hyponatremia  Hyperkalemia  History of atrial fibrillation  GERD  Hx Alcohol withdrawal and intoxication        Please see orders for further plan of care.   Reviewed above with attending    Marlon Madrigal DO, D.O.   GI fellow  11/27/2021 at 10:16 AM    I was present at bedside and performed my own exam.  I agree with the above findings and plan. Pt seen with Mom and significant other at bedside. PT lethargic and confused. Pt with JULIO likely from volume depletion from poor oral intake, diuretics, and diarreha presumably from lactulose. All notes reviewed. Pt on midodrine 15 TID already. Will add octreotide drip and give 1 dose of IV Albumin today for Alb 1.7 to help intravascular status and for possible hepatorenal syndrome. Will check stool studies, will check US with CBD measurement (no gallstones reported 11/14/21 on US). Watch glucose closely as may not have good glycogen stores given decompensated liver at this time. Will ask for diagnostic paracentesis (< / = 300cc ). Did d/w pt / MOM / Significant other poor prognosis given liver failure and question of heptorenal syndrome. Pt last drink around 3-4 weeks ago and not candidate for transplant yet. Will give Vit K dose for elevating INR. Per Critical care note pt was made DNR [facility] with no intubation. Mom and significant other are trying to get his son here. They understand poor overall prognosis and decompensation at this time. No outward bleeding thus far. Our service will follow.       Lucio Ward DO  11/27/2021  1:48 PM

## 2022-11-05 NOTE — PROGRESS NOTES
Internal Medicine Progress Note         Primary Care Physician: Zaheer Pires DO   Admitting Physician:  Zachary Langley DO  Admission date and time: 11/26/2021  9:25 AM    Room:  [address] [place] [address]  Admitting diagnosis: Hepatorenal syndrome ([facility] [address] [K76.7]  Acute kidney injury ([facility] [address] [N17.9]  Chronic liver failure without hepatic coma [facility]) [K72.10]    Patient Name: Jason Yee  MRN: [id number]    Date of Service: 11/30/2021       Subjective: Yusra Song is a 40 y.o. male ,11/30/2021, at the bedside. Patient doing very poorly multiple problems at hand including hepatorenal syndrome and dropping blood pressure. Patient has very low urine output with evidence of metabolic acidosis. Currently being followed by multiple subspecialists. 11/2611/27/2021-medical coverage by Dr. Aisha Mcdowell    11/28/2021  Patient seen examined on PCU. Patient's mother is at the bedside and case discussed in detail. Patient is much more alert this morning. Patient oriented to person place. He denies any significant pain at this time. There is no lower leg edema and abdomen is distended but very soft. Serum sodium is up to 129 with a potassium 3.2. CO2 electrolytes is 18 and improved but BUN/creatinine is 44/3.2 which is improved compared to 11/27. Total bili is 24.2 with WBC 12.7 hemoglobin 10.1. Platelet count is down to 63. INR was 7.8. Temperature 98.8 with heart rate of 70 and blood pressure is 115/67. Patient has been put on phenylephrine drip with improvement in blood pressure. Urine output about 400 cc a shift. 11/29/2021  Patient seen and examined in PCU. Patient's mother is at the bedside and case discussed. Patient is still alert and oriented x23 with episodes of confusion. Patient denies any problem with chest or abdominal pain. Patient strength is still very poor with the patient still very weak on his feet. Patient appetite has improved with less nausea.   Patient still has persistent hand tremor which she has had for years according to the mother. But this has been gotten worse. BUN/creatinine 33/2.2 with serum sodium 130. Blood sugars ranging [id number]. INR is down to 5.7 today. Temperature ranges 90.7100.4 with a heart rate of 61 blood pressure 113/63. O2 sat 97% on room air at rest.  Patient still on phenylephrine drip. Urine output ranges 800-1300 cc a shift. 11/30/2021  Patient seen examined on PCU. Patient states he feels fairly good again today. He denies any chest or abdominal pain. Patient states he is very hungry and is still on liquids. Hemoglobin 9.2 with platelet count of down to 38. BUN/creatinine 24/1.4 with potassium 3.2 and sodium 131. Temperature 99 with heart rate 64 blood pressure 99/57. O2 sat 93% on room air. Phenylephrine drip still running. Urine output ranges to 200-4000 cc a shift. Patient is for abdominal paracentesis today. Review of System: Limited at the present time  Constitutional:   Denies fever or chills, weight loss or gain, fatigue or malaise. HEENT:   Denies ear pain, sore throat, sinus or eye problems. Cardiovascular:   Denies any chest pain, irregular heartbeats, or palpitations. Respiratory:   Denies shortness of breath, coughing, sputum production, hemoptysis, or wheezing. Gastrointestinal:   Denies nausea, vomiting, diarrhea, or constipation. Denies any abdominal pain. Genitourinary:    Denies any urgency, frequency, hematuria. Voiding  without difficulty. Extremities:   Denies lower extremity swelling, edema or cyanosis. Neurology:    Denies any headache or focal neurological deficits, Denies generalized weakness or memory difficulty. Psch:   Denies being anxious or depressed. Musculoskeletal:    Denies  myalgias, joint complaints or back pain. Integumentary:   Denies any rashes, ulcers, or excoriations. Denies bruising. Hematologic/Lymphatic:  Denies bruising or bleeding.     Physical Exam:  No intake/output data recorded. Intake/Output Summary (Last 24 hours) at 11/30/2021 1443  Last data filed at 11/30/2021 0119  Gross per 24 hour   Intake 5916.7 ml   Output 1750 ml   Net 4166.7 ml   I/O last 3 completed shifts: In: 5916.7 [P.O.:480; I.V.:5066; Blood:66.7; IV Piggyback:304]  Out: 1750 [TEYMO:5324]  Patient Vitals for the past 96 hrs (Last 3 readings):   Weight   11/27/21 0600 195 lb 1.6 oz (88.5 kg)   11/26/21 1645 195 lb (88.5 kg)     Vital Signs:   Blood pressure (!) 99/57, pulse 67, temperature 99.6 °F (37.6 °C), temperature source Oral, resp. rate 18, height 6' 3\" (1.905 m), weight 195 lb 1.6 oz (88.5 kg), SpO2 94 %. General appearance:  Patient alert but lethargic. Appears chronically ill  Head:  Normocephalic. No masses, lesions or tenderness. Eyes:  PERRLA. EOMI. sclera icteric. Buccal mucosa moist.  ENT:  Ears normal. Mucosa normal.  Neck:    Supple. Trachea midline. No thyromegaly. No JVD. No bruits. Heart:    Rhythm regular. Tachycardic. No murmurs. Lungs:    Diminished  Abdomen:   Soft. Non-tender. Non-distended. Bowel sounds positive. No organomegaly or masses. No pain on palpation. Newly distended  Extremities:    Peripheral pulses present. No peripheral edema. No ulcers. No cyanosis. No clubbing.   Neurologic:    Responsive but lethargic  Integumentary:  No rashes skin jaundice  Genitalia/Breast:  Hickman catheter    Medication:  Scheduled Meds:   midodrine        insulin lispro  0-6 Units SubCUTAneous TID WC    albumin human  25 g IntraVENous Q8H    pantoprazole  40 mg IntraVENous Daily    And    sodium chloride (PF)  10 mL IntraVENous Daily    midodrine  15 mg Oral TID WC    thiamine (VITAMIN B1) IVPB  100 mg IntraVENous Daily    metoprolol  2.5 mg IntraVENous Q6H    rifaximin  550 mg Oral BID    sodium chloride flush  5-40 mL IntraVENous 2 times per day    piperacillin-tazobactam  3,375 mg IntraVENous Q8H     Continuous Infusions:   sodium chloride      sodium chloride      sodium chloride 1,000 mL (11/30/21 1255)    phenylephrine (DESMOND-SYNEPHRINE) 50mg/250mL infusion 115 mcg/min (11/30/21 1252)    octreotide (SANDOSTATIN) infusion 50 mcg/hr (11/30/21 0529)    dextrose      sodium chloride      sodium chloride Stopped (11/30/21 0844)       Objective Data:  CBC with Differential:    Lab Results   Component Value Date    WBC 7.2 11/30/2021    RBC 2.64 11/30/2021    HGB 9.2 11/30/2021    HCT 25.4 11/30/2021    PLT 38 11/30/2021    MCV 96.2 11/30/2021    MCH 34.8 11/30/2021    MCHC 36.2 11/30/2021    RDW 17.0 11/30/2021    METASPCT 0.9 11/20/2021    LYMPHOPCT 28.7 11/30/2021    MONOPCT 15.7 11/30/2021    BASOPCT 0.7 11/30/2021    MONOSABS 1.13 11/30/2021    LYMPHSABS 2.06 11/30/2021    EOSABS 0.24 11/30/2021    BASOSABS 0.05 11/30/2021     CMP:    Lab Results   Component Value Date     11/30/2021    K 3.2 11/30/2021    K 5.8 11/26/2021    CL 93 11/30/2021    CO2 26 11/30/2021    BUN 24 11/30/2021    CREATININE 1.4 11/30/2021    GFRAA >60 11/30/2021    LABGLOM 55 11/30/2021    GLUCOSE 104 11/30/2021    PROT 5.2 11/30/2021    LABALBU 2.8 11/30/2021    CALCIUM 7.9 11/30/2021    BILITOT 29.4 11/30/2021    ALKPHOS 47 11/30/2021    AST 67 11/30/2021    ALT 43 11/30/2021              Assessment:    · Acute hepatic encephalopathy  · Hepatorenal syndrome probable hepatopulmonary syndrome  · Acute hypoxic respiratory failure  · Hypotonic hypervolemic hyponatremia  · JULIO on CKD  · Pneumonia possible aspiration  · Decompensated cirrhosis  · Hyperbilirubinemia  · Chronic normocytic anemia  · Atrial fibrillation  · History of anxiety and depression  · History of alcohol abuse  · GERD        Plan:     · Patient is doing very poorly at the present time. The patient does have advanced liver disease with decompensated cirrhosis and not currently a candidate for liver transplant. Symptoms are suggestive of severe electrolyte imbalance with probable hepatorenal syndrome.   He also is currently hypotensive. I have discussed the condition with the mother and fiancé along with Dr. Dane Carey have discussed his CODE STATUS and agree on no intubation or chest compressions. We have also discussed possibility of transition into hospice pending his status. Continue aggressive care for the next 24 to 48 hours and observe response  · Electrolyte imbalance of hyponatremia being followed by nephrology  · Metabolic acidosis being corrected by bicarb infusion  · GI is following regarding his hepatic cirrhosis  · Vasopressor has been instituted  · Salt poor albumin to expand volume  · Blood culture has been obtained.   Zosyn to cover for spontaneous bacterial peritonitis  · Patient is auto anticoagulated vitamin K supplementation  · Treat elevated ammonia level  · Might require paracentesis  · Prognosis is very poor but he is improving short-term          Jeremie Borrero DO, F.A.C.O.I.  11/30/2021  2:43 PM PAST MEDICAL HISTORY:  Developmental delay     Scoliosis     Seizure

## 2023-09-16 NOTE — PROGRESS NOTES
Nephrology Note  Sonal Ibarra MD          Patient seen and examined. Patient's family is present at the bedside. Chart reviewed. Awake and alert visiting w/ family   In no acute distress. Low dose pressor support continues  Persistently elevated PT/INR  Paracentesis pending      Vital SignsBP (!) 101/58   Pulse 69   Temp 98.3 °F (36.8 °C) (Oral)   Resp 22   Ht 6' 3\" (1.905 m)   Wt 208 lb (94.3 kg)   SpO2 95%   BMI 26.00 kg/m²   24HR INTAKE/OUTPUT:      Intake/Output Summary (Last 24 hours) at 12/2/2021 1020  Last data filed at 12/2/2021 0600  Gross per 24 hour   Intake 1005 ml   Output 1625 ml   Net -620 ml         Physical Exam  General appearance: Jaundiced  Neck: No JVD  Lungs: Breath sounds decreased at the bases. No rales or ronchi. Heart: Regular rate and rhythm. No S3 gallop. No  murmrur. Abdomen: Soft non distended, non tender and normal bowel sounds. Extremeties: No edema.       ROS:  RESPIRATORY:  negative  CARDIOVASCULAR:  negative  GASTROINTESTINAL:  negative  GENITOURINARY:  negative    Current Facility-Administered Medications   Medication Dose Route Frequency Provider Last Rate Last Admin    phytonadione (VITAMIN K) tablet 10 mg  10 mg Oral Daily Georgina Cook MD   10 mg at 12/02/21 0831    melatonin tablet 6 mg  6 mg Oral Nightly PRN EILEEN Whitlock CNP   6 mg at 12/01/21 2009    vitamin D3 (CHOLECALCIFEROL) tablet 5,000 Units  5,000 Units Oral Daily Basim Barrientos MD   5,000 Units at 12/02/21 0831    0.9 % sodium chloride infusion   IntraVENous PRN Basim Barrientos MD        insulin lispro (HUMALOG) injection vial 0-6 Units  0-6 Units SubCUTAneous TID  EILEEN Cevallos CNP   4 Units at 11/30/21 2222    0.9 % sodium chloride infusion   IntraVENous PRN Basim Barrientos MD        0.9 % sodium chloride infusion   IntraVENous PRN Basim Barrientos MD        0.9 % sodium chloride infusion  1,000 mL IntraVENous Continuous BrittneeEILEEN Swartz CNP 75 mL/hr at 12/01/21 2203 1,000 mL at 12/01/21 2203    albumin human 25 % IV solution 25 g  25 g IntraVENous Q8H Hamida Deras MD   Stopped at 12/02/21 0432    pantoprazole (PROTONIX) injection 40 mg  40 mg IntraVENous Daily Meño Roth MD   40 mg at 12/02/21 0831    And    sodium chloride (PF) 0.9 % injection 10 mL  10 mL IntraVENous Daily Meño Roth MD   10 mL at 12/02/21 9176    phenylephrine (DESMOND-SYNEPHRINE) 50 mg in dextrose 5 % 250 mL infusion   mcg/min IntraVENous Continuous Meño Roth MD 27 mL/hr at 12/02/21 0635 90 mcg/min at 12/02/21 0635    midodrine (PROAMATINE) tablet 15 mg  15 mg Oral TID WC Meño Roth MD   15 mg at 12/02/21 0831    octreotide (SANDOSTATIN) 500 mcg in sodium chloride 0.9 % 100 mL infusion  50 mcg/hr IntraVENous Continuous Michelle Reddy DO 10 mL/hr at 12/01/21 2059 50 mcg/hr at 12/01/21 2059    thiamine (B-1) 100 mg in sodium chloride 0.9 % 100 mL IVPB  100 mg IntraVENous Daily Michelledeejay Reddy DO   Stopped at 12/02/21 0901    LORazepam (ATIVAN) tablet 1 mg  1 mg Oral Q1H PRN Bi Ketschke, DO        Or    LORazepam (ATIVAN) injection 1 mg  1 mg IntraVENous Q1H PRN Bi Ketschke, DO        rifaximin (XIFAXAN) tablet 550 mg  550 mg Oral BID Adilene Sweeney MD   550 mg at 12/02/21 0830    glucose (GLUTOSE) 40 % oral gel 15 g  15 g Oral PRN Emerald Butts, DO        dextrose 50 % IV solution  12.5 g IntraVENous PRN Emerald Butts, DO        glucagon (rDNA) injection 1 mg  1 mg IntraMUSCular PRN Emearld Butts, DO        dextrose 5 % solution  100 mL/hr IntraVENous PRN Emerald Butts, DO        sodium chloride flush 0.9 % injection 5-40 mL  5-40 mL IntraVENous 2 times per day Emerald Butts, DO   10 mL at 12/02/21 9091    sodium chloride flush 0.9 % injection 5-40 mL  5-40 mL IntraVENous PRN Emerald Butts, DO        0.9 % sodium chloride infusion  25 mL IntraVENous PRN Emerald Butts, DO        polyethylene glycol (GLYCOLAX) packet 17 g  17 g Oral Daily PRN Inocencio Morn, DO        acetaminophen (TYLENOL) tablet 650 mg  650 mg Oral Q6H PRN Inocencio Morn, DO        prochlorperazine (COMPAZINE) injection 10 mg  10 mg IntraVENous Q6H PRN Inocencio Morn, DO        piperacillin-tazobactam (ZOSYN) 3,375 mg in dextrose 5 % 50 mL IVPB extended infusion (mini-bag)  3,375 mg IntraVENous Q8H Inocencio Morn, DO   Stopped at 12/02/21 0554    And    0.9 % sodium chloride infusion   IntraVENous Q8H Inocencio Morn, DO   Stopped at 12/02/21 0540       US GALLBLADDER RUQ   Final Result   Findings again consistent with cirrhotic liver. Ascites. Hepatofugal flow redemonstrated in the portal vein. Thickened gallbladder wall which may be reactive to the ascites and hepatic   disease. US ABDOMEN LIMITED   Final Result   Moderate volume intra-abdominal ascites. XR CHEST PORTABLE   Final Result   Mild basilar opacities are suspicious for pneumonia. This appearance can be   seen with COVID-19.         US GUIDED PARACENTESIS    (Results Pending)         Labs:    CBC:   Recent Labs     11/30/21  0530 12/01/21  0430 12/02/21  0550   WBC 7.2 7.5 5.5   HGB 9.2* 8.2* 7.5*   PLT 38* 62* 29*        Lab Results   Component Value Date    IRON 132 05/08/2018    TIBC 149 (L) 05/08/2018    FERRITIN 1,815 05/08/2018       Lab Results   Component Value Date    PTH 60 11/30/2021    CALCIUM 8.3 (L) 12/02/2021    CALCIUM 7.8 (L) 12/01/2021    CALCIUM 7.9 (L) 11/30/2021    CAION 1.12 (L) 11/30/2021    PHOS 1.4 (L) 12/02/2021    PHOS 2.1 (L) 11/30/2021    PHOS 3.1 11/28/2021    MG 1.7 12/02/2021    MG 2.0 12/01/2021    MG 1.6 11/30/2021       BMP:   Recent Labs     11/30/21  0530 12/01/21  0430 12/02/21  0550   * 132 136   K 3.2* 3.8 3.8   CL 93* 98 104   CO2 26 23 21*   BUN 24* 25* 18   CREATININE 1.4* 1.4* 1.2   GLUCOSE 104* 127* 114*             Assessment: / Plan:    1. Acute kidney injury.   Acute kidney injury secondary to renal hypoperfusion with possible underlying hepatorenal syndrome. Urine output has improved which goes against hepatorenal syndrome. Now Resolved - IVF stop today     2. Metabolic acidosis. Patient with non-anion gap metabolic acidosis which may be reflection of diarrhea as well as acute kidney injury. Improved. 3.   Hyponatremia. Patient probably with hepatic failure associated hyponatremia along with use of spironolactone contributing to hyponatremia. SNa back to normal levels - follow off IVF    4. Hypokalemia. Supplement potassium and magnesium as needed    5. Hypocalcemia.   ionized calcium 1.12 / Vit D 14 / PTH 60 - Started oral Vit D on 11/29/21    6. Hypotension. Follow on pressor support and SPA. 7. Hypophosphatemia  Supplement as needed for goal 2.5             [facility]    Patient seen and examined. Chart reviewed. I had a face to face encounter with the patient. Agree with exam.    Agree with  formulation, assessment and plan as outlined above and directed by me. Addition and corrections were done as deemed appropriate. My exam and plan include: Function is improved. Patient still with significant pressor need. Continue current treatment.       Lexa Mendez MD  Nephrology        Electronically signed by Lexa Mendez MD on 12/2/2021 at 1:39 PM yes

## 2025-02-19 NOTE — PROGRESS NOTES
Continued Stay Note  [facility]     Patient Name: Wilner Menjivar  MRN: [id number]  Today's Date: 2/19/2025    Admit Date: 2/11/2025    Plan: Home, spouse to transport.   Discharge Plan       Row Name 02/19/25 1122       Plan    Plan Home, spouse to transport.    Patient/Family in Agreement with Plan yes    Plan Comments CCP met with pt at the bedside to confirm DC. Pt confirms DC plan is to return home, spouse to transport. Saad RN/CCP                   Discharge Codes    No documentation.                 Expected Discharge Date and Time       Expected Discharge Date Expected Discharge Time    Feb 20, 2025               Una Garcia RN     Patient admitted to room. Oriented to room and unit protocols. Bed alarm activated. Educated patient to call for help up to bathroom and bedside commode.  Will continue to assess and monitor patient

## (undated) DEVICE — MASK,FACE,MAXFLUIDPROTECT,SHIELD/ERLPS: Brand: MEDLINE

## (undated) DEVICE — FORCEPS BX L240CM JAW DIA2.8MM L CAP W/ NDL MIC MESH TOOTH

## (undated) DEVICE — Device: Brand: DEFENDO VALVE AND CONNECTOR KIT

## (undated) DEVICE — LUBRICANT SURG JELLY ST BACTER TUBE 4.25OZ

## (undated) DEVICE — SPONGE GZ 4IN 4IN 4 PLY N WVN AVANT

## (undated) DEVICE — 6 X 9  1.75MIL 4-WALL LABGUARD: Brand: MINIGRIP COMMERCIAL LLC

## (undated) DEVICE — KENDALL 450 SERIES MONITORING FOAM ELECTRODE - RECTANGULAR SHAPE ( 3/PK): Brand: KENDALL

## (undated) DEVICE — BLOCK BITE 60FR CAREGUARD

## (undated) DEVICE — COVER,LIGHT HANDLE,FLX,1/PK: Brand: MEDLINE INDUSTRIES, INC.

## (undated) DEVICE — CATHETER ELECHEMSTAS 7FR L300CM CHN 2.8MM STD CONN DISP G

## (undated) DEVICE — TUBING, SUCTION, 1/4" X 10', STRAIGHT: Brand: MEDLINE

## (undated) DEVICE — YANKAUER,BULB TIP,W/O VENT,RIGID,STERILE: Brand: MEDLINE

## (undated) DEVICE — KIT BEDSIDE REVITAL OX 500ML

## (undated) DEVICE — CONTAINER SPEC COLL 960ML POLYPR TRIANG GRAD INTAKE/OUTPUT

## (undated) DEVICE — GOWN ISOLATN REG YEL M WT MULTIPLY SIDETIE LEV 2

## (undated) DEVICE — TOWEL,OR,DSP,ST,BLUE,STD,6/PK,12PK/CS: Brand: MEDLINE